# Patient Record
Sex: MALE | Race: OTHER | NOT HISPANIC OR LATINO | Employment: PART TIME | URBAN - METROPOLITAN AREA
[De-identification: names, ages, dates, MRNs, and addresses within clinical notes are randomized per-mention and may not be internally consistent; named-entity substitution may affect disease eponyms.]

---

## 2022-01-07 ENCOUNTER — TELEPHONE (OUTPATIENT)
Dept: TRANSPLANT | Facility: CLINIC | Age: 57
End: 2022-01-07
Payer: COMMERCIAL

## 2022-01-07 NOTE — TELEPHONE ENCOUNTER
----- Message from Awa Manzo sent at 1/7/2022 11:41 AM CST -----    Hepatology referral received and scanned into media; pt chart sent to referral nurse for medical review.     Pt recs are also in Care Everywhere.    Referring Provider: Irvin Sandhu MD  Phone: [phone] Fax: [phone]                  .

## 2022-01-14 ENCOUNTER — TELEPHONE (OUTPATIENT)
Dept: TRANSPLANT | Facility: CLINIC | Age: 57
End: 2022-01-14
Payer: COMMERCIAL

## 2022-01-14 NOTE — TELEPHONE ENCOUNTER
----- Message from Awa Manzo sent at 1/7/2022 11:41 AM CST -----    Hepatology referral received and scanned into media; pt chart sent to referral nurse for medical review.     Pt recs are also in Care Everywhere.    Referring Provider: Irvin Sandhu MD  Phone: [phone] Fax: [phone]                  .

## 2022-01-14 NOTE — TELEPHONE ENCOUNTER
Referral received from CLINTON CAMARGO     Patient with cirrhosis unknown dx . MELD 20  ICD-10:  k74.60  Referred for liver transplant for  EVALUATION.    Referral completed and forwarded to [facility].          Insurance:   PRIMARY: [facility] Indemnity  ID: [id number]  [facility] [phone]

## 2022-01-28 ENCOUNTER — TELEPHONE (OUTPATIENT)
Dept: TRANSPLANT | Facility: CLINIC | Age: 57
End: 2022-01-28
Payer: COMMERCIAL

## 2022-01-28 DIAGNOSIS — Z76.82 ORGAN TRANSPLANT CANDIDATE: ICD-10-CM

## 2022-01-28 DIAGNOSIS — K74.60 HEPATIC CIRRHOSIS, UNSPECIFIED HEPATIC CIRRHOSIS TYPE, UNSPECIFIED WHETHER ASCITES PRESENT: ICD-10-CM

## 2022-02-02 ENCOUNTER — OFFICE VISIT (OUTPATIENT)
Dept: TRANSPLANT | Facility: CLINIC | Age: 57
End: 2022-02-02
Payer: COMMERCIAL

## 2022-02-02 ENCOUNTER — LAB VISIT (OUTPATIENT)
Dept: LAB | Facility: HOSPITAL | Age: 57
End: 2022-02-02
Attending: INTERNAL MEDICINE
Payer: COMMERCIAL

## 2022-02-02 VITALS
BODY MASS INDEX: 40.17 KG/M2 | SYSTOLIC BLOOD PRESSURE: 151 MMHG | RESPIRATION RATE: 16 BRPM | TEMPERATURE: 98 F | DIASTOLIC BLOOD PRESSURE: 70 MMHG | HEIGHT: 67 IN | HEART RATE: 110 BPM | OXYGEN SATURATION: 98 % | WEIGHT: 255.94 LBS

## 2022-02-02 DIAGNOSIS — K74.60 HEPATIC CIRRHOSIS, UNSPECIFIED HEPATIC CIRRHOSIS TYPE, UNSPECIFIED WHETHER ASCITES PRESENT: ICD-10-CM

## 2022-02-02 DIAGNOSIS — K74.69 DECOMPENSATED LIVER DISEASE: ICD-10-CM

## 2022-02-02 DIAGNOSIS — Z76.82 ORGAN TRANSPLANT CANDIDATE: ICD-10-CM

## 2022-02-02 DIAGNOSIS — E11.21 CONTROLLED TYPE 2 DIABETES MELLITUS WITH DIABETIC NEPHROPATHY, WITHOUT LONG-TERM CURRENT USE OF INSULIN: ICD-10-CM

## 2022-02-02 LAB
ABO + RH BLD: NORMAL
AFP SERPL-MCNC: 11 NG/ML (ref 0–8.4)
ALBUMIN SERPL BCP-MCNC: 2.1 G/DL (ref 3.5–5.2)
ALP SERPL-CCNC: 101 U/L (ref 55–135)
ALT SERPL W/O P-5'-P-CCNC: 75 U/L (ref 10–44)
AMPHET+METHAMPHET UR QL: NEGATIVE
ANION GAP SERPL CALC-SCNC: 7 MMOL/L (ref 8–16)
AST SERPL-CCNC: 124 U/L (ref 10–40)
BARBITURATES UR QL SCN>200 NG/ML: NEGATIVE
BASOPHILS # BLD AUTO: 0.03 K/UL (ref 0–0.2)
BASOPHILS NFR BLD: 0.8 % (ref 0–1.9)
BENZODIAZ UR QL SCN>200 NG/ML: NEGATIVE
BILIRUB DIRECT SERPL-MCNC: 1.8 MG/DL (ref 0.1–0.3)
BILIRUB SERPL-MCNC: 3.2 MG/DL (ref 0.1–1)
BILIRUB UR QL STRIP: NEGATIVE
BLD GP AB SCN CELLS X3 SERPL QL: NORMAL
BUN SERPL-MCNC: 17 MG/DL (ref 6–20)
BZE UR QL SCN: NEGATIVE
CALCIUM SERPL-MCNC: 7.5 MG/DL (ref 8.7–10.5)
CANNABINOIDS UR QL SCN: NEGATIVE
CHLORIDE SERPL-SCNC: 98 MMOL/L (ref 95–110)
CLARITY UR REFRACT.AUTO: CLEAR
CO2 SERPL-SCNC: 21 MMOL/L (ref 23–29)
COLOR UR AUTO: YELLOW
CREAT SERPL-MCNC: 1.3 MG/DL (ref 0.5–1.4)
CREAT UR-MCNC: 104 MG/DL (ref 23–375)
DIFFERENTIAL METHOD: ABNORMAL
EOSINOPHIL # BLD AUTO: 0 K/UL (ref 0–0.5)
EOSINOPHIL NFR BLD: 0 % (ref 0–8)
ERYTHROCYTE [DISTWIDTH] IN BLOOD BY AUTOMATED COUNT: 16.1 % (ref 11.5–14.5)
EST. GFR  (AFRICAN AMERICAN): >60 ML/MIN/1.73 M^2
EST. GFR  (NON AFRICAN AMERICAN): >60 ML/MIN/1.73 M^2
ETHANOL UR-MCNC: <10 MG/DL
GGT SERPL-CCNC: 79 U/L (ref 8–55)
GLUCOSE SERPL-MCNC: 320 MG/DL (ref 70–110)
GLUCOSE UR QL STRIP: NEGATIVE
HCT VFR BLD AUTO: 24.6 % (ref 40–54)
HGB BLD-MCNC: 8.1 G/DL (ref 14–18)
HGB UR QL STRIP: NEGATIVE
IMM GRANULOCYTES # BLD AUTO: 0.01 K/UL (ref 0–0.04)
IMM GRANULOCYTES NFR BLD AUTO: 0.3 % (ref 0–0.5)
INR PPP: 1.4 (ref 0.8–1.2)
KETONES UR QL STRIP: NEGATIVE
LEUKOCYTE ESTERASE UR QL STRIP: NEGATIVE
LYMPHOCYTES # BLD AUTO: 0.7 K/UL (ref 1–4.8)
LYMPHOCYTES NFR BLD: 18.1 % (ref 18–48)
MCH RBC QN AUTO: 30.1 PG (ref 27–31)
MCHC RBC AUTO-ENTMCNC: 32.9 G/DL (ref 32–36)
MCV RBC AUTO: 91 FL (ref 82–98)
METHADONE UR QL SCN>300 NG/ML: NEGATIVE
MONOCYTES # BLD AUTO: 0.5 K/UL (ref 0.3–1)
MONOCYTES NFR BLD: 12.4 % (ref 4–15)
NEUTROPHILS # BLD AUTO: 2.5 K/UL (ref 1.8–7.7)
NEUTROPHILS NFR BLD: 68.4 % (ref 38–73)
NITRITE UR QL STRIP: NEGATIVE
NRBC BLD-RTO: 0 /100 WBC
OPIATES UR QL SCN: NEGATIVE
PCP UR QL SCN>25 NG/ML: NEGATIVE
PH UR STRIP: 5 [PH] (ref 5–8)
PLATELET # BLD AUTO: 67 K/UL (ref 150–450)
PMV BLD AUTO: 10.3 FL (ref 9.2–12.9)
POTASSIUM SERPL-SCNC: 4.3 MMOL/L (ref 3.5–5.1)
PROT SERPL-MCNC: 6.4 G/DL (ref 6–8.4)
PROT UR QL STRIP: NEGATIVE
PROTHROMBIN TIME: 15.5 SEC (ref 9–12.5)
RBC # BLD AUTO: 2.69 M/UL (ref 4.6–6.2)
SODIUM SERPL-SCNC: 126 MMOL/L (ref 136–145)
SP GR UR STRIP: 1.01 (ref 1–1.03)
TOXICOLOGY INFORMATION: NORMAL
URN SPEC COLLECT METH UR: NORMAL
WBC # BLD AUTO: 3.7 K/UL (ref 3.9–12.7)

## 2022-02-02 PROCEDURE — 99999 PR PBB SHADOW E&M-EST. PATIENT-LVL III: ICD-10-PCS | Mod: PBBFAC,TXP,, | Performed by: INTERNAL MEDICINE

## 2022-02-02 PROCEDURE — 85610 PROTHROMBIN TIME: CPT | Mod: TXP | Performed by: INTERNAL MEDICINE

## 2022-02-02 PROCEDURE — 82977 ASSAY OF GGT: CPT | Mod: TXP | Performed by: INTERNAL MEDICINE

## 2022-02-02 PROCEDURE — 86706 HEP B SURFACE ANTIBODY: CPT | Mod: TXP | Performed by: INTERNAL MEDICINE

## 2022-02-02 PROCEDURE — 82105 ALPHA-FETOPROTEIN SERUM: CPT | Mod: TXP | Performed by: INTERNAL MEDICINE

## 2022-02-02 PROCEDURE — 80321 ALCOHOLS BIOMARKERS 1OR 2: CPT | Mod: TXP | Performed by: INTERNAL MEDICINE

## 2022-02-02 PROCEDURE — 99205 OFFICE O/P NEW HI 60 MIN: CPT | Mod: S$GLB,TXP,, | Performed by: INTERNAL MEDICINE

## 2022-02-02 PROCEDURE — 82248 BILIRUBIN DIRECT: CPT | Mod: TXP | Performed by: INTERNAL MEDICINE

## 2022-02-02 PROCEDURE — 99999 PR PBB SHADOW E&M-EST. PATIENT-LVL III: CPT | Mod: PBBFAC,TXP,, | Performed by: INTERNAL MEDICINE

## 2022-02-02 PROCEDURE — 87340 HEPATITIS B SURFACE AG IA: CPT | Mod: TXP | Performed by: INTERNAL MEDICINE

## 2022-02-02 PROCEDURE — 99205 PR OFFICE/OUTPT VISIT, NEW, LEVL V, 60-74 MIN: ICD-10-PCS | Mod: S$GLB,TXP,, | Performed by: INTERNAL MEDICINE

## 2022-02-02 PROCEDURE — 86901 BLOOD TYPING SEROLOGIC RH(D): CPT | Mod: TXP | Performed by: INTERNAL MEDICINE

## 2022-02-02 PROCEDURE — 36415 COLL VENOUS BLD VENIPUNCTURE: CPT | Mod: TXP | Performed by: INTERNAL MEDICINE

## 2022-02-02 PROCEDURE — 86850 RBC ANTIBODY SCREEN: CPT | Mod: TXP | Performed by: INTERNAL MEDICINE

## 2022-02-02 PROCEDURE — 86790 VIRUS ANTIBODY NOS: CPT | Mod: TXP | Performed by: INTERNAL MEDICINE

## 2022-02-02 PROCEDURE — 86704 HEP B CORE ANTIBODY TOTAL: CPT | Mod: TXP | Performed by: INTERNAL MEDICINE

## 2022-02-02 PROCEDURE — 85025 COMPLETE CBC W/AUTO DIFF WBC: CPT | Mod: TXP | Performed by: INTERNAL MEDICINE

## 2022-02-02 PROCEDURE — 80053 COMPREHEN METABOLIC PANEL: CPT | Mod: TXP | Performed by: INTERNAL MEDICINE

## 2022-02-02 PROCEDURE — 81003 URINALYSIS AUTO W/O SCOPE: CPT | Mod: TXP,59 | Performed by: INTERNAL MEDICINE

## 2022-02-02 PROCEDURE — 86803 HEPATITIS C AB TEST: CPT | Mod: TXP | Performed by: INTERNAL MEDICINE

## 2022-02-02 PROCEDURE — 80307 DRUG TEST PRSMV CHEM ANLYZR: CPT | Mod: TXP | Performed by: INTERNAL MEDICINE

## 2022-02-02 RX ORDER — LISINOPRIL 10 MG/1
10 TABLET ORAL DAILY
COMMUNITY
Start: 2022-01-09 | End: 2022-03-17

## 2022-02-02 RX ORDER — PANTOPRAZOLE SODIUM 40 MG/1
40 TABLET, DELAYED RELEASE ORAL DAILY
COMMUNITY
Start: 2021-10-20 | End: 2022-04-28

## 2022-02-02 RX ORDER — POTASSIUM CHLORIDE 1500 MG/1
20 TABLET, EXTENDED RELEASE ORAL DAILY
COMMUNITY
Start: 2022-01-09 | End: 2022-03-17

## 2022-02-02 RX ORDER — ATORVASTATIN CALCIUM 20 MG/1
20 TABLET, FILM COATED ORAL DAILY
COMMUNITY
Start: 2022-01-14 | End: 2022-03-24

## 2022-02-02 RX ORDER — LACTULOSE 10 G/15ML
30 SOLUTION ORAL; RECTAL 3 TIMES DAILY
COMMUNITY
Start: 2021-11-21 | End: 2022-02-02

## 2022-02-02 RX ORDER — FUROSEMIDE 40 MG/1
40 TABLET ORAL 2 TIMES DAILY
Status: ON HOLD | COMMUNITY
Start: 2022-01-20 | End: 2022-04-17 | Stop reason: SDUPTHER

## 2022-02-02 RX ORDER — SPIRONOLACTONE 50 MG/1
50 TABLET, FILM COATED ORAL 2 TIMES DAILY
COMMUNITY
Start: 2022-01-20 | End: 2022-03-17

## 2022-02-02 RX ORDER — RIFAXIMIN 550 MG/1
550 TABLET ORAL 2 TIMES DAILY
Status: ON HOLD | COMMUNITY
Start: 2021-12-10 | End: 2022-04-25 | Stop reason: HOSPADM

## 2022-02-02 NOTE — PROGRESS NOTES
Subjective:       Patient ID: Pelon Vasquez is a 57 y.o. male.    Chief Complaint: No chief complaint on file.    HPI  I saw this 57 y.o. man who came to the liver transplant evaluation clinic with his fiance.    He was diagnosed as having cirrhosis in the summer of 2021 but decompensated with ascites/edema in Nov 2021. He was hospitalized at that time and had 2 large volume paracenteses since then.    It is thought that his liver disease is a combination of alcohol/HASH. He admits to drinking 2 large drinks every night for years but stopped drinking when told about his liver disease in the summer of 2021.    Never considered himself to be addicted to alcohol and has not needed to attend AA or rehab. Has never had alcohol-related work or legal problems    MELD has been around 20    Ascites + HE  Fatigue  Dizzy    - ignacio 100 mg  --furossemide 40 mg    Usual weight was 235 lb    MELD-Na score: 25 at 2/2/2022  2:02 PM  MELD score: 17 at 2/2/2022  2:02 PM  Calculated from:  Serum Creatinine: 1.3 mg/dL at 2/2/2022  2:02 PM  Serum Sodium: 126 mmol/L at 2/2/2022  2:02 PM  Total Bilirubin: 3.2 mg/dL at 2/2/2022  2:02 PM  INR(ratio): 1.4 at 2/2/2022  2:02 PM  Age: 57 years    Recent stress test negative- Nov 2021    PMH:  DM  Hypercholesterolemia  Left shoulder surgeries  Knee surgery- Left MCL + meniscus + Right MCL (footballing injuries)    No heart issues    SH:  Construction  Non- smoker    No kids    FH:  adopted      Review of Systems   Constitutional: Positive for activity change, appetite change and fatigue. Negative for chills, fever and unexpected weight change.   HENT: Negative for hearing loss.    Eyes: Negative for discharge and visual disturbance.   Respiratory: Negative for cough, chest tightness, shortness of breath and wheezing.    Cardiovascular: Positive for leg swelling. Negative for chest pain and palpitations.   Gastrointestinal: Positive for abdominal distention. Negative for abdominal pain,  constipation, diarrhea and nausea.   Genitourinary: Negative for dysuria and frequency.   Musculoskeletal: Negative for arthralgias and back pain.   Skin: Negative for pallor and rash.   Neurological: Negative for dizziness, tremors, speech difficulty and headaches.   Hematological: Negative for adenopathy.   Psychiatric/Behavioral: Negative for agitation and confusion.           Lab Results   Component Value Date    ALT 75 (H) 02/02/2022     (H) 02/02/2022    GGT 79 (H) 02/02/2022    ALKPHOS 101 02/02/2022    BILITOT 3.2 (H) 02/02/2022     Past Medical History:   Diagnosis Date    Arthritis     Cirrhosis of liver     HTN (hypertension)     BRAYDON (obstructive sleep apnea)     Type 2 diabetes mellitus      Past Surgical History:   Procedure Laterality Date    COLONOSCOPY      FINGER AMPUTATION      MEDIAL COLLATERAL LIGAMENT REPAIR, KNEE Bilateral     ROTATRO CUFF REPAIR      TONSILLECTOMY       Current Outpatient Medications   Medication Sig    atorvastatin (LIPITOR) 20 MG tablet Take 20 mg by mouth once daily.    furosemide (LASIX) 40 MG tablet TAKE 1 TABLET BY MOUTH IN THE MORNING AND 1 TABLET IN THE EVENING    lisinopriL 10 MG tablet Take 10 mg by mouth once daily.    pantoprazole (PROTONIX) 40 MG tablet Take 40 mg by mouth.    potassium chloride (K-TAB) 20 mEq Take 20 mEq by mouth once daily.    spironolactone (ALDACTONE) 50 MG tablet Take 50 mg by mouth 2 (two) times daily.    XIFAXAN 550 mg Tab Take 550 mg by mouth 2 (two) times daily.     No current facility-administered medications for this visit.       Objective:      Physical Exam  Constitutional:       Appearance: Normal appearance.   Cardiovascular:      Rate and Rhythm: Normal rate and regular rhythm.      Pulses: Normal pulses.   Pulmonary:      Effort: Pulmonary effort is normal.      Breath sounds: Normal breath sounds.   Abdominal:      General: There is distension.   Musculoskeletal:      Right lower leg: Edema present.       Left lower leg: Edema present.         Assessment:       1. Hepatic cirrhosis, unspecified hepatic cirrhosis type, unspecified whether ascites present    2. Organ transplant candidate    3. Decompensated liver disease    4. Controlled type 2 diabetes mellitus with diabetic nephropathy, without long-term current use of insulin        Plan:   He has decompensated alcohol cirrhosis with a recent MELD score of 20. MELD score from today's labs is awaited.    - he talks about confusion and encephalopathy but he has not had any asterixis, does not describe slurred speech or disorientation.    - proceed with LT evaluation  - discussed living donor option    Patient advised that it is recommended that all transplant candidates, and their close contacts and household members receive Covid vaccination.    UNOS Patient Status  Functional Status: 80% - Normal activity with effort: some symptoms of disease  Physical Capacity: No Limitations    Patient on life support: No  Diabetes: Type II  Any previous malignancy: No  Neoadjuvant Therapy: no  Has patient ever had a dx of HCC: no  Previous Abdominal Surgery: no  Spontaneous Bacterial Peritonitis: no  History of Portal Vein Thrombosis: no  Transjugular Intrahepatic Portosystemic Shunt: no    New diabetes onset between last follow-up to the current follow-up: No  Did patient have any acute rejection episodes during the follow-up period: No  Post transplant malignancy: No

## 2022-02-02 NOTE — LETTER
February 4, 2022        Irvin Sandhu  [address]  [address]  [facility] [place] [address]  Phone: [phone]  Fax: [phone]             [facility]  [address]  [facility] [address]  Phone: [phone]   Patient: Pelon Vasquez   MR Number: [id number]   YOB: 1965   Date of Visit: 2/2/2022       Dear Dr. Irvin Sandhu    Thank you for referring Pelon Vasquez to me for evaluation. Attached you will find relevant portions of my assessment and plan of care.    If you have questions, please do not hesitate to call me. I look forward to following Pelon Vasquez along with you.    Sincerely,    Tito Mcclure MD    Enclosure    If you would like to receive this communication electronically, please contact [email or web address] or [phone] to request [email or web address] Link access.    [email or web address] Link is a tool which provides read-only access to select patient information with whom you have a relationship. Its easy to use and provides real time access to review your patients record including encounter summaries, notes, results, and demographic information.    If you feel you have received this communication in error or would no longer like to receive these types of communications, please e-mail [email or web address]

## 2022-02-03 LAB
HAV IGG SER QL IA: NEGATIVE
HBV CORE AB SERPL QL IA: NEGATIVE
HBV SURFACE AB SER-ACNC: NEGATIVE M[IU]/ML
HBV SURFACE AG SERPL QL IA: NEGATIVE
HCV AB SERPL QL IA: NEGATIVE

## 2022-02-04 PROBLEM — K74.60 DECOMPENSATED LIVER DISEASE: Status: ACTIVE | Noted: 2022-02-04

## 2022-02-04 PROBLEM — K72.90 DECOMPENSATED LIVER DISEASE: Status: ACTIVE | Noted: 2022-02-04

## 2022-02-04 PROBLEM — E11.9 CONTROLLED TYPE 2 DIABETES MELLITUS, WITHOUT LONG-TERM CURRENT USE OF INSULIN: Status: ACTIVE | Noted: 2022-02-04

## 2022-02-04 PROBLEM — K74.69 DECOMPENSATED LIVER DISEASE: Status: ACTIVE | Noted: 2022-02-04

## 2022-02-07 ENCOUNTER — TELEPHONE (OUTPATIENT)
Dept: TRANSPLANT | Facility: CLINIC | Age: 57
End: 2022-02-07
Payer: COMMERCIAL

## 2022-02-07 NOTE — TELEPHONE ENCOUNTER
Called pt to discuss liver transplant evaluation.  Informed patient that evaluation will take approx 2 days to complete.  Informed patient that all testing will be done outpatient.  Patient voiced understanding of the need to have his caregiver present for the  and surgeon consult, as well as for the patient education.  All questions/ concerns regarding liver transplant evaluation answered/ addressed.    Orders for liver transplant evaluation entered and submitted to  for scheduling.  Will schedule appts 2/17 and 2/18.  Patient reports that he is currently in the ER at [facility] for fluid overload, but anticipates being discharged within a few days.    ==================================================    ----- Message from Tito Mcclure MD sent at 2/3/2022  9:42 AM CST -----  Please start liver tx evaluation  Results reviewed.

## 2022-02-08 DIAGNOSIS — I10 HYPERTENSION, UNSPECIFIED TYPE: ICD-10-CM

## 2022-02-08 DIAGNOSIS — K74.69 DECOMPENSATED LIVER DISEASE: Primary | ICD-10-CM

## 2022-02-08 DIAGNOSIS — E11.9 CONTROLLED TYPE 2 DIABETES MELLITUS WITHOUT COMPLICATION, WITHOUT LONG-TERM CURRENT USE OF INSULIN: ICD-10-CM

## 2022-02-08 DIAGNOSIS — R18.8 OTHER ASCITES: ICD-10-CM

## 2022-02-08 DIAGNOSIS — Z76.82 ORGAN TRANSPLANT CANDIDATE: ICD-10-CM

## 2022-02-09 DIAGNOSIS — E11.9 CONTROLLED TYPE 2 DIABETES MELLITUS WITHOUT COMPLICATION, WITHOUT LONG-TERM CURRENT USE OF INSULIN: ICD-10-CM

## 2022-02-09 DIAGNOSIS — Z76.82 ORGAN TRANSPLANT CANDIDATE: ICD-10-CM

## 2022-02-09 DIAGNOSIS — K74.69 DECOMPENSATED LIVER DISEASE: ICD-10-CM

## 2022-02-09 DIAGNOSIS — I10 HYPERTENSION, UNSPECIFIED TYPE: Primary | ICD-10-CM

## 2022-02-09 LAB
PETH 16:0/18.1 (POPETH): <10 NG/ML
PETH 16:0/18.2 (PLPETH): <10 NG/ML

## 2022-02-10 ENCOUNTER — TELEPHONE (OUTPATIENT)
Dept: TRANSPLANT | Facility: CLINIC | Age: 57
End: 2022-02-10
Payer: COMMERCIAL

## 2022-02-10 DIAGNOSIS — E11.9 CONTROLLED TYPE 2 DIABETES MELLITUS WITHOUT COMPLICATION, WITHOUT LONG-TERM CURRENT USE OF INSULIN: ICD-10-CM

## 2022-02-10 DIAGNOSIS — K74.69 DECOMPENSATED LIVER DISEASE: Primary | ICD-10-CM

## 2022-02-10 DIAGNOSIS — R18.8 OTHER ASCITES: ICD-10-CM

## 2022-02-10 DIAGNOSIS — Z76.82 ORGAN TRANSPLANT CANDIDATE: ICD-10-CM

## 2022-02-10 NOTE — TELEPHONE ENCOUNTER
Called [facility] in [place], [place] to request disc of MRI/ CT.  Faxed request to [phone].

## 2022-02-14 ENCOUNTER — HOSPITAL ENCOUNTER (OUTPATIENT)
Dept: RADIOLOGY | Facility: HOSPITAL | Age: 57
Discharge: HOME | End: 2022-02-14
Attending: INTERNAL MEDICINE
Payer: COMMERCIAL

## 2022-02-14 DIAGNOSIS — K74.69 DECOMPENSATED LIVER DISEASE: ICD-10-CM

## 2022-02-14 DIAGNOSIS — R18.8 OTHER ASCITES: ICD-10-CM

## 2022-02-14 DIAGNOSIS — E11.9 CONTROLLED TYPE 2 DIABETES MELLITUS WITHOUT COMPLICATION, WITHOUT LONG-TERM CURRENT USE OF INSULIN: ICD-10-CM

## 2022-02-14 DIAGNOSIS — Z76.82 ORGAN TRANSPLANT CANDIDATE: ICD-10-CM

## 2022-02-14 PROCEDURE — 77080 DEXA BONE DENSITY SPINE HIP: ICD-10-PCS | Mod: 26,TXP,, | Performed by: RADIOLOGY

## 2022-02-14 PROCEDURE — 77080 DXA BONE DENSITY AXIAL: CPT | Mod: TC,TXP

## 2022-02-14 PROCEDURE — 77080 DXA BONE DENSITY AXIAL: CPT | Mod: 26,TXP,, | Performed by: RADIOLOGY

## 2022-02-16 DIAGNOSIS — Z76.82 ORGAN TRANSPLANT CANDIDATE: Primary | ICD-10-CM

## 2022-02-17 ENCOUNTER — HOSPITAL ENCOUNTER (OUTPATIENT)
Dept: RADIOLOGY | Facility: HOSPITAL | Age: 57
Discharge: HOME | End: 2022-02-17
Attending: INTERNAL MEDICINE
Payer: COMMERCIAL

## 2022-02-17 ENCOUNTER — HOSPITAL ENCOUNTER (OUTPATIENT)
Dept: CARDIOLOGY | Facility: HOSPITAL | Age: 57
Discharge: HOME | End: 2022-02-17
Attending: INTERNAL MEDICINE
Payer: COMMERCIAL

## 2022-02-17 ENCOUNTER — CLINICAL SUPPORT (OUTPATIENT)
Dept: TRANSPLANT | Facility: CLINIC | Age: 57
End: 2022-02-17
Payer: COMMERCIAL

## 2022-02-17 ENCOUNTER — OFFICE VISIT (OUTPATIENT)
Dept: INFECTIOUS DISEASES | Facility: CLINIC | Age: 57
End: 2022-02-17
Payer: COMMERCIAL

## 2022-02-17 VITALS
OXYGEN SATURATION: 98 % | RESPIRATION RATE: 16 BRPM | SYSTOLIC BLOOD PRESSURE: 140 MMHG | HEART RATE: 108 BPM | BODY MASS INDEX: 40.81 KG/M2 | SYSTOLIC BLOOD PRESSURE: 115 MMHG | WEIGHT: 260 LBS | DIASTOLIC BLOOD PRESSURE: 61 MMHG | TEMPERATURE: 97 F | WEIGHT: 260.38 LBS | RESPIRATION RATE: 16 BRPM | HEIGHT: 67 IN | BODY MASS INDEX: 40.87 KG/M2 | HEIGHT: 67 IN | DIASTOLIC BLOOD PRESSURE: 62 MMHG | HEART RATE: 99 BPM

## 2022-02-17 VITALS
OXYGEN SATURATION: 98 % | DIASTOLIC BLOOD PRESSURE: 62 MMHG | WEIGHT: 260.38 LBS | TEMPERATURE: 97 F | HEART RATE: 108 BPM | RESPIRATION RATE: 16 BRPM | SYSTOLIC BLOOD PRESSURE: 140 MMHG | BODY MASS INDEX: 40.87 KG/M2 | HEIGHT: 67 IN

## 2022-02-17 VITALS
HEIGHT: 67 IN | DIASTOLIC BLOOD PRESSURE: 58 MMHG | HEART RATE: 97 BPM | TEMPERATURE: 98 F | BODY MASS INDEX: 41.18 KG/M2 | WEIGHT: 262.38 LBS | SYSTOLIC BLOOD PRESSURE: 115 MMHG

## 2022-02-17 DIAGNOSIS — Z76.82 ORGAN TRANSPLANT CANDIDATE: ICD-10-CM

## 2022-02-17 DIAGNOSIS — K74.69 DECOMPENSATED LIVER DISEASE: ICD-10-CM

## 2022-02-17 DIAGNOSIS — R18.8 OTHER ASCITES: ICD-10-CM

## 2022-02-17 DIAGNOSIS — E11.9 CONTROLLED TYPE 2 DIABETES MELLITUS WITHOUT COMPLICATION, WITHOUT LONG-TERM CURRENT USE OF INSULIN: ICD-10-CM

## 2022-02-17 DIAGNOSIS — I10 HYPERTENSION, UNSPECIFIED TYPE: ICD-10-CM

## 2022-02-17 LAB
ASCENDING AORTA: 2.81 CM
AV INDEX (PROSTH): 0.8
AV MEAN GRADIENT: 10 MMHG
AV PEAK GRADIENT: 18 MMHG
AV VALVE AREA: 3 CM2
AV VELOCITY RATIO: 0.75
BSA FOR ECHO PROCEDURE: 2.36 M2
CV ECHO LV RWT: 0.37 CM
CV STRESS BASE HR: 100 BPM
DIASTOLIC BLOOD PRESSURE: 61 MMHG
DOP CALC AO PEAK VEL: 2.13 M/S
DOP CALC AO VTI: 40.63 CM
DOP CALC LVOT AREA: 3.7 CM2
DOP CALC LVOT DIAMETER: 2.18 CM
DOP CALC LVOT PEAK VEL: 1.6 M/S
DOP CALC LVOT STROKE VOLUME: 121.88 CM3
DOP CALCLVOT PEAK VEL VTI: 32.67 CM
E WAVE DECELERATION TIME: 185 MSEC
E/A RATIO: 0.91
E/E' RATIO: 8.08 M/S
ECHO LV POSTERIOR WALL: 0.85 CM (ref 0.6–1.1)
EJECTION FRACTION: 65 %
FRACTIONAL SHORTENING: 35 % (ref 28–44)
INTERVENTRICULAR SEPTUM: 0.69 CM (ref 0.6–1.1)
IVRT: 57.09 MSEC
LA MAJOR: 6.13 CM
LA MINOR: 6.01 CM
LA WIDTH: 4.86 CM
LEFT ATRIUM SIZE: 3.71 CM
LEFT ATRIUM VOLUME INDEX MOD: 37.8 ML/M2
LEFT ATRIUM VOLUME INDEX: 41.2 ML/M2
LEFT ATRIUM VOLUME MOD: 85.32 CM3
LEFT ATRIUM VOLUME: 93.02 CM3
LEFT INTERNAL DIMENSION IN SYSTOLE: 2.96 CM (ref 2.1–4)
LEFT VENTRICLE DIASTOLIC VOLUME INDEX: 42.73 ML/M2
LEFT VENTRICLE DIASTOLIC VOLUME: 96.57 ML
LEFT VENTRICLE MASS INDEX: 49 G/M2
LEFT VENTRICLE SYSTOLIC VOLUME INDEX: 15 ML/M2
LEFT VENTRICLE SYSTOLIC VOLUME: 33.95 ML
LEFT VENTRICULAR INTERNAL DIMENSION IN DIASTOLE: 4.58 CM (ref 3.5–6)
LEFT VENTRICULAR MASS: 111.38 G
LV LATERAL E/E' RATIO: 7.77 M/S
LV SEPTAL E/E' RATIO: 8.42 M/S
MV A" WAVE DURATION": 9.99 MSEC
MV PEAK A VEL: 1.11 M/S
MV PEAK E VEL: 1.01 M/S
MV STENOSIS PRESSURE HALF TIME: 53.65 MS
MV VALVE AREA P 1/2 METHOD: 4.1 CM2
OHS CV CPX 1 MINUTE RECOVERY HEART RATE: 141 BPM
OHS CV CPX 85 PERCENT MAX PREDICTED HEART RATE MALE: 140
OHS CV CPX MAX PREDICTED HEART RATE: 165
OHS CV CPX PATIENT IS FEMALE: 0
OHS CV CPX PATIENT IS MALE: 1
OHS CV CPX PEAK DIASTOLIC BLOOD PRESSURE: 36 MMHG
OHS CV CPX PEAK HEAR RATE: 141 BPM
OHS CV CPX PEAK RATE PRESSURE PRODUCT: NORMAL
OHS CV CPX PEAK SYSTOLIC BLOOD PRESSURE: 92 MMHG
OHS CV CPX PERCENT MAX PREDICTED HEART RATE ACHIEVED: 85
OHS CV CPX RATE PRESSURE PRODUCT PRESENTING: NORMAL
PISA TR MAX VEL: 2.55 M/S
PULM VEIN S/D RATIO: 1.52
PV PEAK D VEL: 0.67 M/S
PV PEAK S VEL: 1.02 M/S
RA MAJOR: 5.46 CM
RA PRESSURE: 3 MMHG
RA WIDTH: 3.9 CM
RIGHT VENTRICULAR END-DIASTOLIC DIMENSION: 3.59 CM
RV TISSUE DOPPLER FREE WALL SYSTOLIC VELOCITY 1 (APICAL 4 CHAMBER VIEW): 22.24 CM/S
SINUS: 2.95 CM
STJ: 2.66 CM
SYSTOLIC BLOOD PRESSURE: 115 MMHG
TDI LATERAL: 0.13 M/S
TDI SEPTAL: 0.12 M/S
TDI: 0.13 M/S
TR MAX PG: 26 MMHG
TRICUSPID ANNULAR PLANE SYSTOLIC EXCURSION: 3.02 CM
TV REST PULMONARY ARTERY PRESSURE: 29 MMHG

## 2022-02-17 PROCEDURE — 93975 VASCULAR STUDY: CPT | Mod: 26,TXP,, | Performed by: RADIOLOGY

## 2022-02-17 PROCEDURE — 76700 US EXAM ABDOM COMPLETE: CPT | Mod: TC,TXP,59

## 2022-02-17 PROCEDURE — 99999 PR PBB SHADOW E&M-EST. PATIENT-LVL III: CPT | Mod: PBBFAC,TXP,,

## 2022-02-17 PROCEDURE — 93351 STRESS TTE COMPLETE: CPT | Mod: 26,TXP,, | Performed by: INTERNAL MEDICINE

## 2022-02-17 PROCEDURE — 71046 XR CHEST PA AND LATERAL: ICD-10-PCS | Mod: 26,TXP,, | Performed by: RADIOLOGY

## 2022-02-17 PROCEDURE — 99999 PR PBB SHADOW E&M-EST. PATIENT-LVL IV: CPT | Mod: PBBFAC,TXP,, | Performed by: PHYSICIAN ASSISTANT

## 2022-02-17 PROCEDURE — 93351 STRESS ECHO (CUPID ONLY): ICD-10-PCS | Mod: 26,TXP,, | Performed by: INTERNAL MEDICINE

## 2022-02-17 PROCEDURE — 93351 STRESS TTE COMPLETE: CPT | Mod: TXP

## 2022-02-17 PROCEDURE — 99204 PR OFFICE/OUTPT VISIT, NEW, LEVL IV, 45-59 MIN: ICD-10-PCS | Mod: S$GLB,TXP,, | Performed by: PHYSICIAN ASSISTANT

## 2022-02-17 PROCEDURE — 93320 STRESS ECHO (CUPID ONLY): ICD-10-PCS | Mod: 26,TXP,, | Performed by: INTERNAL MEDICINE

## 2022-02-17 PROCEDURE — 99999 PR PBB SHADOW E&M-EST. PATIENT-LVL IV: ICD-10-PCS | Mod: PBBFAC,TXP,, | Performed by: PHYSICIAN ASSISTANT

## 2022-02-17 PROCEDURE — 63600175 PHARM REV CODE 636 W HCPCS: Mod: TXP | Performed by: INTERNAL MEDICINE

## 2022-02-17 PROCEDURE — 99999 PR PBB SHADOW E&M-EST. PATIENT-LVL III: ICD-10-PCS | Mod: PBBFAC,TXP,,

## 2022-02-17 PROCEDURE — 93325 DOPPLER ECHO COLOR FLOW MAPG: CPT | Mod: 26,TXP,, | Performed by: INTERNAL MEDICINE

## 2022-02-17 PROCEDURE — 71046 X-RAY EXAM CHEST 2 VIEWS: CPT | Mod: TC,FY,TXP

## 2022-02-17 PROCEDURE — 93975 VASCULAR STUDY: CPT | Mod: TC,TXP

## 2022-02-17 PROCEDURE — 93325 STRESS ECHO (CUPID ONLY): ICD-10-PCS | Mod: 26,TXP,, | Performed by: INTERNAL MEDICINE

## 2022-02-17 PROCEDURE — A4216 STERILE WATER/SALINE, 10 ML: HCPCS | Mod: TXP | Performed by: INTERNAL MEDICINE

## 2022-02-17 PROCEDURE — 76700 US EXAM ABDOM COMPLETE: CPT | Mod: 26,XS,TXP, | Performed by: RADIOLOGY

## 2022-02-17 PROCEDURE — 25000003 PHARM REV CODE 250: Mod: TXP | Performed by: INTERNAL MEDICINE

## 2022-02-17 PROCEDURE — 71046 X-RAY EXAM CHEST 2 VIEWS: CPT | Mod: 26,TXP,, | Performed by: RADIOLOGY

## 2022-02-17 PROCEDURE — 93320 DOPPLER ECHO COMPLETE: CPT | Mod: 26,TXP,, | Performed by: INTERNAL MEDICINE

## 2022-02-17 PROCEDURE — 99204 OFFICE O/P NEW MOD 45 MIN: CPT | Mod: S$GLB,TXP,, | Performed by: PHYSICIAN ASSISTANT

## 2022-02-17 PROCEDURE — 76700 US ABDOMEN COMP WITH DOPPLER_PRE LIVER TRANSPLANT: ICD-10-PCS | Mod: 26,XS,TXP, | Performed by: RADIOLOGY

## 2022-02-17 PROCEDURE — 93975 US ABDOMEN COMP WITH DOPPLER_PRE LIVER TRANSPLANT: ICD-10-PCS | Mod: 26,TXP,, | Performed by: RADIOLOGY

## 2022-02-17 RX ORDER — ATROPINE SULFATE 0.1 MG/ML
0.25 INJECTION INTRAVENOUS
Status: COMPLETED | OUTPATIENT
Start: 2022-02-17 | End: 2022-02-17

## 2022-02-17 RX ORDER — DOBUTAMINE HYDROCHLORIDE 400 MG/100ML
40 INJECTION INTRAVENOUS
Status: COMPLETED | OUTPATIENT
Start: 2022-02-17 | End: 2022-02-17

## 2022-02-17 RX ORDER — SODIUM CHLORIDE 0.9 % (FLUSH) 0.9 %
100 SYRINGE (ML) INJECTION
Status: COMPLETED | OUTPATIENT
Start: 2022-02-17 | End: 2022-02-17

## 2022-02-17 RX ORDER — SODIUM CHLORIDE 0.9 % (FLUSH) 0.9 %
50 SYRINGE (ML) INJECTION
Status: COMPLETED | OUTPATIENT
Start: 2022-02-17 | End: 2022-02-17

## 2022-02-17 RX ADMIN — SODIUM CHLORIDE 50 ML: 9 INJECTION, SOLUTION INTRAMUSCULAR; INTRAVENOUS; SUBCUTANEOUS at 10:02

## 2022-02-17 RX ADMIN — SODIUM CHLORIDE 100 ML: 9 INJECTION, SOLUTION INTRAMUSCULAR; INTRAVENOUS; SUBCUTANEOUS at 10:02

## 2022-02-17 RX ADMIN — ATROPINE SULFATE 0.25 MG: 0.1 INJECTION PARENTERAL at 10:02

## 2022-02-17 RX ADMIN — DOBUTAMINE HYDROCHLORIDE 40 MCG/KG/MIN: 400 INJECTION INTRAVENOUS at 10:02

## 2022-02-17 NOTE — PROGRESS NOTES
PHARM.D. PRE-TRANSPLANT NOTE:    This patient's medication therapy was evaluated as part of his pre-transplant evaluation.      The following general pharmacologic concerns were noted: none    The following concerns for post-operative pain management were noted: none    The following pharmacologic concerns related to HCV therapy were noted: none      This patient's medication profile was reviewed for considerations for DAA Hepatitis C therapy:    [x]  No current inducers of CYP 3A4 or PGP  [x]  No amiodarone on this patient's EMR profile in the last 24 months  [x]  No past or current atrial fibrillation on this patient's EMR profile       Current Outpatient Medications   Medication Sig Dispense Refill    atorvastatin (LIPITOR) 20 MG tablet Take 20 mg by mouth once daily.      furosemide (LASIX) 40 MG tablet TAKE 1 TABLET BY MOUTH IN THE MORNING AND 1 TABLET IN THE EVENING      lisinopriL 10 MG tablet Take 10 mg by mouth once daily.      pantoprazole (PROTONIX) 40 MG tablet Take 40 mg by mouth once daily.      potassium chloride (K-TAB) 20 mEq Take 20 mEq by mouth once daily.      spironolactone (ALDACTONE) 50 MG tablet Take 50 mg by mouth 2 (two) times daily.      XIFAXAN 550 mg Tab Take 550 mg by mouth 2 (two) times daily.       No current facility-administered medications for this visit.           I am available for consultation and can be contacted, as needed by the other members of the Transplant team.

## 2022-02-17 NOTE — PROGRESS NOTES
Pre Transplant Infectious Diseases Consult  Liver Transplant Recipient Evaluation    Requesting Physician: Dr. Mcclure    Reason for Visit:  Pre Transplant Evaluation    Organ:  Liver    Etiology of Liver Disease:  Alcohol cirrhosis and LOZANO. +ascites +paracentesis. Denies SBP. Not on lactulose. Denies esophageal varices     History of Prior Transplant:  No    Currently taking immunosuppressants/steroids:  No    History of Splenectomy:  No    Infectious History:   Current/recent infections or currently taking antibiotics?  No. Took abx for sinus infection in the last year   History of recurrent infections (sinuses, throat, bladder/kidneys, intestines, skin, dental, lung, catheter (HD/PD) related, or peritonitis/SBP)?  No  Any major hospitalizations due to infection?  No  If diabetic, history of diabetic foot infection/osteomyelitis?  No  History of shingles?  No  History of STDs (syphilis, viral hepatitis, HIV)?  Yes - syphilis in the late 80s. Treated.   Exposure to TB or ever had a positive TB skin test?  No  History of residence in coccidioides endemic areas ([facility]?  Born in [place], [place] and [place]   Any foreign travel?  No  Any associated illness?  No    Social/Environmental:  Occupational:  Construction   Animal exposures (dogs, cats, farm animals, bird cages, fish tanks):  Yes - indoor and outdoor   Hobbies (gardening, hike, fish/hunting, etc): none  Consumption of raw/undercooked meat or seafood?  No  Any injectable or smoked recreational drug use?  No    Immunization History:  Childhood vaccines:  Yes  Last Flu shot: 2021  Tetanus/TDAP:[id number]  Hepatitis A:  Hepatitis B:  Prevnar-13:  Pneumovax-23:  Shingles (Zostavax/Shingrix):  Meningococcal:  Other:     Serologies:  CMV IgG Interpretation   Date Value Ref Range Status   02/14/2022 Reactive (A) Non-Reactive Final     Hepatitis A Antibody IgG   Date Value Ref Range Status   02/02/2022 Negative  Final     Comment:     IgG anti-HAV not  detected.     Hep B Core Total Ab   Date Value Ref Range Status   02/02/2022 Negative  Final     Hep B S Ab   Date Value Ref Range Status   02/02/2022 Negative  Final     Comment:     Individual is considered not immune to HBV infection.     Hepatitis B Surface Ag   Date Value Ref Range Status   02/02/2022 Negative Negative Final     HIV 1/2 Ag/Ab   Date Value Ref Range Status   02/14/2022 Negative Negative Final     TB Gold Plus   Date Value Ref Range Status   02/14/2022 Negative  Final     Comment:     The Nil tube value is used to determine if the patient has a  preexisting immune response which could cause a false-positive  reading on the test.  In order for a test to be valid, the   NIL tube must have a value of less than or equal to 8.0 IU/mL.    The mitogen control tube is used to assure the patient has a   healthy immune status and also serves as a control for correct  blood handling and incubation.  It is used to detect false-  negative readings.  The mitogen tube must have a gamma   interferon value of greater than or equal to 0.5 IU/mL higher  that the value of the Nil tube.    The TB antigen tubes are coated with the M. tuberculosis   specific antigens. For a test to be considered positive,the  TB antigen tube values minus the Nil tube value must be   greater than or equal to 0.35 IU/mL.    Diagnosing or excluding tuberculosis disease, and assessing  the probability of LTNI, requires a combination of   epidemiological, historical, medical, and diagnostic findings  that should be taken into account when interpreting   Quantiferon-TB Gold Plus results.       RPR   Date Value Ref Range Status   02/14/2022 Non-reactive Non-reactive Final     Varicella Interpretation   Date Value Ref Range Status   02/14/2022 Positive (A) Negative Final     Comment:     <or=0.8    Negative        No detectable IgG antibody to Varicella zoster  by the TUYET test. Such individuals are presumed to be   uninfected with Varicella  zoster and to be susceptible to   primary infection.    0.9-1.0    Equivocal    >or=1.1    Positive        Indicates presence of detectable IgG antibody to Varicella   zoster by the TUYET test. Indicative of previous or current   infection.          Review of Systems   Constitutional: Negative for chills, decreased appetite, fever, malaise/fatigue, night sweats, weight gain and weight loss.   HENT: Negative for congestion, ear pain, hearing loss, hoarse voice, sore throat and tinnitus.    Eyes: Negative for blurred vision, pain, vision loss in left eye, vision loss in right eye and visual disturbance.   Cardiovascular: Negative for chest pain, dyspnea on exertion, leg swelling and palpitations.   Respiratory: Negative for cough, shortness of breath, sputum production and wheezing.    Skin: Negative for dry skin, itching, rash and suspicious lesions.   Musculoskeletal: Negative for back pain, joint pain, myalgias and neck pain.   Gastrointestinal: Negative for abdominal pain, constipation, diarrhea, heartburn, nausea and vomiting.   Genitourinary: Negative for dysuria, flank pain, frequency, hematuria, hesitancy and urgency.   Neurological: Negative for dizziness, headaches, numbness, paresthesias and weakness.   Psychiatric/Behavioral: Negative for depression and memory loss. The patient does not have insomnia and is not nervous/anxious.      Physical Exam  Vitals reviewed.   Constitutional:       General: He is not in acute distress.     Appearance: He is well-developed and well-nourished. He is obese. He is not diaphoretic.   HENT:      Head: Normocephalic and atraumatic.      Mouth/Throat:      Mouth: Oropharynx is clear and moist.   Eyes:      Extraocular Movements: EOM normal.      Pupils: Pupils are equal, round, and reactive to light.   Cardiovascular:      Rate and Rhythm: Normal rate and regular rhythm.      Pulses: Intact distal pulses.      Heart sounds: Normal heart sounds. No murmur heard.  No friction  rub. No gallop.    Pulmonary:      Effort: Pulmonary effort is normal. No respiratory distress.      Breath sounds: Normal breath sounds. No wheezing or rales.   Chest:      Chest wall: No tenderness.   Abdominal:      General: Bowel sounds are normal. There is distension.      Palpations: There is no mass.      Tenderness: There is no abdominal tenderness.   Musculoskeletal:         General: No deformity. Normal range of motion.      Cervical back: Normal range of motion and neck supple.      Right lower leg: Edema present.      Left lower leg: Edema present.   Skin:     General: Skin is warm and dry.      Findings: No erythema or rash.   Neurological:      Mental Status: He is alert and oriented to person, place, and time.   Psychiatric:         Mood and Affect: Mood and affect and mood normal.         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  The patient has been counseled on the importance of vaccinations including but not limited to a yearly flu vaccine. Patient was also instructed to encourage that family/caretakers receive their flu vaccine yearly. The patient was encouraged to contact us about any problems that may develop after immunizations and possible side effects were reviewed.     Specific guidance has been provided to the patient regarding the patient's occupation, hobbies and activities to avoid future infectious complications. These include but are not limited to: avoiding raw/undercooked meats and seafood, avoiding unpasteurized milk/cheeses, proper (hand) hygiene, contact with animals and appropriate vaccination of animals, use of mosquito/tick precautions, avoiding walking barefoot, avoiding sick contacts, and seeking medical advice  prior to foreign travel (specifically developing countries).     Transplant Candidacy: Based on available information, there are no identified significant barriers to transplantation from an infectious disease standpoint pending acceptable serologies and subject to recommendations below.     Final determination of transplant candidacy will be made once evaluation is complete and reviewed by the Transplant Selection Committee.      ID recommendations:          HIV, RPR, HEP C, NEGATIVE. Strongyloides IgG pending. If positive will need treatment with ivermectin      Vaccines recommended (rx given)   1. heplisav B vaccine series due day 0, 1 month  2. prevnar vaccine  3. shingrix series due day 0, 2-6 months   4. Hepatitis A vaccine series due day 0, 6 months  5. covid vaccine due

## 2022-02-17 NOTE — NURSING NOTE
Patient verified by 2 identifiers and allergies reviewed.  22g IV placed to lt AC.  DSE explained to patient, consent obtained & testing completed.  Pt tolerated testing well except for C/O lightheadedness, dizziness & fatigue associated w/ hypotension during peak testing.  NS 150cc IVP given in divided doses w/ resolution of hypotension & symptoms.  IV removed post testing.  Post study discharge instructions reviewed with patient, patient verbalized understanding.  Patient discharged to home in stable condition.

## 2022-02-17 NOTE — PROGRESS NOTES
Pelon was  seen in clinic for Fast Pass evaluation.  Handbook on pre-liver transplant information (see outline below) was given to the patient.  Patient's wife, accompanied him to his scheduled appointments   Patient viewed pre-liver transplant education slides via desktop in transplant clinic.  Informed consent signed and written information given on selection criteria.    LIVER TRANSPLANT WORK-UP EDUCATION   I. UNDERSTANDING THE TRANSPLANT PROCESS     A. Transplant team      B. MELD score      C. Balancing urgency and outcome     D. Liver Transplant Options         1.  Donor         2. Living Donor--rationale, benefits     E. Transplant Work-up         1. Medical         2. Psychological and Social--lifetime commitment, life changes, personal plan/ goal         3. Financial--fundraising     F.  Completed work-up and Next Steps    G. Wait Time         1.  Can be listed at more than one center         2.  Can transfer wait time     H. The Call       I. Possible donor options         1. DCD         2. Hep B Core and Hep C Positive         3. Increased Risk     J.  Liver Transplant Surgery         1. Length         2. Transfusions, cell saver         3. Surgical risks         4. What to expect after sugery--Central lines, drains, Shirley catheter, incision, endotracheal              tube, NG tube, length of stay in ICU/ TSU  II.  HOW TO BEST CARE FOR YOURSELF (Take Five To Thrive)  III. UNDERSTANDING LIFE AFTER TRANSPLANT  A. Medicines after transplant      1. Immunosuppression--infection and rejection  B. Labs   IV. ADULT LIVER SURVIVAL RATES

## 2022-02-18 ENCOUNTER — LAB VISIT (OUTPATIENT)
Dept: LAB | Facility: HOSPITAL | Age: 57
End: 2022-02-18
Attending: INTERNAL MEDICINE
Payer: COMMERCIAL

## 2022-02-18 DIAGNOSIS — Z76.82 ORGAN TRANSPLANT CANDIDATE: ICD-10-CM

## 2022-02-18 DIAGNOSIS — E11.9 CONTROLLED TYPE 2 DIABETES MELLITUS WITHOUT COMPLICATION, WITHOUT LONG-TERM CURRENT USE OF INSULIN: ICD-10-CM

## 2022-02-18 DIAGNOSIS — K74.69 DECOMPENSATED LIVER DISEASE: ICD-10-CM

## 2022-02-18 DIAGNOSIS — R18.8 OTHER ASCITES: ICD-10-CM

## 2022-02-18 LAB
CREAT CL/1.73 SQ M 12H UR+SERPL-ARVRAT: 49 ML/MIN (ref 70–110)
CREAT CL/1.73 SQ M 12H UR+SERPL-ARVRAT: 49 ML/MIN (ref 70–110)
CREAT SERPL-MCNC: 1.4 MG/DL (ref 0.5–1.4)
CREAT SERPL-MCNC: 1.4 MG/DL (ref 0.5–1.4)
CREAT UR-MCNC: 157.9 MG/DL (ref 23–375)
CREAT UR-MCNC: 157.9 MG/DL (ref 23–375)
CREATININE, URINE (MG/SPEC): 986.9 MG/SPEC
CREATININE, URINE (MG/SPEC): 986.9 MG/SPEC
PROT 24H UR-MRATE: 75 MG/SPEC (ref 0–100)
PROT 24H UR-MRATE: 75 MG/SPEC (ref 0–100)
PROT UR-MCNC: 12 MG/DL (ref 0–15)
PROT UR-MCNC: 12 MG/DL (ref 0–15)
URINE COLLECTION DURATION: 24 HR
URINE VOLUME: 625 ML

## 2022-02-18 PROCEDURE — 84156 ASSAY OF PROTEIN URINE: CPT | Mod: TXP | Performed by: INTERNAL MEDICINE

## 2022-02-18 PROCEDURE — 82575 CREATININE CLEARANCE TEST: CPT | Mod: TXP | Performed by: INTERNAL MEDICINE

## 2022-02-18 NOTE — PROGRESS NOTES
Pre-liver transplant education provided via slide show in exam room of transplant clinic.  E-consents obtained by Lianne Steven LPN and can be found under the Media tab.  ALBAN and HIV consent submitted to ZION Snyder MA for scanning.

## 2022-02-21 ENCOUNTER — TELEPHONE (OUTPATIENT)
Dept: TRANSPLANT | Facility: CLINIC | Age: 57
End: 2022-02-21
Payer: COMMERCIAL

## 2022-02-21 ENCOUNTER — DOCUMENTATION ONLY (OUTPATIENT)
Dept: TRANSPLANT | Facility: CLINIC | Age: 57
End: 2022-02-21
Payer: COMMERCIAL

## 2022-02-21 DIAGNOSIS — E56.9 VITAMIN DEFICIENCY: Primary | ICD-10-CM

## 2022-02-21 RX ORDER — UREA 10 %
220 LOTION (ML) TOPICAL DAILY
Qty: 90 TABLET | Refills: 1 | Status: ON HOLD | COMMUNITY
Start: 2022-02-21 | End: 2022-04-25 | Stop reason: HOSPADM

## 2022-02-21 NOTE — TELEPHONE ENCOUNTER
Called patient to inform him that his case will be discussed this week to see rather he is a suitable transplant candidate, but covid vaccine and EGD pending.  Patient confirms that he has not had the vaccination, but reports that he will schedule J&J ASAP.  Patient made aware that even if approved for transplant, he will not be listed until FULLY vaccinated.  In regards to EGD, patient unaware if he has ever had this done.  Called pt's local GI MD, Dr. Sandhu.  Message left for nurse requesting report of EGD if done, and a callback if not done.  Phone and fax number provided. Awaiting call back.

## 2022-02-21 NOTE — TELEPHONE ENCOUNTER
Phone call returned.  No answer.  Message left on VM confirming that message received, and will follow-up later this week to get results and to confirm that covid vaccine administered.  Return phone call requested if any further assistance needed or for any questions/ concerns.  Contact info provided.    ==============================================    ----- Message from Adrián Pulliam sent at 2/21/2022  2:30 PM CST -----  Dr. Sandhu calling to state that Mr. Vasquez is scheduled for EGD this Wednesday and vaccines will be completed as well.    Call # [phone]

## 2022-02-21 NOTE — TELEPHONE ENCOUNTER
Received call back from Dr. Sandhu's office.  Patient has not had an EGD.  Informed nurse that EGD will likely need to be scheduled for patient.  Will call back later this week w/ further recommendations (after presented in liver selection committee meeting.

## 2022-02-23 ENCOUNTER — COMMITTEE REVIEW (OUTPATIENT)
Dept: TRANSPLANT | Facility: CLINIC | Age: 57
End: 2022-02-23
Payer: COMMERCIAL

## 2022-02-23 DIAGNOSIS — K74.60 LIVER CIRRHOSIS SECONDARY TO NASH: ICD-10-CM

## 2022-02-23 DIAGNOSIS — E11.9 CONTROLLED TYPE 2 DIABETES MELLITUS WITHOUT COMPLICATION, WITHOUT LONG-TERM CURRENT USE OF INSULIN: ICD-10-CM

## 2022-02-23 DIAGNOSIS — K74.69 DECOMPENSATED LIVER DISEASE: Primary | ICD-10-CM

## 2022-02-23 DIAGNOSIS — K75.81 LIVER CIRRHOSIS SECONDARY TO NASH: ICD-10-CM

## 2022-02-23 DIAGNOSIS — K76.9 LIVER LESION: ICD-10-CM

## 2022-02-23 NOTE — COMMITTEE REVIEW
Pelon Vasquez's case presented to selection committee.  Patient has been deferred for liver transplant pending [facility] (d/t diagnosis of LOZANO, elevated BMI, h/o of lipidemia, h/o DM).  Patient also needs updated imaging (CT or MRI) to follow up on liver lesion identified on recent u/s of abd.  SW will also need to follow-up with patient to confirm enrollment in IOP/ AA, as well as confirmation of back-up caregiver plan.  I was present at the committee meeting and attest to the decision of the committee.    Tito Mcclure  02/23/2022

## 2022-02-24 DIAGNOSIS — K74.69 DECOMPENSATED LIVER DISEASE: Primary | ICD-10-CM

## 2022-02-24 DIAGNOSIS — K76.9 LIVER LESION: ICD-10-CM

## 2022-02-24 DIAGNOSIS — Z76.82 ORGAN TRANSPLANT CANDIDATE: ICD-10-CM

## 2022-03-02 ENCOUNTER — TELEPHONE (OUTPATIENT)
Dept: TRANSPLANT | Facility: CLINIC | Age: 57
End: 2022-03-02
Payer: COMMERCIAL

## 2022-03-02 NOTE — TELEPHONE ENCOUNTER
Called pt wife back with Cardiology number so pt can call and rescheduled her husbands appointment    ----- Message from Cornelio Schwartz sent at 3/2/2022  8:02 AM CST -----  Regarding: call back  Pt's wife call to reschedule appt due to pt being in the hospital in ICU    CALL

## 2022-03-03 ENCOUNTER — TELEPHONE (OUTPATIENT)
Dept: TRANSPLANT | Facility: HOSPITAL | Age: 57
End: 2022-03-03
Payer: COMMERCIAL

## 2022-03-03 DIAGNOSIS — Z76.82 ORGAN TRANSPLANT CANDIDATE: ICD-10-CM

## 2022-03-03 DIAGNOSIS — K74.69 DECOMPENSATED LIVER DISEASE: Primary | ICD-10-CM

## 2022-03-03 NOTE — TELEPHONE ENCOUNTER
SW left message for pt's back-up caregiver, Romario, [phone], he shares his phone with a someone named Estrella. No answer, sw left voicemail. Getting in touch with back-up caregiver has been difficult.     PANCHO has sent message to pt's RN coordinator regarding pt's need for IOP or AA, as SW that did assessment did not feel pt did, and pt is diagnosed with LOZANO, and has had no alcohol since diagnosis.    SW waiting for return call from caregiver. SW remains available.    ----- Message from BLANCO Benedict sent at 3/3/2022  1:09 PM CST -----    ----- Message -----  From: Hallie Brown LCSW  Sent: 2/28/2022   2:22 PM CST  To: Ishmael Feng LMSW, BLANCO Benedict    I tried to call this secondary caregiver probably 10 times last week. He called back once but I missed it and couldn't reach him again. Can you all try to call and confirm. Per  T:    Pelon Vasquez's case presented to selection committee.  Patient has been deferred for liver transplant pending [facility] (d/t diagnosis of LOZANO, elevated BMI, h/o of lipidemia, h/o DM).  Patient also needs updated imaging (CT or MRI) to follow up on liver lesion identified on recent u/s of abd.  SW will also need to follow-up with patient to confirm enrollment in IOP/ AA, as well as confirmation of back-up caregiver plan.    I'm not sure if we said he needs IOP though but that's what they want us to follow up on.      Thanks!   ----- Message -----  From: Hallie Brown LCSW  Sent: 2/25/2022  11:39 AM CST  To: Hallie Brown LCSW    Pt's secondary care giver is calling to speak with office.       [phone]

## 2022-03-17 ENCOUNTER — OFFICE VISIT (OUTPATIENT)
Dept: CARDIOLOGY | Facility: CLINIC | Age: 57
DRG: 432 | End: 2022-03-17
Payer: COMMERCIAL

## 2022-03-17 ENCOUNTER — OFFICE VISIT (OUTPATIENT)
Dept: TRANSPLANT | Facility: CLINIC | Age: 57
DRG: 432 | End: 2022-03-17
Payer: COMMERCIAL

## 2022-03-17 ENCOUNTER — HOSPITAL ENCOUNTER (OUTPATIENT)
Dept: RADIOLOGY | Facility: HOSPITAL | Age: 57
Discharge: HOME | DRG: 432 | End: 2022-03-17
Attending: INTERNAL MEDICINE
Payer: COMMERCIAL

## 2022-03-17 ENCOUNTER — HOSPITAL ENCOUNTER (INPATIENT)
Facility: HOSPITAL | Age: 57
LOS: 7 days | Discharge: HOME | DRG: 432 | End: 2022-03-24
Attending: EMERGENCY MEDICINE | Admitting: STUDENT IN AN ORGANIZED HEALTH CARE EDUCATION/TRAINING PROGRAM
Payer: COMMERCIAL

## 2022-03-17 VITALS
OXYGEN SATURATION: 100 % | WEIGHT: 257.06 LBS | RESPIRATION RATE: 16 BRPM | HEIGHT: 67 IN | OXYGEN SATURATION: 98 % | SYSTOLIC BLOOD PRESSURE: 123 MMHG | WEIGHT: 256.38 LBS | HEART RATE: 72 BPM | HEART RATE: 75 BPM | HEIGHT: 66 IN | BODY MASS INDEX: 41.31 KG/M2 | TEMPERATURE: 97 F | SYSTOLIC BLOOD PRESSURE: 133 MMHG | DIASTOLIC BLOOD PRESSURE: 61 MMHG | BODY MASS INDEX: 40.24 KG/M2 | DIASTOLIC BLOOD PRESSURE: 63 MMHG

## 2022-03-17 DIAGNOSIS — E11.9 CONTROLLED TYPE 2 DIABETES MELLITUS WITHOUT COMPLICATION, WITHOUT LONG-TERM CURRENT USE OF INSULIN: ICD-10-CM

## 2022-03-17 DIAGNOSIS — K70.31 ASCITES DUE TO ALCOHOLIC CIRRHOSIS: Chronic | ICD-10-CM

## 2022-03-17 DIAGNOSIS — R07.9 CHEST PAIN: ICD-10-CM

## 2022-03-17 DIAGNOSIS — K74.69 DECOMPENSATED LIVER DISEASE: Primary | ICD-10-CM

## 2022-03-17 DIAGNOSIS — K75.81 LIVER CIRRHOSIS SECONDARY TO NASH: ICD-10-CM

## 2022-03-17 DIAGNOSIS — K74.60 HEPATIC CIRRHOSIS, UNSPECIFIED HEPATIC CIRRHOSIS TYPE, UNSPECIFIED WHETHER ASCITES PRESENT: Primary | ICD-10-CM

## 2022-03-17 DIAGNOSIS — K76.9 LIVER LESION: ICD-10-CM

## 2022-03-17 DIAGNOSIS — E87.5 HYPERKALEMIA: ICD-10-CM

## 2022-03-17 DIAGNOSIS — Z76.82 ORGAN TRANSPLANT CANDIDATE: ICD-10-CM

## 2022-03-17 DIAGNOSIS — K74.60 LIVER CIRRHOSIS SECONDARY TO NASH: ICD-10-CM

## 2022-03-17 DIAGNOSIS — D63.8 ANEMIA IN OTHER CHRONIC DISEASES CLASSIFIED ELSEWHERE: ICD-10-CM

## 2022-03-17 DIAGNOSIS — N17.9 AKI (ACUTE KIDNEY INJURY): ICD-10-CM

## 2022-03-17 DIAGNOSIS — K72.90 DECOMPENSATION OF CIRRHOSIS OF LIVER: Chronic | ICD-10-CM

## 2022-03-17 DIAGNOSIS — K74.69 DECOMPENSATED LIVER DISEASE: ICD-10-CM

## 2022-03-17 DIAGNOSIS — K74.60 DECOMPENSATION OF CIRRHOSIS OF LIVER: Chronic | ICD-10-CM

## 2022-03-17 DIAGNOSIS — Z01.818 PRE-TRANSPLANT EVALUATION FOR LIVER TRANSPLANT: ICD-10-CM

## 2022-03-17 LAB
ABO + RH BLD: NORMAL
AMPHET+METHAMPHET UR QL: NEGATIVE
BARBITURATES UR QL SCN>200 NG/ML: NEGATIVE
BENZODIAZ UR QL SCN>200 NG/ML: NEGATIVE
BLD GP AB SCN CELLS X3 SERPL QL: NORMAL
BZE UR QL SCN: NEGATIVE
CANNABINOIDS UR QL SCN: NEGATIVE
CREAT UR-MCNC: 89 MG/DL (ref 23–375)
CTP QC/QA: YES
ESTIMATED AVG GLUCOSE: 140 MG/DL (ref 68–131)
ETHANOL UR-MCNC: <10 MG/DL
HBA1C MFR BLD: 6.5 % (ref 4–5.6)
METHADONE UR QL SCN>300 NG/ML: NEGATIVE
OPIATES UR QL SCN: NEGATIVE
PCP UR QL SCN>25 NG/ML: NEGATIVE
POCT GLUCOSE: 261 MG/DL (ref 70–110)
SARS-COV-2 RDRP RESP QL NAA+PROBE: NEGATIVE
TOXICOLOGY INFORMATION: NORMAL

## 2022-03-17 PROCEDURE — 99223 PR INITIAL HOSPITAL CARE,LEVL III: ICD-10-PCS | Mod: NTX,,, | Performed by: STUDENT IN AN ORGANIZED HEALTH CARE EDUCATION/TRAINING PROGRAM

## 2022-03-17 PROCEDURE — 25000003 PHARM REV CODE 250: Mod: NTX | Performed by: STUDENT IN AN ORGANIZED HEALTH CARE EDUCATION/TRAINING PROGRAM

## 2022-03-17 PROCEDURE — 80307 DRUG TEST PRSMV CHEM ANLYZR: CPT | Mod: TXP | Performed by: INTERNAL MEDICINE

## 2022-03-17 PROCEDURE — 99999 PR PBB SHADOW E&M-EST. PATIENT-LVL III: CPT | Mod: PBBFAC,TXP,, | Performed by: INTERNAL MEDICINE

## 2022-03-17 PROCEDURE — A9585 GADOBUTROL INJECTION: HCPCS | Mod: TXP | Performed by: INTERNAL MEDICINE

## 2022-03-17 PROCEDURE — 63600175 PHARM REV CODE 636 W HCPCS: Mod: NTX | Performed by: STUDENT IN AN ORGANIZED HEALTH CARE EDUCATION/TRAINING PROGRAM

## 2022-03-17 PROCEDURE — 99215 OFFICE O/P EST HI 40 MIN: CPT | Mod: S$GLB,TXP,, | Performed by: INTERNAL MEDICINE

## 2022-03-17 PROCEDURE — 99999 PR PBB SHADOW E&M-EST. PATIENT-LVL IV: ICD-10-PCS | Mod: PBBFAC,TXP,, | Performed by: INTERNAL MEDICINE

## 2022-03-17 PROCEDURE — 99285 EMERGENCY DEPT VISIT HI MDM: CPT | Mod: 25,NTX

## 2022-03-17 PROCEDURE — 99999 PR PBB SHADOW E&M-EST. PATIENT-LVL III: ICD-10-PCS | Mod: PBBFAC,TXP,, | Performed by: INTERNAL MEDICINE

## 2022-03-17 PROCEDURE — 25500020 PHARM REV CODE 255: Mod: TXP | Performed by: INTERNAL MEDICINE

## 2022-03-17 PROCEDURE — 99285 EMERGENCY DEPT VISIT HI MDM: CPT | Mod: NTX,CS,, | Performed by: EMERGENCY MEDICINE

## 2022-03-17 PROCEDURE — 99285 PR EMERGENCY DEPT VISIT,LEVEL V: ICD-10-PCS | Mod: NTX,CS,, | Performed by: EMERGENCY MEDICINE

## 2022-03-17 PROCEDURE — 74183 MRI ABD W/O CNTR FLWD CNTR: CPT | Mod: 26,NTX,, | Performed by: STUDENT IN AN ORGANIZED HEALTH CARE EDUCATION/TRAINING PROGRAM

## 2022-03-17 PROCEDURE — 99215 PR OFFICE/OUTPT VISIT, EST, LEVL V, 40-54 MIN: ICD-10-PCS | Mod: S$GLB,TXP,, | Performed by: INTERNAL MEDICINE

## 2022-03-17 PROCEDURE — 63600175 PHARM REV CODE 636 W HCPCS: Mod: JG,NTX | Performed by: STUDENT IN AN ORGANIZED HEALTH CARE EDUCATION/TRAINING PROGRAM

## 2022-03-17 PROCEDURE — 99223 1ST HOSP IP/OBS HIGH 75: CPT | Mod: NTX,,, | Performed by: STUDENT IN AN ORGANIZED HEALTH CARE EDUCATION/TRAINING PROGRAM

## 2022-03-17 PROCEDURE — U0002 COVID-19 LAB TEST NON-CDC: HCPCS | Mod: NTX | Performed by: EMERGENCY MEDICINE

## 2022-03-17 PROCEDURE — 99204 OFFICE O/P NEW MOD 45 MIN: CPT | Mod: S$GLB,TXP,, | Performed by: INTERNAL MEDICINE

## 2022-03-17 PROCEDURE — 74183 MRI ABD W/O CNTR FLWD CNTR: CPT | Mod: TC,TXP

## 2022-03-17 PROCEDURE — 74183 MRI ABDOMEN W WO CONTRAST: ICD-10-PCS | Mod: 26,NTX,, | Performed by: STUDENT IN AN ORGANIZED HEALTH CARE EDUCATION/TRAINING PROGRAM

## 2022-03-17 PROCEDURE — 99999 PR PBB SHADOW E&M-EST. PATIENT-LVL IV: CPT | Mod: PBBFAC,TXP,, | Performed by: INTERNAL MEDICINE

## 2022-03-17 PROCEDURE — 86850 RBC ANTIBODY SCREEN: CPT | Mod: NTX | Performed by: EMERGENCY MEDICINE

## 2022-03-17 PROCEDURE — 86920 COMPATIBILITY TEST SPIN: CPT | Mod: NTX | Performed by: STUDENT IN AN ORGANIZED HEALTH CARE EDUCATION/TRAINING PROGRAM

## 2022-03-17 PROCEDURE — 20600001 HC STEP DOWN PRIVATE ROOM: Mod: NTX

## 2022-03-17 PROCEDURE — P9047 ALBUMIN (HUMAN), 25%, 50ML: HCPCS | Mod: JG,NTX | Performed by: STUDENT IN AN ORGANIZED HEALTH CARE EDUCATION/TRAINING PROGRAM

## 2022-03-17 PROCEDURE — 83036 HEMOGLOBIN GLYCOSYLATED A1C: CPT | Mod: NTX | Performed by: STUDENT IN AN ORGANIZED HEALTH CARE EDUCATION/TRAINING PROGRAM

## 2022-03-17 PROCEDURE — 99204 PR OFFICE/OUTPT VISIT, NEW, LEVL IV, 45-59 MIN: ICD-10-PCS | Mod: S$GLB,TXP,, | Performed by: INTERNAL MEDICINE

## 2022-03-17 RX ORDER — ALBUMIN HUMAN 250 G/1000ML
100 SOLUTION INTRAVENOUS EVERY 24 HOURS
Status: DISCONTINUED | OUTPATIENT
Start: 2022-03-17 | End: 2022-03-18

## 2022-03-17 RX ORDER — TRIAMCINOLONE ACETONIDE 1 MG/G
CREAM TOPICAL 2 TIMES DAILY PRN
COMMUNITY
Start: 2022-02-17 | End: 2022-03-24

## 2022-03-17 RX ORDER — AMOXICILLIN 250 MG
1 CAPSULE ORAL 2 TIMES DAILY
Status: DISCONTINUED | OUTPATIENT
Start: 2022-03-17 | End: 2022-03-24 | Stop reason: HOSPADM

## 2022-03-17 RX ORDER — ACETAMINOPHEN 325 MG/1
650 TABLET ORAL EVERY 8 HOURS PRN
Status: DISCONTINUED | OUTPATIENT
Start: 2022-03-17 | End: 2022-03-24 | Stop reason: HOSPADM

## 2022-03-17 RX ORDER — ONDANSETRON 4 MG/1
1 TABLET, ORALLY DISINTEGRATING ORAL EVERY 8 HOURS PRN
Status: ON HOLD | COMMUNITY
Start: 2022-03-02 | End: 2022-04-28 | Stop reason: HOSPADM

## 2022-03-17 RX ORDER — IBUPROFEN 200 MG
16 TABLET ORAL
Status: DISCONTINUED | OUTPATIENT
Start: 2022-03-17 | End: 2022-03-24 | Stop reason: HOSPADM

## 2022-03-17 RX ORDER — ZINC SULFATE 50(220)MG
220 CAPSULE ORAL DAILY
Refills: 1 | Status: DISCONTINUED | OUTPATIENT
Start: 2022-03-18 | End: 2022-03-24 | Stop reason: HOSPADM

## 2022-03-17 RX ORDER — IPRATROPIUM BROMIDE AND ALBUTEROL SULFATE 2.5; .5 MG/3ML; MG/3ML
3 SOLUTION RESPIRATORY (INHALATION) EVERY 6 HOURS PRN
Status: DISCONTINUED | OUTPATIENT
Start: 2022-03-17 | End: 2022-03-24 | Stop reason: HOSPADM

## 2022-03-17 RX ORDER — GADOBUTROL 604.72 MG/ML
10 INJECTION INTRAVENOUS
Status: COMPLETED | OUTPATIENT
Start: 2022-03-17 | End: 2022-03-17

## 2022-03-17 RX ORDER — PROCHLORPERAZINE EDISYLATE 5 MG/ML
5 INJECTION INTRAMUSCULAR; INTRAVENOUS EVERY 6 HOURS PRN
Status: DISCONTINUED | OUTPATIENT
Start: 2022-03-17 | End: 2022-03-24 | Stop reason: HOSPADM

## 2022-03-17 RX ORDER — MAG HYDROX/ALUMINUM HYD/SIMETH 200-200-20
30 SUSPENSION, ORAL (FINAL DOSE FORM) ORAL 4 TIMES DAILY PRN
Status: DISCONTINUED | OUTPATIENT
Start: 2022-03-17 | End: 2022-03-24 | Stop reason: HOSPADM

## 2022-03-17 RX ORDER — HEPARIN SODIUM 5000 [USP'U]/ML
5000 INJECTION, SOLUTION INTRAVENOUS; SUBCUTANEOUS EVERY 8 HOURS
Status: DISCONTINUED | OUTPATIENT
Start: 2022-03-17 | End: 2022-03-22

## 2022-03-17 RX ORDER — LACTULOSE 10 G/15ML
30 SOLUTION ORAL; RECTAL 3 TIMES DAILY
Status: ON HOLD | COMMUNITY
Start: 2022-03-02 | End: 2022-04-28 | Stop reason: HOSPADM

## 2022-03-17 RX ORDER — FUROSEMIDE 40 MG/1
40 TABLET ORAL 2 TIMES DAILY
Status: DISCONTINUED | OUTPATIENT
Start: 2022-03-17 | End: 2022-03-17

## 2022-03-17 RX ORDER — LACTULOSE 10 G/15ML
20 SOLUTION ORAL 3 TIMES DAILY
Status: DISCONTINUED | OUTPATIENT
Start: 2022-03-17 | End: 2022-03-24 | Stop reason: HOSPADM

## 2022-03-17 RX ORDER — GLUCAGON 1 MG
1 KIT INJECTION
Status: DISCONTINUED | OUTPATIENT
Start: 2022-03-17 | End: 2022-03-24 | Stop reason: HOSPADM

## 2022-03-17 RX ORDER — SODIUM CHLORIDE 0.9 % (FLUSH) 0.9 %
10 SYRINGE (ML) INJECTION EVERY 8 HOURS PRN
Status: DISCONTINUED | OUTPATIENT
Start: 2022-03-17 | End: 2022-03-24 | Stop reason: HOSPADM

## 2022-03-17 RX ORDER — IBUPROFEN 200 MG
24 TABLET ORAL
Status: DISCONTINUED | OUTPATIENT
Start: 2022-03-17 | End: 2022-03-24 | Stop reason: HOSPADM

## 2022-03-17 RX ORDER — TALC
6 POWDER (GRAM) TOPICAL NIGHTLY PRN
Status: DISCONTINUED | OUTPATIENT
Start: 2022-03-17 | End: 2022-03-24 | Stop reason: HOSPADM

## 2022-03-17 RX ORDER — NALOXONE HCL 0.4 MG/ML
0.02 VIAL (ML) INJECTION
Status: DISCONTINUED | OUTPATIENT
Start: 2022-03-17 | End: 2022-03-24 | Stop reason: HOSPADM

## 2022-03-17 RX ORDER — PANTOPRAZOLE SODIUM 40 MG/1
40 TABLET, DELAYED RELEASE ORAL DAILY
Status: DISCONTINUED | OUTPATIENT
Start: 2022-03-18 | End: 2022-03-24 | Stop reason: HOSPADM

## 2022-03-17 RX ORDER — ONDANSETRON 2 MG/ML
4 INJECTION INTRAMUSCULAR; INTRAVENOUS EVERY 8 HOURS PRN
Status: DISCONTINUED | OUTPATIENT
Start: 2022-03-17 | End: 2022-03-24 | Stop reason: HOSPADM

## 2022-03-17 RX ORDER — SIMETHICONE 80 MG
1 TABLET,CHEWABLE ORAL 4 TIMES DAILY PRN
Status: DISCONTINUED | OUTPATIENT
Start: 2022-03-17 | End: 2022-03-24 | Stop reason: HOSPADM

## 2022-03-17 RX ORDER — INSULIN ASPART 100 [IU]/ML
0-5 INJECTION, SOLUTION INTRAVENOUS; SUBCUTANEOUS
Status: DISCONTINUED | OUTPATIENT
Start: 2022-03-17 | End: 2022-03-24 | Stop reason: HOSPADM

## 2022-03-17 RX ADMIN — LACTULOSE 20 G: 20 SOLUTION ORAL at 09:03

## 2022-03-17 RX ADMIN — GADOBUTROL 10 ML: 604.72 INJECTION INTRAVENOUS at 01:03

## 2022-03-17 RX ADMIN — HEPARIN SODIUM 5000 UNITS: 5000 INJECTION INTRAVENOUS; SUBCUTANEOUS at 09:03

## 2022-03-17 RX ADMIN — SENNOSIDES AND DOCUSATE SODIUM 1 TABLET: 50; 8.6 TABLET ORAL at 09:03

## 2022-03-17 RX ADMIN — RIFAXIMIN 550 MG: 550 TABLET ORAL at 09:03

## 2022-03-17 RX ADMIN — SODIUM ZIRCONIUM CYCLOSILICATE 5 G: 5 POWDER, FOR SUSPENSION ORAL at 07:03

## 2022-03-17 RX ADMIN — ALBUMIN (HUMAN) 100 G: 12.5 SOLUTION INTRAVENOUS at 06:03

## 2022-03-17 NOTE — PROGRESS NOTES
Subjective:       Patient ID: Pelon Vasquez is a 57 y.o. male.    Chief Complaint: No chief complaint on file.    HPI  I saw this 57 y.o. man who came to the liver transplant evaluation clinic with his fiance for follow up.  He was evaluated for liver transplant but was deferred for 3 reasons:    1) Anesthesia recommended a Left heart cath for evaluation of his coronary arteries given his other risk factors for cardiac disease.  2) Needed further cross-sectional imaging- arranged for today at 12:15 pm  3) Needed to register for IOP    He is seeing cardiology today.    I understand that he has been in hospital x3 since I first met him in Feb 2022.  He has had encephalopathy and recurrent issues with hyperkalemia and deteriorating kidney function.    He was discharged from hospital last night and his last K was 6 with a creatinine of 2.23.    He was diagnosed as having cirrhosis in the summer of 2021 but decompensated with ascites/edema in Nov 2021. He was hospitalized at that time and had 2 large volume paracenteses since then.    It is thought that his liver disease is a combination of alcohol/HASH. He admits to drinking 2 large drinks every night for years but stopped drinking when told about his liver disease in the summer of 2021.    Never considered himself to be addicted to alcohol and has not needed to attend AA or rehab. Has never had alcohol-related work or legal problems    MELD has been around 20    Ascites + HE  Fatigue  Dizzy    Usual weight was 235 lb    MELD-Na score: 26 at 2/18/2022  1:04 PM  MELD score: 18 at 2/18/2022  1:04 PM  Calculated from:  Serum Creatinine: 1.4 mg/dL at 2/18/2022  1:04 PM  Serum Sodium: 126 mmol/L at 2/17/2022  8:40 AM  Total Bilirubin: 2.9 mg/dL at 2/17/2022  8:40 AM  INR(ratio): 1.5 at 2/17/2022  8:40 AM  Age: 57 years    Recent stress test negative- Nov 2021    PMH:  DM  Hypercholesterolemia  Left shoulder surgeries  Knee surgery- Left MCL + meniscus + Right MCL (footballing  injuries)    No heart issues    SH:  Construction  Non- smoker    No kids    FH:  adopted      Review of Systems   Constitutional: Positive for activity change, appetite change and fatigue. Negative for chills, fever and unexpected weight change.   HENT: Negative for hearing loss.    Eyes: Negative for discharge and visual disturbance.   Respiratory: Negative for cough, chest tightness, shortness of breath and wheezing.    Cardiovascular: Positive for leg swelling. Negative for chest pain and palpitations.   Gastrointestinal: Positive for abdominal distention. Negative for abdominal pain, constipation, diarrhea and nausea.   Genitourinary: Negative for dysuria and frequency.   Musculoskeletal: Negative for arthralgias and back pain.   Skin: Negative for pallor and rash.   Neurological: Negative for dizziness, tremors, speech difficulty and headaches.   Hematological: Negative for adenopathy.   Psychiatric/Behavioral: Negative for agitation and confusion.           Lab Results   Component Value Date    ALT 45 (H) 02/17/2022    AST 81 (H) 02/17/2022    GGT 66 (H) 02/14/2022    ALKPHOS 94 02/17/2022    BILITOT 2.9 (H) 02/17/2022     Past Medical History:   Diagnosis Date    Arthritis     Cirrhosis of liver     HTN (hypertension)     BRAYDON (obstructive sleep apnea)     Type 2 diabetes mellitus      Past Surgical History:   Procedure Laterality Date    COLONOSCOPY      FINGER AMPUTATION      MEDIAL COLLATERAL LIGAMENT REPAIR, KNEE Bilateral     ROTATRO CUFF REPAIR      TONSILLECTOMY       Current Outpatient Medications   Medication Sig    atorvastatin (LIPITOR) 20 MG tablet Take 20 mg by mouth once daily.    furosemide (LASIX) 40 MG tablet TAKE 1 TABLET BY MOUTH IN THE MORNING AND 1 TABLET IN THE EVENING    lactulose (CHRONULAC) 10 gram/15 mL solution Take 30 mLs by mouth 3 (three) times daily.    ondansetron (ZOFRAN-ODT) 4 MG TbDL Take 1 tablet by mouth 2 (two) times daily as needed.    pantoprazole  (PROTONIX) 40 MG tablet Take 40 mg by mouth once daily.    triamcinolone acetonide 0.1% (KENALOG) 0.1 % cream Apply topically 2 (two) times daily as needed.    vitamin A palmitate 7,500 mcg (25,000 unit) Cap Take 900 mcg by mouth once daily.    XIFAXAN 550 mg Tab Take 550 mg by mouth 2 (two) times daily.    zinc sulfate 50 mg zinc (220 mg) Tab tablet Take 1 tablet (220 mg total) by mouth once daily.     No current facility-administered medications for this visit.       Objective:      Physical Exam  Constitutional:       Appearance: Normal appearance.   Cardiovascular:      Rate and Rhythm: Normal rate and regular rhythm.      Pulses: Normal pulses.   Pulmonary:      Effort: Pulmonary effort is normal.      Breath sounds: Normal breath sounds.   Abdominal:      General: There is distension.   Musculoskeletal:      Right lower leg: Edema present.      Left lower leg: Edema present.         Assessment:       1. Decompensated liver disease    2. Organ transplant candidate        Plan:   He has decompensated alcohol cirrhosis with a recent MELD score of around 20. MELD score from today's labs is awaited.    He has worsening kidney function and a high K- awaiting urgent labs from today but if they show similar results, he will be admitted to our hospital to complete his evaluation and treat his electrolyte/kidney dysfunction.    - does not have HE today  - currently on furosemide 40 mg BID only- no ignacio  I will contact him and his wife once I get today's labs.      UNOS Patient Status  Functional Status: 80% - Normal activity with effort: some symptoms of disease  Physical Capacity: No Limitations    Patient on life support: No  Diabetes: Type II  Any previous malignancy: No  Neoadjuvant Therapy: no  Has patient ever had a dx of HCC: no  Previous Abdominal Surgery: no  Spontaneous Bacterial Peritonitis: no  History of Portal Vein Thrombosis: no  Transjugular Intrahepatic Portosystemic Shunt: no    New diabetes onset  between last follow-up to the current follow-up: No  Did patient have any acute rejection episodes during the follow-up period: No  Post transplant malignancy: No

## 2022-03-17 NOTE — LETTER
March 17, 2022        Irvin Sandhu  [address]  [address]  [facility] [place] [address]  Phone: [phone]  Fax: [phone]             [facility]  [address]  [facility] [address]  Phone: [phone]   Patient: Pelon Vasquez   MR Number: [id number]   YOB: 1965   Date of Visit: 3/17/2022       Dear Dr. Irvin Sandhu    Thank you for referring Pelon Vasquez to me for evaluation. Attached you will find relevant portions of my assessment and plan of care.    If you have questions, please do not hesitate to call me. I look forward to following Pelon Vasquez along with you.    Sincerely,    Tito Mcclure MD    Enclosure    If you would like to receive this communication electronically, please contact [email or web address] or [phone] to request [employer] access.    [employer] is a tool which provides read-only access to select patient information with whom you have a relationship. Its easy to use and provides real time access to review your patients record including encounter summaries, notes, results, and demographic information.    If you feel you have received this communication in error or would no longer like to receive these types of communications, please e-mail [email or web address]

## 2022-03-17 NOTE — ASSESSMENT & PLAN NOTE
Either from intravascular depletion vs HRS. Patient was taking lasix/aldactone prior to admission at OSH where he had FAMILIA + hyperK. Aldactone and Kcl supp were discontinued. Patient is voluem overloaded but renal function still not at baseline  - Trial albumin 100g x1 today, possible continuation of HRS treatment tomorrow  - [facility] for hyperkalemia  - Avoid nephrotoxic medications

## 2022-03-17 NOTE — H&P
[facility] - Emergency Dept  [facility]  History & Physical    Patient Name: Pelon Vasquez  MRN: [id number]  Patient Class: IP- Inpatient  Admission Date: 3/17/2022  Attending Physician: Jesica Lo MD  Primary Care Provider: Primary Doctor No         Patient information was obtained from patient, spouse/SO and ER records.     Subjective:     Principal Problem:Decompensated liver disease    Chief Complaint:   Chief Complaint   Patient presents with    Admission        HPI: 58 y/o M with a PMH of cirrhosis c/b ascites who presents for liver transplant evaluation and renal failure.    Patient was seen in hepatology clinic this morning where he mentioned to staff that he was recently hospitalized for ascites as well as acute kidney failure and hyperkalemia. He was discharged yesterday and made his way to his hepatology appointment. Patient reports that he had not been feeling all to well and that his ascites reaccumulated hence his presentation to OSH. There he had a potassium of 7 and Cr of 2.3~. It was believed to be potassium supplementation + aldactone in the setting of worsening renal function that caused hyperkalemia. Furthermore, he saw his cardiologist due to questions if he needed LHC prior to being listed for transplant or not. Per their discussion, they would like his renal function to return to his baseline before LHC    Patient being admitted for hyperkalemia/FAMILIA/transplant w/u.        Past Medical History:   Diagnosis Date    Arthritis     Cirrhosis of liver     HTN (hypertension)     BRAYDON (obstructive sleep apnea)     Type 2 diabetes mellitus        Past Surgical History:   Procedure Laterality Date    COLONOSCOPY      FINGER AMPUTATION      MEDIAL COLLATERAL LIGAMENT REPAIR, KNEE Bilateral     ROTATRO CUFF REPAIR      TONSILLECTOMY         Review of patient's allergies indicates:   Allergen Reactions    Strawberry Anaphylaxis and Rash    Metformin Diarrhea       Current  Facility-Administered Medications on File Prior to Encounter   Medication    [COMPLETED] gadobutroL (GADAVIST) injection 10 mL    [DISCONTINUED] dextrose 50 % in water (D50W) injection    [DISCONTINUED] enoxaparin injection    [DISCONTINUED] GENERIC EXTERNAL MEDICATION    [DISCONTINUED] hydrALAZINE injection    [DISCONTINUED] insulin aspart U-100 pen    [DISCONTINUED] lactulose 10 gram/15 mL solution    [DISCONTINUED] ondansetron injection    [DISCONTINUED] pantoprazole injection    [DISCONTINUED] propranoloL tablet    [DISCONTINUED] rifAXIMin tablet     Current Outpatient Medications on File Prior to Encounter   Medication Sig    atorvastatin (LIPITOR) 20 MG tablet Take 20 mg by mouth once daily.    furosemide (LASIX) 40 MG tablet TAKE 1 TABLET BY MOUTH IN THE MORNING AND 1 TABLET IN THE EVENING    lactulose (CHRONULAC) 10 gram/15 mL solution Take 30 mLs by mouth 3 (three) times daily.    ondansetron (ZOFRAN-ODT) 4 MG TbDL Take 1 tablet by mouth 2 (two) times daily as needed.    pantoprazole (PROTONIX) 40 MG tablet Take 40 mg by mouth once daily.    triamcinolone acetonide 0.1% (KENALOG) 0.1 % cream Apply topically 2 (two) times daily as needed.    vitamin A palmitate 7,500 mcg (25,000 unit) Cap Take 900 mcg by mouth once daily.    XIFAXAN 550 mg Tab Take 550 mg by mouth 2 (two) times daily.    zinc sulfate 50 mg zinc (220 mg) Tab tablet Take 1 tablet (220 mg total) by mouth once daily.    [DISCONTINUED] lisinopriL 10 MG tablet Take 10 mg by mouth once daily.    [DISCONTINUED] potassium chloride (K-TAB) 20 mEq Take 20 mEq by mouth once daily.    [DISCONTINUED] spironolactone (ALDACTONE) 50 MG tablet Take 50 mg by mouth 2 (two) times daily.     Family History    None       Tobacco Use    Smoking status: Never Smoker    Smokeless tobacco: Never Used   Substance and Sexual Activity    Alcohol use: Not Currently    Drug use: Not on file    Sexual activity: Not on file     Review of Systems    Constitutional:  Positive for activity change (decreased). Negative for appetite change, chills, diaphoresis, fatigue, fever and unexpected weight change.   HENT:  Negative for congestion, dental problem, ear pain, hearing loss, nosebleeds, rhinorrhea, sneezing, sore throat and trouble swallowing.    Eyes:  Negative for photophobia, pain, discharge and visual disturbance.   Respiratory:  Negative for cough, shortness of breath and wheezing.    Cardiovascular:  Positive for leg swelling. Negative for chest pain and palpitations.   Gastrointestinal:  Positive for abdominal distention and diarrhea (from lactulose). Negative for abdominal pain, blood in stool, constipation, nausea, rectal pain and vomiting.   Endocrine: Negative for cold intolerance, heat intolerance, polydipsia and polyuria.   Genitourinary:  Negative for decreased urine volume, dysuria, flank pain and hematuria.   Musculoskeletal:  Negative for arthralgias, gait problem, joint swelling and myalgias.   Skin:  Negative for rash and wound.   Allergic/Immunologic: Negative for environmental allergies, food allergies and immunocompromised state.   Neurological:  Negative for dizziness, tremors, seizures, syncope, speech difficulty, weakness, light-headedness, numbness and headaches.   Hematological:  Negative for adenopathy. Does not bruise/bleed easily.   Psychiatric/Behavioral:  Negative for agitation, confusion, hallucinations, sleep disturbance and suicidal ideas. The patient is not nervous/anxious.    Objective:     Vital Signs (Most Recent):  Temp: 98 °F (36.7 °C) (03/17/22 1537)  Pulse: 74 (03/17/22 1537)  Resp: 18 (03/17/22 1537)  BP: 135/61 (03/17/22 1537)  SpO2: 100 % (03/17/22 1537) Vital Signs (24h Range):  Temp:  [97.2 °F (36.2 °C)-98 °F (36.7 °C)] 98 °F (36.7 °C)  Pulse:  [72-75] 74  Resp:  [16-18] 18  SpO2:  [98 %-100 %] 100 %  BP: (109-135)/(55-63) 135/61     Weight: 117.9 kg (260 lb)  Body mass index is 40.72 kg/m².    Physical  Exam  Constitutional:       General: He is not in acute distress.     Appearance: He is well-developed. He is obese. He is ill-appearing. He is not diaphoretic.   HENT:      Head: Normocephalic and atraumatic.   Eyes:      General: Scleral icterus present.         Right eye: No discharge.         Left eye: No discharge.      Conjunctiva/sclera: Conjunctivae normal.      Pupils: Pupils are equal, round, and reactive to light.   Neck:      Thyroid: No thyromegaly.      Vascular: No JVD.      Trachea: No tracheal deviation.   Cardiovascular:      Rate and Rhythm: Normal rate and regular rhythm.      Heart sounds: Normal heart sounds. No murmur heard.    No friction rub. No gallop.   Pulmonary:      Effort: Pulmonary effort is normal. No respiratory distress.      Breath sounds: Normal breath sounds. No stridor. No wheezing or rales.   Abdominal:      General: Bowel sounds are normal. There is distension.      Palpations: Abdomen is soft. There is no mass.      Tenderness: There is no abdominal tenderness. There is no guarding or rebound.      Hernia: No hernia is present.   Musculoskeletal:         General: Swelling present. No deformity. Normal range of motion.      Cervical back: Normal range of motion and neck supple.      Right lower leg: Edema present.      Left lower leg: Edema present.   Lymphadenopathy:      Cervical: No cervical adenopathy.   Skin:     General: Skin is warm and dry.      Coloration: Skin is jaundiced.      Findings: No rash.   Neurological:      Mental Status: He is alert and oriented to person, place, and time.      Cranial Nerves: No cranial nerve deficit.      Motor: No abnormal muscle tone.      Coordination: Coordination normal.      Deep Tendon Reflexes: Reflexes normal.   Psychiatric:         Behavior: Behavior normal.         Thought Content: Thought content normal.         Judgment: Judgment normal.         CRANIAL NERVES     CN III, IV, VI   Pupils are equal, round, and reactive to  light.     Significant Labs: All pertinent labs within the past 24 hours have been reviewed.  CBC:   Recent Labs   Lab 03/17/22  0930   WBC 5.58   HGB 7.2*   HCT 21.2*   PLT 59*     CMP:   Recent Labs   Lab 03/17/22  0930   *   K 5.6*      CO2 18*   *   BUN 48*   CREATININE 2.1*   CALCIUM 8.4*   PROT 5.7*   ALBUMIN 2.5*   BILITOT 2.9*   ALKPHOS 79   AST 70*   ALT 44   ANIONGAP 3*   EGFRNONAA 34.4*       Significant Imaging: I have reviewed all pertinent imaging results/findings within the past 24 hours.    Assessment/Plan:     * Decompensated liver disease  MELD-Na score: 27 at 3/17/2022  9:30 AM  MELD score: 22 at 3/17/2022  9:30 AM  Calculated from:  Serum Creatinine: 2.1 mg/dL at 3/17/2022  9:30 AM  Serum Sodium: 128 mmol/L at 3/17/2022  9:30 AM  Total Bilirubin: 2.9 mg/dL at 3/17/2022  9:30 AM  INR(ratio): 1.5 at 3/17/2022  9:30 AM  Age: 57 years  Secondary to LOZANO vs alcohol. Patient presents with volume overload and FAMILIA despite taking diuretics as prescribed. Patient's abdomen remains soft but his legs are extremely edematous. Patient seen in hepatology clinic and referred to inpatient for transplant eval. Patient's wife reports that at OSH they had given him albumin due to his FAMILIA and his Cr downtreded from 2.4 to 2.1  - Placed consult for hepatology evaluation  - Treat for possible HRS with 100g of albumin today, if responds will continue HRS treatment tomorrow with midodrine and octreotide  - Continue lactulose and rifaximin for HE  - Received TTE today, cardiology requesting Cr be at baseline before C    FAMILIA (acute kidney injury)  Either from intravascular depletion vs HRS. Patient was taking lasix/aldactone prior to admission at OSH where he had FAMILIA + hyperK. Aldactone and Kcl supp were discontinued. Patient is voluem overloaded but renal function still not at baseline  - Trial albumin 100g x1 today, possible continuation of HRS treatment tomorrow  - Lokelma for hyperkalemia  - Avoid  nephrotoxic medications      Hyperkalemia  See FAMILIA      Controlled type 2 diabetes mellitus, without long-term current use of insulin  Not on any insulin currently.   - Will get A1c  - LDSSI + POC AC/HS        VTE Risk Mitigation (From admission, onward)         Ordered     heparin (porcine) injection 5,000 Units  Every 8 hours         03/17/22 1656     IP VTE HIGH RISK PATIENT  Once         03/17/22 1656     Place sequential compression device  Until discontinued         03/17/22 1656                   Jesica Lo MD  Department of Hospital Medicine   [facility] - Emergency Dept

## 2022-03-17 NOTE — ED PROVIDER NOTES
Encounter Date: 3/17/2022       History     Chief Complaint   Patient presents with    Admission     56 yo M with pmhx LOZANO/EtOH cirrhosis, HTN, DM, BRAYDON presents as referral for admission for liver transplant evaluation.  Patient was recently admitted to [facility] and discharged yesterday for admission here.  He saw the liver clinic today at which time labs were obtained and they referred him to the emergency department for admission for COVID test.  His wife notes that during the previous hospitalization, his ammonia was elevated at 118 and potassium was 7. He had a paracentesis yesterday at which time 3.3 L removed.  Patient endorses shortness of breath, weakness, lower extremity edema, left greater than the right, abdominal distension.  Some mild abdominal pain as well.  The symptoms are not more severe today than usual and have been present for weeks.    Labs obtained this morning revealed CBC with a hemoglobin of 7.2.  MCV was 92. White blood cell count was 5.58.  INR was 1.5, PTT 15.6.  CMP revealed hyponatremia of 128, hyperkalemia of 5.6.  Creatinine was 2.1 and BUN is 48. Hypocalcemia of 8.4.  Total bilirubin 2.9.  AST elevated at 70. Urine tox screen was clear. MRI revealed:  1. Examination limited by motion artifact and suboptimal arterial phase bolus timing.  2. No suspicious focal hepatic lesion detected.  3. Cirrhotic liver with sequela of portal hypertension including mild splenomegaly and moderate ascites.  4. Moderate right pleural effusion.  5. Cholelithiasis.          Review of patient's allergies indicates:   Allergen Reactions    Strawberry Anaphylaxis and Rash    Metformin Diarrhea     Past Medical History:   Diagnosis Date    Arthritis     Cirrhosis of liver     HTN (hypertension)     BRAYDON (obstructive sleep apnea)     Type 2 diabetes mellitus      Past Surgical History:   Procedure Laterality Date    COLONOSCOPY      FINGER AMPUTATION      MEDIAL COLLATERAL LIGAMENT REPAIR,  KNEE Bilateral     ROTATRO CUFF REPAIR      TONSILLECTOMY       History reviewed. No pertinent family history.  Social History     Tobacco Use    Smoking status: Never Smoker    Smokeless tobacco: Never Used   Substance Use Topics    Alcohol use: Not Currently     Review of Systems   Constitutional: Positive for fatigue. Negative for fever.   HENT: Negative for sore throat.    Respiratory: Positive for shortness of breath.    Cardiovascular: Negative for chest pain.   Gastrointestinal: Positive for abdominal distention, abdominal pain and nausea.   Genitourinary: Negative for dysuria.   Musculoskeletal: Negative for back pain.   Skin: Negative for rash.   Neurological: Negative for weakness.   Hematological: Does not bruise/bleed easily.   Psychiatric/Behavioral: Negative for confusion.       Physical Exam     Initial Vitals [03/17/22 1537]   BP Pulse Resp Temp SpO2   135/61 74 18 98 °F (36.7 °C) 100 %      MAP       --         Physical Exam    Nursing note and vitals reviewed.  Constitutional: He appears well-developed and well-nourished. He is not diaphoretic. No distress.   HENT:   Head: Normocephalic and atraumatic.   Eyes: Right eye exhibits no discharge. Left eye exhibits no discharge. Scleral icterus is present.   Neck: Neck supple.   Normal range of motion.  Cardiovascular: Normal rate, regular rhythm and normal heart sounds. Exam reveals no gallop and no friction rub.    No murmur heard.  Pulmonary/Chest: Breath sounds normal. No respiratory distress.   Abdominal: Abdomen is soft. He exhibits distension. He exhibits no mass. There is no abdominal tenderness. There is no rebound and no guarding.   Musculoskeletal:         General: Edema (bilateral, L > R, no cords) present. No tenderness. Normal range of motion.      Cervical back: Normal range of motion and neck supple.     Neurological: He is alert. He has normal strength. No sensory deficit.   No asterixis   Skin: Skin is warm and dry. Capillary  refill takes less than 2 seconds.   Psychiatric: Thought content normal.         ED Course   Procedures  Labs Reviewed   POCT GLUCOSE - Abnormal; Notable for the following components:       Result Value    POCT Glucose 261 (*)     All other components within normal limits   HEMOGLOBIN A1C   SARS-COV-2 RDRP GENE    Narrative:     This test utilizes isothermal nucleic acid amplification   technology to detect the SARS-CoV-2 RdRp nucleic acid segment.   The analytical sensitivity (limit of detection) is 125 genome   equivalents/mL.   A POSITIVE result implies infection with the SARS-CoV-2 virus;   the patient is presumed to be contagious.     A NEGATIVE result means that SARS-CoV-2 nucleic acids are not   present above the limit of detection. A NEGATIVE result should be   treated as presumptive. It does not rule out the possibility of   COVID-19 and should not be the sole basis for treatment decisions.   If COVID-19 is strongly suspected based on clinical and exposure   history, re-testing using an alternate molecular assay should be   considered.   This test is only for use under the Food and Drug   Administration s Emergency Use Authorization (EUA).   Commercial kits are provided by [employer].   Performance characteristics of the EUA have been independently   verified by [facility] Department of   Pathology and Laboratory Medicine.   _________________________________________________________________   The authorized Fact Sheet for Healthcare Providers and the authorized Fact   Sheet for Patients of the ID NOW COVID-19 are available on the FDA   website:     https://www.fda.gov/media/260735/download  https://www.fda.gov/media/674604/download          POCT GLUCOSE, HAND-HELD DEVICE   TYPE & SCREEN   POCT GLUCOSE MONITORING CONTINUOUS          Imaging Results    None          Medications   lactulose 20 gram/30 mL solution Soln 20 g (has no administration in time range)   pantoprazole EC tablet 40 mg  (has no administration in time range)   rifAXIMin tablet 550 mg (has no administration in time range)   zinc sulfate capsule 220 mg (has no administration in time range)   sodium chloride 0.9% flush 10 mL (has no administration in time range)   albuterol-ipratropium 2.5 mg-0.5 mg/3 mL nebulizer solution 3 mL (has no administration in time range)   melatonin tablet 6 mg (has no administration in time range)   ondansetron injection 4 mg (has no administration in time range)   prochlorperazine injection Soln 5 mg (has no administration in time range)   senna-docusate 8.6-50 mg per tablet 1 tablet (has no administration in time range)   acetaminophen tablet 650 mg (has no administration in time range)   simethicone chewable tablet 80 mg (has no administration in time range)   aluminum-magnesium hydroxide-simethicone 200-200-20 mg/5 mL suspension 30 mL (has no administration in time range)   naloxone 0.4 mg/mL injection 0.02 mg (has no administration in time range)   glucose chewable tablet 16 g (has no administration in time range)   glucose chewable tablet 24 g (has no administration in time range)   dextrose 10% bolus 125 mL (has no administration in time range)   dextrose 10% bolus 250 mL (has no administration in time range)   glucagon (human recombinant) injection 1 mg (has no administration in time range)   heparin (porcine) injection 5,000 Units (has no administration in time range)   insulin aspart U-100 pen 0-5 Units (has no administration in time range)     Medical Decision Making:   History:   Old Medical Records: I decided to obtain old medical records.  Initial Assessment:   56 yo M with pmhx LOZANO/EtOH cirrhosis, HTN, DM, BRAYDON presents as referral for admission for liver transplant evaluation.   Differential Diagnosis:   Liver transplant eval, anemia, hepatic encephalopathy, COVID  Clinical Tests:   Lab Tests: Ordered  Radiological Study: Reviewed  ED Management:  COVID neg. Obs per case management. Medicine  put in full admission orders.                      Clinical Impression:   Final diagnoses:  [K74.60] Hepatic cirrhosis, unspecified hepatic cirrhosis type, unspecified whether ascites present (Primary)          ED Disposition Condition    Admit               Car Marinelli MD  03/17/22 1720

## 2022-03-17 NOTE — PROGRESS NOTES
Referring physician:      A/P:  Cirrhosis, decompensated  - discussed admission. Will defer to transplant team  pending labwork for inpatient admission today    Coronary eval for liver transplant  - hold off coronary angiogram given cirrhosis and worsening renal function  - can reconsider if Cr recovers < 2 and plt/Hg stable (>50 K , Hg > 7)     HTN, well controlled  -continue lasix defer to liver team for management    HLD  -stop statin given LFTs    DM  -goal a1c < 7  Defer PCP    BRAYDON  - NIPPV       Subjective:     HPI:   Pelon Vasquez who presents as new patient for with following problem list:     HTN   DM   BRAYDON   Etoh/LOZANO cirrhosis with history of encephalopathy, thrombocytopenia and ascites requiring large volume paracentesis- last MELD-Na 26     He presents for evaluation of [facility] at request of anesthesia for pre op eval for liver transplant. He saw liver transplant today and is pending repeat labwork due to fluctuating renal function and multiple admits for encephalopathy and hyperkaelmia.   He was discharged from hospital last night and his last K was 6 with a creatinine of 2.23 and overall has been admitted 3X in past 1 mos. Today states he has 5 pillow orthopnea due to ascites and has been requiring paracentesis more frequently (now once a week, last yesterday, average 3-4 liters removed). ROS - melena or hemathochezia or angina. Today he feels more confused and even more weak with ongoing dyspnea.          HTN: Bp review shows normotensive to low reads, not requiring BP meds as liver disease has progressed.  DM: A1c 6.9   Dyslipidemia: no recent LDL however LFTs 100s.     Anginal symptoms:  Duration: none  Frequency: none  Ambulatory distance: unable to ambulate more than a couple of steps due to dyspnea  Bleeding diathesis or upcoming surgery: yes, ongoing issues with thromboctyopenia in 50s due to liver disease.  Claudication: no          Cardiac Studies:  DSE 2/22  · The stress echo portion of  this study is negative for myocardial ischemia.  · The ECG portion of this study is negative for myocardial ischemia.  · During stress, the following significant arrhythmias were observed: rare PVCs.  · The left ventricle is normal in size with normal systolic function. The estimated ejection fraction is 65%.  · Normal left ventricular diastolic function.  · Normal right ventricular size with normal right ventricular systolic function.  · Mild biatrial enlargement.  · Mild tricuspid regurgitation.  · The estimated PA systolic pressure is 29 mmHg.  · Normal central venous pressure (3 mmHg).          ROS:  Constitution: Negative for fever, chills, weight loss or gain.   HENT: Negative for sore throat, rhinorrhea, or headache.  Eyes: Negative for blurred or double vision.   Cardiovascular: - for Exertional chest pain  Pulmonary: - for Dyspnea on exertion  Gastrointestinal: Negative for abdominal pain, nausea, vomiting, or diarrhea. Negative for melena/hematochezia.  : Negative for dysuria.   Neurological: Negative for focal weakness or sensory changes.  Skin: No bleeding or bruising      Past Medical History:   Diagnosis Date    Arthritis     Cirrhosis of liver     HTN (hypertension)     BRAYDON (obstructive sleep apnea)     Type 2 diabetes mellitus        Past Surgical History:   Procedure Laterality Date    COLONOSCOPY      FINGER AMPUTATION      MEDIAL COLLATERAL LIGAMENT REPAIR, KNEE Bilateral     ROTATRO CUFF REPAIR      TONSILLECTOMY         Social History     Tobacco Use    Smoking status: Never Smoker    Smokeless tobacco: Never Used   Substance Use Topics    Alcohol use: Not Currently       History reviewed. No pertinent family history.    Current Outpatient Medications   Medication Sig    atorvastatin (LIPITOR) 20 MG tablet Take 20 mg by mouth once daily.    furosemide (LASIX) 40 MG tablet TAKE 1 TABLET BY MOUTH IN THE MORNING AND 1 TABLET IN THE EVENING    lactulose (CHRONULAC) 10 gram/15 mL  "solution Take 30 mLs by mouth 3 (three) times daily.    ondansetron (ZOFRAN-ODT) 4 MG TbDL Take 1 tablet by mouth 2 (two) times daily as needed.    pantoprazole (PROTONIX) 40 MG tablet Take 40 mg by mouth once daily.    vitamin A palmitate 7,500 mcg (25,000 unit) Cap Take 900 mcg by mouth once daily.    XIFAXAN 550 mg Tab Take 550 mg by mouth 2 (two) times daily.    zinc sulfate 50 mg zinc (220 mg) Tab tablet Take 1 tablet (220 mg total) by mouth once daily.    triamcinolone acetonide 0.1% (KENALOG) 0.1 % cream Apply topically 2 (two) times daily as needed.     No current facility-administered medications for this visit.       Review of patient's allergies indicates:   Allergen Reactions    Strawberry Anaphylaxis and Rash    Metformin Diarrhea       Objective:   Physical Exam  Vitals:    03/17/22 0943 03/17/22 0946   BP: (!) 109/55 123/63   Pulse: 73 72   SpO2: 100%    Weight: 116.3 kg (256 lb 6.3 oz)    Height: 5' 7" (1.702 m)    PainSc: 0-No pain        Gen: Lying in bed, no acute distress  HEENT: Supple, + JVD  Cvs: Regular rate and rhythm, no murmurs  Resp: Normal work of breathing, clear bilaterally  Abd:+ distended + hepatomegaly  Ext: Warm,+ extensive lower extremity edema.   Vascular:   LEFT RIGHT   RADIAL 2+ 2+   BRACHIAL 2+ 2+   FEMORAL 2+ 2+   DP 2+ 2+   TP 2+ 2+   Denny[facility] Test Normal Normal           Chemistry        Component Value Date/Time     (L) 02/17/2022 0840    K 4.5 02/17/2022 0840    CL 99 02/17/2022 0840    CO2 21 (L) 02/17/2022 0840    BUN 25 (H) 02/17/2022 0840    CREATININE 1.4 02/18/2022 1304    CREATININE 1.4 02/18/2022 1304     (H) 02/17/2022 0840        Component Value Date/Time    CALCIUM 7.9 (L) 02/17/2022 0840    ALKPHOS 94 02/17/2022 0840    AST 81 (H) 02/17/2022 0840    ALT 45 (H) 02/17/2022 0840    BILITOT 2.9 (H) 02/17/2022 0840    ESTGFRAFRICA >60.0 02/17/2022 0840    EGFRNONAA 56.2 (A) 02/17/2022 0840            No results found for: CHOL  No results found " for: HDL  No results found for: LDLCALC  No results found for: TRIG  No results found for: CHOLHDL      Lab Results   Component Value Date     (L) 02/17/2022    K 4.5 02/17/2022    CL 99 02/17/2022    CO2 21 (L) 02/17/2022    BUN 25 (H) 02/17/2022    CREATININE 1.4 02/18/2022    CREATININE 1.4 02/18/2022     (H) 02/17/2022    HGBA1C 6.9 (H) 02/17/2022    AST 81 (H) 02/17/2022    ALT 45 (H) 02/17/2022    ALBUMIN 2.1 (L) 02/17/2022    PROT 6.5 02/17/2022    BILITOT 2.9 (H) 02/17/2022    WBC 4.91 02/17/2022    HGB 7.8 (L) 02/17/2022    HCT 22.8 (L) 02/17/2022    MCV 92 02/17/2022    PLT 65 (L) 02/17/2022    INR 1.5 (H) 02/17/2022    PSA 0.02 02/14/2022   +    I have personally taken the history and examined this patient and agree with the resident's note as stated above.  Discussed coronary angiography as part of her pre liver transplant workup as requested by the liver transplant team with the patient.  If the patient is found to have high-grade coronary stenosis then will discuss coronary revascularization options with the patient and the hepatology team end-stage any indicated PCI.  The patient agreed to proceed.  However today he reports worsening lethargy and ascites.  Prior to this clinic visit he had a clinic visit with hepatology.  His hepatologist is recommending inpatient admission for acute medical management decompensated cirrhosis.  Follow up with the patient for coronary angiography when he is medically stable.  All the patient's and his wife's questions were answered.

## 2022-03-17 NOTE — ED NOTES
Pelon Vasquez, a 57 y.o. male presents to the ED w/ complaint of on liver transplant. Pt reports being sent for elevated ammonia and potassium. Pt aaox4. Abdominal distention noted., jaundice. Pt reports weakness, SOB     Triage note:  Chief Complaint   Patient presents with    Admission     Review of patient's allergies indicates:   Allergen Reactions    Strawberry Anaphylaxis and Rash    Metformin Diarrhea     Past Medical History:   Diagnosis Date    Arthritis     Cirrhosis of liver     HTN (hypertension)     BRAYDON (obstructive sleep apnea)     Type 2 diabetes mellitus      LOC: The patient is awake, alert and aware of environment with an appropriate affect, the patient is oriented x 3 and speaking appropriately.   APPEARANCE: Patient appears comfortable and in no acute distress, patient is clean and well groomed.  SKIN: The skin is warm and dry, jaundice , patient has normal skin turgor and moist mucus membranes, skin intact, no breakdown or bruising noted.   MUSCULOSKELETAL: Patient moving all extremities spontaneously, bilateral leg swelling noted.  RESPIRATORY: Airway is open and patent, respirations are spontaneous, patient has a normal effort and rate, no accessory muscle use noted, pt placed on continuous pulse ox with O2 sats noted at 97% on room air.  CARDIAC: Pt placed on cardiac monitor. Patient has a normal rate and regular rhythm, no edema noted, capillary refill < 3 seconds.   GASTRO: Soft and non tender to palpation,  distention noted, normoactive bowel sounds present in all four quadrants. Pt states bowel movements have been regular.  : Pt denies any pain or frequency with urination.  NEURO: Pt opens eyes spontaneously, behavior appropriate to situation, follows commands, facial expression symmetrical, bilateral hand grasp equal and even, purposeful motor response noted, normal sensation in all extremities when touched with a finger.

## 2022-03-17 NOTE — ASSESSMENT & PLAN NOTE
MELD-Na score: 27 at 3/17/2022  9:30 AM  MELD score: 22 at 3/17/2022  9:30 AM  Calculated from:  Serum Creatinine: 2.1 mg/dL at 3/17/2022  9:30 AM  Serum Sodium: 128 mmol/L at 3/17/2022  9:30 AM  Total Bilirubin: 2.9 mg/dL at 3/17/2022  9:30 AM  INR(ratio): 1.5 at 3/17/2022  9:30 AM  Age: 57 years  Secondary to LOZANO vs alcohol. Patient presents with volume overload and FAMILIA despite taking diuretics as prescribed. Patient's abdomen remains soft but his legs are extremely edematous. Patient seen in hepatology clinic and referred to inpatient for transplant eval. Patient's wife reports that at OSH they had given him albumin due to his FAMILIA and his Cr downtreded from 2.4 to 2.1  - Placed consult for hepatology evaluation  - Treat for possible HRS with 100g of albumin today, if responds will continue HRS treatment tomorrow with midodrine and octreotide  - Continue lactulose and rifaximin for HE  - Received TTE today, cardiology requesting Cr be at baseline before [facility]

## 2022-03-17 NOTE — SUBJECTIVE & OBJECTIVE
Past Medical History:   Diagnosis Date    Arthritis     Cirrhosis of liver     HTN (hypertension)     BRAYDON (obstructive sleep apnea)     Type 2 diabetes mellitus        Past Surgical History:   Procedure Laterality Date    COLONOSCOPY      FINGER AMPUTATION      MEDIAL COLLATERAL LIGAMENT REPAIR, KNEE Bilateral     ROTATRO CUFF REPAIR      TONSILLECTOMY         Review of patient's allergies indicates:   Allergen Reactions    Strawberry Anaphylaxis and Rash    Metformin Diarrhea       Current Facility-Administered Medications on File Prior to Encounter   Medication    [COMPLETED] gadobutroL (GADAVIST) injection 10 mL    [DISCONTINUED] dextrose 50 % in water (D50W) injection    [DISCONTINUED] enoxaparin injection    [DISCONTINUED] GENERIC EXTERNAL MEDICATION    [DISCONTINUED] hydrALAZINE injection    [DISCONTINUED] insulin aspart U-100 pen    [DISCONTINUED] lactulose 10 gram/15 mL solution    [DISCONTINUED] ondansetron injection    [DISCONTINUED] pantoprazole injection    [DISCONTINUED] propranoloL tablet    [DISCONTINUED] rifAXIMin tablet     Current Outpatient Medications on File Prior to Encounter   Medication Sig    atorvastatin (LIPITOR) 20 MG tablet Take 20 mg by mouth once daily.    furosemide (LASIX) 40 MG tablet TAKE 1 TABLET BY MOUTH IN THE MORNING AND 1 TABLET IN THE EVENING    lactulose (CHRONULAC) 10 gram/15 mL solution Take 30 mLs by mouth 3 (three) times daily.    ondansetron (ZOFRAN-ODT) 4 MG TbDL Take 1 tablet by mouth 2 (two) times daily as needed.    pantoprazole (PROTONIX) 40 MG tablet Take 40 mg by mouth once daily.    triamcinolone acetonide 0.1% (KENALOG) 0.1 % cream Apply topically 2 (two) times daily as needed.    vitamin A palmitate 7,500 mcg (25,000 unit) Cap Take 900 mcg by mouth once daily.    XIFAXAN 550 mg Tab Take 550 mg by mouth 2 (two) times daily.    zinc sulfate 50 mg zinc (220 mg) Tab tablet Take 1 tablet (220 mg total) by mouth once daily.    [DISCONTINUED] lisinopriL 10 MG tablet  Take 10 mg by mouth once daily.    [DISCONTINUED] potassium chloride (K-TAB) 20 mEq Take 20 mEq by mouth once daily.    [DISCONTINUED] spironolactone (ALDACTONE) 50 MG tablet Take 50 mg by mouth 2 (two) times daily.     Family History    None       Tobacco Use    Smoking status: Never Smoker    Smokeless tobacco: Never Used   Substance and Sexual Activity    Alcohol use: Not Currently    Drug use: Not on file    Sexual activity: Not on file     Review of Systems   Constitutional:  Positive for activity change (decreased). Negative for appetite change, chills, diaphoresis, fatigue, fever and unexpected weight change.   HENT:  Negative for congestion, dental problem, ear pain, hearing loss, nosebleeds, rhinorrhea, sneezing, sore throat and trouble swallowing.    Eyes:  Negative for photophobia, pain, discharge and visual disturbance.   Respiratory:  Negative for cough, shortness of breath and wheezing.    Cardiovascular:  Positive for leg swelling. Negative for chest pain and palpitations.   Gastrointestinal:  Positive for abdominal distention and diarrhea (from lactulose). Negative for abdominal pain, blood in stool, constipation, nausea, rectal pain and vomiting.   Endocrine: Negative for cold intolerance, heat intolerance, polydipsia and polyuria.   Genitourinary:  Negative for decreased urine volume, dysuria, flank pain and hematuria.   Musculoskeletal:  Negative for arthralgias, gait problem, joint swelling and myalgias.   Skin:  Negative for rash and wound.   Allergic/Immunologic: Negative for environmental allergies, food allergies and immunocompromised state.   Neurological:  Negative for dizziness, tremors, seizures, syncope, speech difficulty, weakness, light-headedness, numbness and headaches.   Hematological:  Negative for adenopathy. Does not bruise/bleed easily.   Psychiatric/Behavioral:  Negative for agitation, confusion, hallucinations, sleep disturbance and suicidal ideas. The patient is not  nervous/anxious.    Objective:     Vital Signs (Most Recent):  Temp: 98 °F (36.7 °C) (03/17/22 1537)  Pulse: 74 (03/17/22 1537)  Resp: 18 (03/17/22 1537)  BP: 135/61 (03/17/22 1537)  SpO2: 100 % (03/17/22 1537) Vital Signs (24h Range):  Temp:  [97.2 °F (36.2 °C)-98 °F (36.7 °C)] 98 °F (36.7 °C)  Pulse:  [72-75] 74  Resp:  [16-18] 18  SpO2:  [98 %-100 %] 100 %  BP: (109-135)/(55-63) 135/61     Weight: 117.9 kg (260 lb)  Body mass index is 40.72 kg/m².    Physical Exam  Constitutional:       General: He is not in acute distress.     Appearance: He is well-developed. He is obese. He is ill-appearing. He is not diaphoretic.   HENT:      Head: Normocephalic and atraumatic.   Eyes:      General: Scleral icterus present.         Right eye: No discharge.         Left eye: No discharge.      Conjunctiva/sclera: Conjunctivae normal.      Pupils: Pupils are equal, round, and reactive to light.   Neck:      Thyroid: No thyromegaly.      Vascular: No JVD.      Trachea: No tracheal deviation.   Cardiovascular:      Rate and Rhythm: Normal rate and regular rhythm.      Heart sounds: Normal heart sounds. No murmur heard.    No friction rub. No gallop.   Pulmonary:      Effort: Pulmonary effort is normal. No respiratory distress.      Breath sounds: Normal breath sounds. No stridor. No wheezing or rales.   Abdominal:      General: Bowel sounds are normal. There is distension.      Palpations: Abdomen is soft. There is no mass.      Tenderness: There is no abdominal tenderness. There is no guarding or rebound.      Hernia: No hernia is present.   Musculoskeletal:         General: Swelling present. No deformity. Normal range of motion.      Cervical back: Normal range of motion and neck supple.      Right lower leg: Edema present.      Left lower leg: Edema present.   Lymphadenopathy:      Cervical: No cervical adenopathy.   Skin:     General: Skin is warm and dry.      Coloration: Skin is jaundiced.      Findings: No rash.    Neurological:      Mental Status: He is alert and oriented to person, place, and time.      Cranial Nerves: No cranial nerve deficit.      Motor: No abnormal muscle tone.      Coordination: Coordination normal.      Deep Tendon Reflexes: Reflexes normal.   Psychiatric:         Behavior: Behavior normal.         Thought Content: Thought content normal.         Judgment: Judgment normal.         CRANIAL NERVES     CN III, IV, VI   Pupils are equal, round, and reactive to light.     Significant Labs: All pertinent labs within the past 24 hours have been reviewed.  CBC:   Recent Labs   Lab 03/17/22  0930   WBC 5.58   HGB 7.2*   HCT 21.2*   PLT 59*     CMP:   Recent Labs   Lab 03/17/22  0930   *   K 5.6*      CO2 18*   *   BUN 48*   CREATININE 2.1*   CALCIUM 8.4*   PROT 5.7*   ALBUMIN 2.5*   BILITOT 2.9*   ALKPHOS 79   AST 70*   ALT 44   ANIONGAP 3*   EGFRNONAA 34.4*       Significant Imaging: I have reviewed all pertinent imaging results/findings within the past 24 hours.

## 2022-03-17 NOTE — HPI
58 y/o M with a PMH of cirrhosis c/b ascites who presents for liver transplant evaluation and renal failure.    Patient was seen in hepatology clinic this morning where he mentioned to staff that he was recently hospitalized for ascites as well as acute kidney failure and hyperkalemia. He was discharged yesterday and made his way to his hepatology appointment. Patient reports that he had not been feeling all to well and that his ascites reaccumulated hence his presentation to OSH. There he had a potassium of 7 and Cr of 2.3~. It was believed to be potassium supplementation + aldactone in the setting of worsening renal function that caused hyperkalemia. Furthermore, he saw his cardiologist due to questions if he needed LHC prior to being listed for transplant or not. Per their discussion, they would like his renal function to return to his baseline before LHC    Patient being admitted for hyperkalemia/FAMILIA/transplant w/u.

## 2022-03-18 PROBLEM — K74.60 DECOMPENSATION OF CIRRHOSIS OF LIVER: Chronic | Status: ACTIVE | Noted: 2022-02-04

## 2022-03-18 PROBLEM — K70.31 ASCITES DUE TO ALCOHOLIC CIRRHOSIS: Chronic | Status: ACTIVE | Noted: 2022-03-18

## 2022-03-18 PROBLEM — K72.90 DECOMPENSATION OF CIRRHOSIS OF LIVER: Status: ACTIVE | Noted: 2022-02-04

## 2022-03-18 PROBLEM — K72.90 DECOMPENSATION OF CIRRHOSIS OF LIVER: Chronic | Status: ACTIVE | Noted: 2022-02-04

## 2022-03-18 PROBLEM — D64.89 OTHER SPECIFIED ANEMIAS: Status: ACTIVE | Noted: 2022-03-18

## 2022-03-18 PROBLEM — K74.60 DECOMPENSATION OF CIRRHOSIS OF LIVER: Status: ACTIVE | Noted: 2022-02-04

## 2022-03-18 PROBLEM — Z76.82 ORGAN TRANSPLANT CANDIDATE: Chronic | Status: ACTIVE | Noted: 2022-03-18

## 2022-03-18 PROBLEM — I85.10 SECONDARY ESOPHAGEAL VARICES WITHOUT BLEEDING: Chronic | Status: ACTIVE | Noted: 2022-03-18

## 2022-03-18 LAB
ALBUMIN SERPL BCP-MCNC: 3.1 G/DL (ref 3.5–5.2)
ALP SERPL-CCNC: 83 U/L (ref 55–135)
ALT SERPL W/O P-5'-P-CCNC: 37 U/L (ref 10–44)
ANION GAP SERPL CALC-SCNC: 6 MMOL/L (ref 8–16)
AST SERPL-CCNC: 55 U/L (ref 10–40)
BASOPHILS # BLD AUTO: 0.05 K/UL (ref 0–0.2)
BASOPHILS NFR BLD: 1.1 % (ref 0–1.9)
BILIRUB SERPL-MCNC: 2.6 MG/DL (ref 0.1–1)
BUN SERPL-MCNC: 45 MG/DL (ref 6–20)
CALCIUM SERPL-MCNC: 8.7 MG/DL (ref 8.7–10.5)
CHLORIDE SERPL-SCNC: 108 MMOL/L (ref 95–110)
CO2 SERPL-SCNC: 15 MMOL/L (ref 23–29)
CREAT SERPL-MCNC: 1.8 MG/DL (ref 0.5–1.4)
DIFFERENTIAL METHOD: ABNORMAL
EOSINOPHIL # BLD AUTO: 0.6 K/UL (ref 0–0.5)
EOSINOPHIL NFR BLD: 13.5 % (ref 0–8)
ERYTHROCYTE [DISTWIDTH] IN BLOOD BY AUTOMATED COUNT: 16.9 % (ref 11.5–14.5)
EST. GFR  (AFRICAN AMERICAN): 47.9 ML/MIN/1.73 M^2
EST. GFR  (NON AFRICAN AMERICAN): 41.4 ML/MIN/1.73 M^2
GLUCOSE SERPL-MCNC: 131 MG/DL (ref 70–110)
HCT VFR BLD AUTO: 21.7 % (ref 40–54)
HGB BLD-MCNC: 6.7 G/DL (ref 14–18)
IMM GRANULOCYTES # BLD AUTO: 0.01 K/UL (ref 0–0.04)
IMM GRANULOCYTES NFR BLD AUTO: 0.2 % (ref 0–0.5)
INR PPP: 1.7 (ref 0.8–1.2)
LYMPHOCYTES # BLD AUTO: 0.7 K/UL (ref 1–4.8)
LYMPHOCYTES NFR BLD: 14.6 % (ref 18–48)
MCH RBC QN AUTO: 30.9 PG (ref 27–31)
MCHC RBC AUTO-ENTMCNC: 30.9 G/DL (ref 32–36)
MCV RBC AUTO: 100 FL (ref 82–98)
MONOCYTES # BLD AUTO: 0.6 K/UL (ref 0.3–1)
MONOCYTES NFR BLD: 14.4 % (ref 4–15)
NEUTROPHILS # BLD AUTO: 2.5 K/UL (ref 1.8–7.7)
NEUTROPHILS NFR BLD: 56.2 % (ref 38–73)
NRBC BLD-RTO: 0 /100 WBC
PLATELET # BLD AUTO: 51 K/UL (ref 150–450)
PMV BLD AUTO: 10.3 FL (ref 9.2–12.9)
POCT GLUCOSE: 146 MG/DL (ref 70–110)
POCT GLUCOSE: 148 MG/DL (ref 70–110)
POCT GLUCOSE: 184 MG/DL (ref 70–110)
POCT GLUCOSE: 283 MG/DL (ref 70–110)
POCT GLUCOSE: <20 MG/DL (ref 70–110)
POTASSIUM SERPL-SCNC: 5.4 MMOL/L (ref 3.5–5.1)
PROT SERPL-MCNC: 5.6 G/DL (ref 6–8.4)
PROTHROMBIN TIME: 16.8 SEC (ref 9–12.5)
RBC # BLD AUTO: 2.17 M/UL (ref 4.6–6.2)
SODIUM SERPL-SCNC: 129 MMOL/L (ref 136–145)
WBC # BLD AUTO: 4.44 K/UL (ref 3.9–12.7)

## 2022-03-18 PROCEDURE — P9047 ALBUMIN (HUMAN), 25%, 50ML: HCPCS | Mod: JG,NTX | Performed by: STUDENT IN AN ORGANIZED HEALTH CARE EDUCATION/TRAINING PROGRAM

## 2022-03-18 PROCEDURE — 99233 SBSQ HOSP IP/OBS HIGH 50: CPT | Mod: NTX,,, | Performed by: STUDENT IN AN ORGANIZED HEALTH CARE EDUCATION/TRAINING PROGRAM

## 2022-03-18 PROCEDURE — 25000003 PHARM REV CODE 250: Mod: NTX | Performed by: STUDENT IN AN ORGANIZED HEALTH CARE EDUCATION/TRAINING PROGRAM

## 2022-03-18 PROCEDURE — 99223 PR INITIAL HOSPITAL CARE,LEVL III: ICD-10-PCS | Mod: NTX,,, | Performed by: INTERNAL MEDICINE

## 2022-03-18 PROCEDURE — 99233 PR SUBSEQUENT HOSPITAL CARE,LEVL III: ICD-10-PCS | Mod: NTX,,, | Performed by: STUDENT IN AN ORGANIZED HEALTH CARE EDUCATION/TRAINING PROGRAM

## 2022-03-18 PROCEDURE — 85025 COMPLETE CBC W/AUTO DIFF WBC: CPT | Mod: NTX | Performed by: STUDENT IN AN ORGANIZED HEALTH CARE EDUCATION/TRAINING PROGRAM

## 2022-03-18 PROCEDURE — 36415 COLL VENOUS BLD VENIPUNCTURE: CPT | Mod: NTX | Performed by: STUDENT IN AN ORGANIZED HEALTH CARE EDUCATION/TRAINING PROGRAM

## 2022-03-18 PROCEDURE — 85610 PROTHROMBIN TIME: CPT | Mod: NTX | Performed by: STUDENT IN AN ORGANIZED HEALTH CARE EDUCATION/TRAINING PROGRAM

## 2022-03-18 PROCEDURE — 80053 COMPREHEN METABOLIC PANEL: CPT | Mod: NTX | Performed by: STUDENT IN AN ORGANIZED HEALTH CARE EDUCATION/TRAINING PROGRAM

## 2022-03-18 PROCEDURE — 99223 1ST HOSP IP/OBS HIGH 75: CPT | Mod: NTX,,, | Performed by: INTERNAL MEDICINE

## 2022-03-18 PROCEDURE — 63600175 PHARM REV CODE 636 W HCPCS: Mod: NTX | Performed by: STUDENT IN AN ORGANIZED HEALTH CARE EDUCATION/TRAINING PROGRAM

## 2022-03-18 PROCEDURE — 20600001 HC STEP DOWN PRIVATE ROOM: Mod: NTX

## 2022-03-18 RX ORDER — MIDODRINE HYDROCHLORIDE 5 MG/1
10 TABLET ORAL EVERY 8 HOURS
Status: DISCONTINUED | OUTPATIENT
Start: 2022-03-18 | End: 2022-03-19

## 2022-03-18 RX ORDER — ALBUMIN HUMAN 250 G/1000ML
SOLUTION INTRAVENOUS
Status: DISPENSED
Start: 2022-03-18 | End: 2022-03-19

## 2022-03-18 RX ORDER — OCTREOTIDE ACETATE 100 UG/ML
100 INJECTION, SOLUTION INTRAVENOUS; SUBCUTANEOUS EVERY 8 HOURS
Status: DISCONTINUED | OUTPATIENT
Start: 2022-03-18 | End: 2022-03-18

## 2022-03-18 RX ORDER — ALBUMIN HUMAN 250 G/1000ML
25 SOLUTION INTRAVENOUS ONCE
Status: COMPLETED | OUTPATIENT
Start: 2022-03-18 | End: 2022-03-18

## 2022-03-18 RX ORDER — ALBUMIN HUMAN 250 G/1000ML
50 SOLUTION INTRAVENOUS DAILY
Status: DISCONTINUED | OUTPATIENT
Start: 2022-03-19 | End: 2022-03-24

## 2022-03-18 RX ORDER — OCTREOTIDE ACETATE 100 UG/ML
100 INJECTION, SOLUTION INTRAVENOUS; SUBCUTANEOUS EVERY 8 HOURS
Status: DISCONTINUED | OUTPATIENT
Start: 2022-03-18 | End: 2022-03-24

## 2022-03-18 RX ORDER — ACETAMINOPHEN 325 MG/1
325 TABLET ORAL EVERY 6 HOURS PRN
Status: ON HOLD | COMMUNITY
End: 2022-03-24 | Stop reason: HOSPADM

## 2022-03-18 RX ADMIN — OCTREOTIDE ACETATE 100 MCG: 100 INJECTION, SOLUTION INTRAVENOUS; SUBCUTANEOUS at 09:03

## 2022-03-18 RX ADMIN — SENNOSIDES AND DOCUSATE SODIUM 1 TABLET: 50; 8.6 TABLET ORAL at 09:03

## 2022-03-18 RX ADMIN — HEPARIN SODIUM 5000 UNITS: 5000 INJECTION INTRAVENOUS; SUBCUTANEOUS at 05:03

## 2022-03-18 RX ADMIN — SODIUM ZIRCONIUM CYCLOSILICATE 5 G: 5 POWDER, FOR SUSPENSION ORAL at 05:03

## 2022-03-18 RX ADMIN — HEPARIN SODIUM 5000 UNITS: 5000 INJECTION INTRAVENOUS; SUBCUTANEOUS at 09:03

## 2022-03-18 RX ADMIN — OCTREOTIDE ACETATE 100 MCG: 100 INJECTION, SOLUTION INTRAVENOUS; SUBCUTANEOUS at 05:03

## 2022-03-18 RX ADMIN — HEPARIN SODIUM 5000 UNITS: 5000 INJECTION INTRAVENOUS; SUBCUTANEOUS at 06:03

## 2022-03-18 RX ADMIN — INSULIN ASPART 3 UNITS: 100 INJECTION, SOLUTION INTRAVENOUS; SUBCUTANEOUS at 01:03

## 2022-03-18 RX ADMIN — LACTULOSE 20 G: 20 SOLUTION ORAL at 05:03

## 2022-03-18 RX ADMIN — RIFAXIMIN 550 MG: 550 TABLET ORAL at 09:03

## 2022-03-18 RX ADMIN — MIDODRINE HYDROCHLORIDE 10 MG: 5 TABLET ORAL at 05:03

## 2022-03-18 RX ADMIN — MIDODRINE HYDROCHLORIDE 10 MG: 5 TABLET ORAL at 09:03

## 2022-03-18 RX ADMIN — ZINC SULFATE 220 MG (50 MG) CAPSULE 220 MG: CAPSULE at 09:03

## 2022-03-18 RX ADMIN — PANTOPRAZOLE SODIUM 40 MG: 40 TABLET, DELAYED RELEASE ORAL at 09:03

## 2022-03-18 RX ADMIN — LACTULOSE 20 G: 20 SOLUTION ORAL at 09:03

## 2022-03-18 RX ADMIN — ALBUMIN (HUMAN) 25 G: 25 SOLUTION INTRAVENOUS at 04:03

## 2022-03-18 NOTE — SUBJECTIVE & OBJECTIVE
Review of Systems   Constitutional:  Negative for chills and fever.   HENT:  Negative for congestion, sore throat and trouble swallowing.    Eyes:  Negative for visual disturbance.   Respiratory:  Positive for cough and shortness of breath. Negative for wheezing.    Cardiovascular:  Positive for leg swelling. Negative for chest pain and palpitations.   Gastrointestinal:  Positive for abdominal distention. Negative for abdominal pain, blood in stool, constipation, diarrhea, nausea and vomiting.   Genitourinary:  Negative for dysuria and hematuria.   Musculoskeletal:  Negative for arthralgias and myalgias.   Skin:  Negative for rash.   Neurological:  Negative for dizziness, light-headedness, numbness and headaches.   Psychiatric/Behavioral:  Negative for agitation and confusion.      Past Medical History:   Diagnosis Date    Arthritis     Cirrhosis of liver     HTN (hypertension)     BRAYDON (obstructive sleep apnea)     Type 2 diabetes mellitus        Past Surgical History:   Procedure Laterality Date    COLONOSCOPY      FINGER AMPUTATION      MEDIAL COLLATERAL LIGAMENT REPAIR, KNEE Bilateral     ROTATRO CUFF REPAIR      TONSILLECTOMY         Family history of liver disease: No    Review of patient's allergies indicates:   Allergen Reactions    Strawberry Anaphylaxis and Rash    Metformin Diarrhea         Tobacco Use    Smoking status: Never Smoker    Smokeless tobacco: Never Used   Substance and Sexual Activity    Alcohol use: Not Currently    Drug use: Not on file    Sexual activity: Not on file       Medications Prior to Admission   Medication Sig Dispense Refill Last Dose    atorvastatin (LIPITOR) 20 MG tablet Take 20 mg by mouth once daily.       furosemide (LASIX) 40 MG tablet TAKE 1 TABLET BY MOUTH IN THE MORNING AND 1 TABLET IN THE EVENING       lactulose (CHRONULAC) 10 gram/15 mL solution Take 30 mLs by mouth 3 (three) times daily.       ondansetron (ZOFRAN-ODT) 4 MG TbDL Take 1 tablet by mouth 2 (two) times  daily as needed.       pantoprazole (PROTONIX) 40 MG tablet Take 40 mg by mouth once daily.       triamcinolone acetonide 0.1% (KENALOG) 0.1 % cream Apply topically 2 (two) times daily as needed.       vitamin A palmitate 7,500 mcg (25,000 unit) Cap Take 900 mcg by mouth once daily. 30 capsule 1     XIFAXAN 550 mg Tab Take 550 mg by mouth 2 (two) times daily.       zinc sulfate 50 mg zinc (220 mg) Tab tablet Take 1 tablet (220 mg total) by mouth once daily. 90 tablet 1        Objective:     Vital Signs (Most Recent):  Temp: 97.8 °F (36.6 °C) (03/18/22 0749)  Pulse: 83 (03/18/22 0749)  Resp: 18 (03/18/22 0749)  BP: (!) 107/55 (03/18/22 0749)  SpO2: 96 % (03/18/22 0749)   Vital Signs (24h Range):  Temp:  [96.9 °F (36.1 °C)-98.4 °F (36.9 °C)] 97.8 °F (36.6 °C)  Pulse:  [72-83] 83  Resp:  [16-18] 18  SpO2:  [96 %-100 %] 96 %  BP: ()/(52-67) 107/55     Weight: 117.9 kg (260 lb) (03/17/22 1537)  Body mass index is 40.72 kg/m².    Physical Exam  Constitutional:       General: He is not in acute distress.     Appearance: He is well-developed. He is obese.   HENT:      Head: Normocephalic and atraumatic.      Mouth/Throat:      Pharynx: No oropharyngeal exudate.   Eyes:      Conjunctiva/sclera: Conjunctivae normal.      Pupils: Pupils are equal, round, and reactive to light.   Cardiovascular:      Rate and Rhythm: Normal rate and regular rhythm.      Heart sounds: Normal heart sounds. No murmur heard.  Pulmonary:      Effort: Pulmonary effort is normal. No respiratory distress.      Breath sounds: No wheezing or rales.   Abdominal:      General: Bowel sounds are normal. There is distension.      Palpations: Abdomen is soft.      Tenderness: There is no abdominal tenderness. There is no guarding.   Musculoskeletal:         General: Swelling (2+ pitting edema in BLE) present. No tenderness.      Cervical back: Normal range of motion and neck supple.   Skin:     General: Skin is warm and dry.      Findings: No rash.    Neurological:      General: No focal deficit present.      Mental Status: He is alert and oriented to person, place, and time. Mental status is at baseline.      Cranial Nerves: No cranial nerve deficit.      Motor: No abnormal muscle tone.   Psychiatric:         Behavior: Behavior normal.       MELD-Na score: 27 at 3/18/2022  4:51 AM  MELD score: 22 at 3/18/2022  4:51 AM  Calculated from:  Serum Creatinine: 1.8 mg/dL at 3/18/2022  4:51 AM  Serum Sodium: 129 mmol/L at 3/18/2022  4:51 AM  Total Bilirubin: 2.6 mg/dL at 3/18/2022  4:51 AM  INR(ratio): 1.7 at 3/18/2022  4:51 AM  Age: 57 years    Significant Labs:  CBC:   Recent Labs   Lab 03/18/22  0451   WBC 4.44   RBC 2.17*   HGB 6.7*   HCT 21.7*   PLT 51*     CMP:   Recent Labs   Lab 03/18/22  0451   *   CALCIUM 8.7   ALBUMIN 3.1*   PROT 5.6*   *   K 5.4*   CO2 15*      BUN 45*   CREATININE 1.8*   ALKPHOS 83   ALT 37   AST 55*   BILITOT 2.6*       Significant Imaging:    MRI Abdomen (3/17/22):  FINDINGS:  Examination limited by motion artifact and suboptimal arterial phase bolus timing.     Liver: Cirrhotic morphology.  No focal arterial enhancing lesion detected.  No pseudocapsule or washout.  Portal vein is patent.     Biliary: Cholelithiasis.  No significant gallbladder wall thickening.  No intra or extrahepatic biliary duct dilatation.     Pancreas: Unremarkable.  No pancreatic ductal dilatation.     Spleen: Mildly enlarged measuring 12.8 cm in craniocaudal dimension.     Adrenal glands: Unremarkable.     Kidneys: No renal masses.  No hydronephrosis.     Miscellaneous: Moderate volume dependent right pleural fluid.  Moderate volume ascites.  Visualized bowel loops are unremarkable.  No evidence for lymphadenopathy.     Impression:    1. Examination limited by motion artifact and suboptimal arterial phase bolus timing.  2. No suspicious focal hepatic lesion detected.  3. Cirrhotic liver with sequela of portal hypertension including mild  splenomegaly and moderate ascites.  4. Moderate right pleural effusion.  5. Cholelithiasis.      US Retroperitoneal (3/17/22):  FINDINGS:  Right kidney: The right kidney measures 9.5 cm. No cortical thinning. No loss of corticomedullary distinction.  There is overall decreased perfusion to the right kidney.  Resistive index measures 0.80.  No mass. No renal stone. No hydronephrosis.     Left kidney: The left kidney measures 11.2 cm. No cortical thinning. No loss of corticomedullary distinction.  There is overall decreased perfusion to the left kidney.  Resistive index measures 0.82.  No mass. No renal stone. No hydronephrosis.     The bladder is partially distended at the time of scanning and has an unremarkable appearance.     Small volume ascites throughout the visualized abdomen.     Splenic RI measures 0.77.     Impression:  No sonographic evidence of obstructive uropathy.     Elevated renal arterial resistive indices, which can be seen in setting of medical renal disease.  Correlation is advised.     Small volume ascites throughout the visualized abdomen.

## 2022-03-18 NOTE — ASSESSMENT & PLAN NOTE
MELD-Na score: 27 at 3/18/2022  4:51 AM  MELD score: 22 at 3/18/2022  4:51 AM  Calculated from:  Serum Creatinine: 1.8 mg/dL at 3/18/2022  4:51 AM  Serum Sodium: 129 mmol/L at 3/18/2022  4:51 AM  Total Bilirubin: 2.6 mg/dL at 3/18/2022  4:51 AM  INR(ratio): 1.7 at 3/18/2022  4:51 AM  Age: 57 years  Secondary to LOZANO vs alcohol. Patient presents with volume overload and FAMILIA despite taking diuretics as prescribed. Patient's abdomen remains soft but his legs are extremely edematous. Patient seen in hepatology clinic and referred to inpatient for transplant eval. Patient's wife reports that at OSH they had given him albumin due to his FAMILIA and his Cr downtreded from 2.4 to 2.1  - Placed consult for hepatology evaluation  - Responded to albumin challenge. Continue HRS treatment with albumin, midodrine, and octreotide  - Continue lactulose and rifaximin for HE  - Interventional cardiology consulted for [facility], tentatively planned for Monday 3/20 assuming renal function continues to improve.

## 2022-03-18 NOTE — CONSULTS
Interventional Cardiologt Consult Note.      A/P:  Cirrhosis, decompensated  - s/p  paracentesis today   - MELD Na 26 in past     Coronary eval for liver transplant  - hold off coronary angiogram given cirrhosis and worsening renal function and anemia  - will continue to peripherally follow and reassess on Monday, please ensure plt/Hg stable (>50 K , Hg > 7)     HTN, well controlled  -continue lasix defer to liver team for management    HLD  -stop statin given LFTs    DM  -goal a1c < 7  Defer PCP    BRAYDON  - NIPPV       Subjective:     HPI:   Pelon Vasquez with following problem list:     HTN   DM   BRAYDON   Etoh/LOZANO cirrhosis with history of encephalopathy, thrombocytopenia and ascites requiring large volume paracentesis- last MELD-Na 26     He presents for evaluation of LHC at request of anesthesia for pre op eval for liver transplant. He saw liver transplant today and is pending repeat labwork due to fluctuating renal function and multiple admits for encephalopathy and hyperkaelmia.   He was discharged from hospital last night and his last K was 6 with a creatinine of 2.23 and overall has been admitted 3X in past 1 mos. Yesterday was seen in IC clinic where he stated he has 5 pillow orthopnea due to ascites and has been requiring paracentesis more frequently (now once a week, last yesterday, average 3-4 liters removed). ROS - melena or hemathochezia or angina. Due to worsening FAMILIA on labwork and hyperkalemia was admitted yesterday to hospital medicine. IC consulted for C for pre op eval of liver transplant.          Anginal symptoms:  Duration: none  Frequency: none  Ambulatory distance: unable to ambulate more than a couple of steps due to dyspnea  Bleeding diathesis or upcoming surgery: yes, ongoing issues with thromboctyopenia in 50s due to liver disease.  Claudication: no          Cardiac Studies:  DSE 2/22  · The stress echo portion of this study is negative for myocardial ischemia.  · The ECG portion of  this study is negative for myocardial ischemia.  · During stress, the following significant arrhythmias were observed: rare PVCs.  · The left ventricle is normal in size with normal systolic function. The estimated ejection fraction is 65%.  · Normal left ventricular diastolic function.  · Normal right ventricular size with normal right ventricular systolic function.  · Mild biatrial enlargement.  · Mild tricuspid regurgitation.  · The estimated PA systolic pressure is 29 mmHg.  · Normal central venous pressure (3 mmHg).          ROS:  Constitution: Negative for fever, chills, weight loss or gain.   HENT: Negative for sore throat, rhinorrhea, or headache.  Eyes: Negative for blurred or double vision.   Cardiovascular: - for Exertional chest pain  Pulmonary: - for Dyspnea on exertion  Gastrointestinal: Negative for abdominal pain, nausea, vomiting, or diarrhea. Negative for melena/hematochezia.  : Negative for dysuria.   Neurological: Negative for focal weakness or sensory changes.  Skin: No bleeding or bruising      Past Medical History:   Diagnosis Date    Arthritis     Cirrhosis of liver     HTN (hypertension)     BRAYDON (obstructive sleep apnea)     Secondary esophageal varices without bleeding 3/18/2022    EGD (2/25/22) showed moderate portal hypertensive gastropathy, small hiatal hernia, grade 1 esophageal varices    Type 2 diabetes mellitus        Past Surgical History:   Procedure Laterality Date    COLONOSCOPY      FINGER AMPUTATION      MEDIAL COLLATERAL LIGAMENT REPAIR, KNEE Bilateral     ROTATRO CUFF REPAIR      TONSILLECTOMY         Social History     Tobacco Use    Smoking status: Never Smoker    Smokeless tobacco: Never Used   Substance Use Topics    Alcohol use: Not Currently       History reviewed. No pertinent family history.    Current Facility-Administered Medications   Medication    acetaminophen tablet 650 mg    [START ON 3/19/2022] albumin human 25% bottle 50 g     albuterol-ipratropium 2.5 mg-0.5 mg/3 mL nebulizer solution 3 mL    aluminum-magnesium hydroxide-simethicone 200-200-20 mg/5 mL suspension 30 mL    dextrose 10% bolus 125 mL    dextrose 10% bolus 250 mL    glucagon (human recombinant) injection 1 mg    glucose chewable tablet 16 g    glucose chewable tablet 24 g    heparin (porcine) injection 5,000 Units    insulin aspart U-100 pen 0-5 Units    lactulose 20 gram/30 mL solution Soln 20 g    melatonin tablet 6 mg    midodrine tablet 10 mg    naloxone 0.4 mg/mL injection 0.02 mg    octreotide injection 100 mcg    ondansetron injection 4 mg    pantoprazole EC tablet 40 mg    prochlorperazine injection Soln 5 mg    rifAXIMin tablet 550 mg    senna-docusate 8.6-50 mg per tablet 1 tablet    simethicone chewable tablet 80 mg    sodium chloride 0.9% flush 10 mL    sodium zirconium cyclosilicate packet 5 g    zinc sulfate capsule 220 mg       Review of patient's allergies indicates:   Allergen Reactions    Strawberry Anaphylaxis and Rash    Metformin Diarrhea       Objective:   Physical Exam  Vitals:    03/17/22 2343 03/18/22 0431 03/18/22 0749 03/18/22 1211   BP: (!) 111/52 (!) 146/67 (!) 107/55 (!) 106/54   Pulse: 82 83 83 82   Resp:  18 18 18   Temp: 96.9 °F (36.1 °C) 98.4 °F (36.9 °C) 97.8 °F (36.6 °C) 97.8 °F (36.6 °C)   TempSrc:  Oral Oral Oral   SpO2: 97% 96% 96% 97%   Weight:       Height:           Gen: Lying in bed, no acute distress  HEENT: Supple, + JVD  Cvs: Regular rate and rhythm, no murmurs  Resp: Normal work of breathing, clear bilaterally  Abd:+ distended + hepatomegaly  Ext: Warm,+ extensive lower extremity edema.   Vascular:   LEFT RIGHT   RADIAL 2+ 2+   BRACHIAL 2+ 2+   FEMORAL 2+ 2+   DP 2+ 2+   TP 2+ 2+   Denny's Test Normal Normal           Chemistry        Component Value Date/Time     (L) 03/18/2022 0451    K 5.4 (H) 03/18/2022 0451     03/18/2022 0451    CO2 15 (L) 03/18/2022 0451    BUN 45 (H) 03/18/2022 045     CREATININE 1.8 (H) 03/18/2022 0451     (H) 03/18/2022 0451        Component Value Date/Time    CALCIUM 8.7 03/18/2022 0451    ALKPHOS 83 03/18/2022 0451    AST 55 (H) 03/18/2022 0451    ALT 37 03/18/2022 0451    BILITOT 2.6 (H) 03/18/2022 0451    ESTGFRAFRICA 47.9 (A) 03/18/2022 0451    EGFRNONAA 41.4 (A) 03/18/2022 0451            No results found for: CHOL  No results found for: HDL  No results found for: LDLCALC  No results found for: TRIG  No results found for: CHOLHDL      Lab Results   Component Value Date     (L) 03/18/2022    K 5.4 (H) 03/18/2022     03/18/2022    CO2 15 (L) 03/18/2022    BUN 45 (H) 03/18/2022    CREATININE 1.8 (H) 03/18/2022     (H) 03/18/2022    HGBA1C 6.5 (H) 03/17/2022    AST 55 (H) 03/18/2022    ALT 37 03/18/2022    ALBUMIN 3.1 (L) 03/18/2022    PROT 5.6 (L) 03/18/2022    BILITOT 2.6 (H) 03/18/2022    WBC 4.44 03/18/2022    HGB 6.7 (L) 03/18/2022    HCT 21.7 (L) 03/18/2022     (H) 03/18/2022    PLT 51 (L) 03/18/2022    INR 1.7 (H) 03/18/2022    PSA 0.02 02/14/2022

## 2022-03-18 NOTE — ASSESSMENT & PLAN NOTE
Either from intravascular depletion vs HRS. Patient was taking lasix/aldactone prior to admission at OSH where he had FAMILIA + hyperK. Aldactone and Kcl supp were discontinued. Patient is voluem overloaded but renal function still not at baseline  - Continue HRS treatment as above  - Lokelma for hyperkalemia  - Avoid nephrotoxic medications

## 2022-03-18 NOTE — ASSESSMENT & PLAN NOTE
58 yo M with decompensated EtOH cirrhosis (c/b HE, esophageal varices, ascites) admitted as direct admit from Hepatology clinic for FAMILIA, hyperK, and inpatient transplant evaluation. In regards to his cirrhosis, he was diagnosed in summer of 2021. He was told that it was due to EtOH + years of undiagnosed DM. He is a former drinker and drank approx 4-6 liquor drinks daily for 8 years. His last drink was approx 10 months ago since being diagnosed with cirrhosis. He denies any legal or work-related issues 2/2 EtOH use. He has never been to rehab. He has history of HE requiring ICU admission. He takes lactulose + rifaximin. He previously struggled with compliance with lactulose 2/2 nausea/vomiting + diarrhea but has since been compliant. He has refractory ascites and has multiple therapeutic paracenteses. He was prescribed spironolactone 150mg am + 100mg pm, lasix 40mg BID, and KCl 20 mEq prior to recent admission. EGD (2/25/22) showed moderate portal hypertensive gastropathy, small hiatal hernia, distal grade 1 esophageal varices (no sequela of recent bleeding). Denies h/o esophageal bleeding. He is currently being evaluated for LTx. He needs LHC prior to being listed. His MELD has been around 20 until recent admission.    MELD-Na score: 27 at 3/18/2022  4:51 AM  MELD score: 22 at 3/18/2022  4:51 AM  Calculated from:  Serum Creatinine: 1.8 mg/dL at 3/18/2022  4:51 AM  Serum Sodium: 129 mmol/L at 3/18/2022  4:51 AM  Total Bilirubin: 2.6 mg/dL at 3/18/2022  4:51 AM  INR(ratio): 1.7 at 3/18/2022  4:51 AM  Age: 57 years    Recommendations:  - LTx evaluation: iCARDS consultation for LHC   - FAMILIA: agree with albumin, Nephro consult for further recommendations to optimize for LHC  - Ascites: hold spironolactone + K supplementation in setting of hyperK, hold lasix given FAMILIA  - HE: stable, continue lactulose + rifaximin  - Varices: non-bleeding, continue to monitor  - Continue EtOH cessation  - Avoid hepatotoxic agents  -  Monitor mental status  - CMP, INR daily  - Na restriction < 2g

## 2022-03-18 NOTE — CONSULTS
[facility] - Telemetry Stepdown  Hepatology  Consult Note    Patient Name: Pelon Vasquez  MRN: [id number]  Admission Date: 3/17/2022  Hospital Length of Stay: 1 days  Attending Provider: Jesica Lo MD   Primary Care Physician: Primary Doctor No  Principal Problem:Decompensation of cirrhosis of liver    Inpatient consult to Hepatology  Consult performed by: Jay Gudino MD  Consult ordered by: Jesica Lo MD        Subjective:     Transplant status: Pre-transplant    HPI:  Mr. Vasquez is a 58 yo M with decompensated EtOH cirrhosis (c/b HE, esophageal varices, ascites) and HFpEF (EF 60-65%, grade I dd) that presents as direct admit from Hepatology clinic for abnormal labs + IP LTx evaluation.    Patient has been hospitalized 3 times thus far in 2022 due various issues related to his cirrhosis + renal function. Patient was recently admitted to OSH from 3/13-16 for AMS 2/2 suspected HE. He was treated with lactulose + rifaximin with rapid improvement of mental status. While inpatient, he received a therapeutic paracentesis with removal of 3L. Course complicated by FAMILIA with associated hyperK. His renal function was still poor and K 6 on day of discharge. Spironolactone was held upon discharge. He was seen by Dr. Eaton in Hepatology clinic on 3/17. Repeat labs showed persistent FAMILIA + hyperK with K 5.6. Decision was made to send patient as direct admit for FAMILIA, hyperK, and inpatient transplant evaluation.    In regards to his cirrhosis, he was diagnosed in summer of 2021. He was told that it was due to EtOH + years of undiagnosed DM. He is a former drinker and drank approx 4-6 liquor drinks daily for 8 years. His last drink was approx 10 months ago since being diagnosed with cirrhosis. He denies any legal or work-related issues 2/2 EtOH use. He has never been to rehab. He has history of HE requiring ICU admission. He takes lactulose + rifaximin. He previously struggled with compliance with lactulose 2/2  nausea/vomiting + diarrhea but has since been compliant. He has refractory ascites and has multiple therapeutic paracenteses. He was prescribed spironolactone 150mg am + 100mg pm, lasix 40mg BID, and KCl 20 mEq prior to recent admission. EGD (2/25/22) showed moderate portal hypertensive gastropathy, small hiatal hernia, distal grade 1 esophageal varices (no sequela of recent bleeding). Denies h/o esophageal bleeding. He is currently being evaluated for LTx. He needs LHC prior to being listed. His MELD has been around 20 until recent admission.      Review of Systems   Constitutional:  Negative for chills and fever.   HENT:  Negative for congestion, sore throat and trouble swallowing.    Eyes:  Negative for visual disturbance.   Respiratory:  Positive for cough and shortness of breath. Negative for wheezing.    Cardiovascular:  Positive for leg swelling. Negative for chest pain and palpitations.   Gastrointestinal:  Positive for abdominal distention. Negative for abdominal pain, blood in stool, constipation, diarrhea, nausea and vomiting.   Genitourinary:  Negative for dysuria and hematuria.   Musculoskeletal:  Negative for arthralgias and myalgias.   Skin:  Negative for rash.   Neurological:  Negative for dizziness, light-headedness, numbness and headaches.   Psychiatric/Behavioral:  Negative for agitation and confusion.      Past Medical History:   Diagnosis Date    Arthritis     Cirrhosis of liver     HTN (hypertension)     BRAYDON (obstructive sleep apnea)     Type 2 diabetes mellitus        Past Surgical History:   Procedure Laterality Date    COLONOSCOPY      FINGER AMPUTATION      MEDIAL COLLATERAL LIGAMENT REPAIR, KNEE Bilateral     ROTATRO CUFF REPAIR      TONSILLECTOMY         Family history of liver disease: No    Review of patient's allergies indicates:   Allergen Reactions    Strawberry Anaphylaxis and Rash    Metformin Diarrhea         Tobacco Use    Smoking status: Never Smoker    Smokeless  tobacco: Never Used   Substance and Sexual Activity    Alcohol use: Not Currently    Drug use: Not on file    Sexual activity: Not on file       Medications Prior to Admission   Medication Sig Dispense Refill Last Dose    atorvastatin (LIPITOR) 20 MG tablet Take 20 mg by mouth once daily.       furosemide (LASIX) 40 MG tablet TAKE 1 TABLET BY MOUTH IN THE MORNING AND 1 TABLET IN THE EVENING       lactulose (CHRONULAC) 10 gram/15 mL solution Take 30 mLs by mouth 3 (three) times daily.       ondansetron (ZOFRAN-ODT) 4 MG TbDL Take 1 tablet by mouth 2 (two) times daily as needed.       pantoprazole (PROTONIX) 40 MG tablet Take 40 mg by mouth once daily.       triamcinolone acetonide 0.1% (KENALOG) 0.1 % cream Apply topically 2 (two) times daily as needed.       vitamin A palmitate 7,500 mcg (25,000 unit) Cap Take 900 mcg by mouth once daily. 30 capsule 1     XIFAXAN 550 mg Tab Take 550 mg by mouth 2 (two) times daily.       zinc sulfate 50 mg zinc (220 mg) Tab tablet Take 1 tablet (220 mg total) by mouth once daily. 90 tablet 1        Objective:     Vital Signs (Most Recent):  Temp: 97.8 °F (36.6 °C) (03/18/22 0749)  Pulse: 83 (03/18/22 0749)  Resp: 18 (03/18/22 0749)  BP: (!) 107/55 (03/18/22 0749)  SpO2: 96 % (03/18/22 0749)   Vital Signs (24h Range):  Temp:  [96.9 °F (36.1 °C)-98.4 °F (36.9 °C)] 97.8 °F (36.6 °C)  Pulse:  [72-83] 83  Resp:  [16-18] 18  SpO2:  [96 %-100 %] 96 %  BP: ()/(52-67) 107/55     Weight: 117.9 kg (260 lb) (03/17/22 1537)  Body mass index is 40.72 kg/m².    Physical Exam  Constitutional:       General: He is not in acute distress.     Appearance: He is well-developed. He is obese.   HENT:      Head: Normocephalic and atraumatic.      Mouth/Throat:      Pharynx: No oropharyngeal exudate.   Eyes:      Conjunctiva/sclera: Conjunctivae normal.      Pupils: Pupils are equal, round, and reactive to light.   Cardiovascular:      Rate and Rhythm: Normal rate and regular rhythm.       Heart sounds: Normal heart sounds. No murmur heard.  Pulmonary:      Effort: Pulmonary effort is normal. No respiratory distress.      Breath sounds: No wheezing or rales.   Abdominal:      General: Bowel sounds are normal. There is distension.      Palpations: Abdomen is soft.      Tenderness: There is no abdominal tenderness. There is no guarding.   Musculoskeletal:         General: Swelling (2+ pitting edema in BLE) present. No tenderness.      Cervical back: Normal range of motion and neck supple.   Skin:     General: Skin is warm and dry.      Findings: No rash.   Neurological:      General: No focal deficit present.      Mental Status: He is alert and oriented to person, place, and time. Mental status is at baseline.      Cranial Nerves: No cranial nerve deficit.      Motor: No abnormal muscle tone.   Psychiatric:         Behavior: Behavior normal.       MELD-Na score: 27 at 3/18/2022  4:51 AM  MELD score: 22 at 3/18/2022  4:51 AM  Calculated from:  Serum Creatinine: 1.8 mg/dL at 3/18/2022  4:51 AM  Serum Sodium: 129 mmol/L at 3/18/2022  4:51 AM  Total Bilirubin: 2.6 mg/dL at 3/18/2022  4:51 AM  INR(ratio): 1.7 at 3/18/2022  4:51 AM  Age: 57 years    Significant Labs:  CBC:   Recent Labs   Lab 03/18/22  0451   WBC 4.44   RBC 2.17*   HGB 6.7*   HCT 21.7*   PLT 51*     CMP:   Recent Labs   Lab 03/18/22  0451   *   CALCIUM 8.7   ALBUMIN 3.1*   PROT 5.6*   *   K 5.4*   CO2 15*      BUN 45*   CREATININE 1.8*   ALKPHOS 83   ALT 37   AST 55*   BILITOT 2.6*       Significant Imaging:    MRI Abdomen (3/17/22):  FINDINGS:  Examination limited by motion artifact and suboptimal arterial phase bolus timing.     Liver: Cirrhotic morphology.  No focal arterial enhancing lesion detected.  No pseudocapsule or washout.  Portal vein is patent.     Biliary: Cholelithiasis.  No significant gallbladder wall thickening.  No intra or extrahepatic biliary duct dilatation.     Pancreas: Unremarkable.  No pancreatic  ductal dilatation.     Spleen: Mildly enlarged measuring 12.8 cm in craniocaudal dimension.     Adrenal glands: Unremarkable.     Kidneys: No renal masses.  No hydronephrosis.     Miscellaneous: Moderate volume dependent right pleural fluid.  Moderate volume ascites.  Visualized bowel loops are unremarkable.  No evidence for lymphadenopathy.     Impression:    1. Examination limited by motion artifact and suboptimal arterial phase bolus timing.  2. No suspicious focal hepatic lesion detected.  3. Cirrhotic liver with sequela of portal hypertension including mild splenomegaly and moderate ascites.  4. Moderate right pleural effusion.  5. Cholelithiasis.      US Retroperitoneal (3/17/22):  FINDINGS:  Right kidney: The right kidney measures 9.5 cm. No cortical thinning. No loss of corticomedullary distinction.  There is overall decreased perfusion to the right kidney.  Resistive index measures 0.80.  No mass. No renal stone. No hydronephrosis.     Left kidney: The left kidney measures 11.2 cm. No cortical thinning. No loss of corticomedullary distinction.  There is overall decreased perfusion to the left kidney.  Resistive index measures 0.82.  No mass. No renal stone. No hydronephrosis.     The bladder is partially distended at the time of scanning and has an unremarkable appearance.     Small volume ascites throughout the visualized abdomen.     Splenic RI measures 0.77.     Impression:  No sonographic evidence of obstructive uropathy.     Elevated renal arterial resistive indices, which can be seen in setting of medical renal disease.  Correlation is advised.     Small volume ascites throughout the visualized abdomen.    Assessment/Plan:     * Decompensation of cirrhosis of liver  Ascites due to alcoholic cirrhosis  Secondary esophageal varices without bleeding  H/o hepatic encephalopathy  Organ transplant candidate  FAMILIA (acute kidney injury)  Hyperkalemia  58 yo M with decompensated EtOH cirrhosis (c/b HE,  esophageal varices, ascites) admitted as direct admit from Hepatology clinic for FAMILIA, hyperK, and inpatient transplant evaluation. In regards to his cirrhosis, he was diagnosed in summer of 2021. He was told that it was due to EtOH + years of undiagnosed DM. He is a former drinker and drank approx 4-6 liquor drinks daily for 8 years. His last drink was approx 10 months ago since being diagnosed with cirrhosis. He denies any legal or work-related issues 2/2 EtOH use. He has never been to rehab. He has history of HE requiring ICU admission. He takes lactulose + rifaximin. He previously struggled with compliance with lactulose 2/2 nausea/vomiting + diarrhea but has since been compliant. He has refractory ascites and has multiple therapeutic paracenteses. He was prescribed spironolactone 150mg am + 100mg pm, lasix 40mg BID, and KCl 20 mEq prior to recent admission. EGD (2/25/22) showed moderate portal hypertensive gastropathy, small hiatal hernia, distal grade 1 esophageal varices (no sequela of recent bleeding). Denies h/o esophageal bleeding. He is currently being evaluated for LTx. He needs LHC prior to being listed. His MELD has been around 20 until recent admission.    MELD-Na score: 27 at 3/18/2022  4:51 AM  MELD score: 22 at 3/18/2022  4:51 AM  Calculated from:  Serum Creatinine: 1.8 mg/dL at 3/18/2022  4:51 AM  Serum Sodium: 129 mmol/L at 3/18/2022  4:51 AM  Total Bilirubin: 2.6 mg/dL at 3/18/2022  4:51 AM  INR(ratio): 1.7 at 3/18/2022  4:51 AM  Age: 57 years    Recommendations:  - LTx evaluation: iCARDS consultation for LHC   - FAMILIA: agree with albumin, Nephro consult for further recommendations to optimize for LHC  - Ascites: hold spironolactone + K supplementation in setting of hyperK, hold lasix given FAMIILA  - HE: stable, continue lactulose + rifaximin  - Varices: non-bleeding, continue to monitor  - Continue EtOH cessation  - Avoid hepatotoxic agents  - Monitor mental status  - CMP, INR daily  - Na restriction  < 2g      Thank you for your consult. I will follow-up with patient. Please contact us if you have any additional questions.    Jay Gudino MD  Hepatology  [facility] - Telemetry Stepdown

## 2022-03-18 NOTE — PROCEDURES
Radiology Post-Procedure Note    Pre Op Diagnosis: Ascites  Post Op Diagnosis: Same    Procedure: Ultrasound Guided Paracentesis    Procedure performed by: Lianne Headley PA-C    Written Informed Consent Obtained: Yes  Specimen Removed: YES clear, yellow  Estimated Blood Loss: Minimal    Findings:   Successful paracentesis.  LLQ. Albumin administered PRN per protocol.    Patient tolerated procedure well.    Lianne Headley PA-C  Interventional Radiology  Clinic [phone]

## 2022-03-18 NOTE — H&P
Inpatient Radiology Pre-procedure Note    History of Present Illness:  Pelon Vasquez is a 57 y.o. male who presents for ultrasound guided paracentesis.  Admission H&P reviewed.  Past Medical History:   Diagnosis Date    Arthritis     Cirrhosis of liver     HTN (hypertension)     BRAYDON (obstructive sleep apnea)     Secondary esophageal varices without bleeding 3/18/2022    EGD (2/25/22) showed moderate portal hypertensive gastropathy, small hiatal hernia, grade 1 esophageal varices    Type 2 diabetes mellitus      Past Surgical History:   Procedure Laterality Date    COLONOSCOPY      FINGER AMPUTATION      MEDIAL COLLATERAL LIGAMENT REPAIR, KNEE Bilateral     ROTATRO CUFF REPAIR      TONSILLECTOMY         Review of Systems:   As documented in primary team H&P    Home Meds:   Prior to Admission medications    Medication Sig Start Date End Date Taking? Authorizing Provider   acetaminophen (TYLENOL) 325 MG tablet Take 325 mg by mouth every 6 (six) hours as needed for Pain (or headache).   Yes Historical Provider   atorvastatin (LIPITOR) 20 MG tablet Take 20 mg by mouth once daily. 1/14/22  Yes Historical Provider   furosemide (LASIX) 40 MG tablet Take 40 mg by mouth 2 (two) times a day. 1/20/22  Yes Historical Provider   lactulose (CHRONULAC) 10 gram/15 mL solution Take 30 mLs by mouth 3 (three) times daily. 3/2/22  Yes Historical Provider   ondansetron (ZOFRAN-ODT) 4 MG TbDL Take 1 tablet by mouth every 8 (eight) hours as needed (NAUSEA). 3/2/22  Yes Historical Provider   pantoprazole (PROTONIX) 40 MG tablet Take 40 mg by mouth once daily. 10/20/21 4/18/22 Yes Historical Provider   triamcinolone acetonide 0.1% (KENALOG) 0.1 % cream Apply topically 2 (two) times daily as needed. 2/17/22  Yes Historical Provider   vitamin A palmitate 7,500 mcg (25,000 unit) Cap Take 900 mcg by mouth once daily. 2/21/22  Yes Tito Mcclure MD   XIFAXAN 550 mg Tab Take 550 mg by mouth 2 (two) times daily. 12/10/21  Yes  Historical Provider   zinc sulfate 50 mg zinc (220 mg) Tab tablet Take 1 tablet (220 mg total) by mouth once daily. 2/21/22  Yes Tito Mcclure MD     Scheduled Meds:    [START ON 3/19/2022] albumin human 25%  50 g Intravenous Daily    heparin (porcine)  5,000 Units Subcutaneous Q8H    lactulose  20 g Oral TID    midodrine  10 mg Oral Q8H    octreotide  100 mcg Subcutaneous Q8H    pantoprazole  40 mg Oral Daily    rifAXIMin  550 mg Oral BID    senna-docusate 8.6-50 mg  1 tablet Oral BID    sodium zirconium cyclosilicate  5 g Oral Once    zinc sulfate  220 mg Oral Daily     Continuous Infusions:   PRN Meds:acetaminophen, albuterol-ipratropium, aluminum-magnesium hydroxide-simethicone, dextrose 10%, dextrose 10%, glucagon (human recombinant), glucose, glucose, insulin aspart U-100, melatonin, naloxone, ondansetron, prochlorperazine, simethicone, sodium chloride 0.9%  Anticoagulants/Antiplatelets: no anticoagulation    Allergies:   Review of patient's allergies indicates:   Allergen Reactions    Strawberry Anaphylaxis and Rash    Metformin Diarrhea     Sedation Hx: have not been any systemic reactions    Vitals:  Temp: 97.8 °F (36.6 °C) (03/18/22 1211)  Pulse: 82 (03/18/22 1211)  Resp: 18 (03/18/22 1211)  BP: (!) 106/54 (03/18/22 1211)  SpO2: 97 % (03/18/22 1211)     Physical Exam:  ASA: 3  Mallampati: n/a    General: no acute distress  Mental Status: alert and oriented to person, place and time  HEENT: normocephalic, atraumatic  Chest: unlabored breathing  Heart: regular heart rate  Abdomen: distended  Extremity: moves all extremities    Plan: ultrasound guided paracentesis  Sedation Plan: local    Lianne Headley PA-C  Interventional Radiology  Clinic [phone]

## 2022-03-18 NOTE — CARE UPDATE
Did not see the pt today because he was moved for a procedure. Pt's VSS, Cr improved from 2.3 to 1.8. Currently on HRS treatment. Will evaluate the patient again.

## 2022-03-18 NOTE — ASSESSMENT & PLAN NOTE
Baseline hemoglobin around 7-8.  - Hgb this AM 6.7, likely in relation to receiving albumin yesterday. Will repeat CBC overnight and transfuse if <7.  - Already has T&S

## 2022-03-18 NOTE — SUBJECTIVE & OBJECTIVE
Interval History: No acute events overnight. Patient this AM states that he feels better. He denies any hypoglycemic symptoms. Reports his abdomen is distended but not painfully distended. Reports no change in his LE edema.    Review of Systems   Constitutional:  Positive for activity change. Negative for chills and fever.   HENT:  Negative for congestion and sore throat.    Eyes:  Negative for photophobia and visual disturbance.   Respiratory:  Negative for cough and shortness of breath.    Cardiovascular:  Positive for leg swelling. Negative for chest pain and palpitations.   Gastrointestinal:  Positive for abdominal distention. Negative for abdominal pain, blood in stool, constipation, diarrhea, nausea and vomiting.   Endocrine: Negative for cold intolerance and heat intolerance.   Genitourinary:  Negative for dysuria and hematuria.   Musculoskeletal:  Negative for arthralgias and myalgias.   Skin:  Negative for rash and wound.   Allergic/Immunologic: Negative for environmental allergies and food allergies.   Neurological:  Negative for seizures and numbness.   Hematological:  Negative for adenopathy. Does not bruise/bleed easily.   Psychiatric/Behavioral:  Negative for hallucinations and suicidal ideas.    Objective:     Vital Signs (Most Recent):  Temp: 97.8 °F (36.6 °C) (03/18/22 1211)  Pulse: 82 (03/18/22 1211)  Resp: 18 (03/18/22 1211)  BP: (!) 106/54 (03/18/22 1211)  SpO2: 97 % (03/18/22 1211)   Vital Signs (24h Range):  Temp:  [96.9 °F (36.1 °C)-98.4 °F (36.9 °C)] 97.8 °F (36.6 °C)  Pulse:  [74-83] 82  Resp:  [16-18] 18  SpO2:  [96 %-100 %] 97 %  BP: ()/(52-67) 106/54     Weight: 117.9 kg (260 lb)  Body mass index is 40.72 kg/m².    Intake/Output Summary (Last 24 hours) at 3/18/2022 1348  Last data filed at 3/18/2022 0900  Gross per 24 hour   Intake 240 ml   Output --   Net 240 ml      Physical Exam  Constitutional:       Appearance: He is well-developed. He is ill-appearing.   HENT:      Head:  Normocephalic and atraumatic.   Eyes:      General: Scleral icterus present.      Conjunctiva/sclera: Conjunctivae normal.      Pupils: Pupils are equal, round, and reactive to light.   Neck:      Thyroid: No thyromegaly.      Vascular: No JVD.   Cardiovascular:      Rate and Rhythm: Normal rate and regular rhythm.      Heart sounds: Normal heart sounds. No murmur heard.    No friction rub. No gallop.   Pulmonary:      Effort: Pulmonary effort is normal. No respiratory distress.      Breath sounds: Normal breath sounds. No wheezing or rales.   Abdominal:      General: Bowel sounds are normal. There is no distension.      Palpations: Abdomen is soft. There is no mass.      Tenderness: There is no abdominal tenderness. There is no guarding or rebound.      Hernia: No hernia is present.   Musculoskeletal:         General: Swelling present. No deformity. Normal range of motion.      Cervical back: Normal range of motion and neck supple.      Right lower leg: Edema present.      Left lower leg: Edema present.   Skin:     General: Skin is warm and dry.      Coloration: Skin is not jaundiced.   Neurological:      Mental Status: He is alert and oriented to person, place, and time.      Cranial Nerves: No cranial nerve deficit.   Psychiatric:         Behavior: Behavior normal.       Significant Labs: All pertinent labs within the past 24 hours have been reviewed.  CBC:   Recent Labs   Lab 03/17/22  0930 03/18/22  0451   WBC 5.58 4.44   HGB 7.2* 6.7*   HCT 21.2* 21.7*   PLT 59* 51*     CMP:   Recent Labs   Lab 03/17/22  0930 03/18/22  0451   * 129*   K 5.6* 5.4*    108   CO2 18* 15*   * 131*   BUN 48* 45*   CREATININE 2.1* 1.8*   CALCIUM 8.4* 8.7   PROT 5.7* 5.6*   ALBUMIN 2.5* 3.1*   BILITOT 2.9* 2.6*   ALKPHOS 79 83   AST 70* 55*   ALT 44 37   ANIONGAP 3* 6*   EGFRNONAA 34.4* 41.4*       Significant Imaging: I have reviewed all pertinent imaging results/findings within the past 24 hours.

## 2022-03-18 NOTE — PHARMACY MED REC
"Admission Medication History     The home medication history was taken by Kelsie Dill.    You may go to "Admission" then "Reconcile Home Medications" tabs to review and/or act upon these items.      The home medication list has been updated by the Pharmacy department.    Please read ALL comments highlighted in yellow.    Please address this information as you see fit.     Feel free to contact us if you have any questions or require assistance.          Medications listed below were obtained from: Patient/family  PTA Medications   Medication Sig    acetaminophen (TYLENOL) 325 MG tablet Take 325 mg by mouth every 6 (six) hours as needed for Pain (or headache).    atorvastatin (LIPITOR) 20 MG tablet Take 20 mg by mouth once daily.    furosemide (LASIX) 40 MG tablet Take 40 mg by mouth 2 (two) times a day.    lactulose (CHRONULAC) 10 gram/15 mL solution Take 30 mLs by mouth 3 (three) times daily.    ondansetron (ZOFRAN-ODT) 4 MG TbDL Take 1 tablet by mouth every 8 (eight) hours as needed (NAUSEA).    pantoprazole (PROTONIX) 40 MG tablet Take 40 mg by mouth once daily.    triamcinolone acetonide 0.1% (KENALOG) 0.1 % cream Apply topically 2 (two) times daily as needed.    vitamin A palmitate 7,500 mcg (25,000 unit) Cap Take 900 mcg by mouth once daily.    XIFAXAN 550 mg Tab Take 550 mg by mouth 2 (two) times daily.    zinc sulfate 50 mg zinc (220 mg) Tab tablet Take 1 tablet (220 mg total) by mouth once daily.           Kelsie Dill  EXT [id number]                  .        "

## 2022-03-18 NOTE — HOSPITAL COURSE
Renal function worsened post paracentesis despite albumin. Nephrology consulted recommending continuing HRS treatment. Urine was spun showed some WBCs indicating possible AIN. Cr down to 2.0. Cardiology holding off on LHC due to patients other cardiac workup not indicating need for LHC. Anesthesia requesting PET in lieu of LHC given issues with renal function. Patient to have it scheduled outpatient.

## 2022-03-18 NOTE — PROGRESS NOTES
[facility] - Telemetry [facility] Medicine  Progress Note    Patient Name: Pelon Vasquez  MRN: [id number]  Patient Class: IP- Inpatient   Admission Date: 3/17/2022  Length of Stay: 1 days  Attending Physician: Jesica Lo MD  Primary Care Provider: Primary Doctor No        Subjective:     Principal Problem:Decompensation of cirrhosis of liver        HPI:  58 y/o M with a PMH of cirrhosis c/b ascites who presents for liver transplant evaluation and renal failure.    Patient was seen in hepatology clinic this morning where he mentioned to staff that he was recently hospitalized for ascites as well as acute kidney failure and hyperkalemia. He was discharged yesterday and made his way to his hepatology appointment. Patient reports that he had not been feeling all to well and that his ascites reaccumulated hence his presentation to OSH. There he had a potassium of 7 and Cr of 2.3~. It was believed to be potassium supplementation + aldactone in the setting of worsening renal function that caused hyperkalemia. Furthermore, he saw his cardiologist due to questions if he needed LHC prior to being listed for transplant or not. Per their discussion, they would like his renal function to return to his baseline before [facility]    Patient being admitted for hyperkalemia/FAMILIA/transplant w/u.        Overview/Hospital Course:  Cr improving, tentative plan for LHC monday      Interval History: No acute events overnight. Patient this AM states that he feels better. He denies any hypoglycemic symptoms. Reports his abdomen is distended but not painfully distended. Reports no change in his LE edema.    Review of Systems   Constitutional:  Positive for activity change. Negative for chills and fever.   HENT:  Negative for congestion and sore throat.    Eyes:  Negative for photophobia and visual disturbance.   Respiratory:  Negative for cough and shortness of breath.    Cardiovascular:  Positive for leg swelling. Negative for chest pain  and palpitations.   Gastrointestinal:  Positive for abdominal distention. Negative for abdominal pain, blood in stool, constipation, diarrhea, nausea and vomiting.   Endocrine: Negative for cold intolerance and heat intolerance.   Genitourinary:  Negative for dysuria and hematuria.   Musculoskeletal:  Negative for arthralgias and myalgias.   Skin:  Negative for rash and wound.   Allergic/Immunologic: Negative for environmental allergies and food allergies.   Neurological:  Negative for seizures and numbness.   Hematological:  Negative for adenopathy. Does not bruise/bleed easily.   Psychiatric/Behavioral:  Negative for hallucinations and suicidal ideas.    Objective:     Vital Signs (Most Recent):  Temp: 97.8 °F (36.6 °C) (03/18/22 1211)  Pulse: 82 (03/18/22 1211)  Resp: 18 (03/18/22 1211)  BP: (!) 106/54 (03/18/22 1211)  SpO2: 97 % (03/18/22 1211)   Vital Signs (24h Range):  Temp:  [96.9 °F (36.1 °C)-98.4 °F (36.9 °C)] 97.8 °F (36.6 °C)  Pulse:  [74-83] 82  Resp:  [16-18] 18  SpO2:  [96 %-100 %] 97 %  BP: ()/(52-67) 106/54     Weight: 117.9 kg (260 lb)  Body mass index is 40.72 kg/m².    Intake/Output Summary (Last 24 hours) at 3/18/2022 1348  Last data filed at 3/18/2022 0900  Gross per 24 hour   Intake 240 ml   Output --   Net 240 ml      Physical Exam  Constitutional:       Appearance: He is well-developed. He is ill-appearing.   HENT:      Head: Normocephalic and atraumatic.   Eyes:      General: Scleral icterus present.      Conjunctiva/sclera: Conjunctivae normal.      Pupils: Pupils are equal, round, and reactive to light.   Neck:      Thyroid: No thyromegaly.      Vascular: No JVD.   Cardiovascular:      Rate and Rhythm: Normal rate and regular rhythm.      Heart sounds: Normal heart sounds. No murmur heard.    No friction rub. No gallop.   Pulmonary:      Effort: Pulmonary effort is normal. No respiratory distress.      Breath sounds: Normal breath sounds. No wheezing or rales.   Abdominal:       General: Bowel sounds are normal. There is no distension.      Palpations: Abdomen is soft. There is no mass.      Tenderness: There is no abdominal tenderness. There is no guarding or rebound.      Hernia: No hernia is present.   Musculoskeletal:         General: Swelling present. No deformity. Normal range of motion.      Cervical back: Normal range of motion and neck supple.      Right lower leg: Edema present.      Left lower leg: Edema present.   Skin:     General: Skin is warm and dry.      Coloration: Skin is not jaundiced.   Neurological:      Mental Status: He is alert and oriented to person, place, and time.      Cranial Nerves: No cranial nerve deficit.   Psychiatric:         Behavior: Behavior normal.       Significant Labs: All pertinent labs within the past 24 hours have been reviewed.  CBC:   Recent Labs   Lab 03/17/22 0930 03/18/22  0451   WBC 5.58 4.44   HGB 7.2* 6.7*   HCT 21.2* 21.7*   PLT 59* 51*     CMP:   Recent Labs   Lab 03/17/22 0930 03/18/22 0451   * 129*   K 5.6* 5.4*    108   CO2 18* 15*   * 131*   BUN 48* 45*   CREATININE 2.1* 1.8*   CALCIUM 8.4* 8.7   PROT 5.7* 5.6*   ALBUMIN 2.5* 3.1*   BILITOT 2.9* 2.6*   ALKPHOS 79 83   AST 70* 55*   ALT 44 37   ANIONGAP 3* 6*   EGFRNONAA 34.4* 41.4*       Significant Imaging: I have reviewed all pertinent imaging results/findings within the past 24 hours.      Assessment/Plan:      * Decompensation of cirrhosis of liver  MELD-Na score: 27 at 3/18/2022  4:51 AM  MELD score: 22 at 3/18/2022  4:51 AM  Calculated from:  Serum Creatinine: 1.8 mg/dL at 3/18/2022  4:51 AM  Serum Sodium: 129 mmol/L at 3/18/2022  4:51 AM  Total Bilirubin: 2.6 mg/dL at 3/18/2022  4:51 AM  INR(ratio): 1.7 at 3/18/2022  4:51 AM  Age: 57 years  Secondary to LOZANO vs alcohol. Patient presents with volume overload and FAMILIA despite taking diuretics as prescribed. Patient's abdomen remains soft but his legs are extremely edematous. Patient seen in hepatology  clinic and referred to inpatient for transplant eval. Patient's wife reports that at OSH they had given him albumin due to his FAMILIA and his Cr downtreded from 2.4 to 2.1  - Placed consult for hepatology evaluation  - Responded to albumin challenge. Continue HRS treatment with albumin, midodrine, and octreotide  - Continue lactulose and rifaximin for HE  - Interventional cardiology consulted for [facility], tentatively planned for Monday 3/20 assuming renal function continues to improve.    FAMILIA (acute kidney injury)  Either from intravascular depletion vs HRS. Patient was taking lasix/aldactone prior to admission at OSH where he had FAMILIA + hyperK. Aldactone and Kcl supp were discontinued. Patient is voluem overloaded but renal function still not at baseline  - Continue HRS treatment as above  - Lokelma for hyperkalemia  - Avoid nephrotoxic medications      Other specified anemias  Baseline hemoglobin around 7-8.  - Hgb this AM 6.7, likely in relation to receiving albumin yesterday. Will repeat CBC overnight and transfuse if <7.  - Already has T&S      Ascites due to alcoholic cirrhosis  Went for paracentesis with IR today. Studies pending      Hyperkalemia  See FAMILIA      Controlled type 2 diabetes mellitus, without long-term current use of insulin  Not on any insulin currently.   - A1c 6.5, down from 6.9  - LDSSI + POC AC/HS        VTE Risk Mitigation (From admission, onward)         Ordered     heparin (porcine) injection 5,000 Units  Every 8 hours         03/17/22 1656     IP VTE HIGH RISK PATIENT  Once         03/17/22 1656     Place sequential compression device  Until discontinued         03/17/22 1656                Discharge Planning   EDITH:      Code Status: Full Code   Is the patient medically ready for discharge?:     Reason for patient still in hospital (select all that apply): Patient trending condition                     Jesica Lo MD  Department of Hospital Medicine   [facility] - Telemetry Stepdown     regular

## 2022-03-18 NOTE — PLAN OF CARE
Paracentesis complete, 3600 MLs removed. 25g (100mL) Albumin administered per protocol. Dressing applied to LLQ puncture site, dressing clean dry and intact. Report called to floor nurse. Pt returned to floor via stretcher with transporter.

## 2022-03-18 NOTE — PLAN OF CARE
Problem: Adult Inpatient Plan of Care  Goal: Plan of Care Review  Outcome: Ongoing, Progressing  Goal: Patient-Specific Goal (Individualized)  Outcome: Ongoing, Progressing  Goal: Optimal Comfort and Wellbeing  Outcome: Ongoing, Progressing  Goal: Readiness for Transition of Care  Outcome: Ongoing, Progressing

## 2022-03-18 NOTE — PLAN OF CARE
[facility] - Telemetry Stepdown  Initial Discharge Assessment       Primary Care Provider: Primary Doctor No    Admission Diagnosis: Chest pain [R07.9]  Hepatic cirrhosis, unspecified hepatic cirrhosis type, unspecified whether ascites present [K74.60]    Admission Date: 3/17/2022  Expected Discharge Date: 3/23/2022    Discharge Barriers Identified: None    Payor: [facility] [employer] / Plan: [employer] (TRANSPLANT) / Product Type: Managed Care Transplant /     No emergency contact information on file.    Discharge Plan A: Home with family  Discharge Plan B: Home      [address], [place] - [address] [facility]  [address] [address] [place] [address]  Phone: [phone] Fax: [phone]      Initial Assessment (most recent)     Adult Discharge Assessment - 03/18/22 1541        Discharge Assessment    Assessment Type Discharge Planning Assessment     Confirmed/corrected address, phone number and insurance Yes     Confirmed Demographics Correct on Facesheet     Source of Information family   Leonarda Vicente, S/O, [phone] at bedside. Patient off the floor at time of initial assessment attempt. S/O provided and confirmed basic information below.    Communicated EDITH with patient/caregiver Date not available/Unable to determine     Reason For Admission Decompensation of cirrhosis of liver     Lives With significant other     Do you expect to return to your current living situation? Yes     Do you have help at home or someone to help you manage your care at home? Yes     Who are your caregiver(s) and their phone number(s)? Leonarda Vicente, S/O, [phone]     Current cognitive status: Unable to Assess     Walking or Climbing Stairs Difficulty none     Dressing/Bathing Difficulty none     Home Layout Able to live on 1st floor     Equipment Currently Used at Home glucometer;other (see comments)   Digital BP monitor    Patient currently being followed by outpatient case management? No      Do you currently have service(s) that help you manage your care at home? No     Do you have any problems affording any of your prescribed medications? No     Who is going to help you get home at discharge? Leonarda Vicente S/O, [phone]     How do you get to doctors appointments? car, drives self;family or friend will provide     Are you on dialysis? No     Do you take coumadin? No     Discharge Plan A Home with family     Discharge Plan B Home     DME Needed Upon Discharge  --   TBD    Discharge Plan discussed with: Spouse/sig other     Name(s) and Number(s) Leonarda Vicente S/O, [phone]     Discharge Barriers Identified None        Relationship/Environment    Name(s) of Who Lives With Patient Leonarda Vicente S/O, [phone]               Yudy Umaña LMSW  [facility]  Ext. [phone]

## 2022-03-18 NOTE — HPI
Mr. Vasquez is a 58 yo M with decompensated EtOH cirrhosis (c/b HE, esophageal varices, ascites) and HFpEF (EF 60-65%, grade I dd) that presents as direct admit from Hepatology clinic for abnormal labs + IP LTx evaluation.    Patient has been hospitalized 3 times thus far in 2022 due various issues related to his cirrhosis + renal function. Patient was recently admitted to OSH from 3/13-16 for AMS 2/2 suspected HE. He was treated with lactulose + rifaximin with rapid improvement of mental status. While inpatient, he received a therapeutic paracentesis with removal of 3L. Course complicated by FAMILIA with associated hyperK. His renal function was still poor and K 6 on day of discharge. Spironolactone was held upon discharge. He was seen by Dr. Eaton in Hepatology clinic on 3/17. Repeat labs showed persistent FAMILIA + hyperK with K 5.6. Decision was made to send patient as direct admit for FAMILIA, hyperK, and inpatient transplant evaluation.    In regards to his cirrhosis, he was diagnosed in summer of 2021. He was told that it was due to EtOH + years of undiagnosed DM. He is a former drinker and drank approx 4-6 liquor drinks daily for 8 years. His last drink was approx 10 months ago since being diagnosed with cirrhosis. He denies any legal or work-related issues 2/2 EtOH use. He has never been to rehab. He has history of HE requiring ICU admission. He takes lactulose + rifaximin. He previously struggled with compliance with lactulose 2/2 nausea/vomiting + diarrhea but has since been compliant. He has refractory ascites and has multiple therapeutic paracenteses. He was prescribed spironolactone 150mg am + 100mg pm, lasix 40mg BID, and KCl 20 mEq prior to recent admission. EGD (2/25/22) showed moderate portal hypertensive gastropathy, small hiatal hernia, distal grade 1 esophageal varices (no sequela of recent bleeding). Denies h/o esophageal bleeding. He is currently being evaluated for LTx. He needs [facility] prior to being  listed. His MELD has been around 20 until recent admission.

## 2022-03-19 LAB
ALBUMIN SERPL BCP-MCNC: 3 G/DL (ref 3.5–5.2)
ALP SERPL-CCNC: 53 U/L (ref 55–135)
ALT SERPL W/O P-5'-P-CCNC: 34 U/L (ref 10–44)
ANION GAP SERPL CALC-SCNC: 5 MMOL/L (ref 8–16)
AST SERPL-CCNC: 50 U/L (ref 10–40)
BASOPHILS # BLD AUTO: 0.06 K/UL (ref 0–0.2)
BASOPHILS NFR BLD: 1.1 % (ref 0–1.9)
BILIRUB SERPL-MCNC: 3.5 MG/DL (ref 0.1–1)
BLD PROD TYP BPU: NORMAL
BLOOD UNIT EXPIRATION DATE: NORMAL
BLOOD UNIT TYPE CODE: 9500
BLOOD UNIT TYPE: NORMAL
BUN SERPL-MCNC: 52 MG/DL (ref 6–20)
CALCIUM SERPL-MCNC: 8.2 MG/DL (ref 8.7–10.5)
CHLORIDE SERPL-SCNC: 108 MMOL/L (ref 95–110)
CO2 SERPL-SCNC: 16 MMOL/L (ref 23–29)
CODING SYSTEM: NORMAL
CREAT SERPL-MCNC: 2.2 MG/DL (ref 0.5–1.4)
DIFFERENTIAL METHOD: ABNORMAL
DISPENSE STATUS: NORMAL
EOSINOPHIL # BLD AUTO: 0.8 K/UL (ref 0–0.5)
EOSINOPHIL NFR BLD: 14 % (ref 0–8)
ERYTHROCYTE [DISTWIDTH] IN BLOOD BY AUTOMATED COUNT: 16.8 % (ref 11.5–14.5)
EST. GFR  (AFRICAN AMERICAN): 37.6 ML/MIN/1.73 M^2
EST. GFR  (NON AFRICAN AMERICAN): 32.5 ML/MIN/1.73 M^2
GLUCOSE SERPL-MCNC: 111 MG/DL (ref 70–110)
HCT VFR BLD AUTO: 19.6 % (ref 40–54)
HGB BLD-MCNC: 6.6 G/DL (ref 14–18)
IMM GRANULOCYTES # BLD AUTO: 0.02 K/UL (ref 0–0.04)
IMM GRANULOCYTES NFR BLD AUTO: 0.4 % (ref 0–0.5)
INR PPP: 1.7 (ref 0.8–1.2)
LYMPHOCYTES # BLD AUTO: 0.9 K/UL (ref 1–4.8)
LYMPHOCYTES NFR BLD: 17.6 % (ref 18–48)
MCH RBC QN AUTO: 31.7 PG (ref 27–31)
MCHC RBC AUTO-ENTMCNC: 33.7 G/DL (ref 32–36)
MCV RBC AUTO: 94 FL (ref 82–98)
MONOCYTES # BLD AUTO: 0.7 K/UL (ref 0.3–1)
MONOCYTES NFR BLD: 13.8 % (ref 4–15)
NEUTROPHILS # BLD AUTO: 2.8 K/UL (ref 1.8–7.7)
NEUTROPHILS NFR BLD: 53.1 % (ref 38–73)
NRBC BLD-RTO: 0 /100 WBC
NUM UNITS TRANS PACKED RBC: NORMAL
OSMOLALITY UR: 388 MOSM/KG (ref 50–1200)
PLATELET # BLD AUTO: 59 K/UL (ref 150–450)
PMV BLD AUTO: 10.2 FL (ref 9.2–12.9)
POCT GLUCOSE: 111 MG/DL (ref 70–110)
POCT GLUCOSE: 139 MG/DL (ref 70–110)
POCT GLUCOSE: 147 MG/DL (ref 70–110)
POCT GLUCOSE: 201 MG/DL (ref 70–110)
POTASSIUM SERPL-SCNC: 5.4 MMOL/L (ref 3.5–5.1)
POTASSIUM UR-SCNC: 26 MMOL/L (ref 15–95)
PROT SERPL-MCNC: 5.5 G/DL (ref 6–8.4)
PROTHROMBIN TIME: 17.2 SEC (ref 9–12.5)
RBC # BLD AUTO: 2.08 M/UL (ref 4.6–6.2)
SODIUM SERPL-SCNC: 129 MMOL/L (ref 136–145)
SODIUM UR-SCNC: 20 MMOL/L (ref 20–250)
WBC # BLD AUTO: 5.35 K/UL (ref 3.9–12.7)

## 2022-03-19 PROCEDURE — 25000003 PHARM REV CODE 250: Mod: NTX | Performed by: STUDENT IN AN ORGANIZED HEALTH CARE EDUCATION/TRAINING PROGRAM

## 2022-03-19 PROCEDURE — 99222 PR INITIAL HOSPITAL CARE,LEVL II: ICD-10-PCS | Mod: NTX,,, | Performed by: HOSPITALIST

## 2022-03-19 PROCEDURE — 63600175 PHARM REV CODE 636 W HCPCS: Mod: JB,NTX | Performed by: STUDENT IN AN ORGANIZED HEALTH CARE EDUCATION/TRAINING PROGRAM

## 2022-03-19 PROCEDURE — 99222 1ST HOSP IP/OBS MODERATE 55: CPT | Mod: NTX,,, | Performed by: HOSPITALIST

## 2022-03-19 PROCEDURE — 36415 COLL VENOUS BLD VENIPUNCTURE: CPT | Mod: NTX | Performed by: STUDENT IN AN ORGANIZED HEALTH CARE EDUCATION/TRAINING PROGRAM

## 2022-03-19 PROCEDURE — P9016 RBC LEUKOCYTES REDUCED: HCPCS | Mod: NTX | Performed by: STUDENT IN AN ORGANIZED HEALTH CARE EDUCATION/TRAINING PROGRAM

## 2022-03-19 PROCEDURE — 51798 US URINE CAPACITY MEASURE: CPT | Mod: NTX

## 2022-03-19 PROCEDURE — 20600001 HC STEP DOWN PRIVATE ROOM: Mod: NTX

## 2022-03-19 PROCEDURE — 80053 COMPREHEN METABOLIC PANEL: CPT | Mod: NTX | Performed by: STUDENT IN AN ORGANIZED HEALTH CARE EDUCATION/TRAINING PROGRAM

## 2022-03-19 PROCEDURE — 85025 COMPLETE CBC W/AUTO DIFF WBC: CPT | Mod: NTX | Performed by: STUDENT IN AN ORGANIZED HEALTH CARE EDUCATION/TRAINING PROGRAM

## 2022-03-19 PROCEDURE — 84133 ASSAY OF URINE POTASSIUM: CPT | Mod: NTX | Performed by: HOSPITALIST

## 2022-03-19 PROCEDURE — 99233 PR SUBSEQUENT HOSPITAL CARE,LEVL III: ICD-10-PCS | Mod: NTX,,, | Performed by: STUDENT IN AN ORGANIZED HEALTH CARE EDUCATION/TRAINING PROGRAM

## 2022-03-19 PROCEDURE — 84300 ASSAY OF URINE SODIUM: CPT | Mod: NTX | Performed by: HOSPITALIST

## 2022-03-19 PROCEDURE — 99233 SBSQ HOSP IP/OBS HIGH 50: CPT | Mod: NTX,,, | Performed by: STUDENT IN AN ORGANIZED HEALTH CARE EDUCATION/TRAINING PROGRAM

## 2022-03-19 PROCEDURE — 85610 PROTHROMBIN TIME: CPT | Mod: NTX | Performed by: STUDENT IN AN ORGANIZED HEALTH CARE EDUCATION/TRAINING PROGRAM

## 2022-03-19 PROCEDURE — P9047 ALBUMIN (HUMAN), 25%, 50ML: HCPCS | Mod: JG,NTX | Performed by: STUDENT IN AN ORGANIZED HEALTH CARE EDUCATION/TRAINING PROGRAM

## 2022-03-19 PROCEDURE — 83935 ASSAY OF URINE OSMOLALITY: CPT | Mod: NTX | Performed by: HOSPITALIST

## 2022-03-19 RX ORDER — HYDROCODONE BITARTRATE AND ACETAMINOPHEN 500; 5 MG/1; MG/1
TABLET ORAL
Status: DISCONTINUED | OUTPATIENT
Start: 2022-03-19 | End: 2022-03-21

## 2022-03-19 RX ORDER — MIDODRINE HYDROCHLORIDE 5 MG/1
15 TABLET ORAL EVERY 8 HOURS
Status: DISCONTINUED | OUTPATIENT
Start: 2022-03-19 | End: 2022-03-24 | Stop reason: HOSPADM

## 2022-03-19 RX ADMIN — SENNOSIDES AND DOCUSATE SODIUM 1 TABLET: 50; 8.6 TABLET ORAL at 09:03

## 2022-03-19 RX ADMIN — OCTREOTIDE ACETATE 100 MCG: 100 INJECTION, SOLUTION INTRAVENOUS; SUBCUTANEOUS at 09:03

## 2022-03-19 RX ADMIN — MIDODRINE HYDROCHLORIDE 15 MG: 5 TABLET ORAL at 09:03

## 2022-03-19 RX ADMIN — INSULIN ASPART 2 UNITS: 100 INJECTION, SOLUTION INTRAVENOUS; SUBCUTANEOUS at 01:03

## 2022-03-19 RX ADMIN — LACTULOSE 20 G: 20 SOLUTION ORAL at 09:03

## 2022-03-19 RX ADMIN — PANTOPRAZOLE SODIUM 40 MG: 40 TABLET, DELAYED RELEASE ORAL at 09:03

## 2022-03-19 RX ADMIN — OCTREOTIDE ACETATE 100 MCG: 100 INJECTION, SOLUTION INTRAVENOUS; SUBCUTANEOUS at 06:03

## 2022-03-19 RX ADMIN — HEPARIN SODIUM 5000 UNITS: 5000 INJECTION INTRAVENOUS; SUBCUTANEOUS at 01:03

## 2022-03-19 RX ADMIN — CEFTRIAXONE 1 G: 1 INJECTION, SOLUTION INTRAVENOUS at 11:03

## 2022-03-19 RX ADMIN — ONDANSETRON 4 MG: 2 INJECTION INTRAMUSCULAR; INTRAVENOUS at 09:03

## 2022-03-19 RX ADMIN — RIFAXIMIN 550 MG: 550 TABLET ORAL at 09:03

## 2022-03-19 RX ADMIN — Medication 6 MG: at 09:03

## 2022-03-19 RX ADMIN — HEPARIN SODIUM 5000 UNITS: 5000 INJECTION INTRAVENOUS; SUBCUTANEOUS at 09:03

## 2022-03-19 RX ADMIN — LACTULOSE 20 G: 20 SOLUTION ORAL at 04:03

## 2022-03-19 RX ADMIN — ALBUMIN (HUMAN) 50 G: 12.5 SOLUTION INTRAVENOUS at 06:03

## 2022-03-19 RX ADMIN — OCTREOTIDE ACETATE 100 MCG: 100 INJECTION, SOLUTION INTRAVENOUS; SUBCUTANEOUS at 01:03

## 2022-03-19 RX ADMIN — SODIUM ZIRCONIUM CYCLOSILICATE 10 G: 5 POWDER, FOR SUSPENSION ORAL at 11:03

## 2022-03-19 RX ADMIN — HEPARIN SODIUM 5000 UNITS: 5000 INJECTION INTRAVENOUS; SUBCUTANEOUS at 06:03

## 2022-03-19 RX ADMIN — MIDODRINE HYDROCHLORIDE 10 MG: 5 TABLET ORAL at 06:03

## 2022-03-19 RX ADMIN — ZINC SULFATE 220 MG (50 MG) CAPSULE 220 MG: CAPSULE at 09:03

## 2022-03-19 NOTE — SUBJECTIVE & OBJECTIVE
Interval History: No acute events overnight. Patient this AM states that he feels better during the mornings but gets tired by midnight.     Review of Systems   Constitutional:  Positive for activity change. Negative for chills and fever.   HENT:  Negative for congestion and sore throat.    Eyes:  Negative for photophobia and visual disturbance.   Respiratory:  Negative for cough and shortness of breath.    Cardiovascular:  Positive for leg swelling. Negative for chest pain and palpitations.   Gastrointestinal:  Positive for abdominal distention. Negative for abdominal pain, blood in stool, constipation, diarrhea, nausea and vomiting.   Endocrine: Negative for cold intolerance and heat intolerance.   Genitourinary:  Negative for dysuria and hematuria.   Musculoskeletal:  Negative for arthralgias and myalgias.   Skin:  Negative for rash and wound.   Allergic/Immunologic: Negative for environmental allergies and food allergies.   Neurological:  Negative for seizures and numbness.   Hematological:  Negative for adenopathy. Does not bruise/bleed easily.   Psychiatric/Behavioral:  Negative for hallucinations and suicidal ideas.    Objective:     Vital Signs (Most Recent):  Temp: 98.1 °F (36.7 °C) (03/19/22 0745)  Pulse: 75 (03/19/22 0745)  Resp: 17 (03/19/22 0745)  BP: (!) 114/54 (03/19/22 0745)  SpO2: 96 % (03/19/22 0745)   Vital Signs (24h Range):  Temp:  [97.8 °F (36.6 °C)-98.5 °F (36.9 °C)] 98.1 °F (36.7 °C)  Pulse:  [71-82] 75  Resp:  [16-20] 17  SpO2:  [96 %-99 %] 96 %  BP: ()/(50-55) 114/54     Weight: 117.9 kg (260 lb)  Body mass index is 40.72 kg/m².    Intake/Output Summary (Last 24 hours) at 3/19/2022 0957  Last data filed at 3/18/2022 1518  Gross per 24 hour   Intake --   Output 3600 ml   Net -3600 ml        Physical Exam  Constitutional:       Appearance: He is well-developed. He is ill-appearing.   HENT:      Head: Normocephalic and atraumatic.   Eyes:      General: Scleral icterus present.       Conjunctiva/sclera: Conjunctivae normal.      Pupils: Pupils are equal, round, and reactive to light.   Neck:      Thyroid: No thyromegaly.      Vascular: No JVD.   Cardiovascular:      Rate and Rhythm: Normal rate and regular rhythm.      Heart sounds: Normal heart sounds. No murmur heard.    No friction rub. No gallop.   Pulmonary:      Effort: Pulmonary effort is normal. No respiratory distress.      Breath sounds: Normal breath sounds. No wheezing or rales.   Abdominal:      General: Bowel sounds are normal. There is no distension.      Palpations: Abdomen is soft. There is no mass.      Tenderness: There is no abdominal tenderness. There is no guarding or rebound.      Hernia: No hernia is present.   Musculoskeletal:         General: Swelling present. No deformity. Normal range of motion.      Cervical back: Normal range of motion and neck supple.      Right lower leg: Edema present.      Left lower leg: Edema present.   Skin:     General: Skin is warm and dry.      Coloration: Skin is not jaundiced.   Neurological:      Mental Status: He is alert and oriented to person, place, and time.      Cranial Nerves: No cranial nerve deficit.   Psychiatric:         Behavior: Behavior normal.       Significant Labs: All pertinent labs within the past 24 hours have been reviewed.  CBC:   Recent Labs   Lab 03/18/22  0451 03/19/22  0609   WBC 4.44 5.35   HGB 6.7* 6.6*   HCT 21.7* 19.6*   PLT 51* 59*       CMP:   Recent Labs   Lab 03/18/22  0451 03/19/22  0609   * 129*   K 5.4* 5.4*    108   CO2 15* 16*   * 111*   BUN 45* 52*   CREATININE 1.8* 2.2*   CALCIUM 8.7 8.2*   PROT 5.6* 5.5*   ALBUMIN 3.1* 3.0*   BILITOT 2.6* 3.5*   ALKPHOS 83 53*   AST 55* 50*   ALT 37 34   ANIONGAP 6* 5*   EGFRNONAA 41.4* 32.5*         Significant Imaging: I have reviewed all pertinent imaging results/findings within the past 24 hours.

## 2022-03-19 NOTE — ASSESSMENT & PLAN NOTE
Baseline hemoglobin around 7-8.  - Hgb remains <7 (6.6), will give 1u PRBC today. Patient already consented. Patient denies any hematochezia or hematemesis  - Already has T&S

## 2022-03-19 NOTE — PROGRESS NOTES
[facility] - Telemetry [facility]  Progress Note    Patient Name: Pelon Vasquez  MRN: [id number]  Patient Class: IP- Inpatient   Admission Date: 3/17/2022  Length of Stay: 2 days  Attending Physician: Jesica Lo MD  Primary Care Provider: Primary Doctor No        Subjective:     Principal Problem:Decompensation of cirrhosis of liver        HPI:  58 y/o M with a PMH of cirrhosis c/b ascites who presents for liver transplant evaluation and renal failure.    Patient was seen in hepatology clinic this morning where he mentioned to staff that he was recently hospitalized for ascites as well as acute kidney failure and hyperkalemia. He was discharged yesterday and made his way to his hepatology appointment. Patient reports that he had not been feeling all to well and that his ascites reaccumulated hence his presentation to OSH. There he had a potassium of 7 and Cr of 2.3~. It was believed to be potassium supplementation + aldactone in the setting of worsening renal function that caused hyperkalemia. Furthermore, he saw his cardiologist due to questions if he needed LHC prior to being listed for transplant or not. Per their discussion, they would like his renal function to return to his baseline before LHC    Patient being admitted for hyperkalemia/FAMILIA/transplant w/u.        Overview/Hospital Course:  Tentative plan for LHC Monday. Renal function worsened post paracentesis despite albumin. Nephrology consult pending      Interval History: No acute events overnight. Patient this AM states that he feels better during the mornings but gets tired by midnight. He otherwise denies any issues and is willing to accept a blood transfusion.    Review of Systems   Constitutional:  Positive for activity change. Negative for chills and fever.   HENT:  Negative for congestion and sore throat.    Eyes:  Negative for photophobia and visual disturbance.   Respiratory:  Negative for cough and shortness of breath.     Cardiovascular:  Positive for leg swelling. Negative for chest pain and palpitations.   Gastrointestinal:  Positive for abdominal distention. Negative for abdominal pain, blood in stool, constipation, diarrhea, nausea and vomiting.   Endocrine: Negative for cold intolerance and heat intolerance.   Genitourinary:  Negative for dysuria and hematuria.   Musculoskeletal:  Negative for arthralgias and myalgias.   Skin:  Negative for rash and wound.   Allergic/Immunologic: Negative for environmental allergies and food allergies.   Neurological:  Negative for seizures and numbness.   Hematological:  Negative for adenopathy. Does not bruise/bleed easily.   Psychiatric/Behavioral:  Negative for hallucinations and suicidal ideas.    Objective:     Vital Signs (Most Recent):  Temp: 98.1 °F (36.7 °C) (03/19/22 0745)  Pulse: 75 (03/19/22 0745)  Resp: 17 (03/19/22 0745)  BP: (!) 114/54 (03/19/22 0745)  SpO2: 96 % (03/19/22 0745)   Vital Signs (24h Range):  Temp:  [97.8 °F (36.6 °C)-98.5 °F (36.9 °C)] 98.1 °F (36.7 °C)  Pulse:  [71-82] 75  Resp:  [16-20] 17  SpO2:  [96 %-99 %] 96 %  BP: ()/(50-55) 114/54     Weight: 117.9 kg (260 lb)  Body mass index is 40.72 kg/m².    Intake/Output Summary (Last 24 hours) at 3/19/2022 0957  Last data filed at 3/18/2022 1518  Gross per 24 hour   Intake --   Output 3600 ml   Net -3600 ml        Physical Exam  Constitutional:       Appearance: He is well-developed. He is ill-appearing.   HENT:      Head: Normocephalic and atraumatic.   Eyes:      General: Scleral icterus present.      Conjunctiva/sclera: Conjunctivae normal.      Pupils: Pupils are equal, round, and reactive to light.   Neck:      Thyroid: No thyromegaly.      Vascular: No JVD.   Cardiovascular:      Rate and Rhythm: Normal rate and regular rhythm.      Heart sounds: Normal heart sounds. No murmur heard.    No friction rub. No gallop.   Pulmonary:      Effort: Pulmonary effort is normal. No respiratory distress.      Breath  sounds: Normal breath sounds. No wheezing or rales.   Abdominal:      General: Bowel sounds are normal. There is no distension.      Palpations: Abdomen is soft. There is no mass.      Tenderness: There is no abdominal tenderness. There is no guarding or rebound.      Hernia: No hernia is present.   Musculoskeletal:         General: Swelling present. No deformity. Normal range of motion.      Cervical back: Normal range of motion and neck supple.      Right lower leg: Edema present.      Left lower leg: Edema present.   Skin:     General: Skin is warm and dry.      Coloration: Skin is not jaundiced.   Neurological:      Mental Status: He is alert and oriented to person, place, and time.      Cranial Nerves: No cranial nerve deficit.   Psychiatric:         Behavior: Behavior normal.       Significant Labs: All pertinent labs within the past 24 hours have been reviewed.  CBC:   Recent Labs   Lab 03/18/22 0451 03/19/22  0609   WBC 4.44 5.35   HGB 6.7* 6.6*   HCT 21.7* 19.6*   PLT 51* 59*       CMP:   Recent Labs   Lab 03/18/22 0451 03/19/22  0609   * 129*   K 5.4* 5.4*    108   CO2 15* 16*   * 111*   BUN 45* 52*   CREATININE 1.8* 2.2*   CALCIUM 8.7 8.2*   PROT 5.6* 5.5*   ALBUMIN 3.1* 3.0*   BILITOT 2.6* 3.5*   ALKPHOS 83 53*   AST 55* 50*   ALT 37 34   ANIONGAP 6* 5*   EGFRNONAA 41.4* 32.5*         Significant Imaging: I have reviewed all pertinent imaging results/findings within the past 24 hours.      Assessment/Plan:      * Decompensation of cirrhosis of liver  MELD-Na score: 29 at 3/19/2022  6:09 AM  MELD score: 25 at 3/19/2022  6:09 AM  Calculated from:  Serum Creatinine: 2.2 mg/dL at 3/19/2022  6:09 AM  Serum Sodium: 129 mmol/L at 3/19/2022  6:09 AM  Total Bilirubin: 3.5 mg/dL at 3/19/2022  6:09 AM  INR(ratio): 1.7 at 3/19/2022  6:09 AM  Age: 57 years  Secondary to LOZANO vs alcohol. Patient presents with volume overload and FAMILIA despite taking diuretics as prescribed. Patient's abdomen remains  soft but his legs are extremely edematous. Patient seen in hepatology clinic and referred to inpatient for transplant eval. Patient's wife reports that at OSH they had given him albumin due to his FAMILIA and his Cr downtreded from 2.4 to 2.1  - Hepatology following  - Responded to albumin challenge. Continue HRS treatment with albumin, midodrine, and octreotide  - Continue lactulose and rifaximin for HE  - Interventional cardiology consulted for [facility], tentatively planned for Monday 3/21 assuming renal function continues to improve.  - Received paracentesis on 3/18 but fluid was not sent for analysis. Will empirically treat for SBP    FAMILIA (acute kidney injury)  Either from intravascular depletion vs HRS. Patient was taking lasix/aldactone prior to admission at OSH where he had FAMILIA + hyperK. Aldactone and Kcl supp were discontinued. Patient is volume overloaded but renal function still not at baseline  - Continue HRS treatment as above  - Lokelma for hyperkalemia  - Avoid nephrotoxic medications      Other specified anemias  Baseline hemoglobin around 7-8.  - Hgb remains <7 (6.6), will give 1u PRBC today. Patient already consented. Patient denies any hematochezia or hematemesis  - Already has T&S      Ascites due to alcoholic cirrhosis  Went for paracentesis with IR 3/18 with 3.6L removed      Hyperkalemia  See FAMILIA      Controlled type 2 diabetes mellitus, without long-term current use of insulin  Not on any insulin currently.   - A1c 6.5, down from 6.9  - LDSSI + POC AC/HS        VTE Risk Mitigation (From admission, onward)         Ordered     heparin (porcine) injection 5,000 Units  Every 8 hours         03/17/22 1656     IP VTE HIGH RISK PATIENT  Once         03/17/22 1656     Place sequential compression device  Until discontinued         03/17/22 1656                Discharge Planning   EDITH: 3/23/2022     Code Status: Full Code   Is the patient medically ready for discharge?: No    Reason for patient still in  hospital (select all that apply): Patient trending condition  Discharge Plan A: Home with family                  Jesica Lo MD  Department of Hospital Medicine   [facility] - Telemetry Stepdown

## 2022-03-19 NOTE — PLAN OF CARE
Problem: Adult Inpatient Plan of Care  Goal: Plan of Care Review  Outcome: Ongoing, Progressing  Goal: Optimal Comfort and Wellbeing  Outcome: Ongoing, Progressing     Problem: Infection  Goal: Absence of Infection Signs and Symptoms  Outcome: Ongoing, Progressing     Problem: Diabetes Comorbidity  Goal: Blood Glucose Level Within Targeted Range  Outcome: Ongoing, Progressing     Problem: Fluid and Electrolyte Imbalance (Acute Kidney Injury/Impairment)  Goal: Fluid and Electrolyte Balance  Outcome: Ongoing, Progressing     Problem: Oral Intake Inadequate (Acute Kidney Injury/Impairment)  Goal: Optimal Nutrition Intake  Outcome: Ongoing, Progressing     Problem: Renal Function Impairment (Acute Kidney Injury/Impairment)  Goal: Effective Renal Function  Outcome: Ongoing, Not Progressing

## 2022-03-19 NOTE — CONSULTS
[facility] - Telemetry Stepdown  Nephrology  Consult Note    Patient Name: Pelon Vasquez  MRN: [id number]  Admission Date: 3/17/2022  Hospital Length of Stay: 2 days  Attending Provider: Jesica Lo MD   Primary Care Physician: Primary Doctor No  Principal Problem:Decompensation of cirrhosis of liver    Inpatient consult to Nephrology  Consult performed by: Yesenia Grayson MD  Consult ordered by: Jesica Lo MD        Subjective:     HPI: 56 yo M with PMHx of cirrhosis c/b ascites was admitted for liver transplant evaluation and renal failure. Pt was recently hospitalized at OSH for ascites as well as acute kidney failure and hyperkalemia, where he had a potassium of 7 and Cr of 2.3~. Also pt will need prerequisite C evaluation prior to being listed for transplant. Primary care team suspected the current FAMILIA was 2/2 HRS and started relevant treatment. His renal function was improved temporarily and worsened again after paracentesis. Nephrology was consulted for pre-LHC optimization of renal function. Pt complained about abdominal distension and severe lower extremity edema, denied SOB, chest pain, dysuria, decreased UOP or change in voiding frequency.           Past Medical History:   Diagnosis Date    Arthritis     Cirrhosis of liver     HTN (hypertension)     BRAYDON (obstructive sleep apnea)     Secondary esophageal varices without bleeding 3/18/2022    EGD (2/25/22) showed moderate portal hypertensive gastropathy, small hiatal hernia, grade 1 esophageal varices    Type 2 diabetes mellitus        Past Surgical History:   Procedure Laterality Date    COLONOSCOPY      FINGER AMPUTATION      MEDIAL COLLATERAL LIGAMENT REPAIR, KNEE Bilateral     ROTATRO CUFF REPAIR      TONSILLECTOMY         Review of patient's allergies indicates:   Allergen Reactions    Strawberry Anaphylaxis and Rash    Metformin Diarrhea     Current Facility-Administered Medications   Medication Frequency    0.9%  NaCl infusion (for  blood administration) Q24H PRN    acetaminophen tablet 650 mg Q8H PRN    albumin human 25% bottle 50 g Daily    albuterol-ipratropium 2.5 mg-0.5 mg/3 mL nebulizer solution 3 mL Q6H PRN    aluminum-magnesium hydroxide-simethicone 200-200-20 mg/5 mL suspension 30 mL QID PRN    cefTRIAXone (ROCEPHIN) 1 g/50 mL D5W IVPB Q24H    dextrose 10% bolus 125 mL PRN    dextrose 10% bolus 250 mL PRN    glucagon (human recombinant) injection 1 mg PRN    glucose chewable tablet 16 g PRN    glucose chewable tablet 24 g PRN    heparin (porcine) injection 5,000 Units Q8H    insulin aspart U-100 pen 0-5 Units QID (AC + HS) PRN    lactulose 20 gram/30 mL solution Soln 20 g TID    melatonin tablet 6 mg Nightly PRN    midodrine tablet 15 mg Q8H    naloxone 0.4 mg/mL injection 0.02 mg PRN    octreotide injection 100 mcg Q8H    ondansetron injection 4 mg Q8H PRN    pantoprazole EC tablet 40 mg Daily    prochlorperazine injection Soln 5 mg Q6H PRN    rifAXIMin tablet 550 mg BID    senna-docusate 8.6-50 mg per tablet 1 tablet BID    simethicone chewable tablet 80 mg QID PRN    sodium chloride 0.9% flush 10 mL Q8H PRN    zinc sulfate capsule 220 mg Daily     Family History    None       Tobacco Use    Smoking status: Never Smoker    Smokeless tobacco: Never Used   Substance and Sexual Activity    Alcohol use: Not Currently    Drug use: Not on file    Sexual activity: Not on file     Review of Systems   Constitutional:  Negative for chills and fever.   Respiratory:  Negative for cough and shortness of breath.    Cardiovascular:  Positive for leg swelling. Negative for chest pain.   Gastrointestinal:  Positive for abdominal distention.   Genitourinary:  Negative for decreased urine volume, difficulty urinating, dysuria and frequency.   Neurological:  Negative for light-headedness and headaches.   Objective:     Vital Signs (Most Recent):  Temp: 98.1 °F (36.7 °C) (03/19/22 1428)  Pulse: 81 (03/19/22 1428)  Resp: 18  (03/19/22 1428)  BP: (!) 118/54 (03/19/22 1428)  SpO2: 97 % (03/19/22 1428)  O2 Device (Oxygen Therapy): room air (03/17/22 1930)   Vital Signs (24h Range):  Temp:  [97.4 °F (36.3 °C)-98.5 °F (36.9 °C)] 98.1 °F (36.7 °C)  Pulse:  [71-81] 81  Resp:  [16-20] 18  SpO2:  [96 %-100 %] 97 %  BP: ()/(50-60) 118/54     Weight: 117.9 kg (260 lb) (03/17/22 1537)  Body mass index is 40.72 kg/m².  Body surface area is 2.36 meters squared.    I/O last 3 completed shifts:  In: 240 [P.O.:240]  Out: 3600 [Other:3600]    Physical Exam  Vitals and nursing note reviewed.   Constitutional:       General: He is not in acute distress.     Appearance: He is ill-appearing.   Eyes:      General: Scleral icterus present.   Cardiovascular:      Rate and Rhythm: Normal rate and regular rhythm.      Heart sounds: Normal heart sounds.   Pulmonary:      Effort: Pulmonary effort is normal.      Breath sounds: Normal breath sounds.   Abdominal:      General: There is distension.   Musculoskeletal:         General: Swelling present.      Right lower leg: Edema present.      Left lower leg: Edema present.   Skin:     Coloration: Skin is jaundiced and pale.   Neurological:      General: No focal deficit present.      Mental Status: He is alert and oriented to person, place, and time.       Significant Labs:  All labs within the past 24 hours have been reviewed.    Significant Imaging:  reivewed    Assessment/Plan:     FAMILIA (acute kidney injury)  Baseline 1.4, currently Cr 2.1 -> 1.8 -> 2.2, worsened after paracentesis, suspected volume depletion superimposed to HRS.     Plan:    - continue with HRS treatment with midodrine, octreotide, and albumin  - urine Na and osmolarity  - will perform urine microscopy  - f/u I/O  - daily CMP, Mg, Phos  - renally dosing medications  - avoid nephrotoxic medications, NSAID, ACEi/ARB, IV contrast    Hyperkalemia  Currently stabilized at 5.4, continue to monitor        Thank you for your consult. I will follow-up  with patient. Please contact us if you have any additional questions.    Yesenia Grayson MD PhD  Nephrology  [facility] - Telemetry Stepdown

## 2022-03-19 NOTE — ASSESSMENT & PLAN NOTE
MELD-Na score: 29 at 3/19/2022  6:09 AM  MELD score: 25 at 3/19/2022  6:09 AM  Calculated from:  Serum Creatinine: 2.2 mg/dL at 3/19/2022  6:09 AM  Serum Sodium: 129 mmol/L at 3/19/2022  6:09 AM  Total Bilirubin: 3.5 mg/dL at 3/19/2022  6:09 AM  INR(ratio): 1.7 at 3/19/2022  6:09 AM  Age: 57 years  Secondary to LOZANO vs alcohol. Patient presents with volume overload and FAMILIA despite taking diuretics as prescribed. Patient's abdomen remains soft but his legs are extremely edematous. Patient seen in hepatology clinic and referred to inpatient for transplant eval. Patient's wife reports that at OSH they had given him albumin due to his FAMILIA and his Cr downtreded from 2.4 to 2.1  - Hepatology following  - Responded to albumin challenge. Continue HRS treatment with albumin, midodrine, and octreotide  - Continue lactulose and rifaximin for HE  - Interventional cardiology consulted for [facility], tentatively planned for Monday 3/21 assuming renal function continues to improve.  - Received paracentesis on 3/18 but fluid was not sent for analysis. Will empirically treat for SBP

## 2022-03-19 NOTE — SUBJECTIVE & OBJECTIVE
Past Medical History:   Diagnosis Date    Arthritis     Cirrhosis of liver     HTN (hypertension)     BRAYDON (obstructive sleep apnea)     Secondary esophageal varices without bleeding 3/18/2022    EGD (2/25/22) showed moderate portal hypertensive gastropathy, small hiatal hernia, grade 1 esophageal varices    Type 2 diabetes mellitus        Past Surgical History:   Procedure Laterality Date    COLONOSCOPY      FINGER AMPUTATION      MEDIAL COLLATERAL LIGAMENT REPAIR, KNEE Bilateral     ROTATRO CUFF REPAIR      TONSILLECTOMY         Review of patient's allergies indicates:   Allergen Reactions    Strawberry Anaphylaxis and Rash    Metformin Diarrhea     Current Facility-Administered Medications   Medication Frequency    0.9%  NaCl infusion (for blood administration) Q24H PRN    acetaminophen tablet 650 mg Q8H PRN    albumin human 25% bottle 50 g Daily    albuterol-ipratropium 2.5 mg-0.5 mg/3 mL nebulizer solution 3 mL Q6H PRN    aluminum-magnesium hydroxide-simethicone 200-200-20 mg/5 mL suspension 30 mL QID PRN    cefTRIAXone (ROCEPHIN) 1 g/50 mL D5W IVPB Q24H    dextrose 10% bolus 125 mL PRN    dextrose 10% bolus 250 mL PRN    glucagon (human recombinant) injection 1 mg PRN    glucose chewable tablet 16 g PRN    glucose chewable tablet 24 g PRN    heparin (porcine) injection 5,000 Units Q8H    insulin aspart U-100 pen 0-5 Units QID (AC + HS) PRN    lactulose 20 gram/30 mL solution Soln 20 g TID    melatonin tablet 6 mg Nightly PRN    midodrine tablet 15 mg Q8H    naloxone 0.4 mg/mL injection 0.02 mg PRN    octreotide injection 100 mcg Q8H    ondansetron injection 4 mg Q8H PRN    pantoprazole EC tablet 40 mg Daily    prochlorperazine injection Soln 5 mg Q6H PRN    rifAXIMin tablet 550 mg BID    senna-docusate 8.6-50 mg per tablet 1 tablet BID    simethicone chewable tablet 80 mg QID PRN    sodium chloride 0.9% flush 10 mL Q8H PRN    zinc sulfate capsule 220 mg Daily     Family History    None       Tobacco Use     Smoking status: Never Smoker    Smokeless tobacco: Never Used   Substance and Sexual Activity    Alcohol use: Not Currently    Drug use: Not on file    Sexual activity: Not on file     Review of Systems   Constitutional:  Negative for chills and fever.   Respiratory:  Negative for cough and shortness of breath.    Cardiovascular:  Positive for leg swelling. Negative for chest pain.   Gastrointestinal:  Positive for abdominal distention.   Genitourinary:  Negative for decreased urine volume, difficulty urinating, dysuria and frequency.   Neurological:  Negative for light-headedness and headaches.   Objective:     Vital Signs (Most Recent):  Temp: 98.1 °F (36.7 °C) (03/19/22 1428)  Pulse: 81 (03/19/22 1428)  Resp: 18 (03/19/22 1428)  BP: (!) 118/54 (03/19/22 1428)  SpO2: 97 % (03/19/22 1428)  O2 Device (Oxygen Therapy): room air (03/17/22 1930)   Vital Signs (24h Range):  Temp:  [97.4 °F (36.3 °C)-98.5 °F (36.9 °C)] 98.1 °F (36.7 °C)  Pulse:  [71-81] 81  Resp:  [16-20] 18  SpO2:  [96 %-100 %] 97 %  BP: ()/(50-60) 118/54     Weight: 117.9 kg (260 lb) (03/17/22 1537)  Body mass index is 40.72 kg/m².  Body surface area is 2.36 meters squared.    I/O last 3 completed shifts:  In: 240 [P.O.:240]  Out: 3600 [Other:3600]    Physical Exam  Vitals and nursing note reviewed.   Constitutional:       General: He is not in acute distress.     Appearance: He is ill-appearing.   Eyes:      General: Scleral icterus present.   Cardiovascular:      Rate and Rhythm: Normal rate and regular rhythm.      Heart sounds: Normal heart sounds.   Pulmonary:      Effort: Pulmonary effort is normal.      Breath sounds: Normal breath sounds.   Abdominal:      General: There is distension.   Musculoskeletal:         General: Swelling present.      Right lower leg: Edema present.      Left lower leg: Edema present.   Skin:     Coloration: Skin is jaundiced and pale.   Neurological:      General: No focal deficit present.      Mental Status: He  is alert and oriented to person, place, and time.       Significant Labs:  All labs within the past 24 hours have been reviewed.    Significant Imaging:  reivewed

## 2022-03-19 NOTE — ASSESSMENT & PLAN NOTE
Baseline 1.4, currently Cr 2.1 -> 1.8 -> 2.2, worsened after paracentesis, suspected volume depletion superimposed to HRS.     Plan:    - continue with HRS treatment with midodrine, octreotide, and albumin  - urine Na and osmolarity  - will perform urine microscopy  - f/u I/O  - daily CMP, Mg, Phos  - renally dosing medications  - avoid nephrotoxic medications, NSAID, ACEi/ARB, IV contrast

## 2022-03-19 NOTE — ASSESSMENT & PLAN NOTE
Either from intravascular depletion vs HRS. Patient was taking lasix/aldactone prior to admission at OSH where he had FAMILIA + hyperK. Aldactone and Kcl supp were discontinued. Patient is volume overloaded but renal function still not at baseline  - Continue HRS treatment as above  - Lokelma for hyperkalemia  - Avoid nephrotoxic medications

## 2022-03-19 NOTE — PROGRESS NOTES
Hepatology Treatment Plan    Pelon Vasquez is a 57 y.o. male admitted to hospital 3/17/2022 (Hospital Day: 3) due to Decompensation of cirrhosis of liver.     Interval History  Creatinine up this morning.  S/p paracentesis with removal of 3.6 L, however fluid analysis has not been sent.  Seen by Cardiology, catheterization deferred until creatinine improves.    Objective  Temp:  [97.8 °F (36.6 °C)-98.5 °F (36.9 °C)] 98.1 °F (36.7 °C) (03/19 0745)  Pulse:  [71-82] 75 (03/19 0745)  BP: ()/(50-55) 114/54 (03/19 0745)  Resp:  [16-20] 17 (03/19 0745)  SpO2:  [96 %-99 %] 96 % (03/19 0745)      Laboratory    Lab Results   Component Value Date    WBC 5.35 03/19/2022    HGB 6.6 (L) 03/19/2022    HCT 19.6 (LL) 03/19/2022    MCV 94 03/19/2022    PLT 59 (L) 03/19/2022       Lab Results   Component Value Date     (L) 03/19/2022    K 5.4 (H) 03/19/2022     03/19/2022    CO2 16 (L) 03/19/2022    BUN 52 (H) 03/19/2022    CREATININE 2.2 (H) 03/19/2022    CALCIUM 8.2 (L) 03/19/2022       Lab Results   Component Value Date    ALBUMIN 3.0 (L) 03/19/2022    ALT 34 03/19/2022    AST 50 (H) 03/19/2022    GGT 66 (H) 02/14/2022    ALKPHOS 53 (L) 03/19/2022    BILITOT 3.5 (H) 03/19/2022       Lab Results   Component Value Date    INR 1.7 (H) 03/19/2022    INR 1.7 (H) 03/18/2022    INR 1.5 (H) 03/17/2022       MELD-Na score: 29 at 3/19/2022  6:09 AM  MELD score: 25 at 3/19/2022  6:09 AM  Calculated from:  Serum Creatinine: 2.2 mg/dL at 3/19/2022  6:09 AM  Serum Sodium: 129 mmol/L at 3/19/2022  6:09 AM  Total Bilirubin: 3.5 mg/dL at 3/19/2022  6:09 AM  INR(ratio): 1.7 at 3/19/2022  6:09 AM  Age: 57 years    Plan  - continue HRS protocol  - appreciate nephrology input  - hold diuretics  - send fluid analysis from paracentesis if able, if not, recommend empiric antibiotic treatment for SBP for 5 days  - [facility] timing per cardiology    Thank you for involving us in the care of Pelon Vasquez. Please call with any additional concerns  or questions.    Ivonne Montoya MD  Gastroenterology Fellow

## 2022-03-19 NOTE — HPI
56 yo M with PMHx of cirrhosis c/b ascites was admitted for liver transplant evaluation and renal failure. Pt was recently hospitalized at OSH for ascites as well as acute kidney failure and hyperkalemia, where he had a potassium of 7 and Cr of 2.3~. Also pt will need prerequisite C evaluation prior to being listed for transplant. Primary care team suspected the current FAMILIA was 2/2 HRS and started relevant treatment. His renal function was improved temporarily and worsened again after paracentesis. Nephrology was consulted for pre-LHC optimization of renal function. Pt complained about abdominal distension and severe lower extremity edema, denied SOB, chest pain, dysuria, decreased UOP or change in voiding frequency.

## 2022-03-19 NOTE — NURSING
Patient independent, denies discomfort. Minimal concentrated urine output. 1u PRBC and albumin given today. Fluid restriction adhered to. Vss, safety maintained.

## 2022-03-20 LAB
ALBUMIN SERPL BCP-MCNC: 3.2 G/DL (ref 3.5–5.2)
ALP SERPL-CCNC: 50 U/L (ref 55–135)
ALT SERPL W/O P-5'-P-CCNC: 31 U/L (ref 10–44)
ANION GAP SERPL CALC-SCNC: 8 MMOL/L (ref 8–16)
AST SERPL-CCNC: 42 U/L (ref 10–40)
BASOPHILS # BLD AUTO: 0.06 K/UL (ref 0–0.2)
BASOPHILS NFR BLD: 1.1 % (ref 0–1.9)
BILIRUB SERPL-MCNC: 5 MG/DL (ref 0.1–1)
BUN SERPL-MCNC: 58 MG/DL (ref 6–20)
CALCIUM SERPL-MCNC: 8.7 MG/DL (ref 8.7–10.5)
CHLORIDE SERPL-SCNC: 112 MMOL/L (ref 95–110)
CO2 SERPL-SCNC: 15 MMOL/L (ref 23–29)
CREAT SERPL-MCNC: 2.2 MG/DL (ref 0.5–1.4)
DIFFERENTIAL METHOD: ABNORMAL
EOSINOPHIL # BLD AUTO: 0.6 K/UL (ref 0–0.5)
EOSINOPHIL NFR BLD: 10.5 % (ref 0–8)
ERYTHROCYTE [DISTWIDTH] IN BLOOD BY AUTOMATED COUNT: 17.4 % (ref 11.5–14.5)
EST. GFR  (AFRICAN AMERICAN): 37.6 ML/MIN/1.73 M^2
EST. GFR  (NON AFRICAN AMERICAN): 32.5 ML/MIN/1.73 M^2
GLUCOSE SERPL-MCNC: 123 MG/DL (ref 70–110)
HCT VFR BLD AUTO: 20.4 % (ref 40–54)
HGB BLD-MCNC: 7.1 G/DL (ref 14–18)
IMM GRANULOCYTES # BLD AUTO: 0.01 K/UL (ref 0–0.04)
IMM GRANULOCYTES NFR BLD AUTO: 0.2 % (ref 0–0.5)
INR PPP: 1.8 (ref 0.8–1.2)
LYMPHOCYTES # BLD AUTO: 0.8 K/UL (ref 1–4.8)
LYMPHOCYTES NFR BLD: 15.1 % (ref 18–48)
MCH RBC QN AUTO: 32 PG (ref 27–31)
MCHC RBC AUTO-ENTMCNC: 34.8 G/DL (ref 32–36)
MCV RBC AUTO: 92 FL (ref 82–98)
MONOCYTES # BLD AUTO: 0.7 K/UL (ref 0.3–1)
MONOCYTES NFR BLD: 13.4 % (ref 4–15)
NEUTROPHILS # BLD AUTO: 3.3 K/UL (ref 1.8–7.7)
NEUTROPHILS NFR BLD: 59.7 % (ref 38–73)
NRBC BLD-RTO: 0 /100 WBC
PLATELET # BLD AUTO: 130 K/UL (ref 150–450)
PMV BLD AUTO: 12.7 FL (ref 9.2–12.9)
POCT GLUCOSE: 132 MG/DL (ref 70–110)
POCT GLUCOSE: 144 MG/DL (ref 70–110)
POCT GLUCOSE: 155 MG/DL (ref 70–110)
POCT GLUCOSE: 179 MG/DL (ref 70–110)
POCT GLUCOSE: 188 MG/DL (ref 70–110)
POCT GLUCOSE: 202 MG/DL (ref 70–110)
POTASSIUM SERPL-SCNC: 5.4 MMOL/L (ref 3.5–5.1)
PROT SERPL-MCNC: 5.6 G/DL (ref 6–8.4)
PROTHROMBIN TIME: 18 SEC (ref 9–12.5)
RBC # BLD AUTO: 2.22 M/UL (ref 4.6–6.2)
SODIUM SERPL-SCNC: 135 MMOL/L (ref 136–145)
WBC # BLD AUTO: 5.44 K/UL (ref 3.9–12.7)

## 2022-03-20 PROCEDURE — 63600175 PHARM REV CODE 636 W HCPCS: Mod: JG,NTX | Performed by: STUDENT IN AN ORGANIZED HEALTH CARE EDUCATION/TRAINING PROGRAM

## 2022-03-20 PROCEDURE — 80053 COMPREHEN METABOLIC PANEL: CPT | Mod: NTX | Performed by: STUDENT IN AN ORGANIZED HEALTH CARE EDUCATION/TRAINING PROGRAM

## 2022-03-20 PROCEDURE — 99232 SBSQ HOSP IP/OBS MODERATE 35: CPT | Mod: NTX,,, | Performed by: STUDENT IN AN ORGANIZED HEALTH CARE EDUCATION/TRAINING PROGRAM

## 2022-03-20 PROCEDURE — 25000003 PHARM REV CODE 250: Mod: NTX | Performed by: HOSPITALIST

## 2022-03-20 PROCEDURE — 94760 N-INVAS EAR/PLS OXIMETRY 1: CPT | Mod: NTX

## 2022-03-20 PROCEDURE — 99232 SBSQ HOSP IP/OBS MODERATE 35: CPT | Mod: NTX,,, | Performed by: HOSPITALIST

## 2022-03-20 PROCEDURE — 99232 PR SUBSEQUENT HOSPITAL CARE,LEVL II: ICD-10-PCS | Mod: NTX,,, | Performed by: STUDENT IN AN ORGANIZED HEALTH CARE EDUCATION/TRAINING PROGRAM

## 2022-03-20 PROCEDURE — 25000003 PHARM REV CODE 250: Mod: NTX | Performed by: STUDENT IN AN ORGANIZED HEALTH CARE EDUCATION/TRAINING PROGRAM

## 2022-03-20 PROCEDURE — 36415 COLL VENOUS BLD VENIPUNCTURE: CPT | Mod: NTX | Performed by: STUDENT IN AN ORGANIZED HEALTH CARE EDUCATION/TRAINING PROGRAM

## 2022-03-20 PROCEDURE — 85610 PROTHROMBIN TIME: CPT | Mod: NTX | Performed by: STUDENT IN AN ORGANIZED HEALTH CARE EDUCATION/TRAINING PROGRAM

## 2022-03-20 PROCEDURE — P9047 ALBUMIN (HUMAN), 25%, 50ML: HCPCS | Mod: JG,NTX | Performed by: STUDENT IN AN ORGANIZED HEALTH CARE EDUCATION/TRAINING PROGRAM

## 2022-03-20 PROCEDURE — 85025 COMPLETE CBC W/AUTO DIFF WBC: CPT | Mod: NTX | Performed by: STUDENT IN AN ORGANIZED HEALTH CARE EDUCATION/TRAINING PROGRAM

## 2022-03-20 PROCEDURE — 99232 PR SUBSEQUENT HOSPITAL CARE,LEVL II: ICD-10-PCS | Mod: NTX,,, | Performed by: HOSPITALIST

## 2022-03-20 PROCEDURE — 20600001 HC STEP DOWN PRIVATE ROOM: Mod: NTX

## 2022-03-20 RX ORDER — SODIUM BICARBONATE 650 MG/1
1300 TABLET ORAL 2 TIMES DAILY
Status: DISCONTINUED | OUTPATIENT
Start: 2022-03-20 | End: 2022-03-24 | Stop reason: HOSPADM

## 2022-03-20 RX ADMIN — MIDODRINE HYDROCHLORIDE 15 MG: 5 TABLET ORAL at 08:03

## 2022-03-20 RX ADMIN — RIFAXIMIN 550 MG: 550 TABLET ORAL at 09:03

## 2022-03-20 RX ADMIN — HEPARIN SODIUM 5000 UNITS: 5000 INJECTION INTRAVENOUS; SUBCUTANEOUS at 05:03

## 2022-03-20 RX ADMIN — RIFAXIMIN 550 MG: 550 TABLET ORAL at 08:03

## 2022-03-20 RX ADMIN — LACTULOSE 20 G: 20 SOLUTION ORAL at 09:03

## 2022-03-20 RX ADMIN — OCTREOTIDE ACETATE 100 MCG: 100 INJECTION, SOLUTION INTRAVENOUS; SUBCUTANEOUS at 08:03

## 2022-03-20 RX ADMIN — OCTREOTIDE ACETATE 100 MCG: 100 INJECTION, SOLUTION INTRAVENOUS; SUBCUTANEOUS at 01:03

## 2022-03-20 RX ADMIN — CEFTRIAXONE 1 G: 1 INJECTION, SOLUTION INTRAVENOUS at 11:03

## 2022-03-20 RX ADMIN — OCTREOTIDE ACETATE 100 MCG: 100 INJECTION, SOLUTION INTRAVENOUS; SUBCUTANEOUS at 05:03

## 2022-03-20 RX ADMIN — LACTULOSE 20 G: 20 SOLUTION ORAL at 02:03

## 2022-03-20 RX ADMIN — ZINC SULFATE 220 MG (50 MG) CAPSULE 220 MG: CAPSULE at 09:03

## 2022-03-20 RX ADMIN — MIDODRINE HYDROCHLORIDE 15 MG: 5 TABLET ORAL at 05:03

## 2022-03-20 RX ADMIN — HEPARIN SODIUM 5000 UNITS: 5000 INJECTION INTRAVENOUS; SUBCUTANEOUS at 01:03

## 2022-03-20 RX ADMIN — PANTOPRAZOLE SODIUM 40 MG: 40 TABLET, DELAYED RELEASE ORAL at 09:03

## 2022-03-20 RX ADMIN — MIDODRINE HYDROCHLORIDE 15 MG: 5 TABLET ORAL at 01:03

## 2022-03-20 RX ADMIN — ALBUMIN (HUMAN) 50 G: 12.5 SOLUTION INTRAVENOUS at 05:03

## 2022-03-20 RX ADMIN — INSULIN ASPART 2 UNITS: 100 INJECTION, SOLUTION INTRAVENOUS; SUBCUTANEOUS at 01:03

## 2022-03-20 RX ADMIN — HEPARIN SODIUM 5000 UNITS: 5000 INJECTION INTRAVENOUS; SUBCUTANEOUS at 08:03

## 2022-03-20 RX ADMIN — SODIUM ZIRCONIUM CYCLOSILICATE 10 G: 5 POWDER, FOR SUSPENSION ORAL at 11:03

## 2022-03-20 RX ADMIN — SODIUM BICARBONATE 650 MG TABLET 1300 MG: at 08:03

## 2022-03-20 NOTE — ASSESSMENT & PLAN NOTE
MELD-Na score: 28 at 3/20/2022  5:31 AM  MELD score: 27 at 3/20/2022  5:31 AM  Calculated from:  Serum Creatinine: 2.2 mg/dL at 3/20/2022  5:31 AM  Serum Sodium: 135 mmol/L at 3/20/2022  5:31 AM  Total Bilirubin: 5.0 mg/dL at 3/20/2022  5:31 AM  INR(ratio): 1.8 at 3/20/2022  3:18 AM  Age: 57 years  Secondary to LOZANO vs alcohol. Patient presents with volume overload and FAMILIA despite taking diuretics as prescribed. Patient's abdomen remains soft but his legs are extremely edematous. Patient seen in hepatology clinic and referred to inpatient for transplant eval. Patient's wife reports that at OSH they had given him albumin due to his FAMILIA and his Cr downtreded from 2.4 to 2.1  - Hepatology following  - Responded to albumin challenge. Continue HRS treatment with albumin, midodrine, and octreotide  - Continue lactulose and rifaximin for HE  - Interventional cardiology consulted for [facility], tentatively planned for Monday 3/21 assuming renal function continues to improve/remains stable.  - Received paracentesis on 3/18 but fluid was not sent for analysis. Will empirically treat for SBP

## 2022-03-20 NOTE — PLAN OF CARE
Problem: Adult Inpatient Plan of Care  Goal: Plan of Care Review  Outcome: Ongoing, Progressing  Flowsheets (Taken 3/20/2022 0603)  Plan of Care Reviewed With:   patient   spouse     Problem: Diabetes Comorbidity  Goal: Blood Glucose Level Within Targeted Range  Outcome: Ongoing, Progressing  Intervention: Monitor and Manage Glycemia  Flowsheets (Taken 3/20/2022 0603)  Glycemic Management: blood glucose monitored     Problem: Fall Injury Risk  Goal: Absence of Fall and Fall-Related Injury  Outcome: Ongoing, Progressing  Intervention: Identify and Manage Contributors  Flowsheets (Taken 3/20/2022 0603)  Self-Care Promotion:   independence encouraged   BADL personal routines maintained  Medication Review/Management: medications reviewed  Intervention: Promote Injury-Free Environment  Flowsheets (Taken 3/20/2022 0603)  Safety Promotion/Fall Prevention:   assistive device/personal item within reach   bed alarm set   side rails raised x 3   nonskid shoes/socks when out of bed   Pt AAO*4, calm, cooperative. No complains of discomfort. Intake and output per flowsheet. VSS, afebrile overnight. No acute changes overnight. WCTM.

## 2022-03-20 NOTE — PLAN OF CARE
Problem: Adult Inpatient Plan of Care  Goal: Plan of Care Review  Outcome: Ongoing, Progressing  Goal: Absence of Hospital-Acquired Illness or Injury  Outcome: Ongoing, Progressing  Goal: Optimal Comfort and Wellbeing  Outcome: Ongoing, Progressing     Problem: Bariatric Environmental Safety  Goal: Safety Maintained with Care  Outcome: Ongoing, Progressing     Problem: Infection  Goal: Absence of Infection Signs and Symptoms  Outcome: Ongoing, Progressing     Problem: Diabetes Comorbidity  Goal: Blood Glucose Level Within Targeted Range  Outcome: Ongoing, Progressing     Problem: Fluid and Electrolyte Imbalance (Acute Kidney Injury/Impairment)  Goal: Fluid and Electrolyte Balance  Outcome: Ongoing, Progressing     Problem: Oral Intake Inadequate (Acute Kidney Injury/Impairment)  Goal: Optimal Nutrition Intake  Outcome: Ongoing, Progressing     Problem: Fall Injury Risk  Goal: Absence of Fall and Fall-Related Injury  Outcome: Ongoing, Progressing     Problem: Adult Inpatient Plan of Care  Goal: Readiness for Transition of Care  Outcome: Ongoing, Not Progressing     Problem: Renal Function Impairment (Acute Kidney Injury/Impairment)  Goal: Effective Renal Function  Outcome: Ongoing, Not Progressing

## 2022-03-20 NOTE — PROGRESS NOTES
Pelon Vasquez   MR#:[id number]   RM:[id number] A  :1965  AGE:57 y.o.     Progress Note  Nephrology    Admit Date: 3/17/2022  Length of Stay: 3  Consult Requested By: Jesica Lo MD  Reason for Consult:  FAMILIA likely HRS     58 yo M with PMHx of cirrhosis c/b ascites was admitted for liver transplant evaluation and renal failure. Pt was recently hospitalized at OSH for ascites as well as acute kidney failure and hyperkalemia, where he had a potassium of 7 and Cr of 2.3~. Also pt will need prerequisite C evaluation prior to being listed for transplant. Primary care team suspected the current FAMILIA was 2/2 HRS and started relevant treatment. His renal function was improved temporarily and worsened again after paracentesis. Nephrology was consulted for pre-LHC optimization of renal function. Pt complained about abdominal distension and severe lower extremity edema, denied SOB, chest pain, dysuria, decreased UOP or change in voiding frequency    Interval History:   Pt more alert and active today. S/p 1 unit pRBC. Minimal UOP past 24 ho. Continues to have  edematous B/l lower extremities. S/p paracentesis with 3600 cc 3/18    Review of Systems:  Constitutional: denies fever/weakness  Respiratory: denies SOB, cough   Cardiovascular: denies chest pain/palpitations   Gastrointestinal: denies N/V, diarrhea/constipation   Psych: denies confusion, depression      Patient Active Problem List   Diagnosis    Decompensation of cirrhosis of liver    Controlled type 2 diabetes mellitus, without long-term current use of insulin    Hyperkalemia    FAMILIA (acute kidney injury)    Secondary esophageal varices without bleeding    Ascites due to alcoholic cirrhosis    Organ transplant candidate    Other specified anemias     Past Medical History:   Diagnosis Date    Arthritis     Cirrhosis of liver     HTN (hypertension)     BRAYDON (obstructive sleep apnea)     Secondary esophageal varices without bleeding 3/18/2022    EGD  (2/25/22) showed moderate portal hypertensive gastropathy, small hiatal hernia, grade 1 esophageal varices    Type 2 diabetes mellitus         OBJECTIVE:   Temp:  [97.5 °F (36.4 °C)-99.4 °F (37.4 °C)]   Pulse:  [73-83]   Resp:  [15-20]   BP: (104-119)/(50-57)   SpO2:  [95 %-100 %]       Intake/Output Summary (Last 24 hours) at 3/20/2022 1506  Last data filed at 3/20/2022 1444  Gross per 24 hour   Intake 1140 ml   Output 900 ml   Net 240 ml         Physical Exam:  General Appearance: well developed, well nourished   HEENT: normocephalic, atraumatic    Eyes: conjunctivae/corneas clear. PERRL.   Oropharynx: MMM  Pulm:  Effort: normal   Auscultation: CTA B, no crackles/wheezes  Card:   Palpation:   Auscultation: RRR, S1/S2 nml   Ext. Edema:  B/l lower extremity edema   GI: Tenderness/Masses:  Distended          Laboratory:  CBC with Diff:   Recent Labs   Lab 03/18/22  0451 03/19/22  0609 03/20/22  0318   WBC 4.44 5.35 5.44   HGB 6.7* 6.6* 7.1*   HCT 21.7* 19.6* 20.4*   PLT 51* 59* 130*   LYMPH 14.6*  0.7* 17.6*  0.9* 15.1*  0.8*   MONO 14.4  0.6 13.8  0.7 13.4  0.7   EOSINOPHIL 13.5* 14.0* 10.5*     COAG:  Recent Labs   Lab 03/18/22  0451 03/19/22  0609 03/20/22  0318   INR 1.7* 1.7* 1.8*       CMP:   Recent Labs   Lab 03/18/22  0451 03/19/22  0609 03/20/22  0531   * 111* 123*   CALCIUM 8.7 8.2* 8.7   ALBUMIN 3.1* 3.0* 3.2*   PROT 5.6* 5.5* 5.6*   * 129* 135*   K 5.4* 5.4* 5.4*   CO2 15* 16* 15*    108 112*   BUN 45* 52* 58*   CREATININE 1.8* 2.2* 2.2*   ALKPHOS 83 53* 50*   ALT 37 34 31   AST 55* 50* 42*   BILITOT 2.6* 3.5* 5.0*     Estimated Creatinine Clearance: 46.6 mL/min (A) (based on SCr of 2.2 mg/dL (H)).  Corrected Calcium:      Cardiac markers: No results for input(s): CKMB, TROPONINT, MYOGLOBIN in the last 168 hours.  PT, PTT, INR    ABGNo results for input(s): PH, PO2, PCO2, HCO3, BE in the last 168 hours.    Urinalysis:  No results for input(s): COLORU, CLARITYU, SPECGRAV, PHUR,  PROTEINUA, GLUCOSEU, BILIRUBINCON, BLOODU, WBCU, RBCU, BACTERIA, MUCUS, NITRITE, LEUKOCYTESUR, UROBILINOGEN, HYALINECASTS in the last 24 hours.  CMP:   Recent Labs   Lab 03/20/22  0531   *   CALCIUM 8.7   ALBUMIN 3.2*   PROT 5.6*   *   K 5.4*   CO2 15*   *   BUN 58*   CREATININE 2.2*   ALKPHOS 50*   ALT 31   AST 42*   BILITOT 5.0*     LFTs:   Recent Labs   Lab 03/20/22  0531   ALT 31   AST 42*   ALKPHOS 50*   BILITOT 5.0*   PROT 5.6*   ALBUMIN 3.2*     Coagulation:   Recent Labs   Lab 03/20/22  0318   INR 1.8*           Hemoglobin A1C   Date Value Ref Range Status   03/17/2022 6.5 (H) 4.0 - 5.6 % Final     Comment:     ADA Screening Guidelines:  5.7-6.4%  Consistent with prediabetes  >or=6.5%  Consistent with diabetes    High levels of fetal hemoglobin interfere with the HbA1C  assay. Heterozygous hemoglobin variants (HbS, HgC, etc)do  not significantly interfere with this assay.   However, presence of multiple variants may affect accuracy.     02/17/2022 6.9 (H) 4.0 - 5.6 % Final     Comment:     ADA Screening Guidelines:  5.7-6.4%  Consistent with prediabetes  >or=6.5%  Consistent with diabetes    High levels of fetal hemoglobin interfere with the HbA1C  assay. Heterozygous hemoglobin variants (HbS, HgC, etc)do  not significantly interfere with this assay.   However, presence of multiple variants may affect accuracy.             ACCESS    ASSESSMENT/PLAN:   Principle Problem:  Decompensation of cirrhosis of liver  Active Hospital Problems    Diagnosis  POA    *Decompensation of cirrhosis of liver [K72.90, K74.60]  Yes     Chronic    Secondary esophageal varices without bleeding [I85.10]  Yes     Chronic     EGD (2/25/22) showed moderate portal hypertensive gastropathy, small hiatal hernia, grade 1 esophageal varices      Ascites due to alcoholic cirrhosis [K70.31]  Yes     Chronic    Organ transplant candidate [Z76.82]  Not Applicable     Chronic    Other specified anemias [D64.89]  Yes     Hyperkalemia [E87.5]  Yes    FAMILIA (acute kidney injury) [N17.9]  Yes    Controlled type 2 diabetes mellitus, without long-term current use of insulin [E11.9]  Yes      Resolved Hospital Problems   No resolved problems to display.       A/P:    1. FAMILIA (acute kidney injury)    -Baseline 1.4, currently Cr 2.1 -> 1.8 -> 2.2, worsened after paracentesis    -MAP rangE from 75 to 90, CHAMP 15     Drop in H and h with 1 unit pRBC 3/19  - Urine spun with lots of WBC and hyaline cast  - HRS still under the differentials, however has multiple other insults as drop in H and H   -Pt was hospitalized at [facility] last wk  For acute encephalopathy with serum creatinine peak to 2.3 and has received antibiotics per wife for SBP .  - Given urine with WBC, would consider AIN in the differentials     Plan:     - continue with HRS treatment with midodrine, octreotide   -Suggest re initiating lasix 40 mg po BID      Hyperkalemia  Currently stabilized at 5.4, continue to monitor   S/p one dose Kayexalate      Hypervolemic Hyponatremia     Serum improved to 135 this AM     Non gap metabolic Acidosis    likely  Secondary to renal failure    Recommend adding sodium bicarbonate 1300 mG po Bid     Padmini Camacho MD  Nephrology  [facility].

## 2022-03-20 NOTE — PROGRESS NOTES
[facility] - Telemetry [facility] Medicine  Progress Note    Patient Name: Pelon Vasquez  MRN: [id number]  Patient Class: IP- Inpatient   Admission Date: 3/17/2022  Length of Stay: 3 days  Attending Physician: Jesica Lo MD  Primary Care Provider: Primary Doctor No        Subjective:     Principal Problem:Decompensation of cirrhosis of liver        HPI:  56 y/o M with a PMH of cirrhosis c/b ascites who presents for liver transplant evaluation and renal failure.    Patient was seen in hepatology clinic this morning where he mentioned to staff that he was recently hospitalized for ascites as well as acute kidney failure and hyperkalemia. He was discharged yesterday and made his way to his hepatology appointment. Patient reports that he had not been feeling all to well and that his ascites reaccumulated hence his presentation to OSH. There he had a potassium of 7 and Cr of 2.3~. It was believed to be potassium supplementation + aldactone in the setting of worsening renal function that caused hyperkalemia. Furthermore, he saw his cardiologist due to questions if he needed LHC prior to being listed for transplant or not. Per their discussion, they would like his renal function to return to his baseline before C    Patient being admitted for hyperkalemia/FAMILIA/transplant w/u.        Overview/Hospital Course:  Tentative plan for LHC Monday. Renal function worsened post paracentesis despite albumin. Nephrology consulted recommending continuing HRS treatment. Cr remains at 2.2      Interval History: No acute events overnight. Patient this AM states that he had a good nights rest. He denies any issues at this time. Reports stable appetite. Denies any abdominal pain or confusion.     Review of Systems   Constitutional:  Positive for activity change. Negative for chills and fever.   HENT:  Negative for congestion and sore throat.    Eyes:  Negative for photophobia and visual disturbance.   Respiratory:  Negative for  cough and shortness of breath.    Cardiovascular:  Positive for leg swelling. Negative for chest pain and palpitations.   Gastrointestinal:  Positive for abdominal distention. Negative for abdominal pain, blood in stool, constipation, diarrhea, nausea and vomiting.   Endocrine: Negative for cold intolerance and heat intolerance.   Genitourinary:  Negative for dysuria and hematuria.   Musculoskeletal:  Negative for arthralgias and myalgias.   Skin:  Negative for rash and wound.   Allergic/Immunologic: Negative for environmental allergies and food allergies.   Neurological:  Negative for seizures and numbness.   Hematological:  Negative for adenopathy. Does not bruise/bleed easily.   Psychiatric/Behavioral:  Negative for hallucinations and suicidal ideas.    Objective:     Vital Signs (Most Recent):  Temp: 99.4 °F (37.4 °C) (03/20/22 0800)  Pulse: 78 (03/20/22 0800)  Resp: 15 (03/20/22 0800)  BP: (!) 119/57 (03/20/22 0800)  SpO2: 97 % (03/20/22 0800)   Vital Signs (24h Range):  Temp:  [97.4 °F (36.3 °C)-99.4 °F (37.4 °C)] 99.4 °F (37.4 °C)  Pulse:  [73-83] 78  Resp:  [15-20] 15  SpO2:  [95 %-100 %] 97 %  BP: (104-124)/(50-60) 119/57     Weight: 117.9 kg (260 lb)  Body mass index is 40.72 kg/m².    Intake/Output Summary (Last 24 hours) at 3/20/2022 0936  Last data filed at 3/20/2022 0543  Gross per 24 hour   Intake 1632.42 ml   Output 600 ml   Net 1032.42 ml        Physical Exam  Constitutional:       Appearance: He is well-developed. He is ill-appearing.   HENT:      Head: Normocephalic and atraumatic.   Eyes:      General: Scleral icterus present.      Conjunctiva/sclera: Conjunctivae normal.      Pupils: Pupils are equal, round, and reactive to light.   Neck:      Thyroid: No thyromegaly.      Vascular: No JVD.   Cardiovascular:      Rate and Rhythm: Normal rate and regular rhythm.      Heart sounds: Normal heart sounds. No murmur heard.    No friction rub. No gallop.   Pulmonary:      Effort: Pulmonary effort is  normal. No respiratory distress.      Breath sounds: Normal breath sounds. No wheezing or rales.   Abdominal:      General: Bowel sounds are normal. There is no distension.      Palpations: Abdomen is soft. There is no mass.      Tenderness: There is no abdominal tenderness. There is no guarding or rebound.      Hernia: No hernia is present.   Musculoskeletal:         General: Swelling present. No deformity. Normal range of motion.      Cervical back: Normal range of motion and neck supple.      Right lower leg: Edema present.      Left lower leg: Edema present.   Skin:     General: Skin is warm and dry.      Coloration: Skin is not jaundiced.   Neurological:      Mental Status: He is alert and oriented to person, place, and time.      Cranial Nerves: No cranial nerve deficit.   Psychiatric:         Behavior: Behavior normal.       Significant Labs: All pertinent labs within the past 24 hours have been reviewed.  CBC:   Recent Labs   Lab 03/19/22  0609 03/20/22  0318   WBC 5.35 5.44   HGB 6.6* 7.1*   HCT 19.6* 20.4*   PLT 59* 130*       CMP:   Recent Labs   Lab 03/19/22  0609 03/20/22  0531   * 135*   K 5.4* 5.4*    112*   CO2 16* 15*   * 123*   BUN 52* 58*   CREATININE 2.2* 2.2*   CALCIUM 8.2* 8.7   PROT 5.5* 5.6*   ALBUMIN 3.0* 3.2*   BILITOT 3.5* 5.0*   ALKPHOS 53* 50*   AST 50* 42*   ALT 34 31   ANIONGAP 5* 8   EGFRNONAA 32.5* 32.5*         Significant Imaging: I have reviewed all pertinent imaging results/findings within the past 24 hours.      Assessment/Plan:      * Decompensation of cirrhosis of liver  MELD-Na score: 28 at 3/20/2022  5:31 AM  MELD score: 27 at 3/20/2022  5:31 AM  Calculated from:  Serum Creatinine: 2.2 mg/dL at 3/20/2022  5:31 AM  Serum Sodium: 135 mmol/L at 3/20/2022  5:31 AM  Total Bilirubin: 5.0 mg/dL at 3/20/2022  5:31 AM  INR(ratio): 1.8 at 3/20/2022  3:18 AM  Age: 57 years  Secondary to LOZANO vs alcohol. Patient presents with volume overload and FAMILIA despite taking  diuretics as prescribed. Patient's abdomen remains soft but his legs are extremely edematous. Patient seen in hepatology clinic and referred to inpatient for transplant eval. Patient's wife reports that at OSH they had given him albumin due to his FAMILIA and his Cr downtreded from 2.4 to 2.1  - Hepatology following  - Responded to albumin challenge. Continue HRS treatment with albumin, midodrine, and octreotide  - Continue lactulose and rifaximin for HE  - Interventional cardiology consulted for LHC, tentatively planned for Monday 3/21 assuming renal function continues to improve/remains stable.  - Received paracentesis on 3/18 but fluid was not sent for analysis. Will empirically treat for SBP    FAMILIA (acute kidney injury)  Either from intravascular depletion vs HRS. Patient was taking lasix/aldactone prior to admission at OSH where he had FAMILIA + hyperK. Aldactone and Kcl supp were discontinued. Patient is volume overloaded but renal function still not at baseline  - Continue HRS treatment as above  - Lokelma for hyperkalemia  - Avoid nephrotoxic medications      Other specified anemias  Baseline hemoglobin around 7-8. S/p 1u PRBC on 3/19. T&S ordered for tomorrow AM  - Hgb 7.1  - Transfuse for Hgb <7, platelets <50k in anticipation for C tomorrow      Ascites due to alcoholic cirrhosis  Went for paracentesis with IR 3/18 with 3.6L removed      Hyperkalemia  See FAMILIA      Controlled type 2 diabetes mellitus, without long-term current use of insulin  Not on any insulin currently.   - A1c 6.5, down from 6.9  - LDSSI + POC AC/HS        VTE Risk Mitigation (From admission, onward)         Ordered     heparin (porcine) injection 5,000 Units  Every 8 hours         03/17/22 1656     IP VTE HIGH RISK PATIENT  Once         03/17/22 1656     Place sequential compression device  Until discontinued         03/17/22 1656                Discharge Planning   EDITH: 3/23/2022     Code Status: Full Code   Is the patient medically ready  for discharge?: No    Reason for patient still in hospital (select all that apply): Patient trending condition  Discharge Plan A: Home with family                  Jesica Lo MD  Department of Hospital Medicine   [facility] - Telemetry Stepdown

## 2022-03-20 NOTE — SUBJECTIVE & OBJECTIVE
Interval History: No acute events overnight. Patient this AM states that he had a good nights rest. He denies any issues at this time. Reports stable appetite. Denies any abdominal pain or confusion.     Review of Systems   Constitutional:  Positive for activity change. Negative for chills and fever.   HENT:  Negative for congestion and sore throat.    Eyes:  Negative for photophobia and visual disturbance.   Respiratory:  Negative for cough and shortness of breath.    Cardiovascular:  Positive for leg swelling. Negative for chest pain and palpitations.   Gastrointestinal:  Positive for abdominal distention. Negative for abdominal pain, blood in stool, constipation, diarrhea, nausea and vomiting.   Endocrine: Negative for cold intolerance and heat intolerance.   Genitourinary:  Negative for dysuria and hematuria.   Musculoskeletal:  Negative for arthralgias and myalgias.   Skin:  Negative for rash and wound.   Allergic/Immunologic: Negative for environmental allergies and food allergies.   Neurological:  Negative for seizures and numbness.   Hematological:  Negative for adenopathy. Does not bruise/bleed easily.   Psychiatric/Behavioral:  Negative for hallucinations and suicidal ideas.    Objective:     Vital Signs (Most Recent):  Temp: 99.4 °F (37.4 °C) (03/20/22 0800)  Pulse: 78 (03/20/22 0800)  Resp: 15 (03/20/22 0800)  BP: (!) 119/57 (03/20/22 0800)  SpO2: 97 % (03/20/22 0800)   Vital Signs (24h Range):  Temp:  [97.4 °F (36.3 °C)-99.4 °F (37.4 °C)] 99.4 °F (37.4 °C)  Pulse:  [73-83] 78  Resp:  [15-20] 15  SpO2:  [95 %-100 %] 97 %  BP: (104-124)/(50-60) 119/57     Weight: 117.9 kg (260 lb)  Body mass index is 40.72 kg/m².    Intake/Output Summary (Last 24 hours) at 3/20/2022 0936  Last data filed at 3/20/2022 0543  Gross per 24 hour   Intake 1632.42 ml   Output 600 ml   Net 1032.42 ml        Physical Exam  Constitutional:       Appearance: He is well-developed. He is ill-appearing.   HENT:      Head: Normocephalic  and atraumatic.   Eyes:      General: Scleral icterus present.      Conjunctiva/sclera: Conjunctivae normal.      Pupils: Pupils are equal, round, and reactive to light.   Neck:      Thyroid: No thyromegaly.      Vascular: No JVD.   Cardiovascular:      Rate and Rhythm: Normal rate and regular rhythm.      Heart sounds: Normal heart sounds. No murmur heard.    No friction rub. No gallop.   Pulmonary:      Effort: Pulmonary effort is normal. No respiratory distress.      Breath sounds: Normal breath sounds. No wheezing or rales.   Abdominal:      General: Bowel sounds are normal. There is no distension.      Palpations: Abdomen is soft. There is no mass.      Tenderness: There is no abdominal tenderness. There is no guarding or rebound.      Hernia: No hernia is present.   Musculoskeletal:         General: Swelling present. No deformity. Normal range of motion.      Cervical back: Normal range of motion and neck supple.      Right lower leg: Edema present.      Left lower leg: Edema present.   Skin:     General: Skin is warm and dry.      Coloration: Skin is not jaundiced.   Neurological:      Mental Status: He is alert and oriented to person, place, and time.      Cranial Nerves: No cranial nerve deficit.   Psychiatric:         Behavior: Behavior normal.       Significant Labs: All pertinent labs within the past 24 hours have been reviewed.  CBC:   Recent Labs   Lab 03/19/22  0609 03/20/22  0318   WBC 5.35 5.44   HGB 6.6* 7.1*   HCT 19.6* 20.4*   PLT 59* 130*       CMP:   Recent Labs   Lab 03/19/22  0609 03/20/22  0531   * 135*   K 5.4* 5.4*    112*   CO2 16* 15*   * 123*   BUN 52* 58*   CREATININE 2.2* 2.2*   CALCIUM 8.2* 8.7   PROT 5.5* 5.6*   ALBUMIN 3.0* 3.2*   BILITOT 3.5* 5.0*   ALKPHOS 53* 50*   AST 50* 42*   ALT 34 31   ANIONGAP 5* 8   EGFRNONAA 32.5* 32.5*         Significant Imaging: I have reviewed all pertinent imaging results/findings within the past 24 hours.

## 2022-03-20 NOTE — ASSESSMENT & PLAN NOTE
Baseline hemoglobin around 7-8. S/p 1u PRBC on 3/19. T&S ordered for tomorrow AM  - Hgb 7.1  - Transfuse for Hgb <7, platelets <50k in anticipation for LHC tomorrow

## 2022-03-21 ENCOUNTER — SOCIAL WORK (OUTPATIENT)
Dept: TRANSPLANT | Facility: HOSPITAL | Age: 57
End: 2022-03-21
Payer: COMMERCIAL

## 2022-03-21 ENCOUNTER — TELEPHONE (OUTPATIENT)
Dept: TRANSPLANT | Facility: HOSPITAL | Age: 57
End: 2022-03-21
Payer: COMMERCIAL

## 2022-03-21 LAB
ABO + RH BLD: NORMAL
ALBUMIN SERPL BCP-MCNC: 3.9 G/DL (ref 3.5–5.2)
ALP SERPL-CCNC: 45 U/L (ref 55–135)
ALT SERPL W/O P-5'-P-CCNC: 26 U/L (ref 10–44)
ANION GAP SERPL CALC-SCNC: 10 MMOL/L (ref 8–16)
ANISOCYTOSIS BLD QL SMEAR: SLIGHT
AST SERPL-CCNC: 36 U/L (ref 10–40)
BACTERIA #/AREA URNS AUTO: ABNORMAL /HPF
BASOPHILS # BLD AUTO: 0.06 K/UL (ref 0–0.2)
BASOPHILS NFR BLD: 1 % (ref 0–1.9)
BILIRUB SERPL-MCNC: 4 MG/DL (ref 0.1–1)
BILIRUB UR QL STRIP: NEGATIVE
BLD GP AB SCN CELLS X3 SERPL QL: NORMAL
BUN SERPL-MCNC: 53 MG/DL (ref 6–20)
CALCIUM SERPL-MCNC: 8.7 MG/DL (ref 8.7–10.5)
CHLORIDE SERPL-SCNC: 111 MMOL/L (ref 95–110)
CLARITY UR REFRACT.AUTO: CLEAR
CO2 SERPL-SCNC: 15 MMOL/L (ref 23–29)
COLOR UR AUTO: YELLOW
CREAT SERPL-MCNC: 2.2 MG/DL (ref 0.5–1.4)
DIFFERENTIAL METHOD: ABNORMAL
EOSINOPHIL # BLD AUTO: 0.6 K/UL (ref 0–0.5)
EOSINOPHIL NFR BLD: 9.8 % (ref 0–8)
ERYTHROCYTE [DISTWIDTH] IN BLOOD BY AUTOMATED COUNT: 17.7 % (ref 11.5–14.5)
EST. GFR  (AFRICAN AMERICAN): 37.6 ML/MIN/1.73 M^2
EST. GFR  (NON AFRICAN AMERICAN): 32.5 ML/MIN/1.73 M^2
GLUCOSE SERPL-MCNC: 99 MG/DL (ref 70–110)
GLUCOSE UR QL STRIP: NEGATIVE
HCT VFR BLD AUTO: 21.5 % (ref 40–54)
HGB BLD-MCNC: 7.3 G/DL (ref 14–18)
HGB UR QL STRIP: ABNORMAL
HYALINE CASTS UR QL AUTO: 15 /LPF
IMM GRANULOCYTES # BLD AUTO: 0.02 K/UL (ref 0–0.04)
IMM GRANULOCYTES NFR BLD AUTO: 0.3 % (ref 0–0.5)
INR PPP: 1.9 (ref 0.8–1.2)
KETONES UR QL STRIP: NEGATIVE
LEUKOCYTE ESTERASE UR QL STRIP: ABNORMAL
LYMPHOCYTES # BLD AUTO: 0.8 K/UL (ref 1–4.8)
LYMPHOCYTES NFR BLD: 13.2 % (ref 18–48)
MCH RBC QN AUTO: 31.7 PG (ref 27–31)
MCHC RBC AUTO-ENTMCNC: 34 G/DL (ref 32–36)
MCV RBC AUTO: 94 FL (ref 82–98)
MICROSCOPIC COMMENT: ABNORMAL
MONOCYTES # BLD AUTO: 0.8 K/UL (ref 0.3–1)
MONOCYTES NFR BLD: 12.5 % (ref 4–15)
NEUTROPHILS # BLD AUTO: 3.9 K/UL (ref 1.8–7.7)
NEUTROPHILS NFR BLD: 63.2 % (ref 38–73)
NITRITE UR QL STRIP: NEGATIVE
NRBC BLD-RTO: 0 /100 WBC
PH UR STRIP: 5 [PH] (ref 5–8)
PLATELET # BLD AUTO: 55 K/UL (ref 150–450)
PLATELET BLD QL SMEAR: ABNORMAL
PMV BLD AUTO: 9.8 FL (ref 9.2–12.9)
POCT GLUCOSE: 151 MG/DL (ref 70–110)
POCT GLUCOSE: 242 MG/DL (ref 70–110)
POCT GLUCOSE: 92 MG/DL (ref 70–110)
POIKILOCYTOSIS BLD QL SMEAR: SLIGHT
POTASSIUM SERPL-SCNC: 4.8 MMOL/L (ref 3.5–5.1)
PROT SERPL-MCNC: 6.1 G/DL (ref 6–8.4)
PROT UR QL STRIP: NEGATIVE
PROTHROMBIN TIME: 18.8 SEC (ref 9–12.5)
RBC # BLD AUTO: 2.3 M/UL (ref 4.6–6.2)
RBC #/AREA URNS AUTO: 3 /HPF (ref 0–4)
SODIUM SERPL-SCNC: 136 MMOL/L (ref 136–145)
SP GR UR STRIP: 1.01 (ref 1–1.03)
SQUAMOUS #/AREA URNS AUTO: 1 /HPF
URN SPEC COLLECT METH UR: ABNORMAL
WBC # BLD AUTO: 6.22 K/UL (ref 3.9–12.7)
WBC #/AREA URNS AUTO: 12 /HPF (ref 0–5)

## 2022-03-21 PROCEDURE — 20600001 HC STEP DOWN PRIVATE ROOM: Mod: NTX

## 2022-03-21 PROCEDURE — 81001 URINALYSIS AUTO W/SCOPE: CPT | Mod: NTX | Performed by: STUDENT IN AN ORGANIZED HEALTH CARE EDUCATION/TRAINING PROGRAM

## 2022-03-21 PROCEDURE — 63600175 PHARM REV CODE 636 W HCPCS: Mod: JG,NTX | Performed by: STUDENT IN AN ORGANIZED HEALTH CARE EDUCATION/TRAINING PROGRAM

## 2022-03-21 PROCEDURE — 85610 PROTHROMBIN TIME: CPT | Mod: NTX | Performed by: STUDENT IN AN ORGANIZED HEALTH CARE EDUCATION/TRAINING PROGRAM

## 2022-03-21 PROCEDURE — 99232 SBSQ HOSP IP/OBS MODERATE 35: CPT | Mod: NTX,,, | Performed by: STUDENT IN AN ORGANIZED HEALTH CARE EDUCATION/TRAINING PROGRAM

## 2022-03-21 PROCEDURE — 86920 COMPATIBILITY TEST SPIN: CPT | Mod: NTX | Performed by: STUDENT IN AN ORGANIZED HEALTH CARE EDUCATION/TRAINING PROGRAM

## 2022-03-21 PROCEDURE — 87086 URINE CULTURE/COLONY COUNT: CPT | Mod: NTX | Performed by: STUDENT IN AN ORGANIZED HEALTH CARE EDUCATION/TRAINING PROGRAM

## 2022-03-21 PROCEDURE — 99232 PR SUBSEQUENT HOSPITAL CARE,LEVL II: ICD-10-PCS | Mod: NTX,,, | Performed by: STUDENT IN AN ORGANIZED HEALTH CARE EDUCATION/TRAINING PROGRAM

## 2022-03-21 PROCEDURE — 99233 PR SUBSEQUENT HOSPITAL CARE,LEVL III: ICD-10-PCS | Mod: NTX,,, | Performed by: INTERNAL MEDICINE

## 2022-03-21 PROCEDURE — 85025 COMPLETE CBC W/AUTO DIFF WBC: CPT | Mod: NTX | Performed by: STUDENT IN AN ORGANIZED HEALTH CARE EDUCATION/TRAINING PROGRAM

## 2022-03-21 PROCEDURE — 25000003 PHARM REV CODE 250: Mod: NTX | Performed by: HOSPITALIST

## 2022-03-21 PROCEDURE — 80053 COMPREHEN METABOLIC PANEL: CPT | Mod: NTX | Performed by: STUDENT IN AN ORGANIZED HEALTH CARE EDUCATION/TRAINING PROGRAM

## 2022-03-21 PROCEDURE — 25000003 PHARM REV CODE 250: Mod: NTX | Performed by: STUDENT IN AN ORGANIZED HEALTH CARE EDUCATION/TRAINING PROGRAM

## 2022-03-21 PROCEDURE — 99233 SBSQ HOSP IP/OBS HIGH 50: CPT | Mod: NTX,,, | Performed by: INTERNAL MEDICINE

## 2022-03-21 PROCEDURE — 36415 COLL VENOUS BLD VENIPUNCTURE: CPT | Mod: NTX | Performed by: STUDENT IN AN ORGANIZED HEALTH CARE EDUCATION/TRAINING PROGRAM

## 2022-03-21 PROCEDURE — 86901 BLOOD TYPING SEROLOGIC RH(D): CPT | Mod: NTX | Performed by: STUDENT IN AN ORGANIZED HEALTH CARE EDUCATION/TRAINING PROGRAM

## 2022-03-21 PROCEDURE — P9047 ALBUMIN (HUMAN), 25%, 50ML: HCPCS | Mod: JG,NTX | Performed by: STUDENT IN AN ORGANIZED HEALTH CARE EDUCATION/TRAINING PROGRAM

## 2022-03-21 RX ORDER — HYDROCODONE BITARTRATE AND ACETAMINOPHEN 500; 5 MG/1; MG/1
TABLET ORAL
Status: CANCELLED | OUTPATIENT
Start: 2022-03-21

## 2022-03-21 RX ORDER — FUROSEMIDE 40 MG/1
40 TABLET ORAL 2 TIMES DAILY
Status: DISCONTINUED | OUTPATIENT
Start: 2022-03-21 | End: 2022-03-24 | Stop reason: HOSPADM

## 2022-03-21 RX ADMIN — OCTREOTIDE ACETATE 100 MCG: 100 INJECTION, SOLUTION INTRAVENOUS; SUBCUTANEOUS at 05:03

## 2022-03-21 RX ADMIN — FUROSEMIDE 40 MG: 40 TABLET ORAL at 06:03

## 2022-03-21 RX ADMIN — SODIUM BICARBONATE 650 MG TABLET 1300 MG: at 09:03

## 2022-03-21 RX ADMIN — HEPARIN SODIUM 5000 UNITS: 5000 INJECTION INTRAVENOUS; SUBCUTANEOUS at 09:03

## 2022-03-21 RX ADMIN — SENNOSIDES AND DOCUSATE SODIUM 1 TABLET: 50; 8.6 TABLET ORAL at 09:03

## 2022-03-21 RX ADMIN — INSULIN ASPART 2 UNITS: 100 INJECTION, SOLUTION INTRAVENOUS; SUBCUTANEOUS at 02:03

## 2022-03-21 RX ADMIN — MIDODRINE HYDROCHLORIDE 15 MG: 5 TABLET ORAL at 03:03

## 2022-03-21 RX ADMIN — PANTOPRAZOLE SODIUM 40 MG: 40 TABLET, DELAYED RELEASE ORAL at 09:03

## 2022-03-21 RX ADMIN — HEPARIN SODIUM 5000 UNITS: 5000 INJECTION INTRAVENOUS; SUBCUTANEOUS at 03:03

## 2022-03-21 RX ADMIN — INSULIN ASPART 0 UNITS: 100 INJECTION, SOLUTION INTRAVENOUS; SUBCUTANEOUS at 09:03

## 2022-03-21 RX ADMIN — FUROSEMIDE 40 MG: 40 TABLET ORAL at 09:03

## 2022-03-21 RX ADMIN — MIDODRINE HYDROCHLORIDE 15 MG: 5 TABLET ORAL at 05:03

## 2022-03-21 RX ADMIN — RIFAXIMIN 550 MG: 550 TABLET ORAL at 09:03

## 2022-03-21 RX ADMIN — LACTULOSE 20 G: 20 SOLUTION ORAL at 04:03

## 2022-03-21 RX ADMIN — ALBUMIN (HUMAN) 50 G: 12.5 SOLUTION INTRAVENOUS at 05:03

## 2022-03-21 RX ADMIN — ZINC SULFATE 220 MG (50 MG) CAPSULE 220 MG: CAPSULE at 09:03

## 2022-03-21 RX ADMIN — OCTREOTIDE ACETATE 100 MCG: 100 INJECTION, SOLUTION INTRAVENOUS; SUBCUTANEOUS at 03:03

## 2022-03-21 RX ADMIN — OCTREOTIDE ACETATE 100 MCG: 100 INJECTION, SOLUTION INTRAVENOUS; SUBCUTANEOUS at 09:03

## 2022-03-21 RX ADMIN — LACTULOSE 20 G: 20 SOLUTION ORAL at 09:03

## 2022-03-21 RX ADMIN — MIDODRINE HYDROCHLORIDE 15 MG: 5 TABLET ORAL at 09:03

## 2022-03-21 RX ADMIN — CEFTRIAXONE 1 G: 1 INJECTION, SOLUTION INTRAVENOUS at 12:03

## 2022-03-21 NOTE — ASSESSMENT & PLAN NOTE
Baseline hemoglobin around 7-8. S/p 1u PRBC on 3/19. T&S ordered for tomorrow AM  - Hgb 7.3  - Transfuse for Hgb <7, platelets <50k in anticipation for LHC tomorrow

## 2022-03-21 NOTE — PROGRESS NOTES
Interventional Cardiology Progress Note    Pelon Vasquez with following problem list:     · HTN  · DM  · BRAYDON  · Etoh/LOZANO cirrhosis with history of encephalopathy, thrombocytopenia and ascites requiring large volume paracentesis- last MELD-Na 26      He presents for evaluation of LHC at request of anesthesia for pre op eval for liver transplant. He saw liver transplant today and is pending repeat labwork due to fluctuating renal function and multiple admits for encephalopathy and hyperkaelmia.   He was discharged from hospital last night and his last K was 6 with a creatinine of 2.23 and overall has been admitted 3X in past 1 mos. Yesterday was seen in IC clinic where he stated he has 5 pillow orthopnea due to ascites and has been requiring paracentesis more frequently (now once a week, last yesterday, average 3-4 liters removed). ROS - melena or hemathochezia or angina. Due to worsening FAMILIA on labwork and hyperkalemia was admitted to hospital medicine. IC consulted for [facility] for pre op eval of liver transplant. Hospital course notable for ongoing FAMILIA 2/2 HRS.  Subjective:   Overnight events: none, over the weekend got 1 u prbc, minimal UOP past 24 hours. Underwent paracententisis on 3/18 with 3600 mL removed.  Pending labwork this AM. Last Cr 2.2.      Medications:   Continuous Infusions:    Scheduled Meds:   albumin human 25%  50 g Intravenous Daily    cefTRIAXone (ROCEPHIN) IVPB  1 g Intravenous Q24H    heparin (porcine)  5,000 Units Subcutaneous Q8H    lactulose  20 g Oral TID    midodrine  15 mg Oral Q8H    octreotide  100 mcg Subcutaneous Q8H    pantoprazole  40 mg Oral Daily    rifAXIMin  550 mg Oral BID    senna-docusate 8.6-50 mg  1 tablet Oral BID    sodium bicarbonate  1,300 mg Oral BID    zinc sulfate  220 mg Oral Daily     PRN Meds:sodium chloride, acetaminophen, albuterol-ipratropium, aluminum-magnesium hydroxide-simethicone, dextrose 10%, dextrose 10%, glucagon (human recombinant),  glucose, glucose, insulin aspart U-100, melatonin, naloxone, ondansetron, prochlorperazine, simethicone, sodium chloride 0.9%  Objective:     Vitals:  Temp:  [97.6 °F (36.4 °C)-99.4 °F (37.4 °C)]   Pulse:  [78-89]   Resp:  [15-19]   BP: (110-130)/(56-65)   SpO2:  [95 %-100 %]  on RA I/O's:    Intake/Output Summary (Last 24 hours) at 3/21/2022 0652  Last data filed at 3/21/2022 0000  Gross per 24 hour   Intake --   Output 1000 ml   Net -1000 ml       Constitutional:       Appearance: He is well-developed. He is ill-appearing.   HENT:      Head: Normocephalic and atraumatic.   Eyes:      General: Scleral icterus present.      Conjunctiva/sclera: Conjunctivae normal.      Pupils: Pupils are equal, round, and reactive to light.   Neck:      Thyroid: No thyromegaly.      Vascular: No JVD.   Cardiovascular:      Rate and Rhythm: Normal rate and regular rhythm.      Heart sounds: Normal heart sounds. No murmur heard.    No friction rub. No gallop.   Pulmonary:      Effort: Pulmonary effort is normal. No respiratory distress.      Breath sounds: Normal breath sounds. No wheezing or rales.   Abdominal:      General: Bowel sounds are normal. There is no distension.      Palpations: Abdomen is soft. There is no mass.      Tenderness: There is no abdominal tenderness. There is no guarding or rebound.      Hernia: No hernia is present.   Musculoskeletal:         General: Swelling present. No deformity. Normal range of motion.      Cervical back: Normal range of motion and neck supple.      Right lower leg: Edema present.      Left lower leg: Edema present        Labs:     Lab Results   Component Value Date    HGBA1C 6.5 (H) 03/17/2022     No results found for: BNP, BNPTRIAGEBLO  No results found for: CHOL, HDL, LDLCALC, TRIG      Micro:   Blood Cultures  No results found for: LABBLOO  Urine Cultures  No results found for: LABURIN  Sputum Cultures    Imaging:     EF   Date Value Ref Range Status   02/17/2022 65 % Final       TTE:  {Echo:   EF   Date Value Ref Range Status   02/17/2022 65 % Final           Assessment/Plan   Coronary eval for liver transplant  - hold off coronary angiogram given cirrhosis and worsening renal function and anemia  - if renal function stabilizes ( Cr < 2) with out concern of bleeding, ongoing transfusion needs (hg > 7 Plt > 50K) or infection , please reconsult IC       Signed:  Jo Hardy M.D.  Cardiology Fellow  [facility]

## 2022-03-21 NOTE — SUBJECTIVE & OBJECTIVE
Interval History: No acute events overnight. Patient this AM disappointed at not getting the [facility] today, but he understands why it was delayed given his renal function. Patient mentioned that he would be okay with the risk of further worsening kidney disease from contrast if it arose because he wanted to complete his eval.     Review of Systems   Constitutional:  Positive for activity change. Negative for chills and fever.   HENT:  Negative for congestion and sore throat.    Eyes:  Negative for photophobia and visual disturbance.   Respiratory:  Negative for cough and shortness of breath.    Cardiovascular:  Positive for leg swelling. Negative for chest pain and palpitations.   Gastrointestinal:  Positive for abdominal distention. Negative for abdominal pain, blood in stool, constipation, diarrhea, nausea and vomiting.   Endocrine: Negative for cold intolerance and heat intolerance.   Genitourinary:  Negative for dysuria and hematuria.   Musculoskeletal:  Negative for arthralgias and myalgias.   Skin:  Negative for rash and wound.   Allergic/Immunologic: Negative for environmental allergies and food allergies.   Neurological:  Negative for seizures and numbness.   Hematological:  Negative for adenopathy. Does not bruise/bleed easily.   Psychiatric/Behavioral:  Negative for hallucinations and suicidal ideas.    Objective:     Vital Signs (Most Recent):  Temp: 96 °F (35.6 °C) (03/21/22 0746)  Pulse: 76 (03/21/22 0746)  Resp: 18 (03/21/22 0746)  BP: 126/60 (03/21/22 0746)  SpO2: 96 % (03/21/22 0746)   Vital Signs (24h Range):  Temp:  [96 °F (35.6 °C)-98.6 °F (37 °C)] 96 °F (35.6 °C)  Pulse:  [76-89] 76  Resp:  [15-19] 18  SpO2:  [95 %-100 %] 96 %  BP: (110-130)/(56-65) 126/60     Weight: 117.9 kg (260 lb)  Body mass index is 40.72 kg/m².    Intake/Output Summary (Last 24 hours) at 3/21/2022 1034  Last data filed at 3/21/2022 0000  Gross per 24 hour   Intake --   Output 1000 ml   Net -1000 ml        Physical  Exam  Constitutional:       Appearance: He is well-developed. He is ill-appearing.   HENT:      Head: Normocephalic and atraumatic.   Eyes:      General: Scleral icterus present.      Conjunctiva/sclera: Conjunctivae normal.      Pupils: Pupils are equal, round, and reactive to light.   Neck:      Thyroid: No thyromegaly.      Vascular: No JVD.   Cardiovascular:      Rate and Rhythm: Normal rate and regular rhythm.      Heart sounds: Normal heart sounds. No murmur heard.    No friction rub. No gallop.   Pulmonary:      Effort: Pulmonary effort is normal. No respiratory distress.      Breath sounds: Normal breath sounds. No wheezing or rales.   Abdominal:      General: Bowel sounds are normal. There is no distension.      Palpations: Abdomen is soft. There is no mass.      Tenderness: There is no abdominal tenderness. There is no guarding or rebound.      Hernia: No hernia is present.   Musculoskeletal:         General: Swelling present. No deformity. Normal range of motion.      Cervical back: Normal range of motion and neck supple.      Right lower leg: Edema present.      Left lower leg: Edema present.   Skin:     General: Skin is warm and dry.      Coloration: Skin is not jaundiced.   Neurological:      Mental Status: He is alert and oriented to person, place, and time.      Cranial Nerves: No cranial nerve deficit.   Psychiatric:         Behavior: Behavior normal.       Significant Labs: All pertinent labs within the past 24 hours have been reviewed.  CBC:   Recent Labs   Lab 03/20/22  0318 03/21/22  0535   WBC 5.44 6.22   HGB 7.1* 7.3*   HCT 20.4* 21.5*   *  --        CMP:   Recent Labs   Lab 03/20/22  0531 03/21/22  0757   * 136   K 5.4* 4.8   * 111*   CO2 15* 15*   * 99   BUN 58* 53*   CREATININE 2.2* 2.2*   CALCIUM 8.7 8.7   PROT 5.6* 6.1   ALBUMIN 3.2* 3.9   BILITOT 5.0* 4.0*   ALKPHOS 50* 45*   AST 42* 36   ALT 31 26   ANIONGAP 8 10   EGFRNONAA 32.5* 32.5*         Significant  Imaging: I have reviewed all pertinent imaging results/findings within the past 24 hours.

## 2022-03-21 NOTE — PROGRESS NOTES
PANCHO left another voicemail for pt's back-up caregiver, Romario, c: [phone], he shares this phone with Estrella. SWs have been unable to reach Romario after multiple attempts to confirm that he can be pt's back-up caregiver.    PANCHO then called pt, c: [phone], and notified him of the above. Pt stated he will look into someone else to be his back-up caregiver and call SW back. SW waiting to hear back from pt so that SW can contact back-up caregiver and confirm availability/willingness.    SW remains available.

## 2022-03-21 NOTE — ASSESSMENT & PLAN NOTE
Baseline 1.4, currently Cr 2.1 -> 1.8 -> 2.2, worsened after paracentesis, suspected volume depletion superimposed to HRS.     Plan:  - continue with HRS treatment for now with midodrine, octreotide, and albumin, although urine studies are not largely  suggestive of HRS etiology, accompanied with lack of response to current HRS treatment.  - Will obtain urine sample and perform urine microscopy to explore other etiologies. AIN remains a differential.  - continue oral lasix 40mg BID  - f/u I/O  - daily CMP, Mg, Phos  - renally dosing medications  - avoid nephrotoxic medications, NSAID, ACEi/ARB, IV contrast

## 2022-03-21 NOTE — TELEPHONE ENCOUNTER
CAREGIVER UPDATE FOR TRANSPLANT:    SW received return call from pt listing Farrah Lyons as his back-up, C: [phone], wk: [phone].     SW contacted Farrah and provided education on backup caregiver role. She stated she is able to commit to being pt's back-up caregiver. She works in  at a alf home and reports ability to take time off without concern. She stated she has known pt for over 30yrs and that they were together for 25yrs and remain good friends.    Pt has an adequate back-up caregiver plan for transplant and is suitable at this time.    SW remains available.

## 2022-03-21 NOTE — ASSESSMENT & PLAN NOTE
MELD-Na score: 26 at 3/21/2022  7:57 AM  MELD score: 26 at 3/21/2022  7:57 AM  Calculated from:  Serum Creatinine: 2.2 mg/dL at 3/21/2022  7:57 AM  Serum Sodium: 136 mmol/L at 3/21/2022  7:57 AM  Total Bilirubin: 4.0 mg/dL at 3/21/2022  7:57 AM  INR(ratio): 1.9 at 3/21/2022  5:35 AM  Age: 57 years  Secondary to LOZANO vs alcohol. Patient presents with volume overload and FAMILIA despite taking diuretics as prescribed. Patient's abdomen remains soft but his legs are extremely edematous. Patient seen in hepatology clinic and referred to inpatient for transplant eval. Patient's wife reports that at OSH they had given him albumin due to his FAMILIA and his Cr downtreded from 2.4 to 2.1  - Hepatology following  - Responded to albumin challenge initially; however with further HRS treatment, patients Cr has plateaued. Currently continuing HRS treatment with albumin, midodrine, and octreotide  - Continue lactulose and rifaximin for HE  - Interventional cardiology consulted for [facility], currently held off secondary to patients Cr being >2.  - Received paracentesis on 3/18 but fluid was not sent for analysis. Will empirically treat for SBP

## 2022-03-21 NOTE — NURSING
Morning bedside handoff report received.  Patient is awake and pleasant on rounds.  Wife is at the bedside.  No sign of distress noted at this time.

## 2022-03-21 NOTE — PROGRESS NOTES
[facility] - Telemetry [facility] Medicine  Progress Note    Patient Name: Pelon Vasquez  MRN: [id number]  Patient Class: IP- Inpatient   Admission Date: 3/17/2022  Length of Stay: 4 days  Attending Physician: Jesica Lo MD  Primary Care Provider: Primary Doctor No        Subjective:     Principal Problem:Decompensation of cirrhosis of liver        HPI:  56 y/o M with a PMH of cirrhosis c/b ascites who presents for liver transplant evaluation and renal failure.    Patient was seen in hepatology clinic this morning where he mentioned to staff that he was recently hospitalized for ascites as well as acute kidney failure and hyperkalemia. He was discharged yesterday and made his way to his hepatology appointment. Patient reports that he had not been feeling all to well and that his ascites reaccumulated hence his presentation to OSH. There he had a potassium of 7 and Cr of 2.3~. It was believed to be potassium supplementation + aldactone in the setting of worsening renal function that caused hyperkalemia. Furthermore, he saw his cardiologist due to questions if he needed LHC prior to being listed for transplant or not. Per their discussion, they would like his renal function to return to his baseline before [facility]    Patient being admitted for hyperkalemia/FAMILIA/transplant w/u.        Overview/Hospital Course:  Renal function worsened post paracentesis despite albumin. Nephrology consulted recommending continuing HRS treatment. Urine was spun showed some WBCs indicating possible AIN Cr remains at 2.2      Interval History: No acute events overnight. Patient this AM disappointed at not getting the LHC today, but he understands why it was delayed given his renal function. Patient mentioned that he would be okay with the risk of further worsening kidney disease from contrast if it arose because he wanted to complete his eval.     Review of Systems   Constitutional:  Positive for activity change. Negative for chills  and fever.   HENT:  Negative for congestion and sore throat.    Eyes:  Negative for photophobia and visual disturbance.   Respiratory:  Negative for cough and shortness of breath.    Cardiovascular:  Positive for leg swelling. Negative for chest pain and palpitations.   Gastrointestinal:  Positive for abdominal distention. Negative for abdominal pain, blood in stool, constipation, diarrhea, nausea and vomiting.   Endocrine: Negative for cold intolerance and heat intolerance.   Genitourinary:  Negative for dysuria and hematuria.   Musculoskeletal:  Negative for arthralgias and myalgias.   Skin:  Negative for rash and wound.   Allergic/Immunologic: Negative for environmental allergies and food allergies.   Neurological:  Negative for seizures and numbness.   Hematological:  Negative for adenopathy. Does not bruise/bleed easily.   Psychiatric/Behavioral:  Negative for hallucinations and suicidal ideas.    Objective:     Vital Signs (Most Recent):  Temp: 96 °F (35.6 °C) (03/21/22 0746)  Pulse: 76 (03/21/22 0746)  Resp: 18 (03/21/22 0746)  BP: 126/60 (03/21/22 0746)  SpO2: 96 % (03/21/22 0746)   Vital Signs (24h Range):  Temp:  [96 °F (35.6 °C)-98.6 °F (37 °C)] 96 °F (35.6 °C)  Pulse:  [76-89] 76  Resp:  [15-19] 18  SpO2:  [95 %-100 %] 96 %  BP: (110-130)/(56-65) 126/60     Weight: 117.9 kg (260 lb)  Body mass index is 40.72 kg/m².    Intake/Output Summary (Last 24 hours) at 3/21/2022 1034  Last data filed at 3/21/2022 0000  Gross per 24 hour   Intake --   Output 1000 ml   Net -1000 ml        Physical Exam  Constitutional:       Appearance: He is well-developed. He is ill-appearing.   HENT:      Head: Normocephalic and atraumatic.   Eyes:      General: Scleral icterus present.      Conjunctiva/sclera: Conjunctivae normal.      Pupils: Pupils are equal, round, and reactive to light.   Neck:      Thyroid: No thyromegaly.      Vascular: No JVD.   Cardiovascular:      Rate and Rhythm: Normal rate and regular rhythm.       Heart sounds: Normal heart sounds. No murmur heard.    No friction rub. No gallop.   Pulmonary:      Effort: Pulmonary effort is normal. No respiratory distress.      Breath sounds: Normal breath sounds. No wheezing or rales.   Abdominal:      General: Bowel sounds are normal. There is no distension.      Palpations: Abdomen is soft. There is no mass.      Tenderness: There is no abdominal tenderness. There is no guarding or rebound.      Hernia: No hernia is present.   Musculoskeletal:         General: Swelling present. No deformity. Normal range of motion.      Cervical back: Normal range of motion and neck supple.      Right lower leg: Edema present.      Left lower leg: Edema present.   Skin:     General: Skin is warm and dry.      Coloration: Skin is not jaundiced.   Neurological:      Mental Status: He is alert and oriented to person, place, and time.      Cranial Nerves: No cranial nerve deficit.   Psychiatric:         Behavior: Behavior normal.       Significant Labs: All pertinent labs within the past 24 hours have been reviewed.  CBC:   Recent Labs   Lab 03/20/22  0318 03/21/22  0535   WBC 5.44 6.22   HGB 7.1* 7.3*   HCT 20.4* 21.5*   *  --        CMP:   Recent Labs   Lab 03/20/22  0531 03/21/22  0757   * 136   K 5.4* 4.8   * 111*   CO2 15* 15*   * 99   BUN 58* 53*   CREATININE 2.2* 2.2*   CALCIUM 8.7 8.7   PROT 5.6* 6.1   ALBUMIN 3.2* 3.9   BILITOT 5.0* 4.0*   ALKPHOS 50* 45*   AST 42* 36   ALT 31 26   ANIONGAP 8 10   EGFRNONAA 32.5* 32.5*         Significant Imaging: I have reviewed all pertinent imaging results/findings within the past 24 hours.      Assessment/Plan:      * Decompensation of cirrhosis of liver  MELD-Na score: 26 at 3/21/2022  7:57 AM  MELD score: 26 at 3/21/2022  7:57 AM  Calculated from:  Serum Creatinine: 2.2 mg/dL at 3/21/2022  7:57 AM  Serum Sodium: 136 mmol/L at 3/21/2022  7:57 AM  Total Bilirubin: 4.0 mg/dL at 3/21/2022  7:57 AM  INR(ratio): 1.9 at  3/21/2022  5:35 AM  Age: 57 years  Secondary to LOZANO vs alcohol. Patient presents with volume overload and FAMILIA despite taking diuretics as prescribed. Patient's abdomen remains soft but his legs are extremely edematous. Patient seen in hepatology clinic and referred to inpatient for transplant eval. Patient's wife reports that at OSH they had given him albumin due to his FAMILIA and his Cr downtreded from 2.4 to 2.1  - Hepatology following  - Responded to albumin challenge initially; however with further HRS treatment, patients Cr has plateaued. Currently continuing HRS treatment with albumin, midodrine, and octreotide  - Continue lactulose and rifaximin for HE  - Interventional cardiology consulted for LHC, currently held off secondary to patients Cr being >2.  - Received paracentesis on 3/18 but fluid was not sent for analysis. Will empirically treat for SBP    FAMILIA (acute kidney injury)  Either from intravascular depletion vs HRS. Patient was taking lasix/aldactone prior to admission at OSH where he had FAMILIA + hyperK. Aldactone and Kcl supp were discontinued. Patient is volume overloaded but renal function still not at baseline. Urine microscopy showed WBCs possibly indicating AIN.  - Continue HRS treatment as above  - Lokelma for hyperkalemia  - Avoid nephrotoxic medications      Other specified anemias  Baseline hemoglobin around 7-8. S/p 1u PRBC on 3/19. T&S ordered for tomorrow AM  - Hgb 7.3  - Transfuse for Hgb <7, platelets <50k in anticipation for LHC tomorrow      Ascites due to alcoholic cirrhosis  Went for paracentesis with IR 3/18 with 3.6L removed      Hyperkalemia  See FAMILIA      Controlled type 2 diabetes mellitus, without long-term current use of insulin  Not on any insulin currently.   - A1c 6.5, down from 6.9  - LDSSI + POC AC/HS        VTE Risk Mitigation (From admission, onward)         Ordered     heparin (porcine) injection 5,000 Units  Every 8 hours         03/17/22 1656     IP VTE HIGH RISK PATIENT   Once         03/17/22 1656     Place sequential compression device  Until discontinued         03/17/22 1656                Discharge Planning   EDITH: 3/23/2022     Code Status: Full Code   Is the patient medically ready for discharge?: No    Reason for patient still in hospital (select all that apply): Patient trending condition  Discharge Plan A: Home with family                  Jesica Lo MD  Department of Hospital Medicine   [facility] - Telemetry Stepdown

## 2022-03-21 NOTE — PROGRESS NOTES
[facility] - Telemetry Stepdown  Nephrology  Progress Note    Patient Name: Pelon Vasquez  MRN: [id number]  Admission Date: 3/17/2022  Hospital Length of Stay: 4 days  Attending Provider: Jesica Lo MD   Primary Care Physician: Primary Doctor No  Principal Problem:Decompensation of cirrhosis of liver    Subjective:     HPI: 58 yo M with PMHx of cirrhosis c/b ascites was admitted for liver transplant evaluation and renal failure. Pt was recently hospitalized at OSH for ascites as well as acute kidney failure and hyperkalemia, where he had a potassium of 7 and Cr of 2.3~. Also pt will need prerequisite C evaluation prior to being listed for transplant. Primary care team suspected the current FAMILIA was 2/2 HRS and started relevant treatment. His renal function was improved temporarily and worsened again after paracentesis. Nephrology was consulted for pre-LHC optimization of renal function. Pt complained about abdominal distension and severe lower extremity edema, denied SOB, chest pain, dysuria, decreased UOP or change in voiding frequency.           Interval History: NAEON. Reports ongoing b/l LE edema, though improving. Denies abdominal pain or worsening distention since last para.     Review of patient's allergies indicates:   Allergen Reactions    Strawberry Anaphylaxis and Rash    Metformin Diarrhea     Current Facility-Administered Medications   Medication Frequency    acetaminophen tablet 650 mg Q8H PRN    albumin human 25% bottle 50 g Daily    albuterol-ipratropium 2.5 mg-0.5 mg/3 mL nebulizer solution 3 mL Q6H PRN    aluminum-magnesium hydroxide-simethicone 200-200-20 mg/5 mL suspension 30 mL QID PRN    cefTRIAXone (ROCEPHIN) 1 g/50 mL D5W IVPB Q24H    dextrose 10% bolus 125 mL PRN    dextrose 10% bolus 250 mL PRN    furosemide tablet 40 mg BID    glucagon (human recombinant) injection 1 mg PRN    glucose chewable tablet 16 g PRN    glucose chewable tablet 24 g PRN    heparin (porcine)  injection 5,000 Units Q8H    insulin aspart U-100 pen 0-5 Units QID (AC + HS) PRN    lactulose 20 gram/30 mL solution Soln 20 g TID    melatonin tablet 6 mg Nightly PRN    midodrine tablet 15 mg Q8H    naloxone 0.4 mg/mL injection 0.02 mg PRN    octreotide injection 100 mcg Q8H    ondansetron injection 4 mg Q8H PRN    pantoprazole EC tablet 40 mg Daily    prochlorperazine injection Soln 5 mg Q6H PRN    rifAXIMin tablet 550 mg BID    senna-docusate 8.6-50 mg per tablet 1 tablet BID    simethicone chewable tablet 80 mg QID PRN    sodium bicarbonate tablet 1,300 mg BID    sodium chloride 0.9% flush 10 mL Q8H PRN    zinc sulfate capsule 220 mg Daily       Objective:     Vital Signs (Most Recent):  Temp: 98.7 °F (37.1 °C) (03/21/22 1227)  Pulse: 92 (03/21/22 1227)  Resp: 18 (03/21/22 1227)  BP: 128/66 (03/21/22 1227)  SpO2: 96 % (03/21/22 1227)  O2 Device (Oxygen Therapy): room air (03/21/22 0508) Vital Signs (24h Range):  Temp:  [96 °F (35.6 °C)-98.7 °F (37.1 °C)] 98.7 °F (37.1 °C)  Pulse:  [76-92] 92  Resp:  [15-19] 18  SpO2:  [95 %-100 %] 96 %  BP: (110-130)/(58-66) 128/66     Weight: 117.9 kg (260 lb) (03/17/22 1537)  Body mass index is 40.72 kg/m².  Body surface area is 2.36 meters squared.    I/O last 3 completed shifts:  In: 240 [P.O.:240]  Out: 1450 [Urine:1450]    Physical Exam  Vitals and nursing note reviewed.   Constitutional:       General: He is not in acute distress.     Appearance: He is ill-appearing.   Eyes:      General: Scleral icterus present.   Cardiovascular:      Rate and Rhythm: Normal rate and regular rhythm.      Heart sounds: Normal heart sounds.   Pulmonary:      Effort: Pulmonary effort is normal. No respiratory distress.      Breath sounds: Normal breath sounds. No wheezing or rales.   Abdominal:      General: Abdomen is flat. There is no distension.      Palpations: Abdomen is soft.      Tenderness: There is no abdominal tenderness.   Musculoskeletal:         General:  Swelling present.      Right lower leg: Edema present.      Left lower leg: Edema present.   Skin:     Coloration: Skin is jaundiced and pale.   Neurological:      General: No focal deficit present.      Mental Status: He is alert and oriented to person, place, and time. Mental status is at baseline.   Psychiatric:         Mood and Affect: Mood normal.       Significant Labs:  BMP:   Recent Labs   Lab 03/21/22  0757   GLU 99      K 4.8   *   CO2 15*   BUN 53*   CREATININE 2.2*   CALCIUM 8.7     CBC:   Recent Labs   Lab 03/21/22  0535   WBC 6.22   RBC 2.30*   HGB 7.3*   HCT 21.5*   PLT 55*   MCV 94   MCH 31.7*   MCHC 34.0     CMP:   Recent Labs   Lab 03/21/22  0757   GLU 99   CALCIUM 8.7   ALBUMIN 3.9   PROT 6.1      K 4.8   CO2 15*   *   BUN 53*   CREATININE 2.2*   ALKPHOS 45*   ALT 26   AST 36   BILITOT 4.0*     All labs within the past 24 hours have been reviewed.     Significant Imaging:  Reviewed    Assessment/Plan:     * Decompensation of cirrhosis of liver  Management per hepatology and primary team    FAMILIA (acute kidney injury)  Baseline 1.4, currently Cr 2.1 -> 1.8 -> 2.2, worsened after paracentesis, suspected volume depletion superimposed to HRS.     Plan:  - continue with HRS treatment for now with midodrine, octreotide, and albumin, although urine studies are not largely  suggestive of HRS etiology, accompanied with lack of response to current HRS treatment.  - Will obtain urine sample and perform urine microscopy to explore other etiologies. AIN remains a differential.  - continue oral lasix 40mg BID  - f/u I/O  - daily CMP, Mg, Phos  - renally dosing medications  - avoid nephrotoxic medications, NSAID, ACEi/ARB, IV contrast    Hyperkalemia  Resolved      Thank you for your consult. I will follow-up with patient. Please contact us if you have any additional questions.    Wilman Colon MD  Nephrology  [facility] - Tele[facility]    ATTENDING PHYSICIAN ATTESTATION  I have  personally verified the history and examined the patient. I thoroughly reviewed the demographic, clinical, laboratorial and imaging information available in medical records. I agree with the assessment and recommendations provided by the subspecialty resident who was under my supervision.

## 2022-03-21 NOTE — ASSESSMENT & PLAN NOTE
Either from intravascular depletion vs HRS. Patient was taking lasix/aldactone prior to admission at OSH where he had FAMILIA + hyperK. Aldactone and Kcl supp were discontinued. Patient is volume overloaded but renal function still not at baseline. Urine microscopy showed WBCs possibly indicating AIN.  - Continue HRS treatment as above  - Lokelma for hyperkalemia  - Avoid nephrotoxic medications

## 2022-03-21 NOTE — PROGRESS NOTES
Hepatology Treatment Plan    Pelon Vasquez is a 57 y.o. male admitted to hospital 3/17/2022 (Hospital Day: 5) due to Decompensation of cirrhosis of liver.     Interval History  No creatinine improvement despite HRS protocol over the weekend.    Objective  Temp:  [96 °F (35.6 °C)-98.6 °F (37 °C)] 96 °F (35.6 °C) (03/21 0746)  Pulse:  [76-89] 76 (03/21 0746)  BP: (110-130)/(56-65) 126/60 (03/21 0746)  Resp:  [15-19] 18 (03/21 0746)  SpO2:  [95 %-100 %] 96 % (03/21 0746)       Constitutional:  not in acute distress and well developed  HENT: Head: Normal, normocephalic, atraumatic.  Eyes: conjunctiva clear and sclera nonicteric  Cardiovascular: regular rate and rhythm  Respiratory: normal chest expansion & respiratory effort   and no accessory muscle use  GI: soft, non-tender, without masses or organomegaly  Musculoskeletal: no muscular tenderness noted  Skin: normal color  Neurological: alert, oriented x3  Psychiatric: mood and affect are within normal limits, pt is a good historian; no memory problems were noted      Laboratory    Lab Results   Component Value Date    WBC 6.22 03/21/2022    HGB 7.3 (L) 03/21/2022    HCT 21.5 (L) 03/21/2022    MCV 94 03/21/2022     (L) 03/20/2022       Lab Results   Component Value Date     03/21/2022    K 4.8 03/21/2022     (H) 03/21/2022    CO2 15 (L) 03/21/2022    BUN 53 (H) 03/21/2022    CREATININE 2.2 (H) 03/21/2022    CALCIUM 8.7 03/21/2022       Lab Results   Component Value Date    ALBUMIN 3.9 03/21/2022    ALT 26 03/21/2022    AST 36 03/21/2022    GGT 66 (H) 02/14/2022    ALKPHOS 45 (L) 03/21/2022    BILITOT 4.0 (H) 03/21/2022       Lab Results   Component Value Date    INR 1.9 (H) 03/21/2022    INR 1.8 (H) 03/20/2022    INR 1.7 (H) 03/19/2022       MELD-Na score: 26 at 3/21/2022  7:57 AM  MELD score: 26 at 3/21/2022  7:57 AM  Calculated from:  Serum Creatinine: 2.2 mg/dL at 3/21/2022  7:57 AM  Serum Sodium: 136 mmol/L at 3/21/2022  7:57 AM  Total Bilirubin:  4.0 mg/dL at 3/21/2022  7:57 AM  INR(ratio): 1.9 at 3/21/2022  5:35 AM  Age: 57 years     Problem list  1. Decompensated EtOH cirrhosis  2. FAMILIA  3. Liver transplant evaluation    58 yo M with decompensated EtOH cirrhosis (c/b HE, esophageal varices, ascites) admitted as direct admit from Hepatology clinic for FAMILIA, hyperK, and inpatient transplant evaluation. In regards to his cirrhosis, he was diagnosed in summer of 2021. He was told that it was due to EtOH + years of undiagnosed DM. He is a former drinker and drank approx 4-6 liquor drinks daily for 8 years. His last drink was approx 10 months ago since being diagnosed with cirrhosis. He has history of HE requiring ICU admission. He takes lactulose + rifaximin. He previously struggled with compliance with lactulose 2/2 nausea/vomiting + diarrhea but has since been compliant. He has refractory ascites and has multiple therapeutic paracenteses. He was prescribed spironolactone 150mg am + 100mg pm, lasix 40mg BID, and KCl 20 mEq prior to recent admission. EGD (2/25/22) showed moderate portal hypertensive gastropathy, small hiatal hernia, distal grade 1 esophageal varices (no sequela of recent bleeding). Denies h/o esophageal bleeding. He is currently being evaluated for LTx. He needs LHC prior to being listed. His MELD has been around 20 until recent admission.       Plan  - continue HRS protocol  - appreciate nephrology input  - hold diuretics  - it is unlikely that his kidney function is going to improve significantly, or it might take a long period of time, therefore would recommend performing LHC despite creatinine since the patient is agreeable. Prophylactic dialysis might help prevent worsening FAMILIA, however will defer that to nephrology  - send fluid analysis from paracentesis if able, if not, recommend empiric antibiotic treatment for SBP for 5 days    Thank you for involving us in the care of Pelon Vasquez. Please call with any additional concerns or  questions.    Ivonne Montoya MD  Gastroenterology Fellow

## 2022-03-21 NOTE — SUBJECTIVE & OBJECTIVE
Interval History: NAEON. Reports ongoing b/l LE edema, though improving. Denies abdominal pain or worsening distention since last para.     Review of patient's allergies indicates:   Allergen Reactions    Strawberry Anaphylaxis and Rash    Metformin Diarrhea     Current Facility-Administered Medications   Medication Frequency    acetaminophen tablet 650 mg Q8H PRN    albumin human 25% bottle 50 g Daily    albuterol-ipratropium 2.5 mg-0.5 mg/3 mL nebulizer solution 3 mL Q6H PRN    aluminum-magnesium hydroxide-simethicone 200-200-20 mg/5 mL suspension 30 mL QID PRN    cefTRIAXone (ROCEPHIN) 1 g/50 mL D5W IVPB Q24H    dextrose 10% bolus 125 mL PRN    dextrose 10% bolus 250 mL PRN    furosemide tablet 40 mg BID    glucagon (human recombinant) injection 1 mg PRN    glucose chewable tablet 16 g PRN    glucose chewable tablet 24 g PRN    heparin (porcine) injection 5,000 Units Q8H    insulin aspart U-100 pen 0-5 Units QID (AC + HS) PRN    lactulose 20 gram/30 mL solution Soln 20 g TID    melatonin tablet 6 mg Nightly PRN    midodrine tablet 15 mg Q8H    naloxone 0.4 mg/mL injection 0.02 mg PRN    octreotide injection 100 mcg Q8H    ondansetron injection 4 mg Q8H PRN    pantoprazole EC tablet 40 mg Daily    prochlorperazine injection Soln 5 mg Q6H PRN    rifAXIMin tablet 550 mg BID    senna-docusate 8.6-50 mg per tablet 1 tablet BID    simethicone chewable tablet 80 mg QID PRN    sodium bicarbonate tablet 1,300 mg BID    sodium chloride 0.9% flush 10 mL Q8H PRN    zinc sulfate capsule 220 mg Daily       Objective:     Vital Signs (Most Recent):  Temp: 98.7 °F (37.1 °C) (03/21/22 1227)  Pulse: 92 (03/21/22 1227)  Resp: 18 (03/21/22 1227)  BP: 128/66 (03/21/22 1227)  SpO2: 96 % (03/21/22 1227)  O2 Device (Oxygen Therapy): room air (03/21/22 0508) Vital Signs (24h Range):  Temp:  [96 °F (35.6 °C)-98.7 °F (37.1 °C)] 98.7 °F (37.1 °C)  Pulse:  [76-92] 92  Resp:  [15-19] 18  SpO2:  [95 %-100 %] 96 %  BP: (110-130)/(58-66) 128/66      Weight: 117.9 kg (260 lb) (03/17/22 1537)  Body mass index is 40.72 kg/m².  Body surface area is 2.36 meters squared.    I/O last 3 completed shifts:  In: 240 [P.O.:240]  Out: 1450 [Urine:1450]    Physical Exam  Vitals and nursing note reviewed.   Constitutional:       General: He is not in acute distress.     Appearance: He is ill-appearing.   Eyes:      General: Scleral icterus present.   Cardiovascular:      Rate and Rhythm: Normal rate and regular rhythm.      Heart sounds: Normal heart sounds.   Pulmonary:      Effort: Pulmonary effort is normal. No respiratory distress.      Breath sounds: Normal breath sounds. No wheezing or rales.   Abdominal:      General: Abdomen is flat. There is no distension.      Palpations: Abdomen is soft.      Tenderness: There is no abdominal tenderness.   Musculoskeletal:         General: Swelling present.      Right lower leg: Edema present.      Left lower leg: Edema present.   Skin:     Coloration: Skin is jaundiced and pale.   Neurological:      General: No focal deficit present.      Mental Status: He is alert and oriented to person, place, and time. Mental status is at baseline.   Psychiatric:         Mood and Affect: Mood normal.       Significant Labs:  BMP:   Recent Labs   Lab 03/21/22  0757   GLU 99      K 4.8   *   CO2 15*   BUN 53*   CREATININE 2.2*   CALCIUM 8.7     CBC:   Recent Labs   Lab 03/21/22  0535   WBC 6.22   RBC 2.30*   HGB 7.3*   HCT 21.5*   PLT 55*   MCV 94   MCH 31.7*   MCHC 34.0     CMP:   Recent Labs   Lab 03/21/22  0757   GLU 99   CALCIUM 8.7   ALBUMIN 3.9   PROT 6.1      K 4.8   CO2 15*   *   BUN 53*   CREATININE 2.2*   ALKPHOS 45*   ALT 26   AST 36   BILITOT 4.0*     All labs within the past 24 hours have been reviewed.     Significant Imaging:  Reviewed

## 2022-03-22 PROBLEM — E87.5 HYPERKALEMIA: Status: RESOLVED | Noted: 2022-03-17 | Resolved: 2022-03-22

## 2022-03-22 LAB
ALBUMIN SERPL BCP-MCNC: 4.1 G/DL (ref 3.5–5.2)
ALP SERPL-CCNC: 44 U/L (ref 55–135)
ALT SERPL W/O P-5'-P-CCNC: 21 U/L (ref 10–44)
ANION GAP SERPL CALC-SCNC: 10 MMOL/L (ref 8–16)
AST SERPL-CCNC: 31 U/L (ref 10–40)
BACTERIA UR CULT: NORMAL
BASOPHILS # BLD AUTO: 0.04 K/UL (ref 0–0.2)
BASOPHILS NFR BLD: 0.7 % (ref 0–1.9)
BILIRUB SERPL-MCNC: 3.9 MG/DL (ref 0.1–1)
BLD PROD TYP BPU: NORMAL
BLOOD UNIT EXPIRATION DATE: NORMAL
BLOOD UNIT TYPE CODE: 9500
BLOOD UNIT TYPE: NORMAL
BUN SERPL-MCNC: 53 MG/DL (ref 6–20)
CALCIUM SERPL-MCNC: 8.7 MG/DL (ref 8.7–10.5)
CHLORIDE SERPL-SCNC: 110 MMOL/L (ref 95–110)
CO2 SERPL-SCNC: 15 MMOL/L (ref 23–29)
CODING SYSTEM: NORMAL
CREAT SERPL-MCNC: 2 MG/DL (ref 0.5–1.4)
DIFFERENTIAL METHOD: ABNORMAL
DISPENSE STATUS: NORMAL
EOSINOPHIL # BLD AUTO: 0.7 K/UL (ref 0–0.5)
EOSINOPHIL NFR BLD: 11.5 % (ref 0–8)
ERYTHROCYTE [DISTWIDTH] IN BLOOD BY AUTOMATED COUNT: 18.1 % (ref 11.5–14.5)
EST. GFR  (AFRICAN AMERICAN): 42.2 ML/MIN/1.73 M^2
EST. GFR  (NON AFRICAN AMERICAN): 36.5 ML/MIN/1.73 M^2
GLUCOSE SERPL-MCNC: 108 MG/DL (ref 70–110)
HCT VFR BLD AUTO: 20.6 % (ref 40–54)
HGB BLD-MCNC: 6.8 G/DL (ref 14–18)
IMM GRANULOCYTES # BLD AUTO: 0.01 K/UL (ref 0–0.04)
IMM GRANULOCYTES NFR BLD AUTO: 0.2 % (ref 0–0.5)
INR PPP: 1.8 (ref 0.8–1.2)
LYMPHOCYTES # BLD AUTO: 0.8 K/UL (ref 1–4.8)
LYMPHOCYTES NFR BLD: 14 % (ref 18–48)
MCH RBC QN AUTO: 31.9 PG (ref 27–31)
MCHC RBC AUTO-ENTMCNC: 33 G/DL (ref 32–36)
MCV RBC AUTO: 97 FL (ref 82–98)
MONOCYTES # BLD AUTO: 0.8 K/UL (ref 0.3–1)
MONOCYTES NFR BLD: 13.1 % (ref 4–15)
NEUTROPHILS # BLD AUTO: 3.7 K/UL (ref 1.8–7.7)
NEUTROPHILS NFR BLD: 60.5 % (ref 38–73)
NRBC BLD-RTO: 0 /100 WBC
NUM UNITS TRANS PACKED RBC: NORMAL
PLATELET # BLD AUTO: 46 K/UL (ref 150–450)
PMV BLD AUTO: 9.9 FL (ref 9.2–12.9)
POCT GLUCOSE: 127 MG/DL (ref 70–110)
POCT GLUCOSE: 147 MG/DL (ref 70–110)
POCT GLUCOSE: 178 MG/DL (ref 70–110)
POTASSIUM SERPL-SCNC: 4.9 MMOL/L (ref 3.5–5.1)
PROT SERPL-MCNC: 6.2 G/DL (ref 6–8.4)
PROTHROMBIN TIME: 18.7 SEC (ref 9–12.5)
RBC # BLD AUTO: 2.13 M/UL (ref 4.6–6.2)
SODIUM SERPL-SCNC: 135 MMOL/L (ref 136–145)
WBC # BLD AUTO: 6.02 K/UL (ref 3.9–12.7)

## 2022-03-22 PROCEDURE — P9016 RBC LEUKOCYTES REDUCED: HCPCS | Mod: NTX | Performed by: STUDENT IN AN ORGANIZED HEALTH CARE EDUCATION/TRAINING PROGRAM

## 2022-03-22 PROCEDURE — 99232 SBSQ HOSP IP/OBS MODERATE 35: CPT | Mod: NTX,,, | Performed by: INTERNAL MEDICINE

## 2022-03-22 PROCEDURE — 25000003 PHARM REV CODE 250: Mod: NTX | Performed by: STUDENT IN AN ORGANIZED HEALTH CARE EDUCATION/TRAINING PROGRAM

## 2022-03-22 PROCEDURE — 80053 COMPREHEN METABOLIC PANEL: CPT | Mod: NTX | Performed by: STUDENT IN AN ORGANIZED HEALTH CARE EDUCATION/TRAINING PROGRAM

## 2022-03-22 PROCEDURE — 99233 SBSQ HOSP IP/OBS HIGH 50: CPT | Mod: NTX,,, | Performed by: STUDENT IN AN ORGANIZED HEALTH CARE EDUCATION/TRAINING PROGRAM

## 2022-03-22 PROCEDURE — 20600001 HC STEP DOWN PRIVATE ROOM: Mod: NTX

## 2022-03-22 PROCEDURE — 94640 AIRWAY INHALATION TREATMENT: CPT | Mod: NTX

## 2022-03-22 PROCEDURE — P9047 ALBUMIN (HUMAN), 25%, 50ML: HCPCS | Mod: JG,NTX | Performed by: STUDENT IN AN ORGANIZED HEALTH CARE EDUCATION/TRAINING PROGRAM

## 2022-03-22 PROCEDURE — 85025 COMPLETE CBC W/AUTO DIFF WBC: CPT | Mod: NTX | Performed by: STUDENT IN AN ORGANIZED HEALTH CARE EDUCATION/TRAINING PROGRAM

## 2022-03-22 PROCEDURE — 25000242 PHARM REV CODE 250 ALT 637 W/ HCPCS: Mod: NTX | Performed by: STUDENT IN AN ORGANIZED HEALTH CARE EDUCATION/TRAINING PROGRAM

## 2022-03-22 PROCEDURE — 99232 PR SUBSEQUENT HOSPITAL CARE,LEVL II: ICD-10-PCS | Mod: NTX,,, | Performed by: INTERNAL MEDICINE

## 2022-03-22 PROCEDURE — 94761 N-INVAS EAR/PLS OXIMETRY MLT: CPT | Mod: NTX

## 2022-03-22 PROCEDURE — 85610 PROTHROMBIN TIME: CPT | Mod: NTX | Performed by: STUDENT IN AN ORGANIZED HEALTH CARE EDUCATION/TRAINING PROGRAM

## 2022-03-22 PROCEDURE — 25000003 PHARM REV CODE 250: Mod: NTX | Performed by: HOSPITALIST

## 2022-03-22 PROCEDURE — 36415 COLL VENOUS BLD VENIPUNCTURE: CPT | Mod: NTX | Performed by: STUDENT IN AN ORGANIZED HEALTH CARE EDUCATION/TRAINING PROGRAM

## 2022-03-22 PROCEDURE — 63600175 PHARM REV CODE 636 W HCPCS: Mod: JG,NTX | Performed by: STUDENT IN AN ORGANIZED HEALTH CARE EDUCATION/TRAINING PROGRAM

## 2022-03-22 PROCEDURE — 99233 PR SUBSEQUENT HOSPITAL CARE,LEVL III: ICD-10-PCS | Mod: NTX,,, | Performed by: STUDENT IN AN ORGANIZED HEALTH CARE EDUCATION/TRAINING PROGRAM

## 2022-03-22 RX ORDER — HYDROCODONE BITARTRATE AND ACETAMINOPHEN 500; 5 MG/1; MG/1
TABLET ORAL
Status: DISCONTINUED | OUTPATIENT
Start: 2022-03-22 | End: 2022-03-24 | Stop reason: HOSPADM

## 2022-03-22 RX ADMIN — MIDODRINE HYDROCHLORIDE 15 MG: 5 TABLET ORAL at 02:03

## 2022-03-22 RX ADMIN — CEFTRIAXONE 1 G: 1 INJECTION, SOLUTION INTRAVENOUS at 02:03

## 2022-03-22 RX ADMIN — OCTREOTIDE ACETATE 100 MCG: 100 INJECTION, SOLUTION INTRAVENOUS; SUBCUTANEOUS at 06:03

## 2022-03-22 RX ADMIN — RIFAXIMIN 550 MG: 550 TABLET ORAL at 09:03

## 2022-03-22 RX ADMIN — OCTREOTIDE ACETATE 100 MCG: 100 INJECTION, SOLUTION INTRAVENOUS; SUBCUTANEOUS at 09:03

## 2022-03-22 RX ADMIN — IPRATROPIUM BROMIDE AND ALBUTEROL SULFATE 3 ML: 2.5; .5 SOLUTION RESPIRATORY (INHALATION) at 10:03

## 2022-03-22 RX ADMIN — FUROSEMIDE 40 MG: 40 TABLET ORAL at 08:03

## 2022-03-22 RX ADMIN — MIDODRINE HYDROCHLORIDE 15 MG: 5 TABLET ORAL at 09:03

## 2022-03-22 RX ADMIN — OCTREOTIDE ACETATE 100 MCG: 100 INJECTION, SOLUTION INTRAVENOUS; SUBCUTANEOUS at 02:03

## 2022-03-22 RX ADMIN — ALBUMIN (HUMAN) 50 G: 12.5 SOLUTION INTRAVENOUS at 05:03

## 2022-03-22 RX ADMIN — Medication 6 MG: at 09:03

## 2022-03-22 RX ADMIN — LACTULOSE 20 G: 20 SOLUTION ORAL at 09:03

## 2022-03-22 RX ADMIN — RIFAXIMIN 550 MG: 550 TABLET ORAL at 08:03

## 2022-03-22 RX ADMIN — FUROSEMIDE 40 MG: 40 TABLET ORAL at 05:03

## 2022-03-22 RX ADMIN — LACTULOSE 20 G: 20 SOLUTION ORAL at 02:03

## 2022-03-22 RX ADMIN — SENNOSIDES AND DOCUSATE SODIUM 1 TABLET: 50; 8.6 TABLET ORAL at 09:03

## 2022-03-22 RX ADMIN — HEPARIN SODIUM 5000 UNITS: 5000 INJECTION INTRAVENOUS; SUBCUTANEOUS at 05:03

## 2022-03-22 RX ADMIN — SENNOSIDES AND DOCUSATE SODIUM 1 TABLET: 50; 8.6 TABLET ORAL at 08:03

## 2022-03-22 RX ADMIN — SODIUM BICARBONATE 650 MG TABLET 1300 MG: at 08:03

## 2022-03-22 RX ADMIN — SODIUM BICARBONATE 650 MG TABLET 1300 MG: at 09:03

## 2022-03-22 RX ADMIN — ZINC SULFATE 220 MG (50 MG) CAPSULE 220 MG: CAPSULE at 08:03

## 2022-03-22 RX ADMIN — PANTOPRAZOLE SODIUM 40 MG: 40 TABLET, DELAYED RELEASE ORAL at 08:03

## 2022-03-22 RX ADMIN — MIDODRINE HYDROCHLORIDE 15 MG: 5 TABLET ORAL at 06:03

## 2022-03-22 RX ADMIN — LACTULOSE 20 G: 20 SOLUTION ORAL at 08:03

## 2022-03-22 NOTE — NURSING
Morning bedside handoff report complete.  He is awake and pleasant on rounds.  He reports his abdomen is becoming slightly tight as it feels when in the past he has needed paracentesis.  I confirmed that he did mention this to the md this am as well.

## 2022-03-22 NOTE — ASSESSMENT & PLAN NOTE
MELD-Na score: 26 at 3/22/2022  7:49 AM  MELD score: 25 at 3/22/2022  7:49 AM  Calculated from:  Serum Creatinine: 2.0 mg/dL at 3/22/2022  7:49 AM  Serum Sodium: 135 mmol/L at 3/22/2022  7:49 AM  Total Bilirubin: 3.9 mg/dL at 3/22/2022  7:49 AM  INR(ratio): 1.8 at 3/22/2022  5:12 AM  Age: 57 years  Secondary to LOZANO vs alcohol. Patient presents with volume overload and FAMILIA despite taking diuretics as prescribed. Patient's abdomen remains soft but his legs are extremely edematous. Patient seen in hepatology clinic and referred to inpatient for transplant eval. Patient's wife reports that at OSH they had given him albumin due to his FAMILIA and his Cr downtreded from 2.4 to 2.1  - Hepatology following  - Responded to albumin challenge initially; however with further HRS treatment, patients Cr has plateaued. Currently continuing HRS treatment with albumin, midodrine, and octreotide  - Continue lactulose and rifaximin for HE  - Interventional cardiology consulted for [facility], currently held off secondary to patients Cr being >2.  - Received paracentesis on 3/18 but fluid was not sent for analysis. Will empirically treat for SBP  - Volume - Patient with less LE edema but more abdominal distension from ascites since starting diuretics. May order paracentesis for tomorrow.

## 2022-03-22 NOTE — ASSESSMENT & PLAN NOTE
Baseline hemoglobin around 7-8. S/p 1u PRBC on 3/19.   - Hgb 6.8 today, will give 1u PRBC  - Transfuse for Hgb <7, platelets <50k in anticipation for LHC tomorrow

## 2022-03-22 NOTE — PROGRESS NOTES
[facility] Tele[facility] Stepdown  Nephrology  Progress Note    Patient Name: Pelon Vasquez  MRN: [id number]  Admission Date: 3/17/2022  Hospital Length of Stay: 5 days  Attending Provider: Jesica Lo MD   Primary Care Physician: Primary Doctor No  Principal Problem:Decompensation of cirrhosis of liver    Subjective:     HPI: 56 yo M with PMHx of cirrhosis c/b ascites was admitted for liver transplant evaluation and renal failure. Pt was recently hospitalized at OSH for ascites as well as acute kidney failure and hyperkalemia, where he had a potassium of 7 and Cr of 2.3~. Also pt will need prerequisite C evaluation prior to being listed for transplant. Primary care team suspected the current FAMILIA was 2/2 HRS and started relevant treatment. His renal function was improved temporarily and worsened again after paracentesis. Nephrology was consulted for pre-C optimization of renal function. Pt complained about abdominal distension and severe lower extremity edema, denied SOB, chest pain, dysuria, decreased UOP or change in voiding frequency.           Interval History: NAEON. Patient reports worsening abdominal distention and intermittent dyspnea. Remains on room air. UOP 1.5L over the past 24 hours. sCr slightly improving.     Review of patient's allergies indicates:   Allergen Reactions    Strawberry Anaphylaxis and Rash    Metformin Diarrhea     Current Facility-Administered Medications   Medication Frequency    0.9%  NaCl infusion (for blood administration) Q24H PRN    acetaminophen tablet 650 mg Q8H PRN    albumin human 25% bottle 50 g Daily    albuterol-ipratropium 2.5 mg-0.5 mg/3 mL nebulizer solution 3 mL Q6H PRN    aluminum-magnesium hydroxide-simethicone 200-200-20 mg/5 mL suspension 30 mL QID PRN    cefTRIAXone (ROCEPHIN) 1 g/50 mL D5W IVPB Q24H    dextrose 10% bolus 125 mL PRN    dextrose 10% bolus 250 mL PRN    furosemide tablet 40 mg BID    glucagon (human recombinant) injection 1 mg PRN     glucose chewable tablet 16 g PRN    glucose chewable tablet 24 g PRN    insulin aspart U-100 pen 0-5 Units QID (AC + HS) PRN    lactulose 20 gram/30 mL solution Soln 20 g TID    melatonin tablet 6 mg Nightly PRN    midodrine tablet 15 mg Q8H    naloxone 0.4 mg/mL injection 0.02 mg PRN    octreotide injection 100 mcg Q8H    ondansetron injection 4 mg Q8H PRN    pantoprazole EC tablet 40 mg Daily    prochlorperazine injection Soln 5 mg Q6H PRN    rifAXIMin tablet 550 mg BID    senna-docusate 8.6-50 mg per tablet 1 tablet BID    simethicone chewable tablet 80 mg QID PRN    sodium bicarbonate tablet 1,300 mg BID    sodium chloride 0.9% flush 10 mL Q8H PRN    zinc sulfate capsule 220 mg Daily       Objective:     Vital Signs (Most Recent):  Temp: 97.9 °F (36.6 °C) (03/22/22 1135)  Pulse: 82 (03/22/22 1135)  Resp: 18 (03/22/22 1135)  BP: 127/61 (03/22/22 1135)  SpO2: 95 % (03/22/22 1135)  O2 Device (Oxygen Therapy): room air (03/22/22 1135) Vital Signs (24h Range):  Temp:  [97.5 °F (36.4 °C)-99 °F (37.2 °C)] 97.9 °F (36.6 °C)  Pulse:  [71-93] 82  Resp:  [17-18] 18  SpO2:  [94 %-98 %] 95 %  BP: (113-128)/(56-86) 127/61     Weight: 117.9 kg (260 lb) (03/17/22 1537)  Body mass index is 40.72 kg/m².  Body surface area is 2.36 meters squared.    I/O last 3 completed shifts:  In: 480 [P.O.:480]  Out: 2200 [Urine:2200]    Physical Exam  Vitals and nursing note reviewed.   Constitutional:       General: He is not in acute distress.     Appearance: He is ill-appearing.   Eyes:      General: Scleral icterus present.   Cardiovascular:      Rate and Rhythm: Normal rate and regular rhythm.      Heart sounds: Normal heart sounds.   Pulmonary:      Effort: Pulmonary effort is normal. No respiratory distress.      Breath sounds: Normal breath sounds. No wheezing or rales.   Abdominal:      General: Abdomen is flat. There is distension.      Palpations: Abdomen is soft.      Tenderness: There is no abdominal tenderness.    Musculoskeletal:         General: Swelling present.      Right lower leg: Edema present.      Left lower leg: Edema present.   Skin:     Coloration: Skin is jaundiced and pale.   Neurological:      General: No focal deficit present.      Mental Status: He is alert and oriented to person, place, and time. Mental status is at baseline.   Psychiatric:         Mood and Affect: Mood normal.       Significant Labs:  BMP:   Recent Labs   Lab 03/22/22  0749      *   K 4.9      CO2 15*   BUN 53*   CREATININE 2.0*   CALCIUM 8.7     CBC:   Recent Labs   Lab 03/22/22  0512   WBC 6.02   RBC 2.13*   HGB 6.8*   HCT 20.6*   PLT 46*   MCV 97   MCH 31.9*   MCHC 33.0     CMP:   Recent Labs   Lab 03/22/22  0749      CALCIUM 8.7   ALBUMIN 4.1   PROT 6.2   *   K 4.9   CO2 15*      BUN 53*   CREATININE 2.0*   ALKPHOS 44*   ALT 21   AST 31   BILITOT 3.9*     All labs within the past 24 hours have been reviewed.     Significant Imaging:  No new images to review.    Assessment/Plan:     * Decompensation of cirrhosis of liver  Management per hepatology and primary team    FAMILIA (acute kidney injury)  Baseline 1.4, currently Cr 2.1 -> 1.8 -> 2.2, worsened after paracentesis, suspected volume depletion superimposed to HRS.     Plan:  - continue with HRS treatment for now with midodrine, octreotide, and albumin, although urine studies are not largely  suggestive of HRS etiology, accompanied with lack of response to current HRS treatment.  - sCr improved to 2.0. Okay to proceed with [facility] for liver transplant preop eval from nephrology standpoint. Discussed benefits and risks with patient.   - continue oral lasix 40mg BID  - f/u I/O  - daily CMP, Mg, Phos  - renally dosing medications  - avoid nephrotoxic medications, NSAID, ACEi/ARB, IV contrast    Hyperkalemia  Resolved        Thank you for your consult. I will follow-up with patient. Please contact us if you have any additional questions.    Wilman Colon  MD  Nephrology  [facility] - Tele[facility]    ATTENDING PHYSICIAN ATTESTATION  I have personally verified the history and examined the patient. I thoroughly reviewed the demographic, clinical, laboratorial and imaging information available in medical records. I agree with the assessment and recommendations provided by the subspecialty resident who was under my supervision.

## 2022-03-22 NOTE — ASSESSMENT & PLAN NOTE
Either from intravascular depletion vs HRS. Patient was taking lasix/aldactone prior to admission at OSH where he had FAMILIA + hyperK. Aldactone and Kcl supp were discontinued. Patient is volume overloaded but renal function still not at baseline. Urine microscopy showed WBCs possibly indicating AIN. Hyperkalemia resolved  - Continue HRS treatment as above  - Avoid nephrotoxic medications

## 2022-03-22 NOTE — NURSING
After 15 min of blood transfusing no sign of transfusion reaction noted.  I did explain to the patient and his wife possible signs of transfusion reactions and they vu

## 2022-03-22 NOTE — PROGRESS NOTES
[facility] - Telemetry [facility] Medicine  Progress Note    Patient Name: Pelon Vasquez  MRN: [id number]  Patient Class: IP- Inpatient   Admission Date: 3/17/2022  Length of Stay: 5 days  Attending Physician: Jesica Lo MD  Primary Care Provider: Primary Doctor No        Subjective:     Principal Problem:Decompensation of cirrhosis of liver        HPI:  58 y/o M with a PMH of cirrhosis c/b ascites who presents for liver transplant evaluation and renal failure.    Patient was seen in hepatology clinic this morning where he mentioned to staff that he was recently hospitalized for ascites as well as acute kidney failure and hyperkalemia. He was discharged yesterday and made his way to his hepatology appointment. Patient reports that he had not been feeling all to well and that his ascites reaccumulated hence his presentation to OSH. There he had a potassium of 7 and Cr of 2.3~. It was believed to be potassium supplementation + aldactone in the setting of worsening renal function that caused hyperkalemia. Furthermore, he saw his cardiologist due to questions if he needed LHC prior to being listed for transplant or not. Per their discussion, they would like his renal function to return to his baseline before LHC    Patient being admitted for hyperkalemia/FAMILIA/transplant w/u.        Overview/Hospital Course:  Renal function worsened post paracentesis despite albumin. Nephrology consulted recommending continuing HRS treatment. Urine was spun showed some WBCs indicating possible AIN. Cr down to 2.0. Cardiology holding off on LHC due to patients other cardiac workup not indicating need for LHC. Awaiting Anesthesia input as they were key in suggesting LHC given patients other comorbidities (DM, obesity)      Interval History: No acute events overnight. Discussed cardiology, anesthesia, hepatology, and nephrology perspectives with patient and wife. Patient otherwise reported a rougher night with issues sleeping. He  has more abdominal distension but its not causing extreme discomfort. He reports less LE edema. He is desiring a solution to hopefully getting himself listed for transplant. Patients hgb <7 but Cr improved to 2.0    Review of Systems   Constitutional:  Positive for activity change. Negative for chills and fever.   HENT:  Negative for congestion and sore throat.    Eyes:  Negative for photophobia and visual disturbance.   Respiratory:  Negative for cough and shortness of breath.    Cardiovascular:  Positive for leg swelling. Negative for chest pain and palpitations.   Gastrointestinal:  Positive for abdominal distention. Negative for abdominal pain, blood in stool, constipation, diarrhea, nausea and vomiting.   Endocrine: Negative for cold intolerance and heat intolerance.   Genitourinary:  Negative for dysuria and hematuria.   Musculoskeletal:  Negative for arthralgias and myalgias.   Skin:  Negative for rash and wound.   Allergic/Immunologic: Negative for environmental allergies and food allergies.   Neurological:  Negative for seizures and numbness.   Hematological:  Negative for adenopathy. Does not bruise/bleed easily.   Psychiatric/Behavioral:  Negative for hallucinations and suicidal ideas.    Objective:     Vital Signs (Most Recent):  Temp: 97.9 °F (36.6 °C) (03/22/22 1135)  Pulse: 82 (03/22/22 1135)  Resp: 18 (03/22/22 1135)  BP: 127/61 (03/22/22 1135)  SpO2: 95 % (03/22/22 1135)   Vital Signs (24h Range):  Temp:  [97.5 °F (36.4 °C)-99 °F (37.2 °C)] 97.9 °F (36.6 °C)  Pulse:  [71-93] 82  Resp:  [17-18] 18  SpO2:  [94 %-98 %] 95 %  BP: (113-127)/(56-86) 127/61     Weight: 117.9 kg (260 lb)  Body mass index is 40.72 kg/m².    Intake/Output Summary (Last 24 hours) at 3/22/2022 1232  Last data filed at 3/22/2022 0600  Gross per 24 hour   Intake 480 ml   Output 950 ml   Net -470 ml        Physical Exam  Constitutional:       Appearance: He is well-developed. He is ill-appearing.   HENT:      Head: Normocephalic  and atraumatic.   Eyes:      General: Scleral icterus present.      Conjunctiva/sclera: Conjunctivae normal.      Pupils: Pupils are equal, round, and reactive to light.   Neck:      Thyroid: No thyromegaly.      Vascular: No JVD.   Cardiovascular:      Rate and Rhythm: Normal rate and regular rhythm.      Heart sounds: Normal heart sounds. No murmur heard.    No friction rub. No gallop.   Pulmonary:      Effort: Pulmonary effort is normal. No respiratory distress.      Breath sounds: Normal breath sounds. No wheezing or rales.   Abdominal:      General: Bowel sounds are normal. There is no distension.      Palpations: Abdomen is soft. There is no mass.      Tenderness: There is no abdominal tenderness. There is no guarding or rebound.      Hernia: No hernia is present.   Musculoskeletal:         General: Swelling present. No deformity. Normal range of motion.      Cervical back: Normal range of motion and neck supple.      Right lower leg: Edema present.      Left lower leg: Edema present.   Skin:     General: Skin is warm and dry.      Coloration: Skin is not jaundiced.   Neurological:      Mental Status: He is alert and oriented to person, place, and time.      Cranial Nerves: No cranial nerve deficit.   Psychiatric:         Behavior: Behavior normal.       Significant Labs: All pertinent labs within the past 24 hours have been reviewed.  CBC:   Recent Labs   Lab 03/21/22  0535 03/22/22  0512   WBC 6.22 6.02   HGB 7.3* 6.8*   HCT 21.5* 20.6*   PLT 55* 46*       CMP:   Recent Labs   Lab 03/21/22  0757 03/22/22  0749    135*   K 4.8 4.9   * 110   CO2 15* 15*   GLU 99 108   BUN 53* 53*   CREATININE 2.2* 2.0*   CALCIUM 8.7 8.7   PROT 6.1 6.2   ALBUMIN 3.9 4.1   BILITOT 4.0* 3.9*   ALKPHOS 45* 44*   AST 36 31   ALT 26 21   ANIONGAP 10 10   EGFRNONAA 32.5* 36.5*         Significant Imaging: I have reviewed all pertinent imaging results/findings within the past 24 hours.      Assessment/Plan:      *  Decompensation of cirrhosis of liver  MELD-Na score: 26 at 3/22/2022  7:49 AM  MELD score: 25 at 3/22/2022  7:49 AM  Calculated from:  Serum Creatinine: 2.0 mg/dL at 3/22/2022  7:49 AM  Serum Sodium: 135 mmol/L at 3/22/2022  7:49 AM  Total Bilirubin: 3.9 mg/dL at 3/22/2022  7:49 AM  INR(ratio): 1.8 at 3/22/2022  5:12 AM  Age: 57 years  Secondary to LOZANO vs alcohol. Patient presents with volume overload and FAMILIA despite taking diuretics as prescribed. Patient's abdomen remains soft but his legs are extremely edematous. Patient seen in hepatology clinic and referred to inpatient for transplant eval. Patient's wife reports that at OSH they had given him albumin due to his FAMILIA and his Cr downtreded from 2.4 to 2.1  - Hepatology following  - Responded to albumin challenge initially; however with further HRS treatment, patients Cr has plateaued. Currently continuing HRS treatment with albumin, midodrine, and octreotide  - Continue lactulose and rifaximin for HE  - Interventional cardiology consulted for LHC, currently held off secondary to patients Cr being >2.  - Received paracentesis on 3/18 but fluid was not sent for analysis. Will empirically treat for SBP  - Volume - Patient with less LE edema but more abdominal distension from ascites since starting diuretics. May order paracentesis for tomorrow.     FAMILIA (acute kidney injury)  Either from intravascular depletion vs HRS. Patient was taking lasix/aldactone prior to admission at OSH where he had FAMILIA + hyperK. Aldactone and Kcl supp were discontinued. Patient is volume overloaded but renal function still not at baseline. Urine microscopy showed WBCs possibly indicating AIN. Hyperkalemia resolved  - Continue HRS treatment as above  - Avoid nephrotoxic medications      Other specified anemias  Baseline hemoglobin around 7-8. S/p 1u PRBC on 3/19.   - Hgb 6.8 today, will give 1u PRBC  - Transfuse for Hgb <7, platelets <50k in anticipation for LHC tomorrow      Ascites due to  alcoholic cirrhosis  Went for paracentesis with IR 3/18 with 3.6L removed  - May scheduled repeat para tomorrow.       Controlled type 2 diabetes mellitus, without long-term current use of insulin  Not on any insulin currently.   - A1c 6.5, down from 6.9  - LDSSI + POC AC/HS        VTE Risk Mitigation (From admission, onward)         Ordered     IP VTE HIGH RISK PATIENT  Once         03/17/22 1656     Place sequential compression device  Until discontinued         03/17/22 1656                Discharge Planning   EDITH: 3/24/2022     Code Status: Full Code   Is the patient medically ready for discharge?: No    Reason for patient still in hospital (select all that apply): Patient trending condition  Discharge Plan A: Home with family                  Jesica Lo MD  Department of Hospital Medicine   [facility] - Telemetry Stepdown

## 2022-03-22 NOTE — ASSESSMENT & PLAN NOTE
Baseline 1.4, currently Cr 2.1 -> 1.8 -> 2.2, worsened after paracentesis, suspected volume depletion superimposed to HRS.     Plan:  - continue with HRS treatment for now with midodrine, octreotide, and albumin, although urine studies are not largely  suggestive of HRS etiology, accompanied with lack of response to current HRS treatment.  - sCr improved to 2.0. Okay to proceed with [facility] for liver transplant preop eval from nephrology standpoint. Discussed benefits and risks with patient.   - continue oral lasix 40mg BID  - f/u I/O  - daily CMP, Mg, Phos  - renally dosing medications  - avoid nephrotoxic medications, NSAID, ACEi/ARB, IV contrast

## 2022-03-22 NOTE — SUBJECTIVE & OBJECTIVE
Interval History: No acute events overnight. Discussed cardiology, anesthesia, hepatology, and nephrology perspectives with patient and wife. Patient otherwise reported a rougher night with issues sleeping. He has more abdominal distension but its not causing extreme discomfort. He reports less LE edema. He is desiring a solution to hopefully getting himself listed for transplant. Patients hgb <7 but Cr improved to 2.0    Review of Systems   Constitutional:  Positive for activity change. Negative for chills and fever.   HENT:  Negative for congestion and sore throat.    Eyes:  Negative for photophobia and visual disturbance.   Respiratory:  Negative for cough and shortness of breath.    Cardiovascular:  Positive for leg swelling. Negative for chest pain and palpitations.   Gastrointestinal:  Positive for abdominal distention. Negative for abdominal pain, blood in stool, constipation, diarrhea, nausea and vomiting.   Endocrine: Negative for cold intolerance and heat intolerance.   Genitourinary:  Negative for dysuria and hematuria.   Musculoskeletal:  Negative for arthralgias and myalgias.   Skin:  Negative for rash and wound.   Allergic/Immunologic: Negative for environmental allergies and food allergies.   Neurological:  Negative for seizures and numbness.   Hematological:  Negative for adenopathy. Does not bruise/bleed easily.   Psychiatric/Behavioral:  Negative for hallucinations and suicidal ideas.    Objective:     Vital Signs (Most Recent):  Temp: 97.9 °F (36.6 °C) (03/22/22 1135)  Pulse: 82 (03/22/22 1135)  Resp: 18 (03/22/22 1135)  BP: 127/61 (03/22/22 1135)  SpO2: 95 % (03/22/22 1135)   Vital Signs (24h Range):  Temp:  [97.5 °F (36.4 °C)-99 °F (37.2 °C)] 97.9 °F (36.6 °C)  Pulse:  [71-93] 82  Resp:  [17-18] 18  SpO2:  [94 %-98 %] 95 %  BP: (113-127)/(56-86) 127/61     Weight: 117.9 kg (260 lb)  Body mass index is 40.72 kg/m².    Intake/Output Summary (Last 24 hours) at 3/22/2022 1232  Last data filed at  3/22/2022 0600  Gross per 24 hour   Intake 480 ml   Output 950 ml   Net -470 ml        Physical Exam  Constitutional:       Appearance: He is well-developed. He is ill-appearing.   HENT:      Head: Normocephalic and atraumatic.   Eyes:      General: Scleral icterus present.      Conjunctiva/sclera: Conjunctivae normal.      Pupils: Pupils are equal, round, and reactive to light.   Neck:      Thyroid: No thyromegaly.      Vascular: No JVD.   Cardiovascular:      Rate and Rhythm: Normal rate and regular rhythm.      Heart sounds: Normal heart sounds. No murmur heard.    No friction rub. No gallop.   Pulmonary:      Effort: Pulmonary effort is normal. No respiratory distress.      Breath sounds: Normal breath sounds. No wheezing or rales.   Abdominal:      General: Bowel sounds are normal. There is no distension.      Palpations: Abdomen is soft. There is no mass.      Tenderness: There is no abdominal tenderness. There is no guarding or rebound.      Hernia: No hernia is present.   Musculoskeletal:         General: Swelling present. No deformity. Normal range of motion.      Cervical back: Normal range of motion and neck supple.      Right lower leg: Edema present.      Left lower leg: Edema present.   Skin:     General: Skin is warm and dry.      Coloration: Skin is not jaundiced.   Neurological:      Mental Status: He is alert and oriented to person, place, and time.      Cranial Nerves: No cranial nerve deficit.   Psychiatric:         Behavior: Behavior normal.       Significant Labs: All pertinent labs within the past 24 hours have been reviewed.  CBC:   Recent Labs   Lab 03/21/22  0535 03/22/22  0512   WBC 6.22 6.02   HGB 7.3* 6.8*   HCT 21.5* 20.6*   PLT 55* 46*       CMP:   Recent Labs   Lab 03/21/22  0757 03/22/22  0749    135*   K 4.8 4.9   * 110   CO2 15* 15*   GLU 99 108   BUN 53* 53*   CREATININE 2.2* 2.0*   CALCIUM 8.7 8.7   PROT 6.1 6.2   ALBUMIN 3.9 4.1   BILITOT 4.0* 3.9*   ALKPHOS 45* 44*    AST 36 31   ALT 26 21   ANIONGAP 10 10   EGFRNONAA 32.5* 36.5*         Significant Imaging: I have reviewed all pertinent imaging results/findings within the past 24 hours.

## 2022-03-22 NOTE — PLAN OF CARE
Problem: Adult Inpatient Plan of Care  Goal: Plan of Care Review  Outcome: Ongoing, Progressing  Goal: Patient-Specific Goal (Individualized)  Outcome: Ongoing, Progressing  Goal: Absence of Hospital-Acquired Illness or Injury  Outcome: Ongoing, Progressing  Goal: Optimal Comfort and Wellbeing  Outcome: Ongoing, Progressing  Goal: Readiness for Transition of Care  Outcome: Ongoing, Progressing     Problem: Bariatric Environmental Safety  Goal: Safety Maintained with Care  Outcome: Ongoing, Progressing     Problem: Infection  Goal: Absence of Infection Signs and Symptoms  Outcome: Ongoing, Progressing     Problem: Diabetes Comorbidity  Goal: Blood Glucose Level Within Targeted Range  Outcome: Ongoing, Progressing     Problem: Fluid and Electrolyte Imbalance (Acute Kidney Injury/Impairment)  Goal: Fluid and Electrolyte Balance  Outcome: Ongoing, Progressing     Problem: Oral Intake Inadequate (Acute Kidney Injury/Impairment)  Goal: Optimal Nutrition Intake  Outcome: Ongoing, Progressing     Problem: Renal Function Impairment (Acute Kidney Injury/Impairment)  Goal: Effective Renal Function  Outcome: Ongoing, Progressing     Problem: Fall Injury Risk  Goal: Absence of Fall and Fall-Related Injury  Outcome: Ongoing, Progressing  He seems to be improved from yesterday.  No new complaints.  He did received 1 unit of PRBC's and tolerated this well.

## 2022-03-22 NOTE — PROGRESS NOTES
Hepatology Treatment Plan    Pelon Vasquez is a 57 y.o. male admitted to hospital 3/17/2022 (Hospital Day: 6) due to Decompensation of cirrhosis of liver.     Interval History  Cr down slightly to 2 today. The patient reports improvement in his LE swelling, but experienced shortness of breath when lays down flat.   Discussed with cardiology, his stress test showed no abnormalities and with his current Cr, risk of FAMILIA is high, therefore will re-discuss with anesthesia the need for LHC prior to transplant.    Objective  Temp:  [97.5 °F (36.4 °C)-99 °F (37.2 °C)] 98.2 °F (36.8 °C) (03/22 0758)  Pulse:  [71-93] 71 (03/22 0758)  BP: (113-128)/(56-66) 124/58 (03/22 0758)  Resp:  [17-18] 17 (03/22 0758)  SpO2:  [94 %-98 %] 96 % (03/22 0758)       Constitutional:  not in acute distress and well developed  HENT: Head: Normal, normocephalic, atraumatic.  Eyes: conjunctiva clear and sclera nonicteric  Cardiovascular: regular rate and rhythm  Respiratory: normal chest expansion & respiratory effort   and no accessory muscle use  GI: soft, non-tender, without masses or organomegaly  Musculoskeletal: no muscular tenderness noted  Skin: normal color  Neurological: alert, oriented x3  Psychiatric: mood and affect are within normal limits, pt is a good historian; no memory problems were noted      Laboratory    Lab Results   Component Value Date    WBC 6.02 03/22/2022    HGB 6.8 (L) 03/22/2022    HCT 20.6 (L) 03/22/2022    MCV 97 03/22/2022    PLT 46 (L) 03/22/2022       Lab Results   Component Value Date     (L) 03/22/2022    K 4.9 03/22/2022     03/22/2022    CO2 15 (L) 03/22/2022    BUN 53 (H) 03/22/2022    CREATININE 2.0 (H) 03/22/2022    CALCIUM 8.7 03/22/2022       Lab Results   Component Value Date    ALBUMIN 4.1 03/22/2022    ALT 21 03/22/2022    AST 31 03/22/2022    GGT 66 (H) 02/14/2022    ALKPHOS 44 (L) 03/22/2022    BILITOT 3.9 (H) 03/22/2022       Lab Results   Component Value Date    INR 1.8 (H) 03/22/2022     INR 1.9 (H) 03/21/2022    INR 1.8 (H) 03/20/2022       MELD-Na score: 26 at 3/22/2022  7:49 AM  MELD score: 25 at 3/22/2022  7:49 AM  Calculated from:  Serum Creatinine: 2.0 mg/dL at 3/22/2022  7:49 AM  Serum Sodium: 135 mmol/L at 3/22/2022  7:49 AM  Total Bilirubin: 3.9 mg/dL at 3/22/2022  7:49 AM  INR(ratio): 1.8 at 3/22/2022  5:12 AM  Age: 57 years     Problem list  1. Decompensated EtOH cirrhosis  2. FAMILIA  3. Liver transplant evaluation    56 yo M with decompensated EtOH cirrhosis (c/b HE, esophageal varices, ascites) admitted as direct admit from Hepatology clinic for FAMILIA, hyperK, and inpatient transplant evaluation. In regards to his cirrhosis, he was diagnosed in summer of 2021. He was told that it was due to EtOH + years of undiagnosed DM. He is a former drinker and drank approx 4-6 liquor drinks daily for 8 years. His last drink was approx 10 months ago since being diagnosed with cirrhosis. He has history of HE requiring ICU admission. He takes lactulose + rifaximin. He previously struggled with compliance with lactulose 2/2 nausea/vomiting + diarrhea but has since been compliant. He has refractory ascites and has multiple therapeutic paracenteses. He was prescribed spironolactone 150mg am + 100mg pm, lasix 40mg BID, and KCl 20 mEq prior to recent admission. EGD (2/25/22) showed moderate portal hypertensive gastropathy, small hiatal hernia, distal grade 1 esophageal varices (no sequela of recent bleeding). Denies h/o esophageal bleeding. He is currently being evaluated for LTx. Due to FMAILIA, will re discuss with anesthesia the need for LHC.     Plan  - continue HRS protocol  - appreciate nephrology input  - will re-present to selection committee tomorrow in regards to the need for LHC    Thank you for involving us in the care of Pelon Vasquez. Please call with any additional concerns or questions.    Ivonne Montoya MD  Gastroenterology Fellow

## 2022-03-22 NOTE — SUBJECTIVE & OBJECTIVE
Interval History: NAEON. Patient reports worsening abdominal distention and intermittent dyspnea. Remains on room air. UOP 1.5L over the past 24 hours. sCr slightly improving.     Review of patient's allergies indicates:   Allergen Reactions    Strawberry Anaphylaxis and Rash    Metformin Diarrhea     Current Facility-Administered Medications   Medication Frequency    0.9%  NaCl infusion (for blood administration) Q24H PRN    acetaminophen tablet 650 mg Q8H PRN    albumin human 25% bottle 50 g Daily    albuterol-ipratropium 2.5 mg-0.5 mg/3 mL nebulizer solution 3 mL Q6H PRN    aluminum-magnesium hydroxide-simethicone 200-200-20 mg/5 mL suspension 30 mL QID PRN    cefTRIAXone (ROCEPHIN) 1 g/50 mL D5W IVPB Q24H    dextrose 10% bolus 125 mL PRN    dextrose 10% bolus 250 mL PRN    furosemide tablet 40 mg BID    glucagon (human recombinant) injection 1 mg PRN    glucose chewable tablet 16 g PRN    glucose chewable tablet 24 g PRN    insulin aspart U-100 pen 0-5 Units QID (AC + HS) PRN    lactulose 20 gram/30 mL solution Soln 20 g TID    melatonin tablet 6 mg Nightly PRN    midodrine tablet 15 mg Q8H    naloxone 0.4 mg/mL injection 0.02 mg PRN    octreotide injection 100 mcg Q8H    ondansetron injection 4 mg Q8H PRN    pantoprazole EC tablet 40 mg Daily    prochlorperazine injection Soln 5 mg Q6H PRN    rifAXIMin tablet 550 mg BID    senna-docusate 8.6-50 mg per tablet 1 tablet BID    simethicone chewable tablet 80 mg QID PRN    sodium bicarbonate tablet 1,300 mg BID    sodium chloride 0.9% flush 10 mL Q8H PRN    zinc sulfate capsule 220 mg Daily       Objective:     Vital Signs (Most Recent):  Temp: 97.9 °F (36.6 °C) (03/22/22 1135)  Pulse: 82 (03/22/22 1135)  Resp: 18 (03/22/22 1135)  BP: 127/61 (03/22/22 1135)  SpO2: 95 % (03/22/22 1135)  O2 Device (Oxygen Therapy): room air (03/22/22 1135) Vital Signs (24h Range):  Temp:  [97.5 °F (36.4 °C)-99 °F (37.2 °C)] 97.9 °F (36.6 °C)  Pulse:  [71-93] 82  Resp:  [17-18]  18  SpO2:  [94 %-98 %] 95 %  BP: (113-128)/(56-86) 127/61     Weight: 117.9 kg (260 lb) (03/17/22 1537)  Body mass index is 40.72 kg/m².  Body surface area is 2.36 meters squared.    I/O last 3 completed shifts:  In: 480 [P.O.:480]  Out: 2200 [Urine:2200]    Physical Exam  Vitals and nursing note reviewed.   Constitutional:       General: He is not in acute distress.     Appearance: He is ill-appearing.   Eyes:      General: Scleral icterus present.   Cardiovascular:      Rate and Rhythm: Normal rate and regular rhythm.      Heart sounds: Normal heart sounds.   Pulmonary:      Effort: Pulmonary effort is normal. No respiratory distress.      Breath sounds: Normal breath sounds. No wheezing or rales.   Abdominal:      General: Abdomen is flat. There is distension.      Palpations: Abdomen is soft.      Tenderness: There is no abdominal tenderness.   Musculoskeletal:         General: Swelling present.      Right lower leg: Edema present.      Left lower leg: Edema present.   Skin:     Coloration: Skin is jaundiced and pale.   Neurological:      General: No focal deficit present.      Mental Status: He is alert and oriented to person, place, and time. Mental status is at baseline.   Psychiatric:         Mood and Affect: Mood normal.       Significant Labs:  BMP:   Recent Labs   Lab 03/22/22  0749      *   K 4.9      CO2 15*   BUN 53*   CREATININE 2.0*   CALCIUM 8.7     CBC:   Recent Labs   Lab 03/22/22  0512   WBC 6.02   RBC 2.13*   HGB 6.8*   HCT 20.6*   PLT 46*   MCV 97   MCH 31.9*   MCHC 33.0     CMP:   Recent Labs   Lab 03/22/22  0749      CALCIUM 8.7   ALBUMIN 4.1   PROT 6.2   *   K 4.9   CO2 15*      BUN 53*   CREATININE 2.0*   ALKPHOS 44*   ALT 21   AST 31   BILITOT 3.9*     All labs within the past 24 hours have been reviewed.     Significant Imaging:  No new images to review.

## 2022-03-23 ENCOUNTER — COMMITTEE REVIEW (OUTPATIENT)
Dept: TRANSPLANT | Facility: CLINIC | Age: 57
End: 2022-03-23
Payer: COMMERCIAL

## 2022-03-23 LAB
ALBUMIN SERPL BCP-MCNC: 3.6 G/DL (ref 3.5–5.2)
ALP SERPL-CCNC: 44 U/L (ref 55–135)
ALT SERPL W/O P-5'-P-CCNC: 21 U/L (ref 10–44)
ANION GAP SERPL CALC-SCNC: 8 MMOL/L (ref 8–16)
AST SERPL-CCNC: 30 U/L (ref 10–40)
BASOPHILS # BLD AUTO: 0.05 K/UL (ref 0–0.2)
BASOPHILS NFR BLD: 0.9 % (ref 0–1.9)
BILIRUB SERPL-MCNC: 4.2 MG/DL (ref 0.1–1)
BUN SERPL-MCNC: 48 MG/DL (ref 6–20)
CALCIUM SERPL-MCNC: 8.6 MG/DL (ref 8.7–10.5)
CHLORIDE SERPL-SCNC: 110 MMOL/L (ref 95–110)
CO2 SERPL-SCNC: 18 MMOL/L (ref 23–29)
CREAT SERPL-MCNC: 1.8 MG/DL (ref 0.5–1.4)
DIFFERENTIAL METHOD: ABNORMAL
EOSINOPHIL # BLD AUTO: 0.7 K/UL (ref 0–0.5)
EOSINOPHIL NFR BLD: 12.6 % (ref 0–8)
ERYTHROCYTE [DISTWIDTH] IN BLOOD BY AUTOMATED COUNT: 18 % (ref 11.5–14.5)
EST. GFR  (AFRICAN AMERICAN): 47.9 ML/MIN/1.73 M^2
EST. GFR  (NON AFRICAN AMERICAN): 41.4 ML/MIN/1.73 M^2
GLUCOSE SERPL-MCNC: 144 MG/DL (ref 70–110)
HCT VFR BLD AUTO: 23.8 % (ref 40–54)
HGB BLD-MCNC: 7.9 G/DL (ref 14–18)
IMM GRANULOCYTES # BLD AUTO: 0.01 K/UL (ref 0–0.04)
IMM GRANULOCYTES NFR BLD AUTO: 0.2 % (ref 0–0.5)
INR PPP: 1.8 (ref 0.8–1.2)
LYMPHOCYTES # BLD AUTO: 0.6 K/UL (ref 1–4.8)
LYMPHOCYTES NFR BLD: 11 % (ref 18–48)
MCH RBC QN AUTO: 31.3 PG (ref 27–31)
MCHC RBC AUTO-ENTMCNC: 33.2 G/DL (ref 32–36)
MCV RBC AUTO: 94 FL (ref 82–98)
MONOCYTES # BLD AUTO: 0.7 K/UL (ref 0.3–1)
MONOCYTES NFR BLD: 12.1 % (ref 4–15)
NEUTROPHILS # BLD AUTO: 3.6 K/UL (ref 1.8–7.7)
NEUTROPHILS NFR BLD: 63.2 % (ref 38–73)
NRBC BLD-RTO: 0 /100 WBC
PLATELET # BLD AUTO: 49 K/UL (ref 150–450)
PMV BLD AUTO: 10.6 FL (ref 9.2–12.9)
POCT GLUCOSE: 135 MG/DL (ref 70–110)
POCT GLUCOSE: 160 MG/DL (ref 70–110)
POCT GLUCOSE: 195 MG/DL (ref 70–110)
POCT GLUCOSE: 198 MG/DL (ref 70–110)
POCT GLUCOSE: 208 MG/DL (ref 70–110)
POTASSIUM SERPL-SCNC: 4.6 MMOL/L (ref 3.5–5.1)
PROT SERPL-MCNC: 5.9 G/DL (ref 6–8.4)
PROTHROMBIN TIME: 18.3 SEC (ref 9–12.5)
RBC # BLD AUTO: 2.52 M/UL (ref 4.6–6.2)
SODIUM SERPL-SCNC: 136 MMOL/L (ref 136–145)
WBC # BLD AUTO: 5.64 K/UL (ref 3.9–12.7)

## 2022-03-23 PROCEDURE — 25000242 PHARM REV CODE 250 ALT 637 W/ HCPCS: Mod: NTX | Performed by: STUDENT IN AN ORGANIZED HEALTH CARE EDUCATION/TRAINING PROGRAM

## 2022-03-23 PROCEDURE — 25000003 PHARM REV CODE 250: Mod: NTX | Performed by: STUDENT IN AN ORGANIZED HEALTH CARE EDUCATION/TRAINING PROGRAM

## 2022-03-23 PROCEDURE — P9047 ALBUMIN (HUMAN), 25%, 50ML: HCPCS | Mod: JG,NTX | Performed by: STUDENT IN AN ORGANIZED HEALTH CARE EDUCATION/TRAINING PROGRAM

## 2022-03-23 PROCEDURE — 99232 SBSQ HOSP IP/OBS MODERATE 35: CPT | Mod: NTX,,, | Performed by: STUDENT IN AN ORGANIZED HEALTH CARE EDUCATION/TRAINING PROGRAM

## 2022-03-23 PROCEDURE — 63600175 PHARM REV CODE 636 W HCPCS: Mod: JG,NTX | Performed by: FAMILY MEDICINE

## 2022-03-23 PROCEDURE — 99232 PR SUBSEQUENT HOSPITAL CARE,LEVL II: ICD-10-PCS | Mod: NTX,,, | Performed by: STUDENT IN AN ORGANIZED HEALTH CARE EDUCATION/TRAINING PROGRAM

## 2022-03-23 PROCEDURE — 85610 PROTHROMBIN TIME: CPT | Mod: NTX | Performed by: STUDENT IN AN ORGANIZED HEALTH CARE EDUCATION/TRAINING PROGRAM

## 2022-03-23 PROCEDURE — 99900035 HC TECH TIME PER 15 MIN (STAT): Mod: NTX

## 2022-03-23 PROCEDURE — 99232 SBSQ HOSP IP/OBS MODERATE 35: CPT | Mod: NTX,,, | Performed by: INTERNAL MEDICINE

## 2022-03-23 PROCEDURE — 99233 SBSQ HOSP IP/OBS HIGH 50: CPT | Mod: NTX,,, | Performed by: INTERNAL MEDICINE

## 2022-03-23 PROCEDURE — 63600175 PHARM REV CODE 636 W HCPCS: Mod: NTX | Performed by: STUDENT IN AN ORGANIZED HEALTH CARE EDUCATION/TRAINING PROGRAM

## 2022-03-23 PROCEDURE — 99233 PR SUBSEQUENT HOSPITAL CARE,LEVL III: ICD-10-PCS | Mod: NTX,,, | Performed by: INTERNAL MEDICINE

## 2022-03-23 PROCEDURE — 36415 COLL VENOUS BLD VENIPUNCTURE: CPT | Mod: NTX | Performed by: STUDENT IN AN ORGANIZED HEALTH CARE EDUCATION/TRAINING PROGRAM

## 2022-03-23 PROCEDURE — P9047 ALBUMIN (HUMAN), 25%, 50ML: HCPCS | Mod: JG,NTX | Performed by: FAMILY MEDICINE

## 2022-03-23 PROCEDURE — 94640 AIRWAY INHALATION TREATMENT: CPT | Mod: NTX

## 2022-03-23 PROCEDURE — 20600001 HC STEP DOWN PRIVATE ROOM: Mod: NTX

## 2022-03-23 PROCEDURE — 85025 COMPLETE CBC W/AUTO DIFF WBC: CPT | Mod: NTX | Performed by: STUDENT IN AN ORGANIZED HEALTH CARE EDUCATION/TRAINING PROGRAM

## 2022-03-23 PROCEDURE — 80053 COMPREHEN METABOLIC PANEL: CPT | Mod: NTX | Performed by: STUDENT IN AN ORGANIZED HEALTH CARE EDUCATION/TRAINING PROGRAM

## 2022-03-23 PROCEDURE — 25000003 PHARM REV CODE 250: Mod: NTX | Performed by: HOSPITALIST

## 2022-03-23 PROCEDURE — 99232 PR SUBSEQUENT HOSPITAL CARE,LEVL II: ICD-10-PCS | Mod: NTX,,, | Performed by: INTERNAL MEDICINE

## 2022-03-23 RX ORDER — ALBUMIN HUMAN 250 G/1000ML
SOLUTION INTRAVENOUS
Status: COMPLETED | OUTPATIENT
Start: 2022-03-23 | End: 2022-03-23

## 2022-03-23 RX ORDER — ALBUMIN HUMAN 250 G/1000ML
SOLUTION INTRAVENOUS
Status: DISPENSED
Start: 2022-03-23 | End: 2022-03-24

## 2022-03-23 RX ADMIN — LACTULOSE 20 G: 20 SOLUTION ORAL at 05:03

## 2022-03-23 RX ADMIN — OCTREOTIDE ACETATE 100 MCG: 100 INJECTION, SOLUTION INTRAVENOUS; SUBCUTANEOUS at 09:03

## 2022-03-23 RX ADMIN — Medication 6 MG: at 09:03

## 2022-03-23 RX ADMIN — PANTOPRAZOLE SODIUM 40 MG: 40 TABLET, DELAYED RELEASE ORAL at 08:03

## 2022-03-23 RX ADMIN — RIFAXIMIN 550 MG: 550 TABLET ORAL at 08:03

## 2022-03-23 RX ADMIN — LACTULOSE 20 G: 20 SOLUTION ORAL at 09:03

## 2022-03-23 RX ADMIN — IPRATROPIUM BROMIDE AND ALBUTEROL SULFATE 3 ML: 2.5; .5 SOLUTION RESPIRATORY (INHALATION) at 10:03

## 2022-03-23 RX ADMIN — MIDODRINE HYDROCHLORIDE 15 MG: 5 TABLET ORAL at 06:03

## 2022-03-23 RX ADMIN — SENNOSIDES AND DOCUSATE SODIUM 1 TABLET: 50; 8.6 TABLET ORAL at 09:03

## 2022-03-23 RX ADMIN — MIDODRINE HYDROCHLORIDE 15 MG: 5 TABLET ORAL at 05:03

## 2022-03-23 RX ADMIN — SODIUM BICARBONATE 650 MG TABLET 1300 MG: at 09:03

## 2022-03-23 RX ADMIN — LACTULOSE 20 G: 20 SOLUTION ORAL at 08:03

## 2022-03-23 RX ADMIN — OCTREOTIDE ACETATE 100 MCG: 100 INJECTION, SOLUTION INTRAVENOUS; SUBCUTANEOUS at 06:03

## 2022-03-23 RX ADMIN — ALBUMIN (HUMAN) 50 G: 12.5 SOLUTION INTRAVENOUS at 06:03

## 2022-03-23 RX ADMIN — FUROSEMIDE 40 MG: 40 TABLET ORAL at 08:03

## 2022-03-23 RX ADMIN — CEFTRIAXONE 1 G: 1 INJECTION, SOLUTION INTRAVENOUS at 12:03

## 2022-03-23 RX ADMIN — SENNOSIDES AND DOCUSATE SODIUM 1 TABLET: 50; 8.6 TABLET ORAL at 08:03

## 2022-03-23 RX ADMIN — MIDODRINE HYDROCHLORIDE 15 MG: 5 TABLET ORAL at 09:03

## 2022-03-23 RX ADMIN — SODIUM BICARBONATE 650 MG TABLET 1300 MG: at 08:03

## 2022-03-23 RX ADMIN — INSULIN ASPART 2 UNITS: 100 INJECTION, SOLUTION INTRAVENOUS; SUBCUTANEOUS at 05:03

## 2022-03-23 RX ADMIN — RIFAXIMIN 550 MG: 550 TABLET ORAL at 09:03

## 2022-03-23 RX ADMIN — FUROSEMIDE 40 MG: 40 TABLET ORAL at 05:03

## 2022-03-23 RX ADMIN — ZINC SULFATE 220 MG (50 MG) CAPSULE 220 MG: CAPSULE at 08:03

## 2022-03-23 RX ADMIN — ALBUMIN (HUMAN) 25 G: 25 SOLUTION INTRAVENOUS at 03:03

## 2022-03-23 RX ADMIN — OCTREOTIDE ACETATE 100 MCG: 100 INJECTION, SOLUTION INTRAVENOUS; SUBCUTANEOUS at 05:03

## 2022-03-23 NOTE — ASSESSMENT & PLAN NOTE
Baseline hemoglobin around 7-8. S/p 2u PRBC (3/19 and 3/23).   - Hgb 7.9 today  - Transfuse for Hgb <7, platelets <50k if signs of bleeding

## 2022-03-23 NOTE — COMMITTEE REVIEW
Pelon Vasquez's case presented to selection committee.  Patient has been accepted for liver transplant due to LOZANO / ZAHEER and complications of end stage liver disease including Hyperbilirubinemia, Hypoalbuminemia, Severe malnutrition, Coagulopathy, Hepatic Encephalopathy, Thrombocytopenia, Ascites, , Portal Hypertension, Splenomegaly, Jaundice, Hyponatremia, h/o Pleural effusion, Edema, Fatigue, Anemia, Muscle wasting, Anasarca and esophageal varices with a MELD score of 25.  Patient has no absolute contraindications for liver transplant.  Patient will be listed pending financial approval and normal Cardiac PET Stress test.     Patient will accept HBcAb positive livers.  Patient will accept HCVAB positive livers.  Patient will accept DCD livers.  Patient will accept HCV MATHEUS positive livers  Patient will accept HBV MATHEUS positive livers  I was present at the committee meeting and attest to the decision of the committee.    Tito Mcclure  03/23/2022

## 2022-03-23 NOTE — SUBJECTIVE & OBJECTIVE
Interval History: NAEON. sCr improving to 1.8. UOP 2L over the past 24 hours. Pending repeat paracentesis today.      Review of patient's allergies indicates:   Allergen Reactions    Strawberry Anaphylaxis and Rash    Metformin Diarrhea     Current Facility-Administered Medications   Medication Frequency    0.9%  NaCl infusion (for blood administration) Q24H PRN    acetaminophen tablet 650 mg Q8H PRN    albumin human 25% bottle 50 g Daily    albuterol-ipratropium 2.5 mg-0.5 mg/3 mL nebulizer solution 3 mL Q6H PRN    aluminum-magnesium hydroxide-simethicone 200-200-20 mg/5 mL suspension 30 mL QID PRN    dextrose 10% bolus 125 mL PRN    dextrose 10% bolus 250 mL PRN    furosemide tablet 40 mg BID    glucagon (human recombinant) injection 1 mg PRN    glucose chewable tablet 16 g PRN    glucose chewable tablet 24 g PRN    insulin aspart U-100 pen 0-5 Units QID (AC + HS) PRN    lactulose 20 gram/30 mL solution Soln 20 g TID    melatonin tablet 6 mg Nightly PRN    midodrine tablet 15 mg Q8H    naloxone 0.4 mg/mL injection 0.02 mg PRN    octreotide injection 100 mcg Q8H    ondansetron injection 4 mg Q8H PRN    pantoprazole EC tablet 40 mg Daily    prochlorperazine injection Soln 5 mg Q6H PRN    rifAXIMin tablet 550 mg BID    senna-docusate 8.6-50 mg per tablet 1 tablet BID    simethicone chewable tablet 80 mg QID PRN    sodium bicarbonate tablet 1,300 mg BID    sodium chloride 0.9% flush 10 mL Q8H PRN    zinc sulfate capsule 220 mg Daily       Objective:     Vital Signs (Most Recent):  Temp: 98.7 °F (37.1 °C) (03/23/22 0752)  Pulse: 87 (03/23/22 0752)  Resp: 18 (03/23/22 0752)  BP: (!) 120/59 (03/23/22 0752)  SpO2: 97 % (03/23/22 0752)  O2 Device (Oxygen Therapy): room air (03/23/22 0752)   Vital Signs (24h Range):  Temp:  [97.7 °F (36.5 °C)-98.7 °F (37.1 °C)] 98.7 °F (37.1 °C)  Pulse:  [84-87] 87  Resp:  [15-20] 18  SpO2:  [95 %-100 %] 97 %  BP: (114-130)/(55-66) 120/59     Weight: 117.9 kg (260 lb) (03/17/22 1537)  Body  mass index is 40.72 kg/m².  Body surface area is 2.36 meters squared.    I/O last 3 completed shifts:  In: 480 [P.O.:480]  Out: 2600 [Urine:2600]    Physical Exam  Vitals and nursing note reviewed.   Constitutional:       General: He is not in acute distress.     Appearance: He is ill-appearing.   Eyes:      General: Scleral icterus present.   Cardiovascular:      Rate and Rhythm: Normal rate and regular rhythm.      Heart sounds: Normal heart sounds.   Pulmonary:      Effort: Pulmonary effort is normal. No respiratory distress.      Breath sounds: Normal breath sounds. No wheezing or rales.   Abdominal:      General: Abdomen is flat. Bowel sounds are normal. There is distension.      Palpations: Abdomen is soft.      Tenderness: There is no abdominal tenderness.   Musculoskeletal:         General: Swelling present. No tenderness.      Right lower leg: Edema present.      Left lower leg: Edema present.   Skin:     Coloration: Skin is jaundiced and pale.   Neurological:      General: No focal deficit present.      Mental Status: He is alert and oriented to person, place, and time. Mental status is at baseline.   Psychiatric:         Mood and Affect: Mood normal.       Significant Labs:  BMP:   Recent Labs   Lab 03/23/22 0430   *      K 4.6      CO2 18*   BUN 48*   CREATININE 1.8*   CALCIUM 8.6*     CBC:   Recent Labs   Lab 03/23/22 0430   WBC 5.64   RBC 2.52*   HGB 7.9*   HCT 23.8*   PLT 49*   MCV 94   MCH 31.3*   MCHC 33.2     CMP:   Recent Labs   Lab 03/23/22 0430   *   CALCIUM 8.6*   ALBUMIN 3.6   PROT 5.9*      K 4.6   CO2 18*      BUN 48*   CREATININE 1.8*   ALKPHOS 44*   ALT 21   AST 30   BILITOT 4.2*     All labs within the past 24 hours have been reviewed.     Significant Imaging:  No new images to review.

## 2022-03-23 NOTE — H&P
Inpatient Radiology Pre-procedure Note    History of Present Illness:  Pelon Vasquez is a 57 y.o. male who presents for ultrasound guided paracentesis.  Admission H&P reviewed.  Past Medical History:   Diagnosis Date    Arthritis     Cirrhosis of liver     HTN (hypertension)     BRAYDON (obstructive sleep apnea)     Secondary esophageal varices without bleeding 3/18/2022    EGD (2/25/22) showed moderate portal hypertensive gastropathy, small hiatal hernia, grade 1 esophageal varices    Type 2 diabetes mellitus      Past Surgical History:   Procedure Laterality Date    COLONOSCOPY      FINGER AMPUTATION      MEDIAL COLLATERAL LIGAMENT REPAIR, KNEE Bilateral     ROTATRO CUFF REPAIR      TONSILLECTOMY         Review of Systems:   As documented in primary team H&P    Home Meds:   Prior to Admission medications    Medication Sig Start Date End Date Taking? Authorizing Provider   acetaminophen (TYLENOL) 325 MG tablet Take 325 mg by mouth every 6 (six) hours as needed for Pain (or headache).   Yes Historical Provider   atorvastatin (LIPITOR) 20 MG tablet Take 20 mg by mouth once daily. 1/14/22  Yes Historical Provider   furosemide (LASIX) 40 MG tablet Take 40 mg by mouth 2 (two) times a day. 1/20/22  Yes Historical Provider   lactulose (CHRONULAC) 10 gram/15 mL solution Take 30 mLs by mouth 3 (three) times daily. 3/2/22  Yes Historical Provider   ondansetron (ZOFRAN-ODT) 4 MG TbDL Take 1 tablet by mouth every 8 (eight) hours as needed (NAUSEA). 3/2/22  Yes Historical Provider   pantoprazole (PROTONIX) 40 MG tablet Take 40 mg by mouth once daily. 10/20/21 4/18/22 Yes Historical Provider   triamcinolone acetonide 0.1% (KENALOG) 0.1 % cream Apply topically 2 (two) times daily as needed. 2/17/22  Yes Historical Provider   vitamin A palmitate 7,500 mcg (25,000 unit) Cap Take 900 mcg by mouth once daily. 2/21/22  Yes Tito Mcclure MD   XIFAXAN 550 mg Tab Take 550 mg by mouth 2 (two) times daily. 12/10/21  Yes  Historical Provider   zinc sulfate 50 mg zinc (220 mg) Tab tablet Take 1 tablet (220 mg total) by mouth once daily. 2/21/22  Yes Tito Mcclure MD     Scheduled Meds:    albumin human 25%  50 g Intravenous Daily    furosemide  40 mg Oral BID    lactulose  20 g Oral TID    midodrine  15 mg Oral Q8H    octreotide  100 mcg Subcutaneous Q8H    pantoprazole  40 mg Oral Daily    rifAXIMin  550 mg Oral BID    senna-docusate 8.6-50 mg  1 tablet Oral BID    sodium bicarbonate  1,300 mg Oral BID    zinc sulfate  220 mg Oral Daily     Continuous Infusions:   PRN Meds:sodium chloride, acetaminophen, albuterol-ipratropium, aluminum-magnesium hydroxide-simethicone, dextrose 10%, dextrose 10%, glucagon (human recombinant), glucose, glucose, insulin aspart U-100, melatonin, naloxone, ondansetron, prochlorperazine, simethicone, sodium chloride 0.9%  Anticoagulants/Antiplatelets: Heparin    Allergies:   Review of patient's allergies indicates:   Allergen Reactions    Strawberry Anaphylaxis and Rash    Metformin Diarrhea     Sedation Hx: have not been any systemic reactions    Vitals:  Temp: 98.7 °F (37.1 °C) (03/23/22 0752)  Pulse: 80 (03/23/22 1423)  Resp: 18 (03/23/22 1423)  BP: 127/72 (03/23/22 1423)  SpO2: 97 % (03/23/22 1423)     Physical Exam:  ASA: 3  Mallampati: n/a    General: no acute distress  Mental Status: alert and oriented to person, place and time  HEENT: normocephalic, atraumatic  Chest: unlabored breathing  Heart: regular heart rate  Abdomen: distended  Extremity: moves all extremities    Plan: ultrasound guided paracentesis  Sedation Plan: local    VIN Brito, FNP  Interventional Radiology  [phone] clinic

## 2022-03-23 NOTE — ASSESSMENT & PLAN NOTE
Baseline 1.4, currently Cr 2.1 -> 1.8 -> 2.2, worsened after paracentesis, suspected volume depletion superimposed to HRS.     Plan:  - continue with HRS treatment for now with midodrine, octreotide, and albumin, although urine studies are not largely  suggestive of HRS etiology, accompanied with lack of response to current HRS treatment.  - sCr improved to 1.8. Okay to proceed with [facility] for liver transplant preop eval from nephrology standpoint. Discussed benefits and risks with patient.   - continue oral lasix 40mg BID  - f/u I/O  - daily CMP, Mg, Phos  - renally dosing medications  - avoid nephrotoxic medications, NSAID, ACEi/ARB, IV contrast

## 2022-03-23 NOTE — PROGRESS NOTES
[facility] - Telemetry Stepdown  Nephrology  Progress Note    Patient Name: Pelon Vasquez  MRN: [id number]  Admission Date: 3/17/2022  Hospital Length of Stay: 6 days  Attending Provider: Jesica Lo MD   Primary Care Physician: Primary Doctor No  Principal Problem:Decompensation of cirrhosis of liver    Subjective:     HPI: 56 yo M with PMHx of cirrhosis c/b ascites was admitted for liver transplant evaluation and renal failure. Pt was recently hospitalized at OSH for ascites as well as acute kidney failure and hyperkalemia, where he had a potassium of 7 and Cr of 2.3~. Also pt will need prerequisite [facility] evaluation prior to being listed for transplant. Primary care team suspected the current FAMILIA was 2/2 HRS and started relevant treatment. His renal function was improved temporarily and worsened again after paracentesis. Nephrology was consulted for pre-C optimization of renal function. Pt complained about abdominal distension and severe lower extremity edema, denied SOB, chest pain, dysuria, decreased UOP or change in voiding frequency.           Interval History: NAEON. sCr improving to 1.8. UOP 2L over the past 24 hours. Pending repeat paracentesis today.      Review of patient's allergies indicates:   Allergen Reactions    Strawberry Anaphylaxis and Rash    Metformin Diarrhea     Current Facility-Administered Medications   Medication Frequency    0.9%  NaCl infusion (for blood administration) Q24H PRN    acetaminophen tablet 650 mg Q8H PRN    albumin human 25% bottle 50 g Daily    albuterol-ipratropium 2.5 mg-0.5 mg/3 mL nebulizer solution 3 mL Q6H PRN    aluminum-magnesium hydroxide-simethicone 200-200-20 mg/5 mL suspension 30 mL QID PRN    dextrose 10% bolus 125 mL PRN    dextrose 10% bolus 250 mL PRN    furosemide tablet 40 mg BID    glucagon (human recombinant) injection 1 mg PRN    glucose chewable tablet 16 g PRN    glucose chewable tablet 24 g PRN    insulin aspart U-100 pen 0-5 Units QID  (AC + HS) PRN    lactulose 20 gram/30 mL solution Soln 20 g TID    melatonin tablet 6 mg Nightly PRN    midodrine tablet 15 mg Q8H    naloxone 0.4 mg/mL injection 0.02 mg PRN    octreotide injection 100 mcg Q8H    ondansetron injection 4 mg Q8H PRN    pantoprazole EC tablet 40 mg Daily    prochlorperazine injection Soln 5 mg Q6H PRN    rifAXIMin tablet 550 mg BID    senna-docusate 8.6-50 mg per tablet 1 tablet BID    simethicone chewable tablet 80 mg QID PRN    sodium bicarbonate tablet 1,300 mg BID    sodium chloride 0.9% flush 10 mL Q8H PRN    zinc sulfate capsule 220 mg Daily       Objective:     Vital Signs (Most Recent):  Temp: 98.7 °F (37.1 °C) (03/23/22 0752)  Pulse: 87 (03/23/22 0752)  Resp: 18 (03/23/22 0752)  BP: (!) 120/59 (03/23/22 0752)  SpO2: 97 % (03/23/22 0752)  O2 Device (Oxygen Therapy): room air (03/23/22 0752)   Vital Signs (24h Range):  Temp:  [97.7 °F (36.5 °C)-98.7 °F (37.1 °C)] 98.7 °F (37.1 °C)  Pulse:  [84-87] 87  Resp:  [15-20] 18  SpO2:  [95 %-100 %] 97 %  BP: (114-130)/(55-66) 120/59     Weight: 117.9 kg (260 lb) (03/17/22 1537)  Body mass index is 40.72 kg/m².  Body surface area is 2.36 meters squared.    I/O last 3 completed shifts:  In: 480 [P.O.:480]  Out: 2600 [Urine:2600]    Physical Exam  Vitals and nursing note reviewed.   Constitutional:       General: He is not in acute distress.     Appearance: He is ill-appearing.   Eyes:      General: Scleral icterus present.   Cardiovascular:      Rate and Rhythm: Normal rate and regular rhythm.      Heart sounds: Normal heart sounds.   Pulmonary:      Effort: Pulmonary effort is normal. No respiratory distress.      Breath sounds: Normal breath sounds. No wheezing or rales.   Abdominal:      General: Abdomen is flat. Bowel sounds are normal. There is distension.      Palpations: Abdomen is soft.      Tenderness: There is no abdominal tenderness.   Musculoskeletal:         General: Swelling present. No tenderness.      Right  lower leg: Edema present.      Left lower leg: Edema present.   Skin:     Coloration: Skin is jaundiced and pale.   Neurological:      General: No focal deficit present.      Mental Status: He is alert and oriented to person, place, and time. Mental status is at baseline.   Psychiatric:         Mood and Affect: Mood normal.       Significant Labs:  BMP:   Recent Labs   Lab 03/23/22 0430   *      K 4.6      CO2 18*   BUN 48*   CREATININE 1.8*   CALCIUM 8.6*     CBC:   Recent Labs   Lab 03/23/22 0430   WBC 5.64   RBC 2.52*   HGB 7.9*   HCT 23.8*   PLT 49*   MCV 94   MCH 31.3*   MCHC 33.2     CMP:   Recent Labs   Lab 03/23/22 0430   *   CALCIUM 8.6*   ALBUMIN 3.6   PROT 5.9*      K 4.6   CO2 18*      BUN 48*   CREATININE 1.8*   ALKPHOS 44*   ALT 21   AST 30   BILITOT 4.2*     All labs within the past 24 hours have been reviewed.     Significant Imaging:  No new images to review.    Assessment/Plan:     * Decompensation of cirrhosis of liver  Management per hepatology and primary team    FAMILIA (acute kidney injury)  Baseline 1.4, currently Cr 2.1 -> 1.8 -> 2.2, worsened after paracentesis, suspected volume depletion superimposed to HRS.     Plan:  - continue with HRS treatment for now with midodrine, octreotide, and albumin, although urine studies are not largely  suggestive of HRS etiology, accompanied with lack of response to current HRS treatment.  - sCr improved to 1.8. Okay to proceed with [facility] for liver transplant preop eval from nephrology standpoint. Discussed benefits and risks with patient.   - continue oral lasix 40mg BID  - f/u I/O  - daily CMP, Mg, Phos  - renally dosing medications  - avoid nephrotoxic medications, NSAID, ACEi/ARB, IV contrast        Thank you for your consult. I will follow-up with patient. Please contact us if you have any additional questions.    Wilman Colon MD  Nephrology  [facility] - Telemetry Stepdown    ATTENDING PHYSICIAN ATTESTATION  I have  personally verified the history and examined the patient. I thoroughly reviewed the demographic, clinical, laboratorial and imaging information available in medical records. I agree with the assessment and recommendations provided by the subspecialty resident who was under my supervision.

## 2022-03-23 NOTE — PROCEDURES
Radiology Post-Procedure Note    Pre Op Diagnosis: Ascites  Post Op Diagnosis: Same    Procedure: Ultrasound Guided Paracentesis    Procedure performed by: Chong RODGERS, Katia     Written Informed Consent Obtained: Yes  Specimen Removed: YES clear yellow  Estimated Blood Loss: Minimal    Findings:   Successful paracentesis.  Albumin administered PRN per protocol.    Patient tolerated procedure well.    Katia Schwarz, APRN, FNP  Interventional Radiology  [phone] clinic

## 2022-03-23 NOTE — PLAN OF CARE
Paracentesis done. Removed 4700 ml. Albumin 25g administered. Patient awake and alert, no distress noted, respirations even and unlabored.   Vitals:    03/23/22 1530   BP: 118/60   Pulse: 85   Resp: 18   Temp:

## 2022-03-23 NOTE — PLAN OF CARE
Patient awake and alert, no distress noted, respirations even and unlabored. Allergies reviewed. Waiting for eval and consent.  Vitals:    03/23/22 1423   BP: 127/72   Pulse: 80   Resp: 18   Temp:

## 2022-03-23 NOTE — PLAN OF CARE
[facility] - Telemetry Stepdown  Discharge Reassessment    Primary Care Provider: Primary Doctor No    Expected Discharge Date: 3/24/2022    Reassessment (most recent)     Discharge Reassessment - 03/23/22 0916        Discharge Reassessment    Assessment Type Discharge Planning Reassessment     Did the patient's condition or plan change since previous assessment? No     Discharge Plan discussed with: Spouse/sig other;Patient     Discharge Plan A Home with family     Discharge Plan B Home Health     DME Needed Upon Discharge  --   TBD    Discharge Barriers Identified None     Why the patient remains in the hospital Requires continued medical care        Post-Acute Status    Hospital Resources/Appts/Education Provided --   Patient's PCP is Maryan Garcia DO at [facility] in [place]    Discharge Delays None known at this time               Yudy Umaña LMSW  [facility]  Ext. [phone]

## 2022-03-23 NOTE — ASSESSMENT & PLAN NOTE
MELD-Na score: 25 at 3/23/2022  4:30 AM  MELD score: 24 at 3/23/2022  4:30 AM  Calculated from:  Serum Creatinine: 1.8 mg/dL at 3/23/2022  4:30 AM  Serum Sodium: 136 mmol/L at 3/23/2022  4:30 AM  Total Bilirubin: 4.2 mg/dL at 3/23/2022  4:30 AM  INR(ratio): 1.8 at 3/23/2022  4:30 AM  Age: 57 years  Secondary to LOZANO vs alcohol. Patient presents with volume overload and FAMILIA despite taking diuretics as prescribed. Patient's abdomen remains soft but his legs are extremely edematous. Patient seen in hepatology clinic and referred to inpatient for transplant eval. Patient's wife reports that at OSH they had given him albumin due to his FAMILIA and his Cr downtreded from 2.4 to 2.1  - Hepatology following  - Responded to albumin challenge initially; however with further HRS treatment, patients Cr has plateaued. Currently continuing HRS treatment with albumin, midodrine, and octreotide  - Continue lactulose and rifaximin for HE  - Interventional cardiology consulted for [facility], currently held off secondary to patients Cr. Anesthesia requesting possible PET stress instead.  - Received paracentesis on 3/18 but fluid was not sent for analysis. Will empirically treat for SBP  - Volume - Patient with less LE edema but more abdominal distension from ascites since starting diuretics. Patient to go for paracentesis today. Continue lasix 40mg BID

## 2022-03-23 NOTE — PROGRESS NOTES
[facility] - Telemetry [facility] Medicine  Progress Note    Patient Name: Pelon Vasquez  MRN: [id number]  Patient Class: IP- Inpatient   Admission Date: 3/17/2022  Length of Stay: 6 days  Attending Physician: Jesica Lo MD  Primary Care Provider: Primary Doctor No        Subjective:     Principal Problem:Decompensation of cirrhosis of liver        HPI:  58 y/o M with a PMH of cirrhosis c/b ascites who presents for liver transplant evaluation and renal failure.    Patient was seen in hepatology clinic this morning where he mentioned to staff that he was recently hospitalized for ascites as well as acute kidney failure and hyperkalemia. He was discharged yesterday and made his way to his hepatology appointment. Patient reports that he had not been feeling all to well and that his ascites reaccumulated hence his presentation to OSH. There he had a potassium of 7 and Cr of 2.3~. It was believed to be potassium supplementation + aldactone in the setting of worsening renal function that caused hyperkalemia. Furthermore, he saw his cardiologist due to questions if he needed LHC prior to being listed for transplant or not. Per their discussion, they would like his renal function to return to his baseline before LHC    Patient being admitted for hyperkalemia/FAMILIA/transplant w/u.        Overview/Hospital Course:  Renal function worsened post paracentesis despite albumin. Nephrology consulted recommending continuing HRS treatment. Urine was spun showed some WBCs indicating possible AIN. Cr down to 2.0. Cardiology holding off on LHC due to patients other cardiac workup not indicating need for LHC. Anesthesia requesting PET in lieu of LHC given issues with renal function. Patient to have it scheduled outpatient.      No new subjective & objective note has been filed under this hospital service since the last note was generated.      Assessment/Plan:      * Decompensation of cirrhosis of liver  MELD-Na score: 25 at  3/23/2022  4:30 AM  MELD score: 24 at 3/23/2022  4:30 AM  Calculated from:  Serum Creatinine: 1.8 mg/dL at 3/23/2022  4:30 AM  Serum Sodium: 136 mmol/L at 3/23/2022  4:30 AM  Total Bilirubin: 4.2 mg/dL at 3/23/2022  4:30 AM  INR(ratio): 1.8 at 3/23/2022  4:30 AM  Age: 57 years  Secondary to LOZANO vs alcohol. Patient presents with volume overload and FAMILIA despite taking diuretics as prescribed. Patient's abdomen remains soft but his legs are extremely edematous. Patient seen in hepatology clinic and referred to inpatient for transplant eval. Patient's wife reports that at OSH they had given him albumin due to his FAMILIA and his Cr downtreded from 2.4 to 2.1  - Hepatology following  - Responded to albumin challenge initially; however with further HRS treatment, patients Cr has plateaued. Currently continuing HRS treatment with albumin, midodrine, and octreotide  - Continue lactulose and rifaximin for HE  - Interventional cardiology consulted for LHC, currently held off secondary to patients Cr. Anesthesia requesting possible PET stress instead.  - Received paracentesis on 3/18 but fluid was not sent for analysis. Will empirically treat for SBP  - Volume - Patient with less LE edema but more abdominal distension from ascites since starting diuretics. Patient to go for paracentesis today. Continue lasix 40mg BID    FAMILIA (acute kidney injury)  Either from intravascular depletion vs HRS. Patient was taking lasix/aldactone prior to admission at OSH where he had FAMILIA + hyperK. Aldactone and Kcl supp were discontinued. Patient is volume overloaded but renal function still not at baseline. Urine microscopy showed WBCs possibly indicating AIN. Hyperkalemia resolved  - Continue HRS treatment as above  - Avoid nephrotoxic medications      Other specified anemias  Baseline hemoglobin around 7-8. S/p 2u PRBC (3/19 and 3/23).   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      Controlled type 2 diabetes mellitus, without long-term current use of insulin  Not on any insulin currently.   - A1c 6.5, down from 6.9  - LDSSI + POC AC/HS        VTE Risk Mitigation (From admission, onward)         Ordered     IP VTE HIGH RISK PATIENT  Once         03/17/22 1656     Place sequential compression device  Until discontinued         03/17/22 1656                Discharge Planning   EDITH: 3/24/2022     Code Status: Full Code   Is the patient medically ready for discharge?: No    Reason for patient still in hospital (select all that apply): Patient trending condition  Discharge Plan A: Home with family   Discharge Delays: None known at this time              Jesica Lo MD  Department of Hospital Medicine   [facility] - Telemetry Stepdown

## 2022-03-23 NOTE — PROGRESS NOTES
Hepatology Treatment Plan    Pelon Vasquez is a 57 y.o. male admitted to hospital 3/17/2022 (Hospital Day: 7) due to Decompensation of cirrhosis of liver.     Interval History  Creatinine continues to improve. The patient reports improvement in his LE swelling, worsening abdominal distention associated with shortness of breath, plan for paracentesis today  Discussed with cardiology, his stress test showed no abnormalities and with his current Cr, risk of FAMILIA is high, therefore will re-discuss with anesthesia the need for LHC prior to transplant.    Objective  Temp:  [97.7 °F (36.5 °C)-98.7 °F (37.1 °C)] 98.7 °F (37.1 °C) (03/23 0752)  Pulse:  [82-87] 87 (03/23 0752)  BP: (114-130)/(55-66) 120/59 (03/23 0752)  Resp:  [15-20] 18 (03/23 0752)  SpO2:  [95 %-100 %] 97 % (03/23 0752)       Constitutional:  not in acute distress and well developed  HENT: Head: Normal, normocephalic, atraumatic.  Eyes: conjunctiva clear and sclera nonicteric  Cardiovascular: regular rate and rhythm  Respiratory: normal chest expansion & respiratory effort   and no accessory muscle use  GI: soft, non-tender, without masses or organomegaly  Musculoskeletal: no muscular tenderness noted  Skin: normal color, +1 peripheral edema  Neurological: alert, oriented x3  Psychiatric: mood and affect are within normal limits, pt is a good historian; no memory problems were noted      Laboratory    Lab Results   Component Value Date    WBC 5.64 03/23/2022    HGB 7.9 (L) 03/23/2022    HCT 23.8 (L) 03/23/2022    MCV 94 03/23/2022    PLT 49 (L) 03/23/2022       Lab Results   Component Value Date     03/23/2022    K 4.6 03/23/2022     03/23/2022    CO2 18 (L) 03/23/2022    BUN 48 (H) 03/23/2022    CREATININE 1.8 (H) 03/23/2022    CALCIUM 8.6 (L) 03/23/2022       Lab Results   Component Value Date    ALBUMIN 3.6 03/23/2022    ALT 21 03/23/2022    AST 30 03/23/2022    GGT 66 (H) 02/14/2022    ALKPHOS 44 (L) 03/23/2022    BILITOT 4.2 (H) 03/23/2022        Lab Results   Component Value Date    INR 1.8 (H) 03/23/2022    INR 1.8 (H) 03/22/2022    INR 1.9 (H) 03/21/2022       MELD-Na score: 25 at 3/23/2022  4:30 AM  MELD score: 24 at 3/23/2022  4:30 AM  Calculated from:  Serum Creatinine: 1.8 mg/dL at 3/23/2022  4:30 AM  Serum Sodium: 136 mmol/L at 3/23/2022  4:30 AM  Total Bilirubin: 4.2 mg/dL at 3/23/2022  4:30 AM  INR(ratio): 1.8 at 3/23/2022  4:30 AM  Age: 57 years     Problem list  1. Decompensated EtOH cirrhosis  2. FAMILIA  3. Liver transplant evaluation    58 yo M with decompensated EtOH cirrhosis (c/b HE, esophageal varices, ascites) admitted as direct admit from Hepatology clinic for FAMILIA, hyperK, and inpatient transplant evaluation. In regards to his cirrhosis, he was diagnosed in summer of 2021. He was told that it was due to EtOH + years of undiagnosed DM. He is a former drinker and drank approx 4-6 liquor drinks daily for 8 years. His last drink was approx 10 months ago since being diagnosed with cirrhosis. He has history of HE requiring ICU admission. He takes lactulose + rifaximin. He previously struggled with compliance with lactulose 2/2 nausea/vomiting + diarrhea but has since been compliant. He has refractory ascites and has multiple therapeutic paracenteses. He was prescribed spironolactone 150mg am + 100mg pm, lasix 40mg BID, and KCl 20 mEq prior to recent admission. EGD (2/25/22) showed moderate portal hypertensive gastropathy, small hiatal hernia, distal grade 1 esophageal varices (no sequela of recent bleeding). Denies h/o esophageal bleeding. He is currently being evaluated for LTx. Due to FAMILIA, will re discuss with anesthesia the need for LHC.     Plan  - continue HRS protocol  - appreciate nephrology input  - will re-present to selection committee in regards to the need for LHC and his transplant candidacy  - agree with periodic paracentesis for ascites    Thank you for involving us in the care of Pelon Vasquez. Please call with any additional  concerns or questions.    Ivonne Montoya MD  Gastroenterology Fellow

## 2022-03-24 VITALS
TEMPERATURE: 97 F | HEART RATE: 91 BPM | HEIGHT: 67 IN | DIASTOLIC BLOOD PRESSURE: 61 MMHG | RESPIRATION RATE: 14 BRPM | WEIGHT: 260 LBS | BODY MASS INDEX: 40.81 KG/M2 | OXYGEN SATURATION: 96 % | SYSTOLIC BLOOD PRESSURE: 132 MMHG

## 2022-03-24 LAB
ALBUMIN SERPL BCP-MCNC: 3.9 G/DL (ref 3.5–5.2)
ALP SERPL-CCNC: 45 U/L (ref 55–135)
ALT SERPL W/O P-5'-P-CCNC: 18 U/L (ref 10–44)
ANION GAP SERPL CALC-SCNC: 10 MMOL/L (ref 8–16)
AST SERPL-CCNC: 29 U/L (ref 10–40)
BASOPHILS # BLD AUTO: 0.05 K/UL (ref 0–0.2)
BASOPHILS NFR BLD: 1 % (ref 0–1.9)
BILIRUB SERPL-MCNC: 4.3 MG/DL (ref 0.1–1)
BUN SERPL-MCNC: 40 MG/DL (ref 6–20)
CALCIUM SERPL-MCNC: 8.9 MG/DL (ref 8.7–10.5)
CFR FLOW - ANTERIOR: 1.38
CFR FLOW - INFERIOR: 1.19
CFR FLOW - LATERAL: 1.3
CFR FLOW - MAX: 1.61
CFR FLOW - MIN: 1.05
CFR FLOW - SEPTAL: 1.26
CFR FLOW - WHOLE HEART: 1.28
CHLORIDE SERPL-SCNC: 110 MMOL/L (ref 95–110)
CO2 SERPL-SCNC: 18 MMOL/L (ref 23–29)
CREAT SERPL-MCNC: 1.6 MG/DL (ref 0.5–1.4)
CV PHARM DOSE: 0.4 MG
CV STRESS BASE HR: 88 BPM
DIASTOLIC BLOOD PRESSURE: 68 MMHG
DIFFERENTIAL METHOD: ABNORMAL
EJECTION FRACTION- HIGH: 65 %
END DIASTOLIC INDEX-HIGH: 153 ML/M2
END DIASTOLIC INDEX-LOW: 93 ML/M2
END SYSTOLIC INDEX-HIGH: 71 ML/M2
END SYSTOLIC INDEX-LOW: 31 ML/M2
EOSINOPHIL # BLD AUTO: 0.7 K/UL (ref 0–0.5)
EOSINOPHIL NFR BLD: 13 % (ref 0–8)
ERYTHROCYTE [DISTWIDTH] IN BLOOD BY AUTOMATED COUNT: 18.6 % (ref 11.5–14.5)
EST. GFR  (AFRICAN AMERICAN): 55.2 ML/MIN/1.73 M^2
EST. GFR  (NON AFRICAN AMERICAN): 47.8 ML/MIN/1.73 M^2
GLUCOSE SERPL-MCNC: 160 MG/DL (ref 70–110)
HCT VFR BLD AUTO: 24.3 % (ref 40–54)
HGB BLD-MCNC: 8 G/DL (ref 14–18)
IMM GRANULOCYTES # BLD AUTO: 0.01 K/UL (ref 0–0.04)
IMM GRANULOCYTES NFR BLD AUTO: 0.2 % (ref 0–0.5)
INR PPP: 1.9 (ref 0.8–1.2)
LYMPHOCYTES # BLD AUTO: 0.6 K/UL (ref 1–4.8)
LYMPHOCYTES NFR BLD: 11.6 % (ref 18–48)
MCH RBC QN AUTO: 30.9 PG (ref 27–31)
MCHC RBC AUTO-ENTMCNC: 32.9 G/DL (ref 32–36)
MCV RBC AUTO: 94 FL (ref 82–98)
MONOCYTES # BLD AUTO: 0.7 K/UL (ref 0.3–1)
MONOCYTES NFR BLD: 13 % (ref 4–15)
NEUTROPHILS # BLD AUTO: 3.2 K/UL (ref 1.8–7.7)
NEUTROPHILS NFR BLD: 61.2 % (ref 38–73)
NRBC BLD-RTO: 0 /100 WBC
NUC REST DIASTOLIC VOLUME INDEX: 128
NUC REST EJECTION FRACTION: 72
NUC REST SYSTOLIC VOLUME INDEX: 36
NUC STRESS DIASTOLIC VOLUME INDEX: 131
NUC STRESS EJECTION FRACTION: 76 %
NUC STRESS SYSTOLIC VOLUME INDEX: 31
OHS CV CPX 85 PERCENT MAX PREDICTED HEART RATE MALE: 140
OHS CV CPX MAX PREDICTED HEART RATE: 165
OHS CV CPX PATIENT IS FEMALE: 0
OHS CV CPX PATIENT IS MALE: 1
OHS CV CPX PEAK DIASTOLIC BLOOD PRESSURE: 52 MMHG
OHS CV CPX PEAK HEAR RATE: 89 BPM
OHS CV CPX PEAK RATE PRESSURE PRODUCT: NORMAL
OHS CV CPX PEAK SYSTOLIC BLOOD PRESSURE: 131 MMHG
OHS CV CPX PERCENT MAX PREDICTED HEART RATE ACHIEVED: 54
OHS CV CPX RATE PRESSURE PRODUCT PRESENTING: NORMAL
PLATELET # BLD AUTO: 42 K/UL (ref 150–450)
PMV BLD AUTO: 10.3 FL (ref 9.2–12.9)
POCT GLUCOSE: 172 MG/DL (ref 70–110)
POTASSIUM SERPL-SCNC: 4.1 MMOL/L (ref 3.5–5.1)
PROT SERPL-MCNC: 6 G/DL (ref 6–8.4)
PROTHROMBIN TIME: 19.6 SEC (ref 9–12.5)
RBC # BLD AUTO: 2.59 M/UL (ref 4.6–6.2)
REST FLOW - ANTERIOR: 1.13 CC/MIN/G
REST FLOW - INFERIOR: 1.1 CC/MIN/G
REST FLOW - LATERAL: 1.12 CC/MIN/G
REST FLOW - MAX: 1.55 CC/MIN/G
REST FLOW - MIN: 0.68 CC/MIN/G
REST FLOW - SEPTAL: 0.81 CC/MIN/G
REST FLOW - WHOLE HEART: 1.04 CC/MIN/G
RETIRED EF AND QEF - SEE NOTES: 53 %
SODIUM SERPL-SCNC: 138 MMOL/L (ref 136–145)
STRESS FLOW - ANTERIOR: 1.56 CC/MIN/G
STRESS FLOW - INFERIOR: 1.3 CC/MIN/G
STRESS FLOW - LATERAL: 1.46 CC/MIN/G
STRESS FLOW - MAX: 2.12 CC/MIN/G
STRESS FLOW - MIN: 0.85 CC/MIN/G
STRESS FLOW - SEPTAL: 1.01 CC/MIN/G
STRESS FLOW - WHOLE HEART: 1.33 CC/MIN/G
SYSTOLIC BLOOD PRESSURE: 149 MMHG
WBC # BLD AUTO: 5.24 K/UL (ref 3.9–12.7)

## 2022-03-24 PROCEDURE — 99900035 HC TECH TIME PER 15 MIN (STAT): Mod: NTX

## 2022-03-24 PROCEDURE — 63600175 PHARM REV CODE 636 W HCPCS: Mod: NTX | Performed by: HOSPITALIST

## 2022-03-24 PROCEDURE — P9047 ALBUMIN (HUMAN), 25%, 50ML: HCPCS | Mod: JG,NTX | Performed by: STUDENT IN AN ORGANIZED HEALTH CARE EDUCATION/TRAINING PROGRAM

## 2022-03-24 PROCEDURE — 85610 PROTHROMBIN TIME: CPT | Mod: NTX | Performed by: STUDENT IN AN ORGANIZED HEALTH CARE EDUCATION/TRAINING PROGRAM

## 2022-03-24 PROCEDURE — 99231 SBSQ HOSP IP/OBS SF/LOW 25: CPT | Mod: NTX,,, | Performed by: INTERNAL MEDICINE

## 2022-03-24 PROCEDURE — 63600175 PHARM REV CODE 636 W HCPCS: Mod: JB,NTX | Performed by: STUDENT IN AN ORGANIZED HEALTH CARE EDUCATION/TRAINING PROGRAM

## 2022-03-24 PROCEDURE — 85025 COMPLETE CBC W/AUTO DIFF WBC: CPT | Mod: NTX | Performed by: STUDENT IN AN ORGANIZED HEALTH CARE EDUCATION/TRAINING PROGRAM

## 2022-03-24 PROCEDURE — 94761 N-INVAS EAR/PLS OXIMETRY MLT: CPT | Mod: NTX

## 2022-03-24 PROCEDURE — 80053 COMPREHEN METABOLIC PANEL: CPT | Mod: NTX | Performed by: STUDENT IN AN ORGANIZED HEALTH CARE EDUCATION/TRAINING PROGRAM

## 2022-03-24 PROCEDURE — 25000003 PHARM REV CODE 250: Mod: NTX | Performed by: HOSPITALIST

## 2022-03-24 PROCEDURE — 25000003 PHARM REV CODE 250: Mod: NTX | Performed by: STUDENT IN AN ORGANIZED HEALTH CARE EDUCATION/TRAINING PROGRAM

## 2022-03-24 PROCEDURE — 36415 COLL VENOUS BLD VENIPUNCTURE: CPT | Mod: NTX | Performed by: STUDENT IN AN ORGANIZED HEALTH CARE EDUCATION/TRAINING PROGRAM

## 2022-03-24 PROCEDURE — 99231 PR SUBSEQUENT HOSPITAL CARE,LEVL I: ICD-10-PCS | Mod: NTX,,, | Performed by: INTERNAL MEDICINE

## 2022-03-24 RX ORDER — SODIUM BICARBONATE 650 MG/1
1300 TABLET ORAL 2 TIMES DAILY
Qty: 120 TABLET | Refills: 11 | Status: SHIPPED | OUTPATIENT
Start: 2022-03-24 | End: 2022-04-28

## 2022-03-24 RX ORDER — REGADENOSON 0.08 MG/ML
0.4 INJECTION, SOLUTION INTRAVENOUS ONCE
Status: COMPLETED | OUTPATIENT
Start: 2022-03-24 | End: 2022-03-24

## 2022-03-24 RX ORDER — MIDODRINE HYDROCHLORIDE 5 MG/1
15 TABLET ORAL EVERY 8 HOURS
Qty: 270 TABLET | Refills: 11 | Status: SHIPPED | OUTPATIENT
Start: 2022-03-24 | End: 2022-04-28

## 2022-03-24 RX ADMIN — OCTREOTIDE ACETATE 100 MCG: 100 INJECTION, SOLUTION INTRAVENOUS; SUBCUTANEOUS at 05:03

## 2022-03-24 RX ADMIN — ZINC SULFATE 220 MG (50 MG) CAPSULE 220 MG: CAPSULE at 09:03

## 2022-03-24 RX ADMIN — RIFAXIMIN 550 MG: 550 TABLET ORAL at 09:03

## 2022-03-24 RX ADMIN — REGADENOSON 0.4 MG: 0.08 INJECTION, SOLUTION INTRAVENOUS at 11:03

## 2022-03-24 RX ADMIN — ALBUMIN (HUMAN) 50 G: 12.5 SOLUTION INTRAVENOUS at 05:03

## 2022-03-24 RX ADMIN — SODIUM BICARBONATE 650 MG TABLET 1300 MG: at 09:03

## 2022-03-24 RX ADMIN — MIDODRINE HYDROCHLORIDE 15 MG: 5 TABLET ORAL at 02:03

## 2022-03-24 RX ADMIN — LACTULOSE 20 G: 20 SOLUTION ORAL at 09:03

## 2022-03-24 RX ADMIN — MIDODRINE HYDROCHLORIDE 15 MG: 5 TABLET ORAL at 05:03

## 2022-03-24 RX ADMIN — FUROSEMIDE 40 MG: 40 TABLET ORAL at 09:03

## 2022-03-24 RX ADMIN — PANTOPRAZOLE SODIUM 40 MG: 40 TABLET, DELAYED RELEASE ORAL at 09:03

## 2022-03-24 RX ADMIN — SENNOSIDES AND DOCUSATE SODIUM 1 TABLET: 50; 8.6 TABLET ORAL at 09:03

## 2022-03-24 NOTE — PLAN OF CARE
[facility] - Telemetry Stepdown  Discharge Final Note    Primary Care Provider: Primary Doctor No    Expected Discharge Date: 3/24/2022    Final Discharge Note (most recent)     Final Note - 03/24/22 1442        Final Note    Assessment Type Final Discharge Note     Anticipated Discharge Disposition Home or Self Care     Hospital Resources/Appts/Education Provided Appointments scheduled and added to AVS        Post-Acute Status    Post-Acute Authorization Other     Other Status No Post-Acute Service Needs     Discharge Delays None known at this time                 Contact Info     No, Primary Doctor   Relationship: PCP - General        Next Steps: Follow up    Instructions: Established pt's hospital f/u visit on 3/30/22 @ 9:00am. Please bring discharge summary, ID, insurance card, and medication list.   [address], [place], [place]  [phone]    Hepatology Clinic - [facility]   Specialty: Hepatology    [address]  [facility] [address]   Phone: [phone]       Next Steps: Schedule an appointment as soon as possible for a visit        Yudy Umaña LMSW  [facility]  Ext. [phone]

## 2022-03-24 NOTE — PROGRESS NOTES
IV sites have been dc'd per protocol with both tips intact. Both patient and spouse have verbalized understanding to discharge instructions. Patient escorted off unit in stable condition via WC per patient transporter.

## 2022-03-24 NOTE — SUBJECTIVE & OBJECTIVE
Interval History: NAEON. Patient with good UOP. sCr improved to 1.6. Pending cardiac PET stress today per anesthesia request. Patient tolerated 4.7L ascitic fluid removal yesterday.     Review of patient's allergies indicates:   Allergen Reactions    Strawberry Anaphylaxis and Rash    Metformin Diarrhea     Current Facility-Administered Medications   Medication Frequency    0.9%  NaCl infusion (for blood administration) Q24H PRN    acetaminophen tablet 650 mg Q8H PRN    albuterol-ipratropium 2.5 mg-0.5 mg/3 mL nebulizer solution 3 mL Q6H PRN    aluminum-magnesium hydroxide-simethicone 200-200-20 mg/5 mL suspension 30 mL QID PRN    dextrose 10% bolus 125 mL PRN    dextrose 10% bolus 250 mL PRN    furosemide tablet 40 mg BID    glucagon (human recombinant) injection 1 mg PRN    glucose chewable tablet 16 g PRN    glucose chewable tablet 24 g PRN    insulin aspart U-100 pen 0-5 Units QID (AC + HS) PRN    lactulose 20 gram/30 mL solution Soln 20 g TID    melatonin tablet 6 mg Nightly PRN    midodrine tablet 15 mg Q8H    naloxone 0.4 mg/mL injection 0.02 mg PRN    ondansetron injection 4 mg Q8H PRN    pantoprazole EC tablet 40 mg Daily    prochlorperazine injection Soln 5 mg Q6H PRN    rifAXIMin tablet 550 mg BID    senna-docusate 8.6-50 mg per tablet 1 tablet BID    simethicone chewable tablet 80 mg QID PRN    sodium bicarbonate tablet 1,300 mg BID    sodium chloride 0.9% flush 10 mL Q8H PRN    zinc sulfate capsule 220 mg Daily       Objective:     Vital Signs (Most Recent):  Temp: 97 °F (36.1 °C) (03/24/22 1143)  Pulse: 91 (03/24/22 1143)  Resp: 14 (03/24/22 1143)  BP: 132/61 (03/24/22 1143)  SpO2: 96 % (03/24/22 1143)  O2 Device (Oxygen Therapy): room air (03/23/22 2223) Vital Signs (24h Range):  Temp:  [97 °F (36.1 °C)-98.2 °F (36.8 °C)] 97 °F (36.1 °C)  Pulse:  [80-95] 91  Resp:  [14-18] 14  SpO2:  [95 %-100 %] 96 %  BP: (118-149)/(60-72) 132/61     Weight: 117.9 kg (260 lb) (03/24/22 1105)  Body mass index is 40.72  kg/m².  Body surface area is 2.36 meters squared.    I/O last 3 completed shifts:  In: -   Out: 7150 [Urine:2450; Other:4700]    Physical Exam  Vitals and nursing note reviewed.   Constitutional:       General: He is not in acute distress.     Appearance: He is ill-appearing.   Eyes:      General: Scleral icterus present.   Cardiovascular:      Rate and Rhythm: Normal rate and regular rhythm.      Heart sounds: Normal heart sounds.   Pulmonary:      Effort: Pulmonary effort is normal. No respiratory distress.      Breath sounds: Normal breath sounds. No wheezing or rales.   Abdominal:      General: Abdomen is flat. Bowel sounds are normal. There is distension.      Palpations: Abdomen is soft.      Tenderness: There is no abdominal tenderness.   Musculoskeletal:         General: Swelling present. No tenderness.      Right lower leg: Edema present.      Left lower leg: Edema present.   Skin:     General: Skin is warm.      Coloration: Skin is jaundiced. Skin is not pale.   Neurological:      General: No focal deficit present.      Mental Status: He is alert and oriented to person, place, and time. Mental status is at baseline.   Psychiatric:         Mood and Affect: Mood normal.       Significant Labs:  BMP:   Recent Labs   Lab 03/24/22 0409   *      K 4.1      CO2 18*   BUN 40*   CREATININE 1.6*   CALCIUM 8.9     CBC:   Recent Labs   Lab 03/24/22 0409   WBC 5.24   RBC 2.59*   HGB 8.0*   HCT 24.3*   PLT 42*   MCV 94   MCH 30.9   MCHC 32.9     CMP:   Recent Labs   Lab 03/24/22 0409   *   CALCIUM 8.9   ALBUMIN 3.9   PROT 6.0      K 4.1   CO2 18*      BUN 40*   CREATININE 1.6*   ALKPHOS 45*   ALT 18   AST 29   BILITOT 4.3*     All labs within the past 24 hours have been reviewed.     Significant Imaging:  No new images to review

## 2022-03-24 NOTE — PLAN OF CARE
Problem: Adult Inpatient Plan of Care  Goal: Plan of Care Review  Outcome: Ongoing, Progressing  Goal: Patient-Specific Goal (Individualized)  Outcome: Ongoing, Progressing  Goal: Absence of Hospital-Acquired Illness or Injury  Outcome: Ongoing, Progressing  Goal: Optimal Comfort and Wellbeing  Outcome: Ongoing, Progressing  Goal: Readiness for Transition of Care  Outcome: Ongoing, Progressing     Problem: Bariatric Environmental Safety  Goal: Safety Maintained with Care  Outcome: Ongoing, Progressing     Problem: Infection  Goal: Absence of Infection Signs and Symptoms  Outcome: Ongoing, Progressing     Problem: Diabetes Comorbidity  Goal: Blood Glucose Level Within Targeted Range  Outcome: Ongoing, Progressing     Problem: Fluid and Electrolyte Imbalance (Acute Kidney Injury/Impairment)  Goal: Fluid and Electrolyte Balance  Outcome: Ongoing, Progressing     Problem: Oral Intake Inadequate (Acute Kidney Injury/Impairment)  Goal: Optimal Nutrition Intake  Outcome: Ongoing, Progressing     Problem: Renal Function Impairment (Acute Kidney Injury/Impairment)  Goal: Effective Renal Function  Outcome: Ongoing, Progressing     Problem: Fall Injury Risk  Goal: Absence of Fall and Fall-Related Injury  Outcome: Ongoing, Progressing

## 2022-03-24 NOTE — PROGRESS NOTES
[facility] - Telemetry Stepdown  Nephrology  Progress Note    Patient Name: Pelon Vasquez  MRN: [id number]  Admission Date: 3/17/2022  Hospital Length of Stay: 7 days  Attending Provider: Indiana Dutton MD   Primary Care Physician: Primary Doctor No  Principal Problem:Decompensation of cirrhosis of liver    Subjective:     HPI: 58 yo M with PMHx of cirrhosis c/b ascites was admitted for liver transplant evaluation and renal failure. Pt was recently hospitalized at OSH for ascites as well as acute kidney failure and hyperkalemia, where he had a potassium of 7 and Cr of 2.3~. Also pt will need prerequisite C evaluation prior to being listed for transplant. Primary care team suspected the current FMAILIA was 2/2 HRS and started relevant treatment. His renal function was improved temporarily and worsened again after paracentesis. Nephrology was consulted for pre-C optimization of renal function. Pt complained about abdominal distension and severe lower extremity edema, denied SOB, chest pain, dysuria, decreased UOP or change in voiding frequency.           Interval History: NAEON. Patient with good UOP. sCr improved to 1.6. Pending cardiac PET stress today per anesthesia request. Patient tolerated 4.7L ascitic fluid removal yesterday.     Review of patient's allergies indicates:   Allergen Reactions    Strawberry Anaphylaxis and Rash    Metformin Diarrhea     Current Facility-Administered Medications   Medication Frequency    0.9%  NaCl infusion (for blood administration) Q24H PRN    acetaminophen tablet 650 mg Q8H PRN    albuterol-ipratropium 2.5 mg-0.5 mg/3 mL nebulizer solution 3 mL Q6H PRN    aluminum-magnesium hydroxide-simethicone 200-200-20 mg/5 mL suspension 30 mL QID PRN    dextrose 10% bolus 125 mL PRN    dextrose 10% bolus 250 mL PRN    furosemide tablet 40 mg BID    glucagon (human recombinant) injection 1 mg PRN    glucose chewable tablet 16 g PRN    glucose chewable tablet 24 g PRN    insulin  aspart U-100 pen 0-5 Units QID (AC + HS) PRN    lactulose 20 gram/30 mL solution Soln 20 g TID    melatonin tablet 6 mg Nightly PRN    midodrine tablet 15 mg Q8H    naloxone 0.4 mg/mL injection 0.02 mg PRN    ondansetron injection 4 mg Q8H PRN    pantoprazole EC tablet 40 mg Daily    prochlorperazine injection Soln 5 mg Q6H PRN    rifAXIMin tablet 550 mg BID    senna-docusate 8.6-50 mg per tablet 1 tablet BID    simethicone chewable tablet 80 mg QID PRN    sodium bicarbonate tablet 1,300 mg BID    sodium chloride 0.9% flush 10 mL Q8H PRN    zinc sulfate capsule 220 mg Daily       Objective:     Vital Signs (Most Recent):  Temp: 97 °F (36.1 °C) (03/24/22 1143)  Pulse: 91 (03/24/22 1143)  Resp: 14 (03/24/22 1143)  BP: 132/61 (03/24/22 1143)  SpO2: 96 % (03/24/22 1143)  O2 Device (Oxygen Therapy): room air (03/23/22 2223) Vital Signs (24h Range):  Temp:  [97 °F (36.1 °C)-98.2 °F (36.8 °C)] 97 °F (36.1 °C)  Pulse:  [80-95] 91  Resp:  [14-18] 14  SpO2:  [95 %-100 %] 96 %  BP: (118-149)/(60-72) 132/61     Weight: 117.9 kg (260 lb) (03/24/22 1105)  Body mass index is 40.72 kg/m².  Body surface area is 2.36 meters squared.    I/O last 3 completed shifts:  In: -   Out: 7150 [Urine:2450; Other:4700]    Physical Exam  Vitals and nursing note reviewed.   Constitutional:       General: He is not in acute distress.     Appearance: He is ill-appearing.   Eyes:      General: Scleral icterus present.   Cardiovascular:      Rate and Rhythm: Normal rate and regular rhythm.      Heart sounds: Normal heart sounds.   Pulmonary:      Effort: Pulmonary effort is normal. No respiratory distress.      Breath sounds: Normal breath sounds. No wheezing or rales.   Abdominal:      General: Abdomen is flat. Bowel sounds are normal. There is distension.      Palpations: Abdomen is soft.      Tenderness: There is no abdominal tenderness.   Musculoskeletal:         General: Swelling present. No tenderness.      Right lower leg: Edema  present.      Left lower leg: Edema present.   Skin:     General: Skin is warm.      Coloration: Skin is jaundiced. Skin is not pale.   Neurological:      General: No focal deficit present.      Mental Status: He is alert and oriented to person, place, and time. Mental status is at baseline.   Psychiatric:         Mood and Affect: Mood normal.       Significant Labs:  BMP:   Recent Labs   Lab 03/24/22  0409   *      K 4.1      CO2 18*   BUN 40*   CREATININE 1.6*   CALCIUM 8.9     CBC:   Recent Labs   Lab 03/24/22  0409   WBC 5.24   RBC 2.59*   HGB 8.0*   HCT 24.3*   PLT 42*   MCV 94   MCH 30.9   MCHC 32.9     CMP:   Recent Labs   Lab 03/24/22  0409   *   CALCIUM 8.9   ALBUMIN 3.9   PROT 6.0      K 4.1   CO2 18*      BUN 40*   CREATININE 1.6*   ALKPHOS 45*   ALT 18   AST 29   BILITOT 4.3*     All labs within the past 24 hours have been reviewed.     Significant Imaging:  No new images to review    Assessment/Plan:     * Decompensation of cirrhosis of liver  Management per hepatology and primary team    FAMILIA (acute kidney injury)  Baseline 1.4, currently Cr 2.1 -> 1.8 -> 2.2, worsened after paracentesis, suspected volume depletion vs HRS.    Plan:  - Discontinue albumin. Can continue midodrine, octreotide for now  -  Renal function continues to improve (sCr improved to 1.6)  - continue oral lasix 40mg BID  - f/u I/O  - daily CMP, Mg, Phos  - renally dosing medications  - avoid nephrotoxic medications, NSAID, ACEi/ARB, IV contrast      Thank you for your consult. I will follow-up with patient. Please contact us if you have any additional questions.    Wilman Colon MD  Nephrology  [facility] - Telemetry Stepdown    ATTENDING PHYSICIAN ATTESTATION  I have personally verified the history and examined the patient. I thoroughly reviewed the demographic, clinical, laboratorial and imaging information available in medical records. I agree with the assessment and recommendations provided by  the subspecialty resident who was under my supervision.

## 2022-03-24 NOTE — ASSESSMENT & PLAN NOTE
Baseline 1.4, currently Cr 2.1 -> 1.8 -> 2.2, worsened after paracentesis, suspected volume depletion vs HRS.    Plan:  - Discontinue albumin. Can continue midodrine, octreotide for now  -  Renal function continues to improve (sCr improved to 1.6)  - continue oral lasix 40mg BID  - f/u I/O  - daily CMP, Mg, Phos  - renally dosing medications  - avoid nephrotoxic medications, NSAID, ACEi/ARB, IV contrast

## 2022-03-25 ENCOUNTER — PATIENT OUTREACH (OUTPATIENT)
Dept: ADMINISTRATIVE | Facility: CLINIC | Age: 57
End: 2022-03-25
Payer: COMMERCIAL

## 2022-03-25 ENCOUNTER — PATIENT MESSAGE (OUTPATIENT)
Dept: ADMINISTRATIVE | Facility: CLINIC | Age: 57
End: 2022-03-25
Payer: COMMERCIAL

## 2022-03-25 LAB — POCT GLUCOSE: 210 MG/DL (ref 70–110)

## 2022-03-25 NOTE — PROGRESS NOTES
C3 nurse attempted to contact Pelon Vasquez for a TCC post hospital discharge follow up call. No answer. Left voicemail with callback information. Message sent via [facility] portal for Post Discharge Attempt. The patient has a scheduled HOSFU appointment with [facility] on 3/30/2022 @ 0900 per AVS note. PCP not on file.

## 2022-03-28 ENCOUNTER — TELEPHONE (OUTPATIENT)
Dept: TRANSPLANT | Facility: CLINIC | Age: 57
End: 2022-03-28
Payer: COMMERCIAL

## 2022-03-28 DIAGNOSIS — K74.60 LIVER CIRRHOSIS SECONDARY TO NASH: ICD-10-CM

## 2022-03-28 DIAGNOSIS — Z76.82 ORGAN TRANSPLANT CANDIDATE: Primary | ICD-10-CM

## 2022-03-28 DIAGNOSIS — K75.81 LIVER CIRRHOSIS SECONDARY TO NASH: ICD-10-CM

## 2022-03-28 NOTE — TELEPHONE ENCOUNTER
----- Message from Awa Manzo sent at 3/28/2022  8:46 AM CDT -----  Regarding: FW: Appt request/Referral check  Contact: Jules daughter    Returned call to pt and told him we are waiting on finical clr for liver transplant. Johanna'ed pt post hosp d/c'ed appts for 3/31.  .  ----- Message -----  From: Akash Feng  Sent: 3/28/2022   8:16 AM CDT  To: [facility] Pre-Liver Transplant Non-Clinical  Subject: Appt request/Referral check                      Pt's daughter is calling to check the status of her father's referral and proceed with scheduling.      [phone] pt #  [phone] daughter Jules

## 2022-03-28 NOTE — PROGRESS NOTES
C3 nurse attempted to contact Pelon Vasquez for a TCC post hospital discharge follow up call. No answer. Left voicemail with callback information. Message sent via [facility] portal for Post Discharge Attempt. The patient has a scheduled HOSFU appointment with [facility] on 3/30/2022 @ 0900 per AVS note. PCP not on file.

## 2022-03-28 NOTE — PROGRESS NOTES
C3 nurse spoke with Pelon Vasquez's SO, Leonarda Vicente, for a TCC post hospital discharge follow up call. The patient has a scheduled HOSFU appointment with [facility] on 3/30/2022 @ 0900.

## 2022-03-30 ENCOUNTER — TELEPHONE (OUTPATIENT)
Dept: TRANSPLANT | Facility: CLINIC | Age: 57
End: 2022-03-30
Payer: COMMERCIAL

## 2022-03-30 NOTE — TELEPHONE ENCOUNTER
Called pt to inform him that he has been added to the liver transplant waiting list w/ MELD score 24.  Patient voiced understanding that he is now actively listed for transplant.  Discussed the need to remain available at all times by phone for offers.  Reminded him that the call will not always come from an [facility] number, especially if after hours.  Instructed him of the need to call transplant for any acute issues, including local hospital admissions, travel plans, as well as any changes in insurance coverage or caregiver plan. Discussed requirements for waitlist maintenance.  All questions/ concerns regarding listing status answered and addressed.  Understanding verbalized.  Patient agrees to bring a copy of his covid card on tomorrow to f/u visit.

## 2022-03-30 NOTE — TELEPHONE ENCOUNTER
"  LIVER WAIT LISTING NOTE    **NOTE:   IF ANY EXTERNAL LABS ARE USED FOR LISTING THE VALUES AND DATES MUST BE ENTERED IN EPIC TO GENERATE THIS NOTE**    Date of Financial clearance to list: 2022    [facility]:        Organ: Liver    Last Name: Christina  First Name: Pelon     : 1965      Gender:     male         MRN#: [id number]                                   State of Permanent Residence:  [address] [place] [id number]    Ethnicity: Not  or /a   Race:      White    CLINICAL INFORMATION       ABO  ABO Blood Group:   O NEG     ABO Confirmation: (THESE DATES MUST BE PRIOR TO THE LIST DATE AND SUPPORTED BY SEPARATE LAB REPORTS)    Internal Results    Lab Results   Component Value Date    GROUPTRH O NEG 2022    GROUPTRH O NEG 2022     No results found for: ABO    External Results    ABO Date 1:  ABO Result 1:  ABO Date 2:  ABO Result 2:     Are either of these ABO results based on External Labs? no  (If Yes, STOP and go to source document in Media Tab for verification).    VITALS  Height:    Ht Readings from Last 1 Encounters:   22 5' 7" (1.702 m)     Weight:    Wt Readings from Last 1 Encounters:   22 117.9 kg (260 lb)       LIVER ORGAN INFORMATION  Candidate Medical Urgency Status: MELD/PELD    Number of Previous Transplants: 0    MELD/PELD Data Collection:  Had dialysis twice, or 24 hours of CVVHD, within 1 week prior to the serum creatinine test: No  Encephalopathy: 1 - 2 Date: 3/24/2022  Ascites: moderate Date: 3/24/2022          MELD Score:  MELD-Na score: 24 at 3/24/2022  4:09 AM  MELD score: 24 at 3/24/2022  4:09 AM  Calculated from:  Serum Creatinine: 1.6 mg/dL at 3/24/2022  4:09 AM  Serum Sodium: 138 mmol/L (Using max of 137 mmol/L) at 3/24/2022  4:09 AM  Total Bilirubin: 4.3 mg/dL at 3/24/2022  4:09 AM  INR(ratio): 1.9 at 3/24/2022  4:09 AM  Age: 57 years  Lab Results   Component Value Date    ALBUMIN 3.9 2022     No results found for: " "EXTINR, EXTCREATININ, EXTSODIUM, EXTBILITOTAL, EXTALBUMIN    Additional Organs: none  Kidney: No    If Kidney is "Yes" above, check Diagnosis and enter the medical eligibility below for a simultaneous liver/kidney:    Diagnosis: Chronic kidney disease (CKD) with measured or calculated GFR less than or equal to 60 ml/min for greater than 90 consecutive days. At least one of the following must qualify for CKD:  Date Begun Dialysis CrCl (ml/min)  Must be < or = 30 eGFR (ml/min) Must be < or = 30    Yes/no:                    Diagnosis: Sustained acute kidney injury (must be confirmed at least once every 7 days). Please select at least one of the following criteria:  Date of test or treatment Dialysis received CrCl (ml/min) Must be < or = 30 eGFR (ml/min) Must be < or = 25 Number of days since previous test or treatment (must be less than or equal to 7 days)    Yes/No:                  Diagnosis: Metabolic disease, Check all diagnosis that apply:     Hyperoxaluria    Atypical hemolytic uremic syndrome (HUS) from mutations in factor H or factor I    Familial non-neuropathic systemic amyloidosis    Methylmalonic aciduria     Transplant nephrologist confirming candidate's most recent diagnosis for SLK: N/A               ## Please submit a separate Kidney Listing note for combined Liver/Kidney patients. ##    Will Recipient Accept?   Accept HBcAB Positive Organ:  yes  Accept HBV MATHEUS Positive Organ:  yes  Accept HCV Antibody Positive Organ: yes   Accept HCV MATHEUS Positive Organ:  yes                        Local: No                           Import: No  Accept DCD Organ:    yes  Minimum acceptable donor age:  5 years  Maximum acceptable donor age:  99 years  Minimum acceptable donor weight:  40 lbs    Maximum acceptable donor weight:  440 lb  Maximum miles the organ or  Recovery team will travel:   5000 miles    TCR Information    Citizenship: [place] Citizen   Country of permanent residence:   Year of entry to the [place]:   Highest " education level: High School (9-12) or GED    Patient on Life Support: No  Functional Status: 70% - cares for self: unable to carry on normal activity or active work  Working for income: yes  If yes, working activity level: Working Part Time Due to Demands of Treatment  Previous Pancreas Islet Infusion - No  Source of payment: Private Insurance  Diabetes: Type II  Any previous malignancy: No  Neoadjuvant Therapy: No  Has candidate ever had a diagnosis of HCC: No    Liver Medical Factors  Previous abdominal surgery: No  Spontaneous Bacterial Peritonitis: No  History of Portal Vein Thrombosis: No  Transjugular Intrahepatic Portosystemic Shunt: No    Blood type verified by Estrella Rojas RN

## 2022-03-31 ENCOUNTER — LAB VISIT (OUTPATIENT)
Dept: LAB | Facility: HOSPITAL | Age: 57
End: 2022-03-31
Payer: COMMERCIAL

## 2022-03-31 ENCOUNTER — OFFICE VISIT (OUTPATIENT)
Dept: TRANSPLANT | Facility: CLINIC | Age: 57
End: 2022-03-31
Payer: COMMERCIAL

## 2022-03-31 VITALS
BODY MASS INDEX: 42.14 KG/M2 | WEIGHT: 268.5 LBS | HEART RATE: 97 BPM | DIASTOLIC BLOOD PRESSURE: 71 MMHG | HEIGHT: 67 IN | TEMPERATURE: 97 F | OXYGEN SATURATION: 98 % | SYSTOLIC BLOOD PRESSURE: 146 MMHG | RESPIRATION RATE: 16 BRPM

## 2022-03-31 DIAGNOSIS — K74.60 LIVER CIRRHOSIS SECONDARY TO NASH: Primary | ICD-10-CM

## 2022-03-31 DIAGNOSIS — I85.10 SECONDARY ESOPHAGEAL VARICES WITHOUT BLEEDING: ICD-10-CM

## 2022-03-31 DIAGNOSIS — K75.81 LIVER CIRRHOSIS SECONDARY TO NASH: ICD-10-CM

## 2022-03-31 DIAGNOSIS — Z76.82 LIVER TRANSPLANT CANDIDATE: ICD-10-CM

## 2022-03-31 DIAGNOSIS — K74.60 LIVER CIRRHOSIS SECONDARY TO NASH: ICD-10-CM

## 2022-03-31 DIAGNOSIS — Z76.82 ORGAN TRANSPLANT CANDIDATE: ICD-10-CM

## 2022-03-31 DIAGNOSIS — K70.31 ALCOHOLIC CIRRHOSIS OF LIVER WITH ASCITES: ICD-10-CM

## 2022-03-31 DIAGNOSIS — R79.89 ELEVATED LFTS: ICD-10-CM

## 2022-03-31 DIAGNOSIS — K76.82 HEPATIC ENCEPHALOPATHY: ICD-10-CM

## 2022-03-31 DIAGNOSIS — K75.81 LIVER CIRRHOSIS SECONDARY TO NASH: Primary | ICD-10-CM

## 2022-03-31 LAB
ALBUMIN SERPL BCP-MCNC: 3.6 G/DL (ref 3.5–5.2)
ALP SERPL-CCNC: 88 U/L (ref 55–135)
ALT SERPL W/O P-5'-P-CCNC: 26 U/L (ref 10–44)
ANION GAP SERPL CALC-SCNC: 7 MMOL/L (ref 8–16)
AST SERPL-CCNC: 44 U/L (ref 10–40)
BASOPHILS # BLD AUTO: 0.04 K/UL (ref 0–0.2)
BASOPHILS NFR BLD: 1 % (ref 0–1.9)
BILIRUB SERPL-MCNC: 4.6 MG/DL (ref 0.1–1)
BUN SERPL-MCNC: 29 MG/DL (ref 6–20)
CALCIUM SERPL-MCNC: 8.4 MG/DL (ref 8.7–10.5)
CHLORIDE SERPL-SCNC: 104 MMOL/L (ref 95–110)
CO2 SERPL-SCNC: 24 MMOL/L (ref 23–29)
CREAT SERPL-MCNC: 1.5 MG/DL (ref 0.5–1.4)
DIFFERENTIAL METHOD: ABNORMAL
EOSINOPHIL # BLD AUTO: 0.7 K/UL (ref 0–0.5)
EOSINOPHIL NFR BLD: 18 % (ref 0–8)
ERYTHROCYTE [DISTWIDTH] IN BLOOD BY AUTOMATED COUNT: 20 % (ref 11.5–14.5)
EST. GFR  (AFRICAN AMERICAN): 59.7 ML/MIN/1.73 M^2
EST. GFR  (NON AFRICAN AMERICAN): 51.7 ML/MIN/1.73 M^2
GLUCOSE SERPL-MCNC: 267 MG/DL (ref 70–110)
HCT VFR BLD AUTO: 22.8 % (ref 40–54)
HGB BLD-MCNC: 7.7 G/DL (ref 14–18)
IMM GRANULOCYTES # BLD AUTO: 0.01 K/UL (ref 0–0.04)
IMM GRANULOCYTES NFR BLD AUTO: 0.3 % (ref 0–0.5)
INR PPP: 1.8 (ref 0.8–1.2)
LYMPHOCYTES # BLD AUTO: 0.6 K/UL (ref 1–4.8)
LYMPHOCYTES NFR BLD: 15 % (ref 18–48)
MCH RBC QN AUTO: 30.6 PG (ref 27–31)
MCHC RBC AUTO-ENTMCNC: 33.8 G/DL (ref 32–36)
MCV RBC AUTO: 91 FL (ref 82–98)
MONOCYTES # BLD AUTO: 0.5 K/UL (ref 0.3–1)
MONOCYTES NFR BLD: 13.3 % (ref 4–15)
NEUTROPHILS # BLD AUTO: 2.1 K/UL (ref 1.8–7.7)
NEUTROPHILS NFR BLD: 52.4 % (ref 38–73)
NRBC BLD-RTO: 0 /100 WBC
PLATELET # BLD AUTO: 40 K/UL (ref 150–450)
PMV BLD AUTO: 10.1 FL (ref 9.2–12.9)
POTASSIUM SERPL-SCNC: 3.3 MMOL/L (ref 3.5–5.1)
PROT SERPL-MCNC: 6 G/DL (ref 6–8.4)
PROTHROMBIN TIME: 18.3 SEC (ref 9–12.5)
RBC # BLD AUTO: 2.52 M/UL (ref 4.6–6.2)
SODIUM SERPL-SCNC: 135 MMOL/L (ref 136–145)
WBC # BLD AUTO: 4 K/UL (ref 3.9–12.7)

## 2022-03-31 PROCEDURE — 85610 PROTHROMBIN TIME: CPT | Mod: TXP | Performed by: STUDENT IN AN ORGANIZED HEALTH CARE EDUCATION/TRAINING PROGRAM

## 2022-03-31 PROCEDURE — 99999 PR PBB SHADOW E&M-EST. PATIENT-LVL IV: ICD-10-PCS | Mod: PBBFAC,TXP,, | Performed by: STUDENT IN AN ORGANIZED HEALTH CARE EDUCATION/TRAINING PROGRAM

## 2022-03-31 PROCEDURE — 99215 PR OFFICE/OUTPT VISIT, EST, LEVL V, 40-54 MIN: ICD-10-PCS | Mod: S$GLB,TXP,, | Performed by: STUDENT IN AN ORGANIZED HEALTH CARE EDUCATION/TRAINING PROGRAM

## 2022-03-31 PROCEDURE — 99215 OFFICE O/P EST HI 40 MIN: CPT | Mod: S$GLB,TXP,, | Performed by: STUDENT IN AN ORGANIZED HEALTH CARE EDUCATION/TRAINING PROGRAM

## 2022-03-31 PROCEDURE — 80053 COMPREHEN METABOLIC PANEL: CPT | Mod: TXP | Performed by: STUDENT IN AN ORGANIZED HEALTH CARE EDUCATION/TRAINING PROGRAM

## 2022-03-31 PROCEDURE — 99999 PR PBB SHADOW E&M-EST. PATIENT-LVL IV: CPT | Mod: PBBFAC,TXP,, | Performed by: STUDENT IN AN ORGANIZED HEALTH CARE EDUCATION/TRAINING PROGRAM

## 2022-03-31 PROCEDURE — 85025 COMPLETE CBC W/AUTO DIFF WBC: CPT | Mod: TXP | Performed by: STUDENT IN AN ORGANIZED HEALTH CARE EDUCATION/TRAINING PROGRAM

## 2022-03-31 PROCEDURE — 36415 COLL VENOUS BLD VENIPUNCTURE: CPT | Mod: TXP | Performed by: STUDENT IN AN ORGANIZED HEALTH CARE EDUCATION/TRAINING PROGRAM

## 2022-03-31 NOTE — PROGRESS NOTES
Transplant Hepatology   Transplant Evaluation Follow Up Note    Referring provider: No ref. provider found  PCP: Primary Doctor No    Chief complaint: follow up of alcohol and LOZANO related cirrhosis    HPI: Pelon Vasquez is a 57 y.o. male who presents to Transplant Hepatology Clinic for follow up of alcohol and LOZANO related cirrhosis. He is accompanied by his wife.    He was hospitalized earlier this month with FAMILIA and hyperkalemia.  He was started on HR S therapy with improvement in his renal function and normalization of his potassium.  He reports doing ok since discharge. Still has abdominal distension and lower extremity edema despite compliance with diuretics.  He takes lactulose and rifaximin, and despite this still has intermittent encephalopathy.  He has had persistent jaundice.  No recent GI bleeding.      Past Medical History:   Diagnosis Date    Arthritis     Cirrhosis of liver     HTN (hypertension)     BRAYDON (obstructive sleep apnea)     Secondary esophageal varices without bleeding 3/18/2022    EGD (2/25/22) showed moderate portal hypertensive gastropathy, small hiatal hernia, grade 1 esophageal varices    Type 2 diabetes mellitus        Past Surgical History:   Procedure Laterality Date    COLONOSCOPY      FINGER AMPUTATION      MEDIAL COLLATERAL LIGAMENT REPAIR, KNEE Bilateral     ROTATRO CUFF REPAIR      TONSILLECTOMY         No family history on file.    Social History     Tobacco Use    Smoking status: Never Smoker    Smokeless tobacco: Never Used   Substance Use Topics    Alcohol use: Not Currently       Current Outpatient Medications   Medication Sig Dispense Refill    furosemide (LASIX) 40 MG tablet Take 40 mg by mouth 2 (two) times a day.      lactulose (CHRONULAC) 10 gram/15 mL solution Take 30 mLs by mouth 3 (three) times daily.      midodrine (PROAMATINE) 5 MG Tab Take 3 tablets (15 mg total) by mouth every 8 (eight) hours. 270 tablet 11    ondansetron (ZOFRAN-ODT) 4 MG TbDL  "Take 1 tablet by mouth every 8 (eight) hours as needed (NAUSEA).      pantoprazole (PROTONIX) 40 MG tablet Take 40 mg by mouth once daily.      sodium bicarbonate 650 MG tablet Take 2 tablets (1,300 mg total) by mouth 2 (two) times daily. 120 tablet 11    XIFAXAN 550 mg Tab Take 550 mg by mouth 2 (two) times daily.      zinc sulfate 50 mg zinc (220 mg) Tab tablet Take 1 tablet (220 mg total) by mouth once daily. 90 tablet 1     No current facility-administered medications for this visit.       Review of patient's allergies indicates:   Allergen Reactions    Strawberry Anaphylaxis and Rash    Metformin Diarrhea       Review of Systems   Constitutional: Negative for fever and weight loss.   Cardiovascular: Positive for leg swelling.   Gastrointestinal: Negative for abdominal pain, blood in stool, constipation, diarrhea, heartburn, melena, nausea and vomiting.       Vitals:    03/31/22 0937   BP: (!) 146/71   Pulse: 97   Resp: 16   Temp: 97.3 °F (36.3 °C)   TempSrc: Temporal   SpO2: 98%   Weight: 121.8 kg (268 lb 8.3 oz)   Height: 5' 7" (1.702 m)       Physical Exam  Vitals reviewed.   Constitutional:       General: He is not in acute distress.  Eyes:      General: Scleral icterus present.   Cardiovascular:      Rate and Rhythm: Normal rate and regular rhythm.   Pulmonary:      Effort: Pulmonary effort is normal. No respiratory distress.   Abdominal:      General: Bowel sounds are normal. There is distension.      Palpations: Abdomen is soft.      Tenderness: There is no abdominal tenderness. There is no guarding or rebound.   Musculoskeletal:      Right lower leg: Edema present.      Left lower leg: Edema present.   Skin:     Coloration: Skin is jaundiced.         LABS: I personally reviewed pertinent laboratory findings.    Lab Results   Component Value Date    ALT 26 03/31/2022    AST 44 (H) 03/31/2022    GGT 66 (H) 02/14/2022    ALKPHOS 88 03/31/2022    BILITOT 4.6 (H) 03/31/2022       Lab Results   Component " Value Date    WBC 4.00 03/31/2022    HGB 7.7 (L) 03/31/2022    HCT 22.8 (L) 03/31/2022    MCV 91 03/31/2022    PLT 40 (L) 03/31/2022       Lab Results   Component Value Date     (L) 03/31/2022    K 3.3 (L) 03/31/2022     03/31/2022    CO2 24 03/31/2022    BUN 29 (H) 03/31/2022    CREATININE 1.5 (H) 03/31/2022    CALCIUM 8.4 (L) 03/31/2022    ANIONGAP 7 (L) 03/31/2022    ESTGFRAFRICA 59.7 (A) 03/31/2022    EGFRNONAA 51.7 (A) 03/31/2022       Lab Results   Component Value Date    INR 1.8 (H) 03/31/2022    INR 1.9 (H) 03/24/2022    INR 1.8 (H) 03/23/2022       No results found for: SMOOTHMUSCAB, MITOAB    Lab Results   Component Value Date    IRON 162 (H) 02/14/2022    TIBC 249 (L) 02/14/2022    FERRITIN 335 (H) 02/14/2022       Lab Results   Component Value Date    HEPBCAB Negative 02/02/2022    HEPCAB Negative 02/02/2022       Imaging:   I personally reviewed recent imaging studies available on the chart and from outside medical records.      Assessment:  57 y.o. male presenting with alcohol and LOZANO related cirrhosis. He is decompensated with ascites, HE, jaundice.    1. Liver cirrhosis secondary to LOZANO    2. Alcoholic cirrhosis of liver with ascites    3. Hepatic encephalopathy    4. Secondary esophageal varices without bleeding    5. Elevated LFTs    6. Liver transplant candidate        MELD-Na score: 24 at 3/31/2022  9:15 AM  MELD score: 23 at 3/31/2022  9:15 AM  Calculated from:  Serum Creatinine: 1.5 mg/dL at 3/31/2022  9:15 AM  Serum Sodium: 135 mmol/L at 3/31/2022  9:15 AM  Total Bilirubin: 4.6 mg/dL at 3/31/2022  9:15 AM  INR(ratio): 1.8 at 3/31/2022  9:15 AM  Age: 57 years    UNOS Patient Status  Functional Status: 50% - Requires considerable assistance and frequent medical care  Physical Capacity: No Limitations    Transplant Candidacy: He is listed for liver transplantation.    Recommendations:  1. Ascites/Edema: 2 gm Na diet.  Continue Lasix 40 mg twice daily.  Unable to tolerate  spironolactone due to recurrent hyperkalemia.    2. Encephalopathy: Continue lactulose. Titrate to maintain 3-4 bowel movements per day. Continue rifaximin 550mg BID    3. Variceal screening: EGD in 02/2022 showed grade I varices. Repeat EGD in 02/2023.    4. HCC screening: MRI in 03/2022 without HCC. AFP 8.8. Repeat abdominal US and AFP every 6 months.    5. Immunizations: Recommend HAV and HBV vaccinations if not immune    Weekly MELD labs. Return to clinic in 4-6 weeks.    I spent a total of 40 minutes on the day of the visit. This includes face to face time and non-face to face time preparing to see the patient (eg, review of tests), obtaining and/or reviewing separately obtained history, documenting clinical information in the electronic or other health record, independently interpreting results, and communicating results to the patient/family/caregiver, or care coordination.    Jimmie Leon MD  Staff Physician  Hepatology and Liver Transplant  [facility]  [facility] [place]

## 2022-03-31 NOTE — LETTER
April 1, 2022        Irvin Sandhu  [address]  [address]  [place] [address]  Phone: [phone]  Fax: [phone]             [facility]  [address]  [facility] [address]  Phone: [phone]   Patient: Pelon Vasquez   MR Number: [id number]   YOB: 1965   Date of Visit: 3/31/2022       Dear Dr. Irvin Sandhu    Thank you for referring Pelon Vasquez to me for evaluation. Attached you will find relevant portions of my assessment and plan of care.    If you have questions, please do not hesitate to call me. I look forward to following Pelon Vasquez along with you.    Sincerely,    Jimmie Leon MD    Enclosure    If you would like to receive this communication electronically, please contact [email or web address] or [phone] to request [employer] Link access.    [employer]pt[employer] Link is a tool which provides read-only access to select patient information with whom you have a relationship. Its easy to use and provides real time access to review your patients record including encounter summaries, notes, results, and demographic information.    If you feel you have received this communication in error or would no longer like to receive these types of communications, please e-mail [email or web address]

## 2022-03-31 NOTE — Clinical Note
Weekly MELD labs. Return in 4-6 weeks with labs. He has been seeing Dr. AHMADI, but I'm happy to see him if needed.

## 2022-04-01 ENCOUNTER — TELEPHONE (OUTPATIENT)
Dept: TRANSPLANT | Facility: CLINIC | Age: 57
End: 2022-04-01
Payer: MEDICAID

## 2022-04-04 DIAGNOSIS — N17.9 AKI (ACUTE KIDNEY INJURY): ICD-10-CM

## 2022-04-04 DIAGNOSIS — K72.90 DECOMPENSATION OF CIRRHOSIS OF LIVER: Primary | ICD-10-CM

## 2022-04-04 DIAGNOSIS — K74.60 DECOMPENSATION OF CIRRHOSIS OF LIVER: Primary | ICD-10-CM

## 2022-04-04 DIAGNOSIS — K70.31 ASCITES DUE TO ALCOHOLIC CIRRHOSIS: ICD-10-CM

## 2022-04-04 NOTE — TELEPHONE ENCOUNTER
4/1/2022  Patient Notification of Waitlist Status Change    Status has been changed from active to inactive on 4/1/2022 due to patient pending clearance from insurance company for transplant listing. This status change has been discussed with Pelon Vasquez by phone.    ====================================  4/4/2022  Patient Notification of Waitlist Status Change    Status has been changed from inactive to active on 4/4/2022 due to financial clearance obtained from insurance company for liver transplant listing. This status change has been discussed with Pelon Vasquez by phone.                                       Lab Results   Component Value Date    CREATININE 1.5 (H) 03/31/2022     (L) 03/31/2022    BILITOT 4.6 (H) 03/31/2022    ALBUMIN 3.6 03/31/2022    INR 1.8 (H) 03/31/2022       Encephalopathy: 1 - 2  Ascites: moderate  Dialysis: no     MELD 24     Recertification requestor: Mariza Gongora

## 2022-04-05 ENCOUNTER — TELEPHONE (OUTPATIENT)
Dept: TRANSPLANT | Facility: CLINIC | Age: 57
End: 2022-04-05
Payer: MEDICAID

## 2022-04-05 ENCOUNTER — DOCUMENTATION ONLY (OUTPATIENT)
Dept: TRANSPLANT | Facility: CLINIC | Age: 57
End: 2022-04-05
Payer: MEDICAID

## 2022-04-05 LAB
EXT ALBUMIN: 2.9
EXT BILIRUBIN TOTAL: 5.2
EXT CREATININE: 1.38 MG/DL
EXT INR: 2
EXT SODIUM: 136 MMOL/L

## 2022-04-05 NOTE — TELEPHONE ENCOUNTER
Spoke w/ patient/ pt's SO.  Patient admitted for HE, SOB and increased fluid retention.  Paracentesis done.  Ammonia level improved.  Dc'd on yesterday.    Informed them of recommendation to repeat labs weekly and to RTC in 4 weeks.  Lab appts scheduled every Thursday at 2 pm in [place] and f/u appt scheduled at the end of April.  Pt and SO made aware of appt dates and times, and confirm that they will review appts via [facility] portal.  No questions noted.    =====================================    ----- Message from Angelo Shanks sent at 4/5/2022  3:34 PM CDT -----  Regarding: speak to nurse  Contact: Pelonkwaku Fierro's SO is calling to speak with nurse.    Contact: [phone]

## 2022-04-05 NOTE — TELEPHONE ENCOUNTER
PATIENT NAME: Pelon Vasquez  [facility] #: [id number]    Lab Results   Component Value Date    EXTINR 2.0 04/04/2022    EXTCREATININ 1.38 04/04/2022    EXTSODIUM 136 04/04/2022    EXTBILITOTAL 5.2 04/04/2022    EXTALBUMIN 2.9 04/04/2022       Encephalopathy: 1 - 2  Ascites: moderate  Dialysis: no     MELD 25     Recertification requestor: Mariza Gongora

## 2022-04-06 ENCOUNTER — TELEPHONE (OUTPATIENT)
Dept: TRANSPLANT | Facility: CLINIC | Age: 57
End: 2022-04-06
Payer: MEDICAID

## 2022-04-06 NOTE — TELEPHONE ENCOUNTER
Called patient to inform him that lab appts will need to be rescheduled to earlier in the week, since MELD re-certification due 4/12.  Patient voiced understanding.  Weekly lab appts rescheduled to Mondays @ 2pm.  Updated appt reminder mailed.

## 2022-04-09 ENCOUNTER — ANESTHESIA (OUTPATIENT)
Dept: SURGERY | Facility: HOSPITAL | Age: 57
DRG: 442 | End: 2022-04-09
Payer: COMMERCIAL

## 2022-04-09 ENCOUNTER — HOSPITAL ENCOUNTER (INPATIENT)
Facility: HOSPITAL | Age: 57
LOS: 1 days | Discharge: HOME | DRG: 442 | End: 2022-04-10
Attending: TRANSPLANT SURGERY | Admitting: TRANSPLANT SURGERY
Payer: COMMERCIAL

## 2022-04-09 ENCOUNTER — TELEPHONE (OUTPATIENT)
Dept: TRANSPLANT | Facility: CLINIC | Age: 57
End: 2022-04-09

## 2022-04-09 ENCOUNTER — ANESTHESIA EVENT (OUTPATIENT)
Dept: SURGERY | Facility: HOSPITAL | Age: 57
DRG: 442 | End: 2022-04-09
Payer: COMMERCIAL

## 2022-04-09 DIAGNOSIS — K72.10 END STAGE LIVER DISEASE: Primary | ICD-10-CM

## 2022-04-09 DIAGNOSIS — K70.31 ASCITES DUE TO ALCOHOLIC CIRRHOSIS: Chronic | ICD-10-CM

## 2022-04-09 DIAGNOSIS — D68.318: ICD-10-CM

## 2022-04-09 DIAGNOSIS — N17.9 AKI (ACUTE KIDNEY INJURY): ICD-10-CM

## 2022-04-09 DIAGNOSIS — D69.6 THROMBOCYTOPENIA, UNSPECIFIED: ICD-10-CM

## 2022-04-09 DIAGNOSIS — K76.82 HEPATIC ENCEPHALOPATHY: ICD-10-CM

## 2022-04-09 DIAGNOSIS — Z01.818 PRE-OP EVALUATION: ICD-10-CM

## 2022-04-09 DIAGNOSIS — K74.69 DECOMPENSATED LIVER DISEASE: ICD-10-CM

## 2022-04-09 DIAGNOSIS — Z76.82 ORGAN TRANSPLANT CANDIDATE: Chronic | ICD-10-CM

## 2022-04-09 PROBLEM — K74.60 DECOMPENSATED LIVER DISEASE: Status: ACTIVE | Noted: 2022-04-09

## 2022-04-09 PROBLEM — K72.90 DECOMPENSATED LIVER DISEASE: Status: ACTIVE | Noted: 2022-04-09

## 2022-04-09 PROBLEM — D64.9 ANEMIA: Status: ACTIVE | Noted: 2022-03-18

## 2022-04-09 LAB
ABO + RH BLD: NORMAL
ALBUMIN SERPL BCP-MCNC: 3.2 G/DL (ref 3.5–5.2)
ALP SERPL-CCNC: 108 U/L (ref 55–135)
ALT SERPL W/O P-5'-P-CCNC: 29 U/L (ref 10–44)
ANION GAP SERPL CALC-SCNC: 9 MMOL/L (ref 8–16)
APTT BLDCRRT: 37.9 SEC (ref 21–32)
AST SERPL-CCNC: 54 U/L (ref 10–40)
BACTERIA #/AREA URNS AUTO: ABNORMAL /HPF
BASOPHILS # BLD AUTO: 0.05 K/UL (ref 0–0.2)
BASOPHILS NFR BLD: 1.1 % (ref 0–1.9)
BILIRUB DIRECT SERPL-MCNC: 1.7 MG/DL (ref 0.1–0.3)
BILIRUB SERPL-MCNC: 4.8 MG/DL (ref 0.1–1)
BILIRUB UR QL STRIP: NEGATIVE
BLD GP AB SCN CELLS X3 SERPL QL: NORMAL
BLOOD BANK HEPATITIS FREEZE AND HOLD: NORMAL
BUN SERPL-MCNC: 19 MG/DL (ref 6–20)
CALCIUM SERPL-MCNC: 8.6 MG/DL (ref 8.7–10.5)
CHLORIDE SERPL-SCNC: 98 MMOL/L (ref 95–110)
CLARITY UR REFRACT.AUTO: ABNORMAL
CO2 SERPL-SCNC: 27 MMOL/L (ref 23–29)
COLOR UR AUTO: YELLOW
CREAT SERPL-MCNC: 1.5 MG/DL (ref 0.5–1.4)
DIFFERENTIAL METHOD: ABNORMAL
EOSINOPHIL # BLD AUTO: 0.5 K/UL (ref 0–0.5)
EOSINOPHIL NFR BLD: 11.7 % (ref 0–8)
ERYTHROCYTE [DISTWIDTH] IN BLOOD BY AUTOMATED COUNT: 19.8 % (ref 11.5–14.5)
EST. GFR  (AFRICAN AMERICAN): 59.7 ML/MIN/1.73 M^2
EST. GFR  (NON AFRICAN AMERICAN): 51.7 ML/MIN/1.73 M^2
ESTIMATED AVG GLUCOSE: 143 MG/DL (ref 68–131)
FIBRINOGEN PPP-MCNC: 108 MG/DL (ref 182–400)
GLUCOSE SERPL-MCNC: 294 MG/DL (ref 70–110)
GLUCOSE UR QL STRIP: ABNORMAL
HBA1C MFR BLD: 6.6 % (ref 4–5.6)
HCT VFR BLD AUTO: 24.4 % (ref 40–54)
HGB BLD-MCNC: 8.1 G/DL (ref 14–18)
HGB UR QL STRIP: ABNORMAL
HYALINE CASTS UR QL AUTO: 9 /LPF
IMM GRANULOCYTES # BLD AUTO: 0.01 K/UL (ref 0–0.04)
IMM GRANULOCYTES NFR BLD AUTO: 0.2 % (ref 0–0.5)
INR PPP: 1.8 (ref 0.8–1.2)
KETONES UR QL STRIP: NEGATIVE
LEUKOCYTE ESTERASE UR QL STRIP: ABNORMAL
LYMPHOCYTES # BLD AUTO: 0.6 K/UL (ref 1–4.8)
LYMPHOCYTES NFR BLD: 13.2 % (ref 18–48)
MAGNESIUM SERPL-MCNC: 1.6 MG/DL (ref 1.6–2.6)
MCH RBC QN AUTO: 30.6 PG (ref 27–31)
MCHC RBC AUTO-ENTMCNC: 33.2 G/DL (ref 32–36)
MCV RBC AUTO: 92 FL (ref 82–98)
MICROSCOPIC COMMENT: ABNORMAL
MONOCYTES # BLD AUTO: 0.9 K/UL (ref 0.3–1)
MONOCYTES NFR BLD: 19 % (ref 4–15)
NEUTROPHILS # BLD AUTO: 2.5 K/UL (ref 1.8–7.7)
NEUTROPHILS NFR BLD: 54.8 % (ref 38–73)
NITRITE UR QL STRIP: NEGATIVE
NRBC BLD-RTO: 0 /100 WBC
PH UR STRIP: 5 [PH] (ref 5–8)
PLATELET # BLD AUTO: 59 K/UL (ref 150–450)
PMV BLD AUTO: 10.7 FL (ref 9.2–12.9)
POTASSIUM SERPL-SCNC: 3.6 MMOL/L (ref 3.5–5.1)
PROT SERPL-MCNC: 6 G/DL (ref 6–8.4)
PROT UR QL STRIP: NEGATIVE
PROTHROMBIN TIME: 18.5 SEC (ref 9–12.5)
RBC # BLD AUTO: 2.65 M/UL (ref 4.6–6.2)
RBC #/AREA URNS AUTO: 3 /HPF (ref 0–4)
SARS-COV-2 RDRP RESP QL NAA+PROBE: NEGATIVE
SODIUM SERPL-SCNC: 134 MMOL/L (ref 136–145)
SP GR UR STRIP: 1.01 (ref 1–1.03)
SQUAMOUS #/AREA URNS AUTO: 2 /HPF
URN SPEC COLLECT METH UR: ABNORMAL
WBC # BLD AUTO: 4.62 K/UL (ref 3.9–12.7)
WBC #/AREA URNS AUTO: 9 /HPF (ref 0–5)

## 2022-04-09 PROCEDURE — 86901 BLOOD TYPING SEROLOGIC RH(D): CPT | Mod: NTX | Performed by: NURSE PRACTITIONER

## 2022-04-09 PROCEDURE — 82248 BILIRUBIN DIRECT: CPT | Mod: NTX | Performed by: NURSE PRACTITIONER

## 2022-04-09 PROCEDURE — 86900 BLOOD TYPING SEROLOGIC ABO: CPT | Mod: NTX | Performed by: NURSE PRACTITIONER

## 2022-04-09 PROCEDURE — 87389 HIV-1 AG W/HIV-1&-2 AB AG IA: CPT | Mod: NTX | Performed by: NURSE PRACTITIONER

## 2022-04-09 PROCEDURE — U0002 COVID-19 LAB TEST NON-CDC: HCPCS | Mod: NTX | Performed by: NURSE PRACTITIONER

## 2022-04-09 PROCEDURE — 20600001 HC STEP DOWN PRIVATE ROOM: Mod: NTX

## 2022-04-09 PROCEDURE — 86706 HEP B SURFACE ANTIBODY: CPT | Mod: NTX | Performed by: NURSE PRACTITIONER

## 2022-04-09 PROCEDURE — 85384 FIBRINOGEN ACTIVITY: CPT | Mod: NTX | Performed by: NURSE PRACTITIONER

## 2022-04-09 PROCEDURE — 87086 URINE CULTURE/COLONY COUNT: CPT | Mod: NTX | Performed by: NURSE PRACTITIONER

## 2022-04-09 PROCEDURE — 83036 HEMOGLOBIN GLYCOSYLATED A1C: CPT | Mod: NTX | Performed by: NURSE PRACTITIONER

## 2022-04-09 PROCEDURE — 83735 ASSAY OF MAGNESIUM: CPT | Mod: NTX | Performed by: NURSE PRACTITIONER

## 2022-04-09 PROCEDURE — 87340 HEPATITIS B SURFACE AG IA: CPT | Mod: NTX | Performed by: NURSE PRACTITIONER

## 2022-04-09 PROCEDURE — 81001 URINALYSIS AUTO W/SCOPE: CPT | Mod: NTX | Performed by: NURSE PRACTITIONER

## 2022-04-09 PROCEDURE — 99223 1ST HOSP IP/OBS HIGH 75: CPT | Mod: 57,NTX,, | Performed by: NURSE PRACTITIONER

## 2022-04-09 PROCEDURE — 85610 PROTHROMBIN TIME: CPT | Mod: NTX | Performed by: NURSE PRACTITIONER

## 2022-04-09 PROCEDURE — 36415 COLL VENOUS BLD VENIPUNCTURE: CPT | Mod: NTX | Performed by: NURSE PRACTITIONER

## 2022-04-09 PROCEDURE — 86704 HEP B CORE ANTIBODY TOTAL: CPT | Mod: NTX | Performed by: NURSE PRACTITIONER

## 2022-04-09 PROCEDURE — 99223 PR INITIAL HOSPITAL CARE,LEVL III: ICD-10-PCS | Mod: 57,NTX,, | Performed by: NURSE PRACTITIONER

## 2022-04-09 PROCEDURE — 86803 HEPATITIS C AB TEST: CPT | Mod: NTX | Performed by: NURSE PRACTITIONER

## 2022-04-09 PROCEDURE — 93010 ELECTROCARDIOGRAM REPORT: CPT | Mod: NTX,,, | Performed by: INTERNAL MEDICINE

## 2022-04-09 PROCEDURE — 25000003 PHARM REV CODE 250: Mod: NTX | Performed by: NURSE PRACTITIONER

## 2022-04-09 PROCEDURE — 93010 EKG 12-LEAD: ICD-10-PCS | Mod: NTX,,, | Performed by: INTERNAL MEDICINE

## 2022-04-09 PROCEDURE — 93005 ELECTROCARDIOGRAM TRACING: CPT | Mod: NTX

## 2022-04-09 PROCEDURE — 85730 THROMBOPLASTIN TIME PARTIAL: CPT | Mod: NTX | Performed by: NURSE PRACTITIONER

## 2022-04-09 PROCEDURE — 87522 HEPATITIS C REVRS TRNSCRPJ: CPT | Mod: NTX | Performed by: NURSE PRACTITIONER

## 2022-04-09 PROCEDURE — 80053 COMPREHEN METABOLIC PANEL: CPT | Mod: NTX | Performed by: NURSE PRACTITIONER

## 2022-04-09 PROCEDURE — 99000 SPECIMEN HANDLING OFFICE-LAB: CPT | Mod: NTX | Performed by: NURSE PRACTITIONER

## 2022-04-09 PROCEDURE — 85025 COMPLETE CBC W/AUTO DIFF WBC: CPT | Mod: NTX | Performed by: NURSE PRACTITIONER

## 2022-04-09 RX ORDER — METHYLPREDNISOLONE SODIUM SUCCINATE 500 MG/8ML
500 INJECTION INTRAMUSCULAR; INTRAVENOUS
Status: DISCONTINUED | OUTPATIENT
Start: 2022-04-09 | End: 2022-04-10 | Stop reason: HOSPADM

## 2022-04-09 RX ORDER — MIDODRINE HYDROCHLORIDE 5 MG/1
15 TABLET ORAL EVERY 8 HOURS
Status: DISCONTINUED | OUTPATIENT
Start: 2022-04-09 | End: 2022-04-10 | Stop reason: HOSPADM

## 2022-04-09 RX ORDER — LACTULOSE 10 G/15ML
20 SOLUTION ORAL 3 TIMES DAILY
Status: COMPLETED | OUTPATIENT
Start: 2022-04-09 | End: 2022-04-09

## 2022-04-09 RX ADMIN — LACTULOSE 20 G: 20 SOLUTION ORAL at 08:04

## 2022-04-09 NOTE — ASSESSMENT & PLAN NOTE
- has been getting 5L paracentesis twice w week, no reported SBP  - abdomen large  - 2 gm Na diet  - was taking Lasix 40 mg twice daily- held on admit  - unable to tolerate spironolactone due to recurrent hyperkalemia. Most recent hospitalization pt had hypokalemia

## 2022-04-09 NOTE — TELEPHONE ENCOUNTER
ORGAN OFFER NOTE    Notified by Jessee Gamino, , of liver offer with donor information.  Donor and recipient information read back and verified.  Spoke with Pelon Benitez identified no acute medical issues during telephone assessment.  Patient instructed to leave within 30 minutes of this phone call and report to the TSU on the 11th floor.  Patient verbalized understanding and willingness to come in for transplant.  ETA: 4:45pm.    Patient was asked if they have had a positive COVID-19 test or if they have any signs or symptoms. Informed patient that they will be tested for COVID-19 upon arrival to the hospital, unless have a previous positive result. If tested and result is positive, the transplant will not be able to occur, they will be inactivated on the wait list for 28 days per protocol and required to quarantine.

## 2022-04-09 NOTE — ANESTHESIA PREPROCEDURE EVALUATION
THIS CASE WAS CANCELLED AT ABOUT 10:55AM  4/10/2022                [facility]  Anesthesia Pre-Operative Evaluation         Patient Name: Pelon Vasquez  YOB: 1965  MRN: [id number]    SUBJECTIVE:     Pre-operative evaluation for Procedure(s) (LRB):  TRANSPLANT, LIVER (N/A)     04/09/2022    Pelon Vasquez is a 57 y.o. male w/ a significant PMHx of EtOH/LOZANO cirrhosis, esophageal varices, obesity (BMI 42), CKD, HTN, DM2, and BRAYDON. He presented for liver transplant likely going on 4/9/2022 PM.     Notably, he has been NPO since 4/9/2022 at 2:00PM when he ate tuna with crackers.    MELD-Na score: 25 at 4/1/2022 11:19 PM  MELD score: 24 at 4/1/2022 11:19 PM  Calculated from:  Serum Creatinine: 1.5 mg/dL at 3/31/2022  9:15 AM  Serum Sodium: 135 mmol/L at 3/31/2022  9:15 AM  Total Bilirubin: 4.6 mg/dL at 3/31/2022  9:15 AM  INR(ratio): 2.10 INR at 4/1/2022 11:19 PM  Age: 57 years    Patient now presents for the above procedure(s).      LDA:        Peripheral IV - Single Lumen 03/17/22 1647 18 G Right Antecubital (Active)   Site Assessment Clean;Dry;Intact 03/24/22 0400   Extremity Assessment Distal to IV No abnormal discoloration;No redness;No swelling;No warmth 03/20/22 0800   Line Status Flushed;Saline locked 03/20/22 0800   Dressing Status Clean;Dry;Intact 03/20/22 0800   Dressing Intervention Integrity maintained 03/20/22 0800   Dressing Change Due 03/22/22 03/19/22 2200   Site Change Due 03/22/22 03/19/22 2200   Reason Not Rotated Not due 03/19/22 2200   Number of days: 23            Peripheral IV - Single Lumen 03/19/22 1200 20 G Left Hand (Active)   Site Assessment Dry;Intact;Clean 03/24/22 0800   Extremity Assessment Distal to IV No swelling;No warmth 03/24/22 0800   Line Status Flushed 03/24/22 0800   Dressing Status Clean;Dry;Intact 03/24/22 0800   Dressing Intervention Integrity maintained 03/24/22 0800   Dressing Change Due 03/24/22 03/19/22 2200   Site Change Due 03/24/22  03/19/22 2200   Reason Not Rotated Not due 03/19/22 2200   Number of days: 21     Prev airway: None documented.    Drips: None documented.      Patient Active Problem List   Diagnosis    Decompensation of cirrhosis of liver    Controlled type 2 diabetes mellitus, without long-term current use of insulin    FAMILIA (acute kidney injury)    Secondary esophageal varices without bleeding    Ascites due to alcoholic cirrhosis    Organ transplant candidate    Other specified anemias       Review of patient's allergies indicates:   Allergen Reactions    Strawberry Anaphylaxis and Rash    Metformin Diarrhea       Current Inpatient Medications:      No current facility-administered medications on file prior to encounter.     Current Outpatient Medications on File Prior to Encounter   Medication Sig Dispense Refill    furosemide (LASIX) 40 MG tablet Take 40 mg by mouth 2 (two) times a day.      lactulose (CHRONULAC) 10 gram/15 mL solution Take 30 mLs by mouth 3 (three) times daily.      midodrine (PROAMATINE) 5 MG Tab Take 3 tablets (15 mg total) by mouth every 8 (eight) hours. 270 tablet 11    ondansetron (ZOFRAN-ODT) 4 MG TbDL Take 1 tablet by mouth every 8 (eight) hours as needed (NAUSEA).      pantoprazole (PROTONIX) 40 MG tablet Take 40 mg by mouth once daily.      sodium bicarbonate 650 MG tablet Take 2 tablets (1,300 mg total) by mouth 2 (two) times daily. 120 tablet 11    XIFAXAN 550 mg Tab Take 550 mg by mouth 2 (two) times daily.      zinc sulfate 50 mg zinc (220 mg) Tab tablet Take 1 tablet (220 mg total) by mouth once daily. 90 tablet 1    [DISCONTINUED] atorvastatin (LIPITOR) 20 MG tablet Take 20 mg by mouth once daily.      [DISCONTINUED] triamcinolone acetonide 0.1% (KENALOG) 0.1 % cream Apply topically 2 (two) times daily as needed.         Past Surgical History:   Procedure Laterality Date    COLONOSCOPY      FINGER AMPUTATION      MEDIAL COLLATERAL LIGAMENT REPAIR, KNEE Bilateral      ROTATRO CUFF REPAIR      TONSILLECTOMY         Social History:  Tobacco Use: Low Risk     Smoking Tobacco Use: Never Smoker    Smokeless Tobacco Use: Never Used      Alcohol Use: Not on file        OBJECTIVE:     Vital Signs Range (Last 24H):  Temp:  [37.2 °C (98.9 °F)]   Pulse:  [113]   BP: (142)/(68)   SpO2:  [97 %]       Significant Labs:  Lab Results   Component Value Date    WBC 5.1 04/03/2022    HGB 7.2 (L) 04/03/2022    HCT 22.0 (L) 04/03/2022    PLT 44 (LL) 04/03/2022    ALT 26 03/31/2022    AST 44 (H) 03/31/2022     (L) 03/31/2022    K 3.3 (L) 03/31/2022     03/31/2022    CREATININE 1.5 (H) 03/31/2022    BUN 29 (H) 03/31/2022    CO2 24 03/31/2022    PSA 0.02 02/14/2022    INR 2.00 04/04/2022    HGBA1C 6.5 (H) 03/17/2022       Diagnostic Studies: No relevant studies.    EKG:   Results for orders placed or performed in visit on 03/17/22   EKG 12-lead    Collection Time: 03/17/22  4:50 PM    Narrative    Test Reason :     Vent. Rate : 073 BPM     Atrial Rate : 073 BPM     P-R Int : 180 ms          QRS Dur : 062 ms      QT Int : 338 ms       P-R-T Axes : 075 -11 038 degrees     QTc Int : 372 ms    Normal sinus rhythm  Low voltage QRS  Abnormal ECG  When compared with ECG of 17-FEB-2022 09:16,  Questionable change in QRS duration    Confirmed by Corby HERRERA MD ([id number]) on 3/18/2022 9:56:39 AM    Referred By: [email or web address]   SELF           Confirmed By:Corby HERRERA MD       2D ECHO:  TTE (2/17/2022):  · The stress echo portion of this study is negative for myocardial ischemia.  · The ECG portion of this study is negative for myocardial ischemia.  · During stress, the following significant arrhythmias were observed: rare PVCs.  · The left ventricle is normal in size with normal systolic function. The estimated ejection fraction is 65%.  · Normal left ventricular diastolic function.  · Normal right ventricular size with normal right ventricular systolic function.  · Mild biatrial enlargement.  · Mild  tricuspid regurgitation.  · The estimated PA systolic pressure is 29 mmHg.  · Normal central venous pressure (3 mmHg).    SERG:  No results found for this or any previous visit.     PET Stress Test (3/24/2022):    The myocardial perfusion images are normal without evidence of ischemia or scar.    The whole heart absolute myocardial perfusion values averaged 1.04 cc/min/g at rest, which is elevated; 1.33 cc/min/g at stress, which is mildly reduced; and CFR is 1.28 , which is moderately reduced in part due to elevated resting flow.    CT attenuation images demonstrate moderate diffuse coronary calcifications in the LAD and LCX territory and no aortic calcifications.    The gated perfusion images showed an ejection fraction of 72% at rest and 76% during stress. A normal ejection fraction is greater than 53%.    The wall motion is normal at rest and during stress.    The LV cavity size is normal at rest and stress.    The EKG portion of this study is negative for ischemia.    There were no arrhythmias during stress.    The patient reported no chest pain during the stress test.      ASSESSMENT/PLAN:           Pre-op Assessment    I have reviewed the Patient Summary Reports.     I have reviewed the Nursing Notes. I have reviewed the NPO Status.   I have reviewed the Medications.     Review of Systems  Anesthesia Hx:  Denies Hx of Anesthetic complications  History of prior surgery of interest to airway management or planning: Denies Family Hx of Anesthesia complications.   Denies Personal Hx of Anesthesia complications.   Social:  Non-Smoker    Cardiovascular:   Hypertension    Pulmonary:   Sleep Apnea    Renal/:   Chronic Renal Disease, CRI    Hepatic/GI:   Liver Disease,    Neurological:  Neurology Normal    Endocrine:   Diabetes, type 2        Physical Exam  General: Well nourished, Alert and Oriented    Airway:  Mallampati: III / II  Mouth Opening: Normal  TM Distance: Normal  Tongue: Normal  Neck ROM: Normal  ROM    Dental:  Edentulous    Chest/Lungs:  Clear to auscultation, Normal Respiratory Rate    Heart:  Rate: Tachycardia  Rhythm: Regular Rhythm  Sounds: Normal        Anesthesia Plan  Type of Anesthesia, risks & benefits discussed:    Anesthesia Type: Gen ETT  Intra-op Monitoring Plan: Standard ASA Monitors, Art Line, Central Line and PA  Post Op Pain Control Plan: multimodal analgesia and IV/PO Opioids PRN  Induction:  IV  Airway Plan: Direct, Post-Induction  Informed Consent: Informed consent signed with the Patient and all parties understand the risks and agree with anesthesia plan.  All questions answered.   ASA Score: 3  Day of Surgery Review of History & Physical: H&P Update referred to the surgeon/provider.    Ready For Surgery From Anesthesia Perspective.     .

## 2022-04-09 NOTE — HPI
Mr.Blake Vasquez is a 57 y.o. 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 He is followed by Dr Leon in Hepatology clinic.  His liver disease has been complicated by jaundice, FAMILIA w hyperkalemia, abscites (requiring 5L para twice a week- no known hx of SBP) and lower extremity edema and HE.  Abdomen is slightly red, but not watm to touch.  He has a tape burn that was blistered but has since popped to middle/top of belly.  He has old paracentesis site to RLQ that is oozing small amt of serous fluid.  BLE are edematous and reddened but w no active signs of cellulitis. FAMILIA and Hyperkalemia improved w diuretics.  Cr on admit is 1.5. Edema has per pt improved but still persists despite diuretics.  Intermittent HE on Lactulose and Rifaximin.  He was hospitalized April 1-4 at OSH for HE.  He is listed w MELD 25.  He is a direct admit for DCD liver txp, donor is NOT high risk.  Donor is CMV+, EBV IgG and syphilis +.  Pt is currently not NPO as there are no times for OR.  He denies SOB, CP, recent change in health or sick contacts.  He denies signs of bleeding. Dr Scott will be primary surgeon.  Labs and imaging ordered.

## 2022-04-09 NOTE — ASSESSMENT & PLAN NOTE
- listed for liver transplant 3/30/2022  - direct admit 4/9 for DCD Liver transplant    MELD-Na score: 25 at 4/1/2022 11:19 PM  MELD score: 24 at 4/1/2022 11:19 PM  Calculated from:  Serum Creatinine: 1.5 mg/dL at 3/31/2022  9:15 AM  Serum Sodium: 135 mmol/L at 3/31/2022  9:15 AM  Total Bilirubin: 4.6 mg/dL at 3/31/2022  9:15 AM  INR(ratio): 2.10 INR at 4/1/2022 11:19 PM  Age: 57 years

## 2022-04-10 VITALS
RESPIRATION RATE: 17 BRPM | WEIGHT: 266.75 LBS | TEMPERATURE: 98 F | BODY MASS INDEX: 41.87 KG/M2 | HEART RATE: 102 BPM | OXYGEN SATURATION: 99 % | HEIGHT: 67 IN | DIASTOLIC BLOOD PRESSURE: 78 MMHG | SYSTOLIC BLOOD PRESSURE: 128 MMHG

## 2022-04-10 LAB
ALBUMIN SERPL BCP-MCNC: 3 G/DL (ref 3.5–5.2)
ALP SERPL-CCNC: 80 U/L (ref 55–135)
ALT SERPL W/O P-5'-P-CCNC: 27 U/L (ref 10–44)
ANION GAP SERPL CALC-SCNC: 8 MMOL/L (ref 8–16)
AST SERPL-CCNC: 53 U/L (ref 10–40)
BACTERIA UR CULT: NO GROWTH
BASOPHILS # BLD AUTO: 0.06 K/UL (ref 0–0.2)
BASOPHILS NFR BLD: 1.5 % (ref 0–1.9)
BILIRUB SERPL-MCNC: 4.8 MG/DL (ref 0.1–1)
BUN SERPL-MCNC: 18 MG/DL (ref 6–20)
CALCIUM SERPL-MCNC: 8.4 MG/DL (ref 8.7–10.5)
CHLORIDE SERPL-SCNC: 99 MMOL/L (ref 95–110)
CO2 SERPL-SCNC: 26 MMOL/L (ref 23–29)
CREAT SERPL-MCNC: 1.2 MG/DL (ref 0.5–1.4)
DIFFERENTIAL METHOD: ABNORMAL
EOSINOPHIL # BLD AUTO: 0.5 K/UL (ref 0–0.5)
EOSINOPHIL NFR BLD: 12.5 % (ref 0–8)
ERYTHROCYTE [DISTWIDTH] IN BLOOD BY AUTOMATED COUNT: 19.8 % (ref 11.5–14.5)
EST. GFR  (AFRICAN AMERICAN): >60 ML/MIN/1.73 M^2
EST. GFR  (NON AFRICAN AMERICAN): >60 ML/MIN/1.73 M^2
GLUCOSE SERPL-MCNC: 210 MG/DL (ref 70–110)
HCT VFR BLD AUTO: 23.2 % (ref 40–54)
HGB BLD-MCNC: 7.9 G/DL (ref 14–18)
IMM GRANULOCYTES # BLD AUTO: 0.01 K/UL (ref 0–0.04)
IMM GRANULOCYTES NFR BLD AUTO: 0.3 % (ref 0–0.5)
LYMPHOCYTES # BLD AUTO: 0.6 K/UL (ref 1–4.8)
LYMPHOCYTES NFR BLD: 15 % (ref 18–48)
MAGNESIUM SERPL-MCNC: 1.7 MG/DL (ref 1.6–2.6)
MCH RBC QN AUTO: 31 PG (ref 27–31)
MCHC RBC AUTO-ENTMCNC: 34.1 G/DL (ref 32–36)
MCV RBC AUTO: 91 FL (ref 82–98)
MONOCYTES # BLD AUTO: 0.8 K/UL (ref 0.3–1)
MONOCYTES NFR BLD: 19.8 % (ref 4–15)
NEUTROPHILS # BLD AUTO: 2 K/UL (ref 1.8–7.7)
NEUTROPHILS NFR BLD: 50.9 % (ref 38–73)
NRBC BLD-RTO: 0 /100 WBC
PHOSPHATE SERPL-MCNC: 2.9 MG/DL (ref 2.7–4.5)
PLATELET # BLD AUTO: 52 K/UL (ref 150–450)
PMV BLD AUTO: 10.8 FL (ref 9.2–12.9)
POTASSIUM SERPL-SCNC: 3.6 MMOL/L (ref 3.5–5.1)
PROT SERPL-MCNC: 5.6 G/DL (ref 6–8.4)
RBC # BLD AUTO: 2.55 M/UL (ref 4.6–6.2)
SODIUM SERPL-SCNC: 133 MMOL/L (ref 136–145)
WBC # BLD AUTO: 4 K/UL (ref 3.9–12.7)

## 2022-04-10 PROCEDURE — 36415 COLL VENOUS BLD VENIPUNCTURE: CPT | Mod: NTX | Performed by: NURSE PRACTITIONER

## 2022-04-10 PROCEDURE — 99239 PR HOSPITAL DISCHARGE DAY,>30 MIN: ICD-10-PCS | Mod: NTX,,, | Performed by: NURSE PRACTITIONER

## 2022-04-10 PROCEDURE — 80053 COMPREHEN METABOLIC PANEL: CPT | Mod: NTX | Performed by: NURSE PRACTITIONER

## 2022-04-10 PROCEDURE — 99499 UNLISTED E&M SERVICE: CPT | Mod: ,,, | Performed by: SURGERY

## 2022-04-10 PROCEDURE — 83735 ASSAY OF MAGNESIUM: CPT | Mod: NTX | Performed by: NURSE PRACTITIONER

## 2022-04-10 PROCEDURE — 99499 NO LOS: ICD-10-PCS | Mod: ,,, | Performed by: SURGERY

## 2022-04-10 PROCEDURE — 85025 COMPLETE CBC W/AUTO DIFF WBC: CPT | Mod: NTX | Performed by: NURSE PRACTITIONER

## 2022-04-10 PROCEDURE — 84100 ASSAY OF PHOSPHORUS: CPT | Mod: NTX | Performed by: NURSE PRACTITIONER

## 2022-04-10 PROCEDURE — 99239 HOSP IP/OBS DSCHRG MGMT >30: CPT | Mod: NTX,,, | Performed by: NURSE PRACTITIONER

## 2022-04-10 RX ORDER — LACTULOSE 10 G/15ML
20 SOLUTION ORAL ONCE
Status: DISCONTINUED | OUTPATIENT
Start: 2022-04-10 | End: 2022-04-10 | Stop reason: HOSPADM

## 2022-04-10 NOTE — TELEPHONE ENCOUNTER
PATIENT NAME: Pelon Vasquez  [facility] #: [id number]    Lab Results   Component Value Date    CREATININE 1.5 (H) 04/09/2022     (L) 04/09/2022    BILITOT 4.8 (H) 04/09/2022    ALBUMIN 3.2 (L) 04/09/2022    INR 1.8 (H) 04/09/2022       Encephalopathy: 1 - 2  Ascites: moderate  Dialysis: no     MELD 25     Recertification requestor: Mariza Gongora

## 2022-04-10 NOTE — TELEPHONE ENCOUNTER
ON CALL NOTE    Times for liver transplant surgery received.  Appropriate staff, as well as patient notified and updated.

## 2022-04-10 NOTE — PROGRESS NOTES
Date Consent signed: 04/10/2022      Sponsor: [employer]    Study Title/IRB Number: An Observational Trial to Assess the Performance of the TEG 6s Diagnostic System with the Citrated K, KH, RTH, FFH Cartridge/IRB # 2021.159    Principle Investigator: Zia Teixeira MD    Present for Discussion: Lluvia GATES, Pelon Vasquez, Spouse    Is LAR Consenting for Subject: No    Prior to the Informed Consent (IC) being signed, or any study protocol required data collection, testing, procedure, or intervention being performed, the following was done and/or discussed:     Subject was given a copy of the IC for review    Purpose of the study and qualifications to participate    Study design, Follow up schedule, and tests or procedures done at each visit   Confidentiality and HIPAA Authorization for Release of Medical Records for the research trial/ subject's rights/research related injury   Risk, Benefits, Alternative Treatments, Compensation and Costs   Participation in the research trial is voluntary and subject may withdraw at anytime   Contact information for study related questions    Subject verbalizes understanding of the above: Yes  Contact information for CRC and PI given to subject: Yes  Subject able to adequately summarize: the purpose of the study, the risks associated with the study, and all procedures, testing, and follow-ups associated with the study: Yes    Pelon Vasquez signed the informed consent form for the [employer] Research Study with an IRB approval date of 30/JUL/2021.  Each page of the consent form was reviewed with Pelon Vasquez and spouse and all questions were answered satisfactorily. Pelon Vasquez signed the consent form and received a copy of same. The original consent was scanned into the subject's electronic medical record (EPIC) and filed into their research study binder.

## 2022-04-10 NOTE — H&P
[facility] - Transplant Step[facility]  Liver Transplant  History & Physical    Patient Name: Pelon Vasquez  MRN: [id number]  Admission Date: 4/9/2022  Code Status: Full Code  Primary Care Provider: Primary Doctor No    Subjective:     History of Present Illness:  Mr.Blake Vasquez is a 57 y.o. 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 He is followed by Dr Leon in Hepatology clinic.  His liver disease has been complicated by jaundice, FAMILIA w hyperkalemia, abscites (requiring 5L para twice a week- no known hx of SBP) and lower extremity edema and HE.  Abdomen is slightly red, but not watm to touch.  He has a tape burn that was blistered but has since popped to middle/top of belly.  He has old paracentesis site to RLQ that is oozing small amt of serous fluid.  BLE are edematous and reddened but w no active signs of cellulitis. FAMILIA and Hyperkalemia improved w diuretics.  Cr on admit is 1.5. Edema has per pt improved but still persists despite diuretics.  Intermittent HE on Lactulose and Rifaximin.  He was hospitalized April 1-4 at OSH for HE.  He is listed w MELD 25.  He is a direct admit for DCD liver txp, donor is NOT high risk.  Donor is CMV+, EBV IgG and syphilis +.  Pt is currently not NPO as there are no times for OR.  He denies SOB, CP, recent change in health or sick contacts.  He denies signs of bleeding. Dr Scott will be primary surgeon.  Labs and imaging ordered.      Past Medical History:   Diagnosis Date    Arthritis     Cirrhosis of liver     HTN (hypertension)     BRYADON (obstructive sleep apnea)     Secondary esophageal varices without bleeding 3/18/2022    EGD (2/25/22) showed moderate portal hypertensive gastropathy, small hiatal hernia, grade 1 esophageal varices    Type 2 diabetes mellitus        Past Surgical History:   Procedure Laterality Date    COLONOSCOPY      FINGER AMPUTATION      MEDIAL COLLATERAL LIGAMENT REPAIR, KNEE Bilateral     ROTATRO CUFF REPAIR      TONSILLECTOMY         Review  of patient's allergies indicates:   Allergen Reactions    Strawberry Anaphylaxis and Rash    Metformin Diarrhea       Family History    None       Tobacco Use    Smoking status: Never Smoker    Smokeless tobacco: Never Used   Substance and Sexual Activity    Alcohol use: Not Currently    Drug use: Not on file    Sexual activity: Not on file       PTA Medications   Medication Sig    furosemide (LASIX) 40 MG tablet Take 40 mg by mouth 2 (two) times a day.    lactulose (CHRONULAC) 10 gram/15 mL solution Take 30 mLs by mouth 3 (three) times daily.    midodrine (PROAMATINE) 5 MG Tab Take 3 tablets (15 mg total) by mouth every 8 (eight) hours.    ondansetron (ZOFRAN-ODT) 4 MG TbDL Take 1 tablet by mouth every 8 (eight) hours as needed (NAUSEA).    pantoprazole (PROTONIX) 40 MG tablet Take 40 mg by mouth once daily.    sodium bicarbonate 650 MG tablet Take 2 tablets (1,300 mg total) by mouth 2 (two) times daily.    XIFAXAN 550 mg Tab Take 550 mg by mouth 2 (two) times daily.    zinc sulfate 50 mg zinc (220 mg) Tab tablet Take 1 tablet (220 mg total) by mouth once daily.       Review of Systems   Constitutional:  Positive for activity change (due to edema/ascites) and unexpected weight change (weight is up 30 lbs per pt over 3 months). Negative for appetite change.   HENT:  Negative for trouble swallowing.    Eyes:  Negative for visual disturbance.   Respiratory:  Negative for cough and shortness of breath.    Cardiovascular:  Positive for leg swelling. Negative for chest pain and palpitations.   Gastrointestinal:  Positive for abdominal distention. Negative for abdominal pain.   Genitourinary:  Negative for decreased urine volume, difficulty urinating and urgency.   Musculoskeletal:  Positive for arthralgias.   Skin:  Positive for color change (to abdomen and BLE) and wound.   Allergic/Immunologic: Negative for immunocompromised state.   Neurological:  Positive for weakness (minimal).   Hematological:   Bruises/bleeds easily.   Psychiatric/Behavioral:  Negative for confusion, decreased concentration and dysphoric mood.    Objective:     Vital Signs (Most Recent):  Temp: 98.9 °F (37.2 °C) (04/09/22 1630)  Pulse: (!) 113 (04/09/22 1630)  BP: (!) 142/68 (04/09/22 1630)  SpO2: 97 % (04/09/22 1630)   Vital Signs (24h Range):  Temp:  [98.9 °F (37.2 °C)] 98.9 °F (37.2 °C)  Pulse:  [113] 113  SpO2:  [97 %] 97 %  BP: (142)/(68) 142/68     Weight: 121 kg (266 lb 12.1 oz)  Body mass index is 41.78 kg/m².    No intake or output data in the 24 hours ending 04/09/22 1938    Physical Exam  Vitals and nursing note reviewed.   Constitutional:       Appearance: Normal appearance.      Comments: obese   HENT:      Head: Normocephalic.      Nose: Nose normal.   Eyes:      General: Scleral icterus present.   Cardiovascular:      Rate and Rhythm: Normal rate and regular rhythm.      Heart sounds: Normal heart sounds.      Comments: Pedal pulses trace due to edema  Pulmonary:      Effort: Pulmonary effort is normal. No respiratory distress.      Breath sounds: Normal breath sounds. No wheezing.   Abdominal:      General: There is distension.      Tenderness: There is no abdominal tenderness.          Comments: Obese abdomen   Musculoskeletal:         General: Normal range of motion.      Cervical back: Neck supple.      Right lower leg: Edema (2+, reddened, no erythema or drainage) present.      Left lower leg: Edema (2+, reddended w no erythema or drainage) present.   Skin:     General: Skin is warm and dry.      Capillary Refill: Capillary refill takes 2 to 3 seconds.      Coloration: Skin is jaundiced.   Neurological:      General: No focal deficit present.      Mental Status: He is alert and oriented to person, place, and time.   Psychiatric:         Mood and Affect: Mood normal.         Behavior: Behavior normal.      Comments: Responses appropriate w slight delay       Laboratory:  CBC:   Recent Labs   Lab 04/09/22  1722   WBC 4.62    RBC 2.65*   HGB 8.1*   HCT 24.4*   PLT 59*   MCV 92   MCH 30.6   MCHC 33.2     CMP:   Recent Labs   Lab 04/09/22  1722   *   CALCIUM 8.6*   ALBUMIN 3.2*   PROT 6.0   *   K 3.6   CO2 27   CL 98   BUN 19   CREATININE 1.5*   ALKPHOS 108   ALT 29   AST 54*   BILITOT 4.8*     Coagulation:   Recent Labs   Lab 04/09/22  1722   INR 1.8*   APTT 37.9*     Labs within the past 24 hours have been reviewed.    Diagnostic Results:    I have personally reviewed all pertinent imaging studies.    Assessment/Plan:     * Decompensated liver disease  - listed for liver transplant 3/30/2022  - direct admit 4/9 for DCD Liver transplant    MELD-Na score: 25 at 4/1/2022 11:19 PM  MELD score: 24 at 4/1/2022 11:19 PM  Calculated from:  Serum Creatinine: 1.5 mg/dL at 3/31/2022  9:15 AM  Serum Sodium: 135 mmol/L at 3/31/2022  9:15 AM  Total Bilirubin: 4.6 mg/dL at 3/31/2022  9:15 AM  INR(ratio): 2.10 INR at 4/1/2022 11:19 PM  Age: 57 years      Thrombocytopenia, unspecified  - likely to improve post transplant      Acquired circulating anticoagulants  - to improve post transplant      Anemia  - last EGD in 02/2022 showed grade I varices  - monitor w daily CBC       Ascites due to alcoholic cirrhosis  - has been getting 5L paracentesis twice w week, no reported SBP  - abdomen large  - 2 gm Na diet  - was taking Lasix 40 mg twice daily- held on admit  - unable to tolerate spironolactone due to recurrent hyperkalemia. Most recent hospitalization pt had hypokalemia    FAMILIA (acute kidney injury)  - cont to monitor strict I/o, weight daily      Hepatic encephalopathy  - having intermittent HE while taking Lactulose and Rifaximin        The patient presents for liver transplant.  There are no apparent contraindications to proceeding with the planned transplant.  The patient understands that the transplant could potentially be cancelled pending detailed assessment of the donor organ.    MELD-Na score: 25 at 4/9/2022  5:22 PM  MELD score:  23 at 4/9/2022  5:22 PM  Calculated from:  Serum Creatinine: 1.5 mg/dL at 4/9/2022  5:22 PM  Serum Sodium: 134 mmol/L at 4/9/2022  5:22 PM  Total Bilirubin: 4.8 mg/dL at 4/9/2022  5:22 PM  INR(ratio): 1.8 at 4/9/2022  5:22 PM  Age: 57 years    He will receive IV steroids pulse induction.    Patient was SARS-CoV-2 /COVID-19 tested with negative results.     A complete discussion of the transplant procedure, including risks, complications, and alternatives, as well as any donor-specific risk factors requiring specific disclosure, will be carried out by the responsible staff surgeon prior to the procedure.         Hien Murray NP  Liver Transplant  [facility] - Transplant Stepdown

## 2022-04-10 NOTE — NURSING
Removed peripheral iv. Catherine escorted pt off of floor, both denied escort. Pt is disharged with avs in hand

## 2022-04-10 NOTE — SUBJECTIVE & OBJECTIVE
Past Medical History:   Diagnosis Date    Arthritis     Cirrhosis of liver     HTN (hypertension)     BRAYDON (obstructive sleep apnea)     Secondary esophageal varices without bleeding 3/18/2022    EGD (2/25/22) showed moderate portal hypertensive gastropathy, small hiatal hernia, grade 1 esophageal varices    Type 2 diabetes mellitus        Past Surgical History:   Procedure Laterality Date    COLONOSCOPY      FINGER AMPUTATION      MEDIAL COLLATERAL LIGAMENT REPAIR, KNEE Bilateral     ROTATRO CUFF REPAIR      TONSILLECTOMY         Review of patient's allergies indicates:   Allergen Reactions    Strawberry Anaphylaxis and Rash    Metformin Diarrhea       Family History    None       Tobacco Use    Smoking status: Never Smoker    Smokeless tobacco: Never Used   Substance and Sexual Activity    Alcohol use: Not Currently    Drug use: Not on file    Sexual activity: Not on file       PTA Medications   Medication Sig    furosemide (LASIX) 40 MG tablet Take 40 mg by mouth 2 (two) times a day.    lactulose (CHRONULAC) 10 gram/15 mL solution Take 30 mLs by mouth 3 (three) times daily.    midodrine (PROAMATINE) 5 MG Tab Take 3 tablets (15 mg total) by mouth every 8 (eight) hours.    ondansetron (ZOFRAN-ODT) 4 MG TbDL Take 1 tablet by mouth every 8 (eight) hours as needed (NAUSEA).    pantoprazole (PROTONIX) 40 MG tablet Take 40 mg by mouth once daily.    sodium bicarbonate 650 MG tablet Take 2 tablets (1,300 mg total) by mouth 2 (two) times daily.    XIFAXAN 550 mg Tab Take 550 mg by mouth 2 (two) times daily.    zinc sulfate 50 mg zinc (220 mg) Tab tablet Take 1 tablet (220 mg total) by mouth once daily.       Review of Systems   Constitutional:  Positive for activity change (due to edema/ascites) and unexpected weight change (weight is up 30 lbs per pt over 3 months). Negative for appetite change.   HENT:  Negative for trouble swallowing.    Eyes:  Negative for visual disturbance.   Respiratory:  Negative for cough and  shortness of breath.    Cardiovascular:  Positive for leg swelling. Negative for chest pain and palpitations.   Gastrointestinal:  Positive for abdominal distention. Negative for abdominal pain.   Genitourinary:  Negative for decreased urine volume, difficulty urinating and urgency.   Musculoskeletal:  Positive for arthralgias.   Skin:  Positive for color change (to abdomen and BLE) and wound.   Allergic/Immunologic: Negative for immunocompromised state.   Neurological:  Positive for weakness (minimal).   Hematological:  Bruises/bleeds easily.   Psychiatric/Behavioral:  Negative for confusion, decreased concentration and dysphoric mood.    Objective:     Vital Signs (Most Recent):  Temp: 98.9 °F (37.2 °C) (04/09/22 1630)  Pulse: (!) 113 (04/09/22 1630)  BP: (!) 142/68 (04/09/22 1630)  SpO2: 97 % (04/09/22 1630)   Vital Signs (24h Range):  Temp:  [98.9 °F (37.2 °C)] 98.9 °F (37.2 °C)  Pulse:  [113] 113  SpO2:  [97 %] 97 %  BP: (142)/(68) 142/68     Weight: 121 kg (266 lb 12.1 oz)  Body mass index is 41.78 kg/m².    No intake or output data in the 24 hours ending 04/09/22 1938    Physical Exam  Vitals and nursing note reviewed.   Constitutional:       Appearance: Normal appearance.      Comments: obese   HENT:      Head: Normocephalic.      Nose: Nose normal.   Eyes:      General: Scleral icterus present.   Cardiovascular:      Rate and Rhythm: Normal rate and regular rhythm.      Heart sounds: Normal heart sounds.      Comments: Pedal pulses trace due to edema  Pulmonary:      Effort: Pulmonary effort is normal. No respiratory distress.      Breath sounds: Normal breath sounds. No wheezing.   Abdominal:      General: There is distension.      Tenderness: There is no abdominal tenderness.          Comments: Obese abdomen   Musculoskeletal:         General: Normal range of motion.      Cervical back: Neck supple.      Right lower leg: Edema (2+, reddened, no erythema or drainage) present.      Left lower leg: Edema  (2+, reddended w no erythema or drainage) present.   Skin:     General: Skin is warm and dry.      Capillary Refill: Capillary refill takes 2 to 3 seconds.      Coloration: Skin is jaundiced.   Neurological:      General: No focal deficit present.      Mental Status: He is alert and oriented to person, place, and time.   Psychiatric:         Mood and Affect: Mood normal.         Behavior: Behavior normal.      Comments: Responses appropriate w slight delay       Laboratory:  CBC:   Recent Labs   Lab 04/09/22  1722   WBC 4.62   RBC 2.65*   HGB 8.1*   HCT 24.4*   PLT 59*   MCV 92   MCH 30.6   MCHC 33.2     CMP:   Recent Labs   Lab 04/09/22  1722   *   CALCIUM 8.6*   ALBUMIN 3.2*   PROT 6.0   *   K 3.6   CO2 27   CL 98   BUN 19   CREATININE 1.5*   ALKPHOS 108   ALT 29   AST 54*   BILITOT 4.8*     Coagulation:   Recent Labs   Lab 04/09/22  1722   INR 1.8*   APTT 37.9*     Labs within the past 24 hours have been reviewed.    Diagnostic Results:    I have personally reviewed all pertinent imaging studies.

## 2022-04-10 NOTE — TELEPHONE ENCOUNTER
ON CALL NOTE    Received phone call from ZION Arshad, , that the case for liver transplant surgery has been cancelled.  All necessary staff as well as patient notified.  Informed patient that staff is aware of above and that he will likely be dc'd from hospital.  Patient verbalized understanding.

## 2022-04-10 NOTE — NURSING
- Patient admitted for liver transplant surgery  - Per team ok to eat, time of transplant TBA  - Spouse at bedside, keep bed low & locked, call light in reach, nonslip footwear in use  - EKG, x-ray, anesthesia consent, labs complete

## 2022-04-10 NOTE — DISCHARGE SUMMARY
[facility] - Transplant Stepdown  Liver Transplant  Discharge Summary      Patient Name: Pelon Vasquez  MRN: [id number]  Admission Date: 2022  Hospital Length of Stay: 1 days  Discharge Date and Time:  04/10/2022 11:20 AM  Attending Physician: Gunnar Kay MD   Discharging Provider: Hien Murray NP  Primary Care Provider: Primary Doctor No  HPI:   Mr.Blake Vasquez is a 57 y.o. 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 He is followed by Dr Leon in Hepatology clinic.  His liver disease has been complicated by jaundice, FAMILIA w hyperkalemia, abscites (requiring 5L para twice a week- no known hx of SBP) and lower extremity edema and HE.  Abdomen is slightly red, but not watm to touch.  He has a tape burn that was blistered but has since popped to middle/top of belly.  He has old paracentesis site to RLQ that is oozing small amt of serous fluid.  BLE are edematous and reddened but w no active signs of cellulitis. FAMILIA and Hyperkalemia improved w diuretics.  Cr on admit is 1.5. Edema has per pt improved but still persists despite diuretics.  Intermittent HE on Lactulose and Rifaximin.  He was hospitalized - at OSH for HE.  He is listed w MELD 25.  He is a direct admit for DCD liver txp, donor is NOT high risk.  Donor is CMV+, EBV IgG and syphilis +.  Pt is currently not NPO as there are no times for OR.  He denies SOB, CP, recent change in health or sick contacts.  He denies signs of bleeding. Dr Scott will be primary surgeon.  Labs and imaging ordered.      Procedure(s) (LRB):  TRANSPLANT, LIVER (N/A)     Hospital Course:    Transplant canceled as donor did not  in time.  Patient and wife verbalize understanding.  Dose of lactulose given.  Pt to resume home meds.  He has labs scheduled in am.  He is listed w MELD 25.  Na MELD day of discharge 24.      Discharge instructions reviewed Nursing and VEDA.  Patient and CG verbalize understanding and are in agreement with discharge from hospital today.   Patient is medically stable for discharge.         Goals of Care Treatment Preferences:  Code Status: Full Code    Living Will: Yes              Final Active Diagnoses:    Diagnosis Date Noted POA    PRINCIPAL PROBLEM:  End stage liver disease [K72.10] 04/09/2022 Unknown    Acquired circulating anticoagulants [D68.318] 04/09/2022 Yes    Thrombocytopenia, unspecified [D69.6] 04/09/2022 Yes    Ascites due to alcoholic cirrhosis [K70.31] 03/18/2022 Yes     Chronic    Anemia [D64.9] 03/18/2022 Yes    FAMILIA (acute kidney injury) [N17.9] 03/17/2022 Yes    Hepatic encephalopathy [K72.90] 02/04/2022 Yes      Problems Resolved During this Admission:           Pending Diagnostic Studies:     Procedure Component Value Units Date/Time    HIV 1/2 Ag/Ab (4th Gen) [[id number]] Collected: 04/09/22 1721    Order Status: Sent Lab Status: In process Updated: 04/09/22 1729    Specimen: Blood     Hepatitis B Core Antibody, Total [[id number]] Collected: 04/09/22 1721    Order Status: Sent Lab Status: In process Updated: 04/09/22 1729    Specimen: Blood     Hepatitis B Surface Ab, Qualitative [[id number]] Collected: 04/09/22 1721    Order Status: Sent Lab Status: In process Updated: 04/09/22 1729    Specimen: Blood     Hepatitis B surface antigen [[id number]] Collected: 04/09/22 1721    Order Status: Sent Lab Status: In process Updated: 04/09/22 1729    Specimen: Blood     Hepatitis C Antibody [[id number]] Collected: 04/09/22 1721    Order Status: Sent Lab Status: In process Updated: 04/09/22 1729    Specimen: Blood     Hepatitis C RNA, quantitative, PCR [[id number]] Collected: 04/09/22 1721    Order Status: Sent Lab Status: In process Updated: 04/09/22 1729    Specimen: Blood         Significant Diagnostic Studies: Labs:   CMP   Recent Labs   Lab 04/09/22  1722 04/10/22  0819   * 133*   K 3.6 3.6   CL 98 99   CO2 27 26   * 210*   BUN 19 18   CREATININE 1.5* 1.2   CALCIUM 8.6* 8.4*   PROT 6.0 5.6*   ALBUMIN 3.2* 3.0*    BILITOT 4.8* 4.8*   ALKPHOS 108 80   AST 54* 53*   ALT 29 27   ANIONGAP 9 8   ESTGFRAFRICA 59.7* >60.0   EGFRNONAA 51.7* >60.0     CBC   Recent Labs   Lab 04/09/22  1722 04/10/22  0819   WBC 4.62 4.00   HGB 8.1* 7.9*   HCT 24.4* 23.2*   PLT 59* 52*     INR   Lab Results   Component Value Date    INR 1.8 (H) 04/09/2022    INR 2.00 04/04/2022    INR 2.10 04/01/2022      All labs within the past 24 hours have been reviewed    Microbiology:   Blood Culture No results found for: LABBLOO and Urine Culture    Lab Results   Component Value Date    LABURIN No significant growth 03/21/2022     Radiology: X-Ray: CXR: X-Ray Chest 1 View (CXR): No results found for this visit on 04/09/22.    The patients clinical status was discussed at multidisplinary rounds, involving transplant surgery, transplant medicine, pharmacy, nursing, nutrition, and social work    Discharged Condition: fair    Disposition: to home accompanied by wife    Follow Up: Labs on Monday 4/12/22    Patient Instructions:   No discharge procedures on file.    Medications:  Reconciled Home Medications:      Medication List      ASK your doctor about these medications    atorvastatin 20 MG tablet  Commonly known as: LIPITOR  Take 20 mg by mouth once daily.     furosemide 40 MG tablet  Commonly known as: LASIX  Take 40 mg by mouth 2 (two) times a day.     lactulose 10 gram/15 mL solution  Commonly known as: CHRONULAC  Take 30 mLs by mouth 3 (three) times daily.     midodrine 5 MG Tab  Commonly known as: PROAMATINE  Take 3 tablets (15 mg total) by mouth every 8 (eight) hours.     ondansetron 4 MG Tbdl  Commonly known as: ZOFRAN-ODT  Take 1 tablet by mouth every 8 (eight) hours as needed (NAUSEA).     pantoprazole 40 MG tablet  Commonly known as: PROTONIX  Take 40 mg by mouth once daily.     sodium bicarbonate 650 MG tablet  Take 2 tablets (1,300 mg total) by mouth 2 (two) times daily.     triamcinolone acetonide 0.1% 0.1 % cream  Commonly known as: KENALOG  Apply  topically 2 (two) times daily as needed.     XIFAXAN 550 mg Tab  Generic drug: rifAXIMin  Take 550 mg by mouth 2 (two) times daily.     zinc sulfate 50 mg zinc (220 mg) Tab tablet  Take 1 tablet (220 mg total) by mouth once daily.          Time spent caring for patient (Greater than 1/2 spent in direct face-to-face contact): > 30 minutes    Hien Murray NP  Liver Transplant  [facility] - Transplant Stepdown

## 2022-04-11 ENCOUNTER — LAB VISIT (OUTPATIENT)
Dept: LAB | Facility: CLINIC | Age: 57
End: 2022-04-11
Payer: COMMERCIAL

## 2022-04-11 DIAGNOSIS — K74.60 DECOMPENSATION OF CIRRHOSIS OF LIVER: ICD-10-CM

## 2022-04-11 DIAGNOSIS — N17.9 AKI (ACUTE KIDNEY INJURY): ICD-10-CM

## 2022-04-11 DIAGNOSIS — K72.90 DECOMPENSATION OF CIRRHOSIS OF LIVER: ICD-10-CM

## 2022-04-11 DIAGNOSIS — K70.31 ASCITES DUE TO ALCOHOLIC CIRRHOSIS: ICD-10-CM

## 2022-04-11 LAB
HBV CORE AB SERPL QL IA: NEGATIVE
HBV SURFACE AB SER-ACNC: NEGATIVE M[IU]/ML
HBV SURFACE AG SERPL QL IA: NEGATIVE
HCV AB SERPL QL IA: NEGATIVE
HIV 1+2 AB+HIV1 P24 AG SERPL QL IA: NEGATIVE

## 2022-04-11 PROCEDURE — 85610 PROTHROMBIN TIME: CPT | Mod: TXP | Performed by: INTERNAL MEDICINE

## 2022-04-11 PROCEDURE — 36415 COLL VENOUS BLD VENIPUNCTURE: CPT | Mod: TXP,,, | Performed by: INTERNAL MEDICINE

## 2022-04-11 PROCEDURE — 36415 PR COLLECTION VENOUS BLOOD,VENIPUNCTURE: ICD-10-PCS | Mod: TXP,,, | Performed by: INTERNAL MEDICINE

## 2022-04-11 PROCEDURE — 85025 COMPLETE CBC W/AUTO DIFF WBC: CPT | Mod: TXP | Performed by: INTERNAL MEDICINE

## 2022-04-11 PROCEDURE — 80053 COMPREHEN METABOLIC PANEL: CPT | Mod: NTX | Performed by: INTERNAL MEDICINE

## 2022-04-12 ENCOUNTER — TELEPHONE (OUTPATIENT)
Dept: TRANSPLANT | Facility: CLINIC | Age: 57
End: 2022-04-12

## 2022-04-12 ENCOUNTER — HOSPITAL ENCOUNTER (INPATIENT)
Facility: HOSPITAL | Age: 57
LOS: 4 days | Discharge: HOME | DRG: 433 | End: 2022-04-17
Attending: TRANSPLANT SURGERY | Admitting: TRANSPLANT SURGERY
Payer: COMMERCIAL

## 2022-04-12 ENCOUNTER — ANESTHESIA EVENT (OUTPATIENT)
Dept: SURGERY | Facility: HOSPITAL | Age: 57
DRG: 433 | End: 2022-04-12
Payer: COMMERCIAL

## 2022-04-12 ENCOUNTER — ANESTHESIA (OUTPATIENT)
Dept: SURGERY | Facility: HOSPITAL | Age: 57
DRG: 433 | End: 2022-04-12
Payer: COMMERCIAL

## 2022-04-12 DIAGNOSIS — Z76.82 ORGAN TRANSPLANT CANDIDATE: Chronic | ICD-10-CM

## 2022-04-12 DIAGNOSIS — K72.10 END STAGE LIVER DISEASE: Primary | ICD-10-CM

## 2022-04-12 DIAGNOSIS — K70.31 ALCOHOLIC CIRRHOSIS OF LIVER WITH ASCITES: ICD-10-CM

## 2022-04-12 DIAGNOSIS — K76.82 HEPATIC ENCEPHALOPATHY: ICD-10-CM

## 2022-04-12 DIAGNOSIS — K70.31 ASCITES DUE TO ALCOHOLIC CIRRHOSIS: Chronic | ICD-10-CM

## 2022-04-12 DIAGNOSIS — Z01.818 PRE-OP EXAMINATION: ICD-10-CM

## 2022-04-12 DIAGNOSIS — R50.9 FEVER, UNSPECIFIED FEVER CAUSE: ICD-10-CM

## 2022-04-12 DIAGNOSIS — D69.6 THROMBOCYTOPENIA, UNSPECIFIED: ICD-10-CM

## 2022-04-12 DIAGNOSIS — D68.4 ACQUIRED COAGULATION FACTOR DEFICIENCY: ICD-10-CM

## 2022-04-12 DIAGNOSIS — Z76.82 LIVER TRANSPLANT CANDIDATE: ICD-10-CM

## 2022-04-12 DIAGNOSIS — D63.8 ANEMIA OF CHRONIC DISEASE: ICD-10-CM

## 2022-04-12 LAB
ABO + RH BLD: NORMAL
ALBUMIN SERPL BCP-MCNC: 3.1 G/DL (ref 3.5–5.2)
ALBUMIN SERPL BCP-MCNC: 3.3 G/DL (ref 3.5–5.2)
ALP SERPL-CCNC: 87 U/L (ref 55–135)
ALP SERPL-CCNC: 87 U/L (ref 55–135)
ALT SERPL W/O P-5'-P-CCNC: 29 U/L (ref 10–44)
ALT SERPL W/O P-5'-P-CCNC: 30 U/L (ref 10–44)
ANION GAP SERPL CALC-SCNC: 9 MMOL/L (ref 8–16)
ANION GAP SERPL CALC-SCNC: 9 MMOL/L (ref 8–16)
APTT BLDCRRT: 38.2 SEC (ref 21–32)
AST SERPL-CCNC: 55 U/L (ref 10–40)
AST SERPL-CCNC: 56 U/L (ref 10–40)
BACTERIA #/AREA URNS AUTO: ABNORMAL /HPF
BASOPHILS # BLD AUTO: 0.06 K/UL (ref 0–0.2)
BASOPHILS # BLD AUTO: 0.08 K/UL (ref 0–0.2)
BASOPHILS NFR BLD: 1 % (ref 0–1.9)
BASOPHILS NFR BLD: 1.3 % (ref 0–1.9)
BILIRUB DIRECT SERPL-MCNC: 2.1 MG/DL (ref 0.1–0.3)
BILIRUB SERPL-MCNC: 5.4 MG/DL (ref 0.1–1)
BILIRUB SERPL-MCNC: 6.2 MG/DL (ref 0.1–1)
BILIRUB UR QL STRIP: NEGATIVE
BLD GP AB SCN CELLS X3 SERPL QL: NORMAL
BLOOD BANK HEPATITIS FREEZE AND HOLD: NORMAL
BUN SERPL-MCNC: 16 MG/DL (ref 6–20)
BUN SERPL-MCNC: 17 MG/DL (ref 6–20)
CALCIUM SERPL-MCNC: 8.6 MG/DL (ref 8.7–10.5)
CALCIUM SERPL-MCNC: 8.8 MG/DL (ref 8.7–10.5)
CHLORIDE SERPL-SCNC: 97 MMOL/L (ref 95–110)
CHLORIDE SERPL-SCNC: 98 MMOL/L (ref 95–110)
CLARITY UR REFRACT.AUTO: CLEAR
CO2 SERPL-SCNC: 25 MMOL/L (ref 23–29)
CO2 SERPL-SCNC: 26 MMOL/L (ref 23–29)
COLOR UR AUTO: ABNORMAL
CREAT SERPL-MCNC: 1.5 MG/DL (ref 0.5–1.4)
CREAT SERPL-MCNC: 1.6 MG/DL (ref 0.5–1.4)
DIFFERENTIAL METHOD: ABNORMAL
DIFFERENTIAL METHOD: ABNORMAL
EOSINOPHIL # BLD AUTO: 0.5 K/UL (ref 0–0.5)
EOSINOPHIL # BLD AUTO: 0.6 K/UL (ref 0–0.5)
EOSINOPHIL NFR BLD: 7.8 % (ref 0–8)
EOSINOPHIL NFR BLD: 9.1 % (ref 0–8)
ERYTHROCYTE [DISTWIDTH] IN BLOOD BY AUTOMATED COUNT: 19.6 % (ref 11.5–14.5)
ERYTHROCYTE [DISTWIDTH] IN BLOOD BY AUTOMATED COUNT: 20 % (ref 11.5–14.5)
EST. GFR  (AFRICAN AMERICAN): 55.2 ML/MIN/1.73 M^2
EST. GFR  (AFRICAN AMERICAN): 59.7 ML/MIN/1.73 M^2
EST. GFR  (NON AFRICAN AMERICAN): 47.8 ML/MIN/1.73 M^2
EST. GFR  (NON AFRICAN AMERICAN): 51.7 ML/MIN/1.73 M^2
ESTIMATED AVG GLUCOSE: 137 MG/DL (ref 68–131)
FIBRINOGEN PPP-MCNC: 126 MG/DL (ref 182–400)
GLUCOSE SERPL-MCNC: 170 MG/DL (ref 70–110)
GLUCOSE SERPL-MCNC: 278 MG/DL (ref 70–110)
GLUCOSE UR QL STRIP: NEGATIVE
HBA1C MFR BLD: 6.4 % (ref 4–5.6)
HCT VFR BLD AUTO: 24.2 % (ref 40–54)
HCT VFR BLD AUTO: 24.8 % (ref 40–54)
HGB BLD-MCNC: 8.2 G/DL (ref 14–18)
HGB BLD-MCNC: 8.3 G/DL (ref 14–18)
HGB UR QL STRIP: ABNORMAL
HYALINE CASTS UR QL AUTO: 3 /LPF
IMM GRANULOCYTES # BLD AUTO: 0.02 K/UL (ref 0–0.04)
IMM GRANULOCYTES # BLD AUTO: 0.03 K/UL (ref 0–0.04)
IMM GRANULOCYTES NFR BLD AUTO: 0.3 % (ref 0–0.5)
IMM GRANULOCYTES NFR BLD AUTO: 0.5 % (ref 0–0.5)
INR PPP: 1.7 (ref 0.8–1.2)
INR PPP: 1.8 (ref 0.8–1.2)
KETONES UR QL STRIP: NEGATIVE
LEUKOCYTE ESTERASE UR QL STRIP: NEGATIVE
LYMPHOCYTES # BLD AUTO: 0.8 K/UL (ref 1–4.8)
LYMPHOCYTES # BLD AUTO: 0.9 K/UL (ref 1–4.8)
LYMPHOCYTES NFR BLD: 11.7 % (ref 18–48)
LYMPHOCYTES NFR BLD: 14.7 % (ref 18–48)
MAGNESIUM SERPL-MCNC: 1.7 MG/DL (ref 1.6–2.6)
MCH RBC QN AUTO: 30.9 PG (ref 27–31)
MCH RBC QN AUTO: 31.2 PG (ref 27–31)
MCHC RBC AUTO-ENTMCNC: 33.1 G/DL (ref 32–36)
MCHC RBC AUTO-ENTMCNC: 34.3 G/DL (ref 32–36)
MCV RBC AUTO: 91 FL (ref 82–98)
MCV RBC AUTO: 94 FL (ref 82–98)
MICROSCOPIC COMMENT: ABNORMAL
MONOCYTES # BLD AUTO: 1.1 K/UL (ref 0.3–1)
MONOCYTES # BLD AUTO: 1.2 K/UL (ref 0.3–1)
MONOCYTES NFR BLD: 17.3 % (ref 4–15)
MONOCYTES NFR BLD: 20.4 % (ref 4–15)
NEUTROPHILS # BLD AUTO: 3.2 K/UL (ref 1.8–7.7)
NEUTROPHILS # BLD AUTO: 3.9 K/UL (ref 1.8–7.7)
NEUTROPHILS NFR BLD: 55.6 % (ref 38–73)
NEUTROPHILS NFR BLD: 60.3 % (ref 38–73)
NITRITE UR QL STRIP: NEGATIVE
NRBC BLD-RTO: 0 /100 WBC
NRBC BLD-RTO: 0 /100 WBC
PH UR STRIP: 5 [PH] (ref 5–8)
PLATELET # BLD AUTO: 53 K/UL (ref 150–450)
PLATELET # BLD AUTO: 57 K/UL (ref 150–450)
PMV BLD AUTO: 10.6 FL (ref 9.2–12.9)
PMV BLD AUTO: 11.5 FL (ref 9.2–12.9)
POTASSIUM SERPL-SCNC: 3.9 MMOL/L (ref 3.5–5.1)
POTASSIUM SERPL-SCNC: 4 MMOL/L (ref 3.5–5.1)
PROT SERPL-MCNC: 6 G/DL (ref 6–8.4)
PROT SERPL-MCNC: 6.1 G/DL (ref 6–8.4)
PROT UR QL STRIP: NEGATIVE
PROTHROMBIN TIME: 17.5 SEC (ref 9–12.5)
PROTHROMBIN TIME: 18 SEC (ref 9–12.5)
RBC # BLD AUTO: 2.65 M/UL (ref 4.6–6.2)
RBC # BLD AUTO: 2.66 M/UL (ref 4.6–6.2)
RBC #/AREA URNS AUTO: 5 /HPF (ref 0–4)
SARS-COV-2 RDRP RESP QL NAA+PROBE: NEGATIVE
SODIUM SERPL-SCNC: 132 MMOL/L (ref 136–145)
SODIUM SERPL-SCNC: 132 MMOL/L (ref 136–145)
SP GR UR STRIP: 1.01 (ref 1–1.03)
SQUAMOUS #/AREA URNS AUTO: 1 /HPF
URN SPEC COLLECT METH UR: ABNORMAL
WBC # BLD AUTO: 5.79 K/UL (ref 3.9–12.7)
WBC # BLD AUTO: 6.4 K/UL (ref 3.9–12.7)
WBC #/AREA URNS AUTO: 8 /HPF (ref 0–5)

## 2022-04-12 PROCEDURE — 85384 FIBRINOGEN ACTIVITY: CPT | Mod: NTX

## 2022-04-12 PROCEDURE — 86920 COMPATIBILITY TEST SPIN: CPT | Mod: NTX

## 2022-04-12 PROCEDURE — 86704 HEP B CORE ANTIBODY TOTAL: CPT | Mod: NTX

## 2022-04-12 PROCEDURE — 85610 PROTHROMBIN TIME: CPT | Mod: NTX

## 2022-04-12 PROCEDURE — 86850 RBC ANTIBODY SCREEN: CPT | Mod: NTX

## 2022-04-12 PROCEDURE — 86706 HEP B SURFACE ANTIBODY: CPT | Mod: NTX

## 2022-04-12 PROCEDURE — 93010 EKG 12-LEAD: ICD-10-PCS | Mod: NTX,,, | Performed by: INTERNAL MEDICINE

## 2022-04-12 PROCEDURE — 81001 URINALYSIS AUTO W/SCOPE: CPT | Mod: NTX

## 2022-04-12 PROCEDURE — 86803 HEPATITIS C AB TEST: CPT | Mod: NTX

## 2022-04-12 PROCEDURE — 85025 COMPLETE CBC W/AUTO DIFF WBC: CPT | Mod: NTX

## 2022-04-12 PROCEDURE — 93005 ELECTROCARDIOGRAM TRACING: CPT | Mod: NTX

## 2022-04-12 PROCEDURE — 83735 ASSAY OF MAGNESIUM: CPT | Mod: NTX

## 2022-04-12 PROCEDURE — 82248 BILIRUBIN DIRECT: CPT | Mod: NTX

## 2022-04-12 PROCEDURE — 80053 COMPREHEN METABOLIC PANEL: CPT | Mod: NTX

## 2022-04-12 PROCEDURE — 83036 HEMOGLOBIN GLYCOSYLATED A1C: CPT | Mod: NTX

## 2022-04-12 PROCEDURE — 87389 HIV-1 AG W/HIV-1&-2 AB AG IA: CPT | Mod: NTX

## 2022-04-12 PROCEDURE — 99222 1ST HOSP IP/OBS MODERATE 55: CPT | Mod: 57,NTX,, | Performed by: NURSE PRACTITIONER

## 2022-04-12 PROCEDURE — 20600001 HC STEP DOWN PRIVATE ROOM: Mod: NTX

## 2022-04-12 PROCEDURE — 87340 HEPATITIS B SURFACE AG IA: CPT | Mod: NTX

## 2022-04-12 PROCEDURE — 99000 SPECIMEN HANDLING OFFICE-LAB: CPT | Mod: NTX

## 2022-04-12 PROCEDURE — 87522 HEPATITIS C REVRS TRNSCRPJ: CPT | Mod: NTX

## 2022-04-12 PROCEDURE — 99222 PR INITIAL HOSPITAL CARE,LEVL II: ICD-10-PCS | Mod: 57,NTX,, | Performed by: NURSE PRACTITIONER

## 2022-04-12 PROCEDURE — 85730 THROMBOPLASTIN TIME PARTIAL: CPT | Mod: NTX

## 2022-04-12 PROCEDURE — U0002 COVID-19 LAB TEST NON-CDC: HCPCS | Mod: NTX

## 2022-04-12 PROCEDURE — 93010 ELECTROCARDIOGRAM REPORT: CPT | Mod: NTX,,, | Performed by: INTERNAL MEDICINE

## 2022-04-12 PROCEDURE — 36415 COLL VENOUS BLD VENIPUNCTURE: CPT | Mod: NTX

## 2022-04-12 RX ORDER — MUPIROCIN 20 MG/G
OINTMENT TOPICAL
Status: DISCONTINUED | OUTPATIENT
Start: 2022-04-12 | End: 2022-04-17 | Stop reason: HOSPADM

## 2022-04-12 RX ORDER — METHYLPREDNISOLONE SODIUM SUCCINATE 500 MG/8ML
500 INJECTION INTRAMUSCULAR; INTRAVENOUS
Status: DISCONTINUED | OUTPATIENT
Start: 2022-04-12 | End: 2022-04-13

## 2022-04-12 NOTE — H&P
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 ESLD complicated by jaundice, FAMILIA, ascites (para 2x/week, no h/o SBP), HE, and hypervolemia. He is listed for a liver transplant with MELD 25. Patient admitted for liver transplant surgery. He denies any recent fevers or hospitalizations unknown to transplant team. He reports feel well in his usual state of health.       Past Medical History:   Diagnosis Date    Arthritis     Cirrhosis of liver     HTN (hypertension)     BRAYDON (obstructive sleep apnea)     Secondary esophageal varices without bleeding 3/18/2022    EGD (2/25/22) showed moderate portal hypertensive gastropathy, small hiatal hernia, grade 1 esophageal varices    Type 2 diabetes mellitus        Past Surgical History:   Procedure Laterality Date    COLONOSCOPY      FINGER AMPUTATION      LIVER TRANSPLANT N/A 4/10/2022    Procedure: TRANSPLANT, LIVER;  Surgeon: Félix Scott MD;  Location: [facility] OR [address] [place] [address];  Service: Transplant;  Laterality: N/A;    MEDIAL COLLATERAL LIGAMENT REPAIR, KNEE Bilateral     ROTATRO CUFF REPAIR      TONSILLECTOMY         Review of patient's allergies indicates:   Allergen Reactions    Strawberry Anaphylaxis and Rash    Metformin Diarrhea       Family History    None       Tobacco Use    Smoking status: Never Smoker    Smokeless tobacco: Never Used   Substance and Sexual Activity    Alcohol use: Not Currently    Drug use: Not on file    Sexual activity: Not on file       PTA Medications   Medication Sig    furosemide (LASIX) 40 MG tablet Take 40 mg by mouth 2 (two) times a day.    lactulose (CHRONULAC) 10 gram/15 mL solution Take 30 mLs by mouth 3 (three) times daily.     midodrine (PROAMATINE) 5 MG Tab Take 3 tablets (15 mg total) by mouth every 8 (eight) hours.    ondansetron (ZOFRAN-ODT) 4 MG TbDL Take 1 tablet by mouth every 8 (eight) hours as needed (NAUSEA).    pantoprazole (PROTONIX) 40 MG tablet Take 40 mg by mouth once daily.    sodium bicarbonate 650 MG tablet Take 2 tablets (1,300 mg total) by mouth 2 (two) times daily.    XIFAXAN 550 mg Tab Take 550 mg by mouth 2 (two) times daily.    zinc sulfate 50 mg zinc (220 mg) Tab tablet Take 1 tablet (220 mg total) by mouth once daily.       Review of Systems   Constitutional:  Positive for activity change (due to edema/ascites) and unexpected weight change (weight is up 30 lbs per pt over 3 months). Negative for appetite change.   HENT:  Negative for trouble swallowing.    Eyes:  Negative for visual disturbance.   Respiratory:  Negative for cough and shortness of breath.    Cardiovascular:  Positive for leg swelling. Negative for chest pain and palpitations.   Gastrointestinal:  Positive for abdominal distention. Negative for abdominal pain.   Genitourinary:  Negative for decreased urine volume, difficulty urinating and urgency.   Musculoskeletal:  Positive for arthralgias.   Skin:  Positive for color change (to abdomen and BLE) and wound.   Allergic/Immunologic: Negative for immunocompromised state.   Neurological:  Positive for weakness (minimal).   Hematological:  Bruises/bleeds easily.   Psychiatric/Behavioral:  Negative for confusion, decreased concentration and dysphoric mood.    Objective:     Vital Signs (Most Recent):  Temp: 98.4 °F (36.9 °C) (04/12/22 1645)  Pulse: 107 (04/12/22 1645)  Resp: 16 (04/12/22 1645)  BP: (!) 151/72 (04/12/22 1645)  SpO2: 98 % (04/12/22 1645)   Vital Signs (24h Range):  Temp:  [98.4 °F (36.9 °C)] 98.4 °F (36.9 °C)  Pulse:  [107] 107  Resp:  [16] 16  SpO2:  [98 %] 98 %  BP: (151)/(72) 151/72        There is no height or weight on file to calculate BMI.    No intake or output data in the 24  hours ending 04/12/22 1649    Physical Exam  Vitals and nursing note reviewed.   Constitutional:       Appearance: Normal appearance.      Comments: obese   HENT:      Head: Normocephalic.      Nose: Nose normal.   Eyes:      General: Scleral icterus present.   Cardiovascular:      Rate and Rhythm: Normal rate and regular rhythm.      Heart sounds: Normal heart sounds.      Comments: Pedal pulses trace due to edema  Pulmonary:      Effort: Pulmonary effort is normal. No respiratory distress.      Breath sounds: Normal breath sounds. No wheezing.   Abdominal:      General: There is distension (ascites).      Tenderness: There is no abdominal tenderness.   Musculoskeletal:         General: Normal range of motion.      Cervical back: Neck supple.      Right lower leg: Edema (3+, reddened, no erythema or drainage) present.      Left lower leg: Edema (3+, reddended w no erythema or drainage) present.   Skin:     General: Skin is warm and dry.      Capillary Refill: Capillary refill takes 2 to 3 seconds.      Coloration: Skin is jaundiced.   Neurological:      General: No focal deficit present.      Mental Status: He is alert and oriented to person, place, and time.   Psychiatric:         Mood and Affect: Mood normal.         Behavior: Behavior normal.      Comments: Responses appropriate w slight delay       Laboratory:  CBC:   Recent Labs   Lab 04/11/22  1347   WBC 6.40   RBC 2.66*   HGB 8.3*   HCT 24.2*   PLT 57*   MCV 91   MCH 31.2*   MCHC 34.3     CMP:   Recent Labs   Lab 04/11/22  1347   *   CALCIUM 8.6*   ALBUMIN 3.3*   PROT 6.1   *   K 3.9   CO2 25   CL 98   BUN 16   CREATININE 1.6*   ALKPHOS 87   ALT 30   AST 56*   BILITOT 5.4*     Coagulation:   Recent Labs   Lab 04/09/22  1722 04/11/22  1347   INR 1.8* 1.7*   APTT 37.9*  --      Labs within the past 24 hours have been reviewed.    Diagnostic Results:  I have personally reviewed all pertinent imaging studies.    Assessment/Plan:     * End stage  liver disease  - ESLD 2/2 ETOH/LOZANO  - Admit for liver transplant surgery  - Dr. Castillo primary surgeon. OR time 7:30pm, Cut time 9 pm  - Pre op labs and diagnostic studies pending, to be reviewed before surgery        The patient presents for liver transplant.  There are no apparent contraindications to proceeding with the planned transplant.  The patient understands that the transplant could potentially be cancelled pending detailed assessment of the donor organ.    MELD-Na score: 26 at 4/11/2022  1:47 PM  MELD score: 23 at 4/11/2022  1:47 PM  Calculated from:  Serum Creatinine: 1.6 mg/dL at 4/11/2022  1:47 PM  Serum Sodium: 132 mmol/L at 4/11/2022  1:47 PM  Total Bilirubin: 5.4 mg/dL at 4/11/2022  1:47 PM  INR(ratio): 1.7 at 4/11/2022  1:47 PM  Age: 57 years    He will receive IV steroids pulse induction.    Covid test pending. If Covid test positive then: Transplant will be cancelled, and instructed patient to go home to quarantine. Discussed the patient would be changed to inactive on wait list for 28 days and will be assessed at that time to be placed back on the waitlist. If discharged, instructed to call transplant program if signs and symptoms should appear    A complete discussion of the transplant procedure, including risks, complications, and alternatives, as well as any donor-specific risk factors requiring specific disclosure, will be carried out by the responsible staff surgeon prior to the procedure.       Amanda Reilly, DNP  Liver Transplant  [facility] - Transplant Stepdown

## 2022-04-12 NOTE — HPI
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 ESLD complicated by jaundice, FAMILIA, ascites (para 2x/week, no h/o SBP), HE, and hypervolemia. He is listed for a liver transplant with MELD 25. Patient admitted for liver transplant surgery. He denies any recent fevers or hospitalizations unknown to transplant team. He reports feel well in his usual state of health.

## 2022-04-12 NOTE — ASSESSMENT & PLAN NOTE
- ESLD 2/2 ETOH/LOZANO  - Admit for liver transplant surgery  - Dr. Castillo primary surgeon. OR time 7:30pm, Cut time 9 pm  - Pre op labs and diagnostic studies pending, to be reviewed before surgery

## 2022-04-12 NOTE — TELEPHONE ENCOUNTER
ORGAN OFFER NOTE    Notified by Jessee Blancas, , of liver offer with donor information.  Donor and recipient information read back and verified.  Spoke with Pelon Vasquez and identified no acute medical issues during telephone assessment.  Patient instructed to report to the T[facility] on 11th floor for admission.  Patient verbalized understanding and willingness to come in for transplant.  ETA: 3:15p.    Patient was asked if they have had a positive COVID-19 test or if they have any signs or symptoms. Informed patient that they will be tested for COVID-19 upon arrival to the hospital, unless have a previous positive result. If tested and result is positive, the transplant will not be able to occur, they will be inactivated on the wait list for 28 days per protocol and required to quarantine.     Instructed patient to bring a copy of his covid vaccination card with him, in order to update his medical records.  He agrees to do so.    =====================================    @230p  Patient on campus, awaiting bed availability.  Informed him that room is being cleaned and should be ready soon ([id number]).  Patient is also aware of the need to be NPO since we now have times for the surgery. Patient last ate at 12 noon.    Appropriate staff informed of above and times for transplant surgery.

## 2022-04-12 NOTE — ANESTHESIA PREPROCEDURE EVALUATION
[facility]  Anesthesia Pre-Operative Evaluation         Patient Name: Pelon Vasquez  YOB: 1965  MRN: [id number]    SUBJECTIVE:     Pre-operative Evaluation for Procedure(s) (LRB):  TRANSPLANT, LIVER (N/A)     04/12/2022    Pelon Vasquez is a 57 y.o. male with a PMHx significant for EtOH/LOZANO cirrhosis, esophageal varices, obesity (BMI 42), CKD, HTN, DM2, and BRAYDON presenting for liver transplant. Transplant was initially scheduled for 4/9 but cancelled due to donor not expiring in time.    NPO since noon.    The patient now presents for the above procedure(s).    MELD-Na score: 26 at 4/11/2022  1:47 PM  MELD score: 23 at 4/11/2022  1:47 PM  Calculated from:  Serum Creatinine: 1.6 mg/dL at 4/11/2022  1:47 PM  Serum Sodium: 132 mmol/L at 4/11/2022  1:47 PM  Total Bilirubin: 5.4 mg/dL at 4/11/2022  1:47 PM  INR(ratio): 1.7 at 4/11/2022  1:47 PM  Age: 57 years    Previous Airway: None documented.    LDA:        Peripheral IV - Single Lumen 04/12/22 1700 20 G Right (Active)   Number of days: 0       Drips: None documented.      Patient Active Problem List   Diagnosis    Hepatic encephalopathy    Controlled type 2 diabetes mellitus, without long-term current use of insulin    FAMILIA (acute kidney injury)    Secondary esophageal varices without bleeding    Ascites due to alcoholic cirrhosis    Organ transplant candidate    Anemia    End stage liver disease    Acquired circulating anticoagulants    Thrombocytopenia, unspecified    Pre-op examination       Review of patient's allergies indicates:   Allergen Reactions    Strawberry Anaphylaxis and Rash    Metformin Diarrhea       Current Inpatient Medications:      Current Outpatient Medications   Medication Instructions    furosemide (LASIX) 40 mg, Oral, 2 times daily    lactulose (CHRONULAC) 10 gram/15 mL solution 30 mLs, Oral, 3 times daily    midodrine (PROAMATINE) 15 mg, Oral, Every 8 hours    ondansetron (ZOFRAN-ODT) 4 MG TbDL  1 tablet, Oral, Every 8 hours PRN    pantoprazole (PROTONIX) 40 mg, Oral, Daily    sodium bicarbonate 1,300 mg, Oral, 2 times daily    XIFAXAN 550 mg, Oral, 2 times daily    zinc sulfate 220 mg, Oral, Daily       Past Surgical History:   Procedure Laterality Date    COLONOSCOPY      FINGER AMPUTATION      LIVER TRANSPLANT N/A 4/10/2022    Procedure: TRANSPLANT, LIVER;  Surgeon: Félix Scott MD;  Location: [facility] OR [address] [place] [address];  Service: Transplant;  Laterality: N/A;    MEDIAL COLLATERAL LIGAMENT REPAIR, KNEE Bilateral     ROTATRO CUFF REPAIR      TONSILLECTOMY         Social History     Substance and Sexual Activity   Drug Use Not on file     Tobacco Use: Low Risk     Smoking Tobacco Use: Never Smoker    Smokeless Tobacco Use: Never Used     Alcohol Use: Not on file         OBJECTIVE:     Vital Signs Range (Last 24H):  Temp:  [36.9 °C (98.4 °F)]   Pulse:  [107]   Resp:  [16]   BP: (151)/(72)   SpO2:  [98 %]       Significant Labs    Heme Profile  Lab Results   Component Value Date    WBC 6.40 04/11/2022    HGB 8.3 (L) 04/11/2022    HCT 24.2 (L) 04/11/2022    PLT 57 (L) 04/11/2022       Coagulation Studies  Lab Results   Component Value Date    LABPROT 17.5 (H) 04/11/2022    INR 1.7 (H) 04/11/2022    APTT 37.9 (H) 04/09/2022       BMP  Lab Results   Component Value Date     (L) 04/11/2022    K 3.9 04/11/2022    CL 98 04/11/2022    CO2 25 04/11/2022    BUN 16 04/11/2022    CREATININE 1.6 (H) 04/11/2022    MG 1.7 04/10/2022    PHOS 2.9 04/10/2022       Liver Function Tests  Lab Results   Component Value Date    AST 56 (H) 04/11/2022    ALT 30 04/11/2022    ALKPHOS 87 04/11/2022    BILITOT 5.4 (H) 04/11/2022    PROT 6.1 04/11/2022    ALBUMIN 3.3 (L) 04/11/2022       Lipid Profile  No results found for: CHOL, HDL, LDLDIRECT, TRIG    Endocrine Profile  Lab Results   Component Value Date    HGBA1C 6.6 (H) 04/09/2022         Cardiac Studies    EKG:   Results for orders placed or performed during the hospital  encounter of 04/09/22   EKG 12-lead    Collection Time: 04/09/22  5:23 PM    Narrative    Test Reason : Z01.818,    Vent. Rate : 108 BPM     Atrial Rate : 108 BPM     P-R Int : 122 ms          QRS Dur : 094 ms      QT Int : 374 ms       P-R-T Axes : 058 -29 054 degrees     QTc Int : 501 ms    Sinus tachycardia  Otherwise normal ECG  When compared with ECG of 24-MAR-2022 11:02,  QT has lengthened  Confirmed by Corby HERRERA MD ([id number]) on 4/10/2022 10:12:01 AM    Referred By: JOHANN KRUGER           Confirmed By:Corby HERRERA MD       SERG  No results found for this or any previous visit.      TTE  No results found for this or any previous visit.      Stress Echo (2/17/22):  Results for orders placed during the hospital encounter of 02/17/22    Stress Echo Which stress agent will be used? Pharmacological; Color Flow Doppler? Yes    Interpretation Summary  · The stress echo portion of this study is negative for myocardial ischemia.  · The ECG portion of this study is negative for myocardial ischemia.  · During stress, the following significant arrhythmias were observed: rare PVCs.  · The left ventricle is normal in size with normal systolic function. The estimated ejection fraction is 65%.  · Normal left ventricular diastolic function.  · Normal right ventricular size with normal right ventricular systolic function.  · Mild biatrial enlargement.  · Mild tricuspid regurgitation.  · The estimated PA systolic pressure is 29 mmHg.  · Normal central venous pressure (3 mmHg).    Cardiac PET 3/24/22:  Conclusion     The myocardial perfusion images are normal without evidence of ischemia or scar.    The whole heart absolute myocardial perfusion values averaged 1.04 cc/min/g at rest, which is elevated; 1.33 cc/min/g at stress, which is mildly reduced; and CFR is 1.28 , which is moderately reduced in part due to elevated resting flow.    CT attenuation images demonstrate moderate diffuse coronary calcifications in the LAD and  LCX territory and no aortic calcifications.    The gated perfusion images showed an ejection fraction of 72% at rest and 76% during stress. A normal ejection fraction is greater than 53%.    The wall motion is normal at rest and during stress.    The LV cavity size is normal at rest and stress.    The EKG portion of this study is negative for ischemia.    There were no arrhythmias during stress.    The patient reported no chest pain during the stress test.        ASSESSMENT/PLAN:         Pre-op Assessment    I have reviewed the Patient Summary Reports.     I have reviewed the Nursing Notes. I have reviewed the NPO Status.   I have reviewed the Medications.     Review of Systems  Anesthesia Hx:  No problems with previous Anesthesia Denies Hx of Anesthetic complications  History of prior surgery of interest to airway management or planning: Denies Family Hx of Anesthesia complications.   Denies Personal Hx of Anesthesia complications.   Social:  Non-Smoker    Hematology/Oncology:     Oncology Normal   Hematology Comments: Hepatic coagulopathy Oncology Comments: Minor skin cured with excision     Cardiovascular:   Denies Hypertension. (low bp on midodrine)  Denies MI.    Denies Angina.    Pulmonary:   Denies COPD.  Denies Asthma. Shortness of breath (yes with fluid buildup) Sleep Apnea    Renal/:   Chronic Renal Disease, CRI    Hepatic/GI:   Liver Disease,    Neurological:  Neurology Normal Denies TIA.  Denies CVA. Denies Seizures.    Endocrine:   Diabetes, type 2        Physical Exam  General: Well nourished, Alert and Oriented    Airway:  Mallampati: III / II  Mouth Opening: Normal  TM Distance: Normal  Tongue: Normal  Neck ROM: Normal ROM    Dental:  Edentulous        Anesthesia Plan  Type of Anesthesia, risks & benefits discussed:    Anesthesia Type: Gen ETT  Intra-op Monitoring Plan: Standard ASA Monitors, Art Line, Central Line and PA  Post Op Pain Control Plan: multimodal analgesia and IV/PO Opioids  PRN  Induction:  IV  Airway Plan: Direct, Post-Induction  Informed Consent: Informed consent signed with the Patient and all parties understand the risks and agree with anesthesia plan.  All questions answered.   ASA Score: 4  Day of Surgery Review of History & Physical: H&P Update referred to the surgeon/provider.    Ready For Surgery From Anesthesia Perspective.     .

## 2022-04-12 NOTE — SUBJECTIVE & OBJECTIVE
Past Medical History:   Diagnosis Date    Arthritis     Cirrhosis of liver     HTN (hypertension)     BRAYDON (obstructive sleep apnea)     Secondary esophageal varices without bleeding 3/18/2022    EGD (2/25/22) showed moderate portal hypertensive gastropathy, small hiatal hernia, grade 1 esophageal varices    Type 2 diabetes mellitus        Past Surgical History:   Procedure Laterality Date    COLONOSCOPY      FINGER AMPUTATION      LIVER TRANSPLANT N/A 4/10/2022    Procedure: TRANSPLANT, LIVER;  Surgeon: Félix Scott MD;  Location: [facility] OR [address] [place] [address];  Service: Transplant;  Laterality: N/A;    MEDIAL COLLATERAL LIGAMENT REPAIR, KNEE Bilateral     ROTATRO CUFF REPAIR      TONSILLECTOMY         Review of patient's allergies indicates:   Allergen Reactions    Strawberry Anaphylaxis and Rash    Metformin Diarrhea       Family History    None       Tobacco Use    Smoking status: Never Smoker    Smokeless tobacco: Never Used   Substance and Sexual Activity    Alcohol use: Not Currently    Drug use: Not on file    Sexual activity: Not on file       PTA Medications   Medication Sig    furosemide (LASIX) 40 MG tablet Take 40 mg by mouth 2 (two) times a day.    lactulose (CHRONULAC) 10 gram/15 mL solution Take 30 mLs by mouth 3 (three) times daily.    midodrine (PROAMATINE) 5 MG Tab Take 3 tablets (15 mg total) by mouth every 8 (eight) hours.    ondansetron (ZOFRAN-ODT) 4 MG TbDL Take 1 tablet by mouth every 8 (eight) hours as needed (NAUSEA).    pantoprazole (PROTONIX) 40 MG tablet Take 40 mg by mouth once daily.    sodium bicarbonate 650 MG tablet Take 2 tablets (1,300 mg total) by mouth 2 (two) times daily.    XIFAXAN 550 mg Tab Take 550 mg by mouth 2 (two) times daily.    zinc sulfate 50 mg zinc (220 mg) Tab tablet Take 1 tablet (220 mg total) by mouth once daily.       Review of Systems   Constitutional:  Positive for activity change (due to edema/ascites) and unexpected weight change (weight is up 30 lbs per pt over 3  months). Negative for appetite change.   HENT:  Negative for trouble swallowing.    Eyes:  Negative for visual disturbance.   Respiratory:  Negative for cough and shortness of breath.    Cardiovascular:  Positive for leg swelling. Negative for chest pain and palpitations.   Gastrointestinal:  Positive for abdominal distention. Negative for abdominal pain.   Genitourinary:  Negative for decreased urine volume, difficulty urinating and urgency.   Musculoskeletal:  Positive for arthralgias.   Skin:  Positive for color change (to abdomen and BLE) and wound.   Allergic/Immunologic: Negative for immunocompromised state.   Neurological:  Positive for weakness (minimal).   Hematological:  Bruises/bleeds easily.   Psychiatric/Behavioral:  Negative for confusion, decreased concentration and dysphoric mood.    Objective:     Vital Signs (Most Recent):  Temp: 98.4 °F (36.9 °C) (04/12/22 1645)  Pulse: 107 (04/12/22 1645)  Resp: 16 (04/12/22 1645)  BP: (!) 151/72 (04/12/22 1645)  SpO2: 98 % (04/12/22 1645)   Vital Signs (24h Range):  Temp:  [98.4 °F (36.9 °C)] 98.4 °F (36.9 °C)  Pulse:  [107] 107  Resp:  [16] 16  SpO2:  [98 %] 98 %  BP: (151)/(72) 151/72        There is no height or weight on file to calculate BMI.    No intake or output data in the 24 hours ending 04/12/22 1649    Physical Exam  Vitals and nursing note reviewed.   Constitutional:       Appearance: Normal appearance.      Comments: obese   HENT:      Head: Normocephalic.      Nose: Nose normal.   Eyes:      General: Scleral icterus present.   Cardiovascular:      Rate and Rhythm: Normal rate and regular rhythm.      Heart sounds: Normal heart sounds.      Comments: Pedal pulses trace due to edema  Pulmonary:      Effort: Pulmonary effort is normal. No respiratory distress.      Breath sounds: Normal breath sounds. No wheezing.   Abdominal:      General: There is distension (ascites).      Tenderness: There is no abdominal tenderness.   Musculoskeletal:          General: Normal range of motion.      Cervical back: Neck supple.      Right lower leg: Edema (3+, reddened, no erythema or drainage) present.      Left lower leg: Edema (3+, reddended w no erythema or drainage) present.   Skin:     General: Skin is warm and dry.      Capillary Refill: Capillary refill takes 2 to 3 seconds.      Coloration: Skin is jaundiced.   Neurological:      General: No focal deficit present.      Mental Status: He is alert and oriented to person, place, and time.   Psychiatric:         Mood and Affect: Mood normal.         Behavior: Behavior normal.      Comments: Responses appropriate w slight delay       Laboratory:  CBC:   Recent Labs   Lab 04/11/22  1347   WBC 6.40   RBC 2.66*   HGB 8.3*   HCT 24.2*   PLT 57*   MCV 91   MCH 31.2*   MCHC 34.3     CMP:   Recent Labs   Lab 04/11/22  1347   *   CALCIUM 8.6*   ALBUMIN 3.3*   PROT 6.1   *   K 3.9   CO2 25   CL 98   BUN 16   CREATININE 1.6*   ALKPHOS 87   ALT 30   AST 56*   BILITOT 5.4*     Coagulation:   Recent Labs   Lab 04/09/22  1722 04/11/22  1347   INR 1.8* 1.7*   APTT 37.9*  --      Labs within the past 24 hours have been reviewed.    Diagnostic Results:  I have personally reviewed all pertinent imaging studies.

## 2022-04-13 PROBLEM — D63.8 ANEMIA OF CHRONIC DISEASE: Status: ACTIVE | Noted: 2022-03-18

## 2022-04-13 PROBLEM — N17.9 AKI (ACUTE KIDNEY INJURY): Status: RESOLVED | Noted: 2022-03-17 | Resolved: 2022-04-13

## 2022-04-13 PROBLEM — D68.4 ACQUIRED COAGULATION FACTOR DEFICIENCY: Status: ACTIVE | Noted: 2022-04-13

## 2022-04-13 LAB
ALBUMIN SERPL BCP-MCNC: 3 G/DL (ref 3.5–5.2)
ALP SERPL-CCNC: 85 U/L (ref 55–135)
ALT SERPL W/O P-5'-P-CCNC: 27 U/L (ref 10–44)
ANION GAP SERPL CALC-SCNC: 8 MMOL/L (ref 8–16)
AST SERPL-CCNC: 59 U/L (ref 10–40)
BASOPHILS # BLD AUTO: 0.04 K/UL (ref 0–0.2)
BASOPHILS NFR BLD: 0.7 % (ref 0–1.9)
BILIRUB SERPL-MCNC: 6.2 MG/DL (ref 0.1–1)
BLD PROD TYP BPU: NORMAL
BLOOD UNIT EXPIRATION DATE: NORMAL
BLOOD UNIT TYPE CODE: 5100
BLOOD UNIT TYPE CODE: 9500
BLOOD UNIT TYPE: NORMAL
BUN SERPL-MCNC: 17 MG/DL (ref 6–20)
CALCIUM SERPL-MCNC: 8.7 MG/DL (ref 8.7–10.5)
CHLORIDE SERPL-SCNC: 98 MMOL/L (ref 95–110)
CO2 SERPL-SCNC: 27 MMOL/L (ref 23–29)
CODING SYSTEM: NORMAL
CREAT SERPL-MCNC: 1.4 MG/DL (ref 0.5–1.4)
DIFFERENTIAL METHOD: ABNORMAL
DISPENSE STATUS: NORMAL
EOSINOPHIL # BLD AUTO: 0.4 K/UL (ref 0–0.5)
EOSINOPHIL NFR BLD: 6.4 % (ref 0–8)
ERYTHROCYTE [DISTWIDTH] IN BLOOD BY AUTOMATED COUNT: 19.4 % (ref 11.5–14.5)
EST. GFR  (AFRICAN AMERICAN): >60 ML/MIN/1.73 M^2
EST. GFR  (NON AFRICAN AMERICAN): 56.2 ML/MIN/1.73 M^2
GLUCOSE SERPL-MCNC: 173 MG/DL (ref 70–110)
HCT VFR BLD AUTO: 24.4 % (ref 40–54)
HCV RNA SERPL NAA+PROBE-ACNC: NORMAL IU/ML
HGB BLD-MCNC: 8.1 G/DL (ref 14–18)
IMM GRANULOCYTES # BLD AUTO: 0.03 K/UL (ref 0–0.04)
IMM GRANULOCYTES NFR BLD AUTO: 0.5 % (ref 0–0.5)
INR PPP: 1.7 (ref 0.8–1.2)
LACTATE SERPL-SCNC: 2.2 MMOL/L (ref 0.5–2.2)
LYMPHOCYTES # BLD AUTO: 0.6 K/UL (ref 1–4.8)
LYMPHOCYTES NFR BLD: 10.3 % (ref 18–48)
MAGNESIUM SERPL-MCNC: 1.6 MG/DL (ref 1.6–2.6)
MCH RBC QN AUTO: 29.9 PG (ref 27–31)
MCHC RBC AUTO-ENTMCNC: 33.2 G/DL (ref 32–36)
MCV RBC AUTO: 90 FL (ref 82–98)
MONOCYTES # BLD AUTO: 1.2 K/UL (ref 0.3–1)
MONOCYTES NFR BLD: 20 % (ref 4–15)
NEUTROPHILS # BLD AUTO: 3.7 K/UL (ref 1.8–7.7)
NEUTROPHILS NFR BLD: 62.1 % (ref 38–73)
NRBC BLD-RTO: 0 /100 WBC
NUM UNITS TRANS FFP: NORMAL
PHOSPHATE SERPL-MCNC: 2.6 MG/DL (ref 2.7–4.5)
PLATELET # BLD AUTO: 56 K/UL (ref 150–450)
PMV BLD AUTO: 11.1 FL (ref 9.2–12.9)
POTASSIUM SERPL-SCNC: 3.9 MMOL/L (ref 3.5–5.1)
PROT SERPL-MCNC: 5.8 G/DL (ref 6–8.4)
PROTHROMBIN TIME: 17.1 SEC (ref 9–12.5)
RBC # BLD AUTO: 2.71 M/UL (ref 4.6–6.2)
SODIUM SERPL-SCNC: 133 MMOL/L (ref 136–145)
WBC # BLD AUTO: 5.9 K/UL (ref 3.9–12.7)

## 2022-04-13 PROCEDURE — 99499 NO LOS: ICD-10-PCS | Mod: ,,, | Performed by: TRANSPLANT SURGERY

## 2022-04-13 PROCEDURE — 63600175 PHARM REV CODE 636 W HCPCS: Mod: NTX | Performed by: TRANSPLANT SURGERY

## 2022-04-13 PROCEDURE — 36415 COLL VENOUS BLD VENIPUNCTURE: CPT | Mod: NTX | Performed by: PHYSICIAN ASSISTANT

## 2022-04-13 PROCEDURE — 27201247 HC HEMODIALYSIS, SET-UP & CANCEL: Mod: NTX

## 2022-04-13 PROCEDURE — 96367 TX/PROPH/DG ADDL SEQ IV INF: CPT

## 2022-04-13 PROCEDURE — G0378 HOSPITAL OBSERVATION PER HR: HCPCS | Mod: NTX

## 2022-04-13 PROCEDURE — 63600175 PHARM REV CODE 636 W HCPCS: Mod: NTX | Performed by: PHYSICIAN ASSISTANT

## 2022-04-13 PROCEDURE — 25000003 PHARM REV CODE 250: Mod: NTX | Performed by: PHYSICIAN ASSISTANT

## 2022-04-13 PROCEDURE — 96365 THER/PROPH/DIAG IV INF INIT: CPT

## 2022-04-13 PROCEDURE — 99499 UNLISTED E&M SERVICE: CPT | Mod: ,,, | Performed by: TRANSPLANT SURGERY

## 2022-04-13 PROCEDURE — 83605 ASSAY OF LACTIC ACID: CPT | Mod: NTX | Performed by: PHYSICIAN ASSISTANT

## 2022-04-13 PROCEDURE — 84100 ASSAY OF PHOSPHORUS: CPT | Mod: NTX

## 2022-04-13 PROCEDURE — 96366 THER/PROPH/DIAG IV INF ADDON: CPT

## 2022-04-13 PROCEDURE — 87040 BLOOD CULTURE FOR BACTERIA: CPT | Mod: NTX | Performed by: PHYSICIAN ASSISTANT

## 2022-04-13 PROCEDURE — 80053 COMPREHEN METABOLIC PANEL: CPT | Mod: NTX

## 2022-04-13 PROCEDURE — 27201236 HC CRRT SET UP & CANCELED: Mod: NTX

## 2022-04-13 PROCEDURE — 85025 COMPLETE CBC W/AUTO DIFF WBC: CPT | Mod: NTX

## 2022-04-13 PROCEDURE — 99225 PR SUBSEQUENT OBSERVATION CARE,LEVEL II: ICD-10-PCS | Mod: NTX,,, | Performed by: PHYSICIAN ASSISTANT

## 2022-04-13 PROCEDURE — 85610 PROTHROMBIN TIME: CPT | Mod: NTX

## 2022-04-13 PROCEDURE — 83735 ASSAY OF MAGNESIUM: CPT | Mod: NTX

## 2022-04-13 PROCEDURE — 25000003 PHARM REV CODE 250: Mod: NTX | Performed by: TRANSPLANT SURGERY

## 2022-04-13 PROCEDURE — 99225 PR SUBSEQUENT OBSERVATION CARE,LEVEL II: CPT | Mod: NTX,,, | Performed by: PHYSICIAN ASSISTANT

## 2022-04-13 PROCEDURE — 36415 COLL VENOUS BLD VENIPUNCTURE: CPT | Mod: NTX

## 2022-04-13 RX ORDER — LACTULOSE 10 G/15ML
30 SOLUTION ORAL 3 TIMES DAILY
Status: DISCONTINUED | OUTPATIENT
Start: 2022-04-13 | End: 2022-04-17 | Stop reason: HOSPADM

## 2022-04-13 RX ORDER — ZINC SULFATE 50(220)MG
220 CAPSULE ORAL DAILY
Refills: 1 | Status: DISCONTINUED | OUTPATIENT
Start: 2022-04-13 | End: 2022-04-17 | Stop reason: HOSPADM

## 2022-04-13 RX ORDER — SODIUM BICARBONATE 650 MG/1
1300 TABLET ORAL 2 TIMES DAILY
Status: DISCONTINUED | OUTPATIENT
Start: 2022-04-13 | End: 2022-04-17 | Stop reason: HOSPADM

## 2022-04-13 RX ORDER — PANTOPRAZOLE SODIUM 40 MG/1
40 TABLET, DELAYED RELEASE ORAL DAILY
Status: DISCONTINUED | OUTPATIENT
Start: 2022-04-13 | End: 2022-04-17 | Stop reason: HOSPADM

## 2022-04-13 RX ORDER — CEFEPIME HYDROCHLORIDE 1 G/50ML
1 INJECTION, SOLUTION INTRAVENOUS
Status: DISCONTINUED | OUTPATIENT
Start: 2022-04-13 | End: 2022-04-16

## 2022-04-13 RX ORDER — MIDODRINE HYDROCHLORIDE 5 MG/1
15 TABLET ORAL EVERY 8 HOURS
Status: DISCONTINUED | OUTPATIENT
Start: 2022-04-13 | End: 2022-04-17 | Stop reason: HOSPADM

## 2022-04-13 RX ADMIN — CEFEPIME HYDROCHLORIDE 1 G: 1 INJECTION, SOLUTION INTRAVENOUS at 12:04

## 2022-04-13 RX ADMIN — ZINC SULFATE 220 MG (50 MG) CAPSULE 220 MG: CAPSULE at 12:04

## 2022-04-13 RX ADMIN — RIFAXIMIN 550 MG: 550 TABLET ORAL at 12:04

## 2022-04-13 RX ADMIN — SODIUM BICARBONATE 650 MG TABLET 1300 MG: at 08:04

## 2022-04-13 RX ADMIN — PANTOPRAZOLE SODIUM 40 MG: 40 TABLET, DELAYED RELEASE ORAL at 12:04

## 2022-04-13 RX ADMIN — SODIUM BICARBONATE 650 MG TABLET 1300 MG: at 12:04

## 2022-04-13 RX ADMIN — LACTULOSE 30 G: 20 SOLUTION ORAL at 08:04

## 2022-04-13 RX ADMIN — LACTULOSE 30 G: 20 SOLUTION ORAL at 02:04

## 2022-04-13 RX ADMIN — CEFEPIME HYDROCHLORIDE 1 G: 1 INJECTION, SOLUTION INTRAVENOUS at 08:04

## 2022-04-13 RX ADMIN — VANCOMYCIN HYDROCHLORIDE 1750 MG: 500 INJECTION, POWDER, LYOPHILIZED, FOR SOLUTION INTRAVENOUS at 01:04

## 2022-04-13 RX ADMIN — RIFAXIMIN 550 MG: 550 TABLET ORAL at 08:04

## 2022-04-13 RX ADMIN — MIDODRINE HYDROCHLORIDE 15 MG: 5 TABLET ORAL at 01:04

## 2022-04-13 NOTE — PLAN OF CARE
Plan of care reviewed with the patient at the beginning of the shift. Pt admitted for liver transplant. Last night, transplant was cancelled and pt rescheduled as primary for a liver transplant today. To OR at 1300. He has been NPO since midnight. Fall precautions maintained. Pt remained free from falls and injury this shift. Bed locked in lowest position, side rails up x2, call light within reach. Instructed pt to call for assistance as needed. Pt verbalized understanding. Vitals stable. Pt afebrile overnight. No acute issues overnight. Will continue to monitor. Fiance at the bedside.

## 2022-04-13 NOTE — HOSPITAL COURSE
Mr. 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 Listed with MELD 26. Admitted as primary candidate for liver transplant. However, prior to transplant, patient spiked temp of 101.1 with associated tachycardia. Unfortunately, surgery canceled due to fever.  Cefepime/vanc initiated on 4/13. Infectious work-up unremarkable to date. Para done 4/14 with 9 L off. Cell counts not suspicious for infection, cx in process, with NGTD. Discontinued vanc and cefepime.  Patient has remained afebrile.   OF NOTE: Patients  Sodium is 128 at discharge will obtain blood work on 4/18, holding diuretics for now and patient placed on fluid restriction.   Discharge instructions reviewed by pharmacist, nursing and transplant coordinator.  Patient and CG verbalized understanding and are in agreement with discharge from hospital today.  Patient is medically stable for discharge. Patient will f/u with labs per pre-transplant coordinator.

## 2022-04-13 NOTE — PROGRESS NOTES
Pharmacokinetic Initial Assessment: IV Vancomycin    Assessment/Plan:  -patient admitted for liver transplant, incidentally found to have Tmax 101 prior to going to OR  -starting vanc 15 mg/kg q12h  -check trough @ 0030 on 4/15 prior to 4th dose    Please contact pharmacy at extension [phone] with any questions regarding this assessment.     Thank you for the consult,   Estela Joel       Patient brief summary:  Pelon Vasquez is a 57 y.o. male initiated on antimicrobial therapy with IV Vancomycin for treatment of suspected empiric    Drug Allergies:   Review of patient's allergies indicates:   Allergen Reactions    Strawberry Anaphylaxis and Rash    Metformin Diarrhea       Actual Body Weight:   122 kg    Renal Function:   Estimated Creatinine Clearance: 74.5 mL/min (based on SCr of 1.4 mg/dL).,     Dialysis Method (if applicable):  N/A    CBC (last 72 hours):  Recent Labs   Lab Result Units 04/11/22  1347 04/12/22  1723 04/13/22  0657   WBC K/uL 6.40 5.79 5.90   Hemoglobin g/dL 8.3* 8.2* 8.1*   Hemoglobin A1C %  --  6.4*  --    Hematocrit % 24.2* 24.8* 24.4*   Platelets K/uL 57* 53* 56*   Gran % % 60.3 55.6 62.1   Lymph % % 11.7* 14.7* 10.3*   Mono % % 17.3* 20.4* 20.0*   Eosinophil % % 9.1* 7.8 6.4   Basophil % % 1.3 1.0 0.7   Differential Method  Automated Automated Automated       Metabolic Panel (last 72 hours):  Recent Labs   Lab Result Units 04/11/22  1347 04/12/22  1654 04/12/22  1723 04/13/22  0657   Sodium mmol/L 132*  --  132* 133*   Potassium mmol/L 3.9  --  4.0 3.9   Chloride mmol/L 98  --  97 98   CO2 mmol/L 25  --  26 27   Glucose mg/dL 278*  --  170* 173*   Glucose, UA   --  Negative  --   --    BUN mg/dL 16  --  17 17   Creatinine mg/dL 1.6*  --  1.5* 1.4   Albumin g/dL 3.3*  --  3.1* 3.0*   Total Bilirubin mg/dL 5.4*  --  6.2* 6.2*   Alkaline Phosphatase U/L 87  --  87 85   AST U/L 56*  --  55* 59*   ALT U/L 30  --  29 27   Magnesium mg/dL  --   --  1.7 1.6   Phosphorus mg/dL  --   --   --   2.6*       Drug levels (last 3 results):  No results for input(s): VANCOMYCINRA, VANCOMYCINPE, VANCOMYCINTR in the last 72 hours.    Microbiologic Results:  Microbiology Results (last 7 days)     Procedure Component Value Units Date/Time    Fungus culture [[id number]]     Order Status: No result Specimen: Body Fluid from Peritoneal Fluid     Culture, Body Fluid (Aerobic) w/ GS [[id number]]     Order Status: No result Specimen: Body Fluid from Peritoneal Fluid     Culture, Anaerobic [[id number]]     Order Status: No result Specimen: Body Fluid from Peritoneal Fluid     Blood culture [[id number]]     Order Status: Sent Specimen: Blood     Blood culture [[id number]]     Order Status: Sent Specimen: Blood

## 2022-04-13 NOTE — PROGRESS NOTES
AAOx4. Patient scheduled for LT this afternoon - surgery canceled due to patient spiking temp of 101.1. . Infectious workup started. Blood cultures collected and sent 4/13 - in process. Cefepime given IVPB. Vancomycin given IVPB. Patient to get para done tomorrow 4/14. Low Na diet started. Patient up ad anuj - ambulates in room and bathroom independently w/ cane. No complaints from patient throughout shift. Bed in low position. Non-skid socks on. Call light within reach. Fiance at bedside.

## 2022-04-13 NOTE — TELEPHONE ENCOUNTER
Case [id number]  Received call from Jessee Gamino, this case is being cancelled.    Patient and significant other contacted with this information. Answered questions and informed to resume eating, drinking and medications.  Charge Nurse Irvin made aware as well as VEDA Lorrie Craig

## 2022-04-13 NOTE — ASSESSMENT & PLAN NOTE
- ESLD 2/2 ETOH/LOZANO  - Admit for liver transplant surgery  - Dr. Castillo primary surgeon. OR time 1300 4/13  - Pre op labs and diagnostic studies reviewed

## 2022-04-13 NOTE — SUBJECTIVE & OBJECTIVE
Scheduled Meds:  Continuous Infusions:  PRN Meds:ampicillin-sulbactim (UNASYN) IVPB, methylPREDNISolone sodium succinate, mupirocin    Review of Systems   Constitutional:  Positive for activity change (due to edema/ascites) and unexpected weight change (weight is up 30 lbs per pt over 3 months). Negative for appetite change.   HENT:  Negative for trouble swallowing.    Eyes:  Negative for visual disturbance.   Respiratory:  Negative for cough and shortness of breath.    Cardiovascular:  Positive for leg swelling. Negative for chest pain and palpitations.   Gastrointestinal:  Positive for abdominal distention. Negative for abdominal pain.   Genitourinary:  Negative for decreased urine volume, difficulty urinating and urgency.   Musculoskeletal:  Positive for arthralgias.   Skin:  Positive for color change (to abdomen and BLE) and wound.   Allergic/Immunologic: Negative for immunocompromised state.   Neurological:  Positive for weakness (minimal).   Hematological:  Bruises/bleeds easily.   Psychiatric/Behavioral:  Negative for confusion, decreased concentration and dysphoric mood.    Objective:     Vital Signs (Most Recent):  Temp: 98.5 °F (36.9 °C) (04/13/22 0730)  Pulse: (!) 117 (04/13/22 0730)  Resp: 16 (04/13/22 0730)  BP: (!) 163/74 (04/13/22 0730)  SpO2: 96 % (04/13/22 0730)   Vital Signs (24h Range):  Temp:  [98.4 °F (36.9 °C)-99.7 °F (37.6 °C)] 98.5 °F (36.9 °C)  Pulse:  [104-117] 117  Resp:  [16-18] 16  SpO2:  [94 %-98 %] 96 %  BP: (121-163)/(59-79) 163/74     Weight: 121.9 kg (268 lb 10.1 oz)  Body mass index is 42.07 kg/m².    Intake/Output - Last 3 Shifts         04/11 0700 04/12 0659 04/12 0700 04/13 0659 04/13 0700 04/14 0659    P.O.  0 0    Total Intake(mL/kg)  0 (0) 0 (0)    Urine (mL/kg/hr)  200     Total Output  200     Net  -200 0                   Physical Exam  Vitals and nursing note reviewed.   Constitutional:       General: He is not in acute distress.     Appearance: Normal appearance.       Comments: obese   HENT:      Head: Normocephalic.      Nose: Nose normal.   Eyes:      General: Scleral icterus present.         Right eye: No discharge.         Left eye: No discharge.   Cardiovascular:      Rate and Rhythm: Normal rate and regular rhythm.      Heart sounds: No murmur heard.     Comments: Pedal pulses trace due to edema  Pulmonary:      Effort: Pulmonary effort is normal. No respiratory distress.      Breath sounds: Normal breath sounds. No wheezing or rales.   Abdominal:      General: There is distension (ascites).      Tenderness: There is no abdominal tenderness. There is no guarding.   Musculoskeletal:         General: Normal range of motion.      Cervical back: Neck supple.      Right lower leg: Edema (3+, reddened, no erythema or drainage) present.      Left lower leg: Edema (3+, reddended w no erythema or drainage) present.   Skin:     General: Skin is warm and dry.      Capillary Refill: Capillary refill takes 2 to 3 seconds.      Coloration: Skin is jaundiced.   Neurological:      General: No focal deficit present.      Mental Status: He is alert and oriented to person, place, and time.   Psychiatric:         Mood and Affect: Mood normal.         Behavior: Behavior normal.         Thought Content: Thought content normal.      Comments: Responses appropriate w slight delay       Laboratory:  Immunosuppressants       None          CBC:   Recent Labs   Lab 04/13/22  0657   WBC 5.90   RBC 2.71*   HGB 8.1*   HCT 24.4*   PLT 56*   MCV 90   MCH 29.9   MCHC 33.2     CMP:   Recent Labs   Lab 04/13/22  0657   *   CALCIUM 8.7   ALBUMIN 3.0*   PROT 5.8*   *   K 3.9   CO2 27   CL 98   BUN 17   CREATININE 1.4   ALKPHOS 85   ALT 27   AST 59*   BILITOT 6.2*     Coagulation:   Recent Labs   Lab 04/12/22  1723 04/13/22  0657   INR 1.8* 1.7*   APTT 38.2*  --      Labs within the past 24 hours have been reviewed.    Diagnostic Results:  I have personally reviewed all pertinent imaging  studies.    Debility/Functional status: Patient debilitated by evidence of Weakness, Other malaise, and Limitation of activities due to disability. Physical and occupational therapy ordered daily to evaluate and treat. Debility was: present on admission.

## 2022-04-13 NOTE — ASSESSMENT & PLAN NOTE
MELD-Na score: 24 at 4/13/2022  6:57 AM  MELD score: 22 at 4/13/2022  6:57 AM  Calculated from:  Serum Creatinine: 1.4 mg/dL at 4/13/2022  6:57 AM  Serum Sodium: 133 mmol/L at 4/13/2022  6:57 AM  Total Bilirubin: 6.2 mg/dL at 4/13/2022  6:57 AM  INR(ratio): 1.7 at 4/13/2022  6:57 AM  Age: 57 years

## 2022-04-13 NOTE — PROGRESS NOTES
Admit Note     Met with patient and significant other to assess needs. Patient is a 57 y.o. single male, admitted for for liver transplant. Pt presented with fever prior to transplant, so pt unable to be transplanted at this time. Pt will now be monitored for infection, etc. SW will continue to follow patient for discharge needs.    Patient admitted on 4/12/2022 .  At this time, patient presents as alert and oriented x 4, pleasant, good eye contact, well groomed, recall good, concentration/judgement good, average intelligence, calm, communicative, cooperative and asking and answering questions appropriately.  At this time, patients caregiver presents as alert and oriented x 4, pleasant, good eye contact, well groomed, recall good, concentration/judgement good, average intelligence, calm, communicative, cooperative and asking and answering questions appropriately.    Household/Family Systems     Patient resides with patient's significant other, at [id number] [place] [address] [place] [id number].  Support system includes his significant other, s/o's family, and friends.  Patient does not have dependents that are need of being cared for.     Patients primary caregiver is Leonarda Vicente, patients significant other, phone number [phone].  Confirmed patients contact information is [phone] (home);   Telephone Information:   Mobile [phone]   .    During admission, patient's caregiver plans to stay in patient's room.  Confirmed patient and patients caregivers do have access to reliable transportation.    Cognitive Status/Learning     Patient reports reading ability as college and states patient does not have difficulty with N/A.  Patient reports patient learns best by reading and hands on.   Needed: No.   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 Pt states he plans on applying for disability soon as he will not be able to work any longer.    Adherence     Patient reports a high level of adherence to patients health care regimen.  Adherence counseling and education provided. Patient verbalizes understanding.    Substance Use    Patient reports the following substance usage.    Tobacco: none, patient denies any use.  Alcohol: none, patient denies any use. Pt states he stopped drinking over 8 months ago. Pt states he stopped drinking upon learning of his liver disease. Pt states he would drink a couple mixed drinks per day. Pt states he drink at that capacity for 7-8 years. Pt states he has not had any cravings or urges to drink since. Pt states that he did not have any issue with discontinuing his use and pt states that he "does not have an addictive personality" and does not have any desire to drink again. Pt states he has engaged in AA.  Illicit Drugs/Non-prescribed Medications: none, patient denies any use.  Patient states clear understanding of the potential impact of substance use.  Substance abstinence/cessation counseling, education and resources provided and reviewed.     Services Utilizing/ADLS    Infusion Service: Prior to admission, patient utilizing? no  Home Health: Prior to admission, patient utilizing? no  DME: Prior to admission, no  Pulmonary/Cardiac Rehab: Prior to admission, no  Dialysis:  Prior to admission, no  Transplant Specialty Pharmacy:  Prior to admission, no; patient provides permission to release necessary information to specialty pharmacy for medications post-transplant. Resources provided and patient is choosing [facility] pharmacy at this time for transplant medications.    Prior to admission, patient reports patient was independent with ADLS and was not driving.  Patient reports patient is not able to care for self at this time due to compromised medical " "condition (as documented in medical record) and physical weakness..  Patient indicates a willingness to care for self once medically cleared to do so.    Insurance/Medications    Insured by   Payer/Plan Subscr  Sex Relation Sub. Ins. ID Effective Group Num   1. [employer]* BRANT GRANADO 1965 Male Self [id number]                                    [address] [address]   2. [employer]* BRANT GRANADO 1965 Male Self [id number]                                   [address] [address]      Primary Insurance (for UNOS reporting): Private Insurance  Secondary Insurance (for UNOS reporting): Private Insurance    Patient reports patient is able to obtain and afford medications at this time and at time of discharge.    Living Will/Healthcare Power of     Patient states patient has a LW and/or HCPA.   provided education regarding LW and HCPA and the completion of forms.    Coping/Mental Health    Patient is coping adequately with the aid of  family members and friends. Pt denies any issues with anxiety or depression at this time. Pt states his transplant wait list journey has been a "roller coaster" but he remains optimistic for the future.  Patient denies mental health difficulties.     Discharge Planning    At time of discharge, [facility] under the care of his significant other Leonarda. If pt is not transplanted, he will return home under the care of his significant other. Patients significant other will transport patient.  Per rounds today, expected discharge date has not been medically determined at this time. Patient and patients caregiver  verbalize understanding and are involved in treatment planning and discharge process.    Additional Concerns    Patient's caretaker denies additional needs and/or concerns at this time. Patient is being followed for needs, education, resources, information, emotional " support, supportive counseling, and for supportive and skilled discharge plan of care.  providing ongoing psychosocial support, education, resources and d/c planning as needed.  SW remains available.  provided resource list, patient choice, psychosocial and supportive counseling, resources, education, assistance and discharge planning with patient and caregiver involvement, ongoing SW availability and services as appropriate. Patient's caregiver verbalizes understanding and agreement with information reviewed,  availability and how to access available resources as needed. Patient denies additional needs and/or concerns at this time. Patient verbalizes understanding and agreement with information reviewed, social work availability, and how to access available resources as needed.

## 2022-04-13 NOTE — PROGRESS NOTES
[facility]  Liver Transplant  Progress Note    Patient Name: Pelon Vasquez  MRN: [id number]  Admission Date: 4/12/2022  Hospital Length of Stay: 1 days  Code Status: Full Code  Primary Care Provider: Primary Doctor No  Post-Operative Day:     ORGAN:   LIVER  Disease Etiology: Cirrhosis: Other, Specify  Donor Type:   Donation after Brain Death  CDC High Risk:   Yes  Donor CMV Status:   Donor CMV Status: Positive  Donor HBcAB:   Negative  Donor HCV Status:   Negative  Donor HBV MATHEUS: Negative  Donor HCV MATHEUS: Negative  Whole or Partial:   Biliary Anastomosis:   Arterial Anatomy:   Subjective:     History of Present Illness:  Pelon Vasquez is a 57yr olf male with 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 ESLD complicated by jaundice, FAMILIA, ascites (para 2x/week, no h/o SBP), HE, and hypervolemia. He is listed for a liver transplant with MELD 25. Patient admitted for liver transplant surgery. He denies any recent fevers or hospitalizations unknown to transplant team. He reports feel well in his usual state of health.       Interval History: No acute events overnight. Primary for liver transplant with tentative OR time 1300 4/13. Pre-op workup reviewed. [facility].    UPDATE: Vitals taken as transport arrived to take patient to [facility]. Temp 101.1F . Temp retaken after blankets removed a few min later, 99.7. Notified Dr. Castillo. Due to temps and tachycardia, surgery cancelled. Will instead obtain infectious workup including para and start BS abx. Patient and caregiver notified and expressed frustration, but understanding.      Scheduled Meds:  Continuous Infusions:  PRN Meds:ampicillin-sulbactim (UNASYN) IVPB, methylPREDNISolone sodium succinate, mupirocin    Review of Systems   Constitutional:  Positive for activity change (due to edema/ascites) and unexpected weight change (weight is up 30 lbs per pt over 3 months). Negative for appetite change.   HENT:  Negative for trouble swallowing.    Eyes:   Negative for visual disturbance.   Respiratory:  Negative for cough and shortness of breath.    Cardiovascular:  Positive for leg swelling. Negative for chest pain and palpitations.   Gastrointestinal:  Positive for abdominal distention. Negative for abdominal pain.   Genitourinary:  Negative for decreased urine volume, difficulty urinating and urgency.   Musculoskeletal:  Positive for arthralgias.   Skin:  Positive for color change (to abdomen and BLE) and wound.   Allergic/Immunologic: Negative for immunocompromised state.   Neurological:  Positive for weakness (minimal).   Hematological:  Bruises/bleeds easily.   Psychiatric/Behavioral:  Negative for confusion, decreased concentration and dysphoric mood.    Objective:     Vital Signs (Most Recent):  Temp: 98.5 °F (36.9 °C) (04/13/22 0730)  Pulse: (!) 117 (04/13/22 0730)  Resp: 16 (04/13/22 0730)  BP: (!) 163/74 (04/13/22 0730)  SpO2: 96 % (04/13/22 0730)   Vital Signs (24h Range):  Temp:  [98.4 °F (36.9 °C)-99.7 °F (37.6 °C)] 98.5 °F (36.9 °C)  Pulse:  [104-117] 117  Resp:  [16-18] 16  SpO2:  [94 %-98 %] 96 %  BP: (121-163)/(59-79) 163/74     Weight: 121.9 kg (268 lb 10.1 oz)  Body mass index is 42.07 kg/m².    Intake/Output - Last 3 Shifts         04/11 0700 04/12 0659 04/12 0700 04/13 0659 04/13 0700 04/14 0659    P.O.  0 0    Total Intake(mL/kg)  0 (0) 0 (0)    Urine (mL/kg/hr)  200     Total Output  200     Net  -200 0                   Physical Exam  Vitals and nursing note reviewed.   Constitutional:       General: He is not in acute distress.     Appearance: Normal appearance.      Comments: obese   HENT:      Head: Normocephalic.      Nose: Nose normal.   Eyes:      General: Scleral icterus present.         Right eye: No discharge.         Left eye: No discharge.   Cardiovascular:      Rate and Rhythm: Normal rate and regular rhythm.      Heart sounds: No murmur heard.     Comments: Pedal pulses trace due to edema  Pulmonary:      Effort: Pulmonary  effort is normal. No respiratory distress.      Breath sounds: Normal breath sounds. No wheezing or rales.   Abdominal:      General: There is distension (ascites).      Tenderness: There is no abdominal tenderness. There is no guarding.   Musculoskeletal:         General: Normal range of motion.      Cervical back: Neck supple.      Right lower leg: Edema (3+, reddened, no erythema or drainage) present.      Left lower leg: Edema (3+, reddended w no erythema or drainage) present.   Skin:     General: Skin is warm and dry.      Capillary Refill: Capillary refill takes 2 to 3 seconds.      Coloration: Skin is jaundiced.   Neurological:      General: No focal deficit present.      Mental Status: He is alert and oriented to person, place, and time.   Psychiatric:         Mood and Affect: Mood normal.         Behavior: Behavior normal.         Thought Content: Thought content normal.      Comments: Responses appropriate w slight delay       Laboratory:  Immunosuppressants       None          CBC:   Recent Labs   Lab 04/13/22  0657   WBC 5.90   RBC 2.71*   HGB 8.1*   HCT 24.4*   PLT 56*   MCV 90   MCH 29.9   MCHC 33.2     CMP:   Recent Labs   Lab 04/13/22  0657   *   CALCIUM 8.7   ALBUMIN 3.0*   PROT 5.8*   *   K 3.9   CO2 27   CL 98   BUN 17   CREATININE 1.4   ALKPHOS 85   ALT 27   AST 59*   BILITOT 6.2*     Coagulation:   Recent Labs   Lab 04/12/22  1723 04/13/22  0657   INR 1.8* 1.7*   APTT 38.2*  --      Labs within the past 24 hours have been reviewed.    Diagnostic Results:  I have personally reviewed all pertinent imaging studies.    Debility/Functional status: Patient debilitated by evidence of Weakness, Other malaise, and Limitation of activities due to disability. Physical and occupational therapy ordered daily to evaluate and treat. Debility was: present on admission.    Assessment/Plan:     * End stage liver disease  - ESLD 2/2 ETOH/LOZANO  - Admit for liver transplant surgery  - Dr. Castillo  primary surgeon. OR time 1300 4/13  - Pre op labs and diagnostic studies reviewed    Acquired coagulation factor deficiency  - Expect to improve post transplant      Pre-op examination        Thrombocytopenia, unspecified  - Due to ESLD. Stable. Expect to improve post transplant.      Anemia of chronic disease  - H/H stable. Monitor.      Organ transplant candidate  MELD-Na score: 24 at 4/13/2022  6:57 AM  MELD score: 22 at 4/13/2022  6:57 AM  Calculated from:  Serum Creatinine: 1.4 mg/dL at 4/13/2022  6:57 AM  Serum Sodium: 133 mmol/L at 4/13/2022  6:57 AM  Total Bilirubin: 6.2 mg/dL at 4/13/2022  6:57 AM  INR(ratio): 1.7 at 4/13/2022  6:57 AM  Age: 57 years      Hepatic encephalopathy  - AAO x 4  - Lactulose/Rifaximin held pre op  - Monitor          VTE Risk Mitigation (From admission, onward)         Ordered     IP VTE HIGH RISK PATIENT  Once         04/12/22 1649     Send SCD stockings and BULMARO hose with patient to OR  Continuous         04/12/22 1649                The patients clinical status was discussed at multidisplinary rounds, involving transplant surgery, transplant medicine, pharmacy, nursing, nutrition, and social work    Discharge Planning:  Not a candidate for dc at this time.    Azalea Malin PA-C  Liver Transplant  [facility] - Transplant Stepdown

## 2022-04-13 NOTE — TELEPHONE ENCOUNTER
"[facility] RISK CRITERIA BEHAVIOR DONOR ORGAN OFFER NOTE    Notified by Jessee Blancas, , of liver offer with donor information.  Donor and recipient information read back and verified.  Spoke with Pelon Vasquez and identified no acute medical issues during telephone assessment.  Patient instructed NPO.    Patient verbalized understanding that he/she has been called as primary recipient.    The donor's reported social history includes PHS risk criteria: Donor had sex with people for money or drugs    The risk of getting HIV or other hepatitis viruses from this donor is very small compared to the risk of dying while waiting for another liver. The risk of clinical illness from any transmitted infection is also small due to the availability of highly effective oral drugs for all three of these viruses. While the patient has the right to decline any organ for any reason, available scientific data do not support turning down an organ based on Hepatitis C or behavioral risk factors as the risks associated with waiting longer for the transplant are many times greater. Informed patient that Dr. Castillo thinks that this is a good liver for the patient. Mr. Vasquez informed that all ID serologies resulting are negative including MATHEUS testing.    Patient was asked if they have had a positive COVID-19 test or if they have any signs or symptoms. Informed patient that they will be tested for COVID-19 upon arrival to the hospital, unless have a previous positive result. If tested and result is positive, the transplant will not be able to occur, they will be inactivated on the wait list for 28 days per protocol and required to quarantine.     Patient was also informed that if he turned down the offer, "A transplant doctor will call you after we hang up". Patient was also informed that if he turned down this donor, he would not be punished or removed from the transplant list, that he would remain on the list at his " current status/MELD score. However, by waiting on the list longer rather than receiving this transplant, he will face a greater risk of death than any risk from the slight possibility of transmitted infection.    Patient was also informed that he would have the opportunity to see and talk to the surgeon when he got to the hospital and before he went down to the operating room.    Patient understands risk and agrees to proceed with transplant.

## 2022-04-13 NOTE — TELEPHONE ENCOUNTER
Case [id number] notified by ELIEZER Gamino that times for donor OR pushed back.    Notified patient's caregiver, Leonarda of new times 2100/2230.    Also called OR and [facility].

## 2022-04-13 NOTE — TELEPHONE ENCOUNTER
Received a call from Jessee Padron, Procurement that patient is running a fever now and is not transplantable at this time.

## 2022-04-14 ENCOUNTER — TELEPHONE (OUTPATIENT)
Dept: TRANSPLANT | Facility: CLINIC | Age: 57
End: 2022-04-14
Payer: MEDICAID

## 2022-04-14 PROBLEM — Z76.82 ORGAN TRANSPLANT CANDIDATE: Chronic | Status: RESOLVED | Noted: 2022-03-18 | Resolved: 2022-04-14

## 2022-04-14 PROBLEM — Z76.82 LIVER TRANSPLANT CANDIDATE: Status: ACTIVE | Noted: 2022-03-18

## 2022-04-14 PROBLEM — R50.9 FEVER: Status: ACTIVE | Noted: 2022-04-14

## 2022-04-14 LAB
ADENOVIRUS: NOT DETECTED
ALBUMIN SERPL BCP-MCNC: 2.8 G/DL (ref 3.5–5.2)
ALP SERPL-CCNC: 77 U/L (ref 55–135)
ALT SERPL W/O P-5'-P-CCNC: 27 U/L (ref 10–44)
ANION GAP SERPL CALC-SCNC: 8 MMOL/L (ref 8–16)
APPEARANCE FLD: CLEAR
AST SERPL-CCNC: 59 U/L (ref 10–40)
BASOPHILS # BLD AUTO: 0.04 K/UL (ref 0–0.2)
BASOPHILS NFR BLD: 0.8 % (ref 0–1.9)
BILIRUB SERPL-MCNC: 6 MG/DL (ref 0.1–1)
BODY FLD TYPE: NORMAL
BORDETELLA PARAPERTUSSIS (IS1001): NOT DETECTED
BORDETELLA PERTUSSIS (PTXP): NOT DETECTED
BUN SERPL-MCNC: 17 MG/DL (ref 6–20)
CALCIUM SERPL-MCNC: 8.4 MG/DL (ref 8.7–10.5)
CHLAMYDIA PNEUMONIAE: NOT DETECTED
CHLORIDE SERPL-SCNC: 95 MMOL/L (ref 95–110)
CO2 SERPL-SCNC: 27 MMOL/L (ref 23–29)
COLOR FLD: YELLOW
CORONAVIRUS 229E, COMMON COLD VIRUS: NOT DETECTED
CORONAVIRUS HKU1, COMMON COLD VIRUS: NOT DETECTED
CORONAVIRUS NL63, COMMON COLD VIRUS: NOT DETECTED
CORONAVIRUS OC43, COMMON COLD VIRUS: NOT DETECTED
CREAT SERPL-MCNC: 1.3 MG/DL (ref 0.5–1.4)
DIFFERENTIAL METHOD: ABNORMAL
EOSINOPHIL # BLD AUTO: 0.3 K/UL (ref 0–0.5)
EOSINOPHIL NFR BLD: 6.7 % (ref 0–8)
ERYTHROCYTE [DISTWIDTH] IN BLOOD BY AUTOMATED COUNT: 19.5 % (ref 11.5–14.5)
EST. GFR  (AFRICAN AMERICAN): >60 ML/MIN/1.73 M^2
EST. GFR  (NON AFRICAN AMERICAN): >60 ML/MIN/1.73 M^2
FLUBV RNA NPH QL NAA+NON-PROBE: NOT DETECTED
GLUCOSE SERPL-MCNC: 153 MG/DL (ref 70–110)
HBV CORE AB SERPL QL IA: NEGATIVE
HBV SURFACE AB SER-ACNC: NEGATIVE M[IU]/ML
HBV SURFACE AG SERPL QL IA: NEGATIVE
HCT VFR BLD AUTO: 22.7 % (ref 40–54)
HCV AB SERPL QL IA: NEGATIVE
HCV RNA SERPL NAA+PROBE-ACNC: NORMAL IU/ML
HGB BLD-MCNC: 7.5 G/DL (ref 14–18)
HIV 1+2 AB+HIV1 P24 AG SERPL QL IA: NEGATIVE
HPIV1 RNA NPH QL NAA+NON-PROBE: NOT DETECTED
HPIV2 RNA NPH QL NAA+NON-PROBE: NOT DETECTED
HPIV3 RNA NPH QL NAA+NON-PROBE: NOT DETECTED
HPIV4 RNA NPH QL NAA+NON-PROBE: NOT DETECTED
HUMAN METAPNEUMOVIRUS: NOT DETECTED
IMM GRANULOCYTES # BLD AUTO: 0.02 K/UL (ref 0–0.04)
IMM GRANULOCYTES NFR BLD AUTO: 0.4 % (ref 0–0.5)
INFLUENZA A (SUBTYPES H1,H1-2009,H3): NOT DETECTED
INR PPP: 1.8 (ref 0.8–1.2)
LYMPHOCYTES # BLD AUTO: 0.7 K/UL (ref 1–4.8)
LYMPHOCYTES NFR BLD: 14.1 % (ref 18–48)
LYMPHOCYTES NFR FLD MANUAL: 69 %
MAGNESIUM SERPL-MCNC: 1.6 MG/DL (ref 1.6–2.6)
MCH RBC QN AUTO: 29.8 PG (ref 27–31)
MCHC RBC AUTO-ENTMCNC: 33 G/DL (ref 32–36)
MCV RBC AUTO: 90 FL (ref 82–98)
MESOTHL CELL NFR FLD MANUAL: 7 %
MONOCYTES # BLD AUTO: 1.2 K/UL (ref 0.3–1)
MONOCYTES NFR BLD: 24.5 % (ref 4–15)
MONOS+MACROS NFR FLD MANUAL: 23 %
MYCOPLASMA PNEUMONIAE: NOT DETECTED
NEUTROPHILS # BLD AUTO: 2.6 K/UL (ref 1.8–7.7)
NEUTROPHILS NFR BLD: 53.5 % (ref 38–73)
NEUTROPHILS NFR FLD MANUAL: 1 %
NRBC BLD-RTO: 0 /100 WBC
PHOSPHATE SERPL-MCNC: 2.8 MG/DL (ref 2.7–4.5)
PLATELET # BLD AUTO: 46 K/UL (ref 150–450)
PMV BLD AUTO: 10.8 FL (ref 9.2–12.9)
POTASSIUM SERPL-SCNC: 3.6 MMOL/L (ref 3.5–5.1)
PROT SERPL-MCNC: 5.7 G/DL (ref 6–8.4)
PROTHROMBIN TIME: 17.8 SEC (ref 9–12.5)
RBC # BLD AUTO: 2.52 M/UL (ref 4.6–6.2)
RESPIRATORY INFECTION PANEL SOURCE: NORMAL
RSV RNA NPH QL NAA+NON-PROBE: NOT DETECTED
RV+EV RNA NPH QL NAA+NON-PROBE: NOT DETECTED
SARS-COV-2 RNA RESP QL NAA+PROBE: NOT DETECTED
SODIUM SERPL-SCNC: 130 MMOL/L (ref 136–145)
WBC # BLD AUTO: 4.89 K/UL (ref 3.9–12.7)
WBC # FLD: 44 /CU MM

## 2022-04-14 PROCEDURE — 99215 PR OFFICE/OUTPT VISIT, EST, LEVL V, 40-54 MIN: ICD-10-PCS | Mod: NTX,,, | Performed by: CLINICAL NURSE SPECIALIST

## 2022-04-14 PROCEDURE — 99233 SBSQ HOSP IP/OBS HIGH 50: CPT | Mod: ,,, | Performed by: CLINICAL NURSE SPECIALIST

## 2022-04-14 PROCEDURE — 87075 CULTR BACTERIA EXCEPT BLOOD: CPT | Mod: NTX | Performed by: PHYSICIAN ASSISTANT

## 2022-04-14 PROCEDURE — P9047 ALBUMIN (HUMAN), 25%, 50ML: HCPCS | Mod: JG,NTX | Performed by: TRANSPLANT SURGERY

## 2022-04-14 PROCEDURE — 87070 CULTURE OTHR SPECIMN AEROBIC: CPT | Mod: NTX | Performed by: PHYSICIAN ASSISTANT

## 2022-04-14 PROCEDURE — 63600175 PHARM REV CODE 636 W HCPCS: Mod: JG,NTX

## 2022-04-14 PROCEDURE — 84100 ASSAY OF PHOSPHORUS: CPT | Mod: NTX

## 2022-04-14 PROCEDURE — 87205 SMEAR GRAM STAIN: CPT | Mod: NTX | Performed by: PHYSICIAN ASSISTANT

## 2022-04-14 PROCEDURE — 96367 TX/PROPH/DG ADDL SEQ IV INF: CPT

## 2022-04-14 PROCEDURE — 99215 OFFICE O/P EST HI 40 MIN: CPT | Mod: NTX,,, | Performed by: CLINICAL NURSE SPECIALIST

## 2022-04-14 PROCEDURE — 85025 COMPLETE CBC W/AUTO DIFF WBC: CPT | Mod: NTX

## 2022-04-14 PROCEDURE — 63600175 PHARM REV CODE 636 W HCPCS: Mod: NTX | Performed by: TRANSPLANT SURGERY

## 2022-04-14 PROCEDURE — 87798 DETECT AGENT NOS DNA AMP: CPT | Mod: NTX | Performed by: CLINICAL NURSE SPECIALIST

## 2022-04-14 PROCEDURE — 20600001 HC STEP DOWN PRIVATE ROOM: Mod: NTX

## 2022-04-14 PROCEDURE — P9047 ALBUMIN (HUMAN), 25%, 50ML: HCPCS | Mod: JG,NTX

## 2022-04-14 PROCEDURE — 99900035 HC TECH TIME PER 15 MIN (STAT): Mod: NTX

## 2022-04-14 PROCEDURE — 63600175 PHARM REV CODE 636 W HCPCS: Mod: NTX | Performed by: PHYSICIAN ASSISTANT

## 2022-04-14 PROCEDURE — 94761 N-INVAS EAR/PLS OXIMETRY MLT: CPT | Mod: NTX

## 2022-04-14 PROCEDURE — 96366 THER/PROPH/DIAG IV INF ADDON: CPT

## 2022-04-14 PROCEDURE — 87102 FUNGUS ISOLATION CULTURE: CPT | Mod: NTX | Performed by: PHYSICIAN ASSISTANT

## 2022-04-14 PROCEDURE — 80053 COMPREHEN METABOLIC PANEL: CPT | Mod: NTX

## 2022-04-14 PROCEDURE — 85610 PROTHROMBIN TIME: CPT | Mod: NTX

## 2022-04-14 PROCEDURE — 25000003 PHARM REV CODE 250: Mod: NTX | Performed by: PHYSICIAN ASSISTANT

## 2022-04-14 PROCEDURE — 36415 COLL VENOUS BLD VENIPUNCTURE: CPT | Mod: NTX

## 2022-04-14 PROCEDURE — 25000003 PHARM REV CODE 250: Mod: NTX | Performed by: TRANSPLANT SURGERY

## 2022-04-14 PROCEDURE — 83735 ASSAY OF MAGNESIUM: CPT | Mod: NTX

## 2022-04-14 PROCEDURE — 99233 PR SUBSEQUENT HOSPITAL CARE,LEVL III: ICD-10-PCS | Mod: ,,, | Performed by: CLINICAL NURSE SPECIALIST

## 2022-04-14 PROCEDURE — 89051 BODY FLUID CELL COUNT: CPT | Mod: NTX | Performed by: PHYSICIAN ASSISTANT

## 2022-04-14 RX ORDER — ALBUMIN HUMAN 250 G/1000ML
50 SOLUTION INTRAVENOUS ONCE
Status: DISCONTINUED | OUTPATIENT
Start: 2022-04-14 | End: 2022-04-14

## 2022-04-14 RX ORDER — ALBUMIN HUMAN 250 G/1000ML
SOLUTION INTRAVENOUS
Status: COMPLETED
Start: 2022-04-14 | End: 2022-04-14

## 2022-04-14 RX ORDER — ALBUMIN HUMAN 250 G/1000ML
12.5 SOLUTION INTRAVENOUS ONCE
Status: COMPLETED | OUTPATIENT
Start: 2022-04-14 | End: 2022-04-14

## 2022-04-14 RX ADMIN — CEFEPIME HYDROCHLORIDE 1 G: 1 INJECTION, SOLUTION INTRAVENOUS at 12:04

## 2022-04-14 RX ADMIN — PANTOPRAZOLE SODIUM 40 MG: 40 TABLET, DELAYED RELEASE ORAL at 10:04

## 2022-04-14 RX ADMIN — ALBUMIN (HUMAN) 37.5 G: 12.5 SOLUTION INTRAVENOUS at 09:04

## 2022-04-14 RX ADMIN — SODIUM BICARBONATE 650 MG TABLET 1300 MG: at 10:04

## 2022-04-14 RX ADMIN — RIFAXIMIN 550 MG: 550 TABLET ORAL at 08:04

## 2022-04-14 RX ADMIN — CEFEPIME HYDROCHLORIDE 1 G: 1 INJECTION, SOLUTION INTRAVENOUS at 05:04

## 2022-04-14 RX ADMIN — ALBUMIN (HUMAN) 12.5 G: 12.5 SOLUTION INTRAVENOUS at 09:04

## 2022-04-14 RX ADMIN — CEFEPIME HYDROCHLORIDE 1 G: 1 INJECTION, SOLUTION INTRAVENOUS at 08:04

## 2022-04-14 RX ADMIN — LACTULOSE 30 G: 20 SOLUTION ORAL at 08:04

## 2022-04-14 RX ADMIN — MIDODRINE HYDROCHLORIDE 15 MG: 5 TABLET ORAL at 01:04

## 2022-04-14 RX ADMIN — ALBUMIN (HUMAN) 12.5 G: 12.5 SOLUTION INTRAVENOUS at 10:04

## 2022-04-14 RX ADMIN — LACTULOSE 30 G: 20 SOLUTION ORAL at 10:04

## 2022-04-14 RX ADMIN — VANCOMYCIN HYDROCHLORIDE 1750 MG: 500 INJECTION, POWDER, LYOPHILIZED, FOR SOLUTION INTRAVENOUS at 12:04

## 2022-04-14 RX ADMIN — LACTULOSE 30 G: 20 SOLUTION ORAL at 02:04

## 2022-04-14 RX ADMIN — RIFAXIMIN 550 MG: 550 TABLET ORAL at 10:04

## 2022-04-14 RX ADMIN — ZINC SULFATE 220 MG (50 MG) CAPSULE 220 MG: CAPSULE at 10:04

## 2022-04-14 RX ADMIN — SODIUM BICARBONATE 650 MG TABLET 1300 MG: at 08:04

## 2022-04-14 RX ADMIN — ALBUMIN (HUMAN): 12.5 SOLUTION INTRAVENOUS at 10:04

## 2022-04-14 NOTE — PROGRESS NOTES
AAOx4. VSS. Afebrile since start of shift - Tmax 99.0. Patient had 2 BMs today. Cefepime and vancomycin given IVPB. Para done today 4/14 - 9 L removed - cultures collected - albumin given. Respiratory infection panel (PCR) done 4/14 - not detected. Plan to consider switching to PO antibiotics pending results from cultures collected during para. Blood cultures from 4/13 show NGTD. Patient up ad anuj - ambulates in room and bathroom independently w/ cane. No complaints from patient throughout shift. Bed in low position. Non-skid socks on. Call light within reach.

## 2022-04-14 NOTE — PROGRESS NOTES
[facility] - Transplant Stepdown  Liver Transplant  Progress Note    Patient Name: Pelon Vasquez  MRN: [id number]  Admission Date: 4/12/2022  Hospital Length of Stay: 1 days  Code Status: Full Code  Primary Care Provider: Primary Doctor No  Post-Operative Day:     ORGAN:   LIVER  Disease Etiology: Cirrhosis: Other, Specify  Donor Type:   Donation after Brain Death  CDC High Risk:   Yes  Donor CMV Status:   Donor CMV Status: Positive  Donor HBcAB:   Negative  Donor HCV Status:   Negative  Donor HBV MATHEUS: Negative  Donor HCV MATHEUS: Negative  Whole or Partial:   Biliary Anastomosis:   Arterial Anatomy:   Subjective:     History of Present Illness:  Pelon Vasquez is a 57yr olf male with 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 ESLD complicated by jaundice, FAMILIA, ascites (para 2x/week, no h/o SBP), HE, and hypervolemia. He is listed for a liver transplant with MELD 25. Patient admitted for liver transplant surgery. He denies any recent fevers or hospitalizations unknown to transplant team. He reports feel well in his usual state of health.       Hospital Course:  Patient admitted as primary candidate for liver transplant scheduled for 4/13. However, prior to transplant, patient spiked temp of 101.1 with associated tachycardia. Decision made to cancel transplant and infectious work-up ordered.     Interval Note: NAEON. Patient Tmax overnight 100.1. Infectious work-up unremarkable to date. UA clean, blood cx NGTD. Paracentesis done this AM, 9 L removed per patient. Awaiting cell counts from para. If cell counts unremarkable, will consider transitioning from IV to PO anbx. MELD 26 today, currently listed with 25. Will have coordinator recapture higher MELD. VSS. Continue to monitor.        Scheduled Meds:   albumin human 25%  12.5 g Intravenous Once    ceFEPime (MAXIPIME) IVPB  1 g Intravenous Q8H    lactulose  30 g Oral TID    midodrine  15 mg Oral Q8H    pantoprazole  40 mg Oral Daily    rifAXIMin  550 mg Oral BID     sodium bicarbonate  1,300 mg Oral BID    vancomycin (VANCOCIN) IVPB  15 mg/kg Intravenous Q12H    zinc sulfate  220 mg Oral Daily     Continuous Infusions:  PRN Meds:mupirocin, Pharmacy to dose Vancomycin consult **AND** vancomycin - pharmacy to dose    Review of Systems   Constitutional:  Positive for unexpected weight change (weight is up 30 lbs per pt over 3 months). Negative for appetite change.   HENT:  Negative for trouble swallowing.    Eyes:  Negative for visual disturbance.   Respiratory:  Negative for cough and shortness of breath.    Cardiovascular:  Positive for leg swelling. Negative for chest pain and palpitations.   Gastrointestinal:  Positive for abdominal distention. Negative for abdominal pain.   Genitourinary:  Negative for decreased urine volume, difficulty urinating and urgency.   Musculoskeletal:  Positive for arthralgias.   Skin:  Positive for color change (to abdomen and BLE) and wound.   Allergic/Immunologic: Negative for immunocompromised state.   Neurological:  Positive for weakness (minimal).   Hematological:  Bruises/bleeds easily.   Psychiatric/Behavioral:  Negative for confusion, decreased concentration and dysphoric mood.    Objective:     Vital Signs (Most Recent):  Temp: 98.3 °F (36.8 °C) (04/14/22 0712)  Pulse: 100 (04/14/22 0712)  Resp: 16 (04/14/22 0712)  BP: 130/71 (04/14/22 0712)  SpO2: 96 % (04/14/22 0712) Vital Signs (24h Range):  Temp:  [97.9 °F (36.6 °C)-100.3 °F (37.9 °C)] 98.3 °F (36.8 °C)  Pulse:  [] 100  Resp:  [16-18] 16  SpO2:  [96 %] 96 %  BP: (130-153)/(68-74) 130/71     Weight: 121.9 kg (268 lb 10.1 oz)  Body mass index is 42.07 kg/m².    Intake/Output - Last 3 Shifts         04/12 0700  04/13 0659 04/13 0700  04/14 0659 04/14 0700  04/15 0659    P.O. 0 480 120    I.V. (mL/kg)  0 (0)     Other  0     Total Intake(mL/kg) 0 (0) 480 (3.9) 120 (1)    Urine (mL/kg/hr) 200 220 (0.1)     Emesis/NG output  0     Other  0 8100    Stool  0     Blood  0     Total  Output     Net -200 +260 -7980           Urine Occurrence  2 x     Stool Occurrence  3 x     Emesis Occurrence  0 x             Physical Exam  Vitals and nursing note reviewed.   Constitutional:       General: He is not in acute distress.     Appearance: Normal appearance.      Comments: obese   HENT:      Head: Normocephalic.      Nose: Nose normal.   Eyes:      General: Scleral icterus present.         Right eye: No discharge.         Left eye: No discharge.   Cardiovascular:      Rate and Rhythm: Normal rate and regular rhythm.      Heart sounds: No murmur heard.  Pulmonary:      Effort: Pulmonary effort is normal. No respiratory distress.      Breath sounds: Normal breath sounds. No wheezing or rales.   Abdominal:      General: There is distension.      Tenderness: There is no abdominal tenderness. There is no guarding.   Musculoskeletal:         General: Normal range of motion.      Cervical back: Neck supple.      Right lower leg: Edema present.      Left lower leg: Edema present.   Skin:     General: Skin is warm and dry.      Capillary Refill: Capillary refill takes 2 to 3 seconds.      Coloration: Skin is jaundiced.   Neurological:      General: No focal deficit present.      Mental Status: He is alert and oriented to person, place, and time.   Psychiatric:         Mood and Affect: Mood normal.         Behavior: Behavior normal.         Thought Content: Thought content normal.       Laboratory:  Immunosuppressants       None          CBC:   Recent Labs   Lab 04/14/22 0627   WBC 4.89   RBC 2.52*   HGB 7.5*   HCT 22.7*   PLT 46*   MCV 90   MCH 29.8   MCHC 33.0     BMP:   Recent Labs   Lab 04/14/22 0627   *   *   K 3.6   CL 95   CO2 27   BUN 17   CREATININE 1.3   CALCIUM 8.4*     CMP:   Recent Labs   Lab 04/14/22 0627   *   CALCIUM 8.4*   ALBUMIN 2.8*   PROT 5.7*   *   K 3.6   CO2 27   CL 95   BUN 17   CREATININE 1.3   ALKPHOS 77   ALT 27   AST 59*   BILITOT 6.0*      Labs within the past 24 hours have been reviewed.    Diagnostic Results:  I have personally reviewed all pertinent imaging studies.    Debility/Functional status: No debility noted.    Assessment/Plan:     * End stage liver disease  - ESLD 2/2 ETOH/LOZANO  - Admit for liver transplant surgery  - Surgery cancelled 4/13 due to new onset fever, Tmax 101.1    Fever  - patient spiked 101.1 fever with tachycardia 4/13 prior to transplant  - surgery cancelled and infectious work-up initiated  - UA clean, blood cx NGTD, respiratory infection panel in process  - Cefepime/vanc ordered 4/13  - Para done 4/14, 9 L removed. Cell counts pending  - Consider transitioning to oral anbx if Para fluid negative for infection and pt remains afebrile      Acquired coagulation factor deficiency  - due to ESLD, Expect to improve post transplant      Thrombocytopenia, unspecified  - Due to ESLD. Stable. Expect to improve post transplant.      Anemia of chronic disease  - H/H low but stable. Monitor.      Ascites due to alcoholic cirrhosis  - Requires frequent outpt paracentesis  - Para done 4/14 with 9 L off, cell counts pending      Hepatic encephalopathy  - AAO x 4  - Lactulose/Rifaximin held pre op  - Monitor          VTE Risk Mitigation (From admission, onward)         Ordered     IP VTE HIGH RISK PATIENT  Once         04/12/22 2027                The patients clinical status was discussed at multidisplinary rounds, involving transplant surgery, transplant medicine, pharmacy, nursing, nutrition, and social work    Discharge Planning:  Not suitable for discharge at this time    Saad Melo, ANA MARIA  Liver Transplant  [facility] - Transplant Stepdown

## 2022-04-14 NOTE — PLAN OF CARE
Pt aaox4. VSS on RA. Tmax overnight 100.7. IV abx administered per order. Pt to get paracentesis today. Blood cx pending. Pt independent and up to bathroom. Nonskid socks in use when OOB. VS and assessments in flowsheets.

## 2022-04-14 NOTE — SUBJECTIVE & OBJECTIVE
Scheduled Meds:   albumin human 25%  12.5 g Intravenous Once    ceFEPime (MAXIPIME) IVPB  1 g Intravenous Q8H    lactulose  30 g Oral TID    midodrine  15 mg Oral Q8H    pantoprazole  40 mg Oral Daily    rifAXIMin  550 mg Oral BID    sodium bicarbonate  1,300 mg Oral BID    vancomycin (VANCOCIN) IVPB  15 mg/kg Intravenous Q12H    zinc sulfate  220 mg Oral Daily     Continuous Infusions:  PRN Meds:mupirocin, Pharmacy to dose Vancomycin consult **AND** vancomycin - pharmacy to dose    Review of Systems   Constitutional:  Positive for unexpected weight change (weight is up 30 lbs per pt over 3 months). Negative for appetite change.   HENT:  Negative for trouble swallowing.    Eyes:  Negative for visual disturbance.   Respiratory:  Negative for cough and shortness of breath.    Cardiovascular:  Positive for leg swelling. Negative for chest pain and palpitations.   Gastrointestinal:  Positive for abdominal distention. Negative for abdominal pain.   Genitourinary:  Negative for decreased urine volume, difficulty urinating and urgency.   Musculoskeletal:  Positive for arthralgias.   Skin:  Positive for color change (to abdomen and BLE) and wound.   Allergic/Immunologic: Negative for immunocompromised state.   Neurological:  Positive for weakness (minimal).   Hematological:  Bruises/bleeds easily.   Psychiatric/Behavioral:  Negative for confusion, decreased concentration and dysphoric mood.    Objective:     Vital Signs (Most Recent):  Temp: 98.3 °F (36.8 °C) (04/14/22 0712)  Pulse: 100 (04/14/22 0712)  Resp: 16 (04/14/22 0712)  BP: 130/71 (04/14/22 0712)  SpO2: 96 % (04/14/22 0712) Vital Signs (24h Range):  Temp:  [97.9 °F (36.6 °C)-100.3 °F (37.9 °C)] 98.3 °F (36.8 °C)  Pulse:  [] 100  Resp:  [16-18] 16  SpO2:  [96 %] 96 %  BP: (130-153)/(68-74) 130/71     Weight: 121.9 kg (268 lb 10.1 oz)  Body mass index is 42.07 kg/m².    Intake/Output - Last 3 Shifts         04/12 0700 04/13 0659 04/13 0700 04/14 0659 04/14  0700  04/15 0659    P.O. 0 480 120    I.V. (mL/kg)  0 (0)     Other  0     Total Intake(mL/kg) 0 (0) 480 (3.9) 120 (1)    Urine (mL/kg/hr) 200 220 (0.1)     Emesis/NG output  0     Other  0 8100    Stool  0     Blood  0     Total Output     Net -200 +260 -7980           Urine Occurrence  2 x     Stool Occurrence  3 x     Emesis Occurrence  0 x             Physical Exam  Vitals and nursing note reviewed.   Constitutional:       General: He is not in acute distress.     Appearance: Normal appearance.      Comments: obese   HENT:      Head: Normocephalic.      Nose: Nose normal.   Eyes:      General: Scleral icterus present.         Right eye: No discharge.         Left eye: No discharge.   Cardiovascular:      Rate and Rhythm: Normal rate and regular rhythm.      Heart sounds: No murmur heard.  Pulmonary:      Effort: Pulmonary effort is normal. No respiratory distress.      Breath sounds: Normal breath sounds. No wheezing or rales.   Abdominal:      General: There is distension.      Tenderness: There is no abdominal tenderness. There is no guarding.   Musculoskeletal:         General: Normal range of motion.      Cervical back: Neck supple.      Right lower leg: Edema present.      Left lower leg: Edema present.   Skin:     General: Skin is warm and dry.      Capillary Refill: Capillary refill takes 2 to 3 seconds.      Coloration: Skin is jaundiced.   Neurological:      General: No focal deficit present.      Mental Status: He is alert and oriented to person, place, and time.   Psychiatric:         Mood and Affect: Mood normal.         Behavior: Behavior normal.         Thought Content: Thought content normal.       Laboratory:  Immunosuppressants       None          CBC:   Recent Labs   Lab 04/14/22  0627   WBC 4.89   RBC 2.52*   HGB 7.5*   HCT 22.7*   PLT 46*   MCV 90   MCH 29.8   MCHC 33.0     BMP:   Recent Labs   Lab 04/14/22 0627   *   *   K 3.6   CL 95   CO2 27   BUN 17   CREATININE  1.3   CALCIUM 8.4*     CMP:   Recent Labs   Lab 04/14/22  0627   *   CALCIUM 8.4*   ALBUMIN 2.8*   PROT 5.7*   *   K 3.6   CO2 27   CL 95   BUN 17   CREATININE 1.3   ALKPHOS 77   ALT 27   AST 59*   BILITOT 6.0*     Labs within the past 24 hours have been reviewed.    Diagnostic Results:  I have personally reviewed all pertinent imaging studies.    Debility/Functional status: No debility noted.

## 2022-04-14 NOTE — PROGRESS NOTES
Potential Weekend Discharge:     SW met with pt at bedside. Pt presents aaox4, engaged, asking, and answering questions appropriately. Pt's fiance' present at bedside. Pt reported accepting of potential weekend discharge. Pt discharging with no SW needs. Pt verbalized understanding. Pt's caregiver transporting home. SW providing ongoing psychosocial and counseling support, education, resources, assistance and discharge planning as indicated. Following and available.

## 2022-04-14 NOTE — H&P
Inpatient Radiology Pre-procedure Note    History of Present Illness:  Pelon Vasquez is a 57 y.o. male who presents for US-guided paracentesis.    Admission H&P reviewed.    Past Medical History:   Diagnosis Date    Arthritis     Cirrhosis of liver     HTN (hypertension)     BRAYDON (obstructive sleep apnea)     Secondary esophageal varices without bleeding 3/18/2022    EGD (2/25/22) showed moderate portal hypertensive gastropathy, small hiatal hernia, grade 1 esophageal varices    Type 2 diabetes mellitus      Past Surgical History:   Procedure Laterality Date    COLONOSCOPY      FINGER AMPUTATION      LIVER TRANSPLANT N/A 4/10/2022    Procedure: TRANSPLANT, LIVER;  Surgeon: Félix Scott MD;  Location: [facility] OR [address] [place] [address];  Service: Transplant;  Laterality: N/A;    LIVER TRANSPLANT N/A 4/13/2022    Procedure: TRANSPLANT, LIVER;  Surgeon: Keyon Castillo MD;  Location: [facility] OR [address] [place] [address];  Service: Transplant;  Laterality: N/A;    MEDIAL COLLATERAL LIGAMENT REPAIR, KNEE Bilateral     ROTATRO CUFF REPAIR      TONSILLECTOMY         Review of Systems:   As documented in primary team H&P    Home Meds:   Prior to Admission medications    Medication Sig Start Date End Date Taking? Authorizing Provider   furosemide (LASIX) 40 MG tablet Take 40 mg by mouth 2 (two) times a day. 1/20/22   Historical Provider   lactulose (CHRONULAC) 10 gram/15 mL solution Take 30 mLs by mouth 3 (three) times daily. 3/2/22   Historical Provider   midodrine (PROAMATINE) 5 MG Tab Take 3 tablets (15 mg total) by mouth every 8 (eight) hours. 3/24/22 3/24/23  Indiana Dutton MD   ondansetron (ZOFRAN-ODT) 4 MG TbDL Take 1 tablet by mouth every 8 (eight) hours as needed (NAUSEA). 3/2/22   Historical Provider   pantoprazole (PROTONIX) 40 MG tablet Take 40 mg by mouth once daily. 10/20/21 4/18/22  Historical Provider   sodium bicarbonate 650 MG tablet Take 2 tablets (1,300 mg total) by mouth 2 (two) times daily. 3/24/22 3/24/23  Indiana VERAS  MD Marija   XIFAXAN 550 mg Tab Take 550 mg by mouth 2 (two) times daily. 12/10/21   Historical Provider   zinc sulfate 50 mg zinc (220 mg) Tab tablet Take 1 tablet (220 mg total) by mouth once daily. 2/21/22   Tito Mcclure MD   atorvastatin (LIPITOR) 20 MG tablet Take 20 mg by mouth once daily. 1/14/22 3/24/22  Historical Provider   triamcinolone acetonide 0.1% (KENALOG) 0.1 % cream Apply topically 2 (two) times daily as needed. 2/17/22 3/24/22  Historical Provider     Scheduled Meds:    ceFEPime (MAXIPIME) IVPB  1 g Intravenous Q8H    lactulose  30 g Oral TID    midodrine  15 mg Oral Q8H    pantoprazole  40 mg Oral Daily    rifAXIMin  550 mg Oral BID    sodium bicarbonate  1,300 mg Oral BID    vancomycin (VANCOCIN) IVPB  15 mg/kg Intravenous Q12H    zinc sulfate  220 mg Oral Daily     Continuous Infusions:   PRN Meds:mupirocin, Pharmacy to dose Vancomycin consult **AND** vancomycin - pharmacy to dose  Anticoagulants/Antiplatelets: no anticoagulation    Allergies:   Review of patient's allergies indicates:   Allergen Reactions    Strawberry Anaphylaxis and Rash    Metformin Diarrhea     Sedation Hx: have not been any systemic reactions    Labs:  Recent Labs   Lab 04/14/22 0627   INR 1.8*       Recent Labs   Lab 04/14/22 0627   WBC 4.89   HGB 7.5*   HCT 22.7*   MCV 90   PLT 46*      Recent Labs   Lab 04/12/22  1723 04/13/22  0657 04/14/22  0627   *   < > 153*   *   < > 130*   K 4.0   < > 3.6   CL 97   < > 95   CO2 26   < > 27   BUN 17   < > 17   CREATININE 1.5*   < > 1.3   CALCIUM 8.8   < > 8.4*   MG 1.7   < > 1.6   ALT 29   < > 27   AST 55*   < > 59*   ALBUMIN 3.1*   < > 2.8*   BILITOT 6.2*   < > 6.0*   BILIDIR 2.1*  --   --     < > = values in this interval not displayed.         Vitals:  Temp: 98.3 °F (36.8 °C) (04/14/22 0712)  Pulse: 100 (04/14/22 0712)  Resp: 16 (04/14/22 0712)  BP: 130/71 (04/14/22 0712)  SpO2: 96 % (04/14/22 0712)     Physical Exam:  ASA: 3  Mallampati:  3    General: no acute distress  Mental Status: alert and oriented to person, place and time  HEENT: normocephalic, atraumatic  Chest: unlabored breathing  Heart: regular heart rate  Abdomen: distended  Extremity: moves all extremities    Plan: US-guided paracentesis  Sedation Plan: paracentesis    Lj Ellis MD PGY2  Department of Radiology  [facility]

## 2022-04-14 NOTE — ASSESSMENT & PLAN NOTE
- patient spiked 101.1 fever with tachycardia 4/13 prior to transplant  - surgery cancelled and infectious work-up initiated  - UA clean, blood cx NGTD, respiratory infection panel in process  - Cefepime/vanc ordered 4/13  - Para done 4/14, 9 L removed. Cell counts pending  - Consider transitioning to oral anbx if Para fluid negative for infection and pt remains afebrile

## 2022-04-14 NOTE — TELEPHONE ENCOUNTER
PATIENT NAME: Pelon Vasquez  [facility] #: [id number]     Meld [address]    Lab Results   Component Value Date    CREATININE 1.3 04/14/2022     (L) 04/14/2022    BILITOT 6.0 (H) 04/14/2022    ALBUMIN 2.8 (L) 04/14/2022    INR 1.8 (H) 04/14/2022       Encephalopathy: 1 - 2  Ascites: moderate  Dialysis: no     Recertification requestor: Phoenix Choi

## 2022-04-14 NOTE — ASSESSMENT & PLAN NOTE
- ESLD 2/2 ETOH/LOZANO  - Admit for liver transplant surgery  - Surgery cancelled 4/13 due to new onset fever, Tmax 101.1

## 2022-04-14 NOTE — PROCEDURES
Radiology Post-Procedure Note    Pre Op Diagnosis: Ascites  Post Op Diagnosis: Same    Procedure: Ultrasound Guided Paracentesis    Procedure performed by: Lj Ellis MD    Written Informed Consent Obtained: Yes  Specimen Removed: YES clear yellow  Estimated Blood Loss: Minimal    Findings:   Successful RLQ US-guided paracentesis.  Albumin administered PRN per protocol.    Patient tolerated procedure well.    Lj Ellis MD PGY2  Department of Radiology  [facility]

## 2022-04-15 LAB
ABO + RH BLD: NORMAL
ALBUMIN SERPL BCP-MCNC: 2.9 G/DL (ref 3.5–5.2)
ALP SERPL-CCNC: 79 U/L (ref 55–135)
ALT SERPL W/O P-5'-P-CCNC: 23 U/L (ref 10–44)
ANION GAP SERPL CALC-SCNC: 8 MMOL/L (ref 8–16)
AST SERPL-CCNC: 52 U/L (ref 10–40)
BASOPHILS # BLD AUTO: 0.02 K/UL (ref 0–0.2)
BASOPHILS NFR BLD: 0.4 % (ref 0–1.9)
BILIRUB SERPL-MCNC: 4.6 MG/DL (ref 0.1–1)
BLD GP AB SCN CELLS X3 SERPL QL: NORMAL
BUN SERPL-MCNC: 15 MG/DL (ref 6–20)
CALCIUM SERPL-MCNC: 8.2 MG/DL (ref 8.7–10.5)
CHLORIDE SERPL-SCNC: 96 MMOL/L (ref 95–110)
CO2 SERPL-SCNC: 26 MMOL/L (ref 23–29)
CREAT SERPL-MCNC: 1.1 MG/DL (ref 0.5–1.4)
DIFFERENTIAL METHOD: ABNORMAL
EOSINOPHIL # BLD AUTO: 0.5 K/UL (ref 0–0.5)
EOSINOPHIL NFR BLD: 10 % (ref 0–8)
ERYTHROCYTE [DISTWIDTH] IN BLOOD BY AUTOMATED COUNT: 18.7 % (ref 11.5–14.5)
EST. GFR  (AFRICAN AMERICAN): >60 ML/MIN/1.73 M^2
EST. GFR  (NON AFRICAN AMERICAN): >60 ML/MIN/1.73 M^2
GLUCOSE SERPL-MCNC: 175 MG/DL (ref 70–110)
HCT VFR BLD AUTO: 21.7 % (ref 40–54)
HGB BLD-MCNC: 7.4 G/DL (ref 14–18)
IMM GRANULOCYTES # BLD AUTO: 0.02 K/UL (ref 0–0.04)
IMM GRANULOCYTES NFR BLD AUTO: 0.4 % (ref 0–0.5)
INR PPP: 1.8 (ref 0.8–1.2)
LYMPHOCYTES # BLD AUTO: 0.7 K/UL (ref 1–4.8)
LYMPHOCYTES NFR BLD: 15.5 % (ref 18–48)
MAGNESIUM SERPL-MCNC: 1.7 MG/DL (ref 1.6–2.6)
MCH RBC QN AUTO: 30 PG (ref 27–31)
MCHC RBC AUTO-ENTMCNC: 34.1 G/DL (ref 32–36)
MCV RBC AUTO: 88 FL (ref 82–98)
MONOCYTES # BLD AUTO: 1.1 K/UL (ref 0.3–1)
MONOCYTES NFR BLD: 24.8 % (ref 4–15)
NEUTROPHILS # BLD AUTO: 2.2 K/UL (ref 1.8–7.7)
NEUTROPHILS NFR BLD: 48.9 % (ref 38–73)
NRBC BLD-RTO: 0 /100 WBC
PHOSPHATE SERPL-MCNC: 2.5 MG/DL (ref 2.7–4.5)
PLATELET # BLD AUTO: 43 K/UL (ref 150–450)
PMV BLD AUTO: 10.1 FL (ref 9.2–12.9)
POTASSIUM SERPL-SCNC: 3.7 MMOL/L (ref 3.5–5.1)
PROT SERPL-MCNC: 5.3 G/DL (ref 6–8.4)
PROTHROMBIN TIME: 18.2 SEC (ref 9–12.5)
RBC # BLD AUTO: 2.47 M/UL (ref 4.6–6.2)
SODIUM SERPL-SCNC: 130 MMOL/L (ref 136–145)
VANCOMYCIN TROUGH SERPL-MCNC: 23.8 UG/ML (ref 10–22)
WBC # BLD AUTO: 4.51 K/UL (ref 3.9–12.7)

## 2022-04-15 PROCEDURE — 85610 PROTHROMBIN TIME: CPT | Mod: NTX

## 2022-04-15 PROCEDURE — 63600175 PHARM REV CODE 636 W HCPCS: Mod: NTX | Performed by: TRANSPLANT SURGERY

## 2022-04-15 PROCEDURE — 86901 BLOOD TYPING SEROLOGIC RH(D): CPT | Mod: NTX | Performed by: CLINICAL NURSE SPECIALIST

## 2022-04-15 PROCEDURE — 80053 COMPREHEN METABOLIC PANEL: CPT | Mod: NTX

## 2022-04-15 PROCEDURE — 63600175 PHARM REV CODE 636 W HCPCS: Mod: NTX | Performed by: PHYSICIAN ASSISTANT

## 2022-04-15 PROCEDURE — 80202 ASSAY OF VANCOMYCIN: CPT | Mod: NTX | Performed by: TRANSPLANT SURGERY

## 2022-04-15 PROCEDURE — 84100 ASSAY OF PHOSPHORUS: CPT | Mod: NTX

## 2022-04-15 PROCEDURE — 99233 SBSQ HOSP IP/OBS HIGH 50: CPT | Mod: NTX,,, | Performed by: STUDENT IN AN ORGANIZED HEALTH CARE EDUCATION/TRAINING PROGRAM

## 2022-04-15 PROCEDURE — 36415 COLL VENOUS BLD VENIPUNCTURE: CPT | Mod: NTX | Performed by: CLINICAL NURSE SPECIALIST

## 2022-04-15 PROCEDURE — 20600001 HC STEP DOWN PRIVATE ROOM: Mod: NTX

## 2022-04-15 PROCEDURE — 83735 ASSAY OF MAGNESIUM: CPT | Mod: NTX

## 2022-04-15 PROCEDURE — 25000003 PHARM REV CODE 250: Mod: NTX | Performed by: PHYSICIAN ASSISTANT

## 2022-04-15 PROCEDURE — 99233 PR SUBSEQUENT HOSPITAL CARE,LEVL III: ICD-10-PCS | Mod: NTX,,, | Performed by: STUDENT IN AN ORGANIZED HEALTH CARE EDUCATION/TRAINING PROGRAM

## 2022-04-15 PROCEDURE — 36415 COLL VENOUS BLD VENIPUNCTURE: CPT | Mod: NTX | Performed by: TRANSPLANT SURGERY

## 2022-04-15 PROCEDURE — 25000003 PHARM REV CODE 250: Mod: NTX | Performed by: TRANSPLANT SURGERY

## 2022-04-15 PROCEDURE — 85025 COMPLETE CBC W/AUTO DIFF WBC: CPT | Mod: NTX

## 2022-04-15 RX ADMIN — RIFAXIMIN 550 MG: 550 TABLET ORAL at 10:04

## 2022-04-15 RX ADMIN — MIDODRINE HYDROCHLORIDE 15 MG: 5 TABLET ORAL at 05:04

## 2022-04-15 RX ADMIN — CEFEPIME HYDROCHLORIDE 1 G: 1 INJECTION, SOLUTION INTRAVENOUS at 05:04

## 2022-04-15 RX ADMIN — CEFEPIME HYDROCHLORIDE 1 G: 1 INJECTION, SOLUTION INTRAVENOUS at 08:04

## 2022-04-15 RX ADMIN — LACTULOSE 30 G: 20 SOLUTION ORAL at 10:04

## 2022-04-15 RX ADMIN — LACTULOSE 30 G: 20 SOLUTION ORAL at 02:04

## 2022-04-15 RX ADMIN — CEFEPIME HYDROCHLORIDE 1 G: 1 INJECTION, SOLUTION INTRAVENOUS at 12:04

## 2022-04-15 RX ADMIN — LACTULOSE 30 G: 20 SOLUTION ORAL at 08:04

## 2022-04-15 RX ADMIN — VANCOMYCIN HYDROCHLORIDE 1750 MG: 500 INJECTION, POWDER, LYOPHILIZED, FOR SOLUTION INTRAVENOUS at 12:04

## 2022-04-15 RX ADMIN — SODIUM BICARBONATE 650 MG TABLET 1300 MG: at 08:04

## 2022-04-15 RX ADMIN — RIFAXIMIN 550 MG: 550 TABLET ORAL at 08:04

## 2022-04-15 RX ADMIN — ZINC SULFATE 220 MG (50 MG) CAPSULE 220 MG: CAPSULE at 10:04

## 2022-04-15 RX ADMIN — SODIUM BICARBONATE 650 MG TABLET 1300 MG: at 10:04

## 2022-04-15 RX ADMIN — PANTOPRAZOLE SODIUM 40 MG: 40 TABLET, DELAYED RELEASE ORAL at 10:04

## 2022-04-15 NOTE — PROGRESS NOTES
Liver Transplant Service  Hepatology Progress Note      HPI: Mr. Vasquez is a 57 y.o. 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       Subjective: No acute events overnight. Patient is without complaints this morning.      Objective:   Vitals:    04/15/22 0435 04/15/22 0515 04/15/22 0530 04/15/22 0837   BP: 130/67   136/65   BP Location: Right arm   Left arm   Patient Position: Lying   Sitting   Pulse: 109   101   Resp: 18   20   Temp: 96.7 °F (35.9 °C)  98.5 °F (36.9 °C) 98.4 °F (36.9 °C)   TempSrc: Oral  Oral Oral   SpO2: 96%   97%   Weight:  109.7 kg (241 lb 13.5 oz)     Height:            GEN:  Alert and oriented, no acute distress  RESP:  No respiratory distress, CTAB.  CV:  Tachycardic, regular rhythm  ABD:  Bowel sounds normal, soft, nontender, nondistended  SKIN: Warm, dry  PSYCH: Cooperate, appropriate mood and affect    Labs:  Labs within the past 24 hours have been reviewed.    Recent Labs   Lab 04/15/22  0740   WBC 4.51   RBC 2.47*   HGB 7.4*   HCT 21.7*   PLT 43*   MCV 88   MCH 30.0   MCHC 34.1       Recent Labs   Lab 04/15/22  0740   CALCIUM 8.2*   PROT 5.3*   *   K 3.7   CO2 26   CL 96   BUN 15   CREATININE 1.1   ALKPHOS 79   ALT 23   AST 52*   BILITOT 4.6*       Recent Labs   Lab 04/15/22  0740   INR 1.8*       MELD-Na score: 25 at 4/15/2022  7:40 AM  MELD score: 20 at 4/15/2022  7:40 AM  Calculated from:  Serum Creatinine: 1.1 mg/dL at 4/15/2022  7:40 AM  Serum Sodium: 130 mmol/L at 4/15/2022  7:40 AM  Total Bilirubin: 4.6 mg/dL at 4/15/2022  7:40 AM  INR(ratio): 1.8 at 4/15/2022  7:40 AM  Age: 57 years      Assessment/Plan:  1. ESLD: Currently listed for liver. Stable for transplant at this time.     2. Fever: Infectious work up negative to date (UA, blood cultures, CXR, ascitic fluid studies, respiratory panel). Given cultures without MRSA, will discontinue vancomycin. Continue cefepime.     3. Ascites: 2 gm Na diet. Holding  diuretics given concern for infection.    4. Hepatic encephalopathy: Continue lactulose. Titrate to maintain 3-4 bowel movements per day. Continue rifaximin 550mg BID. Mentation is good.    5. Anemia of chronic disease: H/H stable. Monitor.

## 2022-04-15 NOTE — PLAN OF CARE
* AAO x 4   * Abdomen ***   * *** diet patient tolerating well. See chart for % eaten.    * Generalized edema noted.   * Bed at lowest position and locked, call light within reach, non-slip socks on, ***, and side rails up x 2.  Encouraged patient to call for assistance when needed ***.   * *** PIV *** G (***) patent, dressing clean dry and intact no signs or symptoms of infection noted.   * Jaundice noted the ***.   * Oxygen saturation *** % on room air.  Respirations even and unlabored no signs or symptoms of distress noted.   * Patient has no complaints of pain at this time.   * Puncture site to the *** side ***.  Dressing clean dry and intact no signs or symptoms of infection noted.   * Skin assessment preformed no breakdown noted.   * Standard precautions maintained.   * Patient able to turn self no assistance needed.   * Patient voiding per urinal *** urine noted see flow sheet for intake and output totals.   * No Visi monitoring system in use. See flow sheet for vital sign charting.

## 2022-04-15 NOTE — PLAN OF CARE
Patient remaining free from injury and ambulating to and from the restroom.  Patient afebrile this shift and continues with Cefepine.  Patient cultures display NGTD.  Patient with 2 BMs.  Patient denies pain at this time.  Patient and wife anticipating dc Sunday as long as patient remains afebrile and BC negative.

## 2022-04-16 LAB
ALBUMIN SERPL BCP-MCNC: 2.9 G/DL (ref 3.5–5.2)
ALP SERPL-CCNC: 83 U/L (ref 55–135)
ALT SERPL W/O P-5'-P-CCNC: 24 U/L (ref 10–44)
ANION GAP SERPL CALC-SCNC: 8 MMOL/L (ref 8–16)
AST SERPL-CCNC: 52 U/L (ref 10–40)
BASOPHILS # BLD AUTO: 0.03 K/UL (ref 0–0.2)
BASOPHILS NFR BLD: 0.7 % (ref 0–1.9)
BILIRUB SERPL-MCNC: 4.4 MG/DL (ref 0.1–1)
BLD PROD TYP BPU: NORMAL
BLOOD UNIT EXPIRATION DATE: NORMAL
BLOOD UNIT TYPE CODE: 5100
BLOOD UNIT TYPE: NORMAL
BUN SERPL-MCNC: 15 MG/DL (ref 6–20)
CALCIUM SERPL-MCNC: 8 MG/DL (ref 8.7–10.5)
CHLORIDE SERPL-SCNC: 97 MMOL/L (ref 95–110)
CO2 SERPL-SCNC: 26 MMOL/L (ref 23–29)
CODING SYSTEM: NORMAL
CREAT SERPL-MCNC: 1.2 MG/DL (ref 0.5–1.4)
DIFFERENTIAL METHOD: ABNORMAL
DISPENSE STATUS: NORMAL
EOSINOPHIL # BLD AUTO: 0.6 K/UL (ref 0–0.5)
EOSINOPHIL NFR BLD: 13.4 % (ref 0–8)
ERYTHROCYTE [DISTWIDTH] IN BLOOD BY AUTOMATED COUNT: 18.9 % (ref 11.5–14.5)
EST. GFR  (AFRICAN AMERICAN): >60 ML/MIN/1.73 M^2
EST. GFR  (NON AFRICAN AMERICAN): >60 ML/MIN/1.73 M^2
GLUCOSE SERPL-MCNC: 164 MG/DL (ref 70–110)
HCT VFR BLD AUTO: 22.4 % (ref 40–54)
HGB BLD-MCNC: 7.4 G/DL (ref 14–18)
IMM GRANULOCYTES # BLD AUTO: 0.02 K/UL (ref 0–0.04)
IMM GRANULOCYTES NFR BLD AUTO: 0.4 % (ref 0–0.5)
INR PPP: 1.8 (ref 0.8–1.2)
LYMPHOCYTES # BLD AUTO: 0.8 K/UL (ref 1–4.8)
LYMPHOCYTES NFR BLD: 16.5 % (ref 18–48)
MAGNESIUM SERPL-MCNC: 1.9 MG/DL (ref 1.6–2.6)
MCH RBC QN AUTO: 29.5 PG (ref 27–31)
MCHC RBC AUTO-ENTMCNC: 33 G/DL (ref 32–36)
MCV RBC AUTO: 89 FL (ref 82–98)
MONOCYTES # BLD AUTO: 0.8 K/UL (ref 0.3–1)
MONOCYTES NFR BLD: 18 % (ref 4–15)
NEUTROPHILS # BLD AUTO: 2.3 K/UL (ref 1.8–7.7)
NEUTROPHILS NFR BLD: 51 % (ref 38–73)
NRBC BLD-RTO: 0 /100 WBC
PHOSPHATE SERPL-MCNC: 2.3 MG/DL (ref 2.7–4.5)
PLATELET # BLD AUTO: 47 K/UL (ref 150–450)
PMV BLD AUTO: 10.6 FL (ref 9.2–12.9)
POTASSIUM SERPL-SCNC: 3.5 MMOL/L (ref 3.5–5.1)
PROT SERPL-MCNC: 5.2 G/DL (ref 6–8.4)
PROTHROMBIN TIME: 17.7 SEC (ref 9–12.5)
RBC # BLD AUTO: 2.51 M/UL (ref 4.6–6.2)
SODIUM SERPL-SCNC: 131 MMOL/L (ref 136–145)
TRANS ERYTHROCYTES VOL PATIENT: NORMAL ML
WBC # BLD AUTO: 4.55 K/UL (ref 3.9–12.7)

## 2022-04-16 PROCEDURE — 25000003 PHARM REV CODE 250: Mod: NTX | Performed by: STUDENT IN AN ORGANIZED HEALTH CARE EDUCATION/TRAINING PROGRAM

## 2022-04-16 PROCEDURE — 84100 ASSAY OF PHOSPHORUS: CPT | Mod: NTX

## 2022-04-16 PROCEDURE — 36415 COLL VENOUS BLD VENIPUNCTURE: CPT | Mod: NTX

## 2022-04-16 PROCEDURE — 83735 ASSAY OF MAGNESIUM: CPT | Mod: NTX

## 2022-04-16 PROCEDURE — 20600001 HC STEP DOWN PRIVATE ROOM: Mod: NTX

## 2022-04-16 PROCEDURE — 99233 SBSQ HOSP IP/OBS HIGH 50: CPT | Mod: NTX,,, | Performed by: STUDENT IN AN ORGANIZED HEALTH CARE EDUCATION/TRAINING PROGRAM

## 2022-04-16 PROCEDURE — 85025 COMPLETE CBC W/AUTO DIFF WBC: CPT | Mod: NTX

## 2022-04-16 PROCEDURE — 25000003 PHARM REV CODE 250: Mod: NTX | Performed by: PHYSICIAN ASSISTANT

## 2022-04-16 PROCEDURE — 63600175 PHARM REV CODE 636 W HCPCS: Mod: NTX | Performed by: PHYSICIAN ASSISTANT

## 2022-04-16 PROCEDURE — 80053 COMPREHEN METABOLIC PANEL: CPT | Mod: NTX

## 2022-04-16 PROCEDURE — 99233 PR SUBSEQUENT HOSPITAL CARE,LEVL III: ICD-10-PCS | Mod: NTX,,, | Performed by: STUDENT IN AN ORGANIZED HEALTH CARE EDUCATION/TRAINING PROGRAM

## 2022-04-16 PROCEDURE — 85610 PROTHROMBIN TIME: CPT | Mod: NTX

## 2022-04-16 RX ORDER — SPIRONOLACTONE 50 MG/1
100 TABLET, FILM COATED ORAL DAILY
Status: DISCONTINUED | OUTPATIENT
Start: 2022-04-16 | End: 2022-04-17 | Stop reason: HOSPADM

## 2022-04-16 RX ORDER — FUROSEMIDE 40 MG/1
40 TABLET ORAL DAILY
Status: DISCONTINUED | OUTPATIENT
Start: 2022-04-16 | End: 2022-04-17 | Stop reason: HOSPADM

## 2022-04-16 RX ADMIN — LACTULOSE 30 G: 20 SOLUTION ORAL at 08:04

## 2022-04-16 RX ADMIN — RIFAXIMIN 550 MG: 550 TABLET ORAL at 08:04

## 2022-04-16 RX ADMIN — SODIUM BICARBONATE 650 MG TABLET 1300 MG: at 08:04

## 2022-04-16 RX ADMIN — CEFEPIME HYDROCHLORIDE 1 G: 1 INJECTION, SOLUTION INTRAVENOUS at 05:04

## 2022-04-16 RX ADMIN — SPIRONOLACTONE 100 MG: 50 TABLET ORAL at 12:04

## 2022-04-16 RX ADMIN — PANTOPRAZOLE SODIUM 40 MG: 40 TABLET, DELAYED RELEASE ORAL at 09:04

## 2022-04-16 RX ADMIN — SODIUM BICARBONATE 650 MG TABLET 1300 MG: at 09:04

## 2022-04-16 RX ADMIN — RIFAXIMIN 550 MG: 550 TABLET ORAL at 09:04

## 2022-04-16 RX ADMIN — FUROSEMIDE 40 MG: 40 TABLET ORAL at 12:04

## 2022-04-16 RX ADMIN — LACTULOSE 30 G: 20 SOLUTION ORAL at 02:04

## 2022-04-16 RX ADMIN — ZINC SULFATE 220 MG (50 MG) CAPSULE 220 MG: CAPSULE at 09:04

## 2022-04-16 RX ADMIN — LACTULOSE 30 G: 20 SOLUTION ORAL at 09:04

## 2022-04-16 NOTE — PLAN OF CARE
Pt remains AAO x 4 with VSS, afebrile, sats upper 90s on RA throughout shift  Pt denies any pain, SOB, or N/V this shift  Pt on cefepime q8hrs - if remains afebrile and cultures negative will switch to PO abx   R FA 20g - CDI, saline locked  Midodrine held for SBP > 130  Wife remains at bedside and attentive to patient needs  Pt up independent and ambulatory, Immanuel hahn on - showered this shift, voids in toilet, LBM 4/15  Low Na diet   Pt remains free from falls and injuries, call light in reach, bed in lowest position, nonskid socks on when OOB, side rails up x2  Will continue to monitor

## 2022-04-16 NOTE — PLAN OF CARE
Patient remains afebrile this shift.  Patient denies pain.  Patient given Lasix and Spironolactone and unable to measure urine secondary to BMs.  Patient SBP> 130 and midodrine held.  Patient anticipating dc tomorrow as long as cultures negative.

## 2022-04-16 NOTE — PROGRESS NOTES
Liver Transplant Service  Hepatology Progress Note      HPI: Mr. Vasquez is a 57 y.o. 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       Subjective: No acute events overnight. Patient is without complaints this morning.      Objective:   Vitals:    04/15/22 1712 04/15/22 1952 04/15/22 2348 04/16/22 0427   BP: 136/72 138/70 132/70 133/71   BP Location:  Left arm Left arm Left arm   Patient Position: Lying Sitting Lying Lying   Pulse: 106 107 104 104   Resp: 18 18 18 18   Temp: 98.6 °F (37 °C) 98.7 °F (37.1 °C) 98.6 °F (37 °C) 98.2 °F (36.8 °C)   TempSrc: Oral Oral Oral Oral   SpO2: 98% 96% 96% 97%   Weight:    112.7 kg (248 lb 7.3 oz)   Height:            GEN:  Alert and oriented, no acute distress  RESP:  No respiratory distress, CTAB.  CV:  Tachycardic, regular rhythm  ABD:  Bowel sounds normal, soft, nontender, nondistended  SKIN: Warm, dry, +LE edema  PSYCH: Cooperate, appropriate mood and affect    Labs:  Labs within the past 24 hours have been reviewed.    Recent Labs   Lab 04/16/22  0634   WBC 4.55   RBC 2.51*   HGB 7.4*   HCT 22.4*   PLT 47*   MCV 89   MCH 29.5   MCHC 33.0       Recent Labs   Lab 04/16/22  0634   CALCIUM 8.0*   PROT 5.2*   *   K 3.5   CO2 26   CL 97   BUN 15   CREATININE 1.2   ALKPHOS 83   ALT 24   AST 52*   BILITOT 4.4*       No results for input(s): PT, INR, APTT in the last 24 hours.    MELD-Na score: 24 at 4/16/2022  6:34 AM  MELD score: 20 at 4/16/2022  6:34 AM  Calculated from:  Serum Creatinine: 1.2 mg/dL at 4/16/2022  6:34 AM  Serum Sodium: 131 mmol/L at 4/16/2022  6:34 AM  Total Bilirubin: 4.4 mg/dL at 4/16/2022  6:34 AM  INR(ratio): 1.8 at 4/15/2022  7:40 AM  Age: 57 years      Assessment/Plan:  1. ESLD: Currently listed for liver. Stable for transplant at this time.     2. Fever: Infectious work up negative to date (UA, blood cultures, CXR, ascitic fluid studies, respiratory panel). Given this, will discontinue  cefepime.     3. Ascites: 2 gm Na diet. Restart Lasix 40mg daily and spironolactone 100mg daily.    4. Hepatic encephalopathy: Continue lactulose. Titrate to maintain 3-4 bowel movements per day. Continue rifaximin 550mg BID. Mentation is good.    5. Anemia of chronic disease: H/H stable. Monitor.     Dispo: If remains afebrile, will plan for discharge tomorrow.

## 2022-04-17 ENCOUNTER — PATIENT MESSAGE (OUTPATIENT)
Dept: TRANSPLANT | Facility: CLINIC | Age: 57
End: 2022-04-17
Payer: MEDICAID

## 2022-04-17 ENCOUNTER — TELEPHONE (OUTPATIENT)
Dept: TRANSPLANT | Facility: CLINIC | Age: 57
End: 2022-04-17
Payer: MEDICAID

## 2022-04-17 VITALS
OXYGEN SATURATION: 100 % | BODY MASS INDEX: 40.21 KG/M2 | HEART RATE: 100 BPM | SYSTOLIC BLOOD PRESSURE: 136 MMHG | DIASTOLIC BLOOD PRESSURE: 68 MMHG | RESPIRATION RATE: 18 BRPM | WEIGHT: 256.19 LBS | TEMPERATURE: 98 F | HEIGHT: 67 IN

## 2022-04-17 LAB
ALBUMIN SERPL BCP-MCNC: 2.8 G/DL (ref 3.5–5.2)
ALP SERPL-CCNC: 103 U/L (ref 55–135)
ALT SERPL W/O P-5'-P-CCNC: 25 U/L (ref 10–44)
ANION GAP SERPL CALC-SCNC: 8 MMOL/L (ref 8–16)
AST SERPL-CCNC: 57 U/L (ref 10–40)
BASOPHILS # BLD AUTO: 0.04 K/UL (ref 0–0.2)
BASOPHILS NFR BLD: 0.7 % (ref 0–1.9)
BILIRUB SERPL-MCNC: 4.1 MG/DL (ref 0.1–1)
BUN SERPL-MCNC: 18 MG/DL (ref 6–20)
CALCIUM SERPL-MCNC: 8.1 MG/DL (ref 8.7–10.5)
CHLORIDE SERPL-SCNC: 94 MMOL/L (ref 95–110)
CO2 SERPL-SCNC: 26 MMOL/L (ref 23–29)
CREAT SERPL-MCNC: 1.2 MG/DL (ref 0.5–1.4)
DIFFERENTIAL METHOD: ABNORMAL
EOSINOPHIL # BLD AUTO: 0.7 K/UL (ref 0–0.5)
EOSINOPHIL NFR BLD: 11.6 % (ref 0–8)
ERYTHROCYTE [DISTWIDTH] IN BLOOD BY AUTOMATED COUNT: 18.9 % (ref 11.5–14.5)
EST. GFR  (AFRICAN AMERICAN): >60 ML/MIN/1.73 M^2
EST. GFR  (NON AFRICAN AMERICAN): >60 ML/MIN/1.73 M^2
GLUCOSE SERPL-MCNC: 186 MG/DL (ref 70–110)
HCT VFR BLD AUTO: 22.6 % (ref 40–54)
HGB BLD-MCNC: 7.4 G/DL (ref 14–18)
IMM GRANULOCYTES # BLD AUTO: 0.06 K/UL (ref 0–0.04)
IMM GRANULOCYTES NFR BLD AUTO: 1 % (ref 0–0.5)
INR PPP: 1.7 (ref 0.8–1.2)
LYMPHOCYTES # BLD AUTO: 0.9 K/UL (ref 1–4.8)
LYMPHOCYTES NFR BLD: 14.9 % (ref 18–48)
MAGNESIUM SERPL-MCNC: 1.8 MG/DL (ref 1.6–2.6)
MCH RBC QN AUTO: 29.5 PG (ref 27–31)
MCHC RBC AUTO-ENTMCNC: 32.7 G/DL (ref 32–36)
MCV RBC AUTO: 90 FL (ref 82–98)
MONOCYTES # BLD AUTO: 0.9 K/UL (ref 0.3–1)
MONOCYTES NFR BLD: 15 % (ref 4–15)
NEUTROPHILS # BLD AUTO: 3.4 K/UL (ref 1.8–7.7)
NEUTROPHILS NFR BLD: 56.8 % (ref 38–73)
NRBC BLD-RTO: 0 /100 WBC
PHOSPHATE SERPL-MCNC: 2.4 MG/DL (ref 2.7–4.5)
PLATELET # BLD AUTO: 51 K/UL (ref 150–450)
PMV BLD AUTO: 10.9 FL (ref 9.2–12.9)
POTASSIUM SERPL-SCNC: 4.4 MMOL/L (ref 3.5–5.1)
PROT SERPL-MCNC: 5.4 G/DL (ref 6–8.4)
PROTHROMBIN TIME: 17.3 SEC (ref 9–12.5)
RBC # BLD AUTO: 2.51 M/UL (ref 4.6–6.2)
SODIUM SERPL-SCNC: 128 MMOL/L (ref 136–145)
WBC # BLD AUTO: 6.05 K/UL (ref 3.9–12.7)

## 2022-04-17 PROCEDURE — 85610 PROTHROMBIN TIME: CPT | Mod: NTX

## 2022-04-17 PROCEDURE — 85025 COMPLETE CBC W/AUTO DIFF WBC: CPT | Mod: NTX

## 2022-04-17 PROCEDURE — 36415 COLL VENOUS BLD VENIPUNCTURE: CPT | Mod: NTX

## 2022-04-17 PROCEDURE — 25000003 PHARM REV CODE 250: Mod: NTX | Performed by: PHYSICIAN ASSISTANT

## 2022-04-17 PROCEDURE — 99238 HOSP IP/OBS DSCHRG MGMT 30/<: CPT | Mod: NTX,,, | Performed by: NURSE PRACTITIONER

## 2022-04-17 PROCEDURE — 99238 PR HOSPITAL DISCHARGE DAY,<30 MIN: ICD-10-PCS | Mod: NTX,,, | Performed by: NURSE PRACTITIONER

## 2022-04-17 PROCEDURE — 80053 COMPREHEN METABOLIC PANEL: CPT | Mod: NTX

## 2022-04-17 PROCEDURE — 25000003 PHARM REV CODE 250: Mod: NTX | Performed by: STUDENT IN AN ORGANIZED HEALTH CARE EDUCATION/TRAINING PROGRAM

## 2022-04-17 PROCEDURE — 83735 ASSAY OF MAGNESIUM: CPT | Mod: NTX

## 2022-04-17 PROCEDURE — 84100 ASSAY OF PHOSPHORUS: CPT | Mod: NTX

## 2022-04-17 RX ORDER — SPIRONOLACTONE 100 MG/1
100 TABLET, FILM COATED ORAL DAILY
Qty: 30 TABLET | Refills: 5 | Status: SHIPPED | OUTPATIENT
Start: 2022-04-17 | End: 2022-04-28

## 2022-04-17 RX ORDER — FUROSEMIDE 40 MG/1
40 TABLET ORAL DAILY
Qty: 30 TABLET | Refills: 5 | Status: ON HOLD | OUTPATIENT
Start: 2022-04-17 | End: 2022-05-01 | Stop reason: HOSPADM

## 2022-04-17 RX ADMIN — LACTULOSE 30 G: 20 SOLUTION ORAL at 08:04

## 2022-04-17 RX ADMIN — ZINC SULFATE 220 MG (50 MG) CAPSULE 220 MG: CAPSULE at 08:04

## 2022-04-17 RX ADMIN — PANTOPRAZOLE SODIUM 40 MG: 40 TABLET, DELAYED RELEASE ORAL at 08:04

## 2022-04-17 RX ADMIN — SODIUM BICARBONATE 650 MG TABLET 1300 MG: at 08:04

## 2022-04-17 RX ADMIN — SPIRONOLACTONE 100 MG: 50 TABLET ORAL at 08:04

## 2022-04-17 RX ADMIN — RIFAXIMIN 550 MG: 550 TABLET ORAL at 08:04

## 2022-04-17 RX ADMIN — MIDODRINE HYDROCHLORIDE 15 MG: 5 TABLET ORAL at 04:04

## 2022-04-17 RX ADMIN — FUROSEMIDE 40 MG: 40 TABLET ORAL at 08:04

## 2022-04-17 NOTE — DISCHARGE SUMMARY
[facility]  Liver Transplant  Discharge Summary      Patient Name: Pelon Vasquez  MRN: [id number]  Admission Date: 4/12/2022  Hospital Length of Stay: 4 days  Discharge Date and Time:  04/17/2022 9:13 AM  Attending Physician: Jimmie Leon MD   Discharging Provider: JOSLYN Grider  Primary Care Provider: Primary Doctor No  HPI:   Pelon Vasquez is a 57yr olf male with 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 ESLD complicated by jaundice, FAMILIA, ascites (para 2x/week, no h/o SBP), HE, and hypervolemia. He is listed for a liver transplant with MELD 25. Patient admitted for liver transplant surgery. He denies any recent fevers or hospitalizations unknown to transplant team. He reports feel well in his usual state of health.       Procedure(s) (LRB):  TRANSPLANT, LIVER (N/A)     Hospital Course:    Mr. Vasquez 58 y/o male ESLD 2/2 LOZANO and/or ETOH cirrhosis. Listed with MELD 26. Admitted as primary candidate for liver transplant. However, prior to transplant, patient spiked temp of 101.1 with associated tachycardia. Unfortunately, surgery canceled due to fever.  Cefepime/vanc initiated on 4/13. Infectious work-up unremarkable to date. Para done 4/14 with 9 L off. Cell counts not suspicious for infection, cx in process, with NGTD. Discontinued vanc and cefepime.  Patient has remained afebrile.   OF NOTE: Patients  Sodium is 128 at discharge will obtain blood work on 4/18, holding diuretics for now and patient placed on fluid restriction.   Discharge instructions reviewed by pharmacist, nursing and transplant coordinator.  Patient and CG verbalized understanding and are in agreement with discharge from hospital today.  Patient is medically stable for discharge. Patient will f/u with labs per pre-transplant coordinator.       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] 04/13/2022 Yes    Thrombocytopenia, unspecified [D69.6] 04/09/2022 Yes    Liver transplant candidate [Z76.82] 03/18/2022 Not Applicable    Anemia of chronic disease [D63.8] 03/18/2022 Yes    Ascites due to alcoholic cirrhosis [K70.31] 03/18/2022 Yes     Chronic    Hepatic encephalopathy [K72.90] 02/04/2022 Yes      Problems Resolved During this Admission:    Diagnosis Date Noted Date Resolved POA    Pre-op examination [Z01.818]  04/14/2022 Not Applicable           Pending Diagnostic Studies:     None          Discharged Condition: stable    Disposition: Home or Self Care    Follow Up:    Patient Instructions:      Diet Adult Regular     Notify your health care provider if you experience any of the following:     Notify your health care provider if you experience any of the following:  increased confusion or weakness     Notify your health care provider if you experience any of the following:  persistent dizziness, light-headedness, or visual disturbances     Notify your health care provider if you experience any of the following:  worsening rash     Notify your health care provider if you experience any of the following:  severe persistent headache     Notify your health care provider if you experience any of the following:  difficulty breathing or increased cough     Notify your health care provider if you experience any of the following:  redness, tenderness, or signs of infection (pain, swelling, redness, odor or green/yellow discharge around incision site)     Notify your health care provider if you experience any of the following:  severe uncontrolled pain     Notify your health care provider if you experience any of the following:  persistent nausea and vomiting or diarrhea     Notify your health care provider if you experience any of the following:  temperature >100.4      Activity as tolerated     Medications:  Reconciled Home Medications:      Medication List      START taking these medications    spironolactone 100 MG tablet  Commonly known as: ALDACTONE  Take 1 tablet (100 mg total) by mouth once daily.        CHANGE how you take these medications    furosemide 40 MG tablet  Commonly known as: LASIX  Take 1 tablet (40 mg total) by mouth once daily.  What changed: when to take this        CONTINUE taking these medications    lactulose 10 gram/15 mL solution  Commonly known as: CHRONULAC  Take 30 mLs by mouth 3 (three) times daily.     midodrine 5 MG Tab  Commonly known as: PROAMATINE  Take 3 tablets (15 mg total) by mouth every 8 (eight) hours.     ondansetron 4 MG Tbdl  Commonly known as: ZOFRAN-ODT  Take 1 tablet by mouth every 8 (eight) hours as needed (NAUSEA).     pantoprazole 40 MG tablet  Commonly known as: PROTONIX  Take 40 mg by mouth once daily.     sodium bicarbonate 650 MG tablet  Take 2 tablets (1,300 mg total) by mouth 2 (two) times daily.     XIFAXAN 550 mg Tab  Generic drug: rifAXIMin  Take 550 mg by mouth 2 (two) times daily.     zinc sulfate 50 mg zinc (220 mg) Tab tablet  Take 1 tablet (220 mg total) by mouth once daily.        STOP taking these medications    atorvastatin 20 MG tablet  Commonly known as: LIPITOR     triamcinolone acetonide 0.1% 0.1 % cream  Commonly known as: KENALOG          Time spent caring for patient (Greater than 1/2 spent in direct face-to-face contact): < 30 minutes    JOSLYN Grider  Liver Transplant  [facility] - Transplant Stepdown

## 2022-04-17 NOTE — PROGRESS NOTES
AVS printout provided to pt and reviewed at bedside with pt and his significant other. Questions encouraged and answered accordingly. Both gave verbal understanding of AVS. PIV removed. VSS on bedside monitor. Pt ambulated off unit with his belongings, no apparent signs of distress noted.

## 2022-04-17 NOTE — PLAN OF CARE
Pt remains AAO x 4 with VSS, afebrile, sats upper 90s on RA throughout shift  Pt denies any pain, SOB, or N/V this shift  Infectious workup and cultures remain unremarkable and NGTD - Abx discontinued; Poss d/c tomorrow if remains afebrile  R FA 20g - CDI, saline locked  Midodrine held for SBP > 130  Wife remains at bedside and attentive to patient needs  Pt up independent and ambulatory, Immanuel hose on, voids in toilet - unmeasured with BM, LBM 4/16  Low Na diet   Pt remains free from falls and injuries, call light in reach, bed in lowest position, nonskid socks on when OOB, side rails up x2  Will continue to monitor

## 2022-04-18 ENCOUNTER — LAB VISIT (OUTPATIENT)
Dept: LAB | Facility: CLINIC | Age: 57
End: 2022-04-18
Payer: COMMERCIAL

## 2022-04-18 DIAGNOSIS — K72.90 DECOMPENSATION OF CIRRHOSIS OF LIVER: ICD-10-CM

## 2022-04-18 DIAGNOSIS — N17.9 AKI (ACUTE KIDNEY INJURY): ICD-10-CM

## 2022-04-18 DIAGNOSIS — K74.60 DECOMPENSATION OF CIRRHOSIS OF LIVER: ICD-10-CM

## 2022-04-18 DIAGNOSIS — K70.31 ASCITES DUE TO ALCOHOLIC CIRRHOSIS: ICD-10-CM

## 2022-04-18 LAB
ALBUMIN SERPL BCP-MCNC: 3.1 G/DL (ref 3.5–5.2)
ALP SERPL-CCNC: 98 U/L (ref 55–135)
ALT SERPL W/O P-5'-P-CCNC: 29 U/L (ref 10–44)
ANION GAP SERPL CALC-SCNC: 8 MMOL/L (ref 8–16)
AST SERPL-CCNC: 59 U/L (ref 10–40)
BACTERIA BLD CULT: NORMAL
BACTERIA BLD CULT: NORMAL
BACTERIA FLD AEROBE CULT: NO GROWTH
BASOPHILS # BLD AUTO: 0.05 K/UL (ref 0–0.2)
BASOPHILS NFR BLD: 0.6 % (ref 0–1.9)
BILIRUB SERPL-MCNC: 4.5 MG/DL (ref 0.1–1)
BUN SERPL-MCNC: 25 MG/DL (ref 6–20)
CALCIUM SERPL-MCNC: 7.9 MG/DL (ref 8.7–10.5)
CHLORIDE SERPL-SCNC: 92 MMOL/L (ref 95–110)
CO2 SERPL-SCNC: 26 MMOL/L (ref 23–29)
CREAT SERPL-MCNC: 1.8 MG/DL (ref 0.5–1.4)
DIFFERENTIAL METHOD: ABNORMAL
EOSINOPHIL # BLD AUTO: 0.7 K/UL (ref 0–0.5)
EOSINOPHIL NFR BLD: 8.9 % (ref 0–8)
ERYTHROCYTE [DISTWIDTH] IN BLOOD BY AUTOMATED COUNT: 18.8 % (ref 11.5–14.5)
EST. GFR  (AFRICAN AMERICAN): 47.9 ML/MIN/1.73 M^2
EST. GFR  (NON AFRICAN AMERICAN): 41.4 ML/MIN/1.73 M^2
GLUCOSE SERPL-MCNC: 270 MG/DL (ref 70–110)
GRAM STN SPEC: NORMAL
GRAM STN SPEC: NORMAL
HCT VFR BLD AUTO: 23 % (ref 40–54)
HGB BLD-MCNC: 7.9 G/DL (ref 14–18)
IMM GRANULOCYTES # BLD AUTO: 0.16 K/UL (ref 0–0.04)
IMM GRANULOCYTES NFR BLD AUTO: 2 % (ref 0–0.5)
INR PPP: 1.7 (ref 0.8–1.2)
LYMPHOCYTES # BLD AUTO: 0.9 K/UL (ref 1–4.8)
LYMPHOCYTES NFR BLD: 11.3 % (ref 18–48)
MCH RBC QN AUTO: 30.6 PG (ref 27–31)
MCHC RBC AUTO-ENTMCNC: 34.3 G/DL (ref 32–36)
MCV RBC AUTO: 89 FL (ref 82–98)
MONOCYTES # BLD AUTO: 1.4 K/UL (ref 0.3–1)
MONOCYTES NFR BLD: 17.2 % (ref 4–15)
NEUTROPHILS # BLD AUTO: 4.8 K/UL (ref 1.8–7.7)
NEUTROPHILS NFR BLD: 60 % (ref 38–73)
NRBC BLD-RTO: 0 /100 WBC
PLATELET # BLD AUTO: 59 K/UL (ref 150–450)
PMV BLD AUTO: 11 FL (ref 9.2–12.9)
POTASSIUM SERPL-SCNC: 4.2 MMOL/L (ref 3.5–5.1)
PROT SERPL-MCNC: 5.9 G/DL (ref 6–8.4)
PROTHROMBIN TIME: 17.3 SEC (ref 9–12.5)
RBC # BLD AUTO: 2.58 M/UL (ref 4.6–6.2)
SODIUM SERPL-SCNC: 126 MMOL/L (ref 136–145)
WBC # BLD AUTO: 7.97 K/UL (ref 3.9–12.7)

## 2022-04-18 PROCEDURE — 80053 COMPREHEN METABOLIC PANEL: CPT | Mod: TXP | Performed by: INTERNAL MEDICINE

## 2022-04-18 PROCEDURE — 36415 PR COLLECTION VENOUS BLOOD,VENIPUNCTURE: ICD-10-PCS | Mod: TXP,,, | Performed by: INTERNAL MEDICINE

## 2022-04-18 PROCEDURE — 85610 PROTHROMBIN TIME: CPT | Mod: NTX | Performed by: INTERNAL MEDICINE

## 2022-04-18 PROCEDURE — 36415 COLL VENOUS BLD VENIPUNCTURE: CPT | Mod: TXP,,, | Performed by: INTERNAL MEDICINE

## 2022-04-18 PROCEDURE — 85025 COMPLETE CBC W/AUTO DIFF WBC: CPT | Mod: NTX | Performed by: INTERNAL MEDICINE

## 2022-04-18 NOTE — TELEPHONE ENCOUNTER
REceived message from Hien that pt has been discharged and needs labs tomorrow locally for low Na+. Scheduled labs at [facility] at 1400 on 4/18/22. Coordinator CCed on note.

## 2022-04-19 ENCOUNTER — TELEPHONE (OUTPATIENT)
Dept: TRANSPLANT | Facility: CLINIC | Age: 57
End: 2022-04-19
Payer: MEDICAID

## 2022-04-19 ENCOUNTER — HOSPITAL ENCOUNTER (INPATIENT)
Facility: HOSPITAL | Age: 57
LOS: 12 days | Discharge: HOME HEALTH | DRG: 005 | End: 2022-05-01
Attending: SURGERY | Admitting: SURGERY
Payer: COMMERCIAL

## 2022-04-19 DIAGNOSIS — Z29.89 PROPHYLACTIC IMMUNOTHERAPY: ICD-10-CM

## 2022-04-19 DIAGNOSIS — Z99.11 ENCOUNTER FOR WEANING FROM VENTILATOR: ICD-10-CM

## 2022-04-19 DIAGNOSIS — D69.6 THROMBOCYTOPENIA, UNSPECIFIED: ICD-10-CM

## 2022-04-19 DIAGNOSIS — D68.4 ACQUIRED COAGULATION FACTOR DEFICIENCY: ICD-10-CM

## 2022-04-19 DIAGNOSIS — T38.0X5A ADVERSE EFFECT OF CORTICOSTEROIDS, INITIAL ENCOUNTER: ICD-10-CM

## 2022-04-19 DIAGNOSIS — R60.1 GENERALIZED EDEMA: ICD-10-CM

## 2022-04-19 DIAGNOSIS — N18.31 STAGE 3A CHRONIC KIDNEY DISEASE: ICD-10-CM

## 2022-04-19 DIAGNOSIS — Z76.82 LIVER TRANSPLANT CANDIDATE: ICD-10-CM

## 2022-04-19 DIAGNOSIS — I10 ESSENTIAL HYPERTENSION: ICD-10-CM

## 2022-04-19 DIAGNOSIS — Z91.89 AT RISK FOR OPPORTUNISTIC INFECTIONS: ICD-10-CM

## 2022-04-19 DIAGNOSIS — K72.10 END STAGE LIVER DISEASE: ICD-10-CM

## 2022-04-19 DIAGNOSIS — K76.82 HEPATIC ENCEPHALOPATHY: ICD-10-CM

## 2022-04-19 DIAGNOSIS — E66.01 CLASS 2 SEVERE OBESITY DUE TO EXCESS CALORIES WITH SERIOUS COMORBIDITY IN ADULT, UNSPECIFIED BMI: ICD-10-CM

## 2022-04-19 DIAGNOSIS — Z94.4 S/P LIVER TRANSPLANT: Primary | ICD-10-CM

## 2022-04-19 DIAGNOSIS — Z79.60 LONG-TERM USE OF IMMUNOSUPPRESSANT MEDICATION: ICD-10-CM

## 2022-04-19 DIAGNOSIS — K70.31 ALCOHOLIC CIRRHOSIS OF LIVER WITH ASCITES: ICD-10-CM

## 2022-04-19 DIAGNOSIS — Z01.818 PRE-OP EVALUATION: ICD-10-CM

## 2022-04-19 DIAGNOSIS — D63.8 ANEMIA OF CHRONIC DISEASE: ICD-10-CM

## 2022-04-19 DIAGNOSIS — N17.9 AKI (ACUTE KIDNEY INJURY): ICD-10-CM

## 2022-04-19 DIAGNOSIS — E11.9 TYPE 2 DIABETES MELLITUS WITHOUT COMPLICATION, WITHOUT LONG-TERM CURRENT USE OF INSULIN: ICD-10-CM

## 2022-04-19 DIAGNOSIS — E66.01 CLASS 2 SEVERE OBESITY DUE TO EXCESS CALORIES WITH SERIOUS COMORBIDITY AND BODY MASS INDEX (BMI) OF 38.0 TO 38.9 IN ADULT: ICD-10-CM

## 2022-04-19 PROBLEM — R50.9 FEVER: Status: RESOLVED | Noted: 2022-04-14 | Resolved: 2022-04-19

## 2022-04-19 LAB
ABO + RH BLD: NORMAL
ALBUMIN SERPL BCP-MCNC: 3.1 G/DL (ref 3.5–5.2)
ALP SERPL-CCNC: 104 U/L (ref 55–135)
ALT SERPL W/O P-5'-P-CCNC: 26 U/L (ref 10–44)
ANION GAP SERPL CALC-SCNC: 10 MMOL/L (ref 8–16)
APTT BLDCRRT: 40.2 SEC (ref 21–32)
AST SERPL-CCNC: 54 U/L (ref 10–40)
BASOPHILS # BLD AUTO: 0.03 K/UL (ref 0–0.2)
BASOPHILS NFR BLD: 0.5 % (ref 0–1.9)
BILIRUB DIRECT SERPL-MCNC: 1.7 MG/DL (ref 0.1–0.3)
BILIRUB SERPL-MCNC: 4.4 MG/DL (ref 0.1–1)
BLD GP AB SCN CELLS X3 SERPL QL: NORMAL
BLOOD BANK HEPATITIS FREEZE AND HOLD: NORMAL
BUN SERPL-MCNC: 29 MG/DL (ref 6–20)
CALCIUM SERPL-MCNC: 7.9 MG/DL (ref 8.7–10.5)
CHLORIDE SERPL-SCNC: 91 MMOL/L (ref 95–110)
CO2 SERPL-SCNC: 26 MMOL/L (ref 23–29)
CREAT SERPL-MCNC: 2.2 MG/DL (ref 0.5–1.4)
DIFFERENTIAL METHOD: ABNORMAL
EOSINOPHIL # BLD AUTO: 0.6 K/UL (ref 0–0.5)
EOSINOPHIL NFR BLD: 9.2 % (ref 0–8)
ERYTHROCYTE [DISTWIDTH] IN BLOOD BY AUTOMATED COUNT: 19 % (ref 11.5–14.5)
EST. GFR  (AFRICAN AMERICAN): 37.6 ML/MIN/1.73 M^2
EST. GFR  (NON AFRICAN AMERICAN): 32.5 ML/MIN/1.73 M^2
FIBRINOGEN PPP-MCNC: 95 MG/DL (ref 182–400)
GLUCOSE SERPL-MCNC: 314 MG/DL (ref 70–110)
HCT VFR BLD AUTO: 22.2 % (ref 40–54)
HGB BLD-MCNC: 7.3 G/DL (ref 14–18)
IMM GRANULOCYTES # BLD AUTO: 0.1 K/UL (ref 0–0.04)
IMM GRANULOCYTES NFR BLD AUTO: 1.7 % (ref 0–0.5)
INR PPP: 1.8 (ref 0.8–1.2)
LYMPHOCYTES # BLD AUTO: 0.8 K/UL (ref 1–4.8)
LYMPHOCYTES NFR BLD: 12.9 % (ref 18–48)
MAGNESIUM SERPL-MCNC: 2.2 MG/DL (ref 1.6–2.6)
MCH RBC QN AUTO: 30.5 PG (ref 27–31)
MCHC RBC AUTO-ENTMCNC: 32.9 G/DL (ref 32–36)
MCV RBC AUTO: 93 FL (ref 82–98)
MONOCYTES # BLD AUTO: 0.8 K/UL (ref 0.3–1)
MONOCYTES NFR BLD: 13.6 % (ref 4–15)
NEUTROPHILS # BLD AUTO: 3.7 K/UL (ref 1.8–7.7)
NEUTROPHILS NFR BLD: 62.1 % (ref 38–73)
NRBC BLD-RTO: 0 /100 WBC
PLATELET # BLD AUTO: 50 K/UL (ref 150–450)
PMV BLD AUTO: 10.3 FL (ref 9.2–12.9)
POTASSIUM SERPL-SCNC: 4.4 MMOL/L (ref 3.5–5.1)
PROT SERPL-MCNC: 5.6 G/DL (ref 6–8.4)
PROTHROMBIN TIME: 18 SEC (ref 9–12.5)
RBC # BLD AUTO: 2.39 M/UL (ref 4.6–6.2)
SARS-COV-2 RDRP RESP QL NAA+PROBE: NEGATIVE
SODIUM SERPL-SCNC: 127 MMOL/L (ref 136–145)
WBC # BLD AUTO: 5.97 K/UL (ref 3.9–12.7)

## 2022-04-19 PROCEDURE — 87389 HIV-1 AG W/HIV-1&-2 AB AG IA: CPT | Mod: NTX | Performed by: PHYSICIAN ASSISTANT

## 2022-04-19 PROCEDURE — 87340 HEPATITIS B SURFACE AG IA: CPT | Mod: NTX | Performed by: PHYSICIAN ASSISTANT

## 2022-04-19 PROCEDURE — 36415 COLL VENOUS BLD VENIPUNCTURE: CPT | Mod: NTX | Performed by: PHYSICIAN ASSISTANT

## 2022-04-19 PROCEDURE — 85730 THROMBOPLASTIN TIME PARTIAL: CPT | Mod: NTX | Performed by: PHYSICIAN ASSISTANT

## 2022-04-19 PROCEDURE — 86706 HEP B SURFACE ANTIBODY: CPT | Mod: NTX | Performed by: PHYSICIAN ASSISTANT

## 2022-04-19 PROCEDURE — 85384 FIBRINOGEN ACTIVITY: CPT | Mod: NTX | Performed by: PHYSICIAN ASSISTANT

## 2022-04-19 PROCEDURE — 85610 PROTHROMBIN TIME: CPT | Mod: NTX | Performed by: PHYSICIAN ASSISTANT

## 2022-04-19 PROCEDURE — 85025 COMPLETE CBC W/AUTO DIFF WBC: CPT | Mod: NTX | Performed by: PHYSICIAN ASSISTANT

## 2022-04-19 PROCEDURE — 81300005 HC LIVER TRANSPORT, GROUND 4-5 HOURS

## 2022-04-19 PROCEDURE — 87086 URINE CULTURE/COLONY COUNT: CPT | Mod: NTX | Performed by: PHYSICIAN ASSISTANT

## 2022-04-19 PROCEDURE — U0002 COVID-19 LAB TEST NON-CDC: HCPCS | Mod: NTX | Performed by: PHYSICIAN ASSISTANT

## 2022-04-19 PROCEDURE — 86704 HEP B CORE ANTIBODY TOTAL: CPT | Mod: NTX | Performed by: PHYSICIAN ASSISTANT

## 2022-04-19 PROCEDURE — 82248 BILIRUBIN DIRECT: CPT | Mod: NTX | Performed by: PHYSICIAN ASSISTANT

## 2022-04-19 PROCEDURE — 87522 HEPATITIS C REVRS TRNSCRPJ: CPT | Mod: NTX | Performed by: PHYSICIAN ASSISTANT

## 2022-04-19 PROCEDURE — 80053 COMPREHEN METABOLIC PANEL: CPT | Mod: NTX | Performed by: PHYSICIAN ASSISTANT

## 2022-04-19 PROCEDURE — 83735 ASSAY OF MAGNESIUM: CPT | Mod: NTX | Performed by: PHYSICIAN ASSISTANT

## 2022-04-19 PROCEDURE — 99000 SPECIMEN HANDLING OFFICE-LAB: CPT | Mod: NTX | Performed by: PHYSICIAN ASSISTANT

## 2022-04-19 PROCEDURE — 93005 ELECTROCARDIOGRAM TRACING: CPT | Mod: NTX

## 2022-04-19 PROCEDURE — 83036 HEMOGLOBIN GLYCOSYLATED A1C: CPT | Mod: NTX | Performed by: PHYSICIAN ASSISTANT

## 2022-04-19 PROCEDURE — 20600001 HC STEP DOWN PRIVATE ROOM: Mod: NTX

## 2022-04-19 PROCEDURE — 86803 HEPATITIS C AB TEST: CPT | Mod: NTX | Performed by: PHYSICIAN ASSISTANT

## 2022-04-19 PROCEDURE — 93010 EKG 12-LEAD: ICD-10-PCS | Mod: NTX,,, | Performed by: INTERNAL MEDICINE

## 2022-04-19 PROCEDURE — 81300000 HC LIVER ACQUISITION CHARGE

## 2022-04-19 PROCEDURE — 86901 BLOOD TYPING SEROLOGIC RH(D): CPT | Mod: NTX | Performed by: PHYSICIAN ASSISTANT

## 2022-04-19 PROCEDURE — 93010 ELECTROCARDIOGRAM REPORT: CPT | Mod: NTX,,, | Performed by: INTERNAL MEDICINE

## 2022-04-19 PROCEDURE — 81001 URINALYSIS AUTO W/SCOPE: CPT | Mod: NTX | Performed by: PHYSICIAN ASSISTANT

## 2022-04-19 RX ORDER — MUPIROCIN 20 MG/G
OINTMENT TOPICAL
Status: DISCONTINUED | OUTPATIENT
Start: 2022-04-19 | End: 2022-04-23

## 2022-04-19 RX ORDER — METHYLPREDNISOLONE SODIUM SUCCINATE 500 MG/8ML
500 INJECTION INTRAMUSCULAR; INTRAVENOUS
Status: DISCONTINUED | OUTPATIENT
Start: 2022-04-19 | End: 2022-04-22

## 2022-04-19 RX ORDER — HYDROCODONE BITARTRATE AND ACETAMINOPHEN 500; 5 MG/1; MG/1
TABLET ORAL
Status: DISCONTINUED | OUTPATIENT
Start: 2022-04-19 | End: 2022-04-22

## 2022-04-19 NOTE — TELEPHONE ENCOUNTER
PATIENT NAME: Pelon Vasquez  [facility] #: [id number]    Lab Results   Component Value Date    CREATININE 1.8 (H) 04/18/2022     (L) 04/18/2022    BILITOT 4.5 (H) 04/18/2022    ALBUMIN 3.1 (L) 04/18/2022    INR 1.7 (H) 04/18/2022       Encephalopathy: 1 - 2  Ascites: moderate  Dialysis: no     MELD 30     Recertification requestor: Mariza Gongora

## 2022-04-20 ENCOUNTER — TELEPHONE (OUTPATIENT)
Dept: TRANSPLANT | Facility: CLINIC | Age: 57
End: 2022-04-20
Payer: MEDICAID

## 2022-04-20 ENCOUNTER — TELEPHONE (OUTPATIENT)
Dept: TRANSPLANT | Facility: HOSPITAL | Age: 57
End: 2022-04-20
Payer: MEDICAID

## 2022-04-20 LAB
BACTERIA #/AREA URNS AUTO: ABNORMAL /HPF
BILIRUB UR QL STRIP: NEGATIVE
CLARITY UR REFRACT.AUTO: ABNORMAL
COLOR UR AUTO: ABNORMAL
ESTIMATED AVG GLUCOSE: 143 MG/DL (ref 68–131)
GLUCOSE UR QL STRIP: ABNORMAL
HBA1C MFR BLD: 6.6 % (ref 4–5.6)
HBV CORE AB SERPL QL IA: NEGATIVE
HBV SURFACE AB SER-ACNC: NEGATIVE M[IU]/ML
HBV SURFACE AG SERPL QL IA: NEGATIVE
HCV AB SERPL QL IA: NEGATIVE
HGB UR QL STRIP: NEGATIVE
HYALINE CASTS UR QL AUTO: 45 /LPF
KETONES UR QL STRIP: ABNORMAL
LEUKOCYTE ESTERASE UR QL STRIP: ABNORMAL
MICROSCOPIC COMMENT: ABNORMAL
NITRITE UR QL STRIP: NEGATIVE
PH UR STRIP: 5 [PH] (ref 5–8)
PROT UR QL STRIP: NEGATIVE
RBC #/AREA URNS AUTO: 5 /HPF (ref 0–4)
SP GR UR STRIP: 1.01 (ref 1–1.03)
SQUAMOUS #/AREA URNS AUTO: 4 /HPF
URN SPEC COLLECT METH UR: ABNORMAL
WBC #/AREA URNS AUTO: 21 /HPF (ref 0–5)

## 2022-04-20 PROCEDURE — P9047 ALBUMIN (HUMAN), 25%, 50ML: HCPCS | Mod: JG | Performed by: PHYSICIAN ASSISTANT

## 2022-04-20 PROCEDURE — G0378 HOSPITAL OBSERVATION PER HR: HCPCS | Mod: NTX

## 2022-04-20 PROCEDURE — 99499 NO LOS: ICD-10-PCS | Mod: ,,, | Performed by: TRANSPLANT SURGERY

## 2022-04-20 PROCEDURE — 99223 PR INITIAL HOSPITAL CARE,LEVL III: ICD-10-PCS | Mod: NTX,,, | Performed by: PHYSICIAN ASSISTANT

## 2022-04-20 PROCEDURE — 63600175 PHARM REV CODE 636 W HCPCS: Mod: JG | Performed by: PHYSICIAN ASSISTANT

## 2022-04-20 PROCEDURE — 25000003 PHARM REV CODE 250: Performed by: PHYSICIAN ASSISTANT

## 2022-04-20 PROCEDURE — 99223 1ST HOSP IP/OBS HIGH 75: CPT | Mod: NTX,,, | Performed by: PHYSICIAN ASSISTANT

## 2022-04-20 PROCEDURE — 99499 UNLISTED E&M SERVICE: CPT | Mod: ,,, | Performed by: TRANSPLANT SURGERY

## 2022-04-20 PROCEDURE — 20600001 HC STEP DOWN PRIVATE ROOM

## 2022-04-20 RX ORDER — MIDODRINE HYDROCHLORIDE 5 MG/1
15 TABLET ORAL EVERY 8 HOURS
Status: DISCONTINUED | OUTPATIENT
Start: 2022-04-20 | End: 2022-04-23

## 2022-04-20 RX ORDER — ALBUMIN HUMAN 250 G/1000ML
25 SOLUTION INTRAVENOUS EVERY 8 HOURS
Status: COMPLETED | OUTPATIENT
Start: 2022-04-20 | End: 2022-04-20

## 2022-04-20 RX ORDER — ONDANSETRON 4 MG/1
4 TABLET, ORALLY DISINTEGRATING ORAL EVERY 8 HOURS PRN
Status: DISCONTINUED | OUTPATIENT
Start: 2022-04-20 | End: 2022-05-01 | Stop reason: HOSPADM

## 2022-04-20 RX ORDER — SODIUM BICARBONATE 650 MG/1
1300 TABLET ORAL 2 TIMES DAILY
Status: DISCONTINUED | OUTPATIENT
Start: 2022-04-20 | End: 2022-04-23

## 2022-04-20 RX ORDER — LACTULOSE 10 G/15ML
20 SOLUTION ORAL 3 TIMES DAILY
Status: DISCONTINUED | OUTPATIENT
Start: 2022-04-20 | End: 2022-04-23

## 2022-04-20 RX ADMIN — MIDODRINE HYDROCHLORIDE 15 MG: 5 TABLET ORAL at 08:04

## 2022-04-20 RX ADMIN — RIFAXIMIN 550 MG: 550 TABLET ORAL at 08:04

## 2022-04-20 RX ADMIN — SODIUM BICARBONATE 650 MG TABLET 1300 MG: at 08:04

## 2022-04-20 RX ADMIN — ALBUMIN (HUMAN) 25 G: 12.5 SOLUTION INTRAVENOUS at 04:04

## 2022-04-20 RX ADMIN — LACTULOSE 20 G: 20 SOLUTION ORAL at 08:04

## 2022-04-20 RX ADMIN — ALBUMIN (HUMAN) 25 G: 12.5 SOLUTION INTRAVENOUS at 09:04

## 2022-04-20 NOTE — TELEPHONE ENCOUNTER
ON CALL NOTE    @0842:  Call received from Jessee Gamino, , with surgery times. Awaiting report from liver transplant coordinator. Will follow-up accordingly.     @0943:  Hand-off report received from Ilda De Guzman RN for ongoing organ offer. Patient admitted but has not been instructed on NPO because surgery times had not been determined. Advised I received surgery times and will follow-up accordingly.     @1025:  ZARINA Herman notified of surgery times and advised patient should be NPO as of now. Understanding expressed.     @1239:  Appropriate in-patient and out-patient team members updated on status of organ offer/surgery times.     @1502:  Notified by Guillermo Arshad, , case is being shut down due to poor donor organ quality. Patient and appropriate inpatient/outpatient team members notified.

## 2022-04-20 NOTE — PROGRESS NOTES
[facility] Transplant [facility]  Liver Transplant  Progress Note    Patient Name: Pelon Vasquez  MRN: [id number]  Admission Date: 4/19/2022  Hospital Length of Stay: 1 days  Code Status: Full Code  Primary Care Provider: Primary Doctor No    Subjective:     History of Present Illness:  Pelon Vasquez is a 57yr olf male with 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 ESLD complicated by jaundice, FAMILIA, ascites (para 2x/week, no h/o SBP), HE, and hypervolemia. He is listed for a liver transplant with MELD 30. Patient admitted for liver transplant surgery. He denies any recent fevers or hospitalizations unknown to transplant team. He reports feel well in his usual state of health. Pre op labs and imaging pending      Hospital Course:  Patient was admitted for liver transplant but case cancelled due to donor organ quality. Pt noted to have FAMILIA on admit. Will hydrate with albumin and reassess in the morning with labs. Will continue to monitor.       Scheduled Meds:   albumin human 25%  25 g Intravenous Q8H     Continuous Infusions:  PRN Meds:sodium chloride, sodium chloride, ampicillin-sulbactim (UNASYN) IVPB, methylPREDNISolone sodium succinate, mupirocin    Review of Systems   Constitutional:  Negative for activity change and appetite change.   Respiratory:  Negative for shortness of breath.    Gastrointestinal:  Negative for abdominal pain, constipation, diarrhea, nausea and vomiting.   Genitourinary:  Negative for difficulty urinating and hematuria.   Neurological:  Negative for dizziness and light-headedness.   Psychiatric/Behavioral:  Negative for agitation and decreased concentration.    Objective:     Vital Signs (Most Recent):  Temp: 97.8 °F (36.6 °C) (04/20/22 1135)  Pulse: 98 (04/20/22 1135)  Resp: 18 (04/20/22 1135)  BP: (!) 112/55 (04/20/22 1135)  SpO2: 98 % (04/20/22 1135)   Vital Signs (24h Range):  Temp:  [97.6 °F (36.4 °C)-98.5 °F (36.9 °C)] 97.8 °F (36.6 °C)  Pulse:  [] 98  Resp:  [16-18] 18  SpO2:   [97 %-100 %] 98 %  BP: (101-130)/(49-60) 112/55     Weight: 118.2 kg (260 lb 7.6 oz)  Body mass index is 40.8 kg/m².    Intake/Output - Last 3 Shifts       None            Physical Exam  Vitals and nursing note reviewed.   Eyes:      Pupils: Pupils are equal, round, and reactive to light.   Cardiovascular:      Rate and Rhythm: Normal rate and regular rhythm.      Heart sounds: No murmur heard.    No gallop.   Pulmonary:      Effort: Pulmonary effort is normal.      Breath sounds: No wheezing or rales.   Abdominal:      General: There is no distension.      Tenderness: There is no abdominal tenderness. There is no guarding or rebound.   Musculoskeletal:         General: No swelling. Normal range of motion.      Right lower leg: No edema.      Left lower leg: No edema.   Skin:     General: Skin is warm.   Neurological:      Mental Status: He is alert and oriented to person, place, and time.   Psychiatric:         Mood and Affect: Mood normal.         Behavior: Behavior normal.         Thought Content: Thought content normal.         Judgment: Judgment normal.       Laboratory:  Immunosuppressants       None          CBC:   Recent Labs   Lab 04/19/22 2209   WBC 5.97   RBC 2.39*   HGB 7.3*   HCT 22.2*   PLT 50*   MCV 93   MCH 30.5   MCHC 32.9     CMP:   Recent Labs   Lab 04/19/22 2209   *   CALCIUM 7.9*   ALBUMIN 3.1*   PROT 5.6*   *   K 4.4   CO2 26   CL 91*   BUN 29*   CREATININE 2.2*   ALKPHOS 104   ALT 26   AST 54*   BILITOT 4.4*     Coagulation:   Recent Labs   Lab 04/19/22 2209   INR 1.8*   APTT 40.2*     Labs within the past 24 hours have been reviewed.    Diagnostic Results:  I have personally reviewed all pertinent imaging studies.    Debility/Functional status: No debility noted.    Assessment/Plan:     * End stage liver disease  - admitted for liver transplant but case cancelled due to donor quality  - MELD 31      Acquired coagulation factor deficiency  - Due to ESLD  - Likely to improve  post transplant      Thrombocytopenia, unspecified  - Due to ESLD  - Likely to improve post transplant      Anemia of chronic disease  - anemia of chronic disease      FAMILIA (acute kidney injury)  - Cr 2.2 from baseline ~1  - Hydrate with albumin  - Hold diuretics       Hepatic encephalopathy  - Chronic  - Cont home lactulose      VTE Risk Mitigation (From admission, onward)         Ordered     IP VTE HIGH RISK PATIENT  Once         04/19/22 2139     Send SCD stockings and BULMARO hose with patient to OR  Continuous         04/19/22 2139                The patients clinical status was discussed at multidisplinary rounds, involving transplant surgery, transplant medicine, pharmacy, nursing, nutrition, and social work    Discharge Planning: Not a candidate for d/c at this time.     No Patient Care Coordination Note on file.      Sandrine Kay PA-C  Liver Transplant  [facility] - Transplant Step[facility]

## 2022-04-20 NOTE — TELEPHONE ENCOUNTER
On Call :     Chart Review Note:    On Call Transplant SW reviewed patients chart for psychosocial barriers or concerns as patient may have an organ offer. No concerns/needs identified or indicated at this time. Transplant SW remains available.    If patient is transplanted, transplant social work to follow. Transplant social work team remains available.

## 2022-04-20 NOTE — SUBJECTIVE & OBJECTIVE
Scheduled Meds:   albumin human 25%  25 g Intravenous Q8H     Continuous Infusions:  PRN Meds:sodium chloride, sodium chloride, ampicillin-sulbactim (UNASYN) IVPB, methylPREDNISolone sodium succinate, mupirocin    Review of Systems   Constitutional:  Negative for activity change and appetite change.   Respiratory:  Negative for shortness of breath.    Gastrointestinal:  Negative for abdominal pain, constipation, diarrhea, nausea and vomiting.   Genitourinary:  Negative for difficulty urinating and hematuria.   Neurological:  Negative for dizziness and light-headedness.   Psychiatric/Behavioral:  Negative for agitation and decreased concentration.    Objective:     Vital Signs (Most Recent):  Temp: 97.8 °F (36.6 °C) (04/20/22 1135)  Pulse: 98 (04/20/22 1135)  Resp: 18 (04/20/22 1135)  BP: (!) 112/55 (04/20/22 1135)  SpO2: 98 % (04/20/22 1135)   Vital Signs (24h Range):  Temp:  [97.6 °F (36.4 °C)-98.5 °F (36.9 °C)] 97.8 °F (36.6 °C)  Pulse:  [] 98  Resp:  [16-18] 18  SpO2:  [97 %-100 %] 98 %  BP: (101-130)/(49-60) 112/55     Weight: 118.2 kg (260 lb 7.6 oz)  Body mass index is 40.8 kg/m².    Intake/Output - Last 3 Shifts       None            Physical Exam  Vitals and nursing note reviewed.   Eyes:      Pupils: Pupils are equal, round, and reactive to light.   Cardiovascular:      Rate and Rhythm: Normal rate and regular rhythm.      Heart sounds: No murmur heard.    No gallop.   Pulmonary:      Effort: Pulmonary effort is normal.      Breath sounds: No wheezing or rales.   Abdominal:      General: There is no distension.      Tenderness: There is no abdominal tenderness. There is no guarding or rebound.   Musculoskeletal:         General: No swelling. Normal range of motion.      Right lower leg: No edema.      Left lower leg: No edema.   Skin:     General: Skin is warm.   Neurological:      Mental Status: He is alert and oriented to person, place, and time.   Psychiatric:         Mood and Affect: Mood normal.          Behavior: Behavior normal.         Thought Content: Thought content normal.         Judgment: Judgment normal.       Laboratory:  Immunosuppressants       None          CBC:   Recent Labs   Lab 04/19/22 2209   WBC 5.97   RBC 2.39*   HGB 7.3*   HCT 22.2*   PLT 50*   MCV 93   MCH 30.5   MCHC 32.9     CMP:   Recent Labs   Lab 04/19/22 2209   *   CALCIUM 7.9*   ALBUMIN 3.1*   PROT 5.6*   *   K 4.4   CO2 26   CL 91*   BUN 29*   CREATININE 2.2*   ALKPHOS 104   ALT 26   AST 54*   BILITOT 4.4*     Coagulation:   Recent Labs   Lab 04/19/22 2209   INR 1.8*   APTT 40.2*     Labs within the past 24 hours have been reviewed.    Diagnostic Results:  I have personally reviewed all pertinent imaging studies.    Debility/Functional status: No debility noted.

## 2022-04-20 NOTE — H&P
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 ESLD complicated by jaundice, FAMILIA, ascites (para 2x/week, no h/o SBP), HE, and hypervolemia. He is listed for a liver transplant with MELD 30. Patient admitted for liver transplant surgery. He denies any recent fevers or hospitalizations unknown to transplant team. He reports feel well in his usual state of health. Pre op labs and imaging pending      Past Medical History:   Diagnosis Date    Arthritis     Cirrhosis of liver     HTN (hypertension)     BRAYDON (obstructive sleep apnea)     Secondary esophageal varices without bleeding 3/18/2022    EGD (2/25/22) showed moderate portal hypertensive gastropathy, small hiatal hernia, grade 1 esophageal varices    Type 2 diabetes mellitus        Past Surgical History:   Procedure Laterality Date    COLONOSCOPY      FINGER AMPUTATION      LIVER TRANSPLANT N/A 4/10/2022    Procedure: TRANSPLANT, LIVER;  Surgeon: Félix Scott MD;  Location: [facility] OR [address] [place] [address];  Service: Transplant;  Laterality: N/A;    LIVER TRANSPLANT N/A 4/13/2022    Procedure: TRANSPLANT, LIVER;  Surgeon: Keyon Castillo MD;  Location: [facility] OR [address] [place] [address];  Service: Transplant;  Laterality: N/A;    MEDIAL COLLATERAL LIGAMENT REPAIR, KNEE Bilateral     ROTATRO CUFF REPAIR      TONSILLECTOMY         Review of patient's allergies indicates:   Allergen Reactions    Strawberry Anaphylaxis and Rash    Metformin Diarrhea       Family History    None       Tobacco Use    Smoking status: Never Smoker    Smokeless tobacco: Never Used   Substance and Sexual Activity    Alcohol use: Not Currently    Drug use: Not on file    Sexual activity: Not on file       No  medications prior to admission.       Review of Systems   Constitutional:  Negative for activity change and appetite change.   Respiratory:  Negative for shortness of breath.    Gastrointestinal:  Negative for abdominal pain.   Psychiatric/Behavioral:  Negative for agitation and decreased concentration.    Objective:     Vital Signs (Most Recent):    Vital Signs (24h Range):           There is no height or weight on file to calculate BMI.    No intake or output data in the 24 hours ending 04/19/22 1959    Physical Exam  Vitals and nursing note reviewed.   Cardiovascular:      Rate and Rhythm: Normal rate and regular rhythm.      Heart sounds: No murmur heard.    No gallop.   Pulmonary:      Effort: Pulmonary effort is normal.      Breath sounds: No wheezing or rales.   Abdominal:      General: There is no distension.      Tenderness: There is no abdominal tenderness. There is no guarding or rebound.   Neurological:      Mental Status: He is alert and oriented to person, place, and time.   Psychiatric:         Mood and Affect: Mood normal.         Behavior: Behavior normal.         Thought Content: Thought content normal.         Judgment: Judgment normal.     Laboratory:  CBC:   Recent Labs   Lab 04/19/22 2209   WBC 5.97   RBC 2.39*   HGB 7.3*   HCT 22.2*   PLT 50*   MCV 93   MCH 30.5   MCHC 32.9     CMP:   Recent Labs   Lab 04/19/22 2209   *   CALCIUM 7.9*   ALBUMIN 3.1*   PROT 5.6*   *   K 4.4   CO2 26   CL 91*   BUN 29*   CREATININE 2.2*   ALKPHOS 104   ALT 26   AST 54*   BILITOT 4.4*     Labs within the past 24 hours have been reviewed.    Diagnostic Results:  XR Chest: No results found for this or any previous visit.    Assessment/Plan:     * End stage liver disease  - Pre op labs and imaging pending  - Steroid induction      Acquired coagulation factor deficiency  - Due to ESLD  - Likely to improve post transplant      Thrombocytopenia, unspecified  - Due to ESLD  - Likely to improve post  transplant          The patient presents for liver transplant.  There are no apparent contraindications to proceeding with the planned transplant.  The patient understands that the transplant could potentially be cancelled pending detailed assessment of the donor organ.    MELD-Na score: 31 at 4/19/2022 10:09 PM  MELD score: 26 at 4/19/2022 10:09 PM  Calculated from:  Serum Creatinine: 2.2 mg/dL at 4/19/2022 10:09 PM  Serum Sodium: 127 mmol/L at 4/19/2022 10:09 PM  Total Bilirubin: 4.4 mg/dL at 4/19/2022 10:09 PM  INR(ratio): 1.8 at 4/19/2022 10:09 PM  Age: 57 years    He will receive IV steroids pulse induction.    Patient was SARS-CoV-2 /COVID-19 tested with negative results.     A complete discussion of the transplant procedure, including risks, complications, and alternatives, as well as any donor-specific risk factors requiring specific disclosure, will be carried out by the responsible staff surgeon prior to the procedure.     Discharge Planning:  No Patient Care Coordination Note on file.      Sabrina Lepe PA-C  Liver Transplant  [facility] - Transplant [facility]

## 2022-04-20 NOTE — TELEPHONE ENCOUNTER
PATIENT NAME: Pelon Vasquez  [facility] #: [id number]    Lab Results   Component Value Date    CREATININE 2.2 (H) 04/19/2022     (L) 04/19/2022    BILITOT 4.4 (H) 04/19/2022    ALBUMIN 3.1 (L) 04/19/2022    INR 1.8 (H) 04/19/2022       Encephalopathy: 1 - 2  Ascites: moderate  Dialysis: no     MELD 31     Recertification requestor: Mariza Gongora

## 2022-04-20 NOTE — PLAN OF CARE
Pt. Arrived to TSU rm [id number] independently for liver workup. MORENITA SRINIVASAN (LTS) notified of pt arrival.  VSS, spo2 >94% on room air.   Pre-transplant workup complete. Consent still needed. No times.  Pt NPO for surgery, wife at bedside--attentive to pt.  Bed in low locked position, call light within reach, pt instructed to call for assistance as needed, WCTM.

## 2022-04-20 NOTE — HPI
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 ESLD complicated by jaundice, FAMILIA, ascites (para 2x/week, no h/o SBP), HE, and hypervolemia. He is listed for a liver transplant with MELD 30. Patient admitted for liver transplant surgery. He denies any recent fevers or hospitalizations unknown to transplant team. He reports feel well in his usual state of health. Pre op labs and imaging pending

## 2022-04-20 NOTE — PROGRESS NOTES
Admit/Transplant Note    Met with patient and significant other Leonarda to assess needs. Patient is a 57 y.o. single male, admitted for for liver transplant.     Patient admitted on 4/19/2022 . At this time, patient presents as alert and oriented x 4, pleasant, good eye contact, well groomed, recall good, concentration/judgement good, average intelligence, calm, communicative, cooperative and asking and answering questions appropriately. At this time, patients caregiver presents as alert and oriented x 4, pleasant, good eye contact, well groomed, recall good, concentration/judgement good, average intelligence, calm, communicative, cooperative and asking and answering questions appropriately.    Household/Family Systems     Patient resides with patient's significant other, at [address] [facility] [place] [address]. Support system includes his significant other, s/o's family, and friends.   Patient does not have dependents that are need of being cared for.     Patients primary caregiver is Leonarda Vicente, patients significant otherphone number [phone]. Confirmed patients contact information is [phone] (home);   Telephone Information:   Mobile [phone]     During admission, patient's caregiver plans to stay in patient's room.  Confirmed patient and patients caregivers do have access to reliable transportation.    Cognitive Status/Learning     Patient reports reading ability as college and states patient does not have difficulty with N/A.  Patient reports patient learns best by reading and hands on.    Needed: No.   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 Pt states he plans on applying for disability soon as he will not be able to work any longer.    Patient reported applying for disability through work  "in January 2022 and applying for social security disability in March 2022 and has not heard back from either on eligibility.     Adherence     Patient reports a high level of adherence to patients health care regimen. Adherence counseling and education provided. Patient verbalizes understanding.    Substance Use    Patient reports the following substance usage.    Tobacco: none, patient denies any use.  Alcohol: none, patient denies any use. Pt states he stopped drinking over 8 months ago. Pt states he stopped drinking upon learning of his liver disease. Pt states he would drink a couple mixed drinks per day. Pt states he drink at that capacity for 7-8 years. Pt states he has not had any cravings or urges to drink since. Pt states that he did not have any issue with discontinuing his use and pt states that he "does not have an addictive personality" and does not have any desire to drink again. Pt states he has engaged in AA.  Illicit Drugs/Non-prescribed Medications: none, patient denies any use.    Patient states clear understanding of the potential impact of substance use. Substance abstinence/cessation counseling, education and resources provided and reviewed.     Services Utilizing/ADLS    Infusion Service: Prior to admission, patient utilizing? no  Home Health: Prior to admission, patient utilizing? no  DME: Prior to admission, no  Pulmonary/Cardiac Rehab: Prior to admission, no  Dialysis:  Prior to admission, no  Transplant Specialty Pharmacy: Prior to admission, no; patient provides permission to release necessary information to specialty pharmacy for medications post-transplant. Resources provided and patient is choosing [facility] pharmacy at this time for transplant medications.    Prior to admission, patient reports patient was independent with ADLS and was not driving. Patient reports patient is not able to care for self at this time due to compromised medical condition (as documented in medical record) " "and physical weakness. Patient indicates a willingness to care for self once medically cleared to do so.    Insurance/Medications    Insured by   Payer/Plan Subscr  Sex Relation Sub. Ins. ID Effective Group Num   1. [employer]* BRANT GRANADO 1965 Male Self [id number]                                    [address] [address]   2. [employer]* BRANT GRANADO 1965 Male Self [id number]                                   [address] [address]      Primary Insurance (for UNOS reporting): Private Insurance  Secondary Insurance (for UNOS reporting): Private Insurance    Patient reports patient is able to obtain and afford medications at this time and at time of discharge.    Living Will/Healthcare Power of     Patient states patient has a LW and/or HCPA.  provided education regarding LW and HCPA and the completion of forms.    Coping/Mental Health    Patient is coping adequately with the aid of  family members and friends. Pt reports feeling a little down that he has  Pt denies any issues with anxiety or depression at this time. Pt states his transplant wait list journey has been a "roller coaster" but he remains optimistic for the future. Patient denies mental health difficulties.     Discharge Planning    At time of discharge, patient plans to return to [facility] apartments (if transplanted on this admission) under the care of his significant other Leonarda. If pt is not transplanted, he will return home under the care of his significant other. Patients significant other will transport patient. Per rounds today, expected discharge date has not been medically determined at this time. Patient and patients caregiver  verbalize understanding and are involved in treatment planning and discharge process.    Additional Concerns    Patient's caretaker denies additional needs and/or concerns at this time. Patient is being followed for needs, education, resources, information, emotional " support, supportive counseling, and for supportive and skilled discharge plan of care.  providing ongoing psychosocial support, education, resources and d/c planning as needed.  SW remains available.  provided resource list, patient choice, psychosocial and supportive counseling, resources, education, assistance and discharge planning with patient and caregiver involvement, ongoing SW availability and services as appropriate. Patient's caregiver verbalizes understanding and agreement with information reviewed,  availability and how to access available resources as needed. Patient denies additional needs and/or concerns at this time. Patient verbalizes understanding and agreement with information reviewed, social work availability, and how to access available resources as needed.

## 2022-04-20 NOTE — TELEPHONE ENCOUNTER
DONOR WITH PHS RISK CRITERIA AND HEPATITIS C POSITIVE     Notified by Guillermo Arshad, , of liver offer with donor information.  Donor and recipient information read back and verified.  Spoke with Pelon Vasquez and identified no acute medical issues during telephone assessment.  Patient instructed NPO.    Patient verbalized understanding that he/she has been called as primary recipient.    The donor's reported social history includes PHS risk criteria.  ELY DETERMINED TO BE hepatitis c antibody positive / MATHEUS negative or   IF DONOR IS DEFINITELY DETERMINED TO BE HEPATITIS C ANTIBODY POSITIVE / MATHEUS NEGATIVE OR POSITIVE IN ADDITION TO THE PHS RISK CRITERIA:    This donor is hepatitis c antibody positive and MATHEUS positive.          The risk of getting HIV or other hepatitis viruses from this donor is very small compared to the risk of dying while waiting for another liver. The risk of clinical illness from any transmitted infection is also small due to the availability of highly effective oral drugs for all three of these viruses. The risk of clinical illness from any transmitted infection is much less than this due to the availability of highly effective oral drugs for all three of these viruses. While the patient has the right to decline any organ for any reason, available scientific data do not support turning down an organ based on Hepatitis C or behavioral risk factors as the risks associated with waiting longer for the transplant are many times greater. Informed patient that Dr. Gonzalez thinks that this is a good liver for the patient.    Patient was asked if they have had a positive COVID-19 test or if they have any signs or symptoms. Informed patient that they will be tested for COVID-19 upon arrival to the hospital, unless they have a previous positive result. If tested and result is positive, the transplant will not be able to occur, they will be inactivated on the wait list for 28 days per  "protocol and required to quarantine.     Patient was informed that if he turned down the offer A transplant doctor will call you after we hang up".  Patient was also informed that if he turned down this donor, he would not be punished or removed from the transplant list, that he would remain on the list at his current status/MELD score. However, by waiting on the list longer rather than receiving this transplant, he will face a greater risk of death than any risk from the slight possibility of transmitted infection.    Patient was also informed that he would have the opportunity to see and talk to the surgeon when he got to the hospital and before he went down to the operating room.    Patient understands risk and agrees to proceed with transplant.  "

## 2022-04-20 NOTE — SUBJECTIVE & OBJECTIVE
Past Medical History:   Diagnosis Date    Arthritis     Cirrhosis of liver     HTN (hypertension)     BRAYDON (obstructive sleep apnea)     Secondary esophageal varices without bleeding 3/18/2022    EGD (2/25/22) showed moderate portal hypertensive gastropathy, small hiatal hernia, grade 1 esophageal varices    Type 2 diabetes mellitus        Past Surgical History:   Procedure Laterality Date    COLONOSCOPY      FINGER AMPUTATION      LIVER TRANSPLANT N/A 4/10/2022    Procedure: TRANSPLANT, LIVER;  Surgeon: Félix Scott MD;  Location: [facility] OR [address] [place] [address];  Service: Transplant;  Laterality: N/A;    LIVER TRANSPLANT N/A 4/13/2022    Procedure: TRANSPLANT, LIVER;  Surgeon: Keyon Castillo MD;  [facility];  Service: Transplant;  Laterality: N/A;    MEDIAL COLLATERAL LIGAMENT REPAIR, KNEE Bilateral     ROTATRO CUFF REPAIR      TONSILLECTOMY         Review of patient's allergies indicates:   Allergen Reactions    Strawberry Anaphylaxis and Rash    Metformin Diarrhea       Family History    None       Tobacco Use    Smoking status: Never Smoker    Smokeless tobacco: Never Used   Substance and Sexual Activity    Alcohol use: Not Currently    Drug use: Not on file    Sexual activity: Not on file       No medications prior to admission.       Review of Systems   Constitutional:  Negative for activity change and appetite change.   Respiratory:  Negative for shortness of breath.    Gastrointestinal:  Negative for abdominal pain.   Psychiatric/Behavioral:  Negative for agitation and decreased concentration.    Objective:     Vital Signs (Most Recent):    Vital Signs (24h Range):           There is no height or weight on file to calculate BMI.    No intake or output data in the 24 hours ending 04/19/22 1959    Physical Exam  Vitals and nursing note reviewed.   Cardiovascular:      Rate and Rhythm: Normal rate and regular rhythm.      Heart sounds: No murmur heard.    No gallop.   Pulmonary:      Effort: Pulmonary  effort is normal.      Breath sounds: No wheezing or rales.   Abdominal:      General: There is no distension.      Tenderness: There is no abdominal tenderness. There is no guarding or rebound.   Neurological:      Mental Status: He is alert and oriented to person, place, and time.   Psychiatric:         Mood and Affect: Mood normal.         Behavior: Behavior normal.         Thought Content: Thought content normal.         Judgment: Judgment normal.     Laboratory:  CBC:   Recent Labs   Lab 04/19/22 2209   WBC 5.97   RBC 2.39*   HGB 7.3*   HCT 22.2*   PLT 50*   MCV 93   MCH 30.5   MCHC 32.9     CMP:   Recent Labs   Lab 04/19/22 2209   *   CALCIUM 7.9*   ALBUMIN 3.1*   PROT 5.6*   *   K 4.4   CO2 26   CL 91*   BUN 29*   CREATININE 2.2*   ALKPHOS 104   ALT 26   AST 54*   BILITOT 4.4*     Labs within the past 24 hours have been reviewed.    Diagnostic Results:  XR Chest: No results found for this or any previous visit.

## 2022-04-20 NOTE — HOSPITAL COURSE
Patient was admitted for liver transplant 4/19 but case cancelled due to donor organ quality. He was kept inpatient due to FAMILIA noted on admit. Urine sodium < 10. Cr continued to trend up despite holding diuretics and hydrating with albumin. Nephrology consulted. Paracentesis ordered to rule out SBP. 5.5L removed, no SBP. Patient ultimately became primary for another liver transplant. He is now s/p OLTx 4/24/22 2/2 LOZANO & ETOH (steroid induction, CMV +/+). Operation straight forward per op note. POD#1 US satisfactory. Post op SICU course notable for SVT requiring adenosine for conversation back to NSR. Shirley removed in SICU. Stepped down to TSU on 4/26. CVC removed 4/27 as renal function improving. TB trending down, but AST/ALT slightly increased 4/27. Increased Prograf and obtained liver US. Liver US 4/27 satisfactory doppler with 3 small peritransplant fluid collections. Continued to push Prograf and monitored.    Interval History: No acute events overnight. POD# 6 s/p OLTx. Continues to tolerate PO intake without nausea. LFT's stable. Plan to push Prograf and obtain US in AM. R GO drains remain with ss output, 490 cc's past 24h. Renal function now wnl with good UOP. Continue diuresis for generalized edema - switched to oral lasix 80 mg PO BID.  PT/OT following. Recommending HH @ discharge (orders placed). Tentative discharge Sunday if LFTs improving. Monitor.

## 2022-04-21 LAB
ALBUMIN SERPL BCP-MCNC: 3.4 G/DL (ref 3.5–5.2)
ALP SERPL-CCNC: 95 U/L (ref 55–135)
ALT SERPL W/O P-5'-P-CCNC: 24 U/L (ref 10–44)
ANION GAP SERPL CALC-SCNC: 10 MMOL/L (ref 8–16)
ANION GAP SERPL CALC-SCNC: 5 MMOL/L (ref 8–16)
AST SERPL-CCNC: 46 U/L (ref 10–40)
BACTERIA SPEC ANAEROBE CULT: NORMAL
BACTERIA UR CULT: NO GROWTH
BASOPHILS # BLD AUTO: 0.02 K/UL (ref 0–0.2)
BASOPHILS NFR BLD: 0.3 % (ref 0–1.9)
BILIRUB SERPL-MCNC: 4.9 MG/DL (ref 0.1–1)
BUN SERPL-MCNC: 37 MG/DL (ref 6–20)
BUN SERPL-MCNC: 37 MG/DL (ref 6–20)
CALCIUM SERPL-MCNC: 8.2 MG/DL (ref 8.7–10.5)
CALCIUM SERPL-MCNC: 8.2 MG/DL (ref 8.7–10.5)
CHLORIDE SERPL-SCNC: 96 MMOL/L (ref 95–110)
CHLORIDE SERPL-SCNC: 97 MMOL/L (ref 95–110)
CO2 SERPL-SCNC: 26 MMOL/L (ref 23–29)
CO2 SERPL-SCNC: 28 MMOL/L (ref 23–29)
CREAT SERPL-MCNC: 2.3 MG/DL (ref 0.5–1.4)
CREAT SERPL-MCNC: 2.5 MG/DL (ref 0.5–1.4)
CREAT UR-MCNC: 190 MG/DL (ref 23–375)
CREAT UR-MCNC: 190 MG/DL (ref 23–375)
DIFFERENTIAL METHOD: ABNORMAL
EOSINOPHIL # BLD AUTO: 0.5 K/UL (ref 0–0.5)
EOSINOPHIL NFR BLD: 8.5 % (ref 0–8)
ERYTHROCYTE [DISTWIDTH] IN BLOOD BY AUTOMATED COUNT: 19.5 % (ref 11.5–14.5)
EST. GFR  (AFRICAN AMERICAN): 32.2 ML/MIN/1.73 M^2
EST. GFR  (AFRICAN AMERICAN): 35.6 ML/MIN/1.73 M^2
EST. GFR  (NON AFRICAN AMERICAN): 27.9 ML/MIN/1.73 M^2
EST. GFR  (NON AFRICAN AMERICAN): 30.8 ML/MIN/1.73 M^2
GLUCOSE SERPL-MCNC: 182 MG/DL (ref 70–110)
GLUCOSE SERPL-MCNC: 340 MG/DL (ref 70–110)
HCT VFR BLD AUTO: 21.7 % (ref 40–54)
HCV RNA SERPL NAA+PROBE-ACNC: NORMAL IU/ML
HGB BLD-MCNC: 7.5 G/DL (ref 14–18)
HIV 1+2 AB+HIV1 P24 AG SERPL QL IA: NEGATIVE
IMM GRANULOCYTES # BLD AUTO: 0.08 K/UL (ref 0–0.04)
IMM GRANULOCYTES NFR BLD AUTO: 1.4 % (ref 0–0.5)
INR PPP: 1.8 (ref 0.8–1.2)
LYMPHOCYTES # BLD AUTO: 0.8 K/UL (ref 1–4.8)
LYMPHOCYTES NFR BLD: 13.2 % (ref 18–48)
MAGNESIUM SERPL-MCNC: 2.4 MG/DL (ref 1.6–2.6)
MCH RBC QN AUTO: 30.7 PG (ref 27–31)
MCHC RBC AUTO-ENTMCNC: 34.6 G/DL (ref 32–36)
MCV RBC AUTO: 89 FL (ref 82–98)
MONOCYTES # BLD AUTO: 0.9 K/UL (ref 0.3–1)
MONOCYTES NFR BLD: 15.3 % (ref 4–15)
NEUTROPHILS # BLD AUTO: 3.5 K/UL (ref 1.8–7.7)
NEUTROPHILS NFR BLD: 61.3 % (ref 38–73)
NRBC BLD-RTO: 0 /100 WBC
OSMOLALITY UR: 332 MOSM/KG (ref 50–1200)
PHOSPHATE SERPL-MCNC: 2.9 MG/DL (ref 2.7–4.5)
PLATELET # BLD AUTO: 51 K/UL (ref 150–450)
PMV BLD AUTO: 10.5 FL (ref 9.2–12.9)
POTASSIUM SERPL-SCNC: 4.7 MMOL/L (ref 3.5–5.1)
POTASSIUM SERPL-SCNC: 4.7 MMOL/L (ref 3.5–5.1)
POTASSIUM UR-SCNC: 41 MMOL/L (ref 15–95)
PROT SERPL-MCNC: 5.7 G/DL (ref 6–8.4)
PROT UR-MCNC: 40 MG/DL (ref 0–15)
PROT/CREAT UR: 0.21 MG/G{CREAT} (ref 0–0.2)
PROTHROMBIN TIME: 17.9 SEC (ref 9–12.5)
RBC # BLD AUTO: 2.44 M/UL (ref 4.6–6.2)
SODIUM SERPL-SCNC: 129 MMOL/L (ref 136–145)
SODIUM SERPL-SCNC: 133 MMOL/L (ref 136–145)
SODIUM UR-SCNC: <10 MMOL/L (ref 20–250)
WBC # BLD AUTO: 5.76 K/UL (ref 3.9–12.7)

## 2022-04-21 PROCEDURE — 85025 COMPLETE CBC W/AUTO DIFF WBC: CPT | Mod: NTX | Performed by: PHYSICIAN ASSISTANT

## 2022-04-21 PROCEDURE — 84156 ASSAY OF PROTEIN URINE: CPT | Mod: NTX | Performed by: PHYSICIAN ASSISTANT

## 2022-04-21 PROCEDURE — 63600175 PHARM REV CODE 636 W HCPCS: Mod: JG,NTX | Performed by: PHYSICIAN ASSISTANT

## 2022-04-21 PROCEDURE — 99233 PR SUBSEQUENT HOSPITAL CARE,LEVL III: ICD-10-PCS | Mod: NTX,,, | Performed by: PHYSICIAN ASSISTANT

## 2022-04-21 PROCEDURE — 80053 COMPREHEN METABOLIC PANEL: CPT | Mod: NTX | Performed by: PHYSICIAN ASSISTANT

## 2022-04-21 PROCEDURE — 25000003 PHARM REV CODE 250: Mod: NTX | Performed by: PHYSICIAN ASSISTANT

## 2022-04-21 PROCEDURE — 36415 COLL VENOUS BLD VENIPUNCTURE: CPT | Mod: NTX | Performed by: PHYSICIAN ASSISTANT

## 2022-04-21 PROCEDURE — 83935 ASSAY OF URINE OSMOLALITY: CPT | Mod: NTX | Performed by: PHYSICIAN ASSISTANT

## 2022-04-21 PROCEDURE — 80048 BASIC METABOLIC PNL TOTAL CA: CPT | Mod: NTX | Performed by: PHYSICIAN ASSISTANT

## 2022-04-21 PROCEDURE — 85610 PROTHROMBIN TIME: CPT | Mod: NTX | Performed by: PHYSICIAN ASSISTANT

## 2022-04-21 PROCEDURE — 84300 ASSAY OF URINE SODIUM: CPT | Mod: NTX | Performed by: PHYSICIAN ASSISTANT

## 2022-04-21 PROCEDURE — 84133 ASSAY OF URINE POTASSIUM: CPT | Mod: NTX | Performed by: PHYSICIAN ASSISTANT

## 2022-04-21 PROCEDURE — 83735 ASSAY OF MAGNESIUM: CPT | Mod: NTX | Performed by: PHYSICIAN ASSISTANT

## 2022-04-21 PROCEDURE — P9047 ALBUMIN (HUMAN), 25%, 50ML: HCPCS | Mod: JG | Performed by: PHYSICIAN ASSISTANT

## 2022-04-21 PROCEDURE — 20600001 HC STEP DOWN PRIVATE ROOM: Mod: NTX

## 2022-04-21 PROCEDURE — 99233 SBSQ HOSP IP/OBS HIGH 50: CPT | Mod: NTX,,, | Performed by: PHYSICIAN ASSISTANT

## 2022-04-21 PROCEDURE — 84100 ASSAY OF PHOSPHORUS: CPT | Mod: NTX | Performed by: PHYSICIAN ASSISTANT

## 2022-04-21 RX ORDER — ALBUMIN HUMAN 250 G/1000ML
25 SOLUTION INTRAVENOUS ONCE
Status: COMPLETED | OUTPATIENT
Start: 2022-04-21 | End: 2022-04-21

## 2022-04-21 RX ADMIN — ALBUMIN (HUMAN) 25 G: 5 SOLUTION INTRAVENOUS at 09:04

## 2022-04-21 RX ADMIN — RIFAXIMIN 550 MG: 550 TABLET ORAL at 08:04

## 2022-04-21 RX ADMIN — LACTULOSE 20 G: 20 SOLUTION ORAL at 08:04

## 2022-04-21 RX ADMIN — LACTULOSE 20 G: 20 SOLUTION ORAL at 04:04

## 2022-04-21 RX ADMIN — ALBUMIN (HUMAN) 25 G: 5 SOLUTION INTRAVENOUS at 04:04

## 2022-04-21 RX ADMIN — SODIUM BICARBONATE 650 MG TABLET 1300 MG: at 08:04

## 2022-04-21 RX ADMIN — MIDODRINE HYDROCHLORIDE 15 MG: 5 TABLET ORAL at 05:04

## 2022-04-21 NOTE — PLAN OF CARE
Pt. AAOx4, VSS, spo2> 94% on room air.    Pt. Ambulating independently in room with cane  No complaints of pain/N/V this shift.   Albumin administered this shift per orders  Wife at bedside--attentive to pt.  Bed in low locked position, call light within reach, pt instructed to call for assistance needed, pt verbalized understanding, remains free from falls this shift. WCTM.

## 2022-04-21 NOTE — ASSESSMENT & PLAN NOTE
- Cr 2.5 from baseline ~1  - Cont hydrating with albumin  - Urine lytes ordered   - Hold diuretics

## 2022-04-21 NOTE — SUBJECTIVE & OBJECTIVE
Scheduled Meds:   albumin human 25%  25 g Intravenous Once    lactulose  20 g Oral TID    midodrine  15 mg Oral Q8H    rifAXIMin  550 mg Oral BID    sodium bicarbonate  1,300 mg Oral BID     Continuous Infusions:  PRN Meds:sodium chloride, sodium chloride, ampicillin-sulbactim (UNASYN) IVPB, sars-cov-2 (covid-19), methylPREDNISolone sodium succinate, mupirocin, ondansetron    Review of Systems   Constitutional:  Negative for activity change and appetite change.   Respiratory:  Negative for shortness of breath.    Cardiovascular:  Negative for chest pain.   Gastrointestinal:  Positive for abdominal distention. Negative for abdominal pain, constipation, diarrhea, nausea and vomiting.   Genitourinary:  Negative for decreased urine volume, difficulty urinating and hematuria.   Neurological:  Negative for dizziness, weakness and light-headedness.   Psychiatric/Behavioral:  Negative for agitation and decreased concentration.    Objective:     Vital Signs (Most Recent):  Temp: 98.4 °F (36.9 °C) (04/21/22 1207)  Pulse: 100 (04/21/22 1406)  Resp: 13 (04/21/22 1406)  BP: 133/84 (04/21/22 1406)  SpO2: 98 % (04/21/22 1406) Vital Signs (24h Range):  Temp:  [97.5 °F (36.4 °C)-98.4 °F (36.9 °C)] 98.4 °F (36.9 °C)  Pulse:  [] 100  Resp:  [13-22] 13  SpO2:  [97 %-100 %] 98 %  BP: ()/(47-87) 133/84     Weight: 116.5 kg (256 lb 13.4 oz)  Body mass index is 40.23 kg/m².    Intake/Output - Last 3 Shifts         04/19 0700 04/20 0659 04/20 0700 04/21 0659 04/21 0700 04/22 0659           Urine Occurrence  3 x 1 x    Stool Occurrence  4 x     Emesis Occurrence  0 x             Physical Exam  Vitals and nursing note reviewed.   Eyes:      Pupils: Pupils are equal, round, and reactive to light.   Cardiovascular:      Rate and Rhythm: Normal rate and regular rhythm.      Heart sounds: No murmur heard.    No gallop.   Pulmonary:      Effort: Pulmonary effort is normal.      Breath sounds: No wheezing or rales.   Abdominal:       General: Bowel sounds are normal. There is distension.      Tenderness: There is no abdominal tenderness. There is no guarding or rebound.   Musculoskeletal:         General: No swelling. Normal range of motion.      Right lower leg: No edema.      Left lower leg: No edema.   Skin:     General: Skin is warm.   Neurological:      Mental Status: He is alert and oriented to person, place, and time.   Psychiatric:         Mood and Affect: Mood normal.         Behavior: Behavior normal.         Thought Content: Thought content normal.         Judgment: Judgment normal.       Laboratory:  Immunosuppressants       None          CBC:   Recent Labs   Lab 04/21/22  0653   WBC 5.76   RBC 2.44*   HGB 7.5*   HCT 21.7*   PLT 51*   MCV 89   MCH 30.7   MCHC 34.6     CMP:   Recent Labs   Lab 04/21/22  0653 04/21/22  1322   * 340*   CALCIUM 8.2* 8.2*   ALBUMIN 3.4*  --    PROT 5.7*  --    * 133*   K 4.7 4.7   CO2 28 26   CL 96 97   BUN 37* 37*   CREATININE 2.3* 2.5*   ALKPHOS 95  --    ALT 24  --    AST 46*  --    BILITOT 4.9*  --      Coagulation:   Recent Labs   Lab 04/19/22  2209 04/21/22  1007   INR 1.8* 1.8*   APTT 40.2*  --      Labs within the past 24 hours have been reviewed.    Diagnostic Results:  I have personally reviewed all pertinent imaging studies.    Debility/Functional status: No debility noted.

## 2022-04-21 NOTE — PROGRESS NOTES
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 ESLD complicated by jaundice, FAMILIA, ascites (para 2x/week, no h/o SBP), HE, and hypervolemia. He is listed for a liver transplant with MELD 30. Patient admitted for liver transplant surgery. He denies any recent fevers or hospitalizations unknown to transplant team. He reports feel well in his usual state of health. Pre op labs and imaging pending      Hospital Course:  Patient was admitted for liver transplant but case cancelled due to donor organ quality. Pt noted to have FAMILIA on admit.    Interval history: No acute events overnight. Cr continues to trend up. Urine lytes ordered. Cont to hold diuretics. Cont to hydrate with albumin. Will continue to monitor.       Scheduled Meds:   albumin human 25%  25 g Intravenous Once    lactulose  20 g Oral TID    midodrine  15 mg Oral Q8H    rifAXIMin  550 mg Oral BID    sodium bicarbonate  1,300 mg Oral BID     Continuous Infusions:  PRN Meds:sodium chloride, sodium chloride, ampicillin-sulbactim (UNASYN) IVPB, sars-cov-2 (covid-19), methylPREDNISolone sodium succinate, mupirocin, ondansetron    Review of Systems   Constitutional:  Negative for activity change and appetite change.   Respiratory:  Negative for shortness of breath.    Cardiovascular:  Negative for chest pain.   Gastrointestinal:  Positive for abdominal distention. Negative for abdominal pain, constipation, diarrhea, nausea and vomiting.   Genitourinary:  Negative for decreased urine volume, difficulty urinating and hematuria.   Neurological:  Negative for dizziness, weakness and light-headedness.   Psychiatric/Behavioral:  Negative for  agitation and decreased concentration.    Objective:     Vital Signs (Most Recent):  Temp: 98.4 °F (36.9 °C) (04/21/22 1207)  Pulse: 100 (04/21/22 1406)  Resp: 13 (04/21/22 1406)  BP: 133/84 (04/21/22 1406)  SpO2: 98 % (04/21/22 1406) Vital Signs (24h Range):  Temp:  [97.5 °F (36.4 °C)-98.4 °F (36.9 °C)] 98.4 °F (36.9 °C)  Pulse:  [] 100  Resp:  [13-22] 13  SpO2:  [97 %-100 %] 98 %  BP: ()/(47-87) 133/84     Weight: 116.5 kg (256 lb 13.4 oz)  Body mass index is 40.23 kg/m².    Intake/Output - Last 3 Shifts         04/19 0700  04/20 0659 04/20 0700  04/21 0659 04/21 0700 04/22 0659           Urine Occurrence  3 x 1 x    Stool Occurrence  4 x     Emesis Occurrence  0 x             Physical Exam  Vitals and nursing note reviewed.   Eyes:      Pupils: Pupils are equal, round, and reactive to light.   Cardiovascular:      Rate and Rhythm: Normal rate and regular rhythm.      Heart sounds: No murmur heard.    No gallop.   Pulmonary:      Effort: Pulmonary effort is normal.      Breath sounds: No wheezing or rales.   Abdominal:      General: Bowel sounds are normal. There is distension.      Tenderness: There is no abdominal tenderness. There is no guarding or rebound.   Musculoskeletal:         General: No swelling. Normal range of motion.      Right lower leg: No edema.      Left lower leg: No edema.   Skin:     General: Skin is warm.   Neurological:      Mental Status: He is alert and oriented to person, place, and time.   Psychiatric:         Mood and Affect: Mood normal.         Behavior: Behavior normal.         Thought Content: Thought content normal.         Judgment: Judgment normal.       Laboratory:  Immunosuppressants       None          CBC:   Recent Labs   Lab 04/21/22  0653   WBC 5.76   RBC 2.44*   HGB 7.5*   HCT 21.7*   PLT 51*   MCV 89   MCH 30.7   MCHC 34.6     CMP:   Recent Labs   Lab 04/21/22  0653 04/21/22  1322   * 340*   CALCIUM 8.2* 8.2*   ALBUMIN 3.4*  --    PROT 5.7*  --    NA  129* 133*   K 4.7 4.7   CO2 28 26   CL 96 97   BUN 37* 37*   CREATININE 2.3* 2.5*   ALKPHOS 95  --    ALT 24  --    AST 46*  --    BILITOT 4.9*  --      Coagulation:   Recent Labs   Lab 04/19/22 2209 04/21/22  1007   INR 1.8* 1.8*   APTT 40.2*  --      Labs within the past 24 hours have been reviewed.    Diagnostic Results:  I have personally reviewed all pertinent imaging studies.    Debility/Functional status: No debility noted.    Assessment/Plan:     * End stage liver disease  - Admitted for liver transplant but case cancelled due to donor quality  - MELD 31      Acquired coagulation factor deficiency  - Due to ESLD  - Likely to improve post transplant      Thrombocytopenia, unspecified  - Due to ESLD  - Likely to improve post transplant      Anemia of chronic disease  - H/H stable. Will continue to monitor with daily cbc.       FAMILIA (acute kidney injury)  - Cr 2.5 from baseline ~1  - Cont hydrating with albumin  - Urine lytes ordered   - Hold diuretics       Hepatic encephalopathy  - Chronic  - Cont home lactulose      VTE Risk Mitigation (From admission, onward)         Ordered     IP VTE HIGH RISK PATIENT  Once         04/19/22 2139     Send SCD stockings and BULMARO hose with patient to OR  Continuous         04/19/22 2139                The patients clinical status was discussed at multidisplinary rounds, involving transplant surgery, transplant medicine, pharmacy, nursing, nutrition, and social work    Discharge Planning: Not a candidate for d/c at this time.     No Patient Care Coordination Note on file.      Sandrine Kay PA-C  Liver Transplant  [facility]

## 2022-04-21 NOTE — DISCHARGE SUMMARY
[facility]  Liver Transplant  Discharge Summary      Patient Name: Pelon Vasquez  MRN: [id number]  Admission Date: 4/19/2022  Hospital Length of Stay: 11 days  Discharge Date and Time:  05/01/2022 7:02 AM  Attending Physician: Keyon Castillo MD  Discharging Provider: Azalea Malin PA-C  Primary Care Provider: Primary Doctor No    HPI:   Pelon Vasquez is a 57yr olf male with 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 ESLD complicated by jaundice, FAMILIA, ascites (para 2x/week, no h/o SBP), HE, and hypervolemia. He is listed for a liver transplant with MELD 30. Patient admitted for liver transplant surgery. He denies any recent fevers or hospitalizations unknown to transplant team. He reports feel well in his usual state of health. Pre op labs and imaging pending      Procedure(s) (LRB):  TRANSPLANT, LIVER (N/A)     Hospital Course:    Patient was admitted for liver transplant 4/19 but case cancelled due to donor organ quality. He was kept inpatient due to FAMILIA noted on admit. Urine sodium < 10. Cr continued to trend up despite holding diuretics and hydrating with albumin. Nephrology consulted. Paracentesis ordered to rule out SBP. 5.5L removed, no SBP. Patient ultimately became primary for another liver transplant. He is now s/p OLTx 4/24/22 2/2 LOZANO & ETOH (steroid induction, CMV +/+). Operation straight forward per op note. POD#1 US satisfactory. Post op SICU course notable for SVT requiring adenosine for conversation back to NSR. Shirley removed in SICU. Stepped down to TSU on 4/26. CVC removed 4/27 as renal function improving. TB trending down, but AST/ALT slightly increased 4/27. Increased Prograf and obtained liver US. Liver US 4/27 satisfactory doppler with 3 small peritransplant fluid collections. Continued to push Prograf and monitored. AST/ALT slightly improved 4/29, but TB slightly increased. Possibly due to low Prograf level.  Lateral GO drain removed 4/29. Medial drain removed 5/1.   Patient is now stable and ready for discharge. LFTs are trending down and renal function is stable. He will be discharged on lasix.  He is passing flatus, has had BMs, tolerating a diet, and pain is appropriately controlled. Follow up to include labs and transplant clinic. Patient is in agreement with discharge from the hospital today and follow up plan.    Goals of Care Treatment Preferences:  Code Status: Full Code    Living Will: Yes    Drain removed:  Site cleaned with alcohol, lidocaine 1% used to numb area to place prolene 3.0 suture to site.  Drain intact at time of removal.  Patient tolerated procedure well.  Will continue to monitor for any complications.    Final Active Diagnoses:    Diagnosis Date Noted POA    PRINCIPAL PROBLEM:  S/P liver transplant [Z94.4] 04/24/2022 Not Applicable    Long-term use of immunosuppressant medication [Z79.899] 04/27/2022 Not Applicable    Essential hypertension [I10] 04/27/2022 Yes    Generalized edema [R60.1] 04/27/2022 Yes    At risk for opportunistic infections [Z91.89] 04/24/2022 No    Prophylactic immunotherapy [Z29.8] 04/24/2022 Not Applicable    Adverse effect of corticosteroids [T38.0X5A] 04/24/2022 No    Class 2 severe obesity due to excess calories with serious comorbidity in adult [E66.01] 04/24/2022 Yes    Thrombocytopenia, unspecified [D69.6] 04/09/2022 Yes    Anemia of chronic disease [D63.8] 03/18/2022 Yes    Type 2 diabetes mellitus without complication, without long-term current use of insulin [E11.9] 02/04/2022 Yes      Problems Resolved During this Admission:    Diagnosis Date Noted Date Resolved POA    Stage 3a chronic kidney disease [N18.31]  04/29/2022 Yes    Encounter for weaning from ventilator [Z99.11] 04/24/2022 04/27/2022 Not Applicable    Acquired coagulation factor deficiency [D68.4] 04/13/2022 04/27/2022 Yes    End stage liver disease [K72.10] 04/09/2022 04/27/2022 Yes    Alcoholic cirrhosis of liver with ascites [K70.31]  03/18/2022 04/27/2022 Yes    FAMILIA (acute kidney injury) [N17.9] 03/17/2022 04/29/2022 Yes    Hepatic encephalopathy [K72.90] 02/04/2022 04/27/2022 Yes     Consults (From admission, onward)        Status Ordering Provider     Inpatient consult to PICC team (NIAS)  Once        Provider:  (Not yet assigned)    Completed CARLITOS CEBALLOS     Inpatient consult to Nephrology  Once        Provider:  (Not yet assigned)    Completed LYN SILVESTRE     Inpatient consult to Endocrinology  Once        Provider:  (Not yet assigned)    Completed MANISHA MADISON     Inpatient consult to Registered Dietitian/Nutritionist  Once        Provider:  (Not yet assigned)    Completed MANISHA MADISON     Inpatient consult to Nephrology  Once        Provider:  (Not yet assigned)    Completed FLORENTIN ECHEVARRIA     Inpatient consult to Nephrology  Once        Provider:  (Not yet assigned)    Completed KELLEN CHAND          Pending Diagnostic Studies:     Procedure Component Value Units Date/Time    Freeze and Hold -BB HEP [[id number]] Collected: 04/24/22 0838    Order Status: Sent Lab Status: In process Updated: 04/24/22 0840    Specimen: Blood     Urinalysis [[id number]] Collected: 04/23/22 2331    Order Status: Sent Lab Status: In process Updated: 04/23/22 2331    Specimen: Urine         Significant Diagnostic Studies: Labs:   CMP   Recent Labs   Lab 04/30/22  0755 05/01/22  0829    138   K 4.0 3.4*    98   CO2 30* 29   * 138*   BUN 43* 39*   CREATININE 1.1 1.1   CALCIUM 7.9* 8.0*   PROT 4.5* 4.8*   ALBUMIN 2.8* 2.9*   BILITOT 2.2* 2.0*   ALKPHOS 103 96   AST 39 25   ALT 97* 75*   ANIONGAP 8 11   ESTGFRAFRICA >60.0 >60.0   EGFRNONAA >60.0 >60.0   , CBC   Recent Labs   Lab 04/30/22  0755 05/01/22  0829   WBC 8.04 10.64   HGB 8.6* 8.9*   HCT 25.5* 26.5*   PLT 59* 106*    and INR   Lab Results   Component Value Date    INR 1.1 04/26/2022    INR 1.2 04/25/2022    INR 1.3 (H) 04/25/2022       The patients clinical status  "was discussed at multidisplinary rounds, involving transplant surgery, transplant medicine, pharmacy, nursing, nutrition, and social work    Discharged Condition: stable    Patient Instructions:      WALKER FOR HOME USE     Order Specific Question Answer Comments   Type of Walker: Adult (5'4"-6'6")    With wheels? Yes    Height: 5' 7" (1.702 m)    Weight: 108.8 kg (239 lb 13.8 oz)    Length of need (1-99 months): 99    Does patient have medical equipment at home? cane, straight    Please check all that apply: Patient's condition impairs ambulation.    Please check all that apply: Walker will be used for gait training.    Please check all that apply: Patient needs help to get in and out of chair.      3 IN 1 COMMODE FOR HOME USE     Order Specific Question Answer Comments   Type: Standard    Height: 5' 7" (1.702 m)    Weight: 108.8 kg (239 lb 13.8 oz)    Does patient have medical equipment at home? cane, straight    Length of need (1-99 months): 99      Ambulatory referral/consult to Nephrology   Standing Status: Future   Referral Priority: Routine Referral Type: Consultation   Referral Reason: Specialty Services Required   Requested Specialty: Nephrology     Ambulatory referral/consult to Home Health   Standing Status: Future   Referral Priority: Routine Referral Type: Home Health Care   Referral Reason: Specialty Services Required   Requested Specialty: Home Health Services   Number of Visits Requested: 1     Diet Adult Regular     Pelvic Rest     No driving until:     Lifting restrictions     Notify your health care provider if you experience any of the following:     Notify your health care provider if you experience any of the following:  increased confusion or weakness     Notify your health care provider if you experience any of the following:  persistent dizziness, light-headedness, or visual disturbances     Notify your health care provider if you experience any of the following:  worsening rash     Notify " your health care provider if you experience any of the following:  severe persistent headache     Notify your health care provider if you experience any of the following:  difficulty breathing or increased cough     Notify your health care provider if you experience any of the following:  redness, tenderness, or signs of infection (pain, swelling, redness, odor or green/yellow discharge around incision site)     Notify your health care provider if you experience any of the following:  severe uncontrolled pain     Notify your health care provider if you experience any of the following:  persistent nausea and vomiting or diarrhea     Notify your health care provider if you experience any of the following:  temperature >100.4     No dressing needed     Weight bearing restrictions (specify):     Shower on day dressing removed (No bath)     Medications:  Reconciled Home Medications:      Medication List      START taking these medications    aspirin 81 MG EC tablet  Commonly known as: ECOTRIN  Take 1 tablet (81 mg total) by mouth once daily.     * blood sugar diagnostic Strp  1 strip by Misc.(Non-Drug; Combo Route) route 4 (four) times daily before meals and nightly.     * blood sugar diagnostic Strp  1 strip by Misc.(Non-Drug; Combo Route) route 4 (four) times daily before meals and nightly.     * blood-glucose meter Misc  use as instructed     * blood-glucose meter kit  Commonly known as: FREESTYLE SYSTEM KIT  Use as instructed     calcium carbonate-vitamin D3 600 mg-20 mcg (800 unit) Tab  Take 1 tablet by mouth once daily.     docusate sodium 100 MG capsule  Commonly known as: COLACE  Take 1 capsule (100 mg total) by mouth 3 (three) times daily as needed for Constipation.     famotidine 20 MG tablet  Commonly known as: PEPCID  Take 1 tablet (20 mg total) by mouth nightly.     insulin lispro 100 unit/mL pen  Commonly known as: HumaLOG KwikPen Insulin  Inject 7 Units into the skin 3 (three) times daily with meals. Plus  "sliding scale insulin (MDD 36 units daily)     ketoconazole 200 mg Tab  Commonly known as: NIZORAL  Take 0.5 tablets (100 mg total) by mouth once daily.     * lancets Misc  1 each by Misc.(Non-Drug; Combo Route) route 4 (four) times daily before meals and nightly.     * lancets Misc  1 each by Misc.(Non-Drug; Combo Route) route 4 (four) times daily before meals and nightly.     magnesium oxide 400 mg (241.3 mg magnesium) tablet  Commonly known as: MAG-OX  Take 2 tablets (800 mg total) by mouth 2 (two) times daily.     methocarbamoL 500 MG Tab  Commonly known as: ROBAXIN  Take 1 tablet (500 mg total) by mouth 3 (three) times daily as needed (muscle spasm/back pain).     mycophenolate 250 mg Cap  Commonly known as: CELLCEPT  Take 4 capsules (1,000 mg total) by mouth 2 (two) times daily.     NIFEdipine 30 MG (OSM) 24 hr tablet  Commonly known as: PROCARDIA-XL  Take 1 tablet (30 mg total) by mouth once daily.     nystatin 100,000 unit/mL suspension  Commonly known as: MYCOSTATIN  Take 5 mLs (500,000 Units total) by mouth 3 (three) times daily after meals. STOP 5/14/22     oxyCODONE 5 MG immediate release tablet  Commonly known as: ROXICODONE  Take 1-2 tablets (5-10 mg total) by mouth every 6 (six) hours as needed for Pain.     pen needle, diabetic 32 gauge x 5/32" Ndle  1 each by Misc.(Non-Drug; Combo Route) route 3 (three) times daily with meals.     predniSONE 5 MG tablet  Commonly known as: DELTASONE  Take by mouth daily:  20mg 4/30-5/6, 15mg 5/7-5/13, 10mg 5/14-5/20, 5mg 5/21-5/27. STOP 5/28/22     sulfamethoxazole-trimethoprim 400-80mg 400-80 mg per tablet  Commonly known as: BACTRIM,SEPTRA  Take 1 tablet by mouth every morning. STOP 10/24/22     tacrolimus 1 MG Cap  Commonly known as: PROGRAF  Take 8 capsules (8 mg total) by mouth every 12 (twelve) hours.     valGANciclovir 450 mg Tab  Commonly known as: VALCYTE  Take 1 tablet (450 mg total) by mouth once daily. STOP 7/24/22  Start taking on: May 4, 2022         * " This list has 6 medication(s) that are the same as other medications prescribed for you. Read the directions carefully, and ask your doctor or other care provider to review them with you.            CHANGE how you take these medications    furosemide 80 MG tablet  Commonly known as: LASIX  Take 1 tablet (80 mg total) by mouth 2 (two) times daily.  What changed:   · medication strength  · how much to take  · when to take this        STOP taking these medications    atorvastatin 20 MG tablet  Commonly known as: LIPITOR     lactulose 10 gram/15 mL solution  Commonly known as: CHRONULAC     midodrine 5 MG Tab  Commonly known as: PROAMATINE     ondansetron 4 MG Tbdl  Commonly known as: ZOFRAN-ODT     pantoprazole 40 MG tablet  Commonly known as: PROTONIX     sodium bicarbonate 650 MG tablet     spironolactone 100 MG tablet  Commonly known as: ALDACTONE     triamcinolone acetonide 0.1% 0.1 % cream  Commonly known as: KENALOG     XIFAXAN 550 mg Tab  Generic drug: rifAXIMin     zinc sulfate 50 mg zinc (220 mg) Tab tablet          Time spent caring for patient (Greater than 1/2 spent in direct face-to-face contact): > 30 minutes     Liver Transplant  [facility] [place]

## 2022-04-22 ENCOUNTER — TELEPHONE (OUTPATIENT)
Dept: TRANSPLANT | Facility: CLINIC | Age: 57
End: 2022-04-22
Payer: MEDICAID

## 2022-04-22 LAB
ALBUMIN SERPL BCP-MCNC: 3.7 G/DL (ref 3.5–5.2)
ALP SERPL-CCNC: 105 U/L (ref 55–135)
ALT SERPL W/O P-5'-P-CCNC: 24 U/L (ref 10–44)
ANION GAP SERPL CALC-SCNC: 8 MMOL/L (ref 8–16)
APPEARANCE FLD: NORMAL
AST SERPL-CCNC: 43 U/L (ref 10–40)
BASOPHILS # BLD AUTO: 0.04 K/UL (ref 0–0.2)
BASOPHILS NFR BLD: 0.7 % (ref 0–1.9)
BILIRUB SERPL-MCNC: 4.7 MG/DL (ref 0.1–1)
BODY FLD TYPE: NORMAL
BODY FLUID COMMENTS: NORMAL
BUN SERPL-MCNC: 41 MG/DL (ref 6–20)
CALCIUM SERPL-MCNC: 8.9 MG/DL (ref 8.7–10.5)
CHLORIDE SERPL-SCNC: 99 MMOL/L (ref 95–110)
CO2 SERPL-SCNC: 28 MMOL/L (ref 23–29)
COLOR FLD: YELLOW
CREAT SERPL-MCNC: 2.6 MG/DL (ref 0.5–1.4)
DIFFERENTIAL METHOD: ABNORMAL
EOSINOPHIL # BLD AUTO: 0.4 K/UL (ref 0–0.5)
EOSINOPHIL NFR BLD: 7.4 % (ref 0–8)
ERYTHROCYTE [DISTWIDTH] IN BLOOD BY AUTOMATED COUNT: 19.9 % (ref 11.5–14.5)
EST. GFR  (AFRICAN AMERICAN): 30.7 ML/MIN/1.73 M^2
EST. GFR  (NON AFRICAN AMERICAN): 26.6 ML/MIN/1.73 M^2
GLUCOSE SERPL-MCNC: 256 MG/DL (ref 70–110)
HCT VFR BLD AUTO: 21.7 % (ref 40–54)
HGB BLD-MCNC: 7.3 G/DL (ref 14–18)
IMM GRANULOCYTES # BLD AUTO: 0.03 K/UL (ref 0–0.04)
IMM GRANULOCYTES NFR BLD AUTO: 0.6 % (ref 0–0.5)
INR PPP: 1.7 (ref 0.8–1.2)
LYMPHOCYTES # BLD AUTO: 0.7 K/UL (ref 1–4.8)
LYMPHOCYTES NFR BLD: 12.2 % (ref 18–48)
LYMPHOCYTES NFR FLD MANUAL: 76 %
MAGNESIUM SERPL-MCNC: 2.4 MG/DL (ref 1.6–2.6)
MCH RBC QN AUTO: 30.5 PG (ref 27–31)
MCHC RBC AUTO-ENTMCNC: 33.6 G/DL (ref 32–36)
MCV RBC AUTO: 91 FL (ref 82–98)
MESOTHL CELL NFR FLD MANUAL: 3 %
MONOCYTES # BLD AUTO: 0.9 K/UL (ref 0.3–1)
MONOCYTES NFR BLD: 16.2 % (ref 4–15)
MONOS+MACROS NFR FLD MANUAL: 10 %
NEUTROPHILS # BLD AUTO: 3.4 K/UL (ref 1.8–7.7)
NEUTROPHILS NFR BLD: 62.9 % (ref 38–73)
NEUTROPHILS NFR FLD MANUAL: 11 %
NRBC BLD-RTO: 0 /100 WBC
PHOSPHATE SERPL-MCNC: 3.2 MG/DL (ref 2.7–4.5)
PLATELET # BLD AUTO: 47 K/UL (ref 150–450)
PMV BLD AUTO: 11.1 FL (ref 9.2–12.9)
POTASSIUM SERPL-SCNC: 4.8 MMOL/L (ref 3.5–5.1)
PROT SERPL-MCNC: 6.1 G/DL (ref 6–8.4)
PROTHROMBIN TIME: 17.4 SEC (ref 9–12.5)
RBC # BLD AUTO: 2.39 M/UL (ref 4.6–6.2)
SODIUM SERPL-SCNC: 135 MMOL/L (ref 136–145)
WBC # BLD AUTO: 5.43 K/UL (ref 3.9–12.7)
WBC # FLD: 78 /CU MM

## 2022-04-22 PROCEDURE — 20600001 HC STEP DOWN PRIVATE ROOM: Mod: NTX

## 2022-04-22 PROCEDURE — 25000003 PHARM REV CODE 250: Mod: NTX | Performed by: PHYSICIAN ASSISTANT

## 2022-04-22 PROCEDURE — 36415 COLL VENOUS BLD VENIPUNCTURE: CPT | Mod: NTX | Performed by: PHYSICIAN ASSISTANT

## 2022-04-22 PROCEDURE — 85025 COMPLETE CBC W/AUTO DIFF WBC: CPT | Mod: NTX | Performed by: PHYSICIAN ASSISTANT

## 2022-04-22 PROCEDURE — 99233 PR SUBSEQUENT HOSPITAL CARE,LEVL III: ICD-10-PCS | Mod: NTX,,, | Performed by: PHYSICIAN ASSISTANT

## 2022-04-22 PROCEDURE — 83735 ASSAY OF MAGNESIUM: CPT | Mod: NTX | Performed by: PHYSICIAN ASSISTANT

## 2022-04-22 PROCEDURE — 99223 PR INITIAL HOSPITAL CARE,LEVL III: ICD-10-PCS | Mod: NTX,,, | Performed by: INTERNAL MEDICINE

## 2022-04-22 PROCEDURE — 80053 COMPREHEN METABOLIC PANEL: CPT | Mod: NTX | Performed by: PHYSICIAN ASSISTANT

## 2022-04-22 PROCEDURE — 87075 CULTR BACTERIA EXCEPT BLOOD: CPT | Mod: NTX | Performed by: PHYSICIAN ASSISTANT

## 2022-04-22 PROCEDURE — 87102 FUNGUS ISOLATION CULTURE: CPT | Mod: NTX | Performed by: PHYSICIAN ASSISTANT

## 2022-04-22 PROCEDURE — 89051 BODY FLUID CELL COUNT: CPT | Mod: NTX | Performed by: PHYSICIAN ASSISTANT

## 2022-04-22 PROCEDURE — 99233 SBSQ HOSP IP/OBS HIGH 50: CPT | Mod: NTX,,, | Performed by: PHYSICIAN ASSISTANT

## 2022-04-22 PROCEDURE — 63600175 PHARM REV CODE 636 W HCPCS: Mod: JG,NTX | Performed by: PHYSICIAN ASSISTANT

## 2022-04-22 PROCEDURE — 85610 PROTHROMBIN TIME: CPT | Mod: NTX | Performed by: PHYSICIAN ASSISTANT

## 2022-04-22 PROCEDURE — P9047 ALBUMIN (HUMAN), 25%, 50ML: HCPCS | Mod: JG,NTX | Performed by: PHYSICIAN ASSISTANT

## 2022-04-22 PROCEDURE — 99223 1ST HOSP IP/OBS HIGH 75: CPT | Mod: NTX,,, | Performed by: INTERNAL MEDICINE

## 2022-04-22 PROCEDURE — 87070 CULTURE OTHR SPECIMN AEROBIC: CPT | Mod: NTX | Performed by: PHYSICIAN ASSISTANT

## 2022-04-22 PROCEDURE — 87205 SMEAR GRAM STAIN: CPT | Mod: NTX | Performed by: PHYSICIAN ASSISTANT

## 2022-04-22 PROCEDURE — 84100 ASSAY OF PHOSPHORUS: CPT | Mod: NTX | Performed by: PHYSICIAN ASSISTANT

## 2022-04-22 RX ORDER — METHYLPREDNISOLONE SODIUM SUCCINATE 500 MG/8ML
500 INJECTION INTRAMUSCULAR; INTRAVENOUS
Status: CANCELLED | OUTPATIENT
Start: 2022-04-22

## 2022-04-22 RX ORDER — MUPIROCIN 20 MG/G
OINTMENT TOPICAL
Status: CANCELLED | OUTPATIENT
Start: 2022-04-22

## 2022-04-22 RX ORDER — LIDOCAINE HYDROCHLORIDE 10 MG/ML
INJECTION INFILTRATION; PERINEURAL
Status: DISPENSED
Start: 2022-04-22 | End: 2022-04-23

## 2022-04-22 RX ORDER — ALBUMIN HUMAN 250 G/1000ML
50 SOLUTION INTRAVENOUS EVERY 8 HOURS
Status: COMPLETED | OUTPATIENT
Start: 2022-04-22 | End: 2022-04-22

## 2022-04-22 RX ADMIN — LACTULOSE 20 G: 20 SOLUTION ORAL at 08:04

## 2022-04-22 RX ADMIN — ALBUMIN (HUMAN) 50 G: 12.5 SOLUTION INTRAVENOUS at 06:04

## 2022-04-22 RX ADMIN — SODIUM BICARBONATE 650 MG TABLET 1300 MG: at 09:04

## 2022-04-22 RX ADMIN — RIFAXIMIN 550 MG: 550 TABLET ORAL at 09:04

## 2022-04-22 RX ADMIN — ALBUMIN (HUMAN) 50 G: 12.5 SOLUTION INTRAVENOUS at 11:04

## 2022-04-22 RX ADMIN — MIDODRINE HYDROCHLORIDE 15 MG: 5 TABLET ORAL at 02:04

## 2022-04-22 RX ADMIN — MIDODRINE HYDROCHLORIDE 15 MG: 5 TABLET ORAL at 09:04

## 2022-04-22 RX ADMIN — RIFAXIMIN 550 MG: 550 TABLET ORAL at 08:04

## 2022-04-22 RX ADMIN — SODIUM BICARBONATE 650 MG TABLET 1300 MG: at 08:04

## 2022-04-22 NOTE — ASSESSMENT & PLAN NOTE
Admitted for liver transplant, cancelled due to organ quality.  MELD-Na score: 28 at 4/22/2022  6:51 AM  MELD score: 27 at 4/22/2022  6:51 AM  Calculated from:  Serum Creatinine: 2.6 mg/dL at 4/22/2022  6:51 AM  Serum Sodium: 135 mmol/L at 4/22/2022  6:51 AM  Total Bilirubin: 4.7 mg/dL at 4/22/2022  6:51 AM  INR(ratio): 1.7 at 4/22/2022  6:51 AM  Age: 57 years

## 2022-04-22 NOTE — ASSESSMENT & PLAN NOTE
- Cr 2.6 from baseline ~1  - Cont hydrating with albumin  - Urine sodium < 10  - Hold diuretics   - Neph consulted   - Diagnostic para ordered to r/o SBP

## 2022-04-22 NOTE — H&P
Inpatient Radiology Pre-procedure Note    History of Present Illness:  Pelon Vasquez is a 57 y.o. male w PMH LOZANO/EtOH cirrhosis, admitted for liver transplant. Pt presents for US-guided paracentesis.    Admission H&P reviewed.  Past Medical History:   Diagnosis Date    Arthritis     Cirrhosis of liver     HTN (hypertension)     BRAYDON (obstructive sleep apnea)     Secondary esophageal varices without bleeding 3/18/2022    EGD (2/25/22) showed moderate portal hypertensive gastropathy, small hiatal hernia, grade 1 esophageal varices    Type 2 diabetes mellitus      Past Surgical History:   Procedure Laterality Date    COLONOSCOPY      FINGER AMPUTATION      LIVER TRANSPLANT N/A 4/10/2022    Procedure: TRANSPLANT, LIVER;  Surgeon: Félix Scott MD;  Location: [facility] OR [facility] FLR;  Service: Transplant;  Laterality: N/A;    LIVER TRANSPLANT N/A 4/13/2022    Procedure: TRANSPLANT, LIVER;  Surgeon: Keyon Castillo MD;  Location: [facility] OR [facility] FLR;  Service: Transplant;  Laterality: N/A;    LIVER TRANSPLANT N/A 4/20/2022    Procedure: TRANSPLANT, LIVER;  Surgeon: Brant Gonzalez Jr., MD;  [facility];  Service: Transplant;  Laterality: N/A;    MEDIAL COLLATERAL LIGAMENT REPAIR, KNEE Bilateral     ROTATRO CUFF REPAIR      TONSILLECTOMY         Review of Systems:   As documented in primary team H&P    Home Meds:   Prior to Admission medications    Medication Sig Start Date End Date Taking? Authorizing Provider   furosemide (LASIX) 40 MG tablet Take 1 tablet (40 mg total) by mouth once daily. 4/17/22   Jimmie Leon MD   lactulose (CHRONULAC) 10 gram/15 mL solution Take 30 mLs by mouth 3 (three) times daily. 3/2/22   Historical Provider   midodrine (PROAMATINE) 5 MG Tab Take 3 tablets (15 mg total) by mouth every 8 (eight) hours. 3/24/22 3/24/23  Indiana Dutton MD   ondansetron (ZOFRAN-ODT) 4 MG TbDL Take 1 tablet by mouth every 8 (eight) hours as needed (NAUSEA). 3/2/22   Historical  Provider   pantoprazole (PROTONIX) 40 MG tablet Take 40 mg by mouth once daily. 10/20/21 4/18/22  Historical Provider   sodium bicarbonate 650 MG tablet Take 2 tablets (1,300 mg total) by mouth 2 (two) times daily. 3/24/22 3/24/23  Indiana Dutton MD   spironolactone (ALDACTONE) 100 MG tablet Take 1 tablet (100 mg total) by mouth once daily. 4/17/22   Jimmie Leon MD   XIFAXAN 550 mg Tab Take 550 mg by mouth 2 (two) times daily. 12/10/21   Historical Provider   zinc sulfate 50 mg zinc (220 mg) Tab tablet Take 1 tablet (220 mg total) by mouth once daily. 2/21/22   Tito Mcclure MD   atorvastatin (LIPITOR) 20 MG tablet Take 20 mg by mouth once daily. 1/14/22 3/24/22  Historical Provider   triamcinolone acetonide 0.1% (KENALOG) 0.1 % cream Apply topically 2 (two) times daily as needed. 2/17/22 3/24/22  Historical Provider     Scheduled Meds:    LIDOcaine HCL 10 mg/ml (1%)        albumin human 25%  50 g Intravenous Q8H    lactulose  20 g Oral TID    midodrine  15 mg Oral Q8H    rifAXIMin  550 mg Oral BID    sodium bicarbonate  1,300 mg Oral BID     Continuous Infusions:   PRN Meds:sars-cov-2 (covid-19), mupirocin, ondansetron  Anticoagulants/Antiplatelets: no anticoagulation    Allergies:   Review of patient's allergies indicates:   Allergen Reactions    Strawberry Anaphylaxis and Rash    Metformin Diarrhea    Pork/porcine containing products Diarrhea and Nausea And Vomiting     Sedation Hx: have not been any systemic reactions    Labs:  Recent Labs   Lab 04/22/22  0651   INR 1.7*       Recent Labs   Lab 04/22/22  0651   WBC 5.43   HGB 7.3*   HCT 21.7*   MCV 91   PLT 47*      Recent Labs   Lab 04/19/22 2209 04/21/22  0653 04/22/22  0651   *   < > 256*   *   < > 135*   K 4.4   < > 4.8   CL 91*   < > 99   CO2 26   < > 28   BUN 29*   < > 41*   CREATININE 2.2*   < > 2.6*   CALCIUM 7.9*   < > 8.9   MG 2.2   < > 2.4   ALT 26   < > 24   AST 54*   < > 43*   ALBUMIN 3.1*   < > 3.7   BILITOT  4.4*   < > 4.7*   BILIDIR 1.7*  --   --     < > = values in this interval not displayed.         Vitals:  Temp: 97.9 °F (36.6 °C) (04/22/22 1151)  Pulse: 109 (04/22/22 1400)  Resp: 15 (04/22/22 1400)  BP: 118/71 (04/22/22 1400)  SpO2: 97 % (04/22/22 1400)     Physical Exam:  ASA: 3  Mallampati: 3    General: no acute distress  Mental Status: alert and oriented to person, place and time  HEENT: normocephalic, atraumatic  Chest: unlabored breathing  Heart: regular heart rate  Abdomen: distended  Extremity: moves all extremities    Plan: US-guided paracentesis  Sedation Plan: local    Lj Ellis MD PGY2  Department of Radiology  [facility]

## 2022-04-22 NOTE — PLAN OF CARE
TB & AST elevated. Cr =2.6 inc from 2.5 yesterday. Nephrology consulted. Albumin ordered for today. Afebrile. Pain minimal to distended abd. Tolerating po well. Having frequent BM's after Lactulose this am. Lactulose afternoon dose held per pt request. Pt ambulating to BR and in room wearing non-skid socks and using cane. Sat on sofa or in chair all day today. Parameter orders added to Midodrine. Afternoon dose Midodrine given. H&H=7.3 & 26.7. Paracentesis completed this afternoon. 4800ml removed. Tolerated well. Significant other remains at BS.

## 2022-04-22 NOTE — PROGRESS NOTES
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 ESLD complicated by jaundice, FAMILIA, ascites (para 2x/week, no h/o SBP), HE, and hypervolemia. He is listed for a liver transplant with MELD 30. Patient admitted for liver transplant surgery. He denies any recent fevers or hospitalizations unknown to transplant team. He reports feel well in his usual state of health. Pre op labs and imaging pending      Hospital Course:  Patient was admitted for liver transplant but case cancelled due to donor organ quality. Pt noted to have FAMILIA on admit.    Interval history: No acute events overnight. Cr continues to trend up.  cc past 24 hrs. Urine sodium < 10. Cont to hold diuretics. Cont to hydrate with albumin. Nephrology consulted. Diagnostic para ordered to rule out SBP. Will continue to monitor.       Scheduled Meds:   albumin human 25%  50 g Intravenous Q8H    lactulose  20 g Oral TID    midodrine  15 mg Oral Q8H    rifAXIMin  550 mg Oral BID    sodium bicarbonate  1,300 mg Oral BID     Continuous Infusions:  PRN Meds:sodium chloride, sodium chloride, ampicillin-sulbactim (UNASYN) IVPB, sars-cov-2 (covid-19), methylPREDNISolone sodium succinate, mupirocin, ondansetron    Review of Systems   Constitutional:  Negative for activity change and appetite change.   Respiratory:  Negative for shortness of breath.    Cardiovascular:  Negative for chest pain.   Gastrointestinal:  Positive for abdominal distention. Negative for abdominal pain, constipation, diarrhea, nausea and vomiting.   Genitourinary:  Negative for decreased urine volume, difficulty urinating and hematuria.   Neurological:  Negative for  dizziness, weakness and light-headedness.   Psychiatric/Behavioral:  Negative for agitation and decreased concentration.    Objective:     Vital Signs (Most Recent):  Temp: 98.2 °F (36.8 °C) (04/22/22 0725)  Pulse: 102 (04/22/22 0725)  Resp: 20 (04/22/22 0725)  BP: 121/73 (04/22/22 0725)  SpO2: 97 % (04/22/22 0725) Vital Signs (24h Range):  Temp:  [98.2 °F (36.8 °C)-98.5 °F (36.9 °C)] 98.2 °F (36.8 °C)  Pulse:  [] 102  Resp:  [12-20] 20  SpO2:  [96 %-99 %] 97 %  BP: (121-143)/(73-90) 121/73     Weight: 116.3 kg (256 lb 4.8 oz)  Body mass index is 40.14 kg/m².    Intake/Output - Last 3 Shifts         04/20 0700  04/21 0659 04/21 0700 04/22 0659 04/22 0700  04/23 0659    P.O.  1044     I.V. (mL/kg)  100 (0.9)     Total Intake(mL/kg)  1144 (9.8)     Urine (mL/kg/hr)  900 (0.3)     Stool  0     Total Output  900     Net  +244            Urine Occurrence 3 x 4 x     Stool Occurrence 4 x 7 x     Emesis Occurrence 0 x              Physical Exam  Vitals and nursing note reviewed.   Eyes:      Pupils: Pupils are equal, round, and reactive to light.   Cardiovascular:      Rate and Rhythm: Normal rate and regular rhythm.      Heart sounds: No murmur heard.    No gallop.   Pulmonary:      Effort: Pulmonary effort is normal.      Breath sounds: No wheezing or rales.   Abdominal:      General: Bowel sounds are normal. There is distension.      Tenderness: There is no abdominal tenderness. There is no guarding or rebound.   Musculoskeletal:         General: No swelling. Normal range of motion.      Right lower leg: No edema.      Left lower leg: No edema.   Skin:     General: Skin is warm.   Neurological:      Mental Status: He is alert and oriented to person, place, and time.   Psychiatric:         Mood and Affect: Mood normal.         Behavior: Behavior normal.         Thought Content: Thought content normal.         Judgment: Judgment normal.       Laboratory:  Immunosuppressants       None          CBC:   Recent Labs    Lab 04/22/22  0651   WBC 5.43   RBC 2.39*   HGB 7.3*   HCT 21.7*   PLT 47*   MCV 91   MCH 30.5   MCHC 33.6     CMP:   Recent Labs   Lab 04/22/22  0651   *   CALCIUM 8.9   ALBUMIN 3.7   PROT 6.1   *   K 4.8   CO2 28   CL 99   BUN 41*   CREATININE 2.6*   ALKPHOS 105   ALT 24   AST 43*   BILITOT 4.7*     Coagulation:   Recent Labs   Lab 04/19/22  2209 04/21/22  1007 04/22/22  0651   INR 1.8*   < > 1.7*   APTT 40.2*  --   --     < > = values in this interval not displayed.     Labs within the past 24 hours have been reviewed.    Diagnostic Results:  I have personally reviewed all pertinent imaging studies.    Debility/Functional status: No debility noted.    Assessment/Plan:     * FAMILIA (acute kidney injury)  - Cr 2.6 from baseline ~1  - Cont hydrating with albumin  - Urine sodium < 10  - Hold diuretics   - Neph consulted   - Diagnostic para ordered to r/o SBP      End stage liver disease  - Admitted for liver transplant but case cancelled due to donor quality  - MELD 31      Acquired coagulation factor deficiency  - Due to ESLD  - Likely to improve post transplant      Thrombocytopenia, unspecified  - Due to ESLD  - Likely to improve post transplant      Anemia of chronic disease  - H/H stable. Will continue to monitor with daily cbc.       Hepatic encephalopathy  - Chronic  - Cont home lactulose      VTE Risk Mitigation (From admission, onward)         Ordered     IP VTE HIGH RISK PATIENT  Once         04/19/22 2139     Send SCD stockings and BULMARO hose with patient to OR  Continuous         04/19/22 2139                The patients clinical status was discussed at multidisplinary rounds, involving transplant surgery, transplant medicine, pharmacy, nursing, nutrition, and social work    Discharge Planning: Not a candidate for d/c at this time.     No Patient Care Coordination Note on file.      Sandrine Kay PA-C  Liver Transplant  [facility] - Transplant Step[facility]

## 2022-04-22 NOTE — SUBJECTIVE & OBJECTIVE
Past Medical History:   Diagnosis Date    Arthritis     Cirrhosis of liver     HTN (hypertension)     BRAYDON (obstructive sleep apnea)     Secondary esophageal varices without bleeding 3/18/2022    EGD (2/25/22) showed moderate portal hypertensive gastropathy, small hiatal hernia, grade 1 esophageal varices    Type 2 diabetes mellitus        Past Surgical History:   Procedure Laterality Date    COLONOSCOPY      FINGER AMPUTATION      LIVER TRANSPLANT N/A 4/10/2022    Procedure: TRANSPLANT, LIVER;  Surgeon: Félix Scott MD;  Location: [facility] OR 2ND FLR;  Service: Transplant;  Laterality: N/A;    LIVER TRANSPLANT N/A 4/13/2022    Procedure: TRANSPLANT, LIVER;  Surgeon: Keyon Castillo MD;  Location: [facility] OR [facility] FLR;  Service: Transplant;  Laterality: N/A;    LIVER TRANSPLANT N/A 4/20/2022    Procedure: TRANSPLANT, LIVER;  Surgeon: Brant Gonzalez Jr., MD;  Location: [facility] OR [facility] FLR;  Service: Transplant;  Laterality: N/A;    MEDIAL COLLATERAL LIGAMENT REPAIR, KNEE Bilateral     ROTATRO CUFF REPAIR      TONSILLECTOMY         Review of patient's allergies indicates:   Allergen Reactions    Strawberry Anaphylaxis and Rash    Metformin Diarrhea    Pork/porcine containing products Diarrhea and Nausea And Vomiting     Current Facility-Administered Medications   Medication Frequency    albumin human 25% bottle 50 g Q8H    COVID-19 vac, karen(Pfizer)(PF) (Pfizer COVID-19) 30 mcg/0.3 mL injection 0.3 mL vaccine x 1 dose    lactulose 20 gram/30 mL solution Soln 20 g TID    LIDOcaine HCL 10 mg/ml (1%) 10 mg/mL (1 %) injection     midodrine tablet 15 mg Q8H    mupirocin 2 % ointment On Call Procedure    ondansetron disintegrating tablet 4 mg Q8H PRN    rifAXIMin tablet 550 mg BID    sodium bicarbonate tablet 1,300 mg BID     Family History    None       Tobacco Use    Smoking status: Never Smoker    Smokeless tobacco: Never Used   Substance and Sexual Activity    Alcohol use: Not Currently    Drug use: Not on file    Sexual  activity: Not on file     Review of Systems   Constitutional:  Negative for activity change, appetite change, fatigue and fever.   HENT:  Negative for congestion and facial swelling.    Respiratory:  Negative for cough, shortness of breath, wheezing and stridor.    Cardiovascular:  Negative for chest pain, palpitations and leg swelling.   Gastrointestinal:  Negative for abdominal pain, diarrhea, nausea and vomiting.   Endocrine: Negative.    Genitourinary:  Negative for dysuria and hematuria.   Musculoskeletal: Negative.    Skin: Negative.    Neurological: Negative.    Objective:     Vital Signs (Most Recent):  Temp: 97.9 °F (36.6 °C) (04/22/22 1151)  Pulse: 109 (04/22/22 1400)  Resp: 15 (04/22/22 1400)  BP: 118/71 (04/22/22 1400)  SpO2: 97 % (04/22/22 1400)  O2 Device (Oxygen Therapy): room air (04/22/22 1400) Vital Signs (24h Range):  Temp:  [97.9 °F (36.6 °C)-98.5 °F (36.9 °C)] 97.9 °F (36.6 °C)  Pulse:  [102-114] 109  Resp:  [12-20] 15  SpO2:  [96 %-99 %] 97 %  BP: (106-136)/(64-85) 118/71     Weight: 116.3 kg (256 lb 4.8 oz) (04/22/22 0500)  Body mass index is 40.14 kg/m².  Body surface area is 2.34 meters squared.    I/O last 3 completed shifts:  In: 1144 [P.O.:1044; I.V.:100]  Out: 900 [Urine:900]    Physical Exam  Vitals reviewed.   Constitutional:       General: He is not in acute distress.     Appearance: He is well-developed.   HENT:      Head: Normocephalic and atraumatic.   Eyes:      Pupils: Pupils are equal, round, and reactive to light.   Neck:      Vascular: No JVD.   Cardiovascular:      Rate and Rhythm: Normal rate and regular rhythm.      Heart sounds: Normal heart sounds. No murmur heard.  Pulmonary:      Effort: Pulmonary effort is normal. No respiratory distress.      Breath sounds: Normal breath sounds. No wheezing or rales.   Abdominal:      General: Bowel sounds are normal. There is distension (ascites).      Palpations: Abdomen is soft.      Tenderness: There is no abdominal tenderness.    Musculoskeletal:         General: No deformity. Normal range of motion.      Cervical back: Normal range of motion and neck supple.   Skin:     General: Skin is warm.   Neurological:      Mental Status: He is alert and oriented to person, place, and time.       Significant Labs:  BMP:   Recent Labs   Lab 04/22/22  0651   *   *   K 4.8   CL 99   CO2 28   BUN 41*   CREATININE 2.6*   CALCIUM 8.9   MG 2.4     CBC:   Recent Labs   Lab 04/22/22  0651   WBC 5.43   RBC 2.39*   HGB 7.3*   HCT 21.7*   PLT 47*   MCV 91   MCH 30.5   MCHC 33.6     CMP:   Recent Labs   Lab 04/22/22  0651   *   CALCIUM 8.9   ALBUMIN 3.7   PROT 6.1   *   K 4.8   CO2 28   CL 99   BUN 41*   CREATININE 2.6*   ALKPHOS 105   ALT 24   AST 43*   BILITOT 4.7*     All labs within the past 24 hours have been reviewed.    Significant Imaging:

## 2022-04-22 NOTE — ASSESSMENT & PLAN NOTE
Baseline sCr of  1.3-1.5, admission Cr at 2.3  > currently Cr at 2.6      - UA shows protein 21 WBC 5 RBC and hyaline casts  - UPCR  0.2 g/g  - Urine spun today: shows some muddy brown casts consistent with ATN    Ddx include : HRS, ATN secondary to hypovolemia from recent paracentesis but also patient received IV Vancomycin (total of 7 gr in 2 days, 4/13 and 4/14 on recent admission)  Etiology most likely due to vancomycin toxicity.    - Volume status: appears hypervolemic but stable on RA  - Electrolytes:  K   4.8  - Acid/Base:Co2 at 28    Plan:  · No need for RRT at this time.  · Ok to continue albumin, midodrine and octreotide for now  · Tomorrow  would discontinue triple therapy as this is probably ATN 2/2 vancomycin.  · Daily BMP  · Monitor electrolytes closely.  · Avoid nephrotoxic medications, NSAIDs, IV contrast, etc.  · Medication doses adjusted to GFR  · Maintain MAP > 65  ;

## 2022-04-22 NOTE — CONSULTS
[facility] - Transplant Stepdown  Nephrology  Consult Note    Patient Name: Pelon Vasquez  MRN: [id number]  Admission Date: 4/19/2022  Hospital Length of Stay: 2 days  Attending Provider: Jimmie Leon MD   Primary Care Physician: Primary Doctor No  Principal Problem:FAMILIA (acute kidney injury)    Inpatient consult to Nephrology  Consult performed by: Mauricio Upton MD  Consult ordered by: Sandrine Kay PA-C        Subjective:     HPI: 56 YO male with ESLD 2/2 LOZANO and ETOH abuse related cirrhosis, ascites requiring paracentesis every 2 weeks (last one was last Tuesday), CKD 3 (baseline 1.3-1.5), recent admission rom 4/13 to 4/17 for transplant but cancelled due to fever of unknown etiology that has now resolved, re- admitted for liver transplant surgery. Surgery was cancelled due to organ quality, but noted to have an elevated creatinine. Nephrology consulted for FAMILIA on CKD.    Patient currently denies any symptoms. During his recent admission he received IV Vancomycin/Cefepime for sepsis. Denies any NSAID use, vomiting, dehydration. He does have frequent bms 2/2 lactulose but this has not increased. Denies recent GIB. Last Paracentesis was last Tuesday per patient with 9L of fluid removal.       Past Medical History:   Diagnosis Date    Arthritis     Cirrhosis of liver     HTN (hypertension)     BRAYDON (obstructive sleep apnea)     Secondary esophageal varices without bleeding 3/18/2022    EGD (2/25/22) showed moderate portal hypertensive gastropathy, small hiatal hernia, grade 1 esophageal varices    Type 2 diabetes mellitus        Past Surgical History:   Procedure Laterality Date    COLONOSCOPY      FINGER AMPUTATION      LIVER TRANSPLANT N/A 4/10/2022    Procedure: TRANSPLANT, LIVER;  Surgeon: Félix Scott MD;  Location: [facility] OR [address] [place] [address];  Service: Transplant;  Laterality: N/A;    LIVER TRANSPLANT N/A 4/13/2022    Procedure: TRANSPLANT, LIVER;  Surgeon: Keyon Castillo MD;  Location: [facility] OR  2ND FLR;  Service: Transplant;  Laterality: N/A;    LIVER TRANSPLANT N/A 4/20/2022    Procedure: TRANSPLANT, LIVER;  Surgeon: Brant Gonzalez Jr., MD;  Location: [facility] OR 2ND FLR;  Service: Transplant;  Laterality: N/A;    MEDIAL COLLATERAL LIGAMENT REPAIR, KNEE Bilateral     ROTATRO CUFF REPAIR      TONSILLECTOMY         Review of patient's allergies indicates:   Allergen Reactions    Strawberry Anaphylaxis and Rash    Metformin Diarrhea    Pork/porcine containing products Diarrhea and Nausea And Vomiting     Current Facility-Administered Medications   Medication Frequency    albumin human 25% bottle 50 g Q8H    COVID-19 vac, karen(Pfizer)(PF) (Pfizer COVID-19) 30 mcg/0.3 mL injection 0.3 mL vaccine x 1 dose    lactulose 20 gram/30 mL solution Soln 20 g TID    LIDOcaine HCL 10 mg/ml (1%) 10 mg/mL (1 %) injection     midodrine tablet 15 mg Q8H    mupirocin 2 % ointment On Call Procedure    ondansetron disintegrating tablet 4 mg Q8H PRN    rifAXIMin tablet 550 mg BID    sodium bicarbonate tablet 1,300 mg BID     Family History    None       Tobacco Use    Smoking status: Never Smoker    Smokeless tobacco: Never Used   Substance and Sexual Activity    Alcohol use: Not Currently    Drug use: Not on file    Sexual activity: Not on file     Review of Systems   Constitutional:  Negative for activity change, appetite change, fatigue and fever.   HENT:  Negative for congestion and facial swelling.    Respiratory:  Negative for cough, shortness of breath, wheezing and stridor.    Cardiovascular:  Negative for chest pain, palpitations and leg swelling.   Gastrointestinal:  Negative for abdominal pain, diarrhea, nausea and vomiting.   Endocrine: Negative.    Genitourinary:  Negative for dysuria and hematuria.   Musculoskeletal: Negative.    Skin: Negative.    Neurological: Negative.    Objective:     Vital Signs (Most Recent):  Temp: 97.9 °F (36.6 °C) (04/22/22 1151)  Pulse: 109 (04/22/22 1400)  Resp: 15  (04/22/22 1400)  BP: 118/71 (04/22/22 1400)  SpO2: 97 % (04/22/22 1400)  O2 Device (Oxygen Therapy): room air (04/22/22 1400) Vital Signs (24h Range):  Temp:  [97.9 °F (36.6 °C)-98.5 °F (36.9 °C)] 97.9 °F (36.6 °C)  Pulse:  [102-114] 109  Resp:  [12-20] 15  SpO2:  [96 %-99 %] 97 %  BP: (106-136)/(64-85) 118/71     Weight: 116.3 kg (256 lb 4.8 oz) (04/22/22 0500)  Body mass index is 40.14 kg/m².  Body surface area is 2.34 meters squared.    I/O last 3 completed shifts:  In: 1144 [P.O.:1044; I.V.:100]  Out: 900 [Urine:900]    Physical Exam  Vitals reviewed.   Constitutional:       General: He is not in acute distress.     Appearance: He is well-developed.   HENT:      Head: Normocephalic and atraumatic.   Eyes:      Pupils: Pupils are equal, round, and reactive to light.   Neck:      Vascular: No JVD.   Cardiovascular:      Rate and Rhythm: Normal rate and regular rhythm.      Heart sounds: Normal heart sounds. No murmur heard.  Pulmonary:      Effort: Pulmonary effort is normal. No respiratory distress.      Breath sounds: Normal breath sounds. No wheezing or rales.   Abdominal:      General: Bowel sounds are normal. There is distension (ascites).      Palpations: Abdomen is soft.      Tenderness: There is no abdominal tenderness.   Musculoskeletal:         General: No deformity. Normal range of motion.      Cervical back: Normal range of motion and neck supple.   Skin:     General: Skin is warm.   Neurological:      Mental Status: He is alert and oriented to person, place, and time.       Significant Labs:  BMP:   Recent Labs   Lab 04/22/22  0651   *   *   K 4.8   CL 99   CO2 28   BUN 41*   CREATININE 2.6*   CALCIUM 8.9   MG 2.4     CBC:   Recent Labs   Lab 04/22/22  0651   WBC 5.43   RBC 2.39*   HGB 7.3*   HCT 21.7*   PLT 47*   MCV 91   MCH 30.5   MCHC 33.6     CMP:   Recent Labs   Lab 04/22/22  0651   *   CALCIUM 8.9   ALBUMIN 3.7   PROT 6.1   *   K 4.8   CO2 28   CL 99   BUN 41*    CREATININE 2.6*   ALKPHOS 105   ALT 24   AST 43*   BILITOT 4.7*     All labs within the past 24 hours have been reviewed.    Significant Imaging:      Assessment/Plan:     * FAMILIA (acute kidney injury)  Baseline sCr of  1.3-1.5, admission Cr at 2.3  > currently Cr at 2.6      - UA shows protein 21 WBC 5 RBC and hyaline casts  - UPCR  0.2 g/g  - Urine spun today: shows some muddy brown casts consistent with ATN    Ddx include : HRS, ATN secondary to hypovolemia from recent paracentesis but also patient received IV Vancomycin (total of 7 gr in 2 days, 4/13 and 4/14 on recent admission)  Etiology most likely due to vancomycin toxicity.    - Volume status: appears hypervolemic but stable on RA  - Electrolytes:  K   4.8  - Acid/Base:Co2 at 28    Plan:  · No need for RRT at this time.  · Ok to continue albumin, midodrine and octreotide for now  · Tomorrow  would discontinue triple therapy as this is probably ATN 2/2 vancomycin.  · Daily BMP  · Monitor electrolytes closely.  · Avoid nephrotoxic medications, NSAIDs, IV contrast, etc.  · Medication doses adjusted to GFR  · Maintain MAP > 65  ;      End stage liver disease  Admitted for liver transplant, cancelled due to organ quality.  MELD-Na score: 28 at 4/22/2022  6:51 AM  MELD score: 27 at 4/22/2022  6:51 AM  Calculated from:  Serum Creatinine: 2.6 mg/dL at 4/22/2022  6:51 AM  Serum Sodium: 135 mmol/L at 4/22/2022  6:51 AM  Total Bilirubin: 4.7 mg/dL at 4/22/2022  6:51 AM  INR(ratio): 1.7 at 4/22/2022  6:51 AM  Age: 57 years          Thank you for your consult. I will follow-up with patient. Please contact us if you have any additional questions.    Mauricio Upton MD  Nephrology  [facility] - Transplant Stepdown

## 2022-04-22 NOTE — HPI
56 YO male with ESLD 2/2 LOZANO and ETOH abuse related cirrhosis, ascites requiring paracentesis every 2 weeks (last one was last Tuesday), CKD 3 (baseline 1.3-1.5), recent admission rom 4/13 to 4/17 for transplant but cancelled due to fever of unknown etiology that has now resolved, re- admitted for liver transplant surgery. Surgery was cancelled due to organ quality, but noted to have an elevated creatinine. Nephrology consulted for FAMILIA on CKD.    Patient currently denies any symptoms. During his recent admission he received IV Vancomycin/Cefepime for sepsis. Denies any NSAID use, vomiting, dehydration. He does have frequent bms 2/2 lactulose but this has not increased. Denies recent GIB. Last Paracentesis was last Tuesday per patient with 9L of fluid removal.

## 2022-04-22 NOTE — SUBJECTIVE & OBJECTIVE
Scheduled Meds:   albumin human 25%  50 g Intravenous Q8H    lactulose  20 g Oral TID    midodrine  15 mg Oral Q8H    rifAXIMin  550 mg Oral BID    sodium bicarbonate  1,300 mg Oral BID     Continuous Infusions:  PRN Meds:sodium chloride, sodium chloride, ampicillin-sulbactim (UNASYN) IVPB, sars-cov-2 (covid-19), methylPREDNISolone sodium succinate, mupirocin, ondansetron    Review of Systems   Constitutional:  Negative for activity change and appetite change.   Respiratory:  Negative for shortness of breath.    Cardiovascular:  Negative for chest pain.   Gastrointestinal:  Positive for abdominal distention. Negative for abdominal pain, constipation, diarrhea, nausea and vomiting.   Genitourinary:  Negative for decreased urine volume, difficulty urinating and hematuria.   Neurological:  Negative for dizziness, weakness and light-headedness.   Psychiatric/Behavioral:  Negative for agitation and decreased concentration.    Objective:     Vital Signs (Most Recent):  Temp: 98.2 °F (36.8 °C) (04/22/22 0725)  Pulse: 102 (04/22/22 0725)  Resp: 20 (04/22/22 0725)  BP: 121/73 (04/22/22 0725)  SpO2: 97 % (04/22/22 0725) Vital Signs (24h Range):  Temp:  [98.2 °F (36.8 °C)-98.5 °F (36.9 °C)] 98.2 °F (36.8 °C)  Pulse:  [] 102  Resp:  [12-20] 20  SpO2:  [96 %-99 %] 97 %  BP: (121-143)/(73-90) 121/73     Weight: 116.3 kg (256 lb 4.8 oz)  Body mass index is 40.14 kg/m².    Intake/Output - Last 3 Shifts         04/20 0700 04/21 0659 04/21 0700 04/22 0659 04/22 0700 04/23 0659    P.O.  1044     I.V. (mL/kg)  100 (0.9)     Total Intake(mL/kg)  1144 (9.8)     Urine (mL/kg/hr)  900 (0.3)     Stool  0     Total Output  900     Net  +244            Urine Occurrence 3 x 4 x     Stool Occurrence 4 x 7 x     Emesis Occurrence 0 x              Physical Exam  Vitals and nursing note reviewed.   Eyes:      Pupils: Pupils are equal, round, and reactive to light.   Cardiovascular:      Rate and Rhythm: Normal rate and regular rhythm.       Heart sounds: No murmur heard.    No gallop.   Pulmonary:      Effort: Pulmonary effort is normal.      Breath sounds: No wheezing or rales.   Abdominal:      General: Bowel sounds are normal. There is distension.      Tenderness: There is no abdominal tenderness. There is no guarding or rebound.   Musculoskeletal:         General: No swelling. Normal range of motion.      Right lower leg: No edema.      Left lower leg: No edema.   Skin:     General: Skin is warm.   Neurological:      Mental Status: He is alert and oriented to person, place, and time.   Psychiatric:         Mood and Affect: Mood normal.         Behavior: Behavior normal.         Thought Content: Thought content normal.         Judgment: Judgment normal.       Laboratory:  Immunosuppressants       None          CBC:   Recent Labs   Lab 04/22/22  0651   WBC 5.43   RBC 2.39*   HGB 7.3*   HCT 21.7*   PLT 47*   MCV 91   MCH 30.5   MCHC 33.6     CMP:   Recent Labs   Lab 04/22/22  0651   *   CALCIUM 8.9   ALBUMIN 3.7   PROT 6.1   *   K 4.8   CO2 28   CL 99   BUN 41*   CREATININE 2.6*   ALKPHOS 105   ALT 24   AST 43*   BILITOT 4.7*     Coagulation:   Recent Labs   Lab 04/19/22  2209 04/21/22  1007 04/22/22  0651   INR 1.8*   < > 1.7*   APTT 40.2*  --   --     < > = values in this interval not displayed.     Labs within the past 24 hours have been reviewed.    Diagnostic Results:  I have personally reviewed all pertinent imaging studies.    Debility/Functional status: No debility noted.

## 2022-04-22 NOTE — PLAN OF CARE
Patient AAOx3, no distress noted, respirations even and unlabored, vss, will continue to monitor. Acceptance of education, consents signed, H/P done. Labs reviewed. Patient in MPU Room 4 for paracentesis. No sedation to be administered; local anesthetic only.

## 2022-04-22 NOTE — PLAN OF CARE
Pt aaox4 vswnl and no c/o pain. Pt ambulates independently. Creatine increased to 2.3 today. Albumin given per order. =3 BLE edema.

## 2022-04-22 NOTE — PROCEDURES
Radiology Post-Procedure Note    Pre Op Diagnosis: Ascites  Post Op Diagnosis: Same    Procedure: Ultrasound Guided Paracentesis    Procedure performed by: Lj Ellis MD    Written Informed Consent Obtained: Yes  Specimen Removed: YES cloudy yellow  Estimated Blood Loss: Minimal    Findings:   Successful R-sided US-guided paracentesis.  Albumin administered PRN per protocol.    Patient tolerated procedure well.    Lj Ellis MD PGY2  Department of Radiology  [facility]

## 2022-04-22 NOTE — PLAN OF CARE
Patient AAOx3, no distress noted, respirations even and unlabored, vss, will continue to monitor. Right abdominal site clean, dry, and intact; no bleeding or hematoma noted. Patient to be transferred back to Bed [id number] via patient transporter. Patient stable for transport. Report called to GRANT Melissa.

## 2022-04-23 ENCOUNTER — ANESTHESIA (OUTPATIENT)
Dept: SURGERY | Facility: HOSPITAL | Age: 57
DRG: 005 | End: 2022-04-23
Payer: COMMERCIAL

## 2022-04-23 ENCOUNTER — ANESTHESIA EVENT (OUTPATIENT)
Dept: SURGERY | Facility: HOSPITAL | Age: 57
DRG: 005 | End: 2022-04-23
Payer: COMMERCIAL

## 2022-04-23 PROBLEM — K70.31 ALCOHOLIC CIRRHOSIS OF LIVER WITH ASCITES: Status: ACTIVE | Noted: 2022-03-18

## 2022-04-23 LAB
ABO + RH BLD: NORMAL
ALBUMIN SERPL BCP-MCNC: 3.9 G/DL (ref 3.5–5.2)
ALP SERPL-CCNC: 82 U/L (ref 55–135)
ALT SERPL W/O P-5'-P-CCNC: 18 U/L (ref 10–44)
ANION GAP SERPL CALC-SCNC: 10 MMOL/L (ref 8–16)
AST SERPL-CCNC: 31 U/L (ref 10–40)
BASOPHILS # BLD AUTO: 0.03 K/UL (ref 0–0.2)
BASOPHILS NFR BLD: 0.5 % (ref 0–1.9)
BILIRUB SERPL-MCNC: 4.8 MG/DL (ref 0.1–1)
BLD GP AB SCN CELLS X3 SERPL QL: NORMAL
BLD PROD TYP BPU: NORMAL
BLOOD UNIT EXPIRATION DATE: NORMAL
BLOOD UNIT TYPE CODE: 9500
BLOOD UNIT TYPE: NORMAL
BUN SERPL-MCNC: 42 MG/DL (ref 6–20)
CALCIUM SERPL-MCNC: 8.9 MG/DL (ref 8.7–10.5)
CHLORIDE SERPL-SCNC: 99 MMOL/L (ref 95–110)
CO2 SERPL-SCNC: 26 MMOL/L (ref 23–29)
CODING SYSTEM: NORMAL
CREAT SERPL-MCNC: 2.7 MG/DL (ref 0.5–1.4)
DIFFERENTIAL METHOD: ABNORMAL
DISPENSE STATUS: NORMAL
EOSINOPHIL # BLD AUTO: 0.4 K/UL (ref 0–0.5)
EOSINOPHIL NFR BLD: 5.7 % (ref 0–8)
ERYTHROCYTE [DISTWIDTH] IN BLOOD BY AUTOMATED COUNT: 20 % (ref 11.5–14.5)
EST. GFR  (AFRICAN AMERICAN): 29.3 ML/MIN/1.73 M^2
EST. GFR  (NON AFRICAN AMERICAN): 25.4 ML/MIN/1.73 M^2
GLUCOSE SERPL-MCNC: 182 MG/DL (ref 70–110)
HCT VFR BLD AUTO: 20.5 % (ref 40–54)
HGB BLD-MCNC: 6.8 G/DL (ref 14–18)
IMM GRANULOCYTES # BLD AUTO: 0.02 K/UL (ref 0–0.04)
IMM GRANULOCYTES NFR BLD AUTO: 0.3 % (ref 0–0.5)
INR PPP: 1.8 (ref 0.8–1.2)
LYMPHOCYTES # BLD AUTO: 0.7 K/UL (ref 1–4.8)
LYMPHOCYTES NFR BLD: 10.8 % (ref 18–48)
MAGNESIUM SERPL-MCNC: 2.5 MG/DL (ref 1.6–2.6)
MCH RBC QN AUTO: 30 PG (ref 27–31)
MCHC RBC AUTO-ENTMCNC: 33.2 G/DL (ref 32–36)
MCV RBC AUTO: 90 FL (ref 82–98)
MONOCYTES # BLD AUTO: 0.9 K/UL (ref 0.3–1)
MONOCYTES NFR BLD: 14.4 % (ref 4–15)
NEUTROPHILS # BLD AUTO: 4.2 K/UL (ref 1.8–7.7)
NEUTROPHILS NFR BLD: 68.3 % (ref 38–73)
NRBC BLD-RTO: 0 /100 WBC
PHOSPHATE SERPL-MCNC: 3.4 MG/DL (ref 2.7–4.5)
PLATELET # BLD AUTO: 45 K/UL (ref 150–450)
PMV BLD AUTO: 10.6 FL (ref 9.2–12.9)
POTASSIUM SERPL-SCNC: 5.1 MMOL/L (ref 3.5–5.1)
PROT SERPL-MCNC: 5.6 G/DL (ref 6–8.4)
PROTHROMBIN TIME: 18.6 SEC (ref 9–12.5)
RBC # BLD AUTO: 2.27 M/UL (ref 4.6–6.2)
SODIUM SERPL-SCNC: 135 MMOL/L (ref 136–145)
TRANS ERYTHROCYTES VOL PATIENT: NORMAL ML
WBC # BLD AUTO: 6.19 K/UL (ref 3.9–12.7)

## 2022-04-23 PROCEDURE — 27800506 HC CATH, RAPID INFUSION (7.5&8.5): Mod: NTX | Performed by: ANESTHESIOLOGY

## 2022-04-23 PROCEDURE — 27201423 OPTIME MED/SURG SUP & DEVICES STERILE SUPPLY: Performed by: TRANSPLANT SURGERY

## 2022-04-23 PROCEDURE — 47135 TRANSPLANTATION OF LIVER: CPT | Mod: ,,, | Performed by: TRANSPLANT SURGERY

## 2022-04-23 PROCEDURE — D9220A PRA ANESTHESIA: Mod: CRNA,,, | Performed by: NURSE ANESTHETIST, CERTIFIED REGISTERED

## 2022-04-23 PROCEDURE — 27100021 HC MULTIPORT INFUSION MANIFOLD: Mod: NTX | Performed by: ANESTHESIOLOGY

## 2022-04-23 PROCEDURE — 80053 COMPREHEN METABOLIC PANEL: CPT | Mod: NTX | Performed by: PHYSICIAN ASSISTANT

## 2022-04-23 PROCEDURE — 99233 SBSQ HOSP IP/OBS HIGH 50: CPT | Mod: NTX,,, | Performed by: STUDENT IN AN ORGANIZED HEALTH CARE EDUCATION/TRAINING PROGRAM

## 2022-04-23 PROCEDURE — 85025 COMPLETE CBC W/AUTO DIFF WBC: CPT | Mod: NTX | Performed by: PHYSICIAN ASSISTANT

## 2022-04-23 PROCEDURE — 36415 COLL VENOUS BLD VENIPUNCTURE: CPT | Mod: NTX | Performed by: PHYSICIAN ASSISTANT

## 2022-04-23 PROCEDURE — D9220A PRA ANESTHESIA: ICD-10-PCS | Mod: CRNA,,, | Performed by: NURSE ANESTHETIST, CERTIFIED REGISTERED

## 2022-04-23 PROCEDURE — 36415 COLL VENOUS BLD VENIPUNCTURE: CPT | Mod: NTX | Performed by: NURSE PRACTITIONER

## 2022-04-23 PROCEDURE — 37000009 HC ANESTHESIA EA ADD 15 MINS: Performed by: TRANSPLANT SURGERY

## 2022-04-23 PROCEDURE — 93010 EKG 12-LEAD: ICD-10-PCS | Mod: ,,, | Performed by: INTERNAL MEDICINE

## 2022-04-23 PROCEDURE — C1751 CATH, INF, PER/CENT/MIDLINE: HCPCS | Mod: NTX | Performed by: ANESTHESIOLOGY

## 2022-04-23 PROCEDURE — 99233 PR SUBSEQUENT HOSPITAL CARE,LEVL III: ICD-10-PCS | Mod: NTX,,, | Performed by: STUDENT IN AN ORGANIZED HEALTH CARE EDUCATION/TRAINING PROGRAM

## 2022-04-23 PROCEDURE — 27200656 HC CATH, FEMORAL ARTERY: Mod: NTX | Performed by: ANESTHESIOLOGY

## 2022-04-23 PROCEDURE — C1729 CATH, DRAINAGE: HCPCS | Performed by: TRANSPLANT SURGERY

## 2022-04-23 PROCEDURE — 20600001 HC STEP DOWN PRIVATE ROOM: Mod: NTX

## 2022-04-23 PROCEDURE — 83735 ASSAY OF MAGNESIUM: CPT | Mod: NTX | Performed by: PHYSICIAN ASSISTANT

## 2022-04-23 PROCEDURE — 36556 INSERT NON-TUNNEL CV CATH: CPT | Mod: 59,,, | Performed by: ANESTHESIOLOGY

## 2022-04-23 PROCEDURE — 47135 TRANSPLANTATION OF LIVER: CPT | Mod: 82,,, | Performed by: TRANSPLANT SURGERY

## 2022-04-23 PROCEDURE — 93010 ELECTROCARDIOGRAM REPORT: CPT | Mod: ,,, | Performed by: INTERNAL MEDICINE

## 2022-04-23 PROCEDURE — 85610 PROTHROMBIN TIME: CPT | Mod: NTX | Performed by: PHYSICIAN ASSISTANT

## 2022-04-23 PROCEDURE — 93503 PR INSERT/PLACE FLOW DIRECT CATH: ICD-10-PCS | Mod: 59,,, | Performed by: ANESTHESIOLOGY

## 2022-04-23 PROCEDURE — 93503 INSERT/PLACE HEART CATHETER: CPT | Mod: 59,,, | Performed by: ANESTHESIOLOGY

## 2022-04-23 PROCEDURE — 12000002 HC ACUTE/MED SURGE SEMI-PRIVATE ROOM

## 2022-04-23 PROCEDURE — 47135 PR TRANSPLANT LIVER,ALLOTRANSPLANT: ICD-10-PCS | Mod: 82,,, | Performed by: TRANSPLANT SURGERY

## 2022-04-23 PROCEDURE — 36620 PR INSERT CATH,ART,PERCUT,SHORTTERM: ICD-10-PCS | Mod: 59,,, | Performed by: ANESTHESIOLOGY

## 2022-04-23 PROCEDURE — 87086 URINE CULTURE/COLONY COUNT: CPT | Performed by: NURSE PRACTITIONER

## 2022-04-23 PROCEDURE — 47143 PR TRANSPLANT,PREP DONOR LIVER, WHOLE: ICD-10-PCS | Mod: 51,,, | Performed by: TRANSPLANT SURGERY

## 2022-04-23 PROCEDURE — P9021 RED BLOOD CELLS UNIT: HCPCS | Performed by: NURSE PRACTITIONER

## 2022-04-23 PROCEDURE — D9220A PRA ANESTHESIA: Mod: ANES,,, | Performed by: ANESTHESIOLOGY

## 2022-04-23 PROCEDURE — 84100 ASSAY OF PHOSPHORUS: CPT | Mod: NTX | Performed by: PHYSICIAN ASSISTANT

## 2022-04-23 PROCEDURE — C1894 INTRO/SHEATH, NON-LASER: HCPCS | Mod: NTX | Performed by: ANESTHESIOLOGY

## 2022-04-23 PROCEDURE — 36000931 HC OR TIME LEV VII EA ADD 15 MIN: Performed by: TRANSPLANT SURGERY

## 2022-04-23 PROCEDURE — 36000930 HC OR TIME LEV VII 1ST 15 MIN: Performed by: TRANSPLANT SURGERY

## 2022-04-23 PROCEDURE — 93005 ELECTROCARDIOGRAM TRACING: CPT

## 2022-04-23 PROCEDURE — 27800505 HC CATH, RADIAL ARTERY KIT: Mod: NTX | Performed by: ANESTHESIOLOGY

## 2022-04-23 PROCEDURE — 36620 INSERTION CATHETER ARTERY: CPT | Mod: 59,,, | Performed by: ANESTHESIOLOGY

## 2022-04-23 PROCEDURE — 63600175 PHARM REV CODE 636 W HCPCS: Performed by: NURSE ANESTHETIST, CERTIFIED REGISTERED

## 2022-04-23 PROCEDURE — 27100025 HC TUBING, SET FLUID WARMER: Mod: NTX | Performed by: ANESTHESIOLOGY

## 2022-04-23 PROCEDURE — 47135 PR TRANSPLANT LIVER,ALLOTRANSPLANT: ICD-10-PCS | Mod: ,,, | Performed by: TRANSPLANT SURGERY

## 2022-04-23 PROCEDURE — 37000008 HC ANESTHESIA 1ST 15 MINUTES: Performed by: TRANSPLANT SURGERY

## 2022-04-23 PROCEDURE — 36556 PR INSERT NON-TUNNEL CV CATH 5+ YRS OLD: ICD-10-PCS | Mod: 59,,, | Performed by: ANESTHESIOLOGY

## 2022-04-23 PROCEDURE — 86901 BLOOD TYPING SEROLOGIC RH(D): CPT | Mod: 91 | Performed by: NURSE PRACTITIONER

## 2022-04-23 PROCEDURE — 86920 COMPATIBILITY TEST SPIN: CPT | Mod: NTX | Performed by: NURSE PRACTITIONER

## 2022-04-23 PROCEDURE — 25000003 PHARM REV CODE 250: Mod: NTX | Performed by: PHYSICIAN ASSISTANT

## 2022-04-23 PROCEDURE — D9220A PRA ANESTHESIA: ICD-10-PCS | Mod: ANES,,, | Performed by: ANESTHESIOLOGY

## 2022-04-23 RX ORDER — METHYLPREDNISOLONE SODIUM SUCCINATE 500 MG/8ML
500 INJECTION INTRAMUSCULAR; INTRAVENOUS
Status: DISCONTINUED | OUTPATIENT
Start: 2022-04-23 | End: 2022-04-24 | Stop reason: HOSPADM

## 2022-04-23 RX ORDER — MIDAZOLAM HYDROCHLORIDE 1 MG/ML
INJECTION INTRAMUSCULAR; INTRAVENOUS
Status: DISCONTINUED | OUTPATIENT
Start: 2022-04-23 | End: 2022-04-24

## 2022-04-23 RX ORDER — MUPIROCIN 20 MG/G
OINTMENT TOPICAL
Status: DISCONTINUED | OUTPATIENT
Start: 2022-04-23 | End: 2022-04-24 | Stop reason: HOSPADM

## 2022-04-23 RX ORDER — HYDROCODONE BITARTRATE AND ACETAMINOPHEN 500; 5 MG/1; MG/1
TABLET ORAL
Status: DISCONTINUED | OUTPATIENT
Start: 2022-04-23 | End: 2022-04-25

## 2022-04-23 RX ORDER — HYDROCODONE BITARTRATE AND ACETAMINOPHEN 500; 5 MG/1; MG/1
TABLET ORAL
Status: DISCONTINUED | OUTPATIENT
Start: 2022-04-23 | End: 2022-04-24 | Stop reason: HOSPADM

## 2022-04-23 RX ADMIN — MIDAZOLAM HYDROCHLORIDE 2 MG: 1 INJECTION, SOLUTION INTRAMUSCULAR; INTRAVENOUS at 11:04

## 2022-04-23 RX ADMIN — RIFAXIMIN 550 MG: 550 TABLET ORAL at 08:04

## 2022-04-23 RX ADMIN — MIDODRINE HYDROCHLORIDE 15 MG: 5 TABLET ORAL at 03:04

## 2022-04-23 RX ADMIN — SODIUM CHLORIDE, SODIUM GLUCONATE, SODIUM ACETATE, POTASSIUM CHLORIDE, MAGNESIUM CHLORIDE, SODIUM PHOSPHATE, DIBASIC, AND POTASSIUM PHOSPHATE: .53; .5; .37; .037; .03; .012; .00082 INJECTION, SOLUTION INTRAVENOUS at 11:04

## 2022-04-23 RX ADMIN — SODIUM BICARBONATE 650 MG TABLET 1300 MG: at 08:04

## 2022-04-23 RX ADMIN — LACTULOSE 20 G: 20 SOLUTION ORAL at 08:04

## 2022-04-23 NOTE — ANESTHESIA PREPROCEDURE EVALUATION
[facility]  Anesthesia Pre-Operative Evaluation         Patient Name: Pelon Vasquez  YOB: 1965  MRN: [id number]    SUBJECTIVE:     Pre-operative evaluation for Procedure(s) (LRB):  TRANSPLANT, LIVER (N/A)     04/23/2022    Pelon Vasquez is a 57 y.o. male with a PMHx significant for EtOH/LOZANO cirrhosis, esophageal varices, obesity (BMI 42), CKD, HTN, DM2, and BRAYDON presenting for liver transplant. He is s/p diagnostic paracentesis on 4/22 with 4800mL of fluid removed. Patient currently has FAMILIA likely in setting of vancomycin toxicity, per Nephrology note. Plan for intraoperative dialysis.     Patient now presents for the above procedure(s).    MELD-Na score: 29 at 4/23/2022  6:12 AM  MELD score: 28 at 4/23/2022  6:12 AM  Calculated from:  Serum Creatinine: 2.7 mg/dL at 4/23/2022  6:12 AM  Serum Sodium: 135 mmol/L at 4/23/2022  6:12 AM  Total Bilirubin: 4.8 mg/dL at 4/23/2022  6:12 AM  INR(ratio): 1.8 at 4/23/2022  6:12 AM  Age: 57 years     Cardiac PET Stress 3/24/22    The myocardial perfusion images are normal without evidence of ischemia or scar.    The whole heart absolute myocardial perfusion values averaged 1.04 cc/min/g at rest, which is elevated; 1.33 cc/min/g at stress, which is mildly reduced; and CFR is 1.28 , which is moderately reduced in part due to elevated resting flow.    CT attenuation images demonstrate moderate diffuse coronary calcifications in the LAD and LCX territory and no aortic calcifications.    The gated perfusion images showed an ejection fraction of 72% at rest and 76% during stress. A normal ejection fraction is greater than 53%.    The wall motion is normal at rest and during stress.    The LV cavity size is normal at rest and stress.    The EKG portion of this study is negative for ischemia.    There were no arrhythmias during stress.    The patient reported no chest pain during the stress test.         LDA:       Peripheral IV - Single Lumen  04/21/22 22 G Right Forearm (Active)   Site Assessment Clean;Dry;Intact 04/23/22 0352   Extremity Assessment Distal to IV No abnormal discoloration;No redness;No swelling 04/22/22 0725   Line Status Saline locked 04/22/22 2000   Dressing Status Clean;Dry;Intact 04/22/22 2000   Dressing Intervention Integrity maintained 04/22/22 2000   Dressing Change Due 04/25/22 04/22/22 0725   Site Change Due 04/25/22 04/22/22 0725   Reason Not Rotated Not due 04/22/22 2000   Number of days: 2       Prev airway: None documented.    Drips: None documented.      Patient Active Problem List   Diagnosis    Hepatic encephalopathy    Controlled type 2 diabetes mellitus, without long-term current use of insulin    FAMILIA (acute kidney injury)    Secondary esophageal varices without bleeding    Ascites due to alcoholic cirrhosis    Liver transplant candidate    Anemia of chronic disease    End stage liver disease    Acquired circulating anticoagulants    Thrombocytopenia, unspecified    Acquired coagulation factor deficiency       Review of patient's allergies indicates:   Allergen Reactions    Strawberry Anaphylaxis and Rash    Metformin Diarrhea    Pork/porcine containing products Diarrhea and Nausea And Vomiting       Current Inpatient Medications:   lactulose  20 g Oral TID    midodrine  15 mg Oral Q8H    rifAXIMin  550 mg Oral BID    sodium bicarbonate  1,300 mg Oral BID       No current facility-administered medications on file prior to encounter.     Current Outpatient Medications on File Prior to Encounter   Medication Sig Dispense Refill    furosemide (LASIX) 40 MG tablet Take 1 tablet (40 mg total) by mouth once daily. 30 tablet 5    lactulose (CHRONULAC) 10 gram/15 mL solution Take 30 mLs by mouth 3 (three) times daily.      midodrine (PROAMATINE) 5 MG Tab Take 3 tablets (15 mg total) by mouth every 8 (eight) hours. 270 tablet 11    ondansetron (ZOFRAN-ODT) 4 MG TbDL Take 1 tablet by mouth every 8 (eight)  hours as needed (NAUSEA).      pantoprazole (PROTONIX) 40 MG tablet Take 40 mg by mouth once daily.      sodium bicarbonate 650 MG tablet Take 2 tablets (1,300 mg total) by mouth 2 (two) times daily. 120 tablet 11    spironolactone (ALDACTONE) 100 MG tablet Take 1 tablet (100 mg total) by mouth once daily. 30 tablet 5    XIFAXAN 550 mg Tab Take 550 mg by mouth 2 (two) times daily.      zinc sulfate 50 mg zinc (220 mg) Tab tablet Take 1 tablet (220 mg total) by mouth once daily. 90 tablet 1    [DISCONTINUED] atorvastatin (LIPITOR) 20 MG tablet Take 20 mg by mouth once daily.      [DISCONTINUED] triamcinolone acetonide 0.1% (KENALOG) 0.1 % cream Apply topically 2 (two) times daily as needed.         Past Surgical History:   Procedure Laterality Date    COLONOSCOPY      FINGER AMPUTATION      LIVER TRANSPLANT N/A 4/10/2022    Procedure: TRANSPLANT, LIVER;  Surgeon: Félix Scott MD;  [facility];  Service: Transplant;  Laterality: N/A;    LIVER TRANSPLANT N/A 4/13/2022    Procedure: TRANSPLANT, LIVER;  Surgeon: Keyon Castillo MD;  Location: [facility] OR [facility];  Service: Transplant;  Laterality: N/A;    LIVER TRANSPLANT N/A 4/20/2022    Procedure: TRANSPLANT, LIVER;  Surgeon: Brant Gonzalez Jr., MD;  [facility];  Service: Transplant;  Laterality: N/A;    MEDIAL COLLATERAL LIGAMENT REPAIR, KNEE Bilateral     ROTATRO CUFF REPAIR      TONSILLECTOMY         Social History     Socioeconomic History    Marital status: Single   Tobacco Use    Smoking status: Never Smoker    Smokeless tobacco: Never Used   Substance and Sexual Activity    Alcohol use: Not Currently       OBJECTIVE:     Vital Signs Range (Last 24H):  Temp:  [36.6 °C (97.9 °F)-36.8 °C (98.2 °F)]   Pulse:  [106-114]   Resp:  [13-19]   BP: (106-134)/(64-75)   SpO2:  [96 %-98 %]       Significant Labs:  Lab Results   Component Value Date    WBC 6.19 04/23/2022    HGB 6.8 (L) 04/23/2022    HCT 20.5 (L)  04/23/2022    PLT 45 (L) 04/23/2022    ALT 18 04/23/2022    AST 31 04/23/2022     (L) 04/23/2022    K 5.1 04/23/2022    CL 99 04/23/2022    CREATININE 2.7 (H) 04/23/2022    BUN 42 (H) 04/23/2022    CO2 26 04/23/2022    PSA 0.02 02/14/2022    INR 1.8 (H) 04/23/2022    HGBA1C 6.6 (H) 04/19/2022       Diagnostic Studies: No relevant studies.    EKG:   Results for orders placed or performed during the hospital encounter of 04/19/22   EKG 12-lead    Collection Time: 04/19/22  9:59 PM    Narrative    Test Reason : Z01.818,    Vent. Rate : 102 BPM     Atrial Rate : 102 BPM     P-R Int : 154 ms          QRS Dur : 086 ms      QT Int : 376 ms       P-R-T Axes : 051 -22 043 degrees     QTc Int : 490 ms    Sinus tachycardia  Otherwise normal ECG  When compared with ECG of 12-APR-2022 17:04,  No significant change was found  Confirmed by SHAILA COUCH MD ([id number]) on 4/20/2022 5:43:15 PM    Referred By: FREDY RUBIN           Confirmed By:SHAILA COUCH MD       2D ECHO:  TTE:  No results found for this or any previous visit.    SERG:  No results found for this or any previous visit.    ASSESSMENT/PLAN:       Pre-op Assessment    I have reviewed the Patient Summary Reports.     I have reviewed the Nursing Notes. I have reviewed the NPO Status.   I have reviewed the Medications.     Review of Systems  Anesthesia Hx:  History of prior surgery of interest to airway management or planning:  Denies Personal Hx of Anesthesia complications.   Hematology/Oncology:  Hematology Normal   Oncology Normal     EENT/Dental:EENT/Dental Normal   Cardiovascular:   Hypertension    Pulmonary:   Sleep Apnea    Renal/:   Chronic Renal Disease, ARF    Hepatic/GI:   Liver Disease,    Musculoskeletal:  Musculoskeletal Normal    Neurological:  Neurology Normal    Endocrine:   Diabetes    Dermatological:  Skin Normal    Psych:  Psychiatric Normal           Physical Exam  General: Well nourished, Alert and Oriented    Airway:  Mallampati: III / II  Mouth  Opening: Normal  TM Distance: Normal  Tongue: Normal  Neck ROM: Normal ROM    Dental:  Edentulous        Anesthesia Plan  Type of Anesthesia, risks & benefits discussed:    Anesthesia Type: Gen ETT  Intra-op Monitoring Plan: Standard ASA Monitors, Art Line and Central Line  Post Op Pain Control Plan: multimodal analgesia and IV/PO Opioids PRN  Induction:  IV  Airway Plan: Video and Direct  Informed Consent: Informed consent signed with the Patient and all parties understand the risks and agree with anesthesia plan.  All questions answered.   ASA Score: 4  Day of Surgery Review of History & Physical: H&P Update referred to the surgeon/provider.    Ready For Surgery From Anesthesia Perspective.     .

## 2022-04-23 NOTE — TELEPHONE ENCOUNTER
ORGAN OFFER NOTE    Notified by Jessee Blancas, , of liver offer with donor information.  Donor and recipient information read back and verified.  Spoke with Pelon Vasquez and identified no acute medical issues during telephone assessment.   Patient is currently already inpatient on TSU.  At this time patient aware he has no restrictions but will be contacted once he is to become NPO.    Patient was asked if they have had a positive COVID-19 test or if they have any signs or symptoms. Informed patient that they will be tested for COVID-19 upon arrival to the hospital, unless have a previous positive result. If tested and result is positive, the transplant will not be able to occur, they will be inactivated on the wait list for 28 days per protocol and required to quarantine.

## 2022-04-23 NOTE — TELEPHONE ENCOUNTER
Writer updated patient that Case has been given times for the OR tomorrow night and that he is to become NPO for this liver case starting at 2pm tomorrow. Patient verbalized understanding.

## 2022-04-23 NOTE — PROGRESS NOTES
Liver Transplant Service  Hepatology Progress Note      HPI: Mr. Vasquez is a 57 y.o. 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       Subjective: No acute events overnight. Patient is without complaints this morning.      Objective:   Vitals:    04/22/22 2000 04/23/22 0000 04/23/22 0400 04/23/22 0836   BP: 118/71 134/75     BP Location:       Patient Position:       Pulse: (!) 114 107     Resp: 19 15     Temp: 98 °F (36.7 °C) 98.1 °F (36.7 °C) 98.2 °F (36.8 °C) 98 °F (36.7 °C)   TempSrc: Oral Oral Oral Oral   SpO2: 97% 96%     Weight:   114.5 kg (252 lb 8 oz)    Height:            GEN:  Alert and oriented, no acute distress  RESP:  No respiratory distress, CTAB.  CV:  Tachycardic, regular rhythm  ABD:  Bowel sounds normal, soft, nontender, nondistended  SKIN: Warm, dry, +LE edema  PSYCH: Cooperate, appropriate mood and affect    Labs:  Labs within the past 24 hours have been reviewed.    Recent Labs   Lab 04/23/22 0612   WBC 6.19   RBC 2.27*   HGB 6.8*   HCT 20.5*   PLT 45*   MCV 90   MCH 30.0   MCHC 33.2       Recent Labs   Lab 04/23/22 0612   CALCIUM 8.9   PROT 5.6*   *   K 5.1   CO2 26   CL 99   BUN 42*   CREATININE 2.7*   ALKPHOS 82   ALT 18   AST 31   BILITOT 4.8*       Recent Labs   Lab 04/23/22 0612   INR 1.8*       MELD-Na score: 29 at 4/23/2022  6:12 AM  MELD score: 28 at 4/23/2022  6:12 AM  Calculated from:  Serum Creatinine: 2.7 mg/dL at 4/23/2022  6:12 AM  Serum Sodium: 135 mmol/L at 4/23/2022  6:12 AM  Total Bilirubin: 4.8 mg/dL at 4/23/2022  6:12 AM  INR(ratio): 1.8 at 4/23/2022  6:12 AM  Age: 57 years      Assessment/Plan:  1. ESLD: Currently listed for liver. Stable for transplant at this time.     2. FAMILIA: Cr 2.7, hold albumin today as he is receiving blood. Nephrology consulted, appreciate assistance. Nephrology suspects FAMILIA due to ATN.    3. Ascites: 2 gm Na diet. Hold diuretics given FAMILIA.    4. Hepatic encephalopathy:  Continue lactulose. Titrate to maintain 3-4 bowel movements per day. Continue rifaximin 550mg BID. Mentation is good.    5. Anemia of chronic disease: H/H low. Transfuse 1 unit pRBC. Monitor.

## 2022-04-23 NOTE — PLAN OF CARE
Pt is AAOx4, independent, and VSS. 1 unit RBC transfusing. Surgical and blood consent signed. OR time for tonight. Pt NPO since 1400. Pt's wife at bedside. Pt's bed in lowest position, call bell within reach, nonskid socks on, and RN encouraged pt to call for any assistance needed. RN rounded on pt throughout shift. Will continue to monitor.

## 2022-04-24 PROBLEM — Z94.4 S/P LIVER TRANSPLANT: Status: ACTIVE | Noted: 2022-04-24

## 2022-04-24 PROBLEM — Z29.89 PROPHYLACTIC IMMUNOTHERAPY: Status: ACTIVE | Noted: 2022-04-24

## 2022-04-24 PROBLEM — Z99.11 ENCOUNTER FOR WEANING FROM VENTILATOR: Status: ACTIVE | Noted: 2022-04-24

## 2022-04-24 PROBLEM — T38.0X5A ADVERSE EFFECT OF CORTICOSTEROIDS: Status: ACTIVE | Noted: 2022-04-24

## 2022-04-24 PROBLEM — E66.01 CLASS 2 SEVERE OBESITY DUE TO EXCESS CALORIES WITH SERIOUS COMORBIDITY IN ADULT: Status: ACTIVE | Noted: 2022-04-24

## 2022-04-24 PROBLEM — Z91.89 AT RISK FOR OPPORTUNISTIC INFECTIONS: Status: ACTIVE | Noted: 2022-04-24

## 2022-04-24 PROBLEM — E66.812 CLASS 2 SEVERE OBESITY DUE TO EXCESS CALORIES WITH SERIOUS COMORBIDITY IN ADULT: Status: ACTIVE | Noted: 2022-04-24

## 2022-04-24 LAB
ALBUMIN SERPL BCP-MCNC: 2.5 G/DL (ref 3.5–5.2)
ALBUMIN SERPL BCP-MCNC: 2.6 G/DL (ref 3.5–5.2)
ALBUMIN SERPL BCP-MCNC: 2.7 G/DL (ref 3.5–5.2)
ALBUMIN SERPL BCP-MCNC: 3.5 G/DL (ref 3.5–5.2)
ALLENS TEST: ABNORMAL
ALLENS TEST: NORMAL
ALP SERPL-CCNC: 50 U/L (ref 55–135)
ALT SERPL W/O P-5'-P-CCNC: 148 U/L (ref 10–44)
ALT SERPL W/O P-5'-P-CCNC: 155 U/L (ref 10–44)
AMYLASE SERPL-CCNC: 44 U/L (ref 20–110)
ANION GAP SERPL CALC-SCNC: 8 MMOL/L (ref 8–16)
ANION GAP SERPL CALC-SCNC: 8 MMOL/L (ref 8–16)
APPEARANCE FLD: NORMAL
APTT BLDCRRT: 39 SEC (ref 21–32)
APTT BLDCRRT: 42.9 SEC (ref 21–32)
APTT BLDCRRT: 43.1 SEC (ref 21–32)
APTT BLDCRRT: 71.5 SEC (ref 21–32)
AST SERPL-CCNC: 238 U/L (ref 10–40)
AST SERPL-CCNC: 282 U/L (ref 10–40)
AST SERPL-CCNC: 321 U/L (ref 10–40)
AST SERPL-CCNC: 402 U/L (ref 10–40)
AST SERPL-CCNC: 433 U/L (ref 10–40)
BASOPHILS # BLD AUTO: 0.01 K/UL (ref 0–0.2)
BASOPHILS # BLD AUTO: 0.02 K/UL (ref 0–0.2)
BASOPHILS # BLD AUTO: 0.03 K/UL (ref 0–0.2)
BASOPHILS NFR BLD: 0.1 % (ref 0–1.9)
BASOPHILS NFR BLD: 0.1 % (ref 0–1.9)
BASOPHILS NFR BLD: 0.2 % (ref 0–1.9)
BILIRUB SERPL-MCNC: 8.2 MG/DL (ref 0.1–1)
BLD PROD TYP BPU: NORMAL
BLOOD UNIT EXPIRATION DATE: NORMAL
BLOOD UNIT TYPE CODE: 5100
BLOOD UNIT TYPE CODE: 6200
BLOOD UNIT TYPE CODE: 9500
BLOOD UNIT TYPE: NORMAL
BODY FLD TYPE: NORMAL
BUN SERPL-MCNC: 20 MG/DL (ref 6–20)
BUN SERPL-MCNC: 31 MG/DL (ref 6–20)
CA-I BLDV-SCNC: 1.08 MMOL/L (ref 1.06–1.42)
CA-I BLDV-SCNC: 1.09 MMOL/L (ref 1.06–1.42)
CA-I BLDV-SCNC: 1.17 MMOL/L (ref 1.06–1.42)
CALCIUM SERPL-MCNC: 7.7 MG/DL (ref 8.7–10.5)
CALCIUM SERPL-MCNC: 7.7 MG/DL (ref 8.7–10.5)
CHLORIDE SERPL-SCNC: 103 MMOL/L (ref 95–110)
CHLORIDE SERPL-SCNC: 107 MMOL/L (ref 95–110)
CO2 SERPL-SCNC: 23 MMOL/L (ref 23–29)
CO2 SERPL-SCNC: 24 MMOL/L (ref 23–29)
CODING SYSTEM: NORMAL
COLOR FLD: YELLOW
CREAT SERPL-MCNC: 1.2 MG/DL (ref 0.5–1.4)
CREAT SERPL-MCNC: 1.5 MG/DL (ref 0.5–1.4)
DELSYS: ABNORMAL
DIFFERENTIAL METHOD: ABNORMAL
DISPENSE STATUS: NORMAL
EOSINOPHIL # BLD AUTO: 0 K/UL (ref 0–0.5)
EOSINOPHIL NFR BLD: 0 % (ref 0–8)
EOSINOPHIL NFR BLD: 0.2 % (ref 0–8)
EOSINOPHIL NFR BLD: 0.2 % (ref 0–8)
ERYTHROCYTE [DISTWIDTH] IN BLOOD BY AUTOMATED COUNT: 18.6 % (ref 11.5–14.5)
ERYTHROCYTE [DISTWIDTH] IN BLOOD BY AUTOMATED COUNT: 18.6 % (ref 11.5–14.5)
ERYTHROCYTE [DISTWIDTH] IN BLOOD BY AUTOMATED COUNT: 19.3 % (ref 11.5–14.5)
ERYTHROCYTE [DISTWIDTH] IN BLOOD BY AUTOMATED COUNT: 19.3 % (ref 11.5–14.5)
ERYTHROCYTE [DISTWIDTH] IN BLOOD BY AUTOMATED COUNT: 19.4 % (ref 11.5–14.5)
ERYTHROCYTE [SEDIMENTATION RATE] IN BLOOD BY WESTERGREN METHOD: 16 MM/H
EST. GFR  (AFRICAN AMERICAN): 59.7 ML/MIN/1.73 M^2
EST. GFR  (AFRICAN AMERICAN): >60 ML/MIN/1.73 M^2
EST. GFR  (NON AFRICAN AMERICAN): 51.7 ML/MIN/1.73 M^2
EST. GFR  (NON AFRICAN AMERICAN): >60 ML/MIN/1.73 M^2
FIBRINOGEN PPP-MCNC: 139 MG/DL (ref 182–400)
FIBRINOGEN PPP-MCNC: 230 MG/DL (ref 182–400)
FIBRINOGEN PPP-MCNC: 94 MG/DL (ref 182–400)
FIO2: 100
FIO2: 50
FIO2: 60
GLUCOSE SERPL-MCNC: 146 MG/DL (ref 70–110)
GLUCOSE SERPL-MCNC: 150 MG/DL (ref 70–110)
GLUCOSE SERPL-MCNC: 164 MG/DL (ref 70–110)
GLUCOSE SERPL-MCNC: 182 MG/DL (ref 70–110)
GLUCOSE SERPL-MCNC: 189 MG/DL (ref 70–110)
GRAM STN SPEC: NORMAL
GRAM STN SPEC: NORMAL
HCO3 UR-SCNC: 24.3 MMOL/L (ref 24–28)
HCO3 UR-SCNC: 24.4 MMOL/L (ref 24–28)
HCO3 UR-SCNC: 24.5 MMOL/L (ref 24–28)
HCO3 UR-SCNC: 25.9 MMOL/L (ref 24–28)
HCT VFR BLD AUTO: 21.8 % (ref 40–54)
HCT VFR BLD AUTO: 23.1 % (ref 40–54)
HCT VFR BLD AUTO: 25.5 % (ref 40–54)
HCT VFR BLD AUTO: 25.8 % (ref 40–54)
HCT VFR BLD AUTO: 25.9 % (ref 40–54)
HCT VFR BLD AUTO: 26.2 % (ref 40–54)
HCT VFR BLD AUTO: 26.2 % (ref 40–54)
HCT VFR BLD AUTO: 26.9 % (ref 40–54)
HCT VFR BLD CALC: 24 %PCV (ref 36–54)
HCT VFR BLD CALC: 26 %PCV (ref 36–54)
HCT VFR BLD CALC: 26 %PCV (ref 36–54)
HGB BLD-MCNC: 7.4 G/DL (ref 14–18)
HGB BLD-MCNC: 7.7 G/DL (ref 14–18)
HGB BLD-MCNC: 8.4 G/DL (ref 14–18)
HGB BLD-MCNC: 8.5 G/DL (ref 14–18)
HGB BLD-MCNC: 8.5 G/DL (ref 14–18)
HGB BLD-MCNC: 8.8 G/DL (ref 14–18)
HGB BLD-MCNC: 8.8 G/DL (ref 14–18)
HGB BLD-MCNC: 9 G/DL (ref 14–18)
IMM GRANULOCYTES # BLD AUTO: 0.06 K/UL (ref 0–0.04)
IMM GRANULOCYTES # BLD AUTO: 0.07 K/UL (ref 0–0.04)
IMM GRANULOCYTES # BLD AUTO: 0.08 K/UL (ref 0–0.04)
IMM GRANULOCYTES # BLD AUTO: 0.12 K/UL (ref 0–0.04)
IMM GRANULOCYTES # BLD AUTO: 0.12 K/UL (ref 0–0.04)
IMM GRANULOCYTES NFR BLD AUTO: 0.4 % (ref 0–0.5)
IMM GRANULOCYTES NFR BLD AUTO: 0.4 % (ref 0–0.5)
IMM GRANULOCYTES NFR BLD AUTO: 0.5 % (ref 0–0.5)
IMM GRANULOCYTES NFR BLD AUTO: 0.6 % (ref 0–0.5)
IMM GRANULOCYTES NFR BLD AUTO: 0.6 % (ref 0–0.5)
INR PPP: 1.3 (ref 0.8–1.2)
INR PPP: 1.3 (ref 0.8–1.2)
INR PPP: 1.4 (ref 0.8–1.2)
INR PPP: 1.4 (ref 0.8–1.2)
INR PPP: 1.5 (ref 0.8–1.2)
INR PPP: 1.6 (ref 0.8–1.2)
INR PPP: 1.8 (ref 0.8–1.2)
LDH SERPL L TO P-CCNC: 0.74 MMOL/L (ref 0.36–1.25)
LDH SERPL L TO P-CCNC: 0.89 MMOL/L (ref 0.36–1.25)
LDH SERPL L TO P-CCNC: 1.08 MMOL/L (ref 0.36–1.25)
LDH SERPL L TO P-CCNC: 985 U/L (ref 110–260)
LYMPHOCYTES # BLD AUTO: 0.3 K/UL (ref 1–4.8)
LYMPHOCYTES # BLD AUTO: 0.4 K/UL (ref 1–4.8)
LYMPHOCYTES NFR BLD: 2.2 % (ref 18–48)
LYMPHOCYTES NFR BLD: 2.2 % (ref 18–48)
LYMPHOCYTES NFR BLD: 2.4 % (ref 18–48)
LYMPHOCYTES NFR BLD: 2.5 % (ref 18–48)
LYMPHOCYTES NFR BLD: 2.5 % (ref 18–48)
LYMPHOCYTES NFR FLD MANUAL: 46 %
MAGNESIUM SERPL-MCNC: 2 MG/DL (ref 1.6–2.6)
MAGNESIUM SERPL-MCNC: 2.2 MG/DL (ref 1.6–2.6)
MAGNESIUM SERPL-MCNC: 2.2 MG/DL (ref 1.6–2.6)
MCH RBC QN AUTO: 28.8 PG (ref 27–31)
MCH RBC QN AUTO: 28.8 PG (ref 27–31)
MCH RBC QN AUTO: 29.5 PG (ref 27–31)
MCHC RBC AUTO-ENTMCNC: 32.4 G/DL (ref 32–36)
MCHC RBC AUTO-ENTMCNC: 32.4 G/DL (ref 32–36)
MCHC RBC AUTO-ENTMCNC: 33.5 G/DL (ref 32–36)
MCHC RBC AUTO-ENTMCNC: 34 G/DL (ref 32–36)
MCHC RBC AUTO-ENTMCNC: 34.1 G/DL (ref 32–36)
MCV RBC AUTO: 87 FL (ref 82–98)
MCV RBC AUTO: 87 FL (ref 82–98)
MCV RBC AUTO: 88 FL (ref 82–98)
MCV RBC AUTO: 89 FL (ref 82–98)
MCV RBC AUTO: 89 FL (ref 82–98)
MESOTHL CELL NFR FLD MANUAL: 4 %
MODE: ABNORMAL
MONOCYTES # BLD AUTO: 0.5 K/UL (ref 0.3–1)
MONOCYTES # BLD AUTO: 0.6 K/UL (ref 0.3–1)
MONOCYTES # BLD AUTO: 0.9 K/UL (ref 0.3–1)
MONOCYTES # BLD AUTO: 1.1 K/UL (ref 0.3–1)
MONOCYTES # BLD AUTO: 1.1 K/UL (ref 0.3–1)
MONOCYTES NFR BLD: 3.3 % (ref 4–15)
MONOCYTES NFR BLD: 4.4 % (ref 4–15)
MONOCYTES NFR BLD: 5.2 % (ref 4–15)
MONOCYTES NFR BLD: 5.7 % (ref 4–15)
MONOCYTES NFR BLD: 5.7 % (ref 4–15)
MONOS+MACROS NFR FLD MANUAL: 44 %
NEUTROPHILS # BLD AUTO: 12.1 K/UL (ref 1.8–7.7)
NEUTROPHILS # BLD AUTO: 14.1 K/UL (ref 1.8–7.7)
NEUTROPHILS # BLD AUTO: 16.5 K/UL (ref 1.8–7.7)
NEUTROPHILS # BLD AUTO: 17.9 K/UL (ref 1.8–7.7)
NEUTROPHILS # BLD AUTO: 17.9 K/UL (ref 1.8–7.7)
NEUTROPHILS NFR BLD: 91.1 % (ref 38–73)
NEUTROPHILS NFR BLD: 91.1 % (ref 38–73)
NEUTROPHILS NFR BLD: 91.8 % (ref 38–73)
NEUTROPHILS NFR BLD: 92.5 % (ref 38–73)
NEUTROPHILS NFR BLD: 93.7 % (ref 38–73)
NEUTROPHILS NFR FLD MANUAL: 6 %
NRBC BLD-RTO: 0 /100 WBC
NUM UNITS TRANS FFP: NORMAL
PCO2 BLDA: 42.5 MMHG (ref 35–45)
PCO2 BLDA: 44.8 MMHG (ref 35–45)
PCO2 BLDA: 45.7 MMHG (ref 35–45)
PCO2 BLDA: 46 MMHG (ref 35–45)
PEEP: 5
PH SMN: 7.33 [PH] (ref 7.35–7.45)
PH SMN: 7.34 [PH] (ref 7.35–7.45)
PH SMN: 7.36 [PH] (ref 7.35–7.45)
PH SMN: 7.37 [PH] (ref 7.35–7.45)
PLATELET # BLD AUTO: 35 K/UL (ref 150–450)
PLATELET # BLD AUTO: 40 K/UL (ref 150–450)
PLATELET # BLD AUTO: 40 K/UL (ref 150–450)
PLATELET # BLD AUTO: 41 K/UL (ref 150–450)
PLATELET # BLD AUTO: 42 K/UL (ref 150–450)
PLATELET # BLD AUTO: 55 K/UL (ref 150–450)
PLATELET # BLD AUTO: 55 K/UL (ref 150–450)
PLATELET # BLD AUTO: 69 K/UL (ref 150–450)
PLATELET BLD QL SMEAR: ABNORMAL
PMV BLD AUTO: 10 FL (ref 9.2–12.9)
PMV BLD AUTO: 10 FL (ref 9.2–12.9)
PMV BLD AUTO: 10.5 FL (ref 9.2–12.9)
PMV BLD AUTO: 10.8 FL (ref 9.2–12.9)
PMV BLD AUTO: 10.8 FL (ref 9.2–12.9)
PMV BLD AUTO: 11.3 FL (ref 9.2–12.9)
PMV BLD AUTO: 11.5 FL (ref 9.2–12.9)
PMV BLD AUTO: 9.5 FL (ref 9.2–12.9)
PO2 BLDA: 175 MMHG (ref 80–100)
PO2 BLDA: 184 MMHG (ref 80–100)
PO2 BLDA: 306 MMHG (ref 80–100)
PO2 BLDA: 47 MMHG (ref 40–60)
POC BE: -1 MMOL/L
POC BE: 0 MMOL/L
POC IONIZED CALCIUM: 1.2 MMOL/L (ref 1.06–1.42)
POC IONIZED CALCIUM: 1.21 MMOL/L (ref 1.06–1.42)
POC IONIZED CALCIUM: 1.22 MMOL/L (ref 1.06–1.42)
POC SATURATED O2: 100 % (ref 95–100)
POC SATURATED O2: 100 % (ref 95–100)
POC SATURATED O2: 80 % (ref 95–100)
POC SATURATED O2: 99 % (ref 95–100)
POC TCO2: 26 MMOL/L (ref 23–27)
POC TCO2: 26 MMOL/L (ref 23–27)
POC TCO2: 26 MMOL/L (ref 24–29)
POC TCO2: 27 MMOL/L (ref 23–27)
POOLED CRYOPPT GVN BPU: NORMAL
POOLED CRYOPPT GVN BPU: NORMAL
POTASSIUM BLD-SCNC: 4 MMOL/L (ref 3.5–5.1)
POTASSIUM BLD-SCNC: 4.1 MMOL/L (ref 3.5–5.1)
POTASSIUM BLD-SCNC: 4.2 MMOL/L (ref 3.5–5.1)
POTASSIUM SERPL-SCNC: 3.4 MMOL/L (ref 3.5–5.1)
POTASSIUM SERPL-SCNC: 3.9 MMOL/L (ref 3.5–5.1)
POTASSIUM SERPL-SCNC: 4.1 MMOL/L (ref 3.5–5.1)
POTASSIUM SERPL-SCNC: 4.3 MMOL/L (ref 3.5–5.1)
POTASSIUM SERPL-SCNC: 4.8 MMOL/L (ref 3.5–5.1)
PROT SERPL-MCNC: 3.8 G/DL (ref 6–8.4)
PROTHROMBIN TIME: 13.7 SEC (ref 9–12.5)
PROTHROMBIN TIME: 13.8 SEC (ref 9–12.5)
PROTHROMBIN TIME: 14.3 SEC (ref 9–12.5)
PROTHROMBIN TIME: 14.6 SEC (ref 9–12.5)
PROTHROMBIN TIME: 15.1 SEC (ref 9–12.5)
PROTHROMBIN TIME: 16.1 SEC (ref 9–12.5)
PROTHROMBIN TIME: 18.3 SEC (ref 9–12.5)
RBC # BLD AUTO: 2.95 M/UL (ref 4.6–6.2)
RBC # BLD AUTO: 2.95 M/UL (ref 4.6–6.2)
RBC # BLD AUTO: 2.98 M/UL (ref 4.6–6.2)
RBC # BLD AUTO: 2.98 M/UL (ref 4.6–6.2)
RBC # BLD AUTO: 3.05 M/UL (ref 4.6–6.2)
SAMPLE: ABNORMAL
SAMPLE: NORMAL
SITE: ABNORMAL
SITE: NORMAL
SODIUM BLD-SCNC: 135 MMOL/L (ref 136–145)
SODIUM BLD-SCNC: 136 MMOL/L (ref 136–145)
SODIUM BLD-SCNC: 137 MMOL/L (ref 136–145)
SODIUM SERPL-SCNC: 135 MMOL/L (ref 136–145)
SODIUM SERPL-SCNC: 135 MMOL/L (ref 136–145)
SODIUM SERPL-SCNC: 136 MMOL/L (ref 136–145)
SODIUM SERPL-SCNC: 137 MMOL/L (ref 136–145)
SODIUM SERPL-SCNC: 138 MMOL/L (ref 136–145)
UNIT NUMBER: NORMAL
UNIT NUMBER: NORMAL
VT: 430
WBC # BLD AUTO: 13.11 K/UL (ref 3.9–12.7)
WBC # BLD AUTO: 15.01 K/UL (ref 3.9–12.7)
WBC # BLD AUTO: 17.95 K/UL (ref 3.9–12.7)
WBC # BLD AUTO: 19.65 K/UL (ref 3.9–12.7)
WBC # BLD AUTO: 19.65 K/UL (ref 3.9–12.7)
WBC # FLD: 264 /CU MM

## 2022-04-24 PROCEDURE — 88313 SPECIAL STAINS GROUP 2: CPT | Mod: 26,,, | Performed by: PATHOLOGY

## 2022-04-24 PROCEDURE — 88307 TISSUE EXAM BY PATHOLOGIST: CPT | Mod: 26,,, | Performed by: PATHOLOGY

## 2022-04-24 PROCEDURE — 99223 PR INITIAL HOSPITAL CARE,LEVL III: ICD-10-PCS | Mod: ,,, | Performed by: NURSE PRACTITIONER

## 2022-04-24 PROCEDURE — 85520 HEPARIN ASSAY: CPT

## 2022-04-24 PROCEDURE — 99291 CRITICAL CARE FIRST HOUR: CPT | Mod: 24,,, | Performed by: STUDENT IN AN ORGANIZED HEALTH CARE EDUCATION/TRAINING PROGRAM

## 2022-04-24 PROCEDURE — 81300005 HC LIVER TRANSPORT, GROUND 4-5 HOURS

## 2022-04-24 PROCEDURE — 81300000 HC LIVER ACQUISITION CHARGE

## 2022-04-24 PROCEDURE — 85610 PROTHROMBIN TIME: CPT | Mod: 91 | Performed by: STUDENT IN AN ORGANIZED HEALTH CARE EDUCATION/TRAINING PROGRAM

## 2022-04-24 PROCEDURE — 94761 N-INVAS EAR/PLS OXIMETRY MLT: CPT

## 2022-04-24 PROCEDURE — 85018 HEMOGLOBIN: CPT | Mod: NTX | Performed by: STUDENT IN AN ORGANIZED HEALTH CARE EDUCATION/TRAINING PROGRAM

## 2022-04-24 PROCEDURE — 99900035 HC TECH TIME PER 15 MIN (STAT)

## 2022-04-24 PROCEDURE — 82947 ASSAY GLUCOSE BLOOD QUANT: CPT | Mod: 91 | Performed by: STUDENT IN AN ORGANIZED HEALTH CARE EDUCATION/TRAINING PROGRAM

## 2022-04-24 PROCEDURE — 27201041 HC RESERVOIR, CARDIOTOMY

## 2022-04-24 PROCEDURE — 20000000 HC ICU ROOM

## 2022-04-24 PROCEDURE — 88309 TISSUE EXAM BY PATHOLOGIST: CPT | Mod: 26,,, | Performed by: PATHOLOGY

## 2022-04-24 PROCEDURE — 86965 POOLING BLOOD PLATELETS: CPT | Performed by: ANESTHESIOLOGY

## 2022-04-24 PROCEDURE — 87102 FUNGUS ISOLATION CULTURE: CPT | Mod: NTX | Performed by: TRANSPLANT SURGERY

## 2022-04-24 PROCEDURE — 87070 CULTURE OTHR SPECIMN AEROBIC: CPT | Mod: NTX | Performed by: TRANSPLANT SURGERY

## 2022-04-24 PROCEDURE — 87205 SMEAR GRAM STAIN: CPT | Performed by: TRANSPLANT SURGERY

## 2022-04-24 PROCEDURE — 84132 ASSAY OF SERUM POTASSIUM: CPT | Mod: NTX | Performed by: STUDENT IN AN ORGANIZED HEALTH CARE EDUCATION/TRAINING PROGRAM

## 2022-04-24 PROCEDURE — 82330 ASSAY OF CALCIUM: CPT

## 2022-04-24 PROCEDURE — P9045 ALBUMIN (HUMAN), 5%, 250 ML: HCPCS | Mod: JG | Performed by: SURGERY

## 2022-04-24 PROCEDURE — 84450 TRANSFERASE (AST) (SGOT): CPT | Performed by: STUDENT IN AN ORGANIZED HEALTH CARE EDUCATION/TRAINING PROGRAM

## 2022-04-24 PROCEDURE — 88309 PR  SURG PATH,LEVEL VI: ICD-10-PCS | Mod: 26,,, | Performed by: PATHOLOGY

## 2022-04-24 PROCEDURE — 85384 FIBRINOGEN ACTIVITY: CPT | Mod: NTX | Performed by: STUDENT IN AN ORGANIZED HEALTH CARE EDUCATION/TRAINING PROGRAM

## 2022-04-24 PROCEDURE — P9047 ALBUMIN (HUMAN), 25%, 50ML: HCPCS | Mod: JG | Performed by: NURSE ANESTHETIST, CERTIFIED REGISTERED

## 2022-04-24 PROCEDURE — 83605 ASSAY OF LACTIC ACID: CPT

## 2022-04-24 PROCEDURE — 88313 SPECIAL STAINS GROUP 2: CPT | Mod: 59 | Performed by: PATHOLOGY

## 2022-04-24 PROCEDURE — 84295 ASSAY OF SERUM SODIUM: CPT

## 2022-04-24 PROCEDURE — 84450 TRANSFERASE (AST) (SGOT): CPT | Mod: 91 | Performed by: STUDENT IN AN ORGANIZED HEALTH CARE EDUCATION/TRAINING PROGRAM

## 2022-04-24 PROCEDURE — 63600175 PHARM REV CODE 636 W HCPCS

## 2022-04-24 PROCEDURE — 88307 PR  SURG PATH,LEVEL V: ICD-10-PCS | Mod: 26,,, | Performed by: PATHOLOGY

## 2022-04-24 PROCEDURE — 25000003 PHARM REV CODE 250: Performed by: SURGERY

## 2022-04-24 PROCEDURE — P9012 CRYOPRECIPITATE EACH UNIT: HCPCS | Performed by: ANESTHESIOLOGY

## 2022-04-24 PROCEDURE — 88309 TISSUE EXAM BY PATHOLOGIST: CPT | Performed by: PATHOLOGY

## 2022-04-24 PROCEDURE — P9045 ALBUMIN (HUMAN), 5%, 250 ML: HCPCS | Mod: JG | Performed by: TRANSPLANT SURGERY

## 2022-04-24 PROCEDURE — 82040 ASSAY OF SERUM ALBUMIN: CPT | Mod: 91 | Performed by: STUDENT IN AN ORGANIZED HEALTH CARE EDUCATION/TRAINING PROGRAM

## 2022-04-24 PROCEDURE — 82803 BLOOD GASES ANY COMBINATION: CPT

## 2022-04-24 PROCEDURE — 82150 ASSAY OF AMYLASE: CPT | Performed by: STUDENT IN AN ORGANIZED HEALTH CARE EDUCATION/TRAINING PROGRAM

## 2022-04-24 PROCEDURE — 63600175 PHARM REV CODE 636 W HCPCS: Performed by: TRANSPLANT SURGERY

## 2022-04-24 PROCEDURE — 94002 VENT MGMT INPAT INIT DAY: CPT

## 2022-04-24 PROCEDURE — 99233 PR SUBSEQUENT HOSPITAL CARE,LEVL III: ICD-10-PCS | Mod: ,,, | Performed by: INTERNAL MEDICINE

## 2022-04-24 PROCEDURE — 27000191 HC C-V MONITORING

## 2022-04-24 PROCEDURE — 82330 ASSAY OF CALCIUM: CPT | Mod: NTX | Performed by: STUDENT IN AN ORGANIZED HEALTH CARE EDUCATION/TRAINING PROGRAM

## 2022-04-24 PROCEDURE — 85014 HEMATOCRIT: CPT

## 2022-04-24 PROCEDURE — 84132 ASSAY OF SERUM POTASSIUM: CPT

## 2022-04-24 PROCEDURE — 63600175 PHARM REV CODE 636 W HCPCS: Performed by: SURGERY

## 2022-04-24 PROCEDURE — 63600175 PHARM REV CODE 636 W HCPCS: Performed by: NURSE ANESTHETIST, CERTIFIED REGISTERED

## 2022-04-24 PROCEDURE — 85014 HEMATOCRIT: CPT | Mod: NTX | Performed by: STUDENT IN AN ORGANIZED HEALTH CARE EDUCATION/TRAINING PROGRAM

## 2022-04-24 PROCEDURE — 99291 PR CRITICAL CARE, E/M 30-74 MINUTES: ICD-10-PCS | Mod: 24,,, | Performed by: STUDENT IN AN ORGANIZED HEALTH CARE EDUCATION/TRAINING PROGRAM

## 2022-04-24 PROCEDURE — P9017 PLASMA 1 DONOR FRZ W/IN 8 HR: HCPCS | Performed by: NURSE PRACTITIONER

## 2022-04-24 PROCEDURE — 85002 BLEEDING TIME TEST: CPT

## 2022-04-24 PROCEDURE — 83615 LACTATE (LD) (LDH) ENZYME: CPT | Performed by: STUDENT IN AN ORGANIZED HEALTH CARE EDUCATION/TRAINING PROGRAM

## 2022-04-24 PROCEDURE — 99233 SBSQ HOSP IP/OBS HIGH 50: CPT | Mod: ,,, | Performed by: INTERNAL MEDICINE

## 2022-04-24 PROCEDURE — 84460 ALANINE AMINO (ALT) (SGPT): CPT | Performed by: STUDENT IN AN ORGANIZED HEALTH CARE EDUCATION/TRAINING PROGRAM

## 2022-04-24 PROCEDURE — 85025 COMPLETE CBC W/AUTO DIFF WBC: CPT | Performed by: STUDENT IN AN ORGANIZED HEALTH CARE EDUCATION/TRAINING PROGRAM

## 2022-04-24 PROCEDURE — 84295 ASSAY OF SERUM SODIUM: CPT | Mod: NTX | Performed by: STUDENT IN AN ORGANIZED HEALTH CARE EDUCATION/TRAINING PROGRAM

## 2022-04-24 PROCEDURE — P9021 RED BLOOD CELLS UNIT: HCPCS | Performed by: NURSE PRACTITIONER

## 2022-04-24 PROCEDURE — 99900017 HC EXTUBATION W/PARAMETERS (STAT)

## 2022-04-24 PROCEDURE — 80100014 HC HEMODIALYSIS 1:1

## 2022-04-24 PROCEDURE — 83735 ASSAY OF MAGNESIUM: CPT | Mod: 91 | Performed by: STUDENT IN AN ORGANIZED HEALTH CARE EDUCATION/TRAINING PROGRAM

## 2022-04-24 PROCEDURE — P9035 PLATELET PHERES LEUKOREDUCED: HCPCS | Performed by: ANESTHESIOLOGY

## 2022-04-24 PROCEDURE — 27100088 HC CELL SAVER

## 2022-04-24 PROCEDURE — 80048 BASIC METABOLIC PNL TOTAL CA: CPT | Mod: XB | Performed by: SURGERY

## 2022-04-24 PROCEDURE — 94150 VITAL CAPACITY TEST: CPT

## 2022-04-24 PROCEDURE — 25000003 PHARM REV CODE 250: Performed by: STUDENT IN AN ORGANIZED HEALTH CARE EDUCATION/TRAINING PROGRAM

## 2022-04-24 PROCEDURE — 80053 COMPREHEN METABOLIC PANEL: CPT | Performed by: STUDENT IN AN ORGANIZED HEALTH CARE EDUCATION/TRAINING PROGRAM

## 2022-04-24 PROCEDURE — 36415 COLL VENOUS BLD VENIPUNCTURE: CPT | Performed by: STUDENT IN AN ORGANIZED HEALTH CARE EDUCATION/TRAINING PROGRAM

## 2022-04-24 PROCEDURE — 25000003 PHARM REV CODE 250: Performed by: NURSE ANESTHETIST, CERTIFIED REGISTERED

## 2022-04-24 PROCEDURE — 82800 BLOOD PH: CPT

## 2022-04-24 PROCEDURE — 85730 THROMBOPLASTIN TIME PARTIAL: CPT | Mod: NTX | Performed by: STUDENT IN AN ORGANIZED HEALTH CARE EDUCATION/TRAINING PROGRAM

## 2022-04-24 PROCEDURE — 99223 1ST HOSP IP/OBS HIGH 75: CPT | Mod: ,,, | Performed by: NURSE PRACTITIONER

## 2022-04-24 PROCEDURE — 85730 THROMBOPLASTIN TIME PARTIAL: CPT | Mod: 91 | Performed by: STUDENT IN AN ORGANIZED HEALTH CARE EDUCATION/TRAINING PROGRAM

## 2022-04-24 PROCEDURE — 27000221 HC OXYGEN, UP TO 24 HOURS

## 2022-04-24 PROCEDURE — 89051 BODY FLUID CELL COUNT: CPT | Performed by: TRANSPLANT SURGERY

## 2022-04-24 PROCEDURE — 88313 PR  SPECIAL STAINS,GROUP II: ICD-10-PCS | Mod: 26,,, | Performed by: PATHOLOGY

## 2022-04-24 PROCEDURE — 63600175 PHARM REV CODE 636 W HCPCS: Performed by: STUDENT IN AN ORGANIZED HEALTH CARE EDUCATION/TRAINING PROGRAM

## 2022-04-24 PROCEDURE — 87075 CULTR BACTERIA EXCEPT BLOOD: CPT | Performed by: TRANSPLANT SURGERY

## 2022-04-24 PROCEDURE — 85049 AUTOMATED PLATELET COUNT: CPT | Mod: 91 | Performed by: STUDENT IN AN ORGANIZED HEALTH CARE EDUCATION/TRAINING PROGRAM

## 2022-04-24 PROCEDURE — 37799 UNLISTED PX VASCULAR SURGERY: CPT

## 2022-04-24 PROCEDURE — 88307 TISSUE EXAM BY PATHOLOGIST: CPT | Performed by: PATHOLOGY

## 2022-04-24 PROCEDURE — 85025 COMPLETE CBC W/AUTO DIFF WBC: CPT | Mod: 91 | Performed by: SURGERY

## 2022-04-24 RX ORDER — ALBUMIN HUMAN 250 G/1000ML
SOLUTION INTRAVENOUS CONTINUOUS PRN
Status: DISCONTINUED | OUTPATIENT
Start: 2022-04-24 | End: 2022-04-24

## 2022-04-24 RX ORDER — HYDROMORPHONE HYDROCHLORIDE 1 MG/ML
0.2 INJECTION, SOLUTION INTRAMUSCULAR; INTRAVENOUS; SUBCUTANEOUS
Status: DISCONTINUED | OUTPATIENT
Start: 2022-04-24 | End: 2022-04-24

## 2022-04-24 RX ORDER — HYDROMORPHONE HYDROCHLORIDE 1 MG/ML
0.2 INJECTION, SOLUTION INTRAMUSCULAR; INTRAVENOUS; SUBCUTANEOUS
Status: DISCONTINUED | OUTPATIENT
Start: 2022-04-24 | End: 2022-04-25

## 2022-04-24 RX ORDER — HEPARIN SODIUM 1000 [USP'U]/ML
INJECTION, SOLUTION INTRAVENOUS; SUBCUTANEOUS
Status: DISCONTINUED | OUTPATIENT
Start: 2022-04-24 | End: 2022-04-24 | Stop reason: HOSPADM

## 2022-04-24 RX ORDER — METHYLPREDNISOLONE SOD SUCC 125 MG
200 VIAL (EA) INJECTION DAILY
Status: COMPLETED | OUTPATIENT
Start: 2022-04-25 | End: 2022-04-25

## 2022-04-24 RX ORDER — HEPARIN SODIUM 1000 [USP'U]/ML
INJECTION, SOLUTION INTRAVENOUS; SUBCUTANEOUS
Status: DISCONTINUED | OUTPATIENT
Start: 2022-04-24 | End: 2022-04-24

## 2022-04-24 RX ORDER — NOREPINEPHRINE BITARTRATE/D5W 4MG/250ML
0-3 PLASTIC BAG, INJECTION (ML) INTRAVENOUS CONTINUOUS
Status: DISCONTINUED | OUTPATIENT
Start: 2022-04-24 | End: 2022-04-24

## 2022-04-24 RX ORDER — OXYCODONE HYDROCHLORIDE 5 MG/1
5 TABLET ORAL EVERY 4 HOURS PRN
Status: DISCONTINUED | OUTPATIENT
Start: 2022-04-24 | End: 2022-04-25

## 2022-04-24 RX ORDER — ALBUMIN HUMAN 50 G/1000ML
25 SOLUTION INTRAVENOUS ONCE
Status: COMPLETED | OUTPATIENT
Start: 2022-04-24 | End: 2022-04-24

## 2022-04-24 RX ORDER — PROPOFOL 10 MG/ML
VIAL (ML) INTRAVENOUS
Status: DISCONTINUED | OUTPATIENT
Start: 2022-04-24 | End: 2022-04-24

## 2022-04-24 RX ORDER — FUROSEMIDE 10 MG/ML
120 INJECTION INTRAMUSCULAR; INTRAVENOUS ONCE
Status: COMPLETED | OUTPATIENT
Start: 2022-04-24 | End: 2022-04-24

## 2022-04-24 RX ORDER — METHYLPREDNISOLONE SOD SUCC 125 MG
160 VIAL (EA) INJECTION DAILY
Status: COMPLETED | OUTPATIENT
Start: 2022-04-26 | End: 2022-04-26

## 2022-04-24 RX ORDER — ALBUMIN HUMAN 50 G/1000ML
SOLUTION INTRAVENOUS
Status: COMPLETED | OUTPATIENT
Start: 2022-04-24 | End: 2022-04-24

## 2022-04-24 RX ORDER — MYCOPHENOLATE MOFETIL 200 MG/ML
1000 POWDER, FOR SUSPENSION ORAL 2 TIMES DAILY
Status: DISCONTINUED | OUTPATIENT
Start: 2022-04-24 | End: 2022-04-25

## 2022-04-24 RX ORDER — MANNITOL 250 MG/ML
INJECTION, SOLUTION INTRAVENOUS
Status: DISCONTINUED | OUTPATIENT
Start: 2022-04-24 | End: 2022-04-24

## 2022-04-24 RX ORDER — SODIUM CHLORIDE 0.9 % (FLUSH) 0.9 %
10 SYRINGE (ML) INJECTION
Status: DISCONTINUED | OUTPATIENT
Start: 2022-04-24 | End: 2022-05-01 | Stop reason: HOSPADM

## 2022-04-24 RX ORDER — DEXMEDETOMIDINE HYDROCHLORIDE 4 UG/ML
0-1.4 INJECTION, SOLUTION INTRAVENOUS CONTINUOUS
Status: DISCONTINUED | OUTPATIENT
Start: 2022-04-24 | End: 2022-04-24

## 2022-04-24 RX ORDER — FAMOTIDINE 10 MG/ML
20 INJECTION INTRAVENOUS EVERY 12 HOURS
Status: DISCONTINUED | OUTPATIENT
Start: 2022-04-24 | End: 2022-04-25

## 2022-04-24 RX ORDER — METHYLPREDNISOLONE SOD SUCC 125 MG
120 VIAL (EA) INJECTION DAILY
Status: COMPLETED | OUTPATIENT
Start: 2022-04-27 | End: 2022-04-27

## 2022-04-24 RX ORDER — HYDROMORPHONE HYDROCHLORIDE 2 MG/ML
INJECTION, SOLUTION INTRAMUSCULAR; INTRAVENOUS; SUBCUTANEOUS
Status: DISCONTINUED | OUTPATIENT
Start: 2022-04-24 | End: 2022-04-24

## 2022-04-24 RX ORDER — PROPOFOL 10 MG/ML
0-50 INJECTION, EMULSION INTRAVENOUS CONTINUOUS
Status: DISCONTINUED | OUTPATIENT
Start: 2022-04-24 | End: 2022-04-24

## 2022-04-24 RX ORDER — VASOPRESSIN 20 [USP'U]/ML
INJECTION, SOLUTION INTRAMUSCULAR; SUBCUTANEOUS
Status: DISCONTINUED | OUTPATIENT
Start: 2022-04-24 | End: 2022-04-24

## 2022-04-24 RX ORDER — FUROSEMIDE 10 MG/ML
INJECTION INTRAMUSCULAR; INTRAVENOUS
Status: DISCONTINUED | OUTPATIENT
Start: 2022-04-24 | End: 2022-04-24

## 2022-04-24 RX ORDER — METHYLPREDNISOLONE SODIUM SUCCINATE 1 G/16ML
INJECTION INTRAMUSCULAR; INTRAVENOUS
Status: DISCONTINUED | OUTPATIENT
Start: 2022-04-24 | End: 2022-04-24

## 2022-04-24 RX ORDER — FUROSEMIDE 10 MG/ML
INJECTION INTRAMUSCULAR; INTRAVENOUS
Status: COMPLETED
Start: 2022-04-24 | End: 2022-04-24

## 2022-04-24 RX ORDER — METHOCARBAMOL 500 MG/1
500 TABLET, FILM COATED ORAL 3 TIMES DAILY
Status: DISCONTINUED | OUTPATIENT
Start: 2022-04-24 | End: 2022-05-01 | Stop reason: HOSPADM

## 2022-04-24 RX ORDER — MUPIROCIN 20 MG/G
1 OINTMENT TOPICAL 2 TIMES DAILY
Status: COMPLETED | OUTPATIENT
Start: 2022-04-24 | End: 2022-04-28

## 2022-04-24 RX ORDER — ROCURONIUM BROMIDE 10 MG/ML
INJECTION, SOLUTION INTRAVENOUS
Status: DISCONTINUED | OUTPATIENT
Start: 2022-04-24 | End: 2022-04-24

## 2022-04-24 RX ORDER — LIDOCAINE HCL/PF 100 MG/5ML
SYRINGE (ML) INTRAVENOUS
Status: DISCONTINUED | OUTPATIENT
Start: 2022-04-24 | End: 2022-04-24

## 2022-04-24 RX ORDER — FENTANYL CITRATE 50 UG/ML
INJECTION, SOLUTION INTRAMUSCULAR; INTRAVENOUS
Status: DISCONTINUED | OUTPATIENT
Start: 2022-04-24 | End: 2022-04-24

## 2022-04-24 RX ORDER — NYSTATIN 100000 [USP'U]/ML
500000 SUSPENSION ORAL
Status: DISCONTINUED | OUTPATIENT
Start: 2022-04-24 | End: 2022-05-01 | Stop reason: HOSPADM

## 2022-04-24 RX ORDER — NOREPINEPHRINE BITARTRATE 1 MG/ML
INJECTION, SOLUTION INTRAVENOUS
Status: DISCONTINUED | OUTPATIENT
Start: 2022-04-24 | End: 2022-04-24

## 2022-04-24 RX ORDER — OXYCODONE HCL 5 MG/5 ML
2.5 SOLUTION, ORAL ORAL EVERY 4 HOURS PRN
Status: DISCONTINUED | OUTPATIENT
Start: 2022-04-24 | End: 2022-04-25

## 2022-04-24 RX ORDER — VALGANCICLOVIR 450 MG/1
450 TABLET, FILM COATED ORAL DAILY
Status: DISCONTINUED | OUTPATIENT
Start: 2022-05-04 | End: 2022-05-01 | Stop reason: HOSPADM

## 2022-04-24 RX ORDER — PREDNISONE 20 MG/1
20 TABLET ORAL DAILY
Status: DISCONTINUED | OUTPATIENT
Start: 2022-04-30 | End: 2022-05-01 | Stop reason: HOSPADM

## 2022-04-24 RX ORDER — SULFAMETHOXAZOLE AND TRIMETHOPRIM 400; 80 MG/1; MG/1
1 TABLET ORAL EVERY MORNING
Status: DISCONTINUED | OUTPATIENT
Start: 2022-05-01 | End: 2022-05-01 | Stop reason: HOSPADM

## 2022-04-24 RX ORDER — ONDANSETRON 2 MG/ML
INJECTION INTRAMUSCULAR; INTRAVENOUS
Status: DISCONTINUED | OUTPATIENT
Start: 2022-04-24 | End: 2022-04-24

## 2022-04-24 RX ORDER — HEPARIN SODIUM 5000 [USP'U]/ML
5000 INJECTION, SOLUTION INTRAVENOUS; SUBCUTANEOUS EVERY 8 HOURS
Status: DISCONTINUED | OUTPATIENT
Start: 2022-04-24 | End: 2022-05-01 | Stop reason: HOSPADM

## 2022-04-24 RX ORDER — DEXTROSE MONOHYDRATE AND SODIUM CHLORIDE 5; .9 G/100ML; G/100ML
INJECTION, SOLUTION INTRAVENOUS CONTINUOUS
Status: DISCONTINUED | OUTPATIENT
Start: 2022-04-24 | End: 2022-04-25

## 2022-04-24 RX ADMIN — SODIUM CHLORIDE 3 G: 9 INJECTION, SOLUTION INTRAVENOUS at 02:04

## 2022-04-24 RX ADMIN — MUPIROCIN 1 G: 20 OINTMENT TOPICAL at 09:04

## 2022-04-24 RX ADMIN — PROPOFOL 130 MG: 10 INJECTION, EMULSION INTRAVENOUS at 12:04

## 2022-04-24 RX ADMIN — CALCIUM CHLORIDE 500 MG: 100 INJECTION, SOLUTION INTRAVENOUS at 04:04

## 2022-04-24 RX ADMIN — ROCURONIUM BROMIDE 50 MG: 10 INJECTION, SOLUTION INTRAVENOUS at 03:04

## 2022-04-24 RX ADMIN — SODIUM CHLORIDE 3 UNITS/HR: 9 INJECTION, SOLUTION INTRAVENOUS at 07:04

## 2022-04-24 RX ADMIN — NYSTATIN 500000 UNITS: 500000 SUSPENSION ORAL at 07:04

## 2022-04-24 RX ADMIN — AMPICILLIN SODIUM AND SULBACTAM SODIUM 3 G: 2; 1 INJECTION, POWDER, FOR SOLUTION INTRAMUSCULAR; INTRAVENOUS at 03:04

## 2022-04-24 RX ADMIN — ROCURONIUM BROMIDE 50 MG: 10 INJECTION, SOLUTION INTRAVENOUS at 02:04

## 2022-04-24 RX ADMIN — MANNITOL 100 ML: 250 INJECTION, SOLUTION INTRAVENOUS at 02:04

## 2022-04-24 RX ADMIN — NOREPINEPHRINE BITARTRATE 16 MCG: 1 INJECTION, SOLUTION, CONCENTRATE INTRAVENOUS at 03:04

## 2022-04-24 RX ADMIN — HYDROMORPHONE HYDROCHLORIDE 0.5 MG: 2 INJECTION INTRAMUSCULAR; INTRAVENOUS; SUBCUTANEOUS at 07:04

## 2022-04-24 RX ADMIN — HEPARIN SODIUM 5000 UNITS: 5000 INJECTION INTRAVENOUS; SUBCUTANEOUS at 10:04

## 2022-04-24 RX ADMIN — FUROSEMIDE 100 MG: 10 INJECTION, SOLUTION INTRAMUSCULAR; INTRAVENOUS at 04:04

## 2022-04-24 RX ADMIN — FAMOTIDINE 20 MG: 10 INJECTION INTRAVENOUS at 09:04

## 2022-04-24 RX ADMIN — HEPARIN SODIUM 5000 UNITS: 5000 INJECTION INTRAVENOUS; SUBCUTANEOUS at 03:04

## 2022-04-24 RX ADMIN — VASOPRESSIN 1 UNITS: 20 INJECTION, SOLUTION INTRAMUSCULAR; SUBCUTANEOUS at 03:04

## 2022-04-24 RX ADMIN — ALBUMIN (HUMAN): 25 SOLUTION INTRAVENOUS at 02:04

## 2022-04-24 RX ADMIN — NOREPINEPHRINE BITARTRATE 32 MCG: 1 INJECTION, SOLUTION, CONCENTRATE INTRAVENOUS at 04:04

## 2022-04-24 RX ADMIN — PHYTONADIONE 10 MG: 10 INJECTION, EMULSION INTRAMUSCULAR; INTRAVENOUS; SUBCUTANEOUS at 10:04

## 2022-04-24 RX ADMIN — ROCURONIUM BROMIDE 100 MG: 10 INJECTION, SOLUTION INTRAVENOUS at 12:04

## 2022-04-24 RX ADMIN — FUROSEMIDE 120 MG: 10 INJECTION INTRAMUSCULAR; INTRAVENOUS at 10:04

## 2022-04-24 RX ADMIN — CHLOROTHIAZIDE SODIUM 500 MG: 500 INJECTION, POWDER, LYOPHILIZED, FOR SOLUTION INTRAVENOUS at 12:04

## 2022-04-24 RX ADMIN — NOREPINEPHRINE BITARTRATE 0.03 MCG/KG/MIN: 1 INJECTION, SOLUTION, CONCENTRATE INTRAVENOUS at 01:04

## 2022-04-24 RX ADMIN — PHYTONADIONE 10 MG: 10 INJECTION, EMULSION INTRAMUSCULAR; INTRAVENOUS; SUBCUTANEOUS at 02:04

## 2022-04-24 RX ADMIN — FENTANYL CITRATE 100 MCG: 50 INJECTION, SOLUTION INTRAMUSCULAR; INTRAVENOUS at 02:04

## 2022-04-24 RX ADMIN — NYSTATIN 500000 UNITS: 500000 SUSPENSION ORAL at 09:04

## 2022-04-24 RX ADMIN — MYCOPHENOLATE MOFETIL 1000 MG: 200 POWDER, FOR SUSPENSION ORAL at 09:04

## 2022-04-24 RX ADMIN — SODIUM CHLORIDE 3 G: 9 INJECTION, SOLUTION INTRAVENOUS at 05:04

## 2022-04-24 RX ADMIN — METHYLPREDNISOLONE SODIUM SUCCINATE 500 MG: 1 INJECTION, POWDER, LYOPHILIZED, FOR SOLUTION INTRAMUSCULAR; INTRAVENOUS at 02:04

## 2022-04-24 RX ADMIN — HEPARIN SODIUM 2000 UNITS: 1000 INJECTION, SOLUTION INTRAVENOUS; SUBCUTANEOUS at 03:04

## 2022-04-24 RX ADMIN — TACROLIMUS 1 MG: 1 CAPSULE, GELATIN COATED ORAL at 05:04

## 2022-04-24 RX ADMIN — DEXTROSE MONOHYDRATE 15 ML: 25 INJECTION, SOLUTION INTRAVENOUS at 04:04

## 2022-04-24 RX ADMIN — OXYCODONE 5 MG: 5 TABLET ORAL at 07:04

## 2022-04-24 RX ADMIN — SODIUM CHLORIDE, SODIUM GLUCONATE, SODIUM ACETATE, POTASSIUM CHLORIDE, MAGNESIUM CHLORIDE, SODIUM PHOSPHATE, DIBASIC, AND POTASSIUM PHOSPHATE: .53; .5; .37; .037; .03; .012; .00082 INJECTION, SOLUTION INTRAVENOUS at 12:04

## 2022-04-24 RX ADMIN — FUROSEMIDE 120 MG: 10 INJECTION, SOLUTION INTRAMUSCULAR; INTRAVENOUS at 10:04

## 2022-04-24 RX ADMIN — MYCOPHENOLATE MOFETIL 1000 MG: 200 POWDER, FOR SUSPENSION ORAL at 10:04

## 2022-04-24 RX ADMIN — FUROSEMIDE 100 MG: 10 INJECTION, SOLUTION INTRAMUSCULAR; INTRAVENOUS at 02:04

## 2022-04-24 RX ADMIN — AMPICILLIN SODIUM AND SULBACTAM SODIUM 3 G: 2; 1 INJECTION, POWDER, FOR SOLUTION INTRAMUSCULAR; INTRAVENOUS at 10:04

## 2022-04-24 RX ADMIN — ALBUMIN (HUMAN) 25 G: 12.5 SOLUTION INTRAVENOUS at 09:04

## 2022-04-24 RX ADMIN — HYDROMORPHONE HYDROCHLORIDE 0.2 MG: 1 INJECTION, SOLUTION INTRAMUSCULAR; INTRAVENOUS; SUBCUTANEOUS at 12:04

## 2022-04-24 RX ADMIN — INSULIN HUMAN 10 UNITS: 100 INJECTION, SOLUTION PARENTERAL at 04:04

## 2022-04-24 RX ADMIN — ROCURONIUM BROMIDE 50 MG: 10 INJECTION, SOLUTION INTRAVENOUS at 06:04

## 2022-04-24 RX ADMIN — VASOPRESSIN 0.04 UNITS/MIN: 20 INJECTION INTRAVENOUS at 01:04

## 2022-04-24 RX ADMIN — VASOPRESSIN 2 UNITS: 20 INJECTION, SOLUTION INTRAMUSCULAR; SUBCUTANEOUS at 05:04

## 2022-04-24 RX ADMIN — METHOCARBAMOL 500 MG: 500 TABLET, FILM COATED ORAL at 10:04

## 2022-04-24 RX ADMIN — MANNITOL 100 ML: 250 INJECTION, SOLUTION INTRAVENOUS at 04:04

## 2022-04-24 RX ADMIN — DEXTROSE AND SODIUM CHLORIDE: 5; .9 INJECTION, SOLUTION INTRAVENOUS at 09:04

## 2022-04-24 RX ADMIN — VASOPRESSIN 2 UNITS: 20 INJECTION, SOLUTION INTRAMUSCULAR; SUBCUTANEOUS at 04:04

## 2022-04-24 RX ADMIN — CALCIUM CHLORIDE 500 MG: 100 INJECTION, SOLUTION INTRAVENOUS at 03:04

## 2022-04-24 RX ADMIN — LIDOCAINE HYDROCHLORIDE 100 MG: 20 INJECTION, SOLUTION INTRAVENOUS at 12:04

## 2022-04-24 RX ADMIN — AMPICILLIN SODIUM AND SULBACTAM SODIUM 3 G: 2; 1 INJECTION, POWDER, FOR SOLUTION INTRAMUSCULAR; INTRAVENOUS at 09:04

## 2022-04-24 RX ADMIN — FENTANYL CITRATE 100 MCG: 50 INJECTION, SOLUTION INTRAMUSCULAR; INTRAVENOUS at 12:04

## 2022-04-24 NOTE — PROGRESS NOTES
04/24/22 0730   Vital Signs   Pulse 86   BP (!) 130/59   During Hemodialysis Assessment   Blood Flow Rate (mL/min) 200 mL/min   Dialysate Flow Rate (mL/min) 200 ml/min   Ultrafiltration Rate (mL/Hr) 50 mL/Hr   Arteriovenous Lines Secure Yes   Arterial Pressure (mmHg) -120 mmHg   Venous Pressure (mmHg) 120   Blood Volume Processed (Liters) 73.6 L   UF Removed (mL) 753 mL   TMP 0   Venous Line in Air Detector Yes   Intake (mL) 200 mL   Intra-Hemodialysis Comments Closing.   Post-Hemodialysis Assessment   Rinseback Volume (mL) 400 mL   Blood Volume Processed (Liters) 73.6 L   Dialyzer Clearance Clear   Additional Fluid Intake (mL) 400 mL   Total UF (mL) 753 mL   Net Fluid Removal 353   Patient Response to Treatment Intra-op   Post-Hemodialysis Comments Liver transplant

## 2022-04-24 NOTE — SUBJECTIVE & OBJECTIVE
Follow-up For: Procedure(s) (LRB):  TRANSPLANT, LIVER (N/A)    Post-Operative Day: 1 Day Post-Op     Past Medical History:   Diagnosis Date    Arthritis     Cirrhosis of liver     HTN (hypertension)     BRAYDON (obstructive sleep apnea)     Secondary esophageal varices without bleeding 3/18/2022    EGD (2/25/22) showed moderate portal hypertensive gastropathy, small hiatal hernia, grade 1 esophageal varices    Type 2 diabetes mellitus        Past Surgical History:   Procedure Laterality Date    COLONOSCOPY      FINGER AMPUTATION      LIVER TRANSPLANT N/A 4/10/2022    Procedure: TRANSPLANT, LIVER;  Surgeon: Félix Scott MD;  [facility];  Service: Transplant;  Laterality: N/A;    LIVER TRANSPLANT N/A 4/13/2022    Procedure: TRANSPLANT, LIVER;  Surgeon: Keyon Castillo MD;  [facility];  Service: Transplant;  Laterality: N/A;    LIVER TRANSPLANT N/A 4/20/2022    Procedure: TRANSPLANT, LIVER;  Surgeon: Brant Gonzalez Jr., MD;  [facility];  Service: Transplant;  Laterality: N/A;    MEDIAL COLLATERAL LIGAMENT REPAIR, KNEE Bilateral     ROTATRO CUFF REPAIR      TONSILLECTOMY         Review of patient's allergies indicates:   Allergen Reactions    Strawberry Anaphylaxis and Rash    Metformin Diarrhea    Pork/porcine containing products Diarrhea and Nausea And Vomiting       Family History    None       Tobacco Use    Smoking status: Never Smoker    Smokeless tobacco: Never Used   Substance and Sexual Activity    Alcohol use: Not Currently    Drug use: Not on file    Sexual activity: Not on file      Review of Systems   Unable to perform ROS: Intubated   All other systems reviewed and are negative.  Objective:     Vital Signs (Most Recent):  Temp: 98.1 °F (36.7 °C) (04/24/22 0915)  Pulse: 91 (04/24/22 1003)  Resp: 11 (04/24/22 1003)  BP: (!) 130/59 (04/24/22 0730)  SpO2: 100 % (04/24/22 1003)   Vital Signs (24h Range):  Temp:  [98.1 °F (36.7 °C)-98.8 °F (37.1 °C)] 98.1 °F  (36.7 °C)  Pulse:  [] 91  Resp:  [11-18] 11  SpO2:  [95 %-100 %] 100 %  BP: (100-135)/(46-73) 130/59  Arterial Line BP: (119-133)/(57-66) 131/65     Weight: 113.3 kg (249 lb 10.7 oz)  Body mass index is 39.1 kg/m².      Intake/Output Summary (Last 24 hours) at 4/24/2022 1006  Last data filed at 4/24/2022 0955  Gross per 24 hour   Intake 60882.21 ml   Output 67246 ml   Net -2720.79 ml       Physical Exam  Vitals and nursing note reviewed.   Constitutional:       Comments: sedated   HENT:      Head: Normocephalic and atraumatic.   Neck:      Comments: R IJ, swan in place  Cardiovascular:      Rate and Rhythm: Normal rate and regular rhythm.      Pulses: Normal pulses.   Pulmonary:      Comments: intubated  Abdominal:      General: There is no distension.      Palpations: Abdomen is soft.      Comments: Chevron incision w/ island dressing intact   GO drain x 2, serosanguinous    Genitourinary:     Comments: Shirley   Musculoskeletal:      Comments: Femoral a line, CVC  Left radial a line    Skin:     General: Skin is warm and dry.   Neurological:      General: No focal deficit present.       Vents:  Vent Mode: A/C (04/24/22 1003)  Ventilator Initiated: Yes (04/24/22 0843)  Set Rate: 16 BPM (04/24/22 1003)  Vt Set: 430 mL (04/24/22 1003)  PEEP/CPAP: 5 cmH20 (04/24/22 1003)  Oxygen Concentration (%): 50 (04/24/22 1003)  Peak Airway Pressure: 16 cmH2O (04/24/22 1003)  Plateau Pressure: 0 cmH20 (04/24/22 1003)  Total Ve: 7.89 mL (04/24/22 1003)  Negative Inspiratory Force (cm H2O): 0 (04/24/22 1003)  F/VT Ratio<105 (RSBI): (!) 26.51 (04/24/22 1003)    Lines/Drains/Airways       Central Venous Catheter Line  Duration                  Hemodialysis Catheter 04/24/22 0137 <1 day    Percutaneous Central Line Insertion/Assessment - Triple Lumen  04/24/22 0146 right internal jugular <1 day    Pulmonary Artery Catheter Assessment  04/24/22 0141 <1 day              Drain  Duration                  Closed/Suction Drain 04/24/22  0659 Right Abdomen Bulb 19 Fr. <1 day         Closed/Suction Drain 04/24/22 0744 Right Abdomen Bulb 19 Fr. <1 day         Urethral Catheter 04/24/22 0046 Non-latex;Straight-tip 16 Fr. <1 day              Airway  Duration                  Airway - Non-Surgical 04/24/22 0010 <1 day              Arterial Line  Duration             Arterial Line 04/24/22 0023 Left Radial <1 day    Arterial Line 04/24/22 0139 Right Femoral <1 day              Peripheral Intravenous Line  Duration                  Peripheral IV - Single Lumen 04/23/22 2021 20 G Right Forearm <1 day         GIOVANNA 04/24/22 0037 Left Antecubital <1 day                    Significant Labs:    CBC/Anemia Profile:  Recent Labs   Lab 04/23/22  0612 04/24/22  0030 04/24/22  0553 04/24/22  0700 04/24/22  0838 04/24/22  0838   WBC 6.19  --   --   --  19.65*  19.65*  --    HGB 6.8*   < > 8.4* 7.7* 8.5*  8.5*  --    HCT 20.5*   < > 25.5* 23.1* 26.2*  26.2* 26*   PLT 45*   < > 40* 69* 55*  55*  --    MCV 90  --   --   --  89  89  --    RDW 20.0*  --   --   --  18.6*  18.6*  --     < > = values in this interval not displayed.        Chemistries:  Recent Labs   Lab 04/23/22 0612 04/24/22 0030 04/24/22  0553 04/24/22  0700 04/24/22  0838   * 137 135* 136 135*   K 5.1 4.8 3.4* 3.9 4.1   CL 99  --   --   --  103   CO2 26  --   --   --  24   BUN 42*  --   --   --  20   CREATININE 2.7*  --   --   --  1.2   CALCIUM 8.9  --   --   --  7.7*   ALBUMIN 3.9 3.5 2.7* 2.6* 2.5*   PROT 5.6*  --   --   --  3.8*   BILITOT 4.8*  --   --   --  8.2*   ALKPHOS 82  --   --   --  50*   ALT 18  --  148*  --  155*   AST 31  --  282*  --  433*   MG 2.5 2.2 2.2 2.0  --    PHOS 3.4  --   --   --   --        ABGs:   Recent Labs   Lab 04/24/22  0949   PH 7.342*   PCO2 44.8   HCO3 24.3   POCSATURATED 99   BE -1     Coagulation:   Recent Labs   Lab 04/24/22  0838   INR 1.5*   APTT 39.0*     Lactic Acid: No results for input(s): LACTATE in the last 48 hours.    Significant Imaging: I have  reviewed all pertinent imaging results/findings within the past 24 hours.

## 2022-04-24 NOTE — TRANSFER OF CARE
"Anesthesia Transfer of Care Note    Patient: Pelon Vasquez    Procedure(s) Performed: Procedure(s) (LRB):  TRANSPLANT, LIVER (N/A)    Patient location: ICU    Anesthesia Type: general    Transport from OR: Upon arrival to PACU/ICU, patient attached to ventilator and auscultated to confirm bilateral breath sounds and adequate TV. Continuous ECG monitoring in transport. Continuous SpO2 monitoring in transport. Continuos invasive BP monitoring in transport    Post pain: adequate analgesia    Post assessment: no apparent anesthetic complications and tolerated procedure well    Post vital signs: stable    Level of consciousness: sedated    Nausea/Vomiting: no nausea/vomiting    Complications: none    Transfer of care protocol was followed      Last vitals:   Visit Vitals  BP (!) 130/59   Pulse 86   Temp 37.1 °C (98.8 °F) (Oral)   Resp 18   Ht 5' 7" (1.702 m)   Wt 113.3 kg (249 lb 10.7 oz)   SpO2 100%   BMI 39.10 kg/m²     "

## 2022-04-24 NOTE — ASSESSMENT & PLAN NOTE
BG goal 140 - 180     Continue IV insulin infusion protocol  Requires intensive BG monitoring while on protocol     ** Please call Endocrine for any BG related issues **  ** Please notify Endocrine for any change and/or advance in diet**    Discharge planning: DIETER

## 2022-04-24 NOTE — ANESTHESIA PROCEDURE NOTES
Central Line    Diagnosis: esld  Patient location during procedure: done in OR    Staffing  Authorizing Provider: Zhang Bolanos MD  Performing Provider: Zhang Bolanos MD    Staffing  Performed: anesthesiologist   Anesthesiologist: Zhang Bolanos MD  Anesthesiologist was present at the time of the procedure.  Preanesthetic Checklist  Completed: patient identified, IV checked, site marked, risks and benefits discussed, surgical consent, monitors and equipment checked, pre-op evaluation, timeout performed and anesthesia consent given  Indication   Indication: vascular access, hemodialysis     Anesthesia   general anesthesia    Central Line   Skin Prep: skin prepped with ChloraPrep, skin prep agent completely dried prior to procedure  Sterile Barriers Followed: Yes    All five maximal barriers used- gloves, gown, cap, mask, and large sterile sheet    hand hygiene performed prior to central venous catheter insertion  Location: right internal jugular.   Catheter type: triple lumen  Catheter Size: 12 Fr  Inserted Catheter Length: 14 cm  Ultrasound: vascular probe with ultrasound   Vessel Caliber: medium, patent, compressibility normal  Needle advanced into vessel with real time Ultrasound guidance.  Guidewire confirmed in vessel.  sterile gel and probe cover used in ultrasound-guided central venous catheter insertion   Manometry: Venous cannualation confirmed by visual estimation of blood vessel pressure using manometry.  Insertion Attempts: 1   Securement:line sutured, chlorhexidine patch, sterile dressing applied and blood return through all ports    Post-Procedure    Adverse Events:none      Guidewire

## 2022-04-24 NOTE — H&P
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 ESLD complicated by jaundice, FAMILIA, ascites (para 2x/week, no h/o SBP), HE, and hypervolemia. He now presents to the SICU intubated and sedated s/p DBD OLTxp 04/24.     The case went well. He arrives on 0.04 of levo, 0.04 vaso. Intra-operatively he received 7 uFFP, 4upRBC, 2 plt, 2 cryo with 5 L crystalloid and 400 cc of 25% albumin. He underwent intra-op dialysis given pre-op FAMILIA with oliguria and produced 300 cc of urine following reperfusion.       Hospital/ICU Course:  No notes on file    Follow-up For: Procedure(s) (LRB):  TRANSPLANT, LIVER (N/A)    Post-Operative Day: 1 Day Post-Op     Past Medical History:   Diagnosis Date    Arthritis     Cirrhosis of liver     HTN (hypertension)     BRAYDON (obstructive sleep apnea)     Secondary esophageal varices without bleeding 3/18/2022    EGD (2/25/22) showed moderate portal hypertensive gastropathy, small hiatal hernia, grade 1 esophageal varices    Type 2 diabetes mellitus        Past Surgical History:   Procedure Laterality Date    COLONOSCOPY      FINGER AMPUTATION      LIVER TRANSPLANT N/A 4/10/2022    Procedure: TRANSPLANT, LIVER;  Surgeon: Félix Scott MD;  [facility];  Service: Transplant;  Laterality: N/A;    LIVER TRANSPLANT N/A 4/13/2022    Procedure: TRANSPLANT, LIVER;  Surgeon: Keyon Castillo MD;  [facility];  Service: Transplant;  Laterality: N/A;    LIVER TRANSPLANT N/A 4/20/2022    Procedure: TRANSPLANT, LIVER;  Surgeon: Brant Gonzalez Jr., MD;  Location: [facility] OR 2ND FLR;  Service: Transplant;  Laterality: N/A;     MEDIAL COLLATERAL LIGAMENT REPAIR, KNEE Bilateral     ROTATRO CUFF REPAIR      TONSILLECTOMY         Review of patient's allergies indicates:   Allergen Reactions    Strawberry Anaphylaxis and Rash    Metformin Diarrhea    Pork/porcine containing products Diarrhea and Nausea And Vomiting       Family History    None       Tobacco Use    Smoking status: Never Smoker    Smokeless tobacco: Never Used   Substance and Sexual Activity    Alcohol use: Not Currently    Drug use: Not on file    Sexual activity: Not on file      Review of Systems   Unable to perform ROS: Intubated   All other systems reviewed and are negative.  Objective:     Vital Signs (Most Recent):  Temp: 98.1 °F (36.7 °C) (04/24/22 0915)  Pulse: 91 (04/24/22 1003)  Resp: 11 (04/24/22 1003)  BP: (!) 130/59 (04/24/22 0730)  SpO2: 100 % (04/24/22 1003)   Vital Signs (24h Range):  Temp:  [98.1 °F (36.7 °C)-98.8 °F (37.1 °C)] 98.1 °F (36.7 °C)  Pulse:  [] 91  Resp:  [11-18] 11  SpO2:  [95 %-100 %] 100 %  BP: (100-135)/(46-73) 130/59  Arterial Line BP: (119-133)/(57-66) 131/65     Weight: 113.3 kg (249 lb 10.7 oz)  Body mass index is 39.1 kg/m².      Intake/Output Summary (Last 24 hours) at 4/24/2022 1006  Last data filed at 4/24/2022 0955  Gross per 24 hour   Intake 18679.21 ml   Output 43982 ml   Net -2720.79 ml       Physical Exam  Vitals and nursing note reviewed.   Constitutional:       Comments: sedated   HENT:      Head: Normocephalic and atraumatic.   Neck:      Comments: R IJ, swan in place  Cardiovascular:      Rate and Rhythm: Normal rate and regular rhythm.      Pulses: Normal pulses.   Pulmonary:      Comments: intubated  Abdominal:      General: There is no distension.      Palpations: Abdomen is soft.      Comments: Chevron incision w/ island dressing intact   GO drain x 2, serosanguinous    Genitourinary:     Comments: Shirley   Musculoskeletal:      Comments: Femoral a line, CVC  Left radial a line    Skin:     General: Skin  is warm and dry.   Neurological:      General: No focal deficit present.       Vents:  Vent Mode: A/C (04/24/22 1003)  Ventilator Initiated: Yes (04/24/22 0843)  Set Rate: 16 BPM (04/24/22 1003)  Vt Set: 430 mL (04/24/22 1003)  PEEP/CPAP: 5 cmH20 (04/24/22 1003)  Oxygen Concentration (%): 50 (04/24/22 1003)  Peak Airway Pressure: 16 cmH2O (04/24/22 1003)  Plateau Pressure: 0 cmH20 (04/24/22 1003)  Total Ve: 7.89 mL (04/24/22 1003)  Negative Inspiratory Force (cm H2O): 0 (04/24/22 1003)  F/VT Ratio<105 (RSBI): (!) 26.51 (04/24/22 1003)    Lines/Drains/Airways       Central Venous Catheter Line  Duration                  Hemodialysis Catheter 04/24/22 0137 <1 day    Percutaneous Central Line Insertion/Assessment - Triple Lumen  04/24/22 0146 right internal jugular <1 day    Pulmonary Artery Catheter Assessment  04/24/22 0141 <1 day              Drain  Duration                  Closed/Suction Drain 04/24/22 0659 Right Abdomen Bulb 19 Fr. <1 day         Closed/Suction Drain 04/24/22 0744 Right Abdomen Bulb 19 Fr. <1 day         Urethral Catheter 04/24/22 0046 Non-latex;Straight-tip 16 Fr. <1 day              Airway  Duration                  Airway - Non-Surgical 04/24/22 0010 <1 day              Arterial Line  Duration             Arterial Line 04/24/22 0023 Left Radial <1 day    Arterial Line 04/24/22 0139 Right Femoral <1 day              Peripheral Intravenous Line  Duration                  Peripheral IV - Single Lumen 04/23/22 2021 20 G Right Forearm <1 day         GIOVANNA 04/24/22 0037 Left Antecubital <1 day                    Significant Labs:    CBC/Anemia Profile:  Recent Labs   Lab 04/23/22  0612 04/24/22  0030 04/24/22  0553 04/24/22  0700 04/24/22  0838 04/24/22  0838   WBC 6.19  --   --   --  19.65*  19.65*  --    HGB 6.8*   < > 8.4* 7.7* 8.5*  8.5*  --    HCT 20.5*   < > 25.5* 23.1* 26.2*  26.2* 26*   PLT 45*   < > 40* 69* 55*  55*  --    MCV 90  --   --   --  89  89  --    RDW 20.0*  --   --   --   18.6*  18.6*  --     < > = values in this interval not displayed.        Chemistries:  Recent Labs   Lab 04/23/22  0612 04/24/22  0030 04/24/22  0553 04/24/22  0700 04/24/22  0838   * 137 135* 136 135*   K 5.1 4.8 3.4* 3.9 4.1   CL 99  --   --   --  103   CO2 26  --   --   --  24   BUN 42*  --   --   --  20   CREATININE 2.7*  --   --   --  1.2   CALCIUM 8.9  --   --   --  7.7*   ALBUMIN 3.9 3.5 2.7* 2.6* 2.5*   PROT 5.6*  --   --   --  3.8*   BILITOT 4.8*  --   --   --  8.2*   ALKPHOS 82  --   --   --  50*   ALT 18  --  148*  --  155*   AST 31  --  282*  --  433*   MG 2.5 2.2 2.2 2.0  --    PHOS 3.4  --   --   --   --        ABGs:   Recent Labs   Lab 04/24/22  0949   PH 7.342*   PCO2 44.8   HCO3 24.3   POCSATURATED 99   BE -1     Coagulation:   Recent Labs   Lab 04/24/22  0838   INR 1.5*   APTT 39.0*     Lactic Acid: No results for input(s): LACTATE in the last 48 hours.    Significant Imaging: I have reviewed all pertinent imaging results/findings within the past 24 hours.    Assessment/Plan:     Alcoholic cirrhosis of liver with ascites    Neuro/Psych:   -- Sedation: propofol, prexedex   -- Pain: multimodal w/ PRN narcotic   -- h/o encephalopathy on lactulose/rifaximin pre-op             Cards:   -- HDS  -- maps >65  -- vasopressos   Levo 0.04   Vaso 0.04  -- echo 02/17: normal function       Pulm:   -- Goal O2 sat > 90%  -- Wean as able  -- CXR now and daily while intubated   -- ABG serially while intubated and in early resuscitation period       Renal:  -- Keep francisco for strict I/O  -- FAMILIA pre-op, will monitor closely; urine studies 04/21  -- Intra-op dialysis; nephrology consulted, following  -- Did make 300 cc urine following reperfusion; discussed with nephrology.   -- Will try 120 mg lasix, 500 diuril now and monitor UOP. May need CRRT later   -- albumin PRN      FEN / GI:   -- h/o ESLD now s/p DBD OLTxp 04/24  -- Replace lytes as needed  -- Nutrition: NPO; bedside swallow after extuation  --  bowel reg; HSB  -- CMP serially; AST Q6H  -- GO drain x 2 to right lateral side       ID:   -- Tm: afebrile; WBC WNL pre-op  -- reactive leukocytosis expected  -- immunosuppression and abx per txp pharm      Heme/Onc:   -- H/H pending; will trend (anemic pre-op)  -- CBC, coags now and serially  -- 7uFFP, 2plt, 2 cryo, 4pRBC intra-op      Endo:   -- Gluc goal 140-180  -- insulin gtt       PPx:   Feeding: NPO; bedside swallow  Analgesia/Sedation: multimodal w/ prn narc / propofol, precedex    Thromboembolic prevention: Hold today  HOB >30: Yes  Stress Ulcer ppx: H2B   Glucose control: Critical care goal 140-180 g/dl, ISS    Lines/Drains/Airway: ETT, GO x 2, PIV, arterial line x 2, R IJ, femoral CVC       Dispo/Code Status/Palliative:   -- SICU / Full Code            Critical care was time spent personally by me on the following activities: development of treatment plan with patient or surrogate and bedside caregivers, discussions with consultants, evaluation of patient's response to treatment, examination of patient, ordering and performing treatments and interventions, ordering and review of laboratory studies, ordering and review of radiographic studies, pulse oximetry, re-evaluation of patient's condition.  This critical care time did not overlap with that of any other provider or involve time for any procedures.     Michelle Edwards MD  Critical Care - Surgery  [facility]

## 2022-04-24 NOTE — CONSULTS
[facility]  Endocrinology  Diabetes Consult Note    Consult Requested by: Tonio Smith MD   Reason for admit: S/P liver transplant    HISTORY OF PRESENT ILLNESS:  Reason for Consult: Management of T2DM, Hyperglycemia     Surgical Procedure and Date: Liver Transplant 4/24/22    Diabetes diagnosis year: 6 years ago    Home Diabetes Medications:  none currently (took Metformin in the past and could not tolerate side effects)     How often checking glucose at home?  Once daily    BG readings on regimen: < 150  Hypoglycemia on the regimen?  No  Missed doses on regimen? n/a    Diabetes Complications include:     CKD     Complicating diabetes co morbidities:   CKD, Cirrhosis, Obesity, BRAYDON, Glucocorticoid Use       HPI:   Patient is a 57 y.o. male with a diagnosis of EtOH/LOZANO cirrhosis, esophageal varices, obesity (BMI 42), CKD, HTN, DM2, and BRAYDON presenting for liver transplant. He is s/p diagnostic paracentesis on 4/22 with 4800mL of fluid removed. Patient currently has FAMILIA likely in setting of vancomycin toxicity, per Nephrology note. Plan for intraoperative dialysis. Patient now presents for the above procedure(s). Endocrinology consulted for management of T2DM.       Lab Results   Component Value Date    HGBA1C 6.6 (H) 04/19/2022           Interval HPI:   Overnight events: Admitted to SICU s/p liver transplant. Remains intubated at time of consult. BG within goal ranges on IV intensive insulin protocol. Receiving standard steroid taper.   Eating:   NPO  Nausea: No  Hypoglycemia and intervention: No  Fever: No  TPN and/or TF: No  If yes, type of TF/TPN and rate: n/a    PMH, PSH, FH, SH reviewed     ROS:  Unable to obtain due to: Sedation,Intubation,Altered mental status,Critical illness,Reviewed ROS from note dated 4/20/22 by  Sabrina Lepe PA-C    Review of Systems    Current Medications and/or Treatments Impacting Glycemic Control  Immunotherapy:    Immunosuppressants           Stop Route  "Frequency     mycophenolate mofetil 200 mg/mL suspension 1,000 mg         -- Oral 2 times daily     tacrolimus (PROGRAF) 1 mg/mL oral syringe         -- Oral 2 times daily          Steroids:   Hormones (From admission, onward)                Start     Stop Route Frequency Ordered    04/30/22 0900  predniSONE tablet 20 mg  (methylprednisolone taper panel)        "Followed by" Linked Group Details    -- Oral Daily 04/24/22 0831    04/29/22 0900  methylPREDNISolone sodium succinate injection 40 mg  (methylprednisolone taper panel)        "Followed by" Linked Group Details    04/30 0859 IV Daily 04/24/22 0831    04/28/22 0900  methylPREDNISolone sodium succinate injection 80 mg  (methylprednisolone taper panel)        "Followed by" Linked Group Details    04/29 0859 IV Daily 04/24/22 0831    04/27/22 0900  methylPREDNISolone sodium succinate injection 120 mg  (methylprednisolone taper panel)        "Followed by" Linked Group Details    04/28 0859 IV Daily 04/24/22 0831    04/26/22 0900  methylPREDNISolone sodium succinate injection 160 mg  (methylprednisolone taper panel)        "Followed by" Linked Group Details    04/27 0859 IV Daily 04/24/22 0831    04/25/22 0900  methylPREDNISolone sodium succinate injection 200 mg  (methylprednisolone taper panel)        "Followed by" Linked Group Details    04/26 0859 IV Daily 04/24/22 0831    04/24/22 1015  vasopressin (PITRESSIN) 0.2 Units/mL in dextrose 5 % 100 mL infusion         -- IV Continuous 04/24/22 0902          Pressors:    Autonomic Drugs (From admission, onward)                Start     Stop Route Frequency Ordered    04/24/22 1015  NORepinephrine 4 mg in dextrose 5% 250 mL infusion (premix) (titrating)        Question Answer Comment   Begin at (in mcg/kg/min): 0.01    Titrate by: (in mcg/kg/min) 0.01    Titrate interval: (in minutes) 5    Titrate to maintain: (MAP or SBP) MAP    Greater than: (in mmHg) 65    Maximum dose: (in mcg/kg/min) 0.1        -- IV Continuous " 04/24/22 0902          Hyperglycemia/Diabetes Medications:   Antihyperglycemics (From admission, onward)                Start     Stop Route Frequency Ordered    04/24/22 0915  insulin regular in 0.9 % NaCl 100 unit/100 mL (1 unit/mL) infusion        Question Answer Comment   Insulin Rate Adjustment (DO NOT MODIFY ANSWER) [email or web address]    Enter initial dose from Infusion Protocol Chart (Units/hr): 3        -- IV Continuous 04/24/22 0831             PHYSICAL EXAMINATION:  Vitals:    04/24/22 1100   BP:    Pulse: 89   Resp: (!) 6   Temp: 97.5 °F (36.4 °C)     Body mass index is 39.1 kg/m².    Physical Exam  Constitutional:  Well developed, well nourished, obese, NAD.  ENT: External ears no masses with nose patent  Neck:  Supple; trachea midline  Cardiovascular: Normal heart sounds, no LE edema.     Lungs:  Normal effort; lungs anterior bilaterally clear to auscultation.  Intubated on a ventilator.  Abdomen:  Soft, no masses, no hernias. Hypoactive BS noted.  MS: No clubbing or cyanosis of nails noted; unable to assess gait.  Skin: No rashes, lesions, or ulcers; no nodules.  Chevron abdominal incision with dressing; GO x 2 to RLQ.  Psychiatric: SAMUEL  Neurological: SAMUEL        Labs Reviewed and Include   Recent Labs   Lab 04/24/22  0838   *   CALCIUM 7.7*   ALBUMIN 2.5*   PROT 3.8*   *   K 4.1   CO2 24      BUN 20   CREATININE 1.2   ALKPHOS 50*   *   *   BILITOT 8.2*     Lab Results   Component Value Date    WBC 19.65 (H) 04/24/2022    WBC 19.65 (H) 04/24/2022    HGB 8.5 (L) 04/24/2022    HGB 8.5 (L) 04/24/2022    HCT 26 (L) 04/24/2022    MCV 89 04/24/2022    MCV 89 04/24/2022    PLT 55 (L) 04/24/2022    PLT 55 (L) 04/24/2022     No results for input(s): TSH, FREET4 in the last 168 hours.  Lab Results   Component Value Date    HGBA1C 6.6 (H) 04/19/2022       Nutritional status:   Body mass index is 39.1 kg/m².  Lab Results    Component Value Date    ALBUMIN 2.5 (L) 04/24/2022    ALBUMIN 2.6 (L) 04/24/2022    ALBUMIN 2.7 (L) 04/24/2022     No results found for: PREALBUMIN    Estimated Creatinine Clearance: 83.6 mL/min (based on SCr of 1.2 mg/dL).    Accu-Checks  No results for input(s): POCTGLUCOSE in the last 72 hours.     ASSESSMENT and PLAN    * S/P liver transplant  Managed per primary team  Optimize BG control        Type 2 diabetes mellitus without complication, without long-term current use of insulin  BG goal 140 - 180     Continue IV insulin infusion protocol  Requires intensive BG monitoring while on protocol     ** Please call Endocrine for any BG related issues **  ** Please notify Endocrine for any change and/or advance in diet**    Discharge planning: TBD        FAMILIA (acute kidney injury)  Lab Results   Component Value Date    CREATININE 1.2 04/24/2022     Avoid insulin stacking  Titrate insulin slowly      Prophylactic immunotherapy  May increase insulin resistance.         Adverse effect of corticosteroids  Body mass index is 39.1 kg/m².  May increase insulin resistance.             Plan discussed with family and RN at bedside.     Esme Pichardo NP  Endocrinology  [facility] - Surgical Intensive Care

## 2022-04-24 NOTE — ASSESSMENT & PLAN NOTE
Lab Results   Component Value Date    CREATININE 1.2 04/24/2022     Avoid insulin stacking  Titrate insulin slowly

## 2022-04-24 NOTE — ANESTHESIA POSTPROCEDURE EVALUATION
Anesthesia Post Evaluation    Patient: Pelon Vasquez    Procedure(s) Performed: Procedure(s) (LRB):  TRANSPLANT, LIVER (N/A)    Final Anesthesia Type: general      Patient location during evaluation: ICU  Patient participation: Yes- Able to Participate  Level of consciousness: awake and alert and oriented  Post-procedure vital signs: reviewed and stable  Pain management: adequate  Airway patency: patent    PONV status at discharge: No PONV  Anesthetic complications: no      Cardiovascular status: stable  Respiratory status: unassisted, spontaneous ventilation and nasal cannula  Hydration status: euvolemic  Follow-up not needed.          Vitals Value Taken Time   BP  04/24/22 1525   Temp 36.5 °C (97.7 °F) 04/24/22 1400   Pulse 94 04/24/22 1525   Resp 9 04/24/22 1525   SpO2 99 % 04/24/22 1525   Vitals shown include unvalidated device data.      No case tracking events are documented in the log.      Pain/Daly Score: Pain Rating Prior to Med Admin: 4 (4/24/2022 12:42 PM)  Pain Rating Post Med Admin: 2 (4/24/2022  1:04 PM)

## 2022-04-24 NOTE — PROGRESS NOTES
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 Surgery was cancelled due to organ quality, but noted to have an elevated creatinine. Nephrology consulted for FAMILIA on CKD.    Patient currently denies any symptoms. During his recent admission he received IV Vancomycin/Cefepime for sepsis. Denies any NSAID use, vomiting, dehydration. He does have frequent bms 2/2 lactulose but this has not increased. Denies recent GIB. Last Paracentesis was last Tuesday per patient with 9L of fluid removal.       Interval History: liver transplant 4/23 with hypotension and vasopressor requirements    Review of patient's allergies indicates:   Allergen Reactions    Strawberry Anaphylaxis and Rash    Metformin Diarrhea    Pork/porcine containing products Diarrhea and Nausea And Vomiting     Current Facility-Administered Medications   Medication Frequency    0.9%  NaCl infusion (for blood administration) Q24H PRN    ampicillin-sulbactam 3 g in sodium chloride 0.9 % 100 mL IVPB (ready to mix system) Q6H    chlorothiazide (DIURIL) 500 mg in dextrose 5 % 50 mL IVPB Once    COVID-19 vac, karen(Pfizer)(PF) (Pfizer COVID-19) 30 mcg/0.3 mL injection 0.3 mL vaccine x 1 dose    dexmedetomidine (PRECEDEX) 400mcg/100mL 0.9% NaCL infusion Continuous    dextrose 10% bolus 125 mL PRN    dextrose 10% bolus 250 mL PRN    dextrose 5 % and 0.9 % NaCl infusion  Continuous    famotidine (PF) injection 20 mg Q12H    heparin (porcine) injection 5,000 Units Q8H    HYDROmorphone injection 0.2 mg Q2H PRN    insulin regular in 0.9 % NaCl 100 unit/100 mL (1 unit/mL) infusion Continuous    [START ON 4/25/2022] methylPREDNISolone sodium succinate injection 200 mg Daily    Followed by    [START ON 4/26/2022] methylPREDNISolone sodium succinate injection 160 mg Daily    Followed by    [START ON 4/27/2022] methylPREDNISolone sodium succinate injection 120 mg Daily    Followed by    [START ON 4/28/2022] methylPREDNISolone sodium succinate injection 80 mg Daily    Followed by    [START ON 4/29/2022] methylPREDNISolone sodium succinate injection 40 mg Daily    Followed by    [START ON 4/30/2022] predniSONE tablet 20 mg Daily    mupirocin 2 % ointment 1 g BID    mycophenolate mofetil 200 mg/mL suspension 1,000 mg BID    NORepinephrine 4 mg in dextrose 5% 250 mL infusion (premix) (titrating) Continuous    nystatin 100,000 unit/mL suspension 500,000 Units TID PC    ondansetron disintegrating tablet 4 mg Q8H PRN    phytonadione vitamin k (AQUA-MEPHYTON) 10 mg in dextrose 5 % 50 mL IVPB Q8H    propofol (DIPRIVAN) 10 mg/mL infusion Continuous    sodium chloride 0.9% flush 10 mL PRN    [START ON 5/1/2022] sulfamethoxazole-trimethoprim 400-80mg per tablet 1 tablet Daily AM    tacrolimus (PROGRAF) 1 mg/mL oral syringe BID    [START ON 5/4/2022] valGANciclovir tablet 450 mg Daily    vasopressin (PITRESSIN) 0.2 Units/mL in dextrose 5 % 100 mL infusion Continuous       Objective:     Vital Signs (Most Recent):  Temp: 97.5 °F (36.4 °C) (04/24/22 1000)  Pulse: 89 (04/24/22 1045)  Resp: (!) 3 (04/24/22 1045)  BP: (!) 130/59 (04/24/22 0730)  SpO2: 100 % (04/24/22 1045)  O2 Device (Oxygen Therapy): ventilator (04/24/22 1003)   Vital Signs (24h Range):  Temp:  [97.5 °F (36.4 °C)-98.8 °F (37.1 °C)] 97.5 °F (36.4 °C)  Pulse:  [] 89  Resp:  [3-18] 3  SpO2:  [95 %-100 %] 100 %  BP:  (100-135)/(46-73) 130/59  Arterial Line BP: (119-133)/(57-66) 131/64     Weight: 113.3 kg (249 lb 10.7 oz) (04/23/22 2300)  Body mass index is 39.1 kg/m².  Body surface area is 2.31 meters squared.    I/O last 3 completed shifts:  In: 7510 [P.O.:360; I.V.:4400; Blood:2750]  Out: 6200 [Urine:700; Drains:5500]    Physical Exam  Vitals and nursing note reviewed.   Constitutional:       General: He is not in acute distress.     Appearance: He is ill-appearing.   HENT:      Mouth/Throat:      Mouth: Mucous membranes are moist.   Eyes:      General: Scleral icterus present.      Extraocular Movements: Extraocular movements intact.      Pupils: Pupils are equal, round, and reactive to light.   Cardiovascular:      Rate and Rhythm: Normal rate.   Pulmonary:      Comments: intubated  Abdominal:      Palpations: Abdomen is soft.   Musculoskeletal:         General: Swelling present. No deformity.      Right lower leg: Edema present.      Left lower leg: Edema present.   Skin:     Coloration: Skin is jaundiced.       Significant Labs:  All labs within the past 24 hours have been reviewed.     Significant Imaging:  Labs: Reviewed  X-Ray: Reviewed    Assessment/Plan:     * FAMILIA (acute kidney injury)  -oliguric likely ischemic ATN secondary to intraoperative hypotension and norepi and vaso requirements 4/23-4/24  -Baseline sCr of  1.3-1.5, admission Cr at 2.3  -pre op UA shows protein 21 WBC 5 RBC and hyaline casts, UPCR  0.2 g/g, microscopy with mbgc  -intraoperative dialysis performed 3/23  -post op volume overload with pulmonary edema though decreasing oxygen requirements  -recommend high dose diuretic therapy and careful assessment of urine output, if insignificant or oxygenation begins to worsen, he may require KRT  -furosemide 120, chlorothiazide 500; repeat if urine output increases    End stage liver disease  -LTx 4/23      Anemia of chronic disease  -consider IV iron and epo            Red Shane,  MD  Nephrology  [facility]

## 2022-04-24 NOTE — PLAN OF CARE
Patient admitted to SICU [id number].  Plan of care reviewed, orders received and implemented.  Patient extubated to 3LNC and tolerated without difficulty.  Dr. Edwards aware of lab and ABG results throughout the shift.  Questions and concerns addressed with patient and his wife at the bedside.  Refer to flow sheet for vital signs, ggts, and assessments.

## 2022-04-24 NOTE — HPI
Reason for Consult: Management of T2DM, Hyperglycemia     Surgical Procedure and Date: Liver Transplant 4/24/22    Diabetes diagnosis year: 6 years ago    Home Diabetes Medications:  none currently (took Metformin in the past and could not tolerate side effects)     How often checking glucose at home?  Once daily    BG readings on regimen: < 150  Hypoglycemia on the regimen?  No  Missed doses on regimen? n/a    Diabetes Complications include:     CKD     Complicating diabetes co morbidities:   CKD, Cirrhosis, Obesity, BRAYDON, Glucocorticoid Use       HPI:   Patient is a 57 y.o. male with a diagnosis of EtOH/LOZANO cirrhosis, esophageal varices, obesity (BMI 42), CKD, HTN, DM2, and RBAYDON presenting for liver transplant. He is s/p diagnostic paracentesis on 4/22 with 4800mL of fluid removed. Patient currently has FAMILIA likely in setting of vancomycin toxicity, per Nephrology note. Plan for intraoperative dialysis. Patient now presents for the above procedure(s). Endocrinology consulted for management of T2DM.       Lab Results   Component Value Date    HGBA1C 6.6 (H) 04/19/2022

## 2022-04-24 NOTE — SUBJECTIVE & OBJECTIVE
"Interval HPI:   Overnight events: Admitted to SICU s/p liver transplant. Remains intubated at time of consult. BG within goal ranges on IV intensive insulin protocol. Receiving standard steroid taper.   Eating:   NPO  Nausea: No  Hypoglycemia and intervention: No  Fever: No  TPN and/or TF: No  If yes, type of TF/TPN and rate: n/a    PMH, PSH, FH, SH reviewed     ROS:  Unable to obtain due to: Sedation,Intubation,Altered mental status,Critical illness,Reviewed ROS from note dated 4/20/22 by  Sabrina Lepe PAHeshamC    Review of Systems    Current Medications and/or Treatments Impacting Glycemic Control  Immunotherapy:    Immunosuppressants           Stop Route Frequency     mycophenolate mofetil 200 mg/mL suspension 1,000 mg         -- Oral 2 times daily     tacrolimus (PROGRAF) 1 mg/mL oral syringe         -- Oral 2 times daily          Steroids:   Hormones (From admission, onward)                Start     Stop Route Frequency Ordered    04/30/22 0900  predniSONE tablet 20 mg  (methylprednisolone taper panel)        "Followed by" Linked Group Details    -- Oral Daily 04/24/22 0831    04/29/22 0900  methylPREDNISolone sodium succinate injection 40 mg  (methylprednisolone taper panel)        "Followed by" Linked Group Details    04/30 0859 IV Daily 04/24/22 0831    04/28/22 0900  methylPREDNISolone sodium succinate injection 80 mg  (methylprednisolone taper panel)        "Followed by" Linked Group Details    04/29 0859 IV Daily 04/24/22 0831    04/27/22 0900  methylPREDNISolone sodium succinate injection 120 mg  (methylprednisolone taper panel)        "Followed by" Linked Group Details    04/28 0859 IV Daily 04/24/22 0831    04/26/22 0900  methylPREDNISolone sodium succinate injection 160 mg  (methylprednisolone taper panel)        "Followed by" Linked Group Details    04/27 0859 IV Daily 04/24/22 0831    04/25/22 0900  methylPREDNISolone sodium succinate injection 200 mg  (methylprednisolone taper panel)      " "  "Followed by" Linked Group Details    04/26 0859 IV Daily 04/24/22 0831    04/24/22 1015  vasopressin (PITRESSIN) 0.2 Units/mL in dextrose 5 % 100 mL infusion         -- IV Continuous 04/24/22 0902          Pressors:    Autonomic Drugs (From admission, onward)                Start     Stop Route Frequency Ordered    04/24/22 1015  NORepinephrine 4 mg in dextrose 5% 250 mL infusion (premix) (titrating)        Question Answer Comment   Begin at (in mcg/kg/min): 0.01    Titrate by: (in mcg/kg/min) 0.01    Titrate interval: (in minutes) 5    Titrate to maintain: (MAP or SBP) MAP    Greater than: (in mmHg) 65    Maximum dose: (in mcg/kg/min) 0.1        -- IV Continuous 04/24/22 0902          Hyperglycemia/Diabetes Medications:   Antihyperglycemics (From admission, onward)                Start     Stop Route Frequency Ordered    04/24/22 0915  insulin regular in 0.9 % NaCl 100 unit/100 mL (1 unit/mL) infusion        Question Answer Comment   Insulin Rate Adjustment (DO NOT MODIFY ANSWER) [email or web address]    Enter initial dose from Infusion Protocol Chart (Units/hr): 3        -- IV Continuous 04/24/22 0831             PHYSICAL EXAMINATION:  Vitals:    04/24/22 1100   BP:    Pulse: 89   Resp: (!) 6   Temp: 97.5 °F (36.4 °C)     Body mass index is 39.1 kg/m².    Physical Exam  Constitutional:  Well developed, well nourished, obese, NAD.  ENT: External ears no masses with nose patent  Neck:  Supple; trachea midline  Cardiovascular: Normal heart sounds, no LE edema.     Lungs:  Normal effort; lungs anterior bilaterally clear to auscultation.  Intubated on a ventilator.  Abdomen:  Soft, no masses, no hernias. Hypoactive BS noted.  MS: No clubbing or cyanosis of nails noted; unable to assess gait.  Skin: No rashes, lesions, or ulcers; no nodules.  Chevron abdominal incision with dressing; GO x 2 to RLQ.  Psychiatric: SAMUEL  Neurological: SAMUEL    "

## 2022-04-24 NOTE — ASSESSMENT & PLAN NOTE
  Neuro/Psych:   -- Sedation: propofol, prexedex   -- Pain: multimodal w/ PRN narcotic   -- h/o encephalopathy on lactulose/rifaximin pre-op             Cards:   -- HDS  -- maps >65  -- vasopressos   Levo 0.04   Vaso 0.04  -- echo 02/17: normal function       Pulm:   -- Goal O2 sat > 90%  -- Wean as able  -- CXR now and daily while intubated   -- ABG serially while intubated and in early resuscitation period       Renal:  -- Keep francisco for strict I/O  -- FAMILIA pre-op, will monitor closely; urine studies 04/21  -- Intra-op dialysis; nephrology consulted, following  -- Did make 300 cc urine following reperfusion; discussed with nephrology.   -- Will try 120 mg lasix, 500 diuril now and monitor UOP. May need CRRT later   -- albumin PRN      FEN / GI:   -- h/o ESLD now s/p DBD OLTxp 04/24  -- Replace lytes as needed  -- Nutrition: NPO; bedside swallow after extuation  -- bowel reg; HSB  -- CMP serially; AST Q6H  -- GO drain x 2 to right lateral side       ID:   -- Tm: afebrile; WBC WNL pre-op  -- reactive leukocytosis expected  -- immunosuppression and abx per txp pharm      Heme/Onc:   -- H/H pending; will trend (anemic pre-op)  -- CBC, coags now and serially  -- 7uFFP, 2plt, 2 cryo, 4pRBC intra-op      Endo:   -- Gluc goal 140-180  -- insulin gtt       PPx:   Feeding: NPO; bedside swallow  Analgesia/Sedation: multimodal w/ prn narc / propofol, precedex    Thromboembolic prevention: Hold today  HOB >30: Yes  Stress Ulcer ppx: H2B   Glucose control: Critical care goal 140-180 g/dl, ISS    Lines/Drains/Airway: ETT, GO x 2, PIV, arterial line x 2, R IJ, femoral CVC       Dispo/Code Status/Palliative:   -- SICU / Full Code

## 2022-04-24 NOTE — OP NOTE
Operative Report    Date of Procedure: 4/23/2022    Surgeon: Sandrine Nelson MD  First Assistant: Azalea Hahn    Pre-operative Diagnosis: Allograft liver for transplantation  Post-operative Diagnosis: Same    Procedure(s) Performed:   1. Back Table Preparation of Liver with needle biopsy  .    Anesthesia: Not applicable  Estimated Blood Loss: Not applicable  Fluids Administered: Not applicable    Findings: Estimated steatosis 0-10%, normal vascular anatomy and completely replaced circulation.  Drains: Not applicable  Specimens: Core biopsy of allograft liver      Preamble  Indications: This report describes only the backbench preparation of the liver prior to transplantation.  The transplant operation itself is described in a separate report.    ABO Confirmation: Immediately following arrival of the donor organ and prior to implantation, a formal ABO confirmation was done according to hospital and [employer]OS policies.  I confirmed the UNOS ID number of the donor organ and the donor and recipient ABO types, directly verifying these data by comparison with the [employer] report.  This confirmation was personally done by an attending surgeon and circulating nurse, and is officially documented elsewhere.    Time-Out: A complete time out was carried out prior to the procedure, with confirmation of patient identity, correct procedure, correct operative site, appropriate antibiotic prophylaxis, review of any known allergies, and presence of all needed equipment.    Procedure in Detail  The liver was recovered from the transport cooler and inspected for vascular anatomy and overall suitability for transplantation, with findings as noted above.  The remnant of the diaphragm was dissected away.  The phrenic veins and adrenal vein were identified and ligated or sutured as appropriate.  The portal vein and hepatic artery were mobilized toward the hilum of the liver, and extrahepatic branches were ligated.  No vascular  reconstruction was required.  The vessels were tested for leaks.  A needle biopsy was obtained for permanent section histology using a spring-loaded biopsy device. The liver was kept in ice temperature organ preservation solution until the time of implantation.

## 2022-04-24 NOTE — ANESTHESIA PROCEDURE NOTES
Arterial    Diagnosis: ESLD    Patient location during procedure: done in OR  Procedure start time: 4/24/2022 12:23 AM  Timeout: 4/24/2022 12:23 AM  Procedure end time: 4/24/2022 12:24 AM    Staffing  Authorizing Provider: Zhang Bolanos MD  Performing Provider: Maik Sanders CRNA    Anesthesiologist was present at the time of the procedure.    Preanesthetic Checklist  Completed: patient identified, IV checked, site marked, risks and benefits discussed, surgical consent, monitors and equipment checked, pre-op evaluation, timeout performed and anesthesia consent givenArterial  Skin Prep: chlorhexidine gluconate  Local Infiltration: none  Orientation: left  Location: radial    Catheter Size: 20 G  Catheter placement by Anatomical landmarks. Heme positive aspiration all ports. Insertion Attempts: 1  Assessment  Dressing: secured with tape and tegaderm

## 2022-04-24 NOTE — PROGRESS NOTES
04/24/22 0135   Handoff Report   Received From CRNA   Vital Signs   Pulse 82   BP (!) 117/56   Pre-Hemodialysis Assessment   Patient Status Arrived   Treatment Status Started   Gross Bleach Negative Yes   Machine Number K38   Alarms Verified Yes   pH 7   Machine Temperature 96.6 °F (35.9 °C)   Dialyzer Revaclear   Machine Conductivity 13.8   Meter Conductivity 13.8   Dialysate Na (mEq/L) 138   Dialysate K (mEq/L) 2   Dialysate CA (mEq/L) 2.5   Dialysate HCO3 (mEq/L) 25   Prime Ordered (mL) 400 mL   Net UF Goal 0   Total UF Goal 0   Pre-Hemodialysis Comments Patient arrived being prep for transplant, line in treatment initiated.   UF Rate 50   RO # / DI #  14   RO Rejection Within Limits? Yes   During Hemodialysis Assessment   Blood Flow Rate (mL/min) 200 mL/min   Dialysate Flow Rate (mL/min) 200 ml/min   Ultrafiltration Rate (mL/Hr) 50 mL/Hr   Arteriovenous Lines Secure Yes   Arterial Pressure (mmHg) 80 mmHg   Venous Pressure (mmHg) 90   Blood Volume Processed (Liters) 0 L   UF Removed (mL) 0 mL   TMP 10   Venous Line in Air Detector Yes   Intake (mL) 200 mL   Intra-Hemodialysis Comments Patient treatment initiated.

## 2022-04-24 NOTE — ANESTHESIA PROCEDURE NOTES
Central Line    Diagnosis: esld  Patient location during procedure: done in OR    Staffing  Authorizing Provider: Zhang Bolanos MD  Performing Provider: Zhang Bolanos MD    Staffing  Performed: anesthesiologist   Anesthesiologist: Zhang Bolanos MD  Anesthesiologist was present at the time of the procedure.  Preanesthetic Checklist  Completed: patient identified, IV checked, site marked, risks and benefits discussed, surgical consent, monitors and equipment checked, pre-op evaluation, timeout performed and anesthesia consent given  Indication   Indication: hemodialysis, vascular access     Anesthesia   general anesthesia    Central Line   Skin Prep: skin prepped with ChloraPrep, skin prep agent completely dried prior to procedure  Sterile Barriers Followed: Yes    All five maximal barriers used- gloves, gown, cap, mask, and large sterile sheet    hand hygiene performed prior to central venous catheter insertion  Location: right femoral vein.   Catheter type: dialysis  Catheter Size: 12 Fr  Inserted Catheter Length: 19 cm  Ultrasound: vascular probe with ultrasound   Vessel Caliber: medium, patent, compressibility normal  Needle advanced into vessel with real time Ultrasound guidance.  Guidewire confirmed in vessel.  sterile gel and probe cover used in ultrasound-guided central venous catheter insertion   Manometry: Venous cannualation confirmed by visual estimation of blood vessel pressure using manometry.  Insertion Attempts: 1   Securement:chlorhexidine patch, line sutured, sterile dressing applied and blood return through all ports    Post-Procedure    Adverse Events:none      Guidewire Guidewire removed intact. Guidewire removed intact, verified with nurse.

## 2022-04-24 NOTE — ANESTHESIA PROCEDURE NOTES
Arterial    Diagnosis: esld    Patient location during procedure: done in OR    Staffing  Authorizing Provider: Zhang Bolanos MD  Performing Provider: Zhang Bolanos MD    Anesthesiologist was present at the time of the procedure.    Preanesthetic Checklist  Completed: patient identified, IV checked, site marked, risks and benefits discussed, surgical consent, monitors and equipment checked, pre-op evaluation, timeout performed and anesthesia consent givenArterial  Skin Prep: chlorhexidine gluconate and isopropyl alcohol  Local Infiltration: none  Orientation: right  Location: femoral    Catheter Size: 20 G  Catheter placement by Ultrasound guidance. Heme positive aspiration all ports.   Vessel Caliber: medium, patent, compressibility normal  Vascular Doppler:  not done  Needle advanced into vessel with real time Ultrasound guidance.  Guidewire confirmed in vessel.  Sterile sheath used.Insertion Attempts: 1  Assessment  Dressing: sutured in place and taped and tegaderm  Patient: Tolerated well

## 2022-04-24 NOTE — OP NOTE
Pre-operative Discussion Note  Liver Transplant Surgery    Discussion occurred in patients room with spouse present on 4/23/22    Pelon Vasquez is a 57 y.o. male admitted for liver transplant.  I discussed the planned procedure in detail, including expected hospital course and outcomes, benefits, risks, and potential complications.  Complications discussed included death, graft failure, bleeding, infection, rejection, and neurologic problems.  I discussed the risks of anesthesia, as well as the potential need for re-operation.  The possibility of other complications not specifically mentioned was also discussed.  Also, I discussed the need for lifelong immunosuppression and the possibility of serious complications from immunosuppressive drugs.    The discussion included the risks that the patient will incur if he elects to not have the proposed procedure.    Relevant donor-specific risk factors were disclosed and discussed with the patient, including:       Specific PHS donor risk criteria for the organ donor include:  None    HCV Infection Not applicable.    Hepatocellular Carcinoma Recurrence: Not applicable.    The patient was SARS-CoV-2 /COVID-19 tested with negative results.    COVID-19: I discussed the possibility of COVID-19 transmission with the patient. Although MATHEUS testing is available for the virus using a technique which in theory should be very accurate, there is no data yet regarding the likelihood of a  false-negative test leading to virus transmission. Based on accuracy of testing for other viruses, it is expected that this risk is extremely small.     I also discussed that transplant immunosuppression will increase susceptibility to COVID-19 and other viruses, and that although we use stringent precautions to protect patients from infection, it is possible for a transplant recipient to contract this infection. If COVID-19 infection should occur, it would be a serious matter with a significant risk of  death.    All questions were answered.  The patient and available family members voice understanding and agree to proceed with the transplant.    [employer] Patient Status  Note on scores:  ICU = 10 = total assistance  TSU = 20-30 = partial assistance  Outpatient admitted for transplant requiring medical care in last year = 40-50 = partial assistance  Scores 60 or higher indicate no assistance, meaning no need for medical care in last year. This would be very unusual for a transplant candidate.    Functional Status: 20% - Very sick, hospitalization necessary: active treatment necessary  Physical Capacity: Not Applicable (< 1 year old or hospitalized)

## 2022-04-24 NOTE — ANESTHESIA PROCEDURE NOTES
Intubation    Date/Time: 4/24/2022 12:10 AM  Performed by: Carisa Ortiz CRNA  Authorized by: Zhang Bolanos MD     Intubation:     Induction:  Intravenous    Intubated:  Postinduction    Mask Ventilation:  Easy mask    Attempts:  1    Attempted By:  CRNA    Method of Intubation:  Direct    Blade:  Horne 2    Laryngeal View Grade: Grade I - full view of cords      Difficult Airway Encountered?: No      Complications:  None    Airway Device:  Oral endotracheal tube    Airway Device Size:  7.5    Style/Cuff Inflation:  Cuffed    Inflation Amount (mL):  6    Tube secured:  23    Secured at:  The lips    Placement Verified By:  Capnometry    Complicating Factors:  None    Findings Post-Intubation:  BS equal bilateral and atraumatic/condition of teeth unchanged

## 2022-04-24 NOTE — OP NOTE
Certification of Assistant at Surgery       Surgery Date: 4/23/2022     Participating Surgeons:  Surgeon(s) and Role:     * Tom Romero MD - Primary     * Sandrine Nelson MD - Assisting     * Adiel Pacheco MD - Resident - Assisting    Procedures:  Procedure(s) (LRB):  TRANSPLANT, LIVER (N/A)    Assistant Surgeon's Certification of Necessity:  I understand that section 1842 (b) (6) (d) of the Social Security Act generally prohibits Medicare Part B reasonable charge payment for the services of assistants at surgery in [facility] hospitals when qualified residents are available to furnish such services. I certify that the services for which payment is claimed were medically necessary, and that no qualified resident was available to perform the services. I further understand that these services are subject to post-payment review by the Medicare carrier.      Sandrine Nelson MD    04/24/2022  5:46 AM

## 2022-04-24 NOTE — HPI
57yr olf male with 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 ESLD complicated by jaundice, FAMILIA, ascites (para 2x/week, no h/o SBP), HE, and hypervolemia. He now presents to the SICU intubated and sedated s/p DBD OLTxp 04/24.

## 2022-04-24 NOTE — ASSESSMENT & PLAN NOTE
-oliguric likely ischemic ATN secondary to intraoperative hypotension and norepi and vaso requirements 4/23-4/24  -Baseline sCr of  1.3-1.5, admission Cr at 2.3  -pre op UA shows protein 21 WBC 5 RBC and hyaline casts, UPCR  0.2 g/g, microscopy with mbgc  -intraoperative dialysis performed 3/23  -post op volume overload with pulmonary edema though decreasing oxygen requirements  -recommend high dose diuretic therapy and careful assessment of urine output, if insignificant or oxygenation begins to worsen, he may require KRT  -furosemide 120, chlorothiazide 500; repeat if urine output increases

## 2022-04-24 NOTE — PROGRESS NOTES
Autotransfusion/Rapid Infusion Record:      04/24/2022  Autotransfusionist:  Arnold Rodrigez    Surgeon(s) and Role:     * Tom Romero MD - Primary     * Sandrine Nelson MD - Assisting     * Adiel Pacheco MD - Resident - Assisting     * Aga Schmitz MD - Fellow  Anesthesiologist:  Jay Reza Jr., MD    Past Medical History:   Diagnosis Date    Arthritis     Cirrhosis of liver     HTN (hypertension)     BRAYDON (obstructive sleep apnea)     Secondary esophageal varices without bleeding 3/18/2022    EGD (2/25/22) showed moderate portal hypertensive gastropathy, small hiatal hernia, grade 1 esophageal varices    Type 2 diabetes mellitus        Procedure(s) (LRB):  TRANSPLANT, LIVER (N/A)     8:07 AM    Equipment:    Cell Saver     R.I.S.  : [employer] Model: CATSmart or CATSplus : [employer] [place]   Model: [id number]     Serial number: [id number]   Serial number:    Disposable lot #:    Disposable lot #:       Were extra cardiotomies used for cell saver?  YES   if yes, #:  01    Solutions:  Anticoagulant: ACD-A   Expiration date: 08/2023 Volume used: 2100   Wash solution: 0.9% NaCl   Expiration date: 02/2025 Volume used: 50167     Cell saver checklist  Time completed:           []   Circuit assembled correctly     []   Cell saver powered and operational     []   Vacuum connected, functional, adjust to max -150mmHg     []   Anticoagulant drip rate adjusted     []   Transfer bag properly labeled with patient name, expiration time, volume,       anticoagulant, OR number, and initials     []   Cell saver disinfected after use (completed at end of case)       Cell Saver volumes:    Total volume processed:    72081  mL     Total volume pRBCs recovered     6939  mL     Volume pRBCs infused     6939  mL         RIS checklist   Time completed:  []   RIS circuit assembled correctly     []   RIS power and operational     []   RIS disinfected after use (completed at end of case)       RIS volumes:     Total volume infused:    (see anesthesia record for blood       product information)   mL       Additional comments:

## 2022-04-24 NOTE — PROGRESS NOTES
TRANSPLANT NOTE:    Admit Date: 4/19/2022    ORGAN:   LIVER  Disease Etiology: Cirrhosis: Other, Specify  Donor CMV Status: Positive  Donor HCV Status: Negative  Donor HBcAb: Negative  Donor HBV MATHEUS: Negative  Donor HCV MATHEUS: Negative  Whole or Partial: Whole Liver  Biliary Anastomosis:   Arterial Anatomy: Completely Replaced Circulation    Pelon Vasquez is a 57 y.o. male s/p    Donation after Brain Death liver transplant on 4/24/2022 (Liver) for Cirrhosis: Other, Specify.  This patient will follow the Steroid Induction protocol.  This patients immunosuppression will include a steroid taper over 5 weeks, Cellcept for 3 months and Prograf maintenance.  Opportunistic infection prophylaxis will include Valcyte for 3 months (CMV D+ R+), Bactrim for 6 months, and nystatin x4 weeks.  Osteoporosis risk assessment identifies as normal, therapy will include daily ca/vit D.  I have reviewed the pre-op medications and those have been restarted those, as appropriate.

## 2022-04-24 NOTE — ANESTHESIA PROCEDURE NOTES
Bradley Lili Line    Diagnosis: esld  Patient location during procedure: done in OR    Staffing  Authorizing Provider: Zhang Bolanos MD  Performing Provider: Zhang Bolanos MD    Anesthesiologist was present at the time of the procedure.  Preanesthetic Checklist  Completed: patient identified, IV checked, site marked, risks and benefits discussed, surgical consent, monitors and equipment checked, pre-op evaluation, timeout performed and anesthesia consent given  Bradley Lili Line  Skin Prep: chlorhexidine gluconate and isopropyl alcohol  Local Infiltration: none internal jugular vein  Introducer: 9 Fr single lumen, manometry used.  Device: CCO/Oximetric Catheter   Catheter Size: 8 Fr PAC floated with balloon up not wedgedSterile sheath usedInsertion Attempts: 1  Indication: hemodynamic monitoring  Assessment  Verification: pulsatile blood flow  Dressing: sutured in place and taped and tegaderm  Patient: Tolerated Well

## 2022-04-24 NOTE — SUBJECTIVE & OBJECTIVE
Interval History: liver transplant 4/23 with hypotension and vasopressor requirements    Review of patient's allergies indicates:   Allergen Reactions    Strawberry Anaphylaxis and Rash    Metformin Diarrhea    Pork/porcine containing products Diarrhea and Nausea And Vomiting     Current Facility-Administered Medications   Medication Frequency    0.9%  NaCl infusion (for blood administration) Q24H PRN    ampicillin-sulbactam 3 g in sodium chloride 0.9 % 100 mL IVPB (ready to mix system) Q6H    chlorothiazide (DIURIL) 500 mg in dextrose 5 % 50 mL IVPB Once    COVID-19 vac, karen(Pfizer)(PF) (Pfizer COVID-19) 30 mcg/0.3 mL injection 0.3 mL vaccine x 1 dose    dexmedetomidine (PRECEDEX) 400mcg/100mL 0.9% NaCL infusion Continuous    dextrose 10% bolus 125 mL PRN    dextrose 10% bolus 250 mL PRN    dextrose 5 % and 0.9 % NaCl infusion Continuous    famotidine (PF) injection 20 mg Q12H    heparin (porcine) injection 5,000 Units Q8H    HYDROmorphone injection 0.2 mg Q2H PRN    insulin regular in 0.9 % NaCl 100 unit/100 mL (1 unit/mL) infusion Continuous    [START ON 4/25/2022] methylPREDNISolone sodium succinate injection 200 mg Daily    Followed by    [START ON 4/26/2022] methylPREDNISolone sodium succinate injection 160 mg Daily    Followed by    [START ON 4/27/2022] methylPREDNISolone sodium succinate injection 120 mg Daily    Followed by    [START ON 4/28/2022] methylPREDNISolone sodium succinate injection 80 mg Daily    Followed by    [START ON 4/29/2022] methylPREDNISolone sodium succinate injection 40 mg Daily    Followed by    [START ON 4/30/2022] predniSONE tablet 20 mg Daily    mupirocin 2 % ointment 1 g BID    mycophenolate mofetil 200 mg/mL suspension 1,000 mg BID    NORepinephrine 4 mg in dextrose 5% 250 mL infusion (premix) (titrating) Continuous    nystatin 100,000 unit/mL suspension 500,000 Units TID PC    ondansetron disintegrating tablet 4 mg Q8H PRN    phytonadione vitamin k (AQUA-MEPHYTON) 10 mg in  dextrose 5 % 50 mL IVPB Q8H    propofol (DIPRIVAN) 10 mg/mL infusion Continuous    sodium chloride 0.9% flush 10 mL PRN    [START ON 5/1/2022] sulfamethoxazole-trimethoprim 400-80mg per tablet 1 tablet Daily AM    tacrolimus (PROGRAF) 1 mg/mL oral syringe BID    [START ON 5/4/2022] valGANciclovir tablet 450 mg Daily    vasopressin (PITRESSIN) 0.2 Units/mL in dextrose 5 % 100 mL infusion Continuous       Objective:     Vital Signs (Most Recent):  Temp: 97.5 °F (36.4 °C) (04/24/22 1000)  Pulse: 89 (04/24/22 1045)  Resp: (!) 3 (04/24/22 1045)  BP: (!) 130/59 (04/24/22 0730)  SpO2: 100 % (04/24/22 1045)  O2 Device (Oxygen Therapy): ventilator (04/24/22 1003)   Vital Signs (24h Range):  Temp:  [97.5 °F (36.4 °C)-98.8 °F (37.1 °C)] 97.5 °F (36.4 °C)  Pulse:  [] 89  Resp:  [3-18] 3  SpO2:  [95 %-100 %] 100 %  BP: (100-135)/(46-73) 130/59  Arterial Line BP: (119-133)/(57-66) 131/64     Weight: 113.3 kg (249 lb 10.7 oz) (04/23/22 2300)  Body mass index is 39.1 kg/m².  Body surface area is 2.31 meters squared.    I/O last 3 completed shifts:  In: 7510 [P.O.:360; I.V.:4400; Blood:2750]  Out: 6200 [Urine:700; Drains:5500]    Physical Exam  Vitals and nursing note reviewed.   Constitutional:       General: He is not in acute distress.     Appearance: He is ill-appearing.   HENT:      Mouth/Throat:      Mouth: Mucous membranes are moist.   Eyes:      General: Scleral icterus present.      Extraocular Movements: Extraocular movements intact.      Pupils: Pupils are equal, round, and reactive to light.   Cardiovascular:      Rate and Rhythm: Normal rate.   Pulmonary:      Comments: intubated  Abdominal:      Palpations: Abdomen is soft.   Musculoskeletal:         General: Swelling present. No deformity.      Right lower leg: Edema present.      Left lower leg: Edema present.   Skin:     Coloration: Skin is jaundiced.       Significant Labs:  All labs within the past 24 hours have been reviewed.     Significant Imaging:  Labs:  Reviewed  X-Ray: Reviewed

## 2022-04-24 NOTE — OP NOTE
Operative Report    Date of Procedure: 4/23/2022  Date of Transplant (UNOS): 4/24/22    Surgeons:  Surgeon(s) and Role:     * Tom Romero MD - Primary     * Sandrine Nelson MD - Assisting     * Adiel Pacheco MD - Resident - Assisting     * Aga Schmitz MD - Fellow    First Assistant Attestation:  The presence of an additional attending surgeon functioning as first assistant was required due to the complexity of the procedure relative to any available residents. I certify that no resident was available who was qualified to serve as first assistant. Duties performed by the assistant included assisting the primary surgeon.    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 year-old male with Cirrhosis: Other, Specify;  (UNOS terminology) who has been evaluated for a liver transplant.  The procedure was thoroughly discussed with the patient, including potential risks, complications, and alternatives. Specific complications mentioned included death, graft non-function, bleeding, infection, rejection, renal failure, respiratory failure, and neurologic deficits, as well as the possibility of other complications not specifically mentioned.    Donor Risk Factors:  Prior to the operation, the patient was advised of any donor-specific risk factors requiring specific disclosure. Factors in this case included nothing that required specific disclosure.      Specific [facility] Donor Risk  criteria for the organ donor include:  None    All questions were answered, the patient voiced appropriate understanding, and he agreed to proceed with the planned procedure.    ABO Confirmation: Immediately following arrival of the donor organ and prior to implantation, a formal ABO confirmation was done according to hospital and UNOS policies.  I confirmed the UNOS ID number of the donor organ ([id number]) and the donor and recipient ABO types, directly verifying these data by comparison with the UNOS Match Run report ([id number].  This confirmation was personally done by an attending surgeon and circulating nurse, and is officially documented elsewhere.    Time-Out: A complete time out was carried out prior to incision, with confirmation of patient identity, correct procedure, correct operative site, appropriate antibiotic prophylaxis, review of any known allergies, and presence of all needed equipment.    Procedure in Detail  Following the induction of general endotracheal anesthesia, appropriate arterial and venous lines were placed by the anesthesia team.  Care was taken to pad all pressure points and avoid any potential traction injuries from positioning. The urinary bladder was catheterized.  Sequential compression boots and Fatoumata Huggers were used. The chest, abdomen, and upper thighs were sterilely prepped and draped.     The abdomen was entered via a wide bilateral subcostal incision. 5,500 mL of ascites was removed. The round ligament was ligated and divided. The falciform, left triangular, and gastrohepatic ligaments were divided using the electrocautery, with 2-0 silk ties as needed. The Newell self-retaining retractor was placed to provide exposure. Adhesions between the abdominal viscera and the abdominal wall and the undersurface of the liver were divided as needed using cautery dissection and silk ties or suture ligatures. Hilar dissection was then carried out, with ligation of the right and left  hepatic arteries as well as the cystic duct and the common hepatic duct. The recipient arterial anatomy was found to be: standard. The portal vein was exposed circumferentially from its bifurcation down to the superior border of the pancreas. The portal vein was found to be patent.  Attention was next directed to the right side of the liver where the right triangular ligament was taken down, exposing the bare area of the liver, and the IVC. The infrahepatic IVC was isolated, followed by mobilization of the retrohepatic cava, division of the right adrenal vein, and isolation of the suprahepatic IVC. The patient was given 2000 units of heparin prior to caval cross-clamping. . After assuring adequate stability after cross-clamping, the native liver was excised and the allograft liver was brought to the operative field.  The liver was perfused with cold 5% albumin during implantation to displace the organ preservation solution.  IVC reconstruction technique consisted of end to end ivc using 3-0 and 4-0 polypropylene as appropriate.  The donor portal vein was then anastomosed to the recipient portal inflow site (end portal vein) with 5-0 polypropylene. Once this was completed, anesthesia was notified and the liver was re-perfused. Copious irrigation with warm saline and temporary finger occlusion of portal inflow were used as needed to avoid any problems with hypothermia. Temporary packing, electrocautery, and polypropylene suture ligatures were used as appropriate to provide hemostasis. Once this was satisfactory, attention was directed to the arterial reconstruction. This liver did not come from a DCD donor. Arterial inflow was provided to the graft by anastomosing the recipient proper hepatic artery to the donor  common hepatic artery using 6-0 polypropylene. The liver was assessed for adequacy of perfusion, which was satisfactory. The gallbladder was then removed from the allograft liver, and a temporary packing  period was carried out to help assure hemostatis. Attention was next directed toward the bile duct. The donor bile duct was prepared and assessed for adequate vascular supply. Biliary reconstruction was then performed by anastomosing the recipient common hepatic duct to the donor duct using 6-0 PDS sutures.  The biliary stent used was: none.  A final check for hemostasis was made. 2 19f Pelon drains were placed through separate incisions below the transverse abdominal incision and placed in the usual positions. The cavity was irrigated with saline. The abdomen was closed in layers using running #1 PDS suture. The incision was irrigated and the skin was closed with staples. At the end of the case all instrument, needle, and sponge counts were correct. The patient was taken to the ICU in good condition.     Fluids Administered:   Crystalloid (mL) 5,000   RBC (Units) 4   FFP (Units) 7   Cryoprecipitate (Units)  2   Platelets (Units) 2   Cell Saver (CCs) 2,300   Albumin (Units) 200      Specimens: Explant liver  Drains: 19f Pelon drains x 2    Additional Findings:    Estimated Blood Loss: 7,000 mL  Ascites Evacuated: 5,500 mL    Ischemic Times:  Time of portal reperfusion: 4/24/2022  4:48 AM  Time of arterial reperfusion: 4/24/2022  5:40 AM  Anastomosis (warm ischemia) time: 34 minutes  Cold ischemia time: 391 minutes    Surgical Data:  Graft type (whole vs. partial): whole liver  IVC reconstruction: end to end ivc  Portal vein status: patent  Portal graft performed? no  Donor arterial anatomy: completely replaced circulation  Donor arterial inflow: common hepatic artery  Recipient arterial anatomy: standard  Recipient arterial inflow: proper hepatic artery  Arterial graft performed? no  Biliary reconstruction: end to end (donor common bile duct to recipient common hepatic duct)  Biliary stent: none  Disposition of Vessels from donor of transplanted organ: sent to blood bank  Damage during procurement: no  Procurement  damage comments: None    Donor Factors:  UNOS ID: )[id number]  UNOS Match Run: [id number]  Donor type: donation after brain death  Donor with reported PHS increased risk criteria: no  Donor CMV serologic status: Positive  Donor with known cancer: no  Donor HCV serologic status: Negative  Donor HBcAb: Negative  Donor HBV MATHEUS: Negative  Donor HCV MATHEUS: Negative  Liver weight: 1383 grams

## 2022-04-25 ENCOUNTER — TELEPHONE (OUTPATIENT)
Dept: TRANSPLANT | Facility: HOSPITAL | Age: 57
End: 2022-04-25
Payer: MEDICAID

## 2022-04-25 LAB
ALBUMIN SERPL BCP-MCNC: 2.4 G/DL (ref 3.5–5.2)
ALBUMIN SERPL BCP-MCNC: 2.8 G/DL (ref 3.5–5.2)
ALLENS TEST: ABNORMAL
ALP SERPL-CCNC: 42 U/L (ref 55–135)
ALP SERPL-CCNC: 54 U/L (ref 55–135)
ALT SERPL W/O P-5'-P-CCNC: 132 U/L (ref 10–44)
ALT SERPL W/O P-5'-P-CCNC: 139 U/L (ref 10–44)
ANION GAP SERPL CALC-SCNC: 11 MMOL/L (ref 8–16)
ANION GAP SERPL CALC-SCNC: 7 MMOL/L (ref 8–16)
APTT BLDCRRT: 35.6 SEC (ref 21–32)
AST SERPL-CCNC: 134 U/L (ref 10–40)
AST SERPL-CCNC: 201 U/L (ref 10–40)
AST SERPL-CCNC: 201 U/L (ref 10–40)
AST SERPL-CCNC: 207 U/L (ref 10–40)
AST SERPL-CCNC: 211 U/L (ref 10–40)
BACTERIA UR CULT: NO GROWTH
BASOPHILS # BLD AUTO: 0.03 K/UL (ref 0–0.2)
BASOPHILS # BLD AUTO: 0.05 K/UL (ref 0–0.2)
BASOPHILS # BLD AUTO: 0.06 K/UL (ref 0–0.2)
BASOPHILS # BLD AUTO: 0.09 K/UL (ref 0–0.2)
BASOPHILS NFR BLD: 0.3 % (ref 0–1.9)
BASOPHILS NFR BLD: 0.3 % (ref 0–1.9)
BASOPHILS NFR BLD: 0.4 % (ref 0–1.9)
BASOPHILS NFR BLD: 0.6 % (ref 0–1.9)
BILIRUB DIRECT SERPL-MCNC: 6.5 MG/DL (ref 0.1–0.3)
BILIRUB SERPL-MCNC: 7.2 MG/DL (ref 0.1–1)
BILIRUB SERPL-MCNC: 8.5 MG/DL (ref 0.1–1)
BLD PROD TYP BPU: NORMAL
BLOOD UNIT EXPIRATION DATE: NORMAL
BLOOD UNIT TYPE CODE: 6200
BLOOD UNIT TYPE: NORMAL
BUN SERPL-MCNC: 35 MG/DL (ref 6–20)
BUN SERPL-MCNC: 38 MG/DL (ref 6–20)
BUN SERPL-MCNC: 43 MG/DL (ref 6–20)
BUN SERPL-MCNC: 48 MG/DL (ref 6–20)
CALCIUM SERPL-MCNC: 7.5 MG/DL (ref 8.7–10.5)
CALCIUM SERPL-MCNC: 7.5 MG/DL (ref 8.7–10.5)
CALCIUM SERPL-MCNC: 7.9 MG/DL (ref 8.7–10.5)
CALCIUM SERPL-MCNC: 8 MG/DL (ref 8.7–10.5)
CHLORIDE SERPL-SCNC: 100 MMOL/L (ref 95–110)
CHLORIDE SERPL-SCNC: 102 MMOL/L (ref 95–110)
CHLORIDE SERPL-SCNC: 103 MMOL/L (ref 95–110)
CHLORIDE SERPL-SCNC: 103 MMOL/L (ref 95–110)
CO2 SERPL-SCNC: 21 MMOL/L (ref 23–29)
CO2 SERPL-SCNC: 23 MMOL/L (ref 23–29)
CO2 SERPL-SCNC: 24 MMOL/L (ref 23–29)
CO2 SERPL-SCNC: 24 MMOL/L (ref 23–29)
CODING SYSTEM: NORMAL
CREAT SERPL-MCNC: 1.8 MG/DL (ref 0.5–1.4)
CREAT SERPL-MCNC: 1.8 MG/DL (ref 0.5–1.4)
CREAT SERPL-MCNC: 2 MG/DL (ref 0.5–1.4)
CREAT SERPL-MCNC: 2 MG/DL (ref 0.5–1.4)
DELSYS: ABNORMAL
DIFFERENTIAL METHOD: ABNORMAL
DISPENSE STATUS: NORMAL
EOSINOPHIL # BLD AUTO: 0 K/UL (ref 0–0.5)
EOSINOPHIL # BLD AUTO: 0 K/UL (ref 0–0.5)
EOSINOPHIL # BLD AUTO: 0.1 K/UL (ref 0–0.5)
EOSINOPHIL # BLD AUTO: 0.1 K/UL (ref 0–0.5)
EOSINOPHIL NFR BLD: 0 % (ref 0–8)
EOSINOPHIL NFR BLD: 0.2 % (ref 0–8)
EOSINOPHIL NFR BLD: 0.6 % (ref 0–8)
EOSINOPHIL NFR BLD: 0.8 % (ref 0–8)
ERYTHROCYTE [DISTWIDTH] IN BLOOD BY AUTOMATED COUNT: 19 % (ref 11.5–14.5)
ERYTHROCYTE [DISTWIDTH] IN BLOOD BY AUTOMATED COUNT: 19.2 % (ref 11.5–14.5)
ERYTHROCYTE [DISTWIDTH] IN BLOOD BY AUTOMATED COUNT: 19.6 % (ref 11.5–14.5)
ERYTHROCYTE [DISTWIDTH] IN BLOOD BY AUTOMATED COUNT: 19.6 % (ref 11.5–14.5)
EST. GFR  (AFRICAN AMERICAN): 42.2 ML/MIN/1.73 M^2
EST. GFR  (AFRICAN AMERICAN): 42.2 ML/MIN/1.73 M^2
EST. GFR  (AFRICAN AMERICAN): 47.9 ML/MIN/1.73 M^2
EST. GFR  (AFRICAN AMERICAN): 47.9 ML/MIN/1.73 M^2
EST. GFR  (NON AFRICAN AMERICAN): 36.5 ML/MIN/1.73 M^2
EST. GFR  (NON AFRICAN AMERICAN): 36.5 ML/MIN/1.73 M^2
EST. GFR  (NON AFRICAN AMERICAN): 41.4 ML/MIN/1.73 M^2
EST. GFR  (NON AFRICAN AMERICAN): 41.4 ML/MIN/1.73 M^2
FIO2: 21
GLUCOSE SERPL-MCNC: 128 MG/DL (ref 70–110)
GLUCOSE SERPL-MCNC: 133 MG/DL (ref 70–110)
GLUCOSE SERPL-MCNC: 135 MG/DL (ref 70–110)
GLUCOSE SERPL-MCNC: 139 MG/DL (ref 70–110)
GLUCOSE SERPL-MCNC: 149 MG/DL (ref 70–110)
GLUCOSE SERPL-MCNC: 152 MG/DL (ref 70–110)
GLUCOSE SERPL-MCNC: 157 MG/DL (ref 70–110)
GLUCOSE SERPL-MCNC: 165 MG/DL (ref 70–110)
GLUCOSE SERPL-MCNC: 176 MG/DL (ref 70–110)
GLUCOSE SERPL-MCNC: 187 MG/DL (ref 70–110)
GLUCOSE SERPL-MCNC: 285 MG/DL (ref 70–110)
HCO3 UR-SCNC: 23.7 MMOL/L (ref 24–28)
HCO3 UR-SCNC: 25 MMOL/L (ref 24–28)
HCO3 UR-SCNC: 25.3 MMOL/L (ref 24–28)
HCO3 UR-SCNC: 26.1 MMOL/L (ref 24–28)
HCO3 UR-SCNC: 27.2 MMOL/L (ref 24–28)
HCO3 UR-SCNC: 27.4 MMOL/L (ref 24–28)
HCO3 UR-SCNC: 29.2 MMOL/L (ref 24–28)
HCO3 UR-SCNC: 30.8 MMOL/L (ref 24–28)
HCT VFR BLD AUTO: 24.9 % (ref 40–54)
HCT VFR BLD AUTO: 25.1 % (ref 40–54)
HCT VFR BLD AUTO: 26.4 % (ref 40–54)
HCT VFR BLD AUTO: 28.9 % (ref 40–54)
HCT VFR BLD CALC: 22 %PCV (ref 36–54)
HCT VFR BLD CALC: 23 %PCV (ref 36–54)
HCT VFR BLD CALC: 23 %PCV (ref 36–54)
HCT VFR BLD CALC: 24 %PCV (ref 36–54)
HCT VFR BLD CALC: 25 %PCV (ref 36–54)
HGB BLD-MCNC: 8.5 G/DL (ref 14–18)
HGB BLD-MCNC: 8.5 G/DL (ref 14–18)
HGB BLD-MCNC: 8.8 G/DL (ref 14–18)
HGB BLD-MCNC: 9.5 G/DL (ref 14–18)
IMM GRANULOCYTES # BLD AUTO: 0.08 K/UL (ref 0–0.04)
IMM GRANULOCYTES # BLD AUTO: 0.09 K/UL (ref 0–0.04)
IMM GRANULOCYTES NFR BLD AUTO: 0.5 % (ref 0–0.5)
IMM GRANULOCYTES NFR BLD AUTO: 0.5 % (ref 0–0.5)
IMM GRANULOCYTES NFR BLD AUTO: 0.6 % (ref 0–0.5)
IMM GRANULOCYTES NFR BLD AUTO: 0.8 % (ref 0–0.5)
INR PPP: 1.2 (ref 0.8–1.2)
INR PPP: 1.3 (ref 0.8–1.2)
LYMPHOCYTES # BLD AUTO: 0.4 K/UL (ref 1–4.8)
LYMPHOCYTES # BLD AUTO: 0.5 K/UL (ref 1–4.8)
LYMPHOCYTES # BLD AUTO: 0.5 K/UL (ref 1–4.8)
LYMPHOCYTES # BLD AUTO: 0.6 K/UL (ref 1–4.8)
LYMPHOCYTES NFR BLD: 2.7 % (ref 18–48)
LYMPHOCYTES NFR BLD: 3.2 % (ref 18–48)
LYMPHOCYTES NFR BLD: 3.3 % (ref 18–48)
LYMPHOCYTES NFR BLD: 3.4 % (ref 18–48)
MAGNESIUM SERPL-MCNC: 2 MG/DL (ref 1.6–2.6)
MAGNESIUM SERPL-MCNC: 2 MG/DL (ref 1.6–2.6)
MCH RBC QN AUTO: 29.2 PG (ref 27–31)
MCH RBC QN AUTO: 29.9 PG (ref 27–31)
MCH RBC QN AUTO: 29.9 PG (ref 27–31)
MCH RBC QN AUTO: 30.2 PG (ref 27–31)
MCHC RBC AUTO-ENTMCNC: 32.9 G/DL (ref 32–36)
MCHC RBC AUTO-ENTMCNC: 33.3 G/DL (ref 32–36)
MCHC RBC AUTO-ENTMCNC: 33.9 G/DL (ref 32–36)
MCHC RBC AUTO-ENTMCNC: 34.1 G/DL (ref 32–36)
MCV RBC AUTO: 88 FL (ref 82–98)
MCV RBC AUTO: 89 FL (ref 82–98)
MCV RBC AUTO: 89 FL (ref 82–98)
MCV RBC AUTO: 90 FL (ref 82–98)
MODE: ABNORMAL
MONOCYTES # BLD AUTO: 0.4 K/UL (ref 0.3–1)
MONOCYTES # BLD AUTO: 1 K/UL (ref 0.3–1)
MONOCYTES # BLD AUTO: 1.1 K/UL (ref 0.3–1)
MONOCYTES # BLD AUTO: 1.2 K/UL (ref 0.3–1)
MONOCYTES NFR BLD: 3.5 % (ref 4–15)
MONOCYTES NFR BLD: 5.6 % (ref 4–15)
MONOCYTES NFR BLD: 7.1 % (ref 4–15)
MONOCYTES NFR BLD: 7.6 % (ref 4–15)
NEUTROPHILS # BLD AUTO: 10.4 K/UL (ref 1.8–7.7)
NEUTROPHILS # BLD AUTO: 13 K/UL (ref 1.8–7.7)
NEUTROPHILS # BLD AUTO: 14.5 K/UL (ref 1.8–7.7)
NEUTROPHILS # BLD AUTO: 16.1 K/UL (ref 1.8–7.7)
NEUTROPHILS NFR BLD: 87.2 % (ref 38–73)
NEUTROPHILS NFR BLD: 88.4 % (ref 38–73)
NEUTROPHILS NFR BLD: 90.3 % (ref 38–73)
NEUTROPHILS NFR BLD: 92.1 % (ref 38–73)
NRBC BLD-RTO: 0 /100 WBC
PCO2 BLDA: 36.9 MMHG (ref 35–45)
PCO2 BLDA: 38.3 MMHG (ref 35–45)
PCO2 BLDA: 40.2 MMHG (ref 35–45)
PCO2 BLDA: 41.9 MMHG (ref 35–45)
PCO2 BLDA: 43.6 MMHG (ref 35–45)
PCO2 BLDA: 45.2 MMHG (ref 35–45)
PCO2 BLDA: 48.2 MMHG (ref 35–45)
PCO2 BLDA: 48.5 MMHG (ref 35–45)
PH SMN: 7.32 [PH] (ref 7.35–7.45)
PH SMN: 7.34 [PH] (ref 7.35–7.45)
PH SMN: 7.36 [PH] (ref 7.35–7.45)
PH SMN: 7.39 [PH] (ref 7.35–7.45)
PH SMN: 7.41 [PH] (ref 7.35–7.45)
PH SMN: 7.43 [PH] (ref 7.35–7.45)
PH SMN: 7.47 [PH] (ref 7.35–7.45)
PH SMN: 7.51 [PH] (ref 7.35–7.45)
PHOSPHATE SERPL-MCNC: 4.9 MG/DL (ref 2.7–4.5)
PHOSPHATE SERPL-MCNC: 5.8 MG/DL (ref 2.7–4.5)
PLATELET # BLD AUTO: 40 K/UL (ref 150–450)
PLATELET # BLD AUTO: 41 K/UL (ref 150–450)
PLATELET # BLD AUTO: 42 K/UL (ref 150–450)
PLATELET # BLD AUTO: 45 K/UL (ref 150–450)
PMV BLD AUTO: 10.7 FL (ref 9.2–12.9)
PMV BLD AUTO: 11.4 FL (ref 9.2–12.9)
PMV BLD AUTO: 11.6 FL (ref 9.2–12.9)
PMV BLD AUTO: 11.9 FL (ref 9.2–12.9)
PO2 BLDA: 41 MMHG (ref 40–60)
PO2 BLDA: 414 MMHG (ref 80–100)
PO2 BLDA: 431 MMHG (ref 80–100)
PO2 BLDA: 447 MMHG (ref 80–100)
PO2 BLDA: 508 MMHG (ref 80–100)
PO2 BLDA: 517 MMHG (ref 80–100)
PO2 BLDA: 528 MMHG (ref 80–100)
PO2 BLDA: 72 MMHG (ref 80–100)
POC BE: -1 MMOL/L
POC BE: -2 MMOL/L
POC BE: 0 MMOL/L
POC BE: 1 MMOL/L
POC BE: 3 MMOL/L
POC BE: 4 MMOL/L
POC BE: 5 MMOL/L
POC BE: 8 MMOL/L
POC IONIZED CALCIUM: 0.93 MMOL/L (ref 1.06–1.42)
POC IONIZED CALCIUM: 0.97 MMOL/L (ref 1.06–1.42)
POC IONIZED CALCIUM: 1.07 MMOL/L (ref 1.06–1.42)
POC IONIZED CALCIUM: 1.08 MMOL/L (ref 1.06–1.42)
POC IONIZED CALCIUM: 1.11 MMOL/L (ref 1.06–1.42)
POC IONIZED CALCIUM: 1.13 MMOL/L (ref 1.06–1.42)
POC IONIZED CALCIUM: 1.24 MMOL/L (ref 1.06–1.42)
POC SATURATED O2: 100 % (ref 95–100)
POC SATURATED O2: 74 % (ref 95–100)
POC SATURATED O2: 95 % (ref 95–100)
POC TCO2: 25 MMOL/L (ref 24–29)
POC TCO2: 26 MMOL/L (ref 23–27)
POC TCO2: 27 MMOL/L (ref 23–27)
POC TCO2: 28 MMOL/L (ref 23–27)
POC TCO2: 28 MMOL/L (ref 23–27)
POC TCO2: 29 MMOL/L (ref 23–27)
POC TCO2: 30 MMOL/L (ref 23–27)
POC TCO2: 32 MMOL/L (ref 23–27)
POTASSIUM BLD-SCNC: 3.4 MMOL/L (ref 3.5–5.1)
POTASSIUM BLD-SCNC: 3.9 MMOL/L (ref 3.5–5.1)
POTASSIUM BLD-SCNC: 3.9 MMOL/L (ref 3.5–5.1)
POTASSIUM BLD-SCNC: 4 MMOL/L (ref 3.5–5.1)
POTASSIUM BLD-SCNC: 4.4 MMOL/L (ref 3.5–5.1)
POTASSIUM BLD-SCNC: 4.6 MMOL/L (ref 3.5–5.1)
POTASSIUM BLD-SCNC: 4.7 MMOL/L (ref 3.5–5.1)
POTASSIUM SERPL-SCNC: 4.2 MMOL/L (ref 3.5–5.1)
POTASSIUM SERPL-SCNC: 4.2 MMOL/L (ref 3.5–5.1)
POTASSIUM SERPL-SCNC: 4.4 MMOL/L (ref 3.5–5.1)
POTASSIUM SERPL-SCNC: 4.7 MMOL/L (ref 3.5–5.1)
PROT SERPL-MCNC: 3.7 G/DL (ref 6–8.4)
PROT SERPL-MCNC: 4.5 G/DL (ref 6–8.4)
PROTHROMBIN TIME: 12.7 SEC (ref 9–12.5)
PROTHROMBIN TIME: 13.2 SEC (ref 9–12.5)
RBC # BLD AUTO: 2.81 M/UL (ref 4.6–6.2)
RBC # BLD AUTO: 2.84 M/UL (ref 4.6–6.2)
RBC # BLD AUTO: 2.94 M/UL (ref 4.6–6.2)
RBC # BLD AUTO: 3.25 M/UL (ref 4.6–6.2)
SAMPLE: ABNORMAL
SITE: ABNORMAL
SODIUM BLD-SCNC: 135 MMOL/L (ref 136–145)
SODIUM BLD-SCNC: 136 MMOL/L (ref 136–145)
SODIUM SERPL-SCNC: 132 MMOL/L (ref 136–145)
SODIUM SERPL-SCNC: 133 MMOL/L (ref 136–145)
SODIUM SERPL-SCNC: 133 MMOL/L (ref 136–145)
SODIUM SERPL-SCNC: 134 MMOL/L (ref 136–145)
TACROLIMUS BLD-MCNC: <2 NG/ML (ref 5–15)
UNIT NUMBER: NORMAL
WBC # BLD AUTO: 11.23 K/UL (ref 3.9–12.7)
WBC # BLD AUTO: 14.88 K/UL (ref 3.9–12.7)
WBC # BLD AUTO: 16.44 K/UL (ref 3.9–12.7)
WBC # BLD AUTO: 17.88 K/UL (ref 3.9–12.7)

## 2022-04-25 PROCEDURE — 97530 THERAPEUTIC ACTIVITIES: CPT

## 2022-04-25 PROCEDURE — 63600175 PHARM REV CODE 636 W HCPCS: Performed by: NURSE PRACTITIONER

## 2022-04-25 PROCEDURE — 99233 PR SUBSEQUENT HOSPITAL CARE,LEVL III: ICD-10-PCS | Mod: 24,,, | Performed by: STUDENT IN AN ORGANIZED HEALTH CARE EDUCATION/TRAINING PROGRAM

## 2022-04-25 PROCEDURE — 97116 GAIT TRAINING THERAPY: CPT

## 2022-04-25 PROCEDURE — 85610 PROTHROMBIN TIME: CPT | Performed by: STUDENT IN AN ORGANIZED HEALTH CARE EDUCATION/TRAINING PROGRAM

## 2022-04-25 PROCEDURE — 63600175 PHARM REV CODE 636 W HCPCS: Performed by: STUDENT IN AN ORGANIZED HEALTH CARE EDUCATION/TRAINING PROGRAM

## 2022-04-25 PROCEDURE — 99233 SBSQ HOSP IP/OBS HIGH 50: CPT | Mod: ,,, | Performed by: NURSE PRACTITIONER

## 2022-04-25 PROCEDURE — 84100 ASSAY OF PHOSPHORUS: CPT | Mod: 91 | Performed by: STUDENT IN AN ORGANIZED HEALTH CARE EDUCATION/TRAINING PROGRAM

## 2022-04-25 PROCEDURE — 97162 PT EVAL MOD COMPLEX 30 MIN: CPT

## 2022-04-25 PROCEDURE — 85025 COMPLETE CBC W/AUTO DIFF WBC: CPT | Mod: 91 | Performed by: STUDENT IN AN ORGANIZED HEALTH CARE EDUCATION/TRAINING PROGRAM

## 2022-04-25 PROCEDURE — 84450 TRANSFERASE (AST) (SGOT): CPT | Performed by: STUDENT IN AN ORGANIZED HEALTH CARE EDUCATION/TRAINING PROGRAM

## 2022-04-25 PROCEDURE — 80048 BASIC METABOLIC PNL TOTAL CA: CPT | Mod: XB | Performed by: SURGERY

## 2022-04-25 PROCEDURE — P9035 PLATELET PHERES LEUKOREDUCED: HCPCS | Performed by: STUDENT IN AN ORGANIZED HEALTH CARE EDUCATION/TRAINING PROGRAM

## 2022-04-25 PROCEDURE — 80053 COMPREHEN METABOLIC PANEL: CPT | Performed by: PHYSICIAN ASSISTANT

## 2022-04-25 PROCEDURE — 25000003 PHARM REV CODE 250: Performed by: STUDENT IN AN ORGANIZED HEALTH CARE EDUCATION/TRAINING PROGRAM

## 2022-04-25 PROCEDURE — 83735 ASSAY OF MAGNESIUM: CPT | Performed by: PHYSICIAN ASSISTANT

## 2022-04-25 PROCEDURE — 97165 OT EVAL LOW COMPLEX 30 MIN: CPT

## 2022-04-25 PROCEDURE — 83735 ASSAY OF MAGNESIUM: CPT | Mod: 91 | Performed by: STUDENT IN AN ORGANIZED HEALTH CARE EDUCATION/TRAINING PROGRAM

## 2022-04-25 PROCEDURE — 63600175 PHARM REV CODE 636 W HCPCS

## 2022-04-25 PROCEDURE — 84100 ASSAY OF PHOSPHORUS: CPT | Performed by: PHYSICIAN ASSISTANT

## 2022-04-25 PROCEDURE — 99233 PR SUBSEQUENT HOSPITAL CARE,LEVL III: ICD-10-PCS | Mod: ,,, | Performed by: INTERNAL MEDICINE

## 2022-04-25 PROCEDURE — 99900035 HC TECH TIME PER 15 MIN (STAT)

## 2022-04-25 PROCEDURE — 36430 TRANSFUSION BLD/BLD COMPNT: CPT

## 2022-04-25 PROCEDURE — 20000000 HC ICU ROOM

## 2022-04-25 PROCEDURE — 94761 N-INVAS EAR/PLS OXIMETRY MLT: CPT

## 2022-04-25 PROCEDURE — 84450 TRANSFERASE (AST) (SGOT): CPT | Mod: 91 | Performed by: STUDENT IN AN ORGANIZED HEALTH CARE EDUCATION/TRAINING PROGRAM

## 2022-04-25 PROCEDURE — 82803 BLOOD GASES ANY COMBINATION: CPT

## 2022-04-25 PROCEDURE — 25000003 PHARM REV CODE 250: Performed by: SURGERY

## 2022-04-25 PROCEDURE — 80053 COMPREHEN METABOLIC PANEL: CPT | Mod: 91 | Performed by: STUDENT IN AN ORGANIZED HEALTH CARE EDUCATION/TRAINING PROGRAM

## 2022-04-25 PROCEDURE — 94799 UNLISTED PULMONARY SVC/PX: CPT

## 2022-04-25 PROCEDURE — 99233 SBSQ HOSP IP/OBS HIGH 50: CPT | Mod: ,,, | Performed by: INTERNAL MEDICINE

## 2022-04-25 PROCEDURE — 85610 PROTHROMBIN TIME: CPT | Mod: 91 | Performed by: PHYSICIAN ASSISTANT

## 2022-04-25 PROCEDURE — 85730 THROMBOPLASTIN TIME PARTIAL: CPT | Performed by: STUDENT IN AN ORGANIZED HEALTH CARE EDUCATION/TRAINING PROGRAM

## 2022-04-25 PROCEDURE — 85025 COMPLETE CBC W/AUTO DIFF WBC: CPT | Mod: 91 | Performed by: PHYSICIAN ASSISTANT

## 2022-04-25 PROCEDURE — 25000003 PHARM REV CODE 250: Performed by: NURSE PRACTITIONER

## 2022-04-25 PROCEDURE — 80197 ASSAY OF TACROLIMUS: CPT | Performed by: STUDENT IN AN ORGANIZED HEALTH CARE EDUCATION/TRAINING PROGRAM

## 2022-04-25 PROCEDURE — 82248 BILIRUBIN DIRECT: CPT | Performed by: STUDENT IN AN ORGANIZED HEALTH CARE EDUCATION/TRAINING PROGRAM

## 2022-04-25 PROCEDURE — 99233 SBSQ HOSP IP/OBS HIGH 50: CPT | Mod: 24,,, | Performed by: STUDENT IN AN ORGANIZED HEALTH CARE EDUCATION/TRAINING PROGRAM

## 2022-04-25 PROCEDURE — 99233 PR SUBSEQUENT HOSPITAL CARE,LEVL III: ICD-10-PCS | Mod: ,,, | Performed by: NURSE PRACTITIONER

## 2022-04-25 PROCEDURE — 80048 BASIC METABOLIC PNL TOTAL CA: CPT | Mod: 91,XB | Performed by: SURGERY

## 2022-04-25 PROCEDURE — 25000003 PHARM REV CODE 250

## 2022-04-25 RX ORDER — HYDROMORPHONE HYDROCHLORIDE 1 MG/ML
0.2 INJECTION, SOLUTION INTRAMUSCULAR; INTRAVENOUS; SUBCUTANEOUS EVERY 6 HOURS PRN
Status: DISCONTINUED | OUTPATIENT
Start: 2022-04-25 | End: 2022-04-26

## 2022-04-25 RX ORDER — FAMOTIDINE 20 MG/1
20 TABLET, FILM COATED ORAL NIGHTLY
Status: DISCONTINUED | OUTPATIENT
Start: 2022-04-25 | End: 2022-05-01 | Stop reason: HOSPADM

## 2022-04-25 RX ORDER — HYDRALAZINE HYDROCHLORIDE 20 MG/ML
10 INJECTION INTRAMUSCULAR; INTRAVENOUS ONCE
Status: COMPLETED | OUTPATIENT
Start: 2022-04-25 | End: 2022-04-25

## 2022-04-25 RX ORDER — PREDNISONE 5 MG/1
TABLET ORAL
Qty: 75 TABLET | Refills: 0 | Status: ON HOLD | OUTPATIENT
Start: 2022-04-25 | End: 2022-05-31

## 2022-04-25 RX ORDER — TACROLIMUS 1 MG/1
6 CAPSULE ORAL EVERY 12 HOURS
Qty: 360 CAPSULE | Refills: 11 | Status: SHIPPED | OUTPATIENT
Start: 2022-04-25 | End: 2022-04-30 | Stop reason: SDUPTHER

## 2022-04-25 RX ORDER — SULFAMETHOXAZOLE AND TRIMETHOPRIM 400; 80 MG/1; MG/1
1 TABLET ORAL EVERY MORNING
Qty: 30 TABLET | Refills: 5 | Status: SHIPPED | OUTPATIENT
Start: 2022-05-01 | End: 2022-11-16 | Stop reason: ALTCHOICE

## 2022-04-25 RX ORDER — MYCOPHENOLATE MOFETIL 250 MG/1
1000 CAPSULE ORAL 2 TIMES DAILY
Qty: 240 CAPSULE | Refills: 2 | Status: SHIPPED | OUTPATIENT
Start: 2022-04-25 | End: 2022-07-12

## 2022-04-25 RX ORDER — AMLODIPINE BESYLATE 10 MG/1
10 TABLET ORAL DAILY
Status: DISCONTINUED | OUTPATIENT
Start: 2022-04-25 | End: 2022-04-26

## 2022-04-25 RX ORDER — INSULIN ASPART 100 [IU]/ML
0-10 INJECTION, SOLUTION INTRAVENOUS; SUBCUTANEOUS
Status: DISCONTINUED | OUTPATIENT
Start: 2022-04-25 | End: 2022-05-01 | Stop reason: HOSPADM

## 2022-04-25 RX ORDER — HYDROCODONE BITARTRATE AND ACETAMINOPHEN 500; 5 MG/1; MG/1
TABLET ORAL
Status: DISCONTINUED | OUTPATIENT
Start: 2022-04-25 | End: 2022-04-26

## 2022-04-25 RX ORDER — LIDOCAINE HYDROCHLORIDE 20 MG/ML
INJECTION INTRAVENOUS
Status: COMPLETED
Start: 2022-04-25 | End: 2022-04-25

## 2022-04-25 RX ORDER — OXYCODONE HYDROCHLORIDE 5 MG/1
5 TABLET ORAL EVERY 4 HOURS PRN
Status: DISCONTINUED | OUTPATIENT
Start: 2022-04-25 | End: 2022-05-01 | Stop reason: HOSPADM

## 2022-04-25 RX ORDER — OXYCODONE HYDROCHLORIDE 10 MG/1
10 TABLET ORAL EVERY 4 HOURS PRN
Status: DISCONTINUED | OUTPATIENT
Start: 2022-04-25 | End: 2022-05-01 | Stop reason: HOSPADM

## 2022-04-25 RX ORDER — IBUPROFEN 200 MG
16 TABLET ORAL
Status: DISCONTINUED | OUTPATIENT
Start: 2022-04-25 | End: 2022-05-01 | Stop reason: HOSPADM

## 2022-04-25 RX ORDER — MIDAZOLAM HYDROCHLORIDE 1 MG/ML
INJECTION INTRAMUSCULAR; INTRAVENOUS
Status: COMPLETED
Start: 2022-04-25 | End: 2022-04-25

## 2022-04-25 RX ORDER — ADENOSINE 3 MG/ML
6 INJECTION, SOLUTION INTRAVENOUS ONCE
Status: COMPLETED | OUTPATIENT
Start: 2022-04-25 | End: 2022-04-25

## 2022-04-25 RX ORDER — MYCOPHENOLATE MOFETIL 250 MG/1
1000 CAPSULE ORAL 2 TIMES DAILY
Status: DISCONTINUED | OUTPATIENT
Start: 2022-04-25 | End: 2022-05-01 | Stop reason: HOSPADM

## 2022-04-25 RX ORDER — METOPROLOL TARTRATE 1 MG/ML
5 INJECTION, SOLUTION INTRAVENOUS
Status: COMPLETED | OUTPATIENT
Start: 2022-04-25 | End: 2022-04-25

## 2022-04-25 RX ORDER — ADENOSINE 3 MG/ML
INJECTION, SOLUTION INTRAVENOUS
Status: DISCONTINUED
Start: 2022-04-25 | End: 2022-04-25 | Stop reason: WASHOUT

## 2022-04-25 RX ORDER — VALGANCICLOVIR 450 MG/1
450 TABLET, FILM COATED ORAL DAILY
Qty: 30 TABLET | Refills: 2 | Status: SHIPPED | OUTPATIENT
Start: 2022-05-04

## 2022-04-25 RX ORDER — DOCUSATE SODIUM 100 MG/1
100 CAPSULE, LIQUID FILLED ORAL 3 TIMES DAILY
Status: DISCONTINUED | OUTPATIENT
Start: 2022-04-25 | End: 2022-05-01 | Stop reason: HOSPADM

## 2022-04-25 RX ORDER — TACROLIMUS 1 MG/1
2 CAPSULE ORAL 2 TIMES DAILY
Status: DISCONTINUED | OUTPATIENT
Start: 2022-04-25 | End: 2022-04-26

## 2022-04-25 RX ORDER — MIDAZOLAM HYDROCHLORIDE 1 MG/ML
0.5 INJECTION INTRAMUSCULAR; INTRAVENOUS ONCE
Status: COMPLETED | OUTPATIENT
Start: 2022-04-26 | End: 2022-04-25

## 2022-04-25 RX ORDER — MIDAZOLAM HYDROCHLORIDE 1 MG/ML
0.5 INJECTION INTRAMUSCULAR; INTRAVENOUS ONCE
Status: DISCONTINUED | OUTPATIENT
Start: 2022-04-25 | End: 2022-04-25

## 2022-04-25 RX ORDER — GLUCAGON 1 MG
1 KIT INJECTION
Status: DISCONTINUED | OUTPATIENT
Start: 2022-04-25 | End: 2022-05-01 | Stop reason: HOSPADM

## 2022-04-25 RX ORDER — METOPROLOL TARTRATE 1 MG/ML
INJECTION, SOLUTION INTRAVENOUS
Status: COMPLETED
Start: 2022-04-25 | End: 2022-04-25

## 2022-04-25 RX ORDER — NYSTATIN 100000 [USP'U]/ML
500000 SUSPENSION ORAL
Qty: 250 ML | Refills: 0 | Status: SHIPPED | OUTPATIENT
Start: 2022-04-25 | End: 2022-05-31 | Stop reason: CLARIF

## 2022-04-25 RX ORDER — IBUPROFEN 200 MG
24 TABLET ORAL
Status: DISCONTINUED | OUTPATIENT
Start: 2022-04-25 | End: 2022-05-01 | Stop reason: HOSPADM

## 2022-04-25 RX ADMIN — METHOCARBAMOL 500 MG: 500 TABLET, FILM COATED ORAL at 09:04

## 2022-04-25 RX ADMIN — INSULIN HUMAN 1.2 UNITS/HR: 1 INJECTION, SOLUTION INTRAVENOUS at 04:04

## 2022-04-25 RX ADMIN — INSULIN ASPART 8 UNITS: 100 INJECTION, SOLUTION INTRAVENOUS; SUBCUTANEOUS at 09:04

## 2022-04-25 RX ADMIN — METOROPROLOL TARTRATE 5 MG: 5 INJECTION, SOLUTION INTRAVENOUS at 08:04

## 2022-04-25 RX ADMIN — DOCUSATE SODIUM 100 MG: 100 CAPSULE ORAL at 10:04

## 2022-04-25 RX ADMIN — FAMOTIDINE 20 MG: 20 TABLET ORAL at 09:04

## 2022-04-25 RX ADMIN — METHOCARBAMOL 500 MG: 500 TABLET, FILM COATED ORAL at 02:04

## 2022-04-25 RX ADMIN — TACROLIMUS 1 MG: 1 CAPSULE, GELATIN COATED ORAL at 09:04

## 2022-04-25 RX ADMIN — HYDROMORPHONE HYDROCHLORIDE 0.2 MG: 1 INJECTION, SOLUTION INTRAMUSCULAR; INTRAVENOUS; SUBCUTANEOUS at 03:04

## 2022-04-25 RX ADMIN — INSULIN HUMAN 0.5 UNITS/HR: 1 INJECTION, SOLUTION INTRAVENOUS at 10:04

## 2022-04-25 RX ADMIN — METOPROLOL TARTRATE 5 MG: 5 INJECTION, SOLUTION INTRAVENOUS at 08:04

## 2022-04-25 RX ADMIN — HYDROMORPHONE HYDROCHLORIDE 0.2 MG: 1 INJECTION, SOLUTION INTRAMUSCULAR; INTRAVENOUS; SUBCUTANEOUS at 11:04

## 2022-04-25 RX ADMIN — HEPARIN SODIUM 5000 UNITS: 5000 INJECTION INTRAVENOUS; SUBCUTANEOUS at 09:04

## 2022-04-25 RX ADMIN — PHYTONADIONE 10 MG: 10 INJECTION, EMULSION INTRAMUSCULAR; INTRAVENOUS; SUBCUTANEOUS at 06:04

## 2022-04-25 RX ADMIN — NYSTATIN 500000 UNITS: 500000 SUSPENSION ORAL at 09:04

## 2022-04-25 RX ADMIN — AMLODIPINE BESYLATE 10 MG: 10 TABLET ORAL at 02:04

## 2022-04-25 RX ADMIN — HYDROMORPHONE HYDROCHLORIDE 0.2 MG: 1 INJECTION, SOLUTION INTRAMUSCULAR; INTRAVENOUS; SUBCUTANEOUS at 08:04

## 2022-04-25 RX ADMIN — OXYCODONE 5 MG: 5 TABLET ORAL at 06:04

## 2022-04-25 RX ADMIN — MIDAZOLAM HYDROCHLORIDE 0.5 MG: 1 INJECTION INTRAMUSCULAR; INTRAVENOUS at 10:04

## 2022-04-25 RX ADMIN — HEPARIN SODIUM 5000 UNITS: 5000 INJECTION INTRAVENOUS; SUBCUTANEOUS at 01:04

## 2022-04-25 RX ADMIN — INSULIN ASPART 6 UNITS: 100 INJECTION, SOLUTION INTRAVENOUS; SUBCUTANEOUS at 05:04

## 2022-04-25 RX ADMIN — METHYLPREDNISOLONE SODIUM SUCCINATE 200 MG: 125 INJECTION, POWDER, FOR SOLUTION INTRAMUSCULAR; INTRAVENOUS at 09:04

## 2022-04-25 RX ADMIN — HYDROMORPHONE HYDROCHLORIDE 0.2 MG: 1 INJECTION, SOLUTION INTRAMUSCULAR; INTRAVENOUS; SUBCUTANEOUS at 05:04

## 2022-04-25 RX ADMIN — METOPROLOL TARTRATE 5 MG: 5 INJECTION, SOLUTION INTRAVENOUS at 09:04

## 2022-04-25 RX ADMIN — DOCUSATE SODIUM 100 MG: 100 CAPSULE ORAL at 09:04

## 2022-04-25 RX ADMIN — OXYCODONE 5 MG: 5 TABLET ORAL at 02:04

## 2022-04-25 RX ADMIN — FAMOTIDINE 20 MG: 10 INJECTION INTRAVENOUS at 09:04

## 2022-04-25 RX ADMIN — MYCOPHENOLATE MOFETIL 1000 MG: 200 POWDER, FOR SUSPENSION ORAL at 09:04

## 2022-04-25 RX ADMIN — HYDRALAZINE HYDROCHLORIDE 10 MG: 20 INJECTION, SOLUTION INTRAMUSCULAR; INTRAVENOUS at 02:04

## 2022-04-25 RX ADMIN — OXYCODONE HYDROCHLORIDE 10 MG: 10 TABLET ORAL at 10:04

## 2022-04-25 RX ADMIN — MYCOPHENOLATE MOFETIL 1000 MG: 250 CAPSULE ORAL at 09:04

## 2022-04-25 RX ADMIN — MUPIROCIN 1 G: 20 OINTMENT TOPICAL at 09:04

## 2022-04-25 RX ADMIN — DOCUSATE SODIUM 100 MG: 100 CAPSULE ORAL at 02:04

## 2022-04-25 RX ADMIN — NYSTATIN 500000 UNITS: 500000 SUSPENSION ORAL at 08:04

## 2022-04-25 RX ADMIN — TACROLIMUS 2 MG: 1 CAPSULE ORAL at 05:04

## 2022-04-25 RX ADMIN — NYSTATIN 500000 UNITS: 500000 SUSPENSION ORAL at 01:04

## 2022-04-25 RX ADMIN — MIDAZOLAM 0.5 MG: 1 INJECTION INTRAMUSCULAR; INTRAVENOUS at 10:04

## 2022-04-25 RX ADMIN — OXYCODONE HYDROCHLORIDE 10 MG: 10 TABLET ORAL at 02:04

## 2022-04-25 RX ADMIN — INSULIN ASPART 4 UNITS: 100 INJECTION, SOLUTION INTRAVENOUS; SUBCUTANEOUS at 11:04

## 2022-04-25 RX ADMIN — HYDROMORPHONE HYDROCHLORIDE 0.2 MG: 1 INJECTION, SOLUTION INTRAMUSCULAR; INTRAVENOUS; SUBCUTANEOUS at 07:04

## 2022-04-25 RX ADMIN — HEPARIN SODIUM 5000 UNITS: 5000 INJECTION INTRAVENOUS; SUBCUTANEOUS at 06:04

## 2022-04-25 RX ADMIN — ADENOSINE 6 MG: 3 INJECTION, SOLUTION INTRAVENOUS at 10:04

## 2022-04-25 NOTE — PROGRESS NOTES
[facility]  Critical Care - Surgery  Progress Note    Patient Name: Pelon Vasquez  MRN: [id number]  Admission Date: 4/19/2022  Hospital Length of Stay: 5 days  Code Status: Full Code  Attending Provider: Tonio Smith MD  Primary Care Provider: Primary Doctor No   Principal Problem: S/P liver transplant    Subjective:     Hospital/ICU Course:  No notes on file    Interval History/Significant Events:   AF, HDS. NAEON.   AST/ALT trending down.  Extubated, now on room air  Making urine    Follow-up For: Procedure(s) (LRB):  TRANSPLANT, LIVER (N/A)    Post-Operative Day: 2 Days Post-Op    Objective:     Vital Signs (Most Recent):  Temp: 98.7 °F (37.1 °C) (04/25/22 0700)  Pulse: 96 (04/25/22 0700)  Resp: 15 (04/25/22 0719)  BP: (!) 130/59 (04/24/22 0730)  SpO2: 98 % (04/25/22 0719)   Vital Signs (24h Range):  Temp:  [97.5 °F (36.4 °C)-98.7 °F (37.1 °C)] 98.7 °F (37.1 °C)  Pulse:  [] 96  Resp:  [2-22] 15  SpO2:  [94 %-100 %] 98 %  Arterial Line BP: (119-162)/(57-80) 145/69     Weight: 112.7 kg (248 lb 7.3 oz)  Body mass index is 38.91 kg/m².      Intake/Output Summary (Last 24 hours) at 4/25/2022 0732  Last data filed at 4/25/2022 0700  Gross per 24 hour   Intake 4711.79 ml   Output 3704 ml   Net 1007.79 ml       Physical Exam  Vitals and nursing note reviewed.   Constitutional:       General: He is not in acute distress.  HENT:      Head: Normocephalic and atraumatic.   Neck:      Comments: R IJ, swan in place  Cardiovascular:      Rate and Rhythm: Normal rate and regular rhythm.      Pulses: Normal pulses.   Pulmonary:      Comments: On room air  Abdominal:      General: There is no distension.      Palpations: Abdomen is soft.      Comments: Chevron incision w/ island dressing intact   GO drain x 2, serosanguinous    Genitourinary:     Comments: [place]   Musculoskeletal:      Comments: Femoral a line, CVC  Left radial a line    Skin:     General: Skin is warm and dry.   Neurological:       General: No focal deficit present.      Mental Status: He is alert.       Vents:  Vent Mode: Spont (04/24/22 1400)  Ventilator Initiated: Yes (04/24/22 0843)  Set Rate: 16 BPM (04/24/22 1150)  Vt Set: 430 mL (04/24/22 1150)  Pressure Support: 6 cmH20 (04/24/22 1400)  PEEP/CPAP: 5 cmH20 (04/24/22 1400)  Oxygen Concentration (%): 30 (04/24/22 1445)  Peak Airway Pressure: 12 cmH2O (04/24/22 1400)  Plateau Pressure: 0 cmH20 (04/24/22 1400)  Total Ve: 7.46 mL (04/24/22 1400)  Negative Inspiratory Force (cm H2O): 0 (04/24/22 1400)  F/VT Ratio<105 (RSBI): (!) 7.54 (04/24/22 1400)    Lines/Drains/Airways       Central Venous Catheter Line  Duration                  Hemodialysis Catheter 04/24/22 0137 right femoral 1 day    Percutaneous Central Line Insertion/Assessment - Triple Lumen  04/24/22 0146 right internal jugular 1 day    Pulmonary Artery Catheter Assessment  04/24/22 0141 1 day              Drain  Duration                  Closed/Suction Drain 04/24/22 0659 Right Abdomen Bulb 19 Fr. 1 day         Urethral Catheter 04/24/22 0046 Non-latex;Straight-tip 16 Fr. 1 day         Closed/Suction Drain 04/24/22 0744 Right Abdomen Bulb 19 Fr. <1 day              Arterial Line  Duration             Arterial Line 04/24/22 0023 Left Radial 1 day    Arterial Line 04/24/22 0139 Right Femoral 1 day              Peripheral Intravenous Line  Duration                  Peripheral IV - Single Lumen 04/23/22 2021 20 G Right Forearm 1 day         GIOVANNA 04/24/22 0037 Left Antecubital 1 day                    Significant Labs:    CBC/Anemia Profile:  Recent Labs   Lab 04/24/22 2005 04/25/22  0003 04/25/22  0417   WBC 17.95* 17.88* 16.44*   HGB 8.8* 8.5* 8.5*   HCT 25.8* 25.1* 24.9*   PLT 40* 41* 40*   MCV 87 89 88   RDW 19.4* 19.2* 19.6*        Chemistries:  Recent Labs   Lab 04/24/22  0553 04/24/22  0700 04/24/22  0700 04/24/22  0838 04/24/22  1216 04/24/22  1750 04/24/22  2005 04/25/22  0003 04/25/22  0417   * 136  --  135*  --  138   --  133* 133*   K 3.4* 3.9  --  4.1  --  4.3  --  4.4 4.2   CL  --   --    < > 103  --  107  --  103 102   CO2  --   --    < > 24  --  23  --  23 24   BUN  --   --    < > 20  --  31*  --  35* 38*   CREATININE  --   --    < > 1.2  --  1.5*  --  1.8* 1.8*   CALCIUM  --   --    < > 7.7*  --  7.7*  --  7.5* 7.5*   ALBUMIN 2.7* 2.6*  --  2.5*  --   --   --   --  2.4*   PROT  --   --   --  3.8*  --   --   --   --  3.7*   BILITOT  --   --   --  8.2*  --   --   --   --  8.5*   ALKPHOS  --   --   --  50*  --   --   --   --  42*   *  --   --  155*  --   --   --   --  132*   *  --   --  433*   < >  --  238* 211* 201*  201*   MG 2.2 2.0  --   --   --   --   --   --  2.0   PHOS  --   --   --   --   --   --   --   --  4.9*    < > = values in this interval not displayed.       ABGs:   Recent Labs   Lab 04/25/22  0423   PH 7.361   PCO2 41.9   HCO3 23.7*   POCSATURATED 74*   BE -2     CMP:   Recent Labs   Lab 04/24/22  0553 04/24/22  0700 04/24/22  0700 04/24/22  0838 04/24/22  1216 04/24/22  1750 04/24/22  2005 04/25/22  0003 04/25/22 0417   * 136  --  135*  --  138  --  133* 133*   K 3.4* 3.9  --  4.1  --  4.3  --  4.4 4.2   CL  --   --    < > 103  --  107  --  103 102   CO2  --   --    < > 24  --  23 -- 23 24   * 182*  --  189*  --  146*  --  157* 149*   BUN  --   --    < > 20  --  31*  --  35* 38*   CREATININE  --   --    < > 1.2  --  1.5*  --  1.8* 1.8*   CALCIUM  --   --    < > 7.7*  --  7.7*  --  7.5* 7.5*   PROT  --   --   --  3.8*  --   --   --   --  3.7*   ALBUMIN 2.7* 2.6*  --  2.5*  --   --   --   --  2.4*   BILITOT  --   --   --  8.2*  --   --   --   --  8.5*   ALKPHOS  --   --   --  50*  --   --   --   --  42*   *  --   --  433*   < >  --  238* 211* 201*  201*   *  --   --  155*  --   --   --   --  132*   ANIONGAP  --   --    < > 8  --  8  --  7* 7*   EGFRNONAA  --   --    < > >60.0  --  51.7*  --  41.4* 41.4*    < > = values in this interval not displayed.        Significant Imaging:  I have reviewed all pertinent imaging results/findings within the past 24 hours.    Assessment/Plan:     Alcoholic cirrhosis of liver with ascites    Neuro/Psych:   -- Sedation: none  -- Pain: multimodal w/ PRN narcotic   -- h/o encephalopathy on lactulose/rifaximin pre-op             Cards:   -- HDS  -- maps >65  -- vasopressors: none  -- echo 02/17: normal function   -- platelets 40, will need platelet transfusion before delining      Pulm:   -- Goal O2 sat > 90%  -- Wean as able  -- CXR daily  -- ABG PRN      Renal:  -- Keep francisco for strict I/O  -- FAMILIA pre-op, will monitor closely; urine studies 04/21  -- Intra-op dialysis; nephrology consulted, following  -- albumin PRN      FEN / GI:   -- h/o ESLD now s/p DBD OLTxp 04/24  -- Replace lytes as needed  -- Nutrition: NPO for bedside liver ultrasound, ok to advance diet as tolerated  -- bowel reg; HSB  -- CMP serially; AST Q6H  -- GO drain x 2 to right lateral side. 1L output from both drains  -- liver ultrasound this morning 4/25      ID:   -- Tm: afebrile; WBC WNL pre-op  -- reactive leukocytosis expected  -- immunosuppression and abx per txp pharm      Heme/Onc:   -- Hgb 8.5  -- CBC, coags serially  -- 7uFFP, 2plt, 2 cryo, 4pRBC intra-op      Endo:   -- Gluc goal 140-180  -- insulin gtt       PPx:   Feeding: NPO for now, advance after liver US  Analgesia/Sedation: multimodal w/ prn narc / none  Thromboembolic prevention: heparin TID  HOB >30: Yes  Stress Ulcer ppx: H2B   Glucose control: Critical care goal 140-180 g/dl, ISS    Lines/Drains/Airway: GO x 2, PIV, arterial line x 2, R IJ, femoral CVC. OK to deline today      Dispo/Code Status/Palliative:   -- SICU / Full Code           Critical care was time spent personally by me on the following activities: development of treatment plan with patient or surrogate and bedside caregivers, discussions with consultants, evaluation of patient's response to treatment, examination of  patient, ordering and performing treatments and interventions, ordering and review of laboratory studies, ordering and review of radiographic studies, pulse oximetry, re-evaluation of patient's condition.  This critical care time did not overlap with that of any other provider or involve time for any procedures.     Rodrick Joseph MD  Critical Care - Surgery  [facility]

## 2022-04-25 NOTE — ASSESSMENT & PLAN NOTE
BG goal 140 - 180     Discontinue IV insulin infusion protocol  Start Transition IV insulin infusion at 0.5 u/hr with stepdown parameters (Rate based on current IV insulin requirements)  Start Moderate Dose Correction Scale  BG monitoring /hs/0200    ** Please call Endocrine for any BG related issues **  ** Please notify Endocrine for any change and/or advance in diet**    Discharge planning: TBD

## 2022-04-25 NOTE — PT/OT/SLP EVAL
Occupational Therapy   Evaluation and Treatment with PT     Name: Pelon Vasquez  MRN: [id number]  Admitting Diagnosis:  S/P liver transplant  Recent Surgery: Procedure(s) (LRB):  TRANSPLANT, LIVER (N/A) 2 Days Post-Op    Recommendations:     Discharge Recommendations: home health OT, home health PT  Discharge Equipment Recommendations:  bedside commode, walker, rolling  Barriers to discharge:  None    Assessment:     Pelon Vasquez is a 57 y.o. male with a medical diagnosis of S/P liver transplant.  He presents with performance deficits affecting function: weakness, impaired endurance, impaired self care skills, impaired functional mobilty, gait instability, impaired balance, pain. Liver transplant 4/23/22. Pt tolerated session well and motivated to participate in therapy session. Pt would benefit from continued skilled acute OT services in order to maximize independence and safety with ADLs and functional mobility to ensure safe return to PLOF in the least restrictive environment. OT recommending HH PT/OT once pt is medically appropriate for d/c.     Rehab Prognosis: Good; patient would benefit from acute skilled OT services to address these deficits and reach maximum level of function.       Plan:     Patient to be seen 4 x/week to address the above listed problems via self-care/home management, therapeutic activities, therapeutic exercises, neuromuscular re-education  · Plan of Care Expires: 05/24/22  · Plan of Care Reviewed with: patient, spouse    Subjective     Chief Complaint: abdominal pain   Patient/Family Comments/goals: to get better and return home     Occupational Profile:  Living Environment: Pt lives with his wife in a [employer] with threshold to enter. Pt has a tub/shower combo with no DME present. 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   Previous level of function: PTA, pt was (I) with ADLs and functional mobility   Roles and Routines:    Equipment Used at Home:  cane, straight  Assistance upon  Discharge: Pt will have assistance upon d/c.     Pain/Comfort:  · Pain Rating 1: 7/10  · Location 1: abdomen  · Pain Addressed 1: Reposition, Distraction  · Pain Rating Post-Intervention 1: 7/10    Patients cultural, spiritual, Restorationist conflicts given the current situation: no    Objective:     Communicated with: RN prior to session.  Patient found HOB elevated with blood pressure cuff, pulse ox (continuous), telemetry, arterial line, peripheral IV, francisco catheter, central line, pressure relief boots, SCD, GO drain upon OT entry to room.    General Precautions: Standard, fall   Orthopedic Precautions:N/A   Braces: N/A  Respiratory Status: Room air    Occupational Performance:    Bed Mobility:    · Patient completed Scooting/Bridging with contact guard assistance  · Patient completed Supine to Sit with moderate assistance and 2 persons    Functional Mobility/Transfers:  · Patient completed Sit <> Stand Transfer from EOB with contact guard assistance  with  no assistive device   · Patient completed Bed <> Chair Transfer using Step Transfer technique with contact guard assistance with hand-held assist    Activities of Daily Living:  · Upper Body Dressing: total assistance to don gown due to line management   · Lower Body Dressing: total assistance to don B  socks     Cognitive/Visual Perceptual:  Cognitive/Psychosocial Skills:     -       Oriented to: Person, Place, Time and Situation   -       Follows Commands/attention:Follows multistep  commands  -       Communication: clear/fluent  -       Memory: No Deficits noted  -       Safety awareness/insight to disability: impaired   -       Mood/Affect/Coping skills/emotional control: Appropriate to situation and Cooperative  Visual/Perceptual:      -Intact      Physical Exam:  Balance:     Static sit: SBA   Dynamic sit: SBA   Static standing: CGA   Dynamic Standing: CGA <> min A    Postural examination/scapula alignment:    -       Rounded shoulders  Skin  integrity: Visible skin intact  Edema:  None noted  Sensation:    -       Intact  Dominant hand:    -       right   Upper Extremity Range of Motion:     -       Right Upper Extremity: WFL  -       Left Upper Extremity: WFL except limited shoulder flexion due to past surgery   Upper Extremity Strength:    -       Right Upper Extremity: WFL  -       Left Upper Extremity: WFL    AMPAC 6 Click ADL:  AMPAC Total Score: 8    Treatment & Education:   Pt educated on role of OT, POC, and goals for therapy.     POC was dicussed with patient/caregiver, who was included in its development and is in agreement with the identified goals and treatment plan.    Patient and family aware of patient's deficits and therapy progression.    Time provided for therapeutic counseling and discussion of health disposition.    Educated on importance of EOB/OOB mobility, maintaining routine, sitting up in chair, and maximizing independence with ADLs during admission    Pt completed ADLs and functional mobility for treatment session as noted above    Pt/caregiver verbalized understanding and expressed no further concerns/questions.   Updated communication board with level of assist required (min A x 1-2 person assistance for line management) & educated RN/patient that pt is appropriate for transfers with RN/PCT.     Co-evaluation/treatment performed due to patient's multiple deficits requiring two skilled therapists to appropriately and safely assess patient's strength and endurance while facilitating functional tasks in addition to accommodating for patient's activity tolerance.   Education:    Patient left up in chair with all lines intact, call button in reach and RN  notified    GOALS:   Multidisciplinary Problems     Occupational Therapy Goals        Problem: Occupational Therapy    Goal Priority Disciplines Outcome Interventions   Occupational Therapy Goal     OT, PT/OT Ongoing, Progressing    Description: Goals to be met by:  5/9/2022    Patient will increase functional independence with ADLs by performing:    UE Dressing with Modified [place].  LE Dressing with Modified [place].  Grooming while standing at sink with Modified [place].  Toileting from toilet with Modified [place] for hygiene and clothing management.   Toilet transfer to toilet with Modified [place].                     History:     Past Medical History:   Diagnosis Date    Arthritis     Cirrhosis of liver     HTN (hypertension)     BRAYDON (obstructive sleep apnea)     Secondary esophageal varices without bleeding 3/18/2022    EGD (2/25/22) showed moderate portal hypertensive gastropathy, small hiatal hernia, grade 1 esophageal varices    Type 2 diabetes mellitus        Past Surgical History:   Procedure Laterality Date    COLONOSCOPY      FINGER AMPUTATION      LIVER TRANSPLANT N/A 4/10/2022    Procedure: TRANSPLANT, LIVER;  Surgeon: Félix Scott MD;  Location: [facility] 2ND FLR;  Service: Transplant;  Laterality: N/A;    LIVER TRANSPLANT N/A 4/13/2022    Procedure: TRANSPLANT, LIVER;  Surgeon: Keyon Castillo MD;  Location: [facility]ND FLR;  Service: Transplant;  Laterality: N/A;    LIVER TRANSPLANT N/A 4/20/2022    Procedure: TRANSPLANT, LIVER;  Surgeon: Brant Gonzalez Jr., MD;  Location: [facility] 2ND FLR;  Service: Transplant;  Laterality: N/A;    LIVER TRANSPLANT N/A 4/23/2022    Procedure: TRANSPLANT, LIVER;  Surgeon: Tom Romero MD;  Location: [facility] 2ND FLR;  Service: Transplant;  Laterality: N/A;    MEDIAL COLLATERAL LIGAMENT REPAIR, KNEE Bilateral     ROTATRO CUFF REPAIR      TONSILLECTOMY         Time Tracking:     OT Date of Treatment: 04/25/22  OT Start Time: 1403  OT Stop Time: 1421  OT Total Time (min): 18 min    Billable Minutes:Evaluation 10  Therapeutic Activity 8    4/25/2022

## 2022-04-25 NOTE — PLAN OF CARE
SICU PLAN OF CARE NOTE    Dx: S/P liver transplant    Shift Events: Patient NS-sinus tach 90s-100s.  Titrated off NC to RA O2 sats >95%. No events overnight. Pain controled with PRN medication. Patient tolerating PO intake. NPO this AM for post op liver US. POC discussed with patient and wife throughout shift.     Goals of Care: -160    Neuro: AAO x4, Follows Commands, and Moves All Extremities      Respiratory: Room Air    Diet: Clear Liquid    Gtts: Insulin and Abx    Urine Output: Urinary Catheter 40-75 cc/hour    Drains: GO 1 > GO 2  JP1:  397cc/shift  JP2:  675cc/shift    Cardiac profile:   SVO2 72-73%  CO: 9.9-11.4  CI: 4.3-5    Labs/Accuchecks: q4 CBC, PT INR AST, q8 BMP, q1hr accucheck     Skin: No breakdown noted. Foam padding and heel boots in use. Waffle over mattress inflated. Alternating pressure bed plugged in and working. Weight shifting assisted.

## 2022-04-25 NOTE — SUBJECTIVE & OBJECTIVE
Interval History/Significant Events:   AF, HDS. NAEON.   AST/ALT trending down.  Extubated, now on room air  Making urine    Follow-up For: Procedure(s) (LRB):  TRANSPLANT, LIVER (N/A)    Post-Operative Day: 2 Days Post-Op    Objective:     Vital Signs (Most Recent):  Temp: 98.7 °F (37.1 °C) (04/25/22 0700)  Pulse: 96 (04/25/22 0700)  Resp: 15 (04/25/22 0719)  BP: (!) 130/59 (04/24/22 0730)  SpO2: 98 % (04/25/22 0719)   Vital Signs (24h Range):  Temp:  [97.5 °F (36.4 °C)-98.7 °F (37.1 °C)] 98.7 °F (37.1 °C)  Pulse:  [] 96  Resp:  [2-22] 15  SpO2:  [94 %-100 %] 98 %  Arterial Line BP: (119-162)/(57-80) 145/69     Weight: 112.7 kg (248 lb 7.3 oz)  Body mass index is 38.91 kg/m².      Intake/Output Summary (Last 24 hours) at 4/25/2022 0732  Last data filed at 4/25/2022 0700  Gross per 24 hour   Intake 4711.79 ml   Output 3704 ml   Net 1007.79 ml       Physical Exam  Vitals and nursing note reviewed.   Constitutional:       General: He is not in acute distress.  HENT:      Head: Normocephalic and atraumatic.   Neck:      Comments: R IJ, swan in place  Cardiovascular:      Rate and Rhythm: Normal rate and regular rhythm.      Pulses: Normal pulses.   Pulmonary:      Comments: On room air  Abdominal:      General: There is no distension.      Palpations: Abdomen is soft.      Comments: Chevron incision w/ island dressing intact   GO drain x 2, serosanguinous    Genitourinary:     Comments: [place]   Musculoskeletal:      Comments: Femoral a line, CVC  Left radial a line    Skin:     General: Skin is warm and dry.   Neurological:      General: No focal deficit present.      Mental Status: He is alert.       Vents:  Vent Mode: Spont (04/24/22 1400)  Ventilator Initiated: Yes (04/24/22 0843)  Set Rate: 16 BPM (04/24/22 1150)  Vt Set: 430 mL (04/24/22 1150)  Pressure Support: 6 cmH20 (04/24/22 1400)  PEEP/CPAP: 5 cmH20 (04/24/22 1400)  Oxygen Concentration (%): 30 (04/24/22 1445)  Peak Airway Pressure: 12 cmH2O (04/24/22  1400)  Plateau Pressure: 0 cmH20 (04/24/22 1400)  Total Ve: 7.46 mL (04/24/22 1400)  Negative Inspiratory Force (cm H2O): 0 (04/24/22 1400)  F/VT Ratio<105 (RSBI): (!) 7.54 (04/24/22 1400)    Lines/Drains/Airways       Central Venous Catheter Line  Duration                  Hemodialysis Catheter 04/24/22 0137 right femoral 1 day    Percutaneous Central Line Insertion/Assessment - Triple Lumen  04/24/22 0146 right internal jugular 1 day    Pulmonary Artery Catheter Assessment  04/24/22 0141 1 day              Drain  Duration                  Closed/Suction Drain 04/24/22 0659 Right Abdomen Bulb 19 Fr. 1 day         Urethral Catheter 04/24/22 0046 Non-latex;Straight-tip 16 Fr. 1 day         Closed/Suction Drain 04/24/22 0744 Right Abdomen Bulb 19 Fr. <1 day              Arterial Line  Duration             Arterial Line 04/24/22 0023 Left Radial 1 day    Arterial Line 04/24/22 0139 Right Femoral 1 day              Peripheral Intravenous Line  Duration                  Peripheral IV - Single Lumen 04/23/22 2021 20 G Right Forearm 1 day         GIOVANNA 04/24/22 0037 Left Antecubital 1 day                    Significant Labs:    CBC/Anemia Profile:  Recent Labs   Lab 04/24/22 2005 04/25/22 0003 04/25/22 0417   WBC 17.95* 17.88* 16.44*   HGB 8.8* 8.5* 8.5*   HCT 25.8* 25.1* 24.9*   PLT 40* 41* 40*   MCV 87 89 88   RDW 19.4* 19.2* 19.6*        Chemistries:  Recent Labs   Lab 04/24/22  0553 04/24/22  0700 04/24/22  0700 04/24/22  0838 04/24/22  1216 04/24/22  1750 04/24/22 2005 04/25/22 0003 04/25/22  0417   * 136  --  135*  --  138  --  133* 133*   K 3.4* 3.9  --  4.1  --  4.3  --  4.4 4.2   CL  --   --    < > 103  --  107  --  103 102   CO2  --   --    < > 24  --  23  --  23 24   BUN  --   --    < > 20  --  31*  --  35* 38*   CREATININE  --   --    < > 1.2  --  1.5*  --  1.8* 1.8*   CALCIUM  --   --    < > 7.7*  --  7.7*  --  7.5* 7.5*   ALBUMIN 2.7* 2.6*  --  2.5*  --   --   --   --  2.4*   PROT  --   --   --  3.8*   --   --   --   --  3.7*   BILITOT  --   --   --  8.2*  --   --   --   --  8.5*   ALKPHOS  --   --   --  50*  --   --   --   --  42*   *  --   --  155*  --   --   --   --  132*   *  --   --  433*   < >  --  238* 211* 201*  201*   MG 2.2 2.0  --   --   --   --   --   --  2.0   PHOS  --   --   --   --   --   --   --   --  4.9*    < > = values in this interval not displayed.       ABGs:   Recent Labs   Lab 04/25/22  0423   PH 7.361   PCO2 41.9   HCO3 23.7*   POCSATURATED 74*   BE -2     CMP:   Recent Labs   Lab 04/24/22  0553 04/24/22  0700 04/24/22  0700 04/24/22  0838 04/24/22  1216 04/24/22  1750 04/24/22 2005 04/25/22 0003 04/25/22  0417   * 136  --  135*  --  138  --  133* 133*   K 3.4* 3.9  --  4.1  --  4.3  --  4.4 4.2   CL  --   --    < > 103  --  107  --  103 102   CO2  --   --    < > 24  --  23  --  23 24   * 182*  --  189*  --  146*  --  157* 149*   BUN  --   --    < > 20  --  31*  --  35* 38*   CREATININE  --   --    < > 1.2  --  1.5*  --  1.8* 1.8*   CALCIUM  --   --    < > 7.7*  --  7.7*  --  7.5* 7.5*   PROT  --   --   --  3.8*  --   --   --   --  3.7*   ALBUMIN 2.7* 2.6*  --  2.5*  --   --   --   --  2.4*   BILITOT  --   --   --  8.2*  --   --   --   --  8.5*   ALKPHOS  --   --   --  50*  --   --   --   --  42*   *  --   --  433*   < >  --  238* 211* 201*  201*   *  --   --  155*  --   --   --   --  132*   ANIONGAP  --   --    < > 8  --  8  --  7* 7*   EGFRNONAA  --   --    < > >60.0  --  51.7*  --  41.4* 41.4*    < > = values in this interval not displayed.       Significant Imaging:  I have reviewed all pertinent imaging results/findings within the past 24 hours.

## 2022-04-25 NOTE — ASSESSMENT & PLAN NOTE
Nutrition consulted. Most recent weight and BMI monitored-                         Measurements:  Wt Readings from Last 1 Encounters:   04/25/22 112.7 kg (248 lb 7.3 oz)   Body mass index is 38.91 kg/m².    Recommendations:      Patient has been screened and assessed by RD. RD will follow patient.

## 2022-04-25 NOTE — PLAN OF CARE
Problem: Occupational Therapy  Goal: Occupational Therapy Goal  Description: Goals to be met by: 5/9/2022    Patient will increase functional independence with ADLs by performing:    UE Dressing with Modified [place].  LE Dressing with Modified [place].  Grooming while standing at sink with Modified [place].  Toileting from toilet with Modified [place] for hygiene and clothing management.   Toilet transfer to toilet with Modified [place].    Outcome: Ongoing, Progressing

## 2022-04-25 NOTE — ASSESSMENT & PLAN NOTE
Oliguric likely ischemic ATN secondary to intraoperative hypotension and norepi and vaso requirements 4/23-4/24  -Baseline sCr of  1.3-1.5, admission Cr at 2.3    -pre op UA shows protein 21 WBC 5 RBC and hyaline casts, UPCR  0.2 g/g, microscopy with mb  -intraoperative dialysis performed 3/23  -post op volume overload with pulmonary edema though decreasing oxygen requirements  -recommend high dose diuretic therapy and careful assessment of urine output, if insignificant or oxygenation begins to worsen, he may require KRT    Plan/Recommendations:  - Creatinine still worsening 1.8 from 1.5. Electrolytes and acid-base acceptable.  - Still overloaded on exam- Can repeat Furosemide 120 mg + chlorothiazide 500   - Strict in/out  - Renal diet  - Avoid nephrotoxic agents.

## 2022-04-25 NOTE — PHYSICIAN QUERY
PT Name: Pelon Vasquez  MR #: [id number]     DOCUMENTATION CLARIFICATION      CDS/: Alie Gill               Contact information:[email or web address]  This form is a permanent document in the medical record.    Query Date: April 25, 2022    By submitting this query, we are merely seeking further clarification of documentation to reflect the severity of illness of your patient. Please utilize your independent clinical judgment when addressing the question(s) below.     The Medical Record contains the following:   Indicators   Supporting Clinical Findings Location in Medical Record   x Documentation/History of condition -post op volume overload with pulmonary edema  Nephrology note 4-24    Lab Value(s)      Radiology Findings     x Treatment/Medication IV Furosemide  IVPB Diuril Mar 4-24 only   x Other -post op volume overload with pulmonary edema though decreasing oxygen requirements  -recommend high dose diuretic therapy and careful assessment of urine output, if insignificant or oxygenation begins to worsen, he may require KRT Nephrology note 4-24      Provider, please specify the acuity/chronicity of _Pulmonary edema:    [  x ] Acute   [   ] Chronic   [   ] Other (please specify): _______________   [   ] Clinically Undetermined       Present on admission (POA) status:  [   ] Yes (Y)   [  x ] No (N)   [   ] Documentation insufficient to determine if condition is POA (U)   [   ]  Clinically Undetermined (W)     Please document in your progress notes daily for the duration of treatment until resolved, and include in your discharge summary.  Form No. [id number]

## 2022-04-25 NOTE — PROGRESS NOTES
"[facility] - [facility] Intensive Care  Endocrinology  Progress Note    Admit Date: 4/19/2022     Reason for Consult: Management of T2DM, Hyperglycemia     Surgical Procedure and Date: Liver Transplant 4/24/22    Diabetes diagnosis year: 6 years ago    Home Diabetes Medications:  none currently (took Metformin in the past and could not tolerate side effects)     How often checking glucose at home?  Once daily    BG readings on regimen: < 150  Hypoglycemia on the regimen?  No  Missed doses on regimen? n/a    Diabetes Complications include:     CKD     Complicating diabetes co morbidities:   CKD, Cirrhosis, Obesity, BRAYDON, Glucocorticoid Use       HPI:   Patient is a 57 y.o. male with a diagnosis of EtOH/LOZANO cirrhosis, esophageal varices, obesity (BMI 42), CKD, HTN, DM2, and BRAYDON presenting for liver transplant. He is s/p diagnostic paracentesis on 4/22 with 4800mL of fluid removed. Patient currently has FAMILIA likely in setting of vancomycin toxicity, per Nephrology note. Plan for intraoperative dialysis. Patient now presents for the above procedure(s). Endocrinology consulted for management of T2DM.       Lab Results   Component Value Date    HGBA1C 6.6 (H) 04/19/2022           Interval HPI:   Overnight events: Remains in SICU. POD 2 s/p liver transplant. BG well controlled with IV intensive insulin protocol, infusion rate at 0.4 u/hr this morning.  Receiving Solu Medrol 200 mg, standard steroid taper. Diet Clear liquid (no sugar)/Bariatric  Eating:   Bariatric sugar free clears   Nausea: No  Hypoglycemia and intervention: No  Fever: No  TPN and/or TF: No  If yes, type of TF/TPN and rate: n/a    BP (!) 130/59   Pulse 96   Temp 98.7 °F (37.1 °C) (Oral)   Resp 15   Ht 5' 7" (1.702 m)   Wt 112.7 kg (248 lb 7.3 oz)   SpO2 (!) 94%   BMI 38.91 kg/m²     Labs Reviewed and Include    Recent Labs   Lab 04/25/22  0417   *   CALCIUM 7.5*   ALBUMIN 2.4*   PROT 3.7*   *   K 4.2   CO2 24      BUN 38*   CREATININE " "1.8*   ALKPHOS 42*   *   *  201*   BILITOT 8.5*     Lab Results   Component Value Date    WBC 14.88 (H) 04/25/2022    HGB 8.8 (L) 04/25/2022    HCT 26.4 (L) 04/25/2022    MCV 90 04/25/2022    PLT 42 (L) 04/25/2022     No results for input(s): TSH, FREET4 in the last 168 hours.  Lab Results   Component Value Date    HGBA1C 6.6 (H) 04/19/2022       Nutritional status:   Body mass index is 38.91 kg/m².  Lab Results   Component Value Date    ALBUMIN 2.4 (L) 04/25/2022    ALBUMIN 2.5 (L) 04/24/2022    ALBUMIN 2.6 (L) 04/24/2022     No results found for: PREALBUMIN    Estimated Creatinine Clearance: 55.6 mL/min (A) (based on SCr of 1.8 mg/dL (H)).    Accu-Checks  No results for input(s): POCTGLUCOSE in the last 72 hours.    Current Medications and/or Treatments Impacting Glycemic Control  Immunotherapy:    Immunosuppressants           Stop Route Frequency     mycophenolate mofetil 200 mg/mL suspension 1,000 mg         -- Oral 2 times daily     tacrolimus (PROGRAF) 1 mg/mL oral syringe         -- Oral 2 times daily          Steroids:   Hormones (From admission, onward)                Start     Stop Route Frequency Ordered    04/30/22 0900  predniSONE tablet 20 mg  (methylprednisolone taper panel)        "Followed by" Linked Group Details    -- Oral Daily 04/24/22 0831    04/29/22 0900  methylPREDNISolone sodium succinate injection 40 mg  (methylprednisolone taper panel)        "Followed by" Linked Group Details    04/30 0859 IV Daily 04/24/22 0831    04/28/22 0900  methylPREDNISolone sodium succinate injection 80 mg  (methylprednisolone taper panel)        "Followed by" Linked Group Details    04/29 0859 IV Daily 04/24/22 0831    04/27/22 0900  methylPREDNISolone sodium succinate injection 120 mg  (methylprednisolone taper panel)        "Followed by" Linked Group Details    04/28 0859 IV Daily 04/24/22 0831    04/26/22 0900  methylPREDNISolone sodium succinate injection 160 mg  (methylprednisolone taper " "panel)        "Followed by" Linked Group Details    04/27 0859 IV Daily 04/24/22 0831 04/25/22 0900  methylPREDNISolone sodium succinate injection 200 mg  (methylprednisolone taper panel)        "Followed by" Linked Group Details    04/26 0859 IV Daily 04/24/22 0831          Pressors:    Autonomic Drugs (From admission, onward)                None          Hyperglycemia/Diabetes Medications:   Antihyperglycemics (From admission, onward)                Start     Stop Route Frequency Ordered    04/25/22 0945  insulin regular in 0.9 % NaCl 100 unit/100 mL (1 unit/mL) infusion        Question:  Enter initial dose (Units/hr):  Answer:  0.5    -- IV Continuous 04/25/22 0831 04/25/22 0931  insulin aspart U-100 pen 0-10 Units         -- SubQ As needed (PRN) 04/25/22 0831            ASSESSMENT and PLAN    * S/P liver transplant  Managed per primary team  Optimize BG control        Type 2 diabetes mellitus without complication, without long-term current use of insulin  BG goal 140 - 180     Discontinue IV insulin infusion protocol  Start Transition IV insulin infusion at 0.5 u/hr with stepdown parameters (Rate based on current IV insulin requirements)  Start Moderate Dose Correction Scale  BG monitoring ac/hs/0200    ** Please call Endocrine for any BG related issues **  ** Please notify Endocrine for any change and/or advance in diet**    Discharge planning: TBD        FAMILIA (acute kidney injury)  Lab Results   Component Value Date    CREATININE 2.0 (H) 04/25/2022     Avoid insulin stacking  Titrate insulin slowly      Prophylactic immunotherapy  May increase insulin resistance.         Adverse effect of corticosteroids  Body mass index is 38.91 kg/m².  May increase insulin resistance.         Class 2 severe obesity due to excess calories with serious comorbidity in adult  Nutrition consulted. Most recent weight and BMI monitored-                         Measurements:  Wt Readings from Last 1 Encounters:   04/25/22 112.7 " kg (248 lb 7.3 oz)   Body mass index is 38.91 kg/m².    Recommendations:      Patient has been screened and assessed by RD. RD will follow patient.          Esme Pichardo NP  Endocrinology  [facility] - Surgical Intensive Care

## 2022-04-25 NOTE — PLAN OF CARE
Pt free from falls and injury this shift. All VSS at this time. Pain moderately controlled with prn and scheduled medications. Sats >95% on RA. SBP maintained <160 intermittently, hydralazine given once this shift, scheduled amlodipine started. Continuous insulin infusing. Abdomen rounded/soft/tender, no BM this shift. Chevron incision covered with island/border/CDI. GO drains with 340cc serous output this shift, 2nd drain with large clot present. MD Jake aware. Shirley in place, remains intact. UOP adequate, 50-75cc/hr. Bedside liver ultrasound completed. Patient de-lined this shift. Worked well with PT/OT, OOB to chair this afternoon. No new skin breakdown noted this shift. No other significant events this shift. Step down orders in place, awaiting bed placement. POC reviewed pt and spouse, all questions answered. Will continue to monitor closely.

## 2022-04-25 NOTE — TELEPHONE ENCOUNTER
On Call SW:     Chart Review Note:    On Call Transplant SW reviewed patients chart for psychosocial barriers or concerns as patient may have an organ offer. No concerns/needs identified or indicated at this time. Transplant SW remains available.    If patient is transplanted, transplant social work to follow. Transplant social work team remains available.

## 2022-04-25 NOTE — ASSESSMENT & PLAN NOTE
Lab Results   Component Value Date    CREATININE 2.0 (H) 04/25/2022     Avoid insulin stacking  Titrate insulin slowly

## 2022-04-25 NOTE — SUBJECTIVE & OBJECTIVE
Interval History: liver transplant 4/24 with hypotension and vasopressor requirements. Today feels well, extubated, UO 1.1L in last 24 hours.    Review of patient's allergies indicates:   Allergen Reactions    Strawberry Anaphylaxis and Rash    Metformin Diarrhea    Pork/porcine containing products Diarrhea and Nausea And Vomiting     Current Facility-Administered Medications   Medication Frequency    0.9%  NaCl infusion (for blood administration) Q24H PRN    amLODIPine tablet 10 mg Daily    dextrose 10% bolus 125 mL PRN    dextrose 10% bolus 250 mL PRN    docusate sodium capsule 100 mg TID    famotidine tablet 20 mg Nightly    glucagon (human recombinant) injection 1 mg PRN    glucose chewable tablet 16 g PRN    glucose chewable tablet 24 g PRN    heparin (porcine) injection 5,000 Units Q8H    HYDROmorphone injection 0.2 mg Q6H PRN    insulin aspart U-100 pen 0-10 Units PRN    insulin regular in 0.9 % NaCl 100 unit/100 mL (1 unit/mL) infusion Continuous    methocarbamoL tablet 500 mg TID    [START ON 4/26/2022] methylPREDNISolone sodium succinate injection 160 mg Daily    Followed by    [START ON 4/27/2022] methylPREDNISolone sodium succinate injection 120 mg Daily    Followed by    [START ON 4/28/2022] methylPREDNISolone sodium succinate injection 80 mg Daily    Followed by    [START ON 4/29/2022] methylPREDNISolone sodium succinate injection 40 mg Daily    Followed by    [START ON 4/30/2022] predniSONE tablet 20 mg Daily    mupirocin 2 % ointment 1 g BID    mycophenolate capsule 1,000 mg BID    nystatin 100,000 unit/mL suspension 500,000 Units TID PC    ondansetron disintegrating tablet 4 mg Q8H PRN    oxyCODONE immediate release tablet 5 mg Q4H PRN    oxyCODONE immediate release tablet Tab 10 mg Q4H PRN    sodium chloride 0.9% flush 10 mL PRN    [START ON 5/1/2022] sulfamethoxazole-trimethoprim 400-80mg per tablet 1 tablet Daily AM    tacrolimus capsule 2 mg BID    [START ON 5/4/2022] valGANciclovir tablet 450  mg Daily       Objective:     Vital Signs (Most Recent):  Temp: 98.8 °F (37.1 °C) (04/25/22 1100)  Pulse: 103 (04/25/22 1400)  Resp: 15 (04/25/22 1400)  BP: (!) 181/86 (04/25/22 1400)  SpO2: 96 % (04/25/22 1400)  O2 Device (Oxygen Therapy): room air (04/25/22 1400)   Vital Signs (24h Range):  Temp:  [98.2 °F (36.8 °C)-98.8 °F (37.1 °C)] 98.8 °F (37.1 °C)  Pulse:  [] 103  Resp:  [7-22] 15  SpO2:  [93 %-100 %] 96 %  BP: (163-181)/(83-87) 181/86  Arterial Line BP: (131-190)/(63-80) 188/79     Weight: 112.7 kg (248 lb 7.3 oz) (04/25/22 0500)  Body mass index is 38.91 kg/m².  Body surface area is 2.31 meters squared.    I/O last 3 completed shifts:  In: 83369.8 [P.O.:1180; I.V.:5486.9; Blood:5820; Other:400; IV Piggyback:444.9]  Out: 27897 [Urine:1500; Emesis/NG output:300; Drains:7784; Other:753; Blood:7000]    Physical Exam  Vitals and nursing note reviewed.   Constitutional:       General: He is not in acute distress.     Appearance: He is ill-appearing.   HENT:      Mouth/Throat:      Mouth: Mucous membranes are moist.   Eyes:      General: Scleral icterus present.      Extraocular Movements: Extraocular movements intact.      Pupils: Pupils are equal, round, and reactive to light.   Cardiovascular:      Rate and Rhythm: Normal rate.   Abdominal:      Palpations: Abdomen is soft.   Musculoskeletal:         General: Swelling present. No deformity.      Right lower leg: Edema present.      Left lower leg: Edema present.   Skin:     Coloration: Skin is jaundiced.       Significant Labs:  All labs within the past 24 hours have been reviewed.     Significant Imaging:  Labs: Reviewed  X-Ray: Reviewed

## 2022-04-25 NOTE — SUBJECTIVE & OBJECTIVE
"Interval HPI:   Overnight events: Remains in SICU. POD 2 s/p liver transplant. BG well controlled with IV intensive insulin protocol, infusion rate at 0.4 u/hr this morning.  Receiving Solu Medrol 200 mg, standard steroid taper. Diet Clear liquid (no sugar)/Bariatric  Eating:   Bariatric sugar free clears   Nausea: No  Hypoglycemia and intervention: No  Fever: No  TPN and/or TF: No  If yes, type of TF/TPN and rate: n/a    BP (!) 130/59   Pulse 96   Temp 98.7 °F (37.1 °C) (Oral)   Resp 15   Ht 5' 7" (1.702 m)   Wt 112.7 kg (248 lb 7.3 oz)   SpO2 (!) 94%   BMI 38.91 kg/m²     Labs Reviewed and Include    Recent Labs   Lab 04/25/22 0417   *   CALCIUM 7.5*   ALBUMIN 2.4*   PROT 3.7*   *   K 4.2   CO2 24      BUN 38*   CREATININE 1.8*   ALKPHOS 42*   *   *  201*   BILITOT 8.5*     Lab Results   Component Value Date    WBC 14.88 (H) 04/25/2022    HGB 8.8 (L) 04/25/2022    HCT 26.4 (L) 04/25/2022    MCV 90 04/25/2022    PLT 42 (L) 04/25/2022     No results for input(s): TSH, FREET4 in the last 168 hours.  Lab Results   Component Value Date    HGBA1C 6.6 (H) 04/19/2022       Nutritional status:   Body mass index is 38.91 kg/m².  Lab Results   Component Value Date    ALBUMIN 2.4 (L) 04/25/2022    ALBUMIN 2.5 (L) 04/24/2022    ALBUMIN 2.6 (L) 04/24/2022     No results found for: PREALBUMIN    Estimated Creatinine Clearance: 55.6 mL/min (A) (based on SCr of 1.8 mg/dL (H)).    Accu-Checks  No results for input(s): POCTGLUCOSE in the last 72 hours.    Current Medications and/or Treatments Impacting Glycemic Control  Immunotherapy:    Immunosuppressants           Stop Route Frequency     mycophenolate mofetil 200 mg/mL suspension 1,000 mg         -- Oral 2 times daily     tacrolimus (PROGRAF) 1 mg/mL oral syringe         -- Oral 2 times daily          Steroids:   Hormones (From admission, onward)                Start     Stop Route Frequency Ordered    04/30/22 0900  predniSONE tablet 20 " "mg  (methylprednisolone taper panel)        "Followed by" Linked Group Details    -- Oral Daily 04/24/22 0831 04/29/22 0900  methylPREDNISolone sodium succinate injection 40 mg  (methylprednisolone taper panel)        "Followed by" Linked Group Details    04/30 0859 IV Daily 04/24/22 0831 04/28/22 0900  methylPREDNISolone sodium succinate injection 80 mg  (methylprednisolone taper panel)        "Followed by" Linked Group Details    04/29 0859 IV Daily 04/24/22 0831 04/27/22 0900  methylPREDNISolone sodium succinate injection 120 mg  (methylprednisolone taper panel)        "Followed by" Linked Group Details    04/28 0859 IV Daily 04/24/22 0831 04/26/22 0900  methylPREDNISolone sodium succinate injection 160 mg  (methylprednisolone taper panel)        "Followed by" Linked Group Details    04/27 0859 IV Daily 04/24/22 0831 04/25/22 0900  methylPREDNISolone sodium succinate injection 200 mg  (methylprednisolone taper panel)        "Followed by" Linked Group Details    04/26 0859 IV Daily 04/24/22 0831          Pressors:    Autonomic Drugs (From admission, onward)                None          Hyperglycemia/Diabetes Medications:   Antihyperglycemics (From admission, onward)                Start     Stop Route Frequency Ordered    04/25/22 0945  insulin regular in 0.9 % NaCl 100 unit/100 mL (1 unit/mL) infusion        Question:  Enter initial dose (Units/hr):  Answer:  0.5    -- IV Continuous 04/25/22 0831 04/25/22 0931  insulin aspart U-100 pen 0-10 Units         -- SubQ As needed (PRN) 04/25/22 0831          "

## 2022-04-25 NOTE — PROGRESS NOTES
[facility] - [facility]  Nephrology  Progress Note    Patient Name: Pelon Vasquez  MRN: [id number]  Admission Date: 4/19/2022  Hospital Length of Stay: 5 days  Attending Provider: Keyon Castillo MD   Primary Care Physician: Primary Doctor No  Principal Problem:S/P liver transplant    Subjective:     HPI: 58 YO male with ESLD 2/2 LOZANO and ETOH abuse related cirrhosis, ascites requiring paracentesis every 2 weeks (last one was last Tuesday), CKD 3 (baseline 1.3-1.5), recent admission rom 4/13 to 4/17 for transplant but cancelled due to fever of unknown etiology that has now resolved, re- admitted for liver transplant surgery. Surgery was cancelled due to organ quality, but noted to have an elevated creatinine. Nephrology consulted for FAMILIA on CKD.    Patient currently denies any symptoms. During his recent admission he received IV Vancomycin/Cefepime for sepsis. Denies any NSAID use, vomiting, dehydration. He does have frequent bms 2/2 lactulose but this has not increased. Denies recent GIB. Last Paracentesis was last Tuesday per patient with 9L of fluid removal.       Interval History: liver transplant 4/24 with hypotension and vasopressor requirements. Today feels well, extubated, UO 1.1L in last 24 hours.    Review of patient's allergies indicates:   Allergen Reactions    Strawberry Anaphylaxis and Rash    Metformin Diarrhea    Pork/porcine containing products Diarrhea and Nausea And Vomiting     Current Facility-Administered Medications   Medication Frequency    0.9%  NaCl infusion (for blood administration) Q24H PRN    amLODIPine tablet 10 mg Daily    dextrose 10% bolus 125 mL PRN    dextrose 10% bolus 250 mL PRN    docusate sodium capsule 100 mg TID    famotidine tablet 20 mg Nightly    glucagon (human recombinant) injection 1 mg PRN    glucose chewable tablet 16 g PRN    glucose chewable tablet 24 g PRN    heparin (porcine) injection 5,000 Units Q8H    HYDROmorphone injection 0.2 mg  Q6H PRN    insulin aspart U-100 pen 0-10 Units PRN    insulin regular in 0.9 % NaCl 100 unit/100 mL (1 unit/mL) infusion Continuous    methocarbamoL tablet 500 mg TID    [START ON 4/26/2022] methylPREDNISolone sodium succinate injection 160 mg Daily    Followed by    [START ON 4/27/2022] methylPREDNISolone sodium succinate injection 120 mg Daily    Followed by    [START ON 4/28/2022] methylPREDNISolone sodium succinate injection 80 mg Daily    Followed by    [START ON 4/29/2022] methylPREDNISolone sodium succinate injection 40 mg Daily    Followed by    [START ON 4/30/2022] predniSONE tablet 20 mg Daily    mupirocin 2 % ointment 1 g BID    mycophenolate capsule 1,000 mg BID    nystatin 100,000 unit/mL suspension 500,000 Units TID PC    ondansetron disintegrating tablet 4 mg Q8H PRN    oxyCODONE immediate release tablet 5 mg Q4H PRN    oxyCODONE immediate release tablet Tab 10 mg Q4H PRN    sodium chloride 0.9% flush 10 mL PRN    [START ON 5/1/2022] sulfamethoxazole-trimethoprim 400-80mg per tablet 1 tablet Daily AM    tacrolimus capsule 2 mg BID    [START ON 5/4/2022] valGANciclovir tablet 450 mg Daily       Objective:     Vital Signs (Most Recent):  Temp: 98.8 °F (37.1 °C) (04/25/22 1100)  Pulse: 103 (04/25/22 1400)  Resp: 15 (04/25/22 1400)  BP: (!) 181/86 (04/25/22 1400)  SpO2: 96 % (04/25/22 1400)  O2 Device (Oxygen Therapy): room air (04/25/22 1400)   Vital Signs (24h Range):  Temp:  [98.2 °F (36.8 °C)-98.8 °F (37.1 °C)] 98.8 °F (37.1 °C)  Pulse:  [] 103  Resp:  [7-22] 15  SpO2:  [93 %-100 %] 96 %  BP: (163-181)/(83-87) 181/86  Arterial Line BP: (131-190)/(63-80) 188/79     Weight: 112.7 kg (248 lb 7.3 oz) (04/25/22 0500)  Body mass index is 38.91 kg/m².  Body surface area is 2.31 meters squared.    I/O last 3 completed shifts:  In: 74463.8 [P.O.:1180; I.V.:5486.9; Blood:5820; Other:400; IV Piggyback:444.9]  Out: 82263 [Urine:1500; Emesis/NG output:300; Drains:7784; Other:753;  Blood:7000]    Physical Exam  Vitals and nursing note reviewed.   Constitutional:       General: He is not in acute distress.     Appearance: He is ill-appearing.   HENT:      Mouth/Throat:      Mouth: Mucous membranes are moist.   Eyes:      General: Scleral icterus present.      Extraocular Movements: Extraocular movements intact.      Pupils: Pupils are equal, round, and reactive to light.   Cardiovascular:      Rate and Rhythm: Normal rate.   Abdominal:      Palpations: Abdomen is soft.   Musculoskeletal:         General: Swelling present. No deformity.      Right lower leg: Edema present.      Left lower leg: Edema present.   Skin:     Coloration: Skin is jaundiced.       Significant Labs:  All labs within the past 24 hours have been reviewed.     Significant Imaging:  Labs: Reviewed  X-Ray: Reviewed    Assessment/Plan:     FAMILIA (acute kidney injury)  Oliguric likely ischemic ATN secondary to intraoperative hypotension and norepi and vaso requirements 4/23-4/24  -Baseline sCr of  1.3-1.5, admission Cr at 2.3    -pre op UA shows protein 21 WBC 5 RBC and hyaline casts, UPCR  0.2 g/g, microscopy with mbgc  -intraoperative dialysis performed 3/23  -post op volume overload with pulmonary edema though decreasing oxygen requirements  -recommend high dose diuretic therapy and careful assessment of urine output, if insignificant or oxygenation begins to worsen, he may require KRT    Plan/Recommendations:  - Creatinine still worsening 1.8 from 1.5. Electrolytes and acid-base acceptable.  - Still overloaded on exam- Can repeat Furosemide 120 mg + chlorothiazide 500   - Strict in/out  - Renal diet  - Avoid nephrotoxic agents.         Thank you for your consult. I will follow-up with patient. Please contact us if you have any additional questions.    Mauricio Upton MD  Nephrology  [facility] - [facility]

## 2022-04-25 NOTE — PROGRESS NOTES
Met with patient in the ICU.  Gave him My New Journey: Living Smart After My Liver Transplant.  Asked him to read the book in preparation for education.     Called patient's caregiver, Pita. Informed her that My New Journey was left at patient's bedside to read in preparation for education.  Allowed time for questions and answers.

## 2022-04-25 NOTE — CONSULTS
Consult completed by Student Dietitian on 4/25. Please see progress note for full assessment. Recs below.    Recommendations     1. Advance diet as tolerated to diabetic diet. Encourage PO intake.      2. Once diet advanced, add Boost Glucose control TID if PO intake <75% of EEN.      3. Post-transplant nutrition education provided 4/25      Goals: Diet advanced to diabetic diet by RD follow-up.  Nutrition Goal Status: new  Communication of RD Recs:  (POC)     Thank you!    Dominique Ruggiero, MS, RD, LDN  Ext: [phone]

## 2022-04-25 NOTE — PROGRESS NOTES
"[facility] - [facility]  Adult Nutrition  Consult Note    SUMMARY     Recommendations    1. Advance diet as tolerated to diabetic diet. Encourage PO intake.     2. Once diet advanced, recommend Boost Glucose control TID if PO intake <75% of EEN.     3. RD following.    Goals: Diet advanced to diabetic diet by RD follow-up.  Nutrition Goal Status: new  Communication of RD Recs:  (POC)    Assessment and Plan    Inadequate protein/energy intake related to inability to consume sufficient protein/energy as evidence by liquid diet, increased demand for protein/energy 2/2 OLTx.       Reason for Assessment    Reason For Assessment: consult  Diagnosis:  (s/p OLTx)  Relevant Medical History:  (T2DM, cirrhosis, esophageal varices, CKD, hepatic encephalopathy, HTN, LOZANO)  Interdisciplinary Rounds: did not attend  General Information Comments: Pt resting in bed with caregiver at bedside. S/p OLTx x 2 days. Denies N/V/D/C. PTA, pt reported eating 1 meal, followed by decreased appetite for the rest of the day. Pt currently tolerating clear liqud (no sugar) diet. Paracentisis completed 4/22 w/ 4800 mL of fluid removed. .7 kg. Wt loss of 3.5 kg x 1 week, most likely attributed to fluid. Pt reported .8 kg. Did not complete NFPE at consult. Complete NFPE at next visit. Nutrition education provdied to patient/caregiver utilizing the "Nutrition therapy for immunocompromised patients" handout. Pt/caregiver expressed understanding. Will continue to follow.  Nutrition Discharge Planning: Post transplant nutrition education provided. Food safety/drug interactions emphasized. General healthy/low salt diet recommended. Education material elft. No other needs identified. Caregiver present.    Nutrition Risk Screen    Nutrition Risk Screen: no indicators present    Nutrition/Diet History    Patient Reported Diet/Restrictions/Preferences:  (pt reported unable to tolerated Pork products)  Spiritual, Cultural Beliefs, " "Oriental orthodox Practices, Values that Affect Care: no  Food Allergies: other (see comments) (strawberry)  Factors Affecting Nutritional Intake: clear liquid diet    Anthropometrics    Temp: 98.8 °F (37.1 °C)  Height Method: Stated  Height: 5' 7" (170.2 cm)  Height (inches): 67 in  Weight Method: Bed Scale  Weight: 112.7 kg (248 lb 7.3 oz)  Weight (lb): 248.46 lb  Ideal Body Weight (IBW), Male: 148 lb  % Ideal Body Weight, Male (lb): 168.7 %  BMI (Calculated): 38.9  BMI Grade: 35 - 39.9 - obesity - grade II  Usual Body Weight (UBW), k.8 kg  % Usual Body Weight: 105.74       Lab/Procedures/Meds    Pertinent Labs Reviewed: reviewed  Pertinent Labs Comments: Na 134, Glucose 128, Cr 2.0, Ca 7.9, GFR 36.5, phosphorus 4.9,   Pertinent Medications Reviewed: reviewed  Pertinent Medications Comments: prenisone, tacrolimus, nystatin, mycophenolate, NaCl, insulin, glucagon, dextrose      Estimated/Assessed Needs    Weight Used For Calorie Calculations: 112.7 kg (248 lb 7.3 oz)  Energy Calorie Requirements (kcal):   Energy Need Method: Burleson-St Jeor (no AF)  Protein Requirements: 90 - 113 gm pro/day (0.8-1.0 gm/kg)  Weight Used For Protein Calculations: 112.7 kg (248 lb 7.3 oz)  Fluid Requirements (mL): 1 mL/aubree or per MD  Estimated Fluid Requirement Method: RDA Method  RDA Method (mL):          Nutrition Prescription Ordered    Current Diet Order: Clear Liquid/bariatric    Evaluation of Received Nutrient/Fluid Intake    I/O: -3.1 L since admit  Comments: LB   Tolerance: tolerating  % Intake of Estimated Energy Needs: 25 - 50 %  % Meal Intake: 25 - 50 %    Nutrition Risk    Level of Risk/Frequency of Follow-up: moderate - high       Monitor and Evaluation    Food and Nutrient Intake: food and beverage intake, energy intake  Food and Nutrient Adminstration: diet order  Knowledge/Beliefs/Attitudes: food and nutrition knowledge/skill  Physical Activity and Function: nutrition-related ADLs and IADLs  Anthropometric " Measurements: weight, weight change, body mass index  Biochemical Data, Medical Tests and Procedures: electrolyte and renal panel, gastrointestinal profile, glucose/endocrine profile, inflammatory profile, lipid profile  Nutrition-Focused Physical Findings: overall appearance       Nutrition Follow-Up    RD Follow-up?: Yes

## 2022-04-25 NOTE — PROGRESS NOTES
Transplant Note:     presented to the patient's room for follow up and continuity of care. Patient's transplant offer from last week was shut down, and patient received another offer over weekend. Patient was finally transplanted on 4/24/2022. Patient is now observed in bed presents alert and oriented, however notes he is in discomfort and slow to respond due to pain. Patient being attended to by ICU nurse as appropriate. Patient's caregiver/significant other, Leonarda (ph: [phone]), is currently at bedside. Patient's caregier reports they are coping adequately with support from friends and family. She notes they are very thankful and excited patient had his transplant. Patient's caregiver completed financial profile for [employer] apartments, with fee determined to be $0 and patient is on waitlist. Patient nor caregiver had any other questions or concerns at this time. SW remains available and will continue to follow, providing psychosocial support, education and assistance as needed.

## 2022-04-25 NOTE — PLAN OF CARE
[facility]  Initial Discharge Assessment       BEING FOLLOWED BY TRANSPLANT UR/CM/PANCHO

## 2022-04-25 NOTE — ASSESSMENT & PLAN NOTE
  Neuro/Psych:   -- Sedation: none  -- Pain: multimodal w/ PRN narcotic   -- h/o encephalopathy on lactulose/rifaximin pre-op             Cards:   -- HDS  -- maps >65  -- vasopressors: none  -- echo 02/17: normal function   -- platelets 40, will need platelet transfusion before delining      Pulm:   -- Goal O2 sat > 90%  -- Wean as able  -- CXR daily  -- ABG PRN      Renal:  -- Keep francisco for strict I/O  -- FAMILIA pre-op, will monitor closely; urine studies 04/21  -- Intra-op dialysis; nephrology consulted, following  -- albumin PRN      FEN / GI:   -- h/o ESLD now s/p DBD OLTxp 04/24  -- Replace lytes as needed  -- Nutrition: NPO for bedside liver ultrasound, ok to advance diet as tolerated  -- bowel reg; HSB  -- CMP serially; AST Q6H  -- GO drain x 2 to right lateral side. 1L output from both drains  -- liver ultrasound this morning 4/25      ID:   -- Tm: afebrile; WBC WNL pre-op  -- reactive leukocytosis expected  -- immunosuppression and abx per txp pharm      Heme/Onc:   -- Hgb 8.5  -- CBC, coags serially  -- 7uFFP, 2plt, 2 cryo, 4pRBC intra-op      Endo:   -- Gluc goal 140-180  -- insulin gtt       PPx:   Feeding: NPO for now, advance after liver US  Analgesia/Sedation: multimodal w/ prn narc / none  Thromboembolic prevention: heparin TID  HOB >30: Yes  Stress Ulcer ppx: H2B   Glucose control: Critical care goal 140-180 g/dl, ISS    Lines/Drains/Airway: GO x 2, PIV, arterial line x 2, R IJ, femoral CVC. OK to deline today      Dispo/Code Status/Palliative:   -- SICU / Full Code

## 2022-04-26 LAB
ALBUMIN SERPL BCP-MCNC: 2.5 G/DL (ref 3.5–5.2)
ALP SERPL-CCNC: 55 U/L (ref 55–135)
ALT SERPL W/O P-5'-P-CCNC: 113 U/L (ref 10–44)
ANION GAP SERPL CALC-SCNC: 9 MMOL/L (ref 8–16)
ANISOCYTOSIS BLD QL SMEAR: SLIGHT
APTT BLDCRRT: 29.9 SEC (ref 21–32)
AST SERPL-CCNC: 91 U/L (ref 10–40)
BASOPHILS # BLD AUTO: 0.07 K/UL (ref 0–0.2)
BASOPHILS NFR BLD: 0.7 % (ref 0–1.9)
BILIRUB DIRECT SERPL-MCNC: 3.9 MG/DL (ref 0.1–0.3)
BILIRUB SERPL-MCNC: 5.2 MG/DL (ref 0.1–1)
BLD PROD TYP BPU: NORMAL
BLOOD UNIT EXPIRATION DATE: NORMAL
BLOOD UNIT TYPE CODE: 600
BLOOD UNIT TYPE CODE: 7300
BLOOD UNIT TYPE: NORMAL
BUN SERPL-MCNC: 51 MG/DL (ref 6–20)
CALCIUM SERPL-MCNC: 7.9 MG/DL (ref 8.7–10.5)
CHLORIDE SERPL-SCNC: 101 MMOL/L (ref 95–110)
CO2 SERPL-SCNC: 23 MMOL/L (ref 23–29)
CODING SYSTEM: NORMAL
CREAT SERPL-MCNC: 1.8 MG/DL (ref 0.5–1.4)
DIFFERENTIAL METHOD: ABNORMAL
DISPENSE STATUS: NORMAL
EOSINOPHIL # BLD AUTO: 0 K/UL (ref 0–0.5)
EOSINOPHIL NFR BLD: 0.4 % (ref 0–8)
ERYTHROCYTE [DISTWIDTH] IN BLOOD BY AUTOMATED COUNT: 18.8 % (ref 11.5–14.5)
EST. GFR  (AFRICAN AMERICAN): 47.9 ML/MIN/1.73 M^2
EST. GFR  (NON AFRICAN AMERICAN): 41.4 ML/MIN/1.73 M^2
GLUCOSE SERPL-MCNC: 220 MG/DL (ref 70–110)
HCT VFR BLD AUTO: 24.7 % (ref 40–54)
HGB BLD-MCNC: 8.4 G/DL (ref 14–18)
HYPOCHROMIA BLD QL SMEAR: ABNORMAL
IMM GRANULOCYTES # BLD AUTO: 0.04 K/UL (ref 0–0.04)
IMM GRANULOCYTES NFR BLD AUTO: 0.4 % (ref 0–0.5)
INR PPP: 1.1 (ref 0.8–1.2)
LYMPHOCYTES # BLD AUTO: 0.5 K/UL (ref 1–4.8)
LYMPHOCYTES NFR BLD: 4.7 % (ref 18–48)
MAGNESIUM SERPL-MCNC: 1.9 MG/DL (ref 1.6–2.6)
MCH RBC QN AUTO: 30 PG (ref 27–31)
MCHC RBC AUTO-ENTMCNC: 34 G/DL (ref 32–36)
MCV RBC AUTO: 88 FL (ref 82–98)
MONOCYTES # BLD AUTO: 0.8 K/UL (ref 0.3–1)
MONOCYTES NFR BLD: 8.4 % (ref 4–15)
NEUTROPHILS # BLD AUTO: 8.2 K/UL (ref 1.8–7.7)
NEUTROPHILS NFR BLD: 85.4 % (ref 38–73)
NRBC BLD-RTO: 0 /100 WBC
NUM UNITS TRANS FFP: NORMAL
OVALOCYTES BLD QL SMEAR: ABNORMAL
PHOSPHATE SERPL-MCNC: 5.9 MG/DL (ref 2.7–4.5)
PLATELET # BLD AUTO: 36 K/UL (ref 150–450)
PMV BLD AUTO: 10.8 FL (ref 9.2–12.9)
POCT GLUCOSE: 104 MG/DL (ref 70–110)
POCT GLUCOSE: 113 MG/DL (ref 70–110)
POCT GLUCOSE: 116 MG/DL (ref 70–110)
POCT GLUCOSE: 117 MG/DL (ref 70–110)
POCT GLUCOSE: 127 MG/DL (ref 70–110)
POCT GLUCOSE: 138 MG/DL (ref 70–110)
POCT GLUCOSE: 144 MG/DL (ref 70–110)
POCT GLUCOSE: 145 MG/DL (ref 70–110)
POCT GLUCOSE: 146 MG/DL (ref 70–110)
POCT GLUCOSE: 149 MG/DL (ref 70–110)
POCT GLUCOSE: 152 MG/DL (ref 70–110)
POCT GLUCOSE: 161 MG/DL (ref 70–110)
POCT GLUCOSE: 162 MG/DL (ref 70–110)
POCT GLUCOSE: 164 MG/DL (ref 70–110)
POCT GLUCOSE: 165 MG/DL (ref 70–110)
POCT GLUCOSE: 168 MG/DL (ref 70–110)
POCT GLUCOSE: 172 MG/DL (ref 70–110)
POCT GLUCOSE: 174 MG/DL (ref 70–110)
POCT GLUCOSE: 176 MG/DL (ref 70–110)
POCT GLUCOSE: 177 MG/DL (ref 70–110)
POCT GLUCOSE: 180 MG/DL (ref 70–110)
POCT GLUCOSE: 190 MG/DL (ref 70–110)
POCT GLUCOSE: 191 MG/DL (ref 70–110)
POCT GLUCOSE: 222 MG/DL (ref 70–110)
POCT GLUCOSE: 225 MG/DL (ref 70–110)
POCT GLUCOSE: 239 MG/DL (ref 70–110)
POCT GLUCOSE: 257 MG/DL (ref 70–110)
POCT GLUCOSE: 289 MG/DL (ref 70–110)
POCT GLUCOSE: 295 MG/DL (ref 70–110)
POCT GLUCOSE: 322 MG/DL (ref 70–110)
POCT GLUCOSE: 331 MG/DL (ref 70–110)
POCT GLUCOSE: 352 MG/DL (ref 70–110)
POIKILOCYTOSIS BLD QL SMEAR: SLIGHT
POLYCHROMASIA BLD QL SMEAR: ABNORMAL
POTASSIUM SERPL-SCNC: 4.5 MMOL/L (ref 3.5–5.1)
PROT SERPL-MCNC: 4 G/DL (ref 6–8.4)
PROTHROMBIN TIME: 11.9 SEC (ref 9–12.5)
RBC # BLD AUTO: 2.8 M/UL (ref 4.6–6.2)
SODIUM SERPL-SCNC: 133 MMOL/L (ref 136–145)
TACROLIMUS BLD-MCNC: 2.9 NG/ML (ref 5–15)
UNIT NUMBER: NORMAL
WBC # BLD AUTO: 9.57 K/UL (ref 3.9–12.7)

## 2022-04-26 PROCEDURE — 85610 PROTHROMBIN TIME: CPT | Performed by: PHYSICIAN ASSISTANT

## 2022-04-26 PROCEDURE — 99233 SBSQ HOSP IP/OBS HIGH 50: CPT | Mod: ,,, | Performed by: INTERNAL MEDICINE

## 2022-04-26 PROCEDURE — 85730 THROMBOPLASTIN TIME PARTIAL: CPT | Performed by: STUDENT IN AN ORGANIZED HEALTH CARE EDUCATION/TRAINING PROGRAM

## 2022-04-26 PROCEDURE — 25000003 PHARM REV CODE 250: Performed by: STUDENT IN AN ORGANIZED HEALTH CARE EDUCATION/TRAINING PROGRAM

## 2022-04-26 PROCEDURE — 63600175 PHARM REV CODE 636 W HCPCS: Performed by: STUDENT IN AN ORGANIZED HEALTH CARE EDUCATION/TRAINING PROGRAM

## 2022-04-26 PROCEDURE — P9035 PLATELET PHERES LEUKOREDUCED: HCPCS | Performed by: STUDENT IN AN ORGANIZED HEALTH CARE EDUCATION/TRAINING PROGRAM

## 2022-04-26 PROCEDURE — 20600001 HC STEP DOWN PRIVATE ROOM

## 2022-04-26 PROCEDURE — 97116 GAIT TRAINING THERAPY: CPT

## 2022-04-26 PROCEDURE — 82248 BILIRUBIN DIRECT: CPT | Performed by: STUDENT IN AN ORGANIZED HEALTH CARE EDUCATION/TRAINING PROGRAM

## 2022-04-26 PROCEDURE — 97535 SELF CARE MNGMENT TRAINING: CPT

## 2022-04-26 PROCEDURE — 83735 ASSAY OF MAGNESIUM: CPT | Performed by: PHYSICIAN ASSISTANT

## 2022-04-26 PROCEDURE — 99232 PR SUBSEQUENT HOSPITAL CARE,LEVL II: ICD-10-PCS | Mod: ,,, | Performed by: NURSE PRACTITIONER

## 2022-04-26 PROCEDURE — 99233 PR SUBSEQUENT HOSPITAL CARE,LEVL III: ICD-10-PCS | Mod: ,,, | Performed by: INTERNAL MEDICINE

## 2022-04-26 PROCEDURE — 36430 TRANSFUSION BLD/BLD COMPNT: CPT

## 2022-04-26 PROCEDURE — 25000003 PHARM REV CODE 250: Performed by: SURGERY

## 2022-04-26 PROCEDURE — 84100 ASSAY OF PHOSPHORUS: CPT | Performed by: PHYSICIAN ASSISTANT

## 2022-04-26 PROCEDURE — 99233 PR SUBSEQUENT HOSPITAL CARE,LEVL III: ICD-10-PCS | Mod: 24,,, | Performed by: STUDENT IN AN ORGANIZED HEALTH CARE EDUCATION/TRAINING PROGRAM

## 2022-04-26 PROCEDURE — 97530 THERAPEUTIC ACTIVITIES: CPT

## 2022-04-26 PROCEDURE — 85025 COMPLETE CBC W/AUTO DIFF WBC: CPT | Performed by: STUDENT IN AN ORGANIZED HEALTH CARE EDUCATION/TRAINING PROGRAM

## 2022-04-26 PROCEDURE — 99233 SBSQ HOSP IP/OBS HIGH 50: CPT | Mod: 24,,, | Performed by: STUDENT IN AN ORGANIZED HEALTH CARE EDUCATION/TRAINING PROGRAM

## 2022-04-26 PROCEDURE — 99232 SBSQ HOSP IP/OBS MODERATE 35: CPT | Mod: ,,, | Performed by: NURSE PRACTITIONER

## 2022-04-26 PROCEDURE — 80197 ASSAY OF TACROLIMUS: CPT | Performed by: STUDENT IN AN ORGANIZED HEALTH CARE EDUCATION/TRAINING PROGRAM

## 2022-04-26 PROCEDURE — 80053 COMPREHEN METABOLIC PANEL: CPT | Performed by: STUDENT IN AN ORGANIZED HEALTH CARE EDUCATION/TRAINING PROGRAM

## 2022-04-26 PROCEDURE — 63600175 PHARM REV CODE 636 W HCPCS: Performed by: NURSE PRACTITIONER

## 2022-04-26 PROCEDURE — 63600175 PHARM REV CODE 636 W HCPCS: Performed by: SURGERY

## 2022-04-26 RX ORDER — HYDRALAZINE HYDROCHLORIDE 20 MG/ML
10 INJECTION INTRAMUSCULAR; INTRAVENOUS ONCE
Status: COMPLETED | OUTPATIENT
Start: 2022-04-26 | End: 2022-04-26

## 2022-04-26 RX ORDER — TACROLIMUS 1 MG/1
4 CAPSULE ORAL 2 TIMES DAILY
Status: DISCONTINUED | OUTPATIENT
Start: 2022-04-26 | End: 2022-04-27

## 2022-04-26 RX ORDER — TACROLIMUS 1 MG/1
2 CAPSULE ORAL ONCE
Status: COMPLETED | OUTPATIENT
Start: 2022-04-26 | End: 2022-04-26

## 2022-04-26 RX ORDER — NIFEDIPINE 30 MG/1
60 TABLET, EXTENDED RELEASE ORAL DAILY
Status: DISCONTINUED | OUTPATIENT
Start: 2022-04-26 | End: 2022-04-29

## 2022-04-26 RX ORDER — HYDROCODONE BITARTRATE AND ACETAMINOPHEN 500; 5 MG/1; MG/1
TABLET ORAL
Status: DISCONTINUED | OUTPATIENT
Start: 2022-04-26 | End: 2022-05-01 | Stop reason: HOSPADM

## 2022-04-26 RX ORDER — INSULIN ASPART 100 [IU]/ML
6 INJECTION, SOLUTION INTRAVENOUS; SUBCUTANEOUS
Status: DISCONTINUED | OUTPATIENT
Start: 2022-04-26 | End: 2022-04-28

## 2022-04-26 RX ADMIN — TACROLIMUS 4 MG: 1 CAPSULE ORAL at 05:04

## 2022-04-26 RX ADMIN — AMLODIPINE BESYLATE 10 MG: 10 TABLET ORAL at 09:04

## 2022-04-26 RX ADMIN — OXYCODONE HYDROCHLORIDE 10 MG: 10 TABLET ORAL at 10:04

## 2022-04-26 RX ADMIN — INSULIN ASPART 8 UNITS: 100 INJECTION, SOLUTION INTRAVENOUS; SUBCUTANEOUS at 05:04

## 2022-04-26 RX ADMIN — OXYCODONE HYDROCHLORIDE 10 MG: 10 TABLET ORAL at 09:04

## 2022-04-26 RX ADMIN — HYDRALAZINE HYDROCHLORIDE 10 MG: 20 INJECTION, SOLUTION INTRAMUSCULAR; INTRAVENOUS at 01:04

## 2022-04-26 RX ADMIN — INSULIN ASPART 6 UNITS: 100 INJECTION, SOLUTION INTRAVENOUS; SUBCUTANEOUS at 01:04

## 2022-04-26 RX ADMIN — MUPIROCIN 1 G: 20 OINTMENT TOPICAL at 09:04

## 2022-04-26 RX ADMIN — DOCUSATE SODIUM 100 MG: 100 CAPSULE ORAL at 02:04

## 2022-04-26 RX ADMIN — INSULIN ASPART 6 UNITS: 100 INJECTION, SOLUTION INTRAVENOUS; SUBCUTANEOUS at 05:04

## 2022-04-26 RX ADMIN — TACROLIMUS 2 MG: 1 CAPSULE ORAL at 09:04

## 2022-04-26 RX ADMIN — METHYLPREDNISOLONE SODIUM SUCCINATE 160 MG: 125 INJECTION, POWDER, FOR SOLUTION INTRAMUSCULAR; INTRAVENOUS at 09:04

## 2022-04-26 RX ADMIN — HYDROMORPHONE HYDROCHLORIDE 0.2 MG: 1 INJECTION, SOLUTION INTRAMUSCULAR; INTRAVENOUS; SUBCUTANEOUS at 07:04

## 2022-04-26 RX ADMIN — FAMOTIDINE 20 MG: 20 TABLET ORAL at 08:04

## 2022-04-26 RX ADMIN — DOCUSATE SODIUM 100 MG: 100 CAPSULE ORAL at 09:04

## 2022-04-26 RX ADMIN — INSULIN ASPART 6 UNITS: 100 INJECTION, SOLUTION INTRAVENOUS; SUBCUTANEOUS at 02:04

## 2022-04-26 RX ADMIN — DOCUSATE SODIUM 100 MG: 100 CAPSULE ORAL at 08:04

## 2022-04-26 RX ADMIN — INSULIN ASPART 4 UNITS: 100 INJECTION, SOLUTION INTRAVENOUS; SUBCUTANEOUS at 01:04

## 2022-04-26 RX ADMIN — HYDROMORPHONE HYDROCHLORIDE 0.2 MG: 1 INJECTION, SOLUTION INTRAMUSCULAR; INTRAVENOUS; SUBCUTANEOUS at 06:04

## 2022-04-26 RX ADMIN — HEPARIN SODIUM 5000 UNITS: 5000 INJECTION INTRAVENOUS; SUBCUTANEOUS at 08:04

## 2022-04-26 RX ADMIN — MYCOPHENOLATE MOFETIL 1000 MG: 250 CAPSULE ORAL at 09:04

## 2022-04-26 RX ADMIN — OXYCODONE 5 MG: 5 TABLET ORAL at 04:04

## 2022-04-26 RX ADMIN — NYSTATIN 500000 UNITS: 500000 SUSPENSION ORAL at 01:04

## 2022-04-26 RX ADMIN — NYSTATIN 500000 UNITS: 500000 SUSPENSION ORAL at 09:04

## 2022-04-26 RX ADMIN — MYCOPHENOLATE MOFETIL 1000 MG: 250 CAPSULE ORAL at 08:04

## 2022-04-26 RX ADMIN — METHOCARBAMOL 500 MG: 500 TABLET, FILM COATED ORAL at 09:04

## 2022-04-26 RX ADMIN — METHOCARBAMOL 500 MG: 500 TABLET, FILM COATED ORAL at 08:04

## 2022-04-26 RX ADMIN — TACROLIMUS 2 MG: 1 CAPSULE ORAL at 07:04

## 2022-04-26 RX ADMIN — METHOCARBAMOL 500 MG: 500 TABLET, FILM COATED ORAL at 02:04

## 2022-04-26 RX ADMIN — INSULIN ASPART 2 UNITS: 100 INJECTION, SOLUTION INTRAVENOUS; SUBCUTANEOUS at 09:04

## 2022-04-26 RX ADMIN — NYSTATIN 500000 UNITS: 500000 SUSPENSION ORAL at 06:04

## 2022-04-26 RX ADMIN — HYDRALAZINE HYDROCHLORIDE 10 MG: 20 INJECTION, SOLUTION INTRAMUSCULAR; INTRAVENOUS at 06:04

## 2022-04-26 RX ADMIN — HEPARIN SODIUM 5000 UNITS: 5000 INJECTION INTRAVENOUS; SUBCUTANEOUS at 01:04

## 2022-04-26 RX ADMIN — NIFEDIPINE 60 MG: 30 TABLET, FILM COATED, EXTENDED RELEASE ORAL at 09:04

## 2022-04-26 RX ADMIN — OXYCODONE HYDROCHLORIDE 10 MG: 10 TABLET ORAL at 04:04

## 2022-04-26 RX ADMIN — HEPARIN SODIUM 5000 UNITS: 5000 INJECTION INTRAVENOUS; SUBCUTANEOUS at 06:04

## 2022-04-26 RX ADMIN — INSULIN ASPART 8 UNITS: 100 INJECTION, SOLUTION INTRAVENOUS; SUBCUTANEOUS at 08:04

## 2022-04-26 NOTE — ASSESSMENT & PLAN NOTE
Lab Results   Component Value Date    CREATININE 1.8 (H) 04/26/2022     Avoid insulin stacking  Titrate insulin slowly

## 2022-04-26 NOTE — NURSING TRANSFER
Nursing Transfer Note      4/26/2022     Reason patient is being transferred: Appropriate for stepdown     Transfer From: [id number]    Transfer via wheelchair    Transfer with cardiac monitoring    Transported by RN and PCT    Medicines sent: Insulin pen    Any special needs or follow-up needed: None    Chart send with patient: Yes    Notified: spouse    Patient reassessed at: 4/26, 1800    Upon arrival to floor: cardiac monitor applied, patient oriented to room, call bell in reach and bed in lowest position

## 2022-04-26 NOTE — PROGRESS NOTES
@2018: Pt transferred back to bed with RN assist x2, upon returning to bed HR of 160s noted on monitor, EKG obtained, SVT noted, MD Saucedo notified and at bedside to assess pt, pt's BP stable at this time, metoprolol 5mg dose x3 given, HR still sustaining in 140s  @2145: Pt's BP dropped as a result of metoprolol, SBP in low 90s, MD aware, no new orders  @2230: MD notified that pt's HR remains in 130s with SBP now in low 100s, no new orders at this time.

## 2022-04-26 NOTE — SUBJECTIVE & OBJECTIVE
Interval History/Significant Events:   SVT last night unresolved with metop. Given adenosine with return to normal sinus  AST 91,   Remains on room air with adequate urine output. Creatinine stable compared to yesterday at 1.8    Follow-up For: Procedure(s) (LRB):  TRANSPLANT, LIVER (N/A)    Post-Operative Day: 2 Days Post-Op    Objective:     Vital Signs (Most Recent):  Temp: 98.3 °F (36.8 °C) (04/26/22 0705)  Pulse: 90 (04/26/22 0745)  Resp: (!) 9 (04/26/22 0745)  BP: (!) 151/62 (04/26/22 0745)  SpO2: 98 % (04/26/22 0745)   Vital Signs (24h Range):  Temp:  [98.3 °F (36.8 °C)-98.8 °F (37.1 °C)] 98.3 °F (36.8 °C)  Pulse:  [] 90  Resp:  [5-23] 9  SpO2:  [93 %-100 %] 98 %  BP: ()/(53-92) 151/62  Arterial Line BP: (131-190)/(61-80) 180/62     Weight: 109.8 kg (242 lb 1 oz)  Body mass index is 37.91 kg/m².      Intake/Output Summary (Last 24 hours) at 4/26/2022 0822  Last data filed at 4/26/2022 0705  Gross per 24 hour   Intake 707.5 ml   Output 2395 ml   Net -1687.5 ml         Physical Exam  Vitals and nursing note reviewed.   Constitutional:       General: He is not in acute distress.  HENT:      Head: Normocephalic and atraumatic.   Neck:      Comments: R IJ, swan in place  Cardiovascular:      Rate and Rhythm: Normal rate and regular rhythm.      Pulses: Normal pulses.   Pulmonary:      Comments: On room air  Abdominal:      General: There is no distension.      Palpations: Abdomen is soft.      Comments: Chevron incision w/ island dressing intact   GO drain x 2, serosanguinous    Genitourinary:     Comments: Shirley   Musculoskeletal:      Comments: Femoral a line, CVC  Left radial a line    Skin:     General: Skin is warm and dry.   Neurological:      General: No focal deficit present.      Mental Status: He is alert.       Vents:  Vent Mode: Spont (04/24/22 1400)  Ventilator Initiated: Yes (04/24/22 0843)  Set Rate: 16 BPM (04/24/22 1150)  Vt Set: 430 mL (04/24/22 1150)  Pressure Support: 6 cmH20  (04/24/22 1400)  PEEP/CPAP: 5 cmH20 (04/24/22 1400)  Oxygen Concentration (%): 30 (04/24/22 1445)  Peak Airway Pressure: 12 cmH2O (04/24/22 1400)  Plateau Pressure: 0 cmH20 (04/24/22 1400)  Total Ve: 7.46 mL (04/24/22 1400)  Negative Inspiratory Force (cm H2O): 0 (04/24/22 1400)  F/VT Ratio<105 (RSBI): (!) 7.54 (04/24/22 1400)    Lines/Drains/Airways       Central Venous Catheter Line  Duration             Trialysis (Dialysis) Catheter 04/24/22 0146 right internal jugular 2 days              Drain  Duration                  Closed/Suction Drain 04/24/22 0659 Right Abdomen Bulb 19 Fr. 2 days         Closed/Suction Drain 04/24/22 0744 Right Abdomen Bulb 19 Fr. 2 days         Urethral Catheter 04/24/22 0046 Non-latex;Straight-tip 16 Fr. 2 days              Peripheral Intravenous Line  Duration                  Peripheral IV - Single Lumen 04/23/22 2021 20 G Right Forearm 2 days         GIOVANNA 04/24/22 0037 Left Antecubital 2 days                    Significant Labs:    CBC/Anemia Profile:  Recent Labs   Lab 04/25/22  0818 04/25/22 2047 04/26/22  0404   WBC 14.88* 11.23 9.57   HGB 8.8* 9.5* 8.4*   HCT 26.4* 28.9* 24.7*   PLT 42* 45* 36*   MCV 90 89 88   RDW 19.6* 19.0* 18.8*          Chemistries:  Recent Labs   Lab 04/25/22  0417 04/25/22 0818 04/25/22 2047 04/26/22  0404   * 134* 132* 133*   K 4.2 4.2 4.7 4.5    103 100 101   CO2 24 24 21* 23   BUN 38* 43* 48* 51*   CREATININE 1.8* 2.0* 2.0* 1.8*   CALCIUM 7.5* 7.9* 8.0* 7.9*   ALBUMIN 2.4*  --  2.8* 2.5*   PROT 3.7*  --  4.5* 4.0*   BILITOT 8.5*  --  7.2* 5.2*   ALKPHOS 42*  --  54* 55   *  --  139* 113*   *  201* 207* 134* 91*   MG 2.0  --  2.0 1.9   PHOS 4.9*  --  5.8* 5.9*         ABGs:   Recent Labs   Lab 04/25/22  0423   PH 7.361   PCO2 41.9   HCO3 23.7*   POCSATURATED 74*   BE -2       CMP:   Recent Labs   Lab 04/25/22  0417 04/25/22  0818 04/25/22 2047 04/26/22  0404   * 134* 132* 133*   K 4.2 4.2 4.7 4.5    103 100 101    CO2 24 24 21* 23   * 128* 285* 220*   BUN 38* 43* 48* 51*   CREATININE 1.8* 2.0* 2.0* 1.8*   CALCIUM 7.5* 7.9* 8.0* 7.9*   PROT 3.7*  --  4.5* 4.0*   ALBUMIN 2.4*  --  2.8* 2.5*   BILITOT 8.5*  --  7.2* 5.2*   ALKPHOS 42*  --  54* 55   *  201* 207* 134* 91*   *  --  139* 113*   ANIONGAP 7* 7* 11 9   EGFRNONAA 41.4* 36.5* 36.5* 41.4*         Significant Imaging:  I have reviewed all pertinent imaging results/findings within the past 24 hours.

## 2022-04-26 NOTE — ASSESSMENT & PLAN NOTE
BG goal 140 - 180     Increase Transition IV insulin infusion to 0.7 u/hr with stepdown parameters (20% dose increase)  Start Novolog 6 units TID with meals (0.3 u/kg dosing) Prandial BG excursions noted.    Moderate Dose Correction Scale  BG monitoring ac/hs/0200    ** Please call Endocrine for any BG related issues **  ** Please notify Endocrine for any change and/or advance in diet**    Discharge planning: TBD

## 2022-04-26 NOTE — ASSESSMENT & PLAN NOTE
Oliguric likely ischemic ATN secondary to intraoperative hypotension and norepi and vaso requirements 4/23-4/24  -Baseline sCr of  1.3-1.5, admission Cr at 2.3    -pre op UA shows protein 21 WBC 5 RBC and hyaline casts, UPCR  0.2 g/g, [place] [facility] [place]intra[place] dialysis performed 3/23    - Creatinine is now improving.  -  Electrolytes and acid-base acceptable.  - Overloaded on exam    Plan/Recommendations:  · No need for interventions today. Creatinine is improving and expect it to continue to improve.   · Ok to Remove Trialysis line.  Can give Lasix  mg for volume control if UO is inadequate today.  · Strict in/out  · Renal diet  · Avoid nephrotoxic agents.

## 2022-04-26 NOTE — PT/OT/SLP PROGRESS
Physical Therapy Treatment    Patient Name:  Pelon Vasquez   MRN:  [id number]  Admit Date: 4/19/2022  Admitting Diagnosis:  S/P liver transplant   Length of Stay: 6 days  Recent Surgery: Procedure(s) (LRB):  TRANSPLANT, LIVER (N/A) 3 Days Post-Op    Recommendations:     Discharge Recommendations:  home health PT   Discharge Equipment Recommendations: bedside commode, walker, rolling   Barriers to discharge: None    Plan:     During this hospitalization, patient to be seen 4 x/week to address the listed problems via gait training, therapeutic activities, therapeutic exercises, neuromuscular re-education  · Plan of Care Expires:  05/24/22   Plan of Care Reviewed with: patient, spouse    Assessment:     Pelon Vasquez is a 57 y.o. male admitted with a medical diagnosis of S/P liver transplant. Pt progressing towards goals, but not at PLOF. Pt tolerated session well. Pt is improving with therapy evidenced by increased gait distance. Pt would benefit from continued PT services to improve overall functional mobility. Recommend d/c to HHPT to maximize functional independence.        Problem List: weakness, impaired endurance, gait instability, impaired functional mobilty, pain, edema, impaired cardiopulmonary response to activity.  Rehab Prognosis: Good     GOALS:   Multidisciplinary Problems     Physical Therapy Goals        Problem: Physical Therapy    Goal Priority Disciplines Outcome Goal Variances Interventions   Physical Therapy Goal     PT, PT/OT Ongoing, Progressing     Description: Goals to be met by: 5/15/2022    1. Supine to sit with Supervision   2. Sit to stand transfer with Supervision using LRAD  3. Gait  x 150 feet with Supervision using  LRAD                     Subjective   Communicated with RN prior to session.  Patient found up in chair upon PT entry to room, agreeable to evaluation. Pelon Vasquez's wife present during session.    Chief Complaint: pain   Patient/Family Comments/goals: to get better and  "return home   Pain/Comfort:  · Pain Rating 1: 5/10  · Location 1:  (abdomen)  · Pain Addressed 1: Reposition, Distraction  · Pain Rating Post-Intervention 1: 5/10    Objective:   Patient found with: telemetry, pulse ox (continuous), blood pressure cuff, central line, peripheral IV, GO drain, francisco catheter   General Precautions: Standard, Cardiac fall, sternal   Orthopedic Precautions:N/A   Braces: N/A   Oxygen Device: Room Air  Vitals: BP (!) 152/72 (BP Location: Right arm, Patient Position: Lying)   Pulse 105   Temp 98.2 °F (36.8 °C) (Oral)   Resp (!) 21   Ht 5' 7" (1.702 m)   Wt 109.8 kg (242 lb 1 oz)   SpO2 97%   BMI 37.91 kg/m²     Outcome Measures:  AM-PAC 6 CLICK MOBILITY  Turning over in bed (including adjusting bedclothes, sheets and blankets)?: 2  Sitting down on and standing up from a chair with arms (e.g., wheelchair, bedside commode, etc.): 3  Moving from lying on back to sitting on the side of the bed?: 2  Moving to and from a bed to a chair (including a wheelchair)?: 3  Need to walk in hospital room?: 3  Climbing 3-5 steps with a railing?: 2  Basic Mobility Total Score: 15       Functional Mobility:  Additional staff present: OT - due to pt requiring 2 skilled therapists to safely perform functional mobility and to accommodate pt's activity tolerance    Bed Mobility:   Not performed 2nd to pt found in chair     Transfers:    Sit <> Stand Transfer: stand by assistance with rolling walker   o Verbal cuing for hand placement       Gait:  · Patient ambulated: 10ft + 20ft (standing ADLs between trials)  · Patient required: standy by assistance  · Patient used: rolling walker  · Gait Pattern observed: reciprocal gait  · Gait Deviation(s): decreased step length, wide base of support and decreased toma  · Impairments due to: impaired balance, pain and decreased endurance  · Comments:   · Verbal cuing for RW management  · Verbal cuing for pacing and to increase step length     Therapeutic " Activities, Exercises, and Education:   Educated pt on PT role/POC  Educated pt on importance of OOB activity and daily ambulation   Pt verbalized understanding    Pt performed standing ADLs with OT    Patient left up in chair with all lines intact, call button in reach, RN notified and wife present..    Time Tracking:     PT Received On: 04/26/22  PT Start Time: 0835     PT Stop Time: 0858  PT Total Time (min): 23 min       Billable Minutes:   · Gait Training 23    Treatment Type: Treatment  PT/PTA: PT

## 2022-04-26 NOTE — PROGRESS NOTES
Pt arrived from SICU. AAO. Wife at the BS. VISI put on, and cardiac monitoring resumed. Reports incisional pain 7/10, will give PRN Dilaudid. Insulin gtt infusing at 0.7 units/hr. Urinal placed at the BS, instructed to void in it. Oriented to room and call bell. Fall education reviewed with pt. Instructed to call if assistance needed, verbalized understanding.

## 2022-04-26 NOTE — PT/OT/SLP EVAL
Physical Therapy Evaluation    Patient Name:  Pelon Vasquez   MRN:  [id number]  Admit Date: 4/19/2022  Admitting Diagnosis:  S/P liver transplant  Length of Stay: 5 days  Recent Surgery: Procedure(s) (LRB):  TRANSPLANT, LIVER (N/A) 2 Days Post-Op    Recommendations:     Discharge Recommendations:  home health PT   Discharge Equipment Recommendations: bedside commode, walker, rolling   Barriers to discharge: None    Assessment:     Pelon Vasquez is a 57 y.o. male admitted with a medical diagnosis of S/P liver transplant.      Problem List: weakness, impaired endurance, impaired functional mobilty, gait instability, pain, impaired cardiopulmonary response to activity, edema  Rehab Prognosis: Good; patient would benefit from acute skilled PT services to address these deficits and reach maximum level of function.      Plan:     During this hospitalization, patient to be seen 4 x/week to address the identified rehab impairments via gait training, therapeutic activities, therapeutic exercises, neuromuscular re-education and progress towards the established goals.    · Plan of Care Expires:  05/24/22    Subjective   Communicated with RN prior to session.  Patient found HOB elevated upon PT entry to room, agreeable to evaluation. Pelon Vasquez's fiance present during session.    Chief Complaint: No chief complaint on file.    Patient/Family Comments/goals: to get better and return home   Pain/Comfort:  · Pain Rating 1: 7/10  · Location 1:  (abdomen)  · Pain Addressed 1: Reposition, Distraction  · Pain Rating Post-Intervention 1: 7/10    Living Environment:  Living Environment: Pt lives with his wife in a [facility] with threshold to enter. Pt has a tub/shower combo with no DME present. 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   Previous level of function: PTA, pt was (I) with ADLs and functional mobility   Roles and Routines:    Equipment Used at Home:  cane, straight  Assistance upon Discharge: Pt will have assistance  "upon d/c.     Objective:   Patient found with: telemetry, pulse ox (continuous), blood pressure cuff, central line, peripheral IV, GO drain, francisco catheter, arterial line ([place] Lili Catheter)     General Precautions: Standard, Cardiac fall   Orthopedic Precautions:N/A   Braces: N/A   Oxygen Device: Room Air  Vitals: /68   Pulse (!) 142   Temp 98.4 °F (36.9 °C) (Oral)   Resp (!) 9   Ht 5' 7" (1.702 m)   Wt 112.7 kg (248 lb 7.3 oz)   SpO2 96%   BMI 38.91 kg/m²     Exams:  · Cognition:   · Alert and Cooperative  · Ox4  · Command following: Follows multistep  commands  · Fluency: clear/fluent    · RLE ROM: WFL  · RLE Strength: WFL  · LLE ROM: WFL  · LLE Strength: WFL    Outcome Measures:  AM-PAC 6 CLICK MOBILITY  Turning over in bed (including adjusting bedclothes, sheets and blankets)?: 2  Sitting down on and standing up from a chair with arms (e.g., wheelchair, bedside commode, etc.): 3  Moving from lying on back to sitting on the side of the bed?: 2  Moving to and from a bed to a chair (including a wheelchair)?: 3  Need to walk in hospital room?: 3  Climbing 3-5 steps with a railing?: 2  Basic Mobility Total Score: 15     Functional Mobility:  Additional staff present: OT  Bed Mobility:  · Supine to Sit: moderate assistance and 2 persons; HOB elevated  · Scooting anteriorly to EOB to have both feet planted on floor: contact guard assistance    Sitting Balance at Edge of Bed:   Assistance Level Required: Stand-by Assistance    Transfers:   · Sit <> Stand Transfer: contact guard assistance with no assistive device from EOB      Gait:   · Patient ambulated: 4ft to chair    · Patient required: contact guard  · Patient used: no assistive device  · Gait Pattern observed: reciprocal gait  · Gait Deviation(s): decreased step length, wide base of support, flexed posture and decreased toma  · Impairments due to: impaired balance, pain, decreased endurance and post surgical instability   · Comments:   · Verbal " cuing for upright posture and to decrease JUSTIN    Therapeutic Activities, Exercises, & Education:   Educated pt on PT role/POC  Educated pt on importance of OOB activity and daily ambulation   Pt verbalized understanding     T/f to chair to increase tolerance to OOB activity and to create optimal positioning for lung expansion       Patient left up in chair with all lines intact, call button in reach and RN notified fiance present    GOALS:   Multidisciplinary Problems     Physical Therapy Goals        Problem: Physical Therapy    Goal Priority Disciplines Outcome Goal Variances Interventions   Physical Therapy Goal     PT, PT/OT Ongoing, Progressing     Description: Goals to be met by: 5/15/2022    Patient will increase functional independence with mobility by performin. Supine to sit with Supervision   2. Sit to stand transfer with Supervision using LRAD  3. Gait  x 150 feet with Supervision using  LRAD                     History:     Past Medical History:   Diagnosis Date    Arthritis     Cirrhosis of liver     HTN (hypertension)     BRAYDON (obstructive sleep apnea)     Secondary esophageal varices without bleeding 3/18/2022    EGD (22) showed moderate portal hypertensive gastropathy, small hiatal hernia, grade 1 esophageal varices    Type 2 diabetes mellitus        Past Surgical History:   Procedure Laterality Date    COLONOSCOPY      FINGER AMPUTATION      LIVER TRANSPLANT N/A 4/10/2022    Procedure: TRANSPLANT, LIVER;  Surgeon: Félix Scott MD;  Location: [facility] OR [address] [place] [address];  Service: Transplant;  Laterality: N/A;    LIVER TRANSPLANT N/A 2022    Procedure: TRANSPLANT, LIVER;  Surgeon: Keyon Castillo MD;  Location: [facility] OR [address] [place] [address];  Service: Transplant;  Laterality: N/A;    LIVER TRANSPLANT N/A 2022    Procedure: TRANSPLANT, LIVER;  Surgeon: Brant Gonzalez Jr., MD;  Location: [facility] OR [address] [place] [address];  Service: Transplant;  Laterality: N/A;    LIVER TRANSPLANT N/A 2022     Procedure: TRANSPLANT, LIVER;  Surgeon: Tom Romero MD;  Location: [facility] OR [address] [place] [address];  Service: Transplant;  Laterality: N/A;    MEDIAL COLLATERAL LIGAMENT REPAIR, KNEE Bilateral     ROTATRO CUFF REPAIR      TONSILLECTOMY         Time Tracking:     PT Received On: 04/25/22  PT Start Time: 1404     PT Stop Time: 1425  PT Total Time (min): 21 min     Billable Minutes: Evaluation 8 and Gait Training 8

## 2022-04-26 NOTE — PROGRESS NOTES
Social work 2nd Note    Social work presented to patients room for continuity of care and any needs. Patient presented sitting up in the chair and alert and oriented x 4. Patients caregiver Leonarda () patients significant other was at patients bedside and also alert and oriented x 4. Patient reported that he is coping adequately and in some pain. Patients caregiver reported that she turned in the financial profile sheet to social work and their fee at [employer] will be $0. Social work let the patient and patients caregiver know that social work will assist with getting them checked into the apartments when it is closer to the patients discharge. Patient and patients caregiver both report understanding and with no other questions or concerns at this time. Social work to continue to follow for psychosocial needs, resources and continuity of care.

## 2022-04-26 NOTE — SUBJECTIVE & OBJECTIVE
Interval History: Pt had an episode of SVT yesterday with MAPS dropping to 65-67, resolved with adenosine. Today pt feels well, denies any complaints.   - Uo 1.3L in last 24 hours. Net negative 1.4L    Review of patient's allergies indicates:   Allergen Reactions    Strawberry Anaphylaxis and Rash    Metformin Diarrhea    Pork/porcine containing products Diarrhea and Nausea And Vomiting     Current Facility-Administered Medications   Medication Frequency    0.9%  NaCl infusion (for blood administration) Q24H PRN    0.9%  NaCl infusion (for blood administration) Q24H PRN    dextrose 10% bolus 125 mL PRN    dextrose 10% bolus 250 mL PRN    docusate sodium capsule 100 mg TID    famotidine tablet 20 mg Nightly    glucagon (human recombinant) injection 1 mg PRN    glucose chewable tablet 16 g PRN    glucose chewable tablet 24 g PRN    heparin (porcine) injection 5,000 Units Q8H    HYDROmorphone injection 0.2 mg Q6H PRN    insulin aspart U-100 pen 0-10 Units PRN    insulin aspart U-100 pen 6 Units TIDWM    insulin regular in 0.9 % NaCl 100 unit/100 mL (1 unit/mL) infusion Continuous    methocarbamoL tablet 500 mg TID    [START ON 4/27/2022] methylPREDNISolone sodium succinate injection 120 mg Daily    Followed by    [START ON 4/28/2022] methylPREDNISolone sodium succinate injection 80 mg Daily    Followed by    [START ON 4/29/2022] methylPREDNISolone sodium succinate injection 40 mg Daily    Followed by    [START ON 4/30/2022] predniSONE tablet 20 mg Daily    mupirocin 2 % ointment 1 g BID    mycophenolate capsule 1,000 mg BID    NIFEdipine 24 hr tablet 60 mg Daily    nystatin 100,000 unit/mL suspension 500,000 Units TID PC    ondansetron disintegrating tablet 4 mg Q8H PRN    oxyCODONE immediate release tablet 5 mg Q4H PRN    oxyCODONE immediate release tablet Tab 10 mg Q4H PRN    sodium chloride 0.9% flush 10 mL PRN    [START ON 5/1/2022] sulfamethoxazole-trimethoprim 400-80mg per tablet 1 tablet Daily AM    tacrolimus  capsule 4 mg BID    [START ON 5/4/2022] valGANciclovir tablet 450 mg Daily       Objective:     Vital Signs (Most Recent):  Temp: 98.2 °F (36.8 °C) (04/26/22 1100)  Pulse: 105 (04/26/22 1515)  Resp: (!) 21 (04/26/22 1515)  BP: (!) 152/72 (04/26/22 1500)  SpO2: 97 % (04/26/22 1515)  O2 Device (Oxygen Therapy): room air (04/26/22 1500)   Vital Signs (24h Range):  Temp:  [98.2 °F (36.8 °C)-98.7 °F (37.1 °C)] 98.2 °F (36.8 °C)  Pulse:  [] 105  Resp:  [5-27] 21  SpO2:  [95 %-100 %] 97 %  BP: ()/(53-88) 152/72     Weight: 109.8 kg (242 lb 1 oz) (04/26/22 0443)  Body mass index is 37.91 kg/m².  Body surface area is 2.28 meters squared.    I/O last 3 completed shifts:  In: 2152.5 [P.O.:1580; I.V.:89.3; Blood:287; IV Piggyback:196.2]  Out: 3917 [Urine:1950; Drains:1967]    Physical Exam  Vitals and nursing note reviewed.   Constitutional:       General: He is not in acute distress.     Appearance: He is ill-appearing.   HENT:      Mouth/Throat:      Mouth: Mucous membranes are moist.   Eyes:      General: Scleral icterus present.      Extraocular Movements: Extraocular movements intact.      Pupils: Pupils are equal, round, and reactive to light.   Cardiovascular:      Rate and Rhythm: Normal rate.   Abdominal:      Palpations: Abdomen is soft.   Musculoskeletal:         General: Swelling present. No deformity.      Right lower leg: Edema present.      Left lower leg: Edema present.   Skin:     Coloration: Skin is jaundiced.       Significant Labs:  BMP:   Recent Labs   Lab 04/26/22  0404   *   *   K 4.5      CO2 23   BUN 51*   CREATININE 1.8*   CALCIUM 7.9*   MG 1.9     Cardiac Markers: No results for input(s): CKMB, TROPONINT, MYOGLOBIN in the last 168 hours.  CBC:   Recent Labs   Lab 04/26/22  0404   WBC 9.57   RBC 2.80*   HGB 8.4*   HCT 24.7*   PLT 36*   MCV 88   MCH 30.0   MCHC 34.0     All labs within the past 24 hours have been reviewed.     Significant Imaging:  N/a

## 2022-04-26 NOTE — PLAN OF CARE
Problem: Physical Therapy  Goal: Physical Therapy Goal  Description: Goals to be met by: 5/15/2022    Patient will increase functional independence with mobility by performin. Supine to sit with Supervision   2. Sit to stand transfer with Supervision using LRAD  3. Gait  x 150 feet with Supervision using  LRAD    Outcome: Ongoing, Progressing     Eval completed. Goals appropriate.

## 2022-04-26 NOTE — PROGRESS NOTES
[facility] - [facility] Intensive [facility]  Critical Care - Surgery  Progress Note    Patient Name: Pelon Vasquez  MRN: [id number]  Admission Date: 4/19/2022  Hospital Length of Stay: 6 days  Code Status: Full Code  Attending Provider: Keyon Castillo MD  Primary Care Provider: Primary Doctor No   Principal Problem: S/P liver transplant    Subjective:     Hospital/ICU Course:  No notes on file    Interval History/Significant Events:   SVT last night unresolved with metop. Given adenosine with return to normal sinus  AST 91,   Remains on room air with adequate urine output. Creatinine stable compared to yesterday at 1.8    Follow-up For: Procedure(s) (LRB):  TRANSPLANT, LIVER (N/A)    Post-Operative Day: 2 Days Post-Op    Objective:     Vital Signs (Most Recent):  Temp: 98.3 °F (36.8 °C) (04/26/22 0705)  Pulse: 90 (04/26/22 0745)  Resp: (!) 9 (04/26/22 0745)  BP: (!) 151/62 (04/26/22 0745)  SpO2: 98 % (04/26/22 0745)   Vital Signs (24h Range):  Temp:  [98.3 °F (36.8 °C)-98.8 °F (37.1 °C)] 98.3 °F (36.8 °C)  Pulse:  [] 90  Resp:  [5-23] 9  SpO2:  [93 %-100 %] 98 %  BP: ()/(53-92) 151/62  Arterial Line BP: (131-190)/(61-80) 180/62     Weight: 109.8 kg (242 lb 1 oz)  Body mass index is 37.91 kg/m².      Intake/Output Summary (Last 24 hours) at 4/26/2022 0822  Last data filed at 4/26/2022 0705  Gross per 24 hour   Intake 707.5 ml   Output 2395 ml   Net -1687.5 ml         Physical Exam  Vitals and nursing note reviewed.   Constitutional:       General: He is not in acute distress.  HENT:      Head: Normocephalic and atraumatic.   Neck:      Comments: R IJ, swan in place  Cardiovascular:      Rate and Rhythm: Normal rate and regular rhythm.      Pulses: Normal pulses.   Pulmonary:      Comments: On room air  Abdominal:      General: There is no distension.      Palpations: Abdomen is soft.      Comments: Chevron incision w/ island dressing intact   GO drain x 2, serosanguinous    Genitourinary:     Comments:  Fern   Musculoskeletal:      Comments: Femoral a line, CVC  Left radial a line    Skin:     General: Skin is warm and dry.   Neurological:      General: No focal deficit present.      Mental Status: He is alert.       Vents:  Vent Mode: Spont (04/24/22 1400)  Ventilator Initiated: Yes (04/24/22 0843)  Set Rate: 16 BPM (04/24/22 1150)  Vt Set: 430 mL (04/24/22 1150)  Pressure Support: 6 cmH20 (04/24/22 1400)  PEEP/CPAP: 5 cmH20 (04/24/22 1400)  Oxygen Concentration (%): 30 (04/24/22 1445)  Peak Airway Pressure: 12 cmH2O (04/24/22 1400)  Plateau Pressure: 0 cmH20 (04/24/22 1400)  Total Ve: 7.46 mL (04/24/22 1400)  Negative Inspiratory Force (cm H2O): 0 (04/24/22 1400)  F/VT Ratio<105 (RSBI): (!) 7.54 (04/24/22 1400)    Lines/Drains/Airways       Central Venous Catheter Line  Duration             Trialysis (Dialysis) Catheter 04/24/22 0146 right internal jugular 2 days              Drain  Duration                  Closed/Suction Drain 04/24/22 0659 Right Abdomen Bulb 19 Fr. 2 days         Closed/Suction Drain 04/24/22 0744 Right Abdomen Bulb 19 Fr. 2 days         Urethral Catheter 04/24/22 0046 Non-latex;Straight-tip 16 Fr. 2 days              Peripheral Intravenous Line  Duration                  Peripheral IV - Single Lumen 04/23/22 2021 20 G Right Forearm 2 days         GIOVANNA 04/24/22 0037 Left Antecubital 2 days                    Significant Labs:    CBC/Anemia Profile:  Recent Labs   Lab 04/25/22  0818 04/25/22 2047 04/26/22  0404   WBC 14.88* 11.23 9.57   HGB 8.8* 9.5* 8.4*   HCT 26.4* 28.9* 24.7*   PLT 42* 45* 36*   MCV 90 89 88   RDW 19.6* 19.0* 18.8*          Chemistries:  Recent Labs   Lab 04/25/22  0417 04/25/22  0818 04/25/22 2047 04/26/22  0404   * 134* 132* 133*   K 4.2 4.2 4.7 4.5    103 100 101   CO2 24 24 21* 23   BUN 38* 43* 48* 51*   CREATININE 1.8* 2.0* 2.0* 1.8*   CALCIUM 7.5* 7.9* 8.0* 7.9*   ALBUMIN 2.4*  --  2.8* 2.5*   PROT 3.7*  --  4.5* 4.0*   BILITOT 8.5*  --  7.2* 5.2*    ALKPHOS 42*  --  54* 55   *  --  139* 113*   *  201* 207* 134* 91*   MG 2.0  --  2.0 1.9   PHOS 4.9*  --  5.8* 5.9*         ABGs:   Recent Labs   Lab 04/25/22  0423   PH 7.361   PCO2 41.9   HCO3 23.7*   POCSATURATED 74*   BE -2       CMP:   Recent Labs   Lab 04/25/22  0417 04/25/22  0818 04/25/22 2047 04/26/22  0404   * 134* 132* 133*   K 4.2 4.2 4.7 4.5    103 100 101   CO2 24 24 21* 23   * 128* 285* 220*   BUN 38* 43* 48* 51*   CREATININE 1.8* 2.0* 2.0* 1.8*   CALCIUM 7.5* 7.9* 8.0* 7.9*   PROT 3.7*  --  4.5* 4.0*   ALBUMIN 2.4*  --  2.8* 2.5*   BILITOT 8.5*  --  7.2* 5.2*   ALKPHOS 42*  --  54* 55   *  201* 207* 134* 91*   *  --  139* 113*   ANIONGAP 7* 7* 11 9   EGFRNONAA 41.4* 36.5* 36.5* 41.4*         Significant Imaging:  I have reviewed all pertinent imaging results/findings within the past 24 hours.    Assessment/Plan:     Alcoholic cirrhosis of liver with ascites    Neuro/Psych:   -- Sedation: none  -- Pain: multimodal w/ PRN narcotic   -- h/o encephalopathy on lactulose/rifaximin pre-op             Cards:   -- s/p adenosine on 4/25 due to an episode of SVT  -- maps >65  -- vasopressors: none  -- echo 02/17: normal function   -- platelets 36, will need platelets if delining today      Pulm:   -- Goal O2 sat > 90%  -- on room air  -- CXR daily  -- ABG PRN      Renal:  -- Shirley in place. May be able to dc today  -- FAMILIA pre-op, will monitor closely; urine studies 04/21  -- Intra-op dialysis; nephrology consulted, following  -- albumin PRN      FEN / GI:   -- h/o ESLD now s/p DBD OLTxp 04/24  -- Replace lytes as needed  -- Nutrition: Diabetic diet  -- bowel reg; HSB  -- CMP serially; AST Q6H  -- GO drain x 2 to right lateral side. 450/500 output from drains  -- s/p liver ultrasound 4/25      ID:   -- Tm: afebrile; WBC WNL pre-op  -- immunosuppression and abx per txp pharm      Heme/Onc:   -- Hgb 8.4  -- CBC, coags serially  -- 7uFFP, 2plt, 2 cryo,  4pRBC intra-op      Endo:   -- Gluc goal 140-180  -- insulin gtt       PPx:   Feeding: diabetic diet  Analgesia/Sedation: multimodal w/ prn narc / none  Thromboembolic prevention: heparin TID  HOB >30: Yes  Stress Ulcer ppx: H2B   Glucose control: Critical care goal 140-180 g/dl, ISS    Lines/Drains/Airway: GO x 2, PIV, arterial line, R IJ. OK to deline today      Dispo/Code Status/Palliative:   -- SICU / Full Code, stepdown           Critical care was time spent personally by me on the following activities: development of treatment plan with patient or surrogate and bedside caregivers, discussions with consultants, evaluation of patient's response to treatment, examination of patient, ordering and performing treatments and interventions, ordering and review of laboratory studies, ordering and review of radiographic studies, pulse oximetry, re-evaluation of patient's condition.  This critical care time did not overlap with that of any other provider or involve time for any procedures.     Rodrick Joseph MD  Critical Care - Surgery  [facility] - [facility]

## 2022-04-26 NOTE — PLAN OF CARE
"      SICU PLAN OF CARE NOTE    Dx: S/P liver transplant    Shift Events: Pt's V/S stable at this time. Pt had episode of SVT unresolved by metoprolol IVP x3 doses, adenosine given, pt converted to NSR, see notes for details. Pt able to rest intermittently over night, PRN pain meds given x3 doses, working well to control pt's pain. Pt OOBTC this AM with RN assist x2, tolerating well at this time. One time dose of hydralazine given for SBP in 170s, pt not reporting pain, BP decreased with SBP in 140-150s. Plan to stepdown out of ICU today, monitor V/S and labs closely, further independence with PT/OT. POC reviewed with pt and pt's spouse, all questions answered and encouraged. Will continue to monitor.    Goals of Care: -160    Neuro: AAO x4, Follows Commands and Moves All Extremities    Vital Signs: BP (!) 160/77 (BP Location: Right arm, Patient Position: Lying)   Pulse 91   Temp 98.5 °F (36.9 °C) (Oral)   Resp 16   Ht 5' 7" (1.702 m)   Wt 109.8 kg (242 lb 1 oz)   SpO2 99%   BMI 37.91 kg/m²     Cardiac: NSR, HR 80-160s over shift    Respiratory: Room Air    Diet: Diabetic Diet    Gtts: Insulin    Urine Output: Urinary Catheter 610 cc/shift    Drains:   GO #1, total output of 255 ml/shift  OG #2, total output of 300 ml/shift    Labs: daily, MD Callier notified of decreased H/H this AM, no new orders at this time / Accuchecks: ACHS, supplemental insulin given x2    Skin: No skin breakdown noted over night. Pt's chevron incision cleansed with betadine and left AMARIS, GO drain sites cleansed with chloraprep and redressed with gauze/tape. Pt repositioning frequently over night weight shift assistance and pillow support provided. Skin foam in place for prevention. Margi bed plugged in and working properly, waffle mattress inflated.          "

## 2022-04-26 NOTE — PROGRESS NOTES
Per MD Saucedo, adenosine 6mg ordered. Charge RN and MD at bedside, pt connected to Zoll monitor, 0.5mg of Versed given per MD for pt comfort. Adenosine given, pt's HR back to baseline in NSR, HR 80-90s. Pt's BP stable with SBP in 110-130s. Continuing to monitor pt status.

## 2022-04-26 NOTE — SUBJECTIVE & OBJECTIVE
"Interval HPI:   Overnight events: Remains in SICU. POD 3. BG above goal ranges on IV insulin infusion at 0.5 u/hr and prn correction scale. Creatinine 1.8. Receiving Solu Medrol 160 mg, standard steroid taper. Diet diabetic [facility]; 2000 Calorie; Isolation Tray - Regular [place]  Eatin%  Nausea: No  Hypoglycemia and intervention: No  Fever: No  TPN and/or TF: No  If yes, type of TF/TPN and rate: n/a    BP (!) 172/82   Pulse 98   Temp 98.3 °F (36.8 °C) (Oral)   Resp 12   Ht 5' 7" (1.702 m)   Wt 109.8 kg (242 lb 1 oz)   SpO2 98%   BMI 37.91 kg/m²     Labs Reviewed and Include    Recent Labs   Lab 22  0404   *   CALCIUM 7.9*   ALBUMIN 2.5*   PROT 4.0*   *   K 4.5   CO2 23      BUN 51*   CREATININE 1.8*   ALKPHOS 55   *   AST 91*   BILITOT 5.2*     Lab Results   Component Value Date    WBC 9.57 2022    HGB 8.4 (L) 2022    HCT 24.7 (L) 2022    MCV 88 2022    PLT 36 (LL) 2022     No results for input(s): TSH, FREET4 in the last 168 hours.  Lab Results   Component Value Date    HGBA1C 6.6 (H) 2022       Nutritional status:   Body mass index is 37.91 kg/m².  Lab Results   Component Value Date    ALBUMIN 2.5 (L) 2022    ALBUMIN 2.8 (L) 2022    ALBUMIN 2.4 (L) 2022     No results found for: PREALBUMIN    Estimated Creatinine Clearance: 54.8 mL/min (A) (based on SCr of 1.8 mg/dL (H)).    Accu-Checks  No results for input(s): POCTGLUCOSE in the last 72 hours.    Current Medications and/or Treatments Impacting Glycemic Control  Immunotherapy:    Immunosuppressants           Stop Route Frequency     tacrolimus capsule 4 mg         -- Oral 2 times daily     mycophenolate capsule 1,000 mg         -- Oral 2 times daily          Steroids:   Hormones (From admission, onward)                Start     Stop Route Frequency Ordered    22 0900  predniSONE tablet 20 mg  (methylprednisolone taper panel)        "Followed by" " "Linked Group Details    -- Oral Daily 04/24/22 0831 04/29/22 0900  methylPREDNISolone sodium succinate injection 40 mg  (methylprednisolone taper panel)        "Followed by" Linked Group Details    04/30 0859 IV Daily 04/24/22 0831 04/28/22 0900  methylPREDNISolone sodium succinate injection 80 mg  (methylprednisolone taper panel)        "Followed by" Linked Group Details    04/29 0859 IV Daily 04/24/22 0831 04/27/22 0900  methylPREDNISolone sodium succinate injection 120 mg  (methylprednisolone taper panel)        "Followed by" Linked Group Details    04/28 0859 IV Daily 04/24/22 0831          Pressors:    Autonomic Drugs (From admission, onward)                None          Hyperglycemia/Diabetes Medications:   Antihyperglycemics (From admission, onward)                Start     Stop Route Frequency Ordered    04/26/22 1130  insulin aspart U-100 pen 6 Units         -- SubQ 3 times daily with meals 04/26/22 0947    04/25/22 0945  insulin regular in 0.9 % NaCl 100 unit/100 mL (1 unit/mL) infusion        Question:  Enter initial dose (Units/hr):  Answer:  0.5    -- IV Continuous 04/25/22 0831 04/25/22 0931  insulin aspart U-100 pen 0-10 Units         -- SubQ As needed (PRN) 04/25/22 0831          "

## 2022-04-26 NOTE — ASSESSMENT & PLAN NOTE
Nutrition consulted. Most recent weight and BMI monitored-                         Measurements:  Wt Readings from Last 1 Encounters:   04/26/22 109.8 kg (242 lb 1 oz)   Body mass index is 37.91 kg/m².    Recommendations: Recommendation/Intervention: 1. Advance diet as tolerated to diabetic diet. Encourage PO intake. 2. Once diet advanced, recommend Boost Glucose control TID if PO intake <75% of EEN. 3. RD following.  Goals: Diet advanced to diabetic diet by RD follow-up.    Patient has been screened and assessed by RD. RD will follow patient.

## 2022-04-26 NOTE — PROGRESS NOTES
[facility]  Nephrology  Progress Note    Patient Name: Pelon Vasquez  MRN: [id number]  Admission Date: 4/19/2022  Hospital Length of Stay: 6 days  Attending Provider: Keyon Castillo MD   Primary Care Physician: Primary Doctor No  Principal Problem:S/P liver transplant    Subjective:     HPI: 58 YO male with ESLD 2/2 LOZANO and ETOH abuse related cirrhosis, ascites requiring paracentesis every 2 weeks (last one was last Tuesday), CKD 3 (baseline 1.3-1.5), recent admission rom 4/13 to 4/17 for transplant but cancelled due to fever of unknown etiology that has now resolved, re- admitted for liver transplant surgery. Surgery was cancelled due to organ quality, but noted to have an elevated creatinine. Nephrology consulted for FAMILIA on CKD.    Patient currently denies any symptoms. During his recent admission he received IV Vancomycin/Cefepime for sepsis. Denies any NSAID use, vomiting, dehydration. He does have frequent bms 2/2 lactulose but this has not increased. Denies recent GIB. Last Paracentesis was last Tuesday per patient with 9L of fluid removal.       Interval History: Pt had an episode of SVT yesterday with MAPS dropping to 65-67, resolved with adenosine. Today pt feels well, denies any complaints.   - Uo 1.3L in last 24 hours. Net negative 1.4L    Review of patient's allergies indicates:   Allergen Reactions    Strawberry Anaphylaxis and Rash    Metformin Diarrhea    Pork/porcine containing products Diarrhea and Nausea And Vomiting     Current Facility-Administered Medications   Medication Frequency    0.9%  NaCl infusion (for blood administration) Q24H PRN    0.9%  NaCl infusion (for blood administration) Q24H PRN    dextrose 10% bolus 125 mL PRN    dextrose 10% bolus 250 mL PRN    docusate sodium capsule 100 mg TID    famotidine tablet 20 mg Nightly    glucagon (human recombinant) injection 1 mg PRN    glucose chewable tablet 16 g PRN    glucose chewable tablet 24 g PRN     heparin (porcine) injection 5,000 Units Q8H    HYDROmorphone injection 0.2 mg Q6H PRN    insulin aspart U-100 pen 0-10 Units PRN    insulin aspart U-100 pen 6 Units TIDWM    insulin regular in 0.9 % NaCl 100 unit/100 mL (1 unit/mL) infusion Continuous    methocarbamoL tablet 500 mg TID    [START ON 4/27/2022] methylPREDNISolone sodium succinate injection 120 mg Daily    Followed by    [START ON 4/28/2022] methylPREDNISolone sodium succinate injection 80 mg Daily    Followed by    [START ON 4/29/2022] methylPREDNISolone sodium succinate injection 40 mg Daily    Followed by    [START ON 4/30/2022] predniSONE tablet 20 mg Daily    mupirocin 2 % ointment 1 g BID    mycophenolate capsule 1,000 mg BID    NIFEdipine 24 hr tablet 60 mg Daily    nystatin 100,000 unit/mL suspension 500,000 Units TID PC    ondansetron disintegrating tablet 4 mg Q8H PRN    oxyCODONE immediate release tablet 5 mg Q4H PRN    oxyCODONE immediate release tablet Tab 10 mg Q4H PRN    sodium chloride 0.9% flush 10 mL PRN    [START ON 5/1/2022] sulfamethoxazole-trimethoprim 400-80mg per tablet 1 tablet Daily AM    tacrolimus capsule 4 mg BID    [START ON 5/4/2022] valGANciclovir tablet 450 mg Daily       Objective:     Vital Signs (Most Recent):  Temp: 98.2 °F (36.8 °C) (04/26/22 1100)  Pulse: 105 (04/26/22 1515)  Resp: (!) 21 (04/26/22 1515)  BP: (!) 152/72 (04/26/22 1500)  SpO2: 97 % (04/26/22 1515)  O2 Device (Oxygen Therapy): room air (04/26/22 1500)   Vital Signs (24h Range):  Temp:  [98.2 °F (36.8 °C)-98.7 °F (37.1 °C)] 98.2 °F (36.8 °C)  Pulse:  [] 105  Resp:  [5-27] 21  SpO2:  [95 %-100 %] 97 %  BP: ()/(53-88) 152/72     Weight: 109.8 kg (242 lb 1 oz) (04/26/22 0443)  Body mass index is 37.91 kg/m².  Body surface area is 2.28 meters squared.    I/O last 3 completed shifts:  In: 2152.5 [P.O.:1580; I.V.:89.3; Blood:287; IV Piggyback:196.2]  Out: 3917 [Urine:1950; Drains:1967]    Physical Exam  Vitals and  nursing note reviewed.   Constitutional:       General: He is not in acute distress.     Appearance: He is ill-appearing.   HENT:      Mouth/Throat:      Mouth: Mucous membranes are moist.   Eyes:      General: Scleral icterus present.      Extraocular Movements: Extraocular movements intact.      Pupils: Pupils are equal, round, and reactive to light.   Cardiovascular:      Rate and Rhythm: Normal rate.   Abdominal:      Palpations: Abdomen is soft.   Musculoskeletal:         General: Swelling present. No deformity.      Right lower leg: Edema present.      Left lower leg: Edema present.   Skin:     Coloration: Skin is jaundiced.       Significant Labs:  BMP:   Recent Labs   Lab 04/26/22  0404   *   *   K 4.5      CO2 23   BUN 51*   CREATININE 1.8*   CALCIUM 7.9*   MG 1.9     Cardiac Markers: No results for input(s): CKMB, TROPONINT, MYOGLOBIN in the last 168 hours.  CBC:   Recent Labs   Lab 04/26/22  0404   WBC 9.57   RBC 2.80*   HGB 8.4*   HCT 24.7*   PLT 36*   MCV 88   MCH 30.0   MCHC 34.0     All labs within the past 24 hours have been reviewed.     Significant Imaging:  N/a    Assessment/Plan:     * S/P liver transplant  On 4/24  Immunosuppression per primary.    FAMILIA (acute kidney injury)  Oliguric likely ischemic ATN secondary to intraoperative hypotension and norepi and vaso requirements 4/23-4/24  -Baseline sCr of  1.3-1.5, admission Cr at 2.3    -pre op UA shows protein 21 WBC 5 RBC and hyaline casts, UPCR  0.2 g/g, microscopy with [facility]  -intraoperative dialysis performed 3/23    - Creatinine is now improving.  -  Electrolytes and acid-base acceptable.  - Overloaded on exam    Plan/Recommendations:  · No need for interventions today. Creatinine is improving and expect it to continue to improve.   · Ok to Remove Trialysis line.  Can give Lasix  mg for volume control if UO is inadequate today.  · Strict in/out  · Renal diet  · Avoid nephrotoxic agents.         Thank you for your  consult. I will follow-up with patient. Please contact us if you have any additional questions.    Mauricio Upton MD  Nephrology  [facility]

## 2022-04-26 NOTE — PROGRESS NOTES
"[facility]  Endocrinology  Progress Note    Admit Date: 2022     Reason for Consult: Management of T2DM, Hyperglycemia     Surgical Procedure and Date: Liver Transplant 22    Diabetes diagnosis year: 6 years ago    Home Diabetes Medications:  none currently (took Metformin in the past and could not tolerate side effects)     How often checking glucose at home?  Once daily    BG readings on regimen: < 150  Hypoglycemia on the regimen?  No  Missed doses on regimen? n/a    Diabetes Complications include:     CKD     Complicating diabetes co morbidities:   CKD, Cirrhosis, Obesity, BRAYDON, Glucocorticoid Use       HPI:   Patient is a 57 y.o. male with a diagnosis of EtOH/LOZANO cirrhosis, esophageal varices, obesity (BMI 42), CKD, HTN, DM2, and BRAYDON presenting for liver transplant. He is s/p diagnostic paracentesis on  with 4800mL of fluid removed. Patient currently has FAMILIA likely in setting of vancomycin toxicity, per Nephrology note. Plan for intraoperative dialysis. Patient now presents for the above procedure(s). Endocrinology consulted for management of T2DM.       Lab Results   Component Value Date    HGBA1C 6.6 (H) 2022           Interval HPI:   Overnight events: Remains in SICU. POD 3. BG above goal ranges on IV insulin infusion at 0.5 u/hr and prn correction scale. Creatinine 1.8. Receiving Solu Medrol 160 mg, standard steroid taper. Diet diabetic [facility]; 2000 Calorie; Isolation Tray - Regular [place]  Eatin%  Nausea: No  Hypoglycemia and intervention: No  Fever: No  TPN and/or TF: No  If yes, type of TF/TPN and rate: n/a    BP (!) 172/82   Pulse 98   Temp 98.3 °F (36.8 °C) (Oral)   Resp 12   Ht 5' 7" (1.702 m)   Wt 109.8 kg (242 lb 1 oz)   SpO2 98%   BMI 37.91 kg/m²     Labs Reviewed and Include    Recent Labs   Lab 22  0404   *   CALCIUM 7.9*   ALBUMIN 2.5*   PROT 4.0*   *   K 4.5   CO2 23      BUN 51*   CREATININE 1.8* " "  ALKPHOS 55   *   AST 91*   BILITOT 5.2*     Lab Results   Component Value Date    WBC 9.57 04/26/2022    HGB 8.4 (L) 04/26/2022    HCT 24.7 (L) 04/26/2022    MCV 88 04/26/2022    PLT 36 (LL) 04/26/2022     No results for input(s): TSH, FREET4 in the last 168 hours.  Lab Results   Component Value Date    HGBA1C 6.6 (H) 04/19/2022       Nutritional status:   Body mass index is 37.91 kg/m².  Lab Results   Component Value Date    ALBUMIN 2.5 (L) 04/26/2022    ALBUMIN 2.8 (L) 04/25/2022    ALBUMIN 2.4 (L) 04/25/2022     No results found for: PREALBUMIN    Estimated Creatinine Clearance: 54.8 mL/min (A) (based on SCr of 1.8 mg/dL (H)).    Accu-Checks  No results for input(s): POCTGLUCOSE in the last 72 hours.    Current Medications and/or Treatments Impacting Glycemic Control  Immunotherapy:    Immunosuppressants           Stop Route Frequency     tacrolimus capsule 4 mg         -- Oral 2 times daily     mycophenolate capsule 1,000 mg         -- Oral 2 times daily          Steroids:   Hormones (From admission, onward)                Start     Stop Route Frequency Ordered    04/30/22 0900  predniSONE tablet 20 mg  (methylprednisolone taper panel)        "Followed by" Linked Group Details    -- Oral Daily 04/24/22 0831 04/29/22 0900  methylPREDNISolone sodium succinate injection 40 mg  (methylprednisolone taper panel)        "Followed by" Linked Group Details    04/30 0859 IV Daily 04/24/22 0831    04/28/22 0900  methylPREDNISolone sodium succinate injection 80 mg  (methylprednisolone taper panel)        "Followed by" Linked Group Details    04/29 0859 IV Daily 04/24/22 0831    04/27/22 0900  methylPREDNISolone sodium succinate injection 120 mg  (methylprednisolone taper panel)        "Followed by" Linked Group Details    04/28 0859 IV Daily 04/24/22 0831          Pressors:    Autonomic Drugs (From admission, onward)                None          Hyperglycemia/Diabetes Medications:   Antihyperglycemics (From " admission, onward)                Start     Stop Route Frequency Ordered    04/26/22 1130  insulin aspart U-100 pen 6 Units         -- SubQ 3 times daily with meals 04/26/22 0947    04/25/22 0945  insulin regular in 0.9 % NaCl 100 unit/100 mL (1 unit/mL) infusion        Question:  Enter initial dose (Units/hr):  Answer:  0.5    -- IV Continuous 04/25/22 0831    04/25/22 0931  insulin aspart U-100 pen 0-10 Units         -- SubQ As needed (PRN) 04/25/22 0831            ASSESSMENT and PLAN    * S/P liver transplant    Managed per primary team  Optimize BG control        Type 2 diabetes mellitus without complication, without long-term current use of insulin  BG goal 140 - 180     Increase Transition IV insulin infusion to 0.7 u/hr with stepdown parameters (20% dose increase)  Start Novolog 6 units TID with meals (0.3 u/kg dosing) Prandial BG excursions noted.    Moderate Dose Correction Scale  BG monitoring ac/hs/0200    ** Please call Endocrine for any BG related issues **  ** Please notify Endocrine for any change and/or advance in diet**    Discharge planning: TBD        FAMILIA (acute kidney injury)  Lab Results   Component Value Date    CREATININE 1.8 (H) 04/26/2022     Avoid insulin stacking  Titrate insulin slowly      Prophylactic immunotherapy  May increase insulin resistance.         Adverse effect of corticosteroids  Body mass index is 37.91 kg/m².  May increase insulin resistance.         Class 2 severe obesity due to excess calories with serious comorbidity in adult  Nutrition consulted. Most recent weight and BMI monitored-                         Measurements:  Wt Readings from Last 1 Encounters:   04/26/22 109.8 kg (242 lb 1 oz)   Body mass index is 37.91 kg/m².    Recommendations: Recommendation/Intervention: 1. Advance diet as tolerated to diabetic diet. Encourage PO intake. 2. Once diet advanced, recommend Boost Glucose control TID if PO intake <75% of EEN. 3. RD following.  Goals: Diet advanced to  diabetic diet by RD follow-up.    Patient has been screened and assessed by RD. RD will follow patient.          Esme Pichardo NP  Endocrinology  [facility]

## 2022-04-26 NOTE — ASSESSMENT & PLAN NOTE
  Neuro/Psych:   -- Sedation: none  -- Pain: multimodal w/ PRN narcotic   -- h/o encephalopathy on lactulose/rifaximin pre-op             Cards:   -- s/p adenosine on 4/25 due to an episode of SVT  -- maps >65  -- vasopressors: none  -- echo 02/17: normal function   -- platelets 36, will need platelets if delining today      Pulm:   -- Goal O2 sat > 90%  -- on room air  -- CXR daily  -- ABG PRN      Renal:  -- Shirley in place. May be able to dc today  -- FAMILIA pre-op, will monitor closely; urine studies 04/21  -- Intra-op dialysis; nephrology consulted, following  -- albumin PRN      FEN / GI:   -- h/o ESLD now s/p DBD OLTxp 04/24  -- Replace lytes as needed  -- Nutrition: Diabetic diet  -- bowel reg; HSB  -- CMP serially; AST Q6H  -- GO drain x 2 to right lateral side. 450/500 output from drains  -- s/p liver ultrasound 4/25      ID:   -- Tm: afebrile; WBC WNL pre-op  -- immunosuppression and abx per txp pharm      Heme/Onc:   -- Hgb 8.4  -- CBC, coags serially  -- 7uFFP, 2plt, 2 cryo, 4pRBC intra-op      Endo:   -- Gluc goal 140-180  -- insulin gtt       PPx:   Feeding: diabetic diet  Analgesia/Sedation: multimodal w/ prn narc / none  Thromboembolic prevention: heparin TID  HOB >30: Yes  Stress Ulcer ppx: H2B   Glucose control: Critical care goal 140-180 g/dl, ISS    Lines/Drains/Airway: GO x 2, PIV, arterial line, R IJ. OK to deline today      Dispo/Code Status/Palliative:   -- SICU / Full Code, stepdown

## 2022-04-27 PROBLEM — D68.318: Status: RESOLVED | Noted: 2022-04-09 | Resolved: 2022-04-27

## 2022-04-27 PROBLEM — Z76.82 LIVER TRANSPLANT CANDIDATE: Status: RESOLVED | Noted: 2022-03-18 | Resolved: 2022-04-27

## 2022-04-27 PROBLEM — I85.10 SECONDARY ESOPHAGEAL VARICES WITHOUT BLEEDING: Chronic | Status: RESOLVED | Noted: 2022-03-18 | Resolved: 2022-04-27

## 2022-04-27 PROBLEM — D68.4 ACQUIRED COAGULATION FACTOR DEFICIENCY: Status: RESOLVED | Noted: 2022-04-13 | Resolved: 2022-04-27

## 2022-04-27 PROBLEM — R60.1 GENERALIZED EDEMA: Status: ACTIVE | Noted: 2022-04-27

## 2022-04-27 PROBLEM — Z79.60 LONG-TERM USE OF IMMUNOSUPPRESSANT MEDICATION: Status: ACTIVE | Noted: 2022-04-27

## 2022-04-27 PROBLEM — K70.31 ALCOHOLIC CIRRHOSIS OF LIVER WITH ASCITES: Status: RESOLVED | Noted: 2022-03-18 | Resolved: 2022-04-27

## 2022-04-27 PROBLEM — K72.10 END STAGE LIVER DISEASE: Status: RESOLVED | Noted: 2022-04-09 | Resolved: 2022-04-27

## 2022-04-27 PROBLEM — Z99.11 ENCOUNTER FOR WEANING FROM VENTILATOR: Status: RESOLVED | Noted: 2022-04-24 | Resolved: 2022-04-27

## 2022-04-27 PROBLEM — K76.82 HEPATIC ENCEPHALOPATHY: Status: RESOLVED | Noted: 2022-02-04 | Resolved: 2022-04-27

## 2022-04-27 PROBLEM — I10 ESSENTIAL HYPERTENSION: Status: ACTIVE | Noted: 2022-04-27

## 2022-04-27 LAB
ABO + RH BLD: NORMAL
ALBUMIN SERPL BCP-MCNC: 2.4 G/DL (ref 3.5–5.2)
ALBUMIN SERPL BCP-MCNC: 2.4 G/DL (ref 3.5–5.2)
ALP SERPL-CCNC: 60 U/L (ref 55–135)
ALP SERPL-CCNC: 60 U/L (ref 55–135)
ALT SERPL W/O P-5'-P-CCNC: 134 U/L (ref 10–44)
ALT SERPL W/O P-5'-P-CCNC: 134 U/L (ref 10–44)
ANION GAP SERPL CALC-SCNC: 6 MMOL/L (ref 8–16)
ANION GAP SERPL CALC-SCNC: 6 MMOL/L (ref 8–16)
AST SERPL-CCNC: 108 U/L (ref 10–40)
AST SERPL-CCNC: 108 U/L (ref 10–40)
BACTERIA FLD AEROBE CULT: NO GROWTH
BACTERIA SPEC AEROBE CULT: NO GROWTH
BASOPHILS # BLD AUTO: 0.06 K/UL (ref 0–0.2)
BASOPHILS # BLD AUTO: 0.06 K/UL (ref 0–0.2)
BASOPHILS NFR BLD: 0.8 % (ref 0–1.9)
BASOPHILS NFR BLD: 0.8 % (ref 0–1.9)
BILIRUB SERPL-MCNC: 3.1 MG/DL (ref 0.1–1)
BILIRUB SERPL-MCNC: 3.1 MG/DL (ref 0.1–1)
BLD GP AB SCN CELLS X3 SERPL QL: NORMAL
BLD PROD TYP BPU: NORMAL
BLOOD UNIT EXPIRATION DATE: NORMAL
BLOOD UNIT TYPE CODE: 5100
BLOOD UNIT TYPE: NORMAL
BUN SERPL-MCNC: 55 MG/DL (ref 6–20)
BUN SERPL-MCNC: 55 MG/DL (ref 6–20)
CALCIUM SERPL-MCNC: 7.7 MG/DL (ref 8.7–10.5)
CALCIUM SERPL-MCNC: 7.7 MG/DL (ref 8.7–10.5)
CHLORIDE SERPL-SCNC: 104 MMOL/L (ref 95–110)
CHLORIDE SERPL-SCNC: 104 MMOL/L (ref 95–110)
CO2 SERPL-SCNC: 24 MMOL/L (ref 23–29)
CO2 SERPL-SCNC: 24 MMOL/L (ref 23–29)
CODING SYSTEM: NORMAL
CREAT SERPL-MCNC: 1.5 MG/DL (ref 0.5–1.4)
CREAT SERPL-MCNC: 1.5 MG/DL (ref 0.5–1.4)
DIFFERENTIAL METHOD: ABNORMAL
DIFFERENTIAL METHOD: ABNORMAL
DISPENSE STATUS: NORMAL
EOSINOPHIL # BLD AUTO: 0.1 K/UL (ref 0–0.5)
EOSINOPHIL # BLD AUTO: 0.1 K/UL (ref 0–0.5)
EOSINOPHIL NFR BLD: 1.6 % (ref 0–8)
EOSINOPHIL NFR BLD: 1.6 % (ref 0–8)
ERYTHROCYTE [DISTWIDTH] IN BLOOD BY AUTOMATED COUNT: 18.4 % (ref 11.5–14.5)
ERYTHROCYTE [DISTWIDTH] IN BLOOD BY AUTOMATED COUNT: 18.4 % (ref 11.5–14.5)
EST. GFR  (AFRICAN AMERICAN): 59.7 ML/MIN/1.73 M^2
EST. GFR  (AFRICAN AMERICAN): 59.7 ML/MIN/1.73 M^2
EST. GFR  (NON AFRICAN AMERICAN): 51.7 ML/MIN/1.73 M^2
EST. GFR  (NON AFRICAN AMERICAN): 51.7 ML/MIN/1.73 M^2
GLUCOSE SERPL-MCNC: 135 MG/DL (ref 70–110)
GLUCOSE SERPL-MCNC: 135 MG/DL (ref 70–110)
GRAM STN SPEC: NORMAL
GRAM STN SPEC: NORMAL
HCT VFR BLD AUTO: 25.5 % (ref 40–54)
HCT VFR BLD AUTO: 25.5 % (ref 40–54)
HGB BLD-MCNC: 8.4 G/DL (ref 14–18)
HGB BLD-MCNC: 8.4 G/DL (ref 14–18)
IMM GRANULOCYTES # BLD AUTO: 0.03 K/UL (ref 0–0.04)
IMM GRANULOCYTES # BLD AUTO: 0.03 K/UL (ref 0–0.04)
IMM GRANULOCYTES NFR BLD AUTO: 0.4 % (ref 0–0.5)
IMM GRANULOCYTES NFR BLD AUTO: 0.4 % (ref 0–0.5)
LYMPHOCYTES # BLD AUTO: 0.6 K/UL (ref 1–4.8)
LYMPHOCYTES # BLD AUTO: 0.6 K/UL (ref 1–4.8)
LYMPHOCYTES NFR BLD: 7.8 % (ref 18–48)
LYMPHOCYTES NFR BLD: 7.8 % (ref 18–48)
MAGNESIUM SERPL-MCNC: 1.8 MG/DL (ref 1.6–2.6)
MCH RBC QN AUTO: 29.5 PG (ref 27–31)
MCH RBC QN AUTO: 29.5 PG (ref 27–31)
MCHC RBC AUTO-ENTMCNC: 32.9 G/DL (ref 32–36)
MCHC RBC AUTO-ENTMCNC: 32.9 G/DL (ref 32–36)
MCV RBC AUTO: 90 FL (ref 82–98)
MCV RBC AUTO: 90 FL (ref 82–98)
MONOCYTES # BLD AUTO: 0.8 K/UL (ref 0.3–1)
MONOCYTES # BLD AUTO: 0.8 K/UL (ref 0.3–1)
MONOCYTES NFR BLD: 9.9 % (ref 4–15)
MONOCYTES NFR BLD: 9.9 % (ref 4–15)
NEUTROPHILS # BLD AUTO: 6.4 K/UL (ref 1.8–7.7)
NEUTROPHILS # BLD AUTO: 6.4 K/UL (ref 1.8–7.7)
NEUTROPHILS NFR BLD: 79.5 % (ref 38–73)
NEUTROPHILS NFR BLD: 79.5 % (ref 38–73)
NRBC BLD-RTO: 0 /100 WBC
NRBC BLD-RTO: 0 /100 WBC
PHOSPHATE SERPL-MCNC: 4.9 MG/DL (ref 2.7–4.5)
PLATELET # BLD AUTO: 52 K/UL (ref 150–450)
PLATELET # BLD AUTO: 52 K/UL (ref 150–450)
PMV BLD AUTO: 11.6 FL (ref 9.2–12.9)
PMV BLD AUTO: 11.6 FL (ref 9.2–12.9)
POCT GLUCOSE: 213 MG/DL (ref 70–110)
POCT GLUCOSE: 292 MG/DL (ref 70–110)
POCT GLUCOSE: 309 MG/DL (ref 70–110)
POCT GLUCOSE: 311 MG/DL (ref 70–110)
POCT GLUCOSE: 81 MG/DL (ref 70–110)
POCT GLUCOSE: 86 MG/DL (ref 70–110)
POTASSIUM SERPL-SCNC: 3.8 MMOL/L (ref 3.5–5.1)
POTASSIUM SERPL-SCNC: 3.8 MMOL/L (ref 3.5–5.1)
PROT SERPL-MCNC: 4.1 G/DL (ref 6–8.4)
PROT SERPL-MCNC: 4.1 G/DL (ref 6–8.4)
RBC # BLD AUTO: 2.85 M/UL (ref 4.6–6.2)
RBC # BLD AUTO: 2.85 M/UL (ref 4.6–6.2)
SODIUM SERPL-SCNC: 134 MMOL/L (ref 136–145)
SODIUM SERPL-SCNC: 134 MMOL/L (ref 136–145)
TACROLIMUS BLD-MCNC: 4 NG/ML (ref 5–15)
TRANS ERYTHROCYTES VOL PATIENT: NORMAL ML
WBC # BLD AUTO: 7.98 K/UL (ref 3.9–12.7)
WBC # BLD AUTO: 7.98 K/UL (ref 3.9–12.7)

## 2022-04-27 PROCEDURE — 99233 SBSQ HOSP IP/OBS HIGH 50: CPT | Mod: 24,,, | Performed by: PHYSICIAN ASSISTANT

## 2022-04-27 PROCEDURE — 63600175 PHARM REV CODE 636 W HCPCS: Performed by: PHYSICIAN ASSISTANT

## 2022-04-27 PROCEDURE — 63600175 PHARM REV CODE 636 W HCPCS: Performed by: STUDENT IN AN ORGANIZED HEALTH CARE EDUCATION/TRAINING PROGRAM

## 2022-04-27 PROCEDURE — 20600001 HC STEP DOWN PRIVATE ROOM

## 2022-04-27 PROCEDURE — 86901 BLOOD TYPING SEROLOGIC RH(D): CPT | Performed by: TRANSPLANT SURGERY

## 2022-04-27 PROCEDURE — 25000003 PHARM REV CODE 250: Performed by: STUDENT IN AN ORGANIZED HEALTH CARE EDUCATION/TRAINING PROGRAM

## 2022-04-27 PROCEDURE — 99232 SBSQ HOSP IP/OBS MODERATE 35: CPT | Mod: ,,, | Performed by: NURSE PRACTITIONER

## 2022-04-27 PROCEDURE — 25000003 PHARM REV CODE 250: Performed by: SURGERY

## 2022-04-27 PROCEDURE — 99233 PR SUBSEQUENT HOSPITAL CARE,LEVL III: ICD-10-PCS | Mod: 24,,, | Performed by: PHYSICIAN ASSISTANT

## 2022-04-27 PROCEDURE — 83735 ASSAY OF MAGNESIUM: CPT | Performed by: PHYSICIAN ASSISTANT

## 2022-04-27 PROCEDURE — 80197 ASSAY OF TACROLIMUS: CPT | Performed by: STUDENT IN AN ORGANIZED HEALTH CARE EDUCATION/TRAINING PROGRAM

## 2022-04-27 PROCEDURE — 99232 PR SUBSEQUENT HOSPITAL CARE,LEVL II: ICD-10-PCS | Mod: ,,, | Performed by: NURSE PRACTITIONER

## 2022-04-27 PROCEDURE — P9047 ALBUMIN (HUMAN), 25%, 50ML: HCPCS | Mod: JG | Performed by: PHYSICIAN ASSISTANT

## 2022-04-27 PROCEDURE — 84100 ASSAY OF PHOSPHORUS: CPT | Performed by: PHYSICIAN ASSISTANT

## 2022-04-27 PROCEDURE — 80053 COMPREHEN METABOLIC PANEL: CPT | Performed by: STUDENT IN AN ORGANIZED HEALTH CARE EDUCATION/TRAINING PROGRAM

## 2022-04-27 PROCEDURE — 85025 COMPLETE CBC W/AUTO DIFF WBC: CPT | Performed by: STUDENT IN AN ORGANIZED HEALTH CARE EDUCATION/TRAINING PROGRAM

## 2022-04-27 RX ORDER — FUROSEMIDE 10 MG/ML
80 INJECTION INTRAMUSCULAR; INTRAVENOUS ONCE
Status: COMPLETED | OUTPATIENT
Start: 2022-04-27 | End: 2022-04-27

## 2022-04-27 RX ORDER — TACROLIMUS 1 MG/1
2 CAPSULE ORAL ONCE
Status: COMPLETED | OUTPATIENT
Start: 2022-04-27 | End: 2022-04-27

## 2022-04-27 RX ORDER — TACROLIMUS 1 MG/1
6 CAPSULE ORAL 2 TIMES DAILY
Status: DISCONTINUED | OUTPATIENT
Start: 2022-04-27 | End: 2022-04-28

## 2022-04-27 RX ORDER — ALBUMIN HUMAN 250 G/1000ML
25 SOLUTION INTRAVENOUS
Status: COMPLETED | OUTPATIENT
Start: 2022-04-27 | End: 2022-04-27

## 2022-04-27 RX ADMIN — INSULIN ASPART 8 UNITS: 100 INJECTION, SOLUTION INTRAVENOUS; SUBCUTANEOUS at 01:04

## 2022-04-27 RX ADMIN — DOCUSATE SODIUM 100 MG: 100 CAPSULE ORAL at 08:04

## 2022-04-27 RX ADMIN — INSULIN ASPART 8 UNITS: 100 INJECTION, SOLUTION INTRAVENOUS; SUBCUTANEOUS at 05:04

## 2022-04-27 RX ADMIN — MUPIROCIN 1 G: 20 OINTMENT TOPICAL at 08:04

## 2022-04-27 RX ADMIN — INSULIN ASPART 6 UNITS: 100 INJECTION, SOLUTION INTRAVENOUS; SUBCUTANEOUS at 12:04

## 2022-04-27 RX ADMIN — HEPARIN SODIUM 5000 UNITS: 5000 INJECTION INTRAVENOUS; SUBCUTANEOUS at 08:04

## 2022-04-27 RX ADMIN — OXYCODONE 5 MG: 5 TABLET ORAL at 11:04

## 2022-04-27 RX ADMIN — TACROLIMUS 4 MG: 1 CAPSULE ORAL at 08:04

## 2022-04-27 RX ADMIN — METHOCARBAMOL 500 MG: 500 TABLET, FILM COATED ORAL at 02:04

## 2022-04-27 RX ADMIN — FAMOTIDINE 20 MG: 20 TABLET ORAL at 08:04

## 2022-04-27 RX ADMIN — DOCUSATE SODIUM 100 MG: 100 CAPSULE ORAL at 02:04

## 2022-04-27 RX ADMIN — ALBUMIN (HUMAN) 25 G: 12.5 SOLUTION INTRAVENOUS at 10:04

## 2022-04-27 RX ADMIN — FUROSEMIDE 80 MG: 10 INJECTION, SOLUTION INTRAMUSCULAR; INTRAVENOUS at 10:04

## 2022-04-27 RX ADMIN — METHOCARBAMOL 500 MG: 500 TABLET, FILM COATED ORAL at 08:04

## 2022-04-27 RX ADMIN — OXYCODONE HYDROCHLORIDE 10 MG: 10 TABLET ORAL at 05:04

## 2022-04-27 RX ADMIN — TACROLIMUS 6 MG: 1 CAPSULE ORAL at 06:04

## 2022-04-27 RX ADMIN — NIFEDIPINE 60 MG: 30 TABLET, FILM COATED, EXTENDED RELEASE ORAL at 08:04

## 2022-04-27 RX ADMIN — ALBUMIN (HUMAN) 25 G: 12.5 SOLUTION INTRAVENOUS at 06:04

## 2022-04-27 RX ADMIN — INSULIN ASPART 6 UNITS: 100 INJECTION, SOLUTION INTRAVENOUS; SUBCUTANEOUS at 09:04

## 2022-04-27 RX ADMIN — METHYLPREDNISOLONE SODIUM SUCCINATE 120 MG: 125 INJECTION, POWDER, FOR SOLUTION INTRAMUSCULAR; INTRAVENOUS at 08:04

## 2022-04-27 RX ADMIN — OXYCODONE HYDROCHLORIDE 10 MG: 10 TABLET ORAL at 08:04

## 2022-04-27 RX ADMIN — MYCOPHENOLATE MOFETIL 1000 MG: 250 CAPSULE ORAL at 08:04

## 2022-04-27 RX ADMIN — NYSTATIN 500000 UNITS: 500000 SUSPENSION ORAL at 08:04

## 2022-04-27 RX ADMIN — INSULIN ASPART 4 UNITS: 100 INJECTION, SOLUTION INTRAVENOUS; SUBCUTANEOUS at 12:04

## 2022-04-27 RX ADMIN — INSULIN ASPART 6 UNITS: 100 INJECTION, SOLUTION INTRAVENOUS; SUBCUTANEOUS at 05:04

## 2022-04-27 RX ADMIN — NYSTATIN 500000 UNITS: 500000 SUSPENSION ORAL at 02:04

## 2022-04-27 RX ADMIN — NYSTATIN 500000 UNITS: 500000 SUSPENSION ORAL at 06:04

## 2022-04-27 RX ADMIN — OXYCODONE HYDROCHLORIDE 10 MG: 10 TABLET ORAL at 10:04

## 2022-04-27 RX ADMIN — HEPARIN SODIUM 5000 UNITS: 5000 INJECTION INTRAVENOUS; SUBCUTANEOUS at 02:04

## 2022-04-27 RX ADMIN — TACROLIMUS 2 MG: 1 CAPSULE ORAL at 10:04

## 2022-04-27 RX ADMIN — OXYCODONE HYDROCHLORIDE 10 MG: 10 TABLET ORAL at 04:04

## 2022-04-27 NOTE — PROGRESS NOTES
"[facility] - Transplant Stepdown  Endocrinology  Progress Note    Admit Date: 2022     Reason for Consult: Management of T2DM, Hyperglycemia     Surgical Procedure and Date: Liver Transplant 22    Diabetes diagnosis year: 6 years ago    Home Diabetes Medications:  none currently (took Metformin in the past and could not tolerate side effects)     How often checking glucose at home?  Once daily    BG readings on regimen: < 150  Hypoglycemia on the regimen?  No  Missed doses on regimen? n/a    Diabetes Complications include:     CKD     Complicating diabetes co morbidities:   CKD, Cirrhosis, Obesity, BRAYDON, Glucocorticoid Use       HPI:   Patient is a 57 y.o. male with a diagnosis of EtOH/LOZANO cirrhosis, esophageal varices, obesity (BMI 42), CKD, HTN, DM2, and BRAYDON presenting for liver transplant. He is s/p diagnostic paracentesis on  with 4800mL of fluid removed. Patient currently has FAMILIA likely in setting of vancomycin toxicity, per Nephrology note. Plan for intraoperative dialysis. Patient now presents for the above procedure(s). Endocrinology consulted for management of T2DM.       Lab Results   Component Value Date    HGBA1C 6.6 (H) 2022           Interval HPI:   Overnight events: Transferred from SICU to [facility]. POD 4. BG trending down overnight and IV insulin infusion stopped this morning per protocol. Receiving Solu Medrol 120 mg, standard steorid therapy. Diet diabetic [facility]; 2000 Calorie; Isolation Tray - Regular [place]  Eatin%  Nausea: No  Hypoglycemia and intervention: No  Fever: No  TPN and/or TF: No  If yes, type of TF/TPN and rate: n/a    BP (!) 152/97   Pulse 100   Temp 98 °F (36.7 °C)   Resp 12   Ht 5' 7" (1.702 m)   Wt 109.6 kg (241 lb 10 oz)   SpO2 (!) 94%   BMI 37.84 kg/m²     Labs Reviewed and Include    Recent Labs   Lab 22  0442   *  135*   CALCIUM 7.7*  7.7*   ALBUMIN 2.4*  2.4*   PROT 4.1*  4.1*   *  134*   K 3.8  3.8   CO2 " "24 24     104   BUN 55*  55*   CREATININE 1.5*  1.5*   ALKPHOS 60  60   *  134*   *  108*   BILITOT 3.1*  3.1*     Lab Results   Component Value Date    WBC 7.98 04/27/2022    WBC 7.98 04/27/2022    HGB 8.4 (L) 04/27/2022    HGB 8.4 (L) 04/27/2022    HCT 25.5 (L) 04/27/2022    HCT 25.5 (L) 04/27/2022    MCV 90 04/27/2022    MCV 90 04/27/2022    PLT 52 (L) 04/27/2022    PLT 52 (L) 04/27/2022     No results for input(s): TSH, FREET4 in the last 168 hours.  Lab Results   Component Value Date    HGBA1C 6.6 (H) 04/19/2022       Nutritional status:   Body mass index is 37.84 kg/m².  Lab Results   Component Value Date    ALBUMIN 2.4 (L) 04/27/2022    ALBUMIN 2.4 (L) 04/27/2022    ALBUMIN 2.5 (L) 04/26/2022     No results found for: PREALBUMIN    Estimated Creatinine Clearance: 65.7 mL/min (A) (based on SCr of 1.5 mg/dL (H)).    Accu-Checks  Recent Labs     04/25/22  2124 04/26/22  0213 04/26/22  0719 04/26/22  1301 04/26/22  1707 04/26/22  2055 04/27/22  0155 04/27/22  0809 04/27/22  0843 04/27/22  1212   POCTGLUCOSE 322* 295* 176* 222* 331* 352* 311* 86 81 213*       Current Medications and/or Treatments Impacting Glycemic Control  Immunotherapy:    Immunosuppressants           Stop Route Frequency     tacrolimus capsule 6 mg         -- Oral 2 times daily     mycophenolate capsule 1,000 mg         -- Oral 2 times daily          Steroids:   Hormones (From admission, onward)                Start     Stop Route Frequency Ordered    04/30/22 0900  predniSONE tablet 20 mg  (methylprednisolone taper panel)        "Followed by" Linked Group Details    -- Oral Daily 04/24/22 0831    04/29/22 0900  methylPREDNISolone sodium succinate injection 40 mg  (methylprednisolone taper panel)        "Followed by" Linked Group Details    04/30 0859 IV Daily 04/24/22 0831    04/28/22 0900  methylPREDNISolone sodium succinate injection 80 mg  (methylprednisolone taper panel)        "Followed by" Linked Group " Details    04/29 0859 IV Daily 04/24/22 0831          Pressors:    Autonomic Drugs (From admission, onward)                None          Hyperglycemia/Diabetes Medications:   Antihyperglycemics (From admission, onward)                Start     Stop Route Frequency Ordered    04/26/22 1130  insulin aspart U-100 pen 6 Units         -- SubQ 3 times daily with meals 04/26/22 0947    04/25/22 0931  insulin aspart U-100 pen 0-10 Units         -- SubQ As needed (PRN) 04/25/22 0831            ASSESSMENT and PLAN    * S/P liver transplant    Managed per primary team  Optimize BG control        Type 2 diabetes mellitus without complication, without long-term current use of insulin  BG goal 140 - 180     Discontinue Transition IV insulin infusion to 0.7 u/hr with stepdown parameters (titrated off per protocol) BG below goal ranges.  Continue Novolog 6 units TID with meals (0.3 u/kg dosing)   Moderate Dose Correction Scale  BG monitoring ac/hs    ** Please call Endocrine for any BG related issues **  ** Please notify Endocrine for any change and/or advance in diet**    Discharge planning: TBD        FAMILIA (acute kidney injury)  Lab Results   Component Value Date    CREATININE 1.5 (H) 04/27/2022    CREATININE 1.5 (H) 04/27/2022     Avoid insulin stacking  Titrate insulin slowly      Prophylactic immunotherapy  May increase insulin resistance.         Adverse effect of corticosteroids  Body mass index is 37.84 kg/m².  May increase insulin resistance.         Class 2 severe obesity due to excess calories with serious comorbidity in adult  Nutrition consulted. Most recent weight and BMI monitored-               Final Summary  Subcutaneous Fat Loss (Final Summary): well nourished  Muscle Loss Evaluation (Final Summary): well nourished         Measurements:  Wt Readings from Last 1 Encounters:   04/27/22 109.6 kg (241 lb 10 oz)   Body mass index is 37.84 kg/m².    Recommendations: Recommendation/Intervention: 1. Intensify bowel regimen  prn (LBM 4/23) 2. Continue Diabetic diet - If PO intake <50%, add Boost GC TID 3. RD to follow and monitor  Goals: Diet advanced to diabetic diet by RD follow-up.    Patient has been screened and assessed by RD. RD will follow patient.          Esme Pichardo NP  Endocrinology  [facility]

## 2022-04-27 NOTE — PROGRESS NOTES
RIJ Trialysis removed. Pressure held, and Vaseline gauze dressing applied. Pt tolerated well. Instructed pt to lie flat for 30 minutes, and to leave dressing in place for 24 hours. Will continue to monitor.

## 2022-04-27 NOTE — ASSESSMENT & PLAN NOTE
- Continue Prograf. Monitor trough daily and adjust dose as needed to achieve therapeutic level.  - Continue Cellcept.  - Continue steroids.

## 2022-04-27 NOTE — ASSESSMENT & PLAN NOTE
Lab Results   Component Value Date    CREATININE 1.5 (H) 04/27/2022    CREATININE 1.5 (H) 04/27/2022     Avoid insulin stacking  Titrate insulin slowly

## 2022-04-27 NOTE — SUBJECTIVE & OBJECTIVE
"Interval HPI:   Overnight events: [facility]. BG trending down overnight and IV insulin infusion stopped this morning per protocol. Receiving Solu Medrol 120 mg, standard steorid therapy. Diet diabetic [facility]; 2000 Calorie; Isolation Tray - Regular [place]  Eatin%  Nausea: No  Hypoglycemia and intervention: No  Fever: No  TPN and/or TF: No  If yes, type of TF/TPN and rate: n/a    BP (!) 152/97   Pulse 100   Temp 98 °F (36.7 °C)   Resp 12   Ht 5' 7" (1.702 m)   Wt 109.6 kg (241 lb 10 oz)   SpO2 (!) 94%   BMI 37.84 kg/m²     Labs Reviewed and Include    Recent Labs   Lab 22  0442   *  135*   CALCIUM 7.7*  7.7*   ALBUMIN 2.4*  2.4*   PROT 4.1*  4.1*   *  134*   K 3.8  3.8   CO2 24  24     104   BUN 55*  55*   CREATININE 1.5*  1.5*   ALKPHOS 60  60   *  134*   *  108*   BILITOT 3.1*  3.1*     Lab Results   Component Value Date    WBC 7.98 2022    WBC 7.98 2022    HGB 8.4 (L) 2022    HGB 8.4 (L) 2022    HCT 25.5 (L) 2022    HCT 25.5 (L) 2022    MCV 90 2022    MCV 90 2022    PLT 52 (L) 2022    PLT 52 (L) 2022     No results for input(s): TSH, FREET4 in the last 168 hours.  Lab Results   Component Value Date    HGBA1C 6.6 (H) 2022       Nutritional status:   Body mass index is 37.84 kg/m².  Lab Results   Component Value Date    ALBUMIN 2.4 (L) 2022    ALBUMIN 2.4 (L) 2022    ALBUMIN 2.5 (L) 2022     No results found for: PREALBUMIN    Estimated Creatinine Clearance: 65.7 mL/min (A) (based on SCr of 1.5 mg/dL (H)).    Accu-Checks  Recent Labs     22  2124 22  0213 22  0719 22  1301 22  1707 22  2055 22  0155 22  0809 22  0843 22  1212   POCTGLUCOSE 322* 295* 176* 222* 331* 352* 311* 86 81 213*       Current Medications and/or Treatments Impacting Glycemic " "Control  Immunotherapy:    Immunosuppressants           Stop Route Frequency     tacrolimus capsule 6 mg         -- Oral 2 times daily     mycophenolate capsule 1,000 mg         -- Oral 2 times daily          Steroids:   Hormones (From admission, onward)                Start     Stop Route Frequency Ordered    04/30/22 0900  predniSONE tablet 20 mg  (methylprednisolone taper panel)        "Followed by" Linked Group Details    -- Oral Daily 04/24/22 0831    04/29/22 0900  methylPREDNISolone sodium succinate injection 40 mg  (methylprednisolone taper panel)        "Followed by" Linked Group Details    04/30 0859 IV Daily 04/24/22 0831 04/28/22 0900  methylPREDNISolone sodium succinate injection 80 mg  (methylprednisolone taper panel)        "Followed by" Linked Group Details    04/29 0859 IV Daily 04/24/22 0831          Pressors:    Autonomic Drugs (From admission, onward)                None          Hyperglycemia/Diabetes Medications:   Antihyperglycemics (From admission, onward)                Start     Stop Route Frequency Ordered    04/26/22 1130  insulin aspart U-100 pen 6 Units         -- SubQ 3 times daily with meals 04/26/22 0947    04/25/22 0931  insulin aspart U-100 pen 0-10 Units         -- SubQ As needed (PRN) 04/25/22 0831          "

## 2022-04-27 NOTE — ASSESSMENT & PLAN NOTE
BG goal 140 - 180     Discontinue Transition IV insulin infusion to 0.7 u/hr with stepdown parameters (titrated off per protocol) BG below goal ranges.  Continue Novolog 6 units TID with meals (0.3 u/kg dosing)   Moderate Dose Correction Scale  BG monitoring ac/hs    ** Please call Endocrine for any BG related issues **  ** Please notify Endocrine for any change and/or advance in diet**    Discharge planning: TBD

## 2022-04-27 NOTE — ASSESSMENT & PLAN NOTE
- Cr 2.6 from baseline ~1 on admission 4/24  - Hydrated with albumin, held diuretics, consulted nephrology  - Para 4/22 negative for SBP  - Now s/p OLTx 4/24. Required intra-op HD.   - Renal function improving post op.   - Remove CVC 4/27.  - Diuresis with lasix/albumin. Strict I/O. Daily weights.  - Monitor.

## 2022-04-27 NOTE — ASSESSMENT & PLAN NOTE
- s/p OLTx 4/24/22 2/2 LOZANO & ETOH (steroid induction, CMV +/+).   - Operation straight forward per op note. Did require intra-op HD.  - POD#1 US satisfactory.   - Post op SICU course notable for SVT requiring adenosine for conversation back to NSR.  - Shirley removed in SICU. Stepped down to TSU on 4/26.  - 2 GO drains remain with ss output.  - TB trending down, but AST/ALT slightly increased 4/27. Push Prograf and obtain liver US.   - Renal function continues to improve  - CVC removal 4/27.   - Tolerating diet, (+) flatus (-) BM. Continue bowel regimen and encourage ambulation with assistance.   - PT/OT following. Recommending HH @ discharge. Monitor.

## 2022-04-27 NOTE — SUBJECTIVE & OBJECTIVE
Scheduled Meds:   albumin human 25%  25 g Intravenous Q8H    docusate sodium  100 mg Oral TID    famotidine  20 mg Oral Nightly    heparin (porcine)  5,000 Units Subcutaneous Q8H    insulin aspart U-100  6 Units Subcutaneous TIDWM    methocarbamoL  500 mg Oral TID    [START ON 4/28/2022] methylPREDNISolone sodium succinate injection  80 mg Intravenous Daily    Followed by    [START ON 4/29/2022] methylPREDNISolone sodium succinate injection  40 mg Intravenous Daily    Followed by    [START ON 4/30/2022] predniSONE  20 mg Oral Daily    mupirocin  1 g Nasal BID    mycophenolate  1,000 mg Oral BID    NIFEdipine  60 mg Oral Daily    nystatin  500,000 Units Mouth/Throat TID PC    [START ON 5/1/2022] sulfamethoxazole-trimethoprim 400-80mg  1 tablet Oral Daily AM    tacrolimus  6 mg Oral BID    [START ON 5/4/2022] valGANciclovir  450 mg Oral Daily     Continuous Infusions:  PRN Meds:sodium chloride, dextrose 10%, dextrose 10%, glucagon (human recombinant), glucose, glucose, insulin aspart U-100, ondansetron, oxyCODONE, oxyCODONE, sodium chloride 0.9%    Review of Systems   Constitutional:  Negative for appetite change, chills and fever.   HENT:  Negative for facial swelling and trouble swallowing.    Eyes:  Negative for photophobia.   Respiratory:  Negative for cough, shortness of breath, wheezing and stridor.    Cardiovascular:  Positive for leg swelling. Negative for chest pain and palpitations.   Gastrointestinal:  Positive for abdominal distention, abdominal pain (incisional/appropriate) and constipation. Negative for diarrhea, nausea and vomiting.   Genitourinary:  Negative for decreased urine volume, difficulty urinating and dysuria.   Musculoskeletal:  Negative for neck pain and neck stiffness.   Skin:  Positive for wound.   Allergic/Immunologic: Positive for immunocompromised state.   Neurological:  Positive for weakness. Negative for dizziness and light-headedness.   Psychiatric/Behavioral:  Negative for  agitation, behavioral problems, confusion and decreased concentration.    Objective:     Vital Signs (Most Recent):  Temp: 97.9 °F (36.6 °C) (04/27/22 0736)  Pulse: 94 (04/27/22 0736)  Resp: 18 (04/27/22 1018)  BP: 122/78 (04/27/22 0736)  SpO2: 95 % (04/27/22 0736) Vital Signs (24h Range):  Temp:  [97.9 °F (36.6 °C)-98.2 °F (36.8 °C)] 97.9 °F (36.6 °C)  Pulse:  [] 94  Resp:  [10-27] 18  SpO2:  [95 %-98 %] 95 %  BP: (115-168)/(65-81) 122/78     Weight: 109.6 kg (241 lb 10 oz)  Body mass index is 37.84 kg/m².    Intake/Output - Last 3 Shifts         04/25 0700 04/26 0659 04/26 0700 04/27 0659 04/27 0700  04/28 0659    P.O. 400 240 840    I.V. (mL/kg) 25.9 (0.2) 6.9 (0.1) 100 (0.9)    Blood 287 278     Other  0     IV Piggyback 49.3      Total Intake(mL/kg) 762.2 (6.9) 524.9 (4.8) 940 (8.6)    Urine (mL/kg/hr) 1420 (0.5) 1845 (0.7) 550 (1.2)    Emesis/NG output  0     Drains 895 1105 170    Other  0     Stool  0     Blood  0     Total Output 2315 2950 720    Net -1552.8 -2425.1 +220                   Physical Exam  Vitals and nursing note reviewed.   Constitutional:       General: He is not in acute distress.  HENT:      Head: Normocephalic and atraumatic.   Eyes:      General: No scleral icterus.        Right eye: No discharge.         Left eye: No discharge.   Cardiovascular:      Rate and Rhythm: Normal rate and regular rhythm.      Heart sounds: No murmur heard.  Pulmonary:      Effort: Pulmonary effort is normal. No respiratory distress.      Breath sounds: No wheezing or rales.   Abdominal:      General: Bowel sounds are normal. There is distension.      Tenderness: There is no abdominal tenderness. There is no guarding.      Comments: Chevron incision AMARIS with staples no s/s/I  2 R GO drains with ss output   Musculoskeletal:         General: Swelling present.      Right lower leg: Edema present.      Left lower leg: Edema present.   Skin:     General: Skin is warm and dry.      Capillary Refill:  Capillary refill takes less than 2 seconds.      Coloration: Skin is not jaundiced.   Neurological:      General: No focal deficit present.      Mental Status: He is alert and oriented to person, place, and time.   Psychiatric:         Mood and Affect: Mood normal.         Behavior: Behavior normal.         Thought Content: Thought content normal.       Laboratory:  Immunosuppressants           Stop Route Frequency     tacrolimus capsule 6 mg         -- Oral 2 times daily     mycophenolate capsule 1,000 mg         -- Oral 2 times daily          CBC:   Recent Labs   Lab 04/27/22  0442   WBC 7.98  7.98   RBC 2.85*  2.85*   HGB 8.4*  8.4*   HCT 25.5*  25.5*   PLT 52*  52*   MCV 90  90   MCH 29.5  29.5   MCHC 32.9  32.9     CMP:   Recent Labs   Lab 04/27/22  0442   *  135*   CALCIUM 7.7*  7.7*   ALBUMIN 2.4*  2.4*   PROT 4.1*  4.1*   *  134*   K 3.8  3.8   CO2 24  24     104   BUN 55*  55*   CREATININE 1.5*  1.5*   ALKPHOS 60  60   *  134*   *  108*   BILITOT 3.1*  3.1*     Coagulation:   Recent Labs   Lab 04/26/22  0404   INR 1.1   APTT 29.9     Labs within the past 24 hours have been reviewed.    Diagnostic Results:  I have personally reviewed all pertinent imaging studies.    Debility/Functional status: Patient debilitated by evidence of Weakness, Chronic fatigue, unspecified, Other malaise, and Other reduced mobility. Physical and occupational therapy ordered daily to evaluate and treat. Debility was: present on admission.

## 2022-04-27 NOTE — ASSESSMENT & PLAN NOTE
Nutrition consulted. Most recent weight and BMI monitored-               Final Summary  Subcutaneous Fat Loss (Final Summary): well nourished  Muscle Loss Evaluation (Final Summary): well nourished         Measurements:  Wt Readings from Last 1 Encounters:   04/27/22 109.6 kg (241 lb 10 oz)   Body mass index is 37.84 kg/m².    Recommendations: Recommendation/Intervention: 1. Intensify bowel regimen prn (LBM 4/23) 2. Continue Diabetic diet - If PO intake <50%, add Boost GC TID 3. RD to follow and monitor  Goals: Diet advanced to diabetic diet by RD follow-up.    Patient has been screened and assessed by RD. RD will follow patient.

## 2022-04-27 NOTE — PLAN OF CARE
-Pt free from fall or injury so far this shift. Instructed to call if assistance needed, verbalized understanding.   -Sats mid-high 90's on RA. IS in use.  -Cardiac monitoring in progress, currently SR.   -BG 86 this AM, insulin gtt off. BG checked AC/HS. Last , 10 units Novolog given (6 MTI + 4 SSI).   -Creat 1.5. Diuresed with Albumin and IVP Lasix.   -LE's slightly elevated, liver US done. GO drains still in place.   -Oxycodone given for pain.   -Passing gas, but no BM. Colace continued.   -Afebrile. Hand hygiene reinforced. Daily labs monitored.   -Taught pt and fiancee how to pull self meds. Went over blue card in detail, verbalized understanding. Also taught how to paint incision with betadine.

## 2022-04-27 NOTE — PROGRESS NOTES
[facility] - Transplant Stepdown  Liver Transplant  Progress Note    Patient Name: Pelon Vasquez  MRN: [id number]  Admission Date: 4/19/2022  Hospital Length of Stay: 7 days  Code Status: Full Code  Primary Care Provider: Primary Doctor No  Post-Operative Day: 3    ORGAN:   LIVER  Disease Etiology: Cirrhosis: Other, Specify  Donor Type:   Donation after Brain Death  CDC High Risk:   No  Donor CMV Status:   Donor CMV Status: Positive  Donor HBcAB:   Negative  Donor HCV Status:   Negative  Donor HBV MATHEUS: Negative  Donor HCV MATHEUS: Negative  Whole or Partial: Whole Liver  Biliary Anastomosis: End to End  Arterial Anatomy: Completely Replaced Circulation  Subjective:     History of Present Illness:  Pelon Vasquez is a 57yr olf male with 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 ESLD complicated by jaundice, FAMILIA, ascites (para 2x/week, no h/o SBP), HE, and hypervolemia. He is listed for a liver transplant with MELD 30. Patient admitted for liver transplant surgery. He denies any recent fevers or hospitalizations unknown to transplant team. He reports feel well in his usual state of health. Pre op labs and imaging pending      Hospital Course:  Patient was admitted for liver transplant 4/19 but case cancelled due to donor organ quality. He was kept inpatient due to FAMILIA noted on admit. Urine sodium < 10. Cr continued to trend up despite holding diuretics and hydrating with albumin. Nephrology consulted. Paracentesis ordered to rule out SBP. 5.5L removed, no SBP. Patient ultimately became primary for another liver transplant. He is now s/p OLTx 4/24/22 2/2 LOZANO & ETOH (steroid induction, CMV +/+). Operation straight forward per op note. POD#1 US satisfactory. Post op SICU course notable for SVT requiring adenosine for conversation back to NSR. Fern removed in SICU. Stepped down to TSU on 4/26.    Interval History: No acute events overnight. POD# 3 s/p OLTx. TB trending down, but AST/ALT slightly increased today. Push Prograf  and obtain liver US. 2 R GO drains remain with ~500 cc ss output in each past 24h. Leave drains in place for now. Renal function continues to improve.Cr 1.5 from 1.8 with good UOP. Nephrology following. Patient still has significant generalized edema. Plan for diuresis with 25% albumin and 80 mg IV lasix. Additional albumin ordered for this afternoon, will determine need for more lasix pending response. Plan for CVC removal today. Patient overall progressing well. Tolerating diet, (+) flatus (-) BM. Continue bowel regimen and encourage ambulation with assistance. Pain appropriately controlled. PT/OT following. Recommending HH @ discharge. Monitor.      Scheduled Meds:   albumin human 25%  25 g Intravenous Q8H    docusate sodium  100 mg Oral TID    famotidine  20 mg Oral Nightly    heparin (porcine)  5,000 Units Subcutaneous Q8H    insulin aspart U-100  6 Units Subcutaneous TIDWM    methocarbamoL  500 mg Oral TID    [START ON 4/28/2022] methylPREDNISolone sodium succinate injection  80 mg Intravenous Daily    Followed by    [START ON 4/29/2022] methylPREDNISolone sodium succinate injection  40 mg Intravenous Daily    Followed by    [START ON 4/30/2022] predniSONE  20 mg Oral Daily    mupirocin  1 g Nasal BID    mycophenolate  1,000 mg Oral BID    NIFEdipine  60 mg Oral Daily    nystatin  500,000 Units Mouth/Throat TID PC    [START ON 5/1/2022] sulfamethoxazole-trimethoprim 400-80mg  1 tablet Oral Daily AM    tacrolimus  6 mg Oral BID    [START ON 5/4/2022] valGANciclovir  450 mg Oral Daily     Continuous Infusions:  PRN Meds:sodium chloride, dextrose 10%, dextrose 10%, glucagon (human recombinant), glucose, glucose, insulin aspart U-100, ondansetron, oxyCODONE, oxyCODONE, sodium chloride 0.9%    Review of Systems   Constitutional:  Negative for appetite change, chills and fever.   HENT:  Negative for facial swelling and trouble swallowing.    Eyes:  Negative for photophobia.   Respiratory:  Negative  for cough, shortness of breath, wheezing and stridor.    Cardiovascular:  Positive for leg swelling. Negative for chest pain and palpitations.   Gastrointestinal:  Positive for abdominal distention, abdominal pain (incisional/appropriate) and constipation. Negative for diarrhea, nausea and vomiting.   Genitourinary:  Negative for decreased urine volume, difficulty urinating and dysuria.   Musculoskeletal:  Negative for neck pain and neck stiffness.   Skin:  Positive for wound.   Allergic/Immunologic: Positive for immunocompromised state.   Neurological:  Positive for weakness. Negative for dizziness and light-headedness.   Psychiatric/Behavioral:  Negative for agitation, behavioral problems, confusion and decreased concentration.    Objective:     Vital Signs (Most Recent):  Temp: 97.9 °F (36.6 °C) (04/27/22 0736)  Pulse: 94 (04/27/22 0736)  Resp: 18 (04/27/22 1018)  BP: 122/78 (04/27/22 0736)  SpO2: 95 % (04/27/22 0736) Vital Signs (24h Range):  Temp:  [97.9 °F (36.6 °C)-98.2 °F (36.8 °C)] 97.9 °F (36.6 °C)  Pulse:  [] 94  Resp:  [10-27] 18  SpO2:  [95 %-98 %] 95 %  BP: (115-168)/(65-81) 122/78     Weight: 109.6 kg (241 lb 10 oz)  Body mass index is 37.84 kg/m².    Intake/Output - Last 3 Shifts         04/25 0700 04/26 0659 04/26 0700 04/27 0659 04/27 0700 04/28 0659    P.O. 400 240 840    I.V. (mL/kg) 25.9 (0.2) 6.9 (0.1) 100 (0.9)    Blood 287 278     Other  0     IV Piggyback 49.3      Total Intake(mL/kg) 762.2 (6.9) 524.9 (4.8) 940 (8.6)    Urine (mL/kg/hr) 1420 (0.5) 1845 (0.7) 550 (1.2)    Emesis/NG output  0     Drains 895 1105 170    Other  0     Stool  0     Blood  0     Total Output 2315 2950 720    Net -1552.8 -2425.1 +220                   Physical Exam  Vitals and nursing note reviewed.   Constitutional:       General: He is not in acute distress.  HENT:      Head: Normocephalic and atraumatic.   Eyes:      General: No scleral icterus.        Right eye: No discharge.         Left eye: No  discharge.   Cardiovascular:      Rate and Rhythm: Normal rate and regular rhythm.      Heart sounds: No murmur heard.  Pulmonary:      Effort: Pulmonary effort is normal. No respiratory distress.      Breath sounds: No wheezing or rales.   Abdominal:      General: Bowel sounds are normal. There is distension.      Tenderness: There is no abdominal tenderness. There is no guarding.      Comments: Chevron incision AMARIS with staples no s/s/I  2 R GO drains with ss output   Musculoskeletal:         General: Swelling present.      Right lower leg: Edema present.      Left lower leg: Edema present.   Skin:     General: Skin is warm and dry.      Capillary Refill: Capillary refill takes less than 2 seconds.      Coloration: Skin is not jaundiced.   Neurological:      General: No focal deficit present.      Mental Status: He is alert and oriented to person, place, and time.   Psychiatric:         Mood and Affect: Mood normal.         Behavior: Behavior normal.         Thought Content: Thought content normal.       Laboratory:  Immunosuppressants           Stop Route Frequency     tacrolimus capsule 6 mg         -- Oral 2 times daily     mycophenolate capsule 1,000 mg         -- Oral 2 times daily          CBC:   Recent Labs   Lab 04/27/22  0442   WBC 7.98  7.98   RBC 2.85*  2.85*   HGB 8.4*  8.4*   HCT 25.5*  25.5*   PLT 52*  52*   MCV 90  90   MCH 29.5  29.5   MCHC 32.9  32.9     CMP:   Recent Labs   Lab 04/27/22  0442   *  135*   CALCIUM 7.7*  7.7*   ALBUMIN 2.4*  2.4*   PROT 4.1*  4.1*   *  134*   K 3.8  3.8   CO2 24  24     104   BUN 55*  55*   CREATININE 1.5*  1.5*   ALKPHOS 60  60   *  134*   *  108*   BILITOT 3.1*  3.1*     Coagulation:   Recent Labs   Lab 04/26/22  0404   INR 1.1   APTT 29.9     Labs within the past 24 hours have been reviewed.    Diagnostic Results:  I have personally reviewed all pertinent imaging studies.    Debility/Functional status:  "Patient debilitated by evidence of Weakness, Chronic fatigue, unspecified, Other malaise, and Other reduced mobility. Physical and occupational therapy ordered daily to evaluate and treat. Debility was: present on admission.    Assessment/Plan:     * S/P liver transplant  - s/p OLTx 4/24/22 2/2 LOZANO & ETOH (steroid induction, CMV +/+).   - Operation straight forward per op note. Did require intra-op HD.  - POD#1 US satisfactory.   - Post op SICU course notable for SVT requiring adenosine for conversation back to NSR.  - Shirley removed in SICU. Stepped down to TSU on 4/26.  - 2 GO drains remain with ss output.  - TB trending down, but AST/ALT slightly increased 4/27. Push Prograf and obtain liver US.   - Renal function continues to improve  - CVC removal 4/27.   - Tolerating diet, (+) flatus (-) BM. Continue bowel regimen and encourage ambulation with assistance.   - PT/OT following. Recommending HH @ discharge. Monitor.    Generalized edema  - Diuresis with lasix/albumin  - Monitor      Essential hypertension  - Continue Nifedipine.      Long-term use of immunosuppressant medication  - See "prophylactic immunotherapy."      Class 2 severe obesity due to excess calories with serious comorbidity in adult  - Monitor. RD following.      Adverse effect of corticosteroids  - Monitor. Endocrine following.      Prophylactic immunotherapy  - Continue Prograf. Monitor trough daily and adjust dose as needed to achieve therapeutic level.  - Continue Cellcept.  - Continue steroids.    At risk for opportunistic infections  - Bactrim for PCP ppx  - Valcyte for CMV ppx  - Nystatin for thrush ppx      Thrombocytopenia, unspecified  - Due to ESLD  - Improving post op  - Monitor      Anemia of chronic disease  - H/H stable. Will continue to monitor with daily cbc.           FAMILIA (acute kidney injury)  - Cr 2.6 from baseline ~1 on admission 4/24  - Hydrated with albumin, held diuretics, consulted nephrology  - Para 4/22 negative for " SBP  - Now s/p OLTx 4/24. Required intra-op HD.   - Renal function improving post op.   - Remove CVC 4/27.  - Diuresis with lasix/albumin. Strict I/O. Daily weights.  - Monitor.    Type 2 diabetes mellitus without complication, without long-term current use of insulin  - Endocrine following, appreciate assistance.           VTE Risk Mitigation (From admission, onward)         Ordered     heparin (porcine) injection 5,000 Units  Every 8 hours         04/24/22 0831     Place sequential compression device  Until discontinued         04/24/22 0831     IP VTE HIGH RISK PATIENT  Once         04/24/22 0831                The patients clinical status was discussed at multidisplinary rounds, involving transplant surgery, transplant medicine, pharmacy, nursing, nutrition, and social work    Discharge Planning:  Not a candidate for dc at this time.    zAalea Malin PA-C  Liver Transplant  [facility] - Transplant Stepdown

## 2022-04-27 NOTE — ASSESSMENT & PLAN NOTE
Oliguric likely ischemic ATN secondary to intraoperative hypotension and norepi and vaso requirements 4/23-4/24  -Baseline sCr of  1.3-1.5, admission Cr at 2.3    -pre op UA shows protein 21 WBC 5 RBC and hyaline casts, UPCR  0.2 g/g, microscopy with [facility] [place]  -intraoperative dialysis performed 3/23    - Creatinine is now improving.  -  Electrolytes and acid-base acceptable.  - Overloaded on exam    Plan/Recommendations:  · Creatinine is improving and expect it to continue to improve.   · Ok to Remove Trialysis line.  · Can start Lasix 60 mg IV daily for volume control and transition to home dose of Lasix once lower extremity edema improves.  · Nephrology will sign off.    · Follow-up with nephrology as outpatient.   · Strict in/out  · Renal diet  · Avoid nephrotoxic agents.

## 2022-04-27 NOTE — PLAN OF CARE
[facility]  TELE NSR/STACH  VISI ON   OXY GIVEN X 2   GO X 2 WITH GOOD OUTPUT   GOOD UOP  LABS DRAWN AND SENT TO LAB   BED LOW RAILS UP X2 CALL LIGHT WITHIN REACH  VERBAL UNDERSTANDING NOTED TO CALL PRN

## 2022-04-27 NOTE — SUBJECTIVE & OBJECTIVE
Interval History: NAEON. Reports feeling better today.   Creatinine continues to improve.     Review of patient's allergies indicates:   Allergen Reactions    Strawberry Anaphylaxis and Rash    Metformin Diarrhea    Pork/porcine containing products Diarrhea and Nausea And Vomiting     Current Facility-Administered Medications   Medication Frequency    0.9%  NaCl infusion (for blood administration) Q24H PRN    albumin human 25% bottle 25 g Q8H    dextrose 10% bolus 125 mL PRN    dextrose 10% bolus 250 mL PRN    docusate sodium capsule 100 mg TID    famotidine tablet 20 mg Nightly    glucagon (human recombinant) injection 1 mg PRN    glucose chewable tablet 16 g PRN    glucose chewable tablet 24 g PRN    heparin (porcine) injection 5,000 Units Q8H    insulin aspart U-100 pen 0-10 Units PRN    insulin aspart U-100 pen 6 Units TIDWM    methocarbamoL tablet 500 mg TID    [START ON 4/28/2022] methylPREDNISolone sodium succinate injection 80 mg Daily    Followed by    [START ON 4/29/2022] methylPREDNISolone sodium succinate injection 40 mg Daily    Followed by    [START ON 4/30/2022] predniSONE tablet 20 mg Daily    mupirocin 2 % ointment 1 g BID    mycophenolate capsule 1,000 mg BID    NIFEdipine 24 hr tablet 60 mg Daily    nystatin 100,000 unit/mL suspension 500,000 Units TID PC    ondansetron disintegrating tablet 4 mg Q8H PRN    oxyCODONE immediate release tablet 5 mg Q4H PRN    oxyCODONE immediate release tablet Tab 10 mg Q4H PRN    sodium chloride 0.9% flush 10 mL PRN    [START ON 5/1/2022] sulfamethoxazole-trimethoprim 400-80mg per tablet 1 tablet Daily AM    tacrolimus capsule 6 mg BID    [START ON 5/4/2022] valGANciclovir tablet 450 mg Daily       Objective:     Vital Signs (Most Recent):  Temp: 98 °F (36.7 °C) (04/27/22 1230)  Pulse: 100 (04/27/22 1141)  Resp: 12 (04/27/22 1135)  BP: (!) 152/97 (04/27/22 1135)  SpO2: (!) 94 % (04/27/22 1135)  O2 Device (Oxygen Therapy): room air (04/27/22 1135)   Vital Signs (24h  Range):  Temp:  [97.9 °F (36.6 °C)-98.2 °F (36.8 °C)] 98 °F (36.7 °C)  Pulse:  [] 100  Resp:  [10-27] 12  SpO2:  [94 %-98 %] 94 %  BP: (115-161)/(65-97) 152/97     Weight: 109.6 kg (241 lb 10 oz) (04/27/22 0448)  Body mass index is 37.84 kg/m².  Body surface area is 2.28 meters squared.    I/O last 3 completed shifts:  In: 930.9 [P.O.:640; I.V.:12.9; Blood:278]  Out: 4185 [Urine:2525; Drains:1660]    Physical Exam  Vitals and nursing note reviewed.   Constitutional:       General: He is not in acute distress.     Appearance: He is ill-appearing.   HENT:      Mouth/Throat:      Mouth: Mucous membranes are moist.   Eyes:      General: Scleral icterus present.      Extraocular Movements: Extraocular movements intact.      Pupils: Pupils are equal, round, and reactive to light.   Cardiovascular:      Rate and Rhythm: Normal rate.   Abdominal:      Palpations: Abdomen is soft.   Musculoskeletal:         General: Swelling present. No deformity.      Right lower leg: Edema present.      Left lower leg: Edema present.   Skin:     Coloration: Skin is jaundiced.       Significant Labs:  BMP:   Recent Labs   Lab 04/27/22 0442   *  135*   *  134*   K 3.8  3.8     104   CO2 24  24   BUN 55*  55*   CREATININE 1.5*  1.5*   CALCIUM 7.7*  7.7*   MG 1.8       Cardiac Markers: No results for input(s): CKMB, TROPONINT, MYOGLOBIN in the last 168 hours.  CBC:   Recent Labs   Lab 04/27/22 0442   WBC 7.98  7.98   RBC 2.85*  2.85*   HGB 8.4*  8.4*   HCT 25.5*  25.5*   PLT 52*  52*   MCV 90  90   MCH 29.5  29.5   MCHC 32.9  32.9       All labs within the past 24 hours have been reviewed.     Significant Imaging:  N/a

## 2022-04-27 NOTE — PT/OT/SLP PROGRESS
Physical Therapy      Patient Name:  Pelon Vasquez   MRN:  [id number]    Patient not seen today secondary to pt with central line removed and needing to lay flat. Unable to re attmept  . Will follow-up next scheduled treatment per PT POC.

## 2022-04-27 NOTE — PROGRESS NOTES
[facility] - [facility]plant Stepdown  Adult Nutrition  Progress Note    SUMMARY       Recommendations    1. Intensify bowel regimen prn (LBM 4/23)     2. Continue Diabetic diet    - If PO intake <50%, add Boost GC TID     3. RD to follow and monitor    Goals: Diet advanced to diabetic diet by RD follow-up.  Nutrition Goal Status: goal met    Communication of RD Recs: reviewed with RN    Assessment and Plan    Nutrition Problem  Increased nutrient needs, protein    Related to (etiology):   Physiological demands, healing    Signs and Symptoms (as evidenced by):   S/p OLTx on 4/23    Interventions/Recommendations (treatment strategy):  Collaboration with other providers  Nutrition education    Nutrition Diagnosis Status:   New    Malnutrition Assessment       Orbital Region (Subcutaneous Fat Loss): well nourished  Upper Arm Region (Subcutaneous Fat Loss): well nourished   Jain Region (Muscle Loss): well nourished  Clavicle Bone Region (Muscle Loss): well nourished  Clavicle and Acromion Bone Region (Muscle Loss): well nourished  Patellar Region (Muscle Loss): well nourished  Anterior Thigh Region (Muscle Loss): well nourished  Posterior Calf Region (Muscle Loss): well nourished   Edema (Fluid Accumulation): 2-->mild   Subcutaneous Fat Loss (Final Summary): well nourished  Muscle Loss Evaluation (Final Summary): well nourished         Reason for Assessment    Reason For Assessment: RD follow-up  Diagnosis:  (s/p OLTx)  Relevant Medical History:  (T2DM, cirrhosis, esophageal varices, CKD, hepatic encephalopathy, HTN, LOZANO)  Interdisciplinary Rounds: did not attend    General Information Comments: S/p OLTx 4/23. Stepped down 4/26. Pt reports great appetite since surgery tolerating 50-75% PO intake with no c/o n/v/d/c or difficulties chewing/swallowing. No ONS ordered. 2+ generalized edema noted. Pt appears somewhat jaundiced. 19# wt loss noted x 3 weeks; likely fluid related as pt is -7.3L since admit. NFPE completed 4/27; pt  "appears edematous but nourished with no s/s of malnutrition.    Nutrition Discharge Planning: Post transplant nutrition education provided on  and reinforced . Food safety/drug interactions emphasized. General healthy/low salt diet recommended. Eduation material left at bedside. No other needs identified.    Nutrition Risk Screen    Nutrition Risk Screen: no indicators present    Nutrition/Diet History    Patient Reported Diet/Restrictions/Preferences:  (pt reported unable to tolerated Pork products)  Spiritual, Cultural Beliefs, Bahai Practices, Values that Affect Care: no  Food Allergies: other (see comments) (strawberry)  Factors Affecting Nutritional Intake: None identified at this time    Anthropometrics    Temp: 98 °F (36.7 °C)  Height Method: Stated  Height: 5' 7" (170.2 cm)  Height (inches): 67 in  Weight Method: Bed Scale  Weight: 109.6 kg (241 lb 10 oz)  Weight (lb): 241.63 lb  Ideal Body Weight (IBW), Male: 148 lb  % Ideal Body Weight, Male (lb): 168.7 %  BMI (Calculated): 37.8  BMI Grade: 35 - 39.9 - obesity - grade II  Usual Body Weight (UBW), k.8 kg  % Usual Body Weight: 105.74       Lab/Procedures/Meds    Pertinent Labs Reviewed: reviewed  Pertinent Labs Comments: Na 134, BUN 55, GFR 51.7, Phos 4.9, Total protein 4.1, ,   Pertinent Medications Reviewed: reviewed  Pertinent Medications Comments: heparin, senna, prednisone, mycophenolate, tacrolimus    Estimated/Assessed Needs    Weight Used For Calorie Calculations: 112.7 kg (248 lb 7.3 oz)  Energy Calorie Requirements (kcal):   Energy Need Method: [place]-St Jeor (no AF)  Protein Requirements: 90 - 113 gm pro/day (0.8-1.0 gm/kg)  Weight Used For Protein Calculations: 112.7 kg (248 lb 7.3 oz)  Fluid Requirements (mL): 1 mL/aubree or per MD  Estimated Fluid Requirement Method: RDA Method  RDA Method (mL):        Nutrition Prescription Ordered    Current Diet Order: Diabetic 2000 kcal    Evaluation of Received " Nutrient/Fluid Intake    I/O: -7.3L since admit  Energy Calories Required: meeting needs  Protein Required: meeting needs  Comments: LBM 4/23  Tolerance: tolerating  % Intake of Estimated Energy Needs: 50 - 75 %  % Meal Intake: 50 - 75 %    Nutrition Risk    Level of Risk/Frequency of Follow-up: low     Monitor and Evaluation    Food and Nutrient Intake: food and beverage intake, energy intake  Food and Nutrient Adminstration: diet order  Knowledge/Beliefs/Attitudes: food and nutrition knowledge/skill  Physical Activity and Function: nutrition-related ADLs and IADLs  Anthropometric Measurements: weight, weight change, body mass index  Biochemical Data, Medical Tests and Procedures: electrolyte and renal panel, gastrointestinal profile, glucose/endocrine profile, inflammatory profile, lipid profile  Nutrition-Focused Physical Findings: overall appearance     Nutrition Follow-Up    RD Follow-up?: Yes

## 2022-04-28 LAB
ALBUMIN SERPL BCP-MCNC: 2.6 G/DL (ref 3.5–5.2)
ALP SERPL-CCNC: 83 U/L (ref 55–135)
ALT SERPL W/O P-5'-P-CCNC: 143 U/L (ref 10–44)
ANION GAP SERPL CALC-SCNC: 7 MMOL/L (ref 8–16)
AST SERPL-CCNC: 108 U/L (ref 10–40)
BASOPHILS # BLD AUTO: 0.03 K/UL (ref 0–0.2)
BASOPHILS NFR BLD: 0.5 % (ref 0–1.9)
BILIRUB SERPL-MCNC: 2.6 MG/DL (ref 0.1–1)
BUN SERPL-MCNC: 57 MG/DL (ref 6–20)
CALCIUM SERPL-MCNC: 7.8 MG/DL (ref 8.7–10.5)
CHLORIDE SERPL-SCNC: 101 MMOL/L (ref 95–110)
CO2 SERPL-SCNC: 25 MMOL/L (ref 23–29)
CREAT SERPL-MCNC: 1.3 MG/DL (ref 0.5–1.4)
DIFFERENTIAL METHOD: ABNORMAL
EOSINOPHIL # BLD AUTO: 0.3 K/UL (ref 0–0.5)
EOSINOPHIL NFR BLD: 5.6 % (ref 0–8)
ERYTHROCYTE [DISTWIDTH] IN BLOOD BY AUTOMATED COUNT: 17.9 % (ref 11.5–14.5)
EST. GFR  (AFRICAN AMERICAN): >60 ML/MIN/1.73 M^2
EST. GFR  (NON AFRICAN AMERICAN): >60 ML/MIN/1.73 M^2
GLUCOSE SERPL-MCNC: 170 MG/DL (ref 70–110)
HCT VFR BLD AUTO: 24.7 % (ref 40–54)
HGB BLD-MCNC: 8.2 G/DL (ref 14–18)
IMM GRANULOCYTES # BLD AUTO: 0.01 K/UL (ref 0–0.04)
IMM GRANULOCYTES NFR BLD AUTO: 0.2 % (ref 0–0.5)
LYMPHOCYTES # BLD AUTO: 0.6 K/UL (ref 1–4.8)
LYMPHOCYTES NFR BLD: 10.5 % (ref 18–48)
MAGNESIUM SERPL-MCNC: 1.7 MG/DL (ref 1.6–2.6)
MCH RBC QN AUTO: 29.7 PG (ref 27–31)
MCHC RBC AUTO-ENTMCNC: 33.2 G/DL (ref 32–36)
MCV RBC AUTO: 90 FL (ref 82–98)
MONOCYTES # BLD AUTO: 0.6 K/UL (ref 0.3–1)
MONOCYTES NFR BLD: 11 % (ref 4–15)
NEUTROPHILS # BLD AUTO: 4.1 K/UL (ref 1.8–7.7)
NEUTROPHILS NFR BLD: 72.2 % (ref 38–73)
NRBC BLD-RTO: 0 /100 WBC
PHOSPHATE SERPL-MCNC: 4.3 MG/DL (ref 2.7–4.5)
PLATELET # BLD AUTO: 46 K/UL (ref 150–450)
PMV BLD AUTO: 10.6 FL (ref 9.2–12.9)
POCT GLUCOSE: 176 MG/DL (ref 70–110)
POCT GLUCOSE: 189 MG/DL (ref 70–110)
POCT GLUCOSE: 267 MG/DL (ref 70–110)
POCT GLUCOSE: 275 MG/DL (ref 70–110)
POTASSIUM SERPL-SCNC: 3.5 MMOL/L (ref 3.5–5.1)
PROT SERPL-MCNC: 4.3 G/DL (ref 6–8.4)
RBC # BLD AUTO: 2.76 M/UL (ref 4.6–6.2)
SODIUM SERPL-SCNC: 133 MMOL/L (ref 136–145)
TACROLIMUS BLD-MCNC: 3.4 NG/ML (ref 5–15)
WBC # BLD AUTO: 5.73 K/UL (ref 3.9–12.7)

## 2022-04-28 PROCEDURE — 63600175 PHARM REV CODE 636 W HCPCS: Performed by: PHYSICIAN ASSISTANT

## 2022-04-28 PROCEDURE — 20600001 HC STEP DOWN PRIVATE ROOM

## 2022-04-28 PROCEDURE — 85025 COMPLETE CBC W/AUTO DIFF WBC: CPT | Performed by: STUDENT IN AN ORGANIZED HEALTH CARE EDUCATION/TRAINING PROGRAM

## 2022-04-28 PROCEDURE — 99232 PR SUBSEQUENT HOSPITAL CARE,LEVL II: ICD-10-PCS | Mod: ,,, | Performed by: NURSE PRACTITIONER

## 2022-04-28 PROCEDURE — 83735 ASSAY OF MAGNESIUM: CPT | Performed by: PHYSICIAN ASSISTANT

## 2022-04-28 PROCEDURE — 63600175 PHARM REV CODE 636 W HCPCS: Performed by: STUDENT IN AN ORGANIZED HEALTH CARE EDUCATION/TRAINING PROGRAM

## 2022-04-28 PROCEDURE — 80197 ASSAY OF TACROLIMUS: CPT | Performed by: STUDENT IN AN ORGANIZED HEALTH CARE EDUCATION/TRAINING PROGRAM

## 2022-04-28 PROCEDURE — 25000003 PHARM REV CODE 250: Performed by: STUDENT IN AN ORGANIZED HEALTH CARE EDUCATION/TRAINING PROGRAM

## 2022-04-28 PROCEDURE — 97116 GAIT TRAINING THERAPY: CPT | Mod: CQ

## 2022-04-28 PROCEDURE — 99233 SBSQ HOSP IP/OBS HIGH 50: CPT | Mod: 24,,, | Performed by: PHYSICIAN ASSISTANT

## 2022-04-28 PROCEDURE — P9047 ALBUMIN (HUMAN), 25%, 50ML: HCPCS | Mod: JG | Performed by: PHYSICIAN ASSISTANT

## 2022-04-28 PROCEDURE — 99232 SBSQ HOSP IP/OBS MODERATE 35: CPT | Mod: ,,, | Performed by: NURSE PRACTITIONER

## 2022-04-28 PROCEDURE — 25000003 PHARM REV CODE 250: Performed by: SURGERY

## 2022-04-28 PROCEDURE — 80053 COMPREHEN METABOLIC PANEL: CPT | Performed by: STUDENT IN AN ORGANIZED HEALTH CARE EDUCATION/TRAINING PROGRAM

## 2022-04-28 PROCEDURE — 84100 ASSAY OF PHOSPHORUS: CPT | Performed by: PHYSICIAN ASSISTANT

## 2022-04-28 PROCEDURE — 36415 COLL VENOUS BLD VENIPUNCTURE: CPT | Performed by: PHYSICIAN ASSISTANT

## 2022-04-28 PROCEDURE — 99233 PR SUBSEQUENT HOSPITAL CARE,LEVL III: ICD-10-PCS | Mod: 24,,, | Performed by: PHYSICIAN ASSISTANT

## 2022-04-28 RX ORDER — TACROLIMUS 1 MG/1
8 CAPSULE ORAL 2 TIMES DAILY
Status: DISCONTINUED | OUTPATIENT
Start: 2022-04-28 | End: 2022-05-01 | Stop reason: HOSPADM

## 2022-04-28 RX ORDER — ALBUMIN HUMAN 250 G/1000ML
25 SOLUTION INTRAVENOUS EVERY 6 HOURS
Status: COMPLETED | OUTPATIENT
Start: 2022-04-28 | End: 2022-04-28

## 2022-04-28 RX ORDER — ACETAMINOPHEN 500 MG
1 TABLET ORAL DAILY
Qty: 30 TABLET | Refills: 5 | Status: SHIPPED | OUTPATIENT
Start: 2022-04-28 | End: 2022-10-19 | Stop reason: SDUPTHER

## 2022-04-28 RX ORDER — DOCUSATE SODIUM 100 MG/1
100 CAPSULE, LIQUID FILLED ORAL 3 TIMES DAILY PRN
Refills: 0 | Status: ON HOLD | COMMUNITY
Start: 2022-04-28 | End: 2022-06-03 | Stop reason: SDUPTHER

## 2022-04-28 RX ORDER — FAMOTIDINE 20 MG/1
20 TABLET, FILM COATED ORAL NIGHTLY
Qty: 30 TABLET | Refills: 0 | Status: ON HOLD | OUTPATIENT
Start: 2022-04-28 | End: 2022-06-03 | Stop reason: HOSPADM

## 2022-04-28 RX ORDER — TACROLIMUS 1 MG/1
2 CAPSULE ORAL ONCE
Status: COMPLETED | OUTPATIENT
Start: 2022-04-28 | End: 2022-04-28

## 2022-04-28 RX ORDER — FUROSEMIDE 10 MG/ML
60 INJECTION INTRAMUSCULAR; INTRAVENOUS EVERY 6 HOURS
Status: COMPLETED | OUTPATIENT
Start: 2022-04-28 | End: 2022-04-28

## 2022-04-28 RX ORDER — NIFEDIPINE 60 MG/1
60 TABLET, EXTENDED RELEASE ORAL DAILY
Qty: 30 TABLET | Refills: 11 | Status: SHIPPED | OUTPATIENT
Start: 2022-04-29 | End: 2022-04-29 | Stop reason: SDUPTHER

## 2022-04-28 RX ORDER — INSULIN ASPART 100 [IU]/ML
10 INJECTION, SOLUTION INTRAVENOUS; SUBCUTANEOUS
Status: DISCONTINUED | OUTPATIENT
Start: 2022-04-28 | End: 2022-05-01 | Stop reason: HOSPADM

## 2022-04-28 RX ADMIN — INSULIN ASPART 6 UNITS: 100 INJECTION, SOLUTION INTRAVENOUS; SUBCUTANEOUS at 09:04

## 2022-04-28 RX ADMIN — DOCUSATE SODIUM 100 MG: 100 CAPSULE ORAL at 02:04

## 2022-04-28 RX ADMIN — NYSTATIN 500000 UNITS: 500000 SUSPENSION ORAL at 08:04

## 2022-04-28 RX ADMIN — INSULIN ASPART 6 UNITS: 100 INJECTION, SOLUTION INTRAVENOUS; SUBCUTANEOUS at 05:04

## 2022-04-28 RX ADMIN — HEPARIN SODIUM 5000 UNITS: 5000 INJECTION INTRAVENOUS; SUBCUTANEOUS at 02:04

## 2022-04-28 RX ADMIN — FAMOTIDINE 20 MG: 20 TABLET ORAL at 09:04

## 2022-04-28 RX ADMIN — METHOCARBAMOL 500 MG: 500 TABLET, FILM COATED ORAL at 02:04

## 2022-04-28 RX ADMIN — DOCUSATE SODIUM 100 MG: 100 CAPSULE ORAL at 08:04

## 2022-04-28 RX ADMIN — METHOCARBAMOL 500 MG: 500 TABLET, FILM COATED ORAL at 09:04

## 2022-04-28 RX ADMIN — FUROSEMIDE 60 MG: 10 INJECTION, SOLUTION INTRAMUSCULAR; INTRAVENOUS at 05:04

## 2022-04-28 RX ADMIN — FUROSEMIDE 60 MG: 10 INJECTION, SOLUTION INTRAMUSCULAR; INTRAVENOUS at 11:04

## 2022-04-28 RX ADMIN — ALBUMIN (HUMAN) 25 G: 12.5 SOLUTION INTRAVENOUS at 11:04

## 2022-04-28 RX ADMIN — INSULIN ASPART 2 UNITS: 100 INJECTION, SOLUTION INTRAVENOUS; SUBCUTANEOUS at 12:04

## 2022-04-28 RX ADMIN — TACROLIMUS 2 MG: 1 CAPSULE ORAL at 11:04

## 2022-04-28 RX ADMIN — OXYCODONE HYDROCHLORIDE 10 MG: 10 TABLET ORAL at 11:04

## 2022-04-28 RX ADMIN — NIFEDIPINE 60 MG: 30 TABLET, FILM COATED, EXTENDED RELEASE ORAL at 08:04

## 2022-04-28 RX ADMIN — OXYCODONE HYDROCHLORIDE 10 MG: 10 TABLET ORAL at 09:04

## 2022-04-28 RX ADMIN — METHOCARBAMOL 500 MG: 500 TABLET, FILM COATED ORAL at 08:04

## 2022-04-28 RX ADMIN — NYSTATIN 500000 UNITS: 500000 SUSPENSION ORAL at 07:04

## 2022-04-28 RX ADMIN — MUPIROCIN 1 G: 20 OINTMENT TOPICAL at 08:04

## 2022-04-28 RX ADMIN — INSULIN ASPART 10 UNITS: 100 INJECTION, SOLUTION INTRAVENOUS; SUBCUTANEOUS at 12:04

## 2022-04-28 RX ADMIN — OXYCODONE 5 MG: 5 TABLET ORAL at 07:04

## 2022-04-28 RX ADMIN — METHYLPREDNISOLONE SODIUM SUCCINATE 80 MG: 40 INJECTION, POWDER, FOR SOLUTION INTRAMUSCULAR; INTRAVENOUS at 08:04

## 2022-04-28 RX ADMIN — INSULIN ASPART 6 UNITS: 100 INJECTION, SOLUTION INTRAVENOUS; SUBCUTANEOUS at 07:04

## 2022-04-28 RX ADMIN — MYCOPHENOLATE MOFETIL 1000 MG: 250 CAPSULE ORAL at 09:04

## 2022-04-28 RX ADMIN — TACROLIMUS 8 MG: 1 CAPSULE ORAL at 05:04

## 2022-04-28 RX ADMIN — TACROLIMUS 6 MG: 1 CAPSULE ORAL at 08:04

## 2022-04-28 RX ADMIN — ALBUMIN (HUMAN) 25 G: 12.5 SOLUTION INTRAVENOUS at 05:04

## 2022-04-28 RX ADMIN — INSULIN ASPART 10 UNITS: 100 INJECTION, SOLUTION INTRAVENOUS; SUBCUTANEOUS at 05:04

## 2022-04-28 RX ADMIN — DOCUSATE SODIUM 100 MG: 100 CAPSULE ORAL at 09:04

## 2022-04-28 RX ADMIN — OXYCODONE HYDROCHLORIDE 10 MG: 10 TABLET ORAL at 04:04

## 2022-04-28 RX ADMIN — NYSTATIN 500000 UNITS: 500000 SUSPENSION ORAL at 02:04

## 2022-04-28 RX ADMIN — MUPIROCIN 1 G: 20 OINTMENT TOPICAL at 09:04

## 2022-04-28 RX ADMIN — MYCOPHENOLATE MOFETIL 1000 MG: 250 CAPSULE ORAL at 08:04

## 2022-04-28 RX ADMIN — INSULIN ASPART 2 UNITS: 100 INJECTION, SOLUTION INTRAVENOUS; SUBCUTANEOUS at 07:04

## 2022-04-28 NOTE — SUBJECTIVE & OBJECTIVE
Scheduled Meds:   albumin human 25%  25 g Intravenous Q6H    docusate sodium  100 mg Oral TID    famotidine  20 mg Oral Nightly    furosemide (LASIX) injection  60 mg Intravenous Q6H    heparin (porcine)  5,000 Units Subcutaneous Q8H    insulin aspart U-100  10 Units Subcutaneous TIDWM    insulin detemir U-100  10 Units Subcutaneous Daily    methocarbamoL  500 mg Oral TID    [START ON 4/29/2022] methylPREDNISolone sodium succinate injection  40 mg Intravenous Daily    Followed by    [START ON 4/30/2022] predniSONE  20 mg Oral Daily    mupirocin  1 g Nasal BID    mycophenolate  1,000 mg Oral BID    NIFEdipine  60 mg Oral Daily    nystatin  500,000 Units Mouth/Throat TID PC    [START ON 5/1/2022] sulfamethoxazole-trimethoprim 400-80mg  1 tablet Oral Daily AM    tacrolimus  2 mg Oral Once    tacrolimus  8 mg Oral BID    [START ON 5/4/2022] valGANciclovir  450 mg Oral Daily     Continuous Infusions:  PRN Meds:sodium chloride, dextrose 10%, dextrose 10%, glucagon (human recombinant), glucose, glucose, insulin aspart U-100, ondansetron, oxyCODONE, oxyCODONE, sodium chloride 0.9%    Review of Systems   Constitutional:  Negative for appetite change, chills and fever.   HENT:  Negative for facial swelling and trouble swallowing.    Eyes:  Negative for photophobia.   Respiratory:  Negative for cough, shortness of breath, wheezing and stridor.    Cardiovascular:  Positive for leg swelling. Negative for chest pain and palpitations.   Gastrointestinal:  Positive for abdominal distention, abdominal pain (incisional/appropriate) and constipation. Negative for diarrhea, nausea and vomiting.   Genitourinary:  Negative for decreased urine volume, difficulty urinating and dysuria.   Musculoskeletal:  Negative for neck pain and neck stiffness.   Skin:  Positive for wound.   Allergic/Immunologic: Positive for immunocompromised state.   Neurological:  Positive for weakness. Negative for dizziness and light-headedness.    Psychiatric/Behavioral:  Negative for agitation, behavioral problems, confusion and decreased concentration.    Objective:     Vital Signs (Most Recent):  Temp: 98 °F (36.7 °C) (04/28/22 0745)  Pulse: 99 (04/28/22 0745)  Resp: 18 (04/28/22 0751)  BP: 127/80 (04/28/22 0745)  SpO2: 97 % (04/28/22 0745) Vital Signs (24h Range):  Temp:  [98 °F (36.7 °C)-98.6 °F (37 °C)] 98 °F (36.7 °C)  Pulse:  [] 99  Resp:  [11-18] 18  SpO2:  [94 %-100 %] 97 %  BP: (116-152)/(64-97) 127/80     Weight: 108.8 kg (239 lb 13.8 oz)  Body mass index is 37.57 kg/m².    Intake/Output - Last 3 Shifts         04/26 0700 04/27 0659 04/27 0700 04/28 0659 04/28 0700  04/29 0659    P.O. 240 2850 240    I.V. (mL/kg) 6.9 (0.1) 200 (1.8)     Blood 278      Other 0 0     IV Piggyback       Total Intake(mL/kg) 524.9 (4.8) 3050 (28) 240 (2.2)    Urine (mL/kg/hr) 1845 (0.7) 3125 (1.2) 500 (1)    Emesis/NG output 0      Drains 1105 1165     Other 0 0     Stool 0 0     Blood 0 0     Total Output 2950 4290 500    Net -2425.1 -1240 -260           Urine Occurrence  0 x     Stool Occurrence  0 x             Physical Exam  Vitals and nursing note reviewed.   Constitutional:       General: He is not in acute distress.  HENT:      Head: Normocephalic and atraumatic.   Eyes:      General: No scleral icterus.        Right eye: No discharge.         Left eye: No discharge.   Cardiovascular:      Rate and Rhythm: Normal rate and regular rhythm.      Heart sounds: No murmur heard.  Pulmonary:      Effort: Pulmonary effort is normal. No respiratory distress.      Breath sounds: No wheezing or rales.   Abdominal:      General: Bowel sounds are normal. There is distension.      Tenderness: There is no abdominal tenderness. There is no guarding.      Comments: Chevron incision AMARIS with staples no s/s/I  2 R GO drains with ss output   Musculoskeletal:         General: Swelling present.      Right lower leg: Edema present.      Left lower leg: Edema present.    Skin:     General: Skin is warm and dry.      Capillary Refill: Capillary refill takes less than 2 seconds.      Coloration: Skin is not jaundiced.   Neurological:      General: No focal deficit present.      Mental Status: He is alert and oriented to person, place, and time.   Psychiatric:         Mood and Affect: Mood normal.         Behavior: Behavior normal.         Thought Content: Thought content normal.       Laboratory:  Immunosuppressants           Stop Route Frequency     tacrolimus capsule 8 mg         -- Oral 2 times daily     tacrolimus capsule 2 mg         -- Oral Once     mycophenolate capsule 1,000 mg         -- Oral 2 times daily          CBC:   Recent Labs   Lab 04/28/22  0700   WBC 5.73   RBC 2.76*   HGB 8.2*   HCT 24.7*   PLT 46*   MCV 90   MCH 29.7   MCHC 33.2     CMP:   Recent Labs   Lab 04/28/22  0700   *   CALCIUM 7.8*   ALBUMIN 2.6*   PROT 4.3*   *   K 3.5   CO2 25      BUN 57*   CREATININE 1.3   ALKPHOS 83   *   *   BILITOT 2.6*     Coagulation:   Recent Labs   Lab 04/26/22  0404   INR 1.1   APTT 29.9     Labs within the past 24 hours have been reviewed.    Diagnostic Results:  I have personally reviewed all pertinent imaging studies.    Debility/Functional status: Patient debilitated by evidence of Weakness, Limitation of activities due to disability, and Other reduced mobility. Physical and occupational therapy ordered daily to evaluate and treat. Debility was: present on admission.

## 2022-04-28 NOTE — PROGRESS NOTES
[facility]  Liver Transplant  Progress Note    Patient Name: Pelon Vasquez  MRN: [id number]  Admission Date: 4/19/2022  Hospital Length of Stay: 8 days  Code Status: Full Code  Primary Care Provider: Primary Doctor No  Post-Operative Day: 4    ORGAN:   LIVER  Disease Etiology: Cirrhosis: Other, Specify  Donor Type:   Donation after Brain Death  CDC High Risk:   No  Donor CMV Status:   Donor CMV Status: Positive  Donor HBcAB:   Negative  Donor HCV Status:   Negative  Donor HBV MATHEUS: Negative  Donor HCV MATHEUS: Negative  Whole or Partial: Whole Liver  Biliary Anastomosis: End to End  Arterial Anatomy: Completely Replaced Circulation  Subjective:     History of Present Illness:  Pelon Vasquez is a 57yr olf male with 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 ESLD complicated by jaundice, FAMILIA, ascites (para 2x/week, no h/o SBP), HE, and hypervolemia. He is listed for a liver transplant with MELD 30. Patient admitted for liver transplant surgery. He denies any recent fevers or hospitalizations unknown to transplant team. He reports feel well in his usual state of health. Pre op labs and imaging pending      Hospital Course:  Patient was admitted for liver transplant 4/19 but case cancelled due to donor organ quality. He was kept inpatient due to FAMILIA noted on admit. Urine sodium < 10. Cr continued to trend up despite holding diuretics and hydrating with albumin. Nephrology consulted. Paracentesis ordered to rule out SBP. 5.5L removed, no SBP. Patient ultimately became primary for another liver transplant. He is now s/p OLTx 4/24/22 2/2 LOZANO & ETOH (steroid induction, CMV +/+). Operation straight forward per op note. POD#1 US satisfactory. Post op SICU course notable for SVT requiring adenosine for conversation back to NSR. Shirley removed in SICU. Stepped down to TSU on 4/26. CVC removed 4/27 as renal function improving. TB trending down, but AST/ALT slightly increased 4/27. Increased Prograf and obtained  liver US. 2    Interval History: No acute events overnight. POD# 4 s/p OLTx. Liver US obtained 4/27 for elevated LFT satisfactory doppler with 3 small peritransplant fluid collections. Today, TB trending down, but AST and ALT stagnant. Continue to push Prograf and monitor. R GO drains remain with ~500 cc ss output in each past 24h. Leave drains in place for now. Renal function continues to improve.Cr 1.3 from 1.5 with good UOP. Continue diuresis for generalized edema - 25% albumin x 2 with 60 mg IV lasix BID today. Patient overall progressing well. Tolerating diet, (+) flatus (-) BM. Continue bowel regimen and encourage ambulation with assistance (refused suppository at this time, but will need one tomorrow if no BM by then). Pain appropriately controlled. PT/OT following. Ambulated in halls with cane today. Recommending HH @ discharge. Monitor.      Scheduled Meds:   albumin human 25%  25 g Intravenous Q6H    docusate sodium  100 mg Oral TID    famotidine  20 mg Oral Nightly    furosemide (LASIX) injection  60 mg Intravenous Q6H    heparin (porcine)  5,000 Units Subcutaneous Q8H    insulin aspart U-100  10 Units Subcutaneous TIDWM    insulin detemir U-100  10 Units Subcutaneous Daily    methocarbamoL  500 mg Oral TID    [START ON 4/29/2022] methylPREDNISolone sodium succinate injection  40 mg Intravenous Daily    Followed by    [START ON 4/30/2022] predniSONE  20 mg Oral Daily    mupirocin  1 g Nasal BID    mycophenolate  1,000 mg Oral BID    NIFEdipine  60 mg Oral Daily    nystatin  500,000 Units Mouth/Throat TID PC    [START ON 5/1/2022] sulfamethoxazole-trimethoprim 400-80mg  1 tablet Oral Daily AM    tacrolimus  2 mg Oral Once    tacrolimus  8 mg Oral BID    [START ON 5/4/2022] valGANciclovir  450 mg Oral Daily     Continuous Infusions:  PRN Meds:sodium chloride, dextrose 10%, dextrose 10%, glucagon (human recombinant), glucose, glucose, insulin aspart U-100, ondansetron, oxyCODONE, oxyCODONE,  sodium chloride 0.9%    Review of Systems   Constitutional:  Negative for appetite change, chills and fever.   HENT:  Negative for facial swelling and trouble swallowing.    Eyes:  Negative for photophobia.   Respiratory:  Negative for cough, shortness of breath, wheezing and stridor.    Cardiovascular:  Positive for leg swelling. Negative for chest pain and palpitations.   Gastrointestinal:  Positive for abdominal distention, abdominal pain (incisional/appropriate) and constipation. Negative for diarrhea, nausea and vomiting.   Genitourinary:  Negative for decreased urine volume, difficulty urinating and dysuria.   Musculoskeletal:  Negative for neck pain and neck stiffness.   Skin:  Positive for wound.   Allergic/Immunologic: Positive for immunocompromised state.   Neurological:  Positive for weakness. Negative for dizziness and light-headedness.   Psychiatric/Behavioral:  Negative for agitation, behavioral problems, confusion and decreased concentration.    Objective:     Vital Signs (Most Recent):  Temp: 98 °F (36.7 °C) (04/28/22 0745)  Pulse: 99 (04/28/22 0745)  Resp: 18 (04/28/22 0751)  BP: 127/80 (04/28/22 0745)  SpO2: 97 % (04/28/22 0745) Vital Signs (24h Range):  Temp:  [98 °F (36.7 °C)-98.6 °F (37 °C)] 98 °F (36.7 °C)  Pulse:  [] 99  Resp:  [11-18] 18  SpO2:  [94 %-100 %] 97 %  BP: (116-152)/(64-97) 127/80     Weight: 108.8 kg (239 lb 13.8 oz)  Body mass index is 37.57 kg/m².    Intake/Output - Last 3 Shifts         04/26 0700 04/27 0659 04/27 0700 04/28 0659 04/28 0700 04/29 0659    P.O. 240 2850 240    I.V. (mL/kg) 6.9 (0.1) 200 (1.8)     Blood 278      Other 0 0     IV Piggyback       Total Intake(mL/kg) 524.9 (4.8) 3050 (28) 240 (2.2)    Urine (mL/kg/hr) 1845 (0.7) 3125 (1.2) 500 (1)    Emesis/NG output 0      Drains 1105 1165     Other 0 0     Stool 0 0     Blood 0 0     Total Output 2950 4290 500    Net -2425.1 -1240 -260           Urine Occurrence  0 x     Stool Occurrence  0 x              Physical Exam  Vitals and nursing note reviewed.   Constitutional:       General: He is not in acute distress.  HENT:      Head: Normocephalic and atraumatic.   Eyes:      General: No scleral icterus.        Right eye: No discharge.         Left eye: No discharge.   Cardiovascular:      Rate and Rhythm: Normal rate and regular rhythm.      Heart sounds: No murmur heard.  Pulmonary:      Effort: Pulmonary effort is normal. No respiratory distress.      Breath sounds: No wheezing or rales.   Abdominal:      General: Bowel sounds are normal. There is distension.      Tenderness: There is no abdominal tenderness. There is no guarding.      Comments: Chevron incision AMARIS with staples no s/s/I  2 R GO drains with ss output   Musculoskeletal:         General: Swelling present.      Right lower leg: Edema present.      Left lower leg: Edema present.   Skin:     General: Skin is warm and dry.      Capillary Refill: Capillary refill takes less than 2 seconds.      Coloration: Skin is not jaundiced.   Neurological:      General: No focal deficit present.      Mental Status: He is alert and oriented to person, place, and time.   Psychiatric:         Mood and Affect: Mood normal.         Behavior: Behavior normal.         Thought Content: Thought content normal.       Laboratory:  Immunosuppressants           Stop Route Frequency     tacrolimus capsule 8 mg         -- Oral 2 times daily     tacrolimus capsule 2 mg         -- Oral Once     mycophenolate capsule 1,000 mg         -- Oral 2 times daily          CBC:   Recent Labs   Lab 04/28/22  0700   WBC 5.73   RBC 2.76*   HGB 8.2*   HCT 24.7*   PLT 46*   MCV 90   MCH 29.7   MCHC 33.2     CMP:   Recent Labs   Lab 04/28/22  0700   *   CALCIUM 7.8*   ALBUMIN 2.6*   PROT 4.3*   *   K 3.5   CO2 25      BUN 57*   CREATININE 1.3   ALKPHOS 83   *   *   BILITOT 2.6*     Coagulation:   Recent Labs   Lab 04/26/22  0404   INR 1.1   APTT 29.9     Labs  "within the past 24 hours have been reviewed.    Diagnostic Results:  I have personally reviewed all pertinent imaging studies.    Debility/Functional status: Patient debilitated by evidence of Weakness, Limitation of activities due to disability, and Other reduced mobility. Physical and occupational therapy ordered daily to evaluate and treat. Debility was: present on admission.    Assessment/Plan:     * S/P liver transplant  - s/p OLTx 4/24/22 2/2 LOZANO & ETOH (steroid induction, CMV +/+).   - Operation straight forward per op note. Did require intra-op HD.  - POD#1 US satisfactory.   - Post op SICU course notable for SVT requiring adenosine for conversation back to NSR.  - Shirley removed in SICU. Stepped down to TSU on 4/26.  - 2 GO drains remain with ss output.  - TB trending down, but AST/ALT slightly increased 4/27. Pushed Prograf and obtained liver US.  - Liver US 4/27 satisfactory doppler with 3 small melody-transplant fluid collections   - Renal function continues to improve  - CVC removed 4/27  - AST/ALT remain elevated 4/28. Continue to push Prograf and monitor. TB trending down.  - Tolerating diet, (+) flatus (-) BM. Continue bowel regimen and encourage ambulation with assistance.   - PT/OT following. Recommending HH @ discharge. Monitor.    Generalized edema  - Diuresis with lasix/albumin  - Monitor      Essential hypertension  - Continue Nifedipine.      Long-term use of immunosuppressant medication  - See "prophylactic immunotherapy."      Class 2 severe obesity due to excess calories with serious comorbidity in adult  - Monitor. RD following.      Adverse effect of corticosteroids  - Monitor. Endocrine following.      Prophylactic immunotherapy  - Continue Prograf. Monitor trough daily and adjust dose as needed to achieve therapeutic level.  - Continue Cellcept.  - Continue steroids.    At risk for opportunistic infections  - Bactrim for PCP ppx  - Valcyte for CMV ppx  - Nystatin for thrush " ppx      Thrombocytopenia, unspecified  - Due to ESLD  - Improving post op  - Monitor      Anemia of chronic disease  - H/H stable. Will continue to monitor with daily cbc.           FAMILIA (acute kidney injury)  - Cr 2.6 from baseline ~1 on admission 4/24  - Hydrated with albumin, held diuretics, consulted nephrology  - Para 4/22 negative for SBP  - Now s/p OLTx 4/24. Required intra-op HD.   - Renal function improving post op.   - Removed CVC 4/27.  - Diuresis with lasix/albumin. Strict I/O. Daily weights.  - Monitor.    Type 2 diabetes mellitus without complication, without long-term current use of insulin  - Endocrine following, appreciate assistance.           VTE Risk Mitigation (From admission, onward)         Ordered     heparin (porcine) injection 5,000 Units  Every 8 hours         04/24/22 0831     Place sequential compression device  Until discontinued         04/24/22 0831     IP VTE HIGH RISK PATIENT  Once         04/24/22 0831                The patients clinical status was discussed at multidisplinary rounds, involving transplant surgery, transplant medicine, pharmacy, nursing, nutrition, and social work    Discharge Planning:  Not today, possibly within next few days.    Azalea Malin PA-C  Liver Transplant  [facility] - [facility]

## 2022-04-28 NOTE — PLAN OF CARE
[facility]  TELE NSR/S-TACH  VISI ON   OXY GIVEN X 2   GO X 2 WITH GOOD OUTPUT   GOOD UOP  WIFE AT BEDSIDE   DRESSING CHANGED TO GO SITE   BED LOW RAILS UP X2 CALL LIGHT WITHIN REACH  VERBAL UNDERSTANDING NOTED TO CALL PRN

## 2022-04-28 NOTE — ASSESSMENT & PLAN NOTE
- s/p OLTx 4/24/22 2/2 LOZANO & ETOH (steroid induction, CMV +/+).   - Operation straight forward per op note. Did require intra-op HD.  - POD#1 US satisfactory.   - Post op SICU course notable for SVT requiring adenosine for conversation back to NSR.  - Shirley removed in SICU. Stepped down to TSU on 4/26.  - 2 GO drains remain with ss output.  - TB trending down, but AST/ALT slightly increased 4/27. Pushed Prograf and obtained liver US.  - Liver US 4/27 satisfactory doppler with 3 small melody-transplant fluid collections   - Renal function continues to improve  - CVC removed 4/27  - AST/ALT remain elevated 4/28. Continue to push Prograf and monitor. TB trending down.  - Tolerating diet, (+) flatus (-) BM. Continue bowel regimen and encourage ambulation with assistance.   - PT/OT following. Recommending HH @ discharge. Monitor.

## 2022-04-28 NOTE — ASSESSMENT & PLAN NOTE
Lab Results   Component Value Date    CREATININE 1.3 04/28/2022     Avoid insulin stacking  Titrate insulin slowly

## 2022-04-28 NOTE — PROGRESS NOTES
Met with patient and his significant other, Leonarda,  for  discharge teaching.  Reviewed [facility] New Journey: Living Smart After My Liver Transplant.  Sections reviewed were: First Steps, Appointments and Prevention.  Medication section will be reviewed by Pharm D.  Allowed time for questions and answers.  Asked patient to complete the review questions in the discharge book.

## 2022-04-28 NOTE — PLAN OF CARE
Patient remaining free from injury.  Patient ambulating in hallway using cane with therapy and wife.  Patient reports pain well controlled with po pain medication.  Patient diuresing well today and voided > 2 L.  Patient had BM.  Patient wife pulling self meds with 100% accuracy.  Patient BG being monitored and < 200.  Insulin adjustments made.  Patient also administered Albumin.  Patient wife painting incision with Betadine.  Wife attentive to patient.  Patient anticipating dc this weekend.

## 2022-04-28 NOTE — ASSESSMENT & PLAN NOTE
- Cr 2.6 from baseline ~1 on admission 4/24  - Hydrated with albumin, held diuretics, consulted nephrology  - Para 4/22 negative for SBP  - Now s/p OLTx 4/24. Required intra-op HD.   - Renal function improving post op.   - Removed CVC 4/27.  - Diuresis with lasix/albumin. Strict I/O. Daily weights.  - Monitor.

## 2022-04-28 NOTE — ASSESSMENT & PLAN NOTE
BG goal 140 - 180     Increase Novolog to 10 units TID with meals (0.5 u/kg dosing) Prandial BG excursions noted on steroid therapy.   Moderate Dose Correction Scale  BG monitoring ac/hs    ** Please call Endocrine for any BG related issues **  ** Please notify Endocrine for any change and/or advance in diet**    Discharge planning: TBD

## 2022-04-28 NOTE — ASSESSMENT & PLAN NOTE
Nutrition consulted. Most recent weight and BMI monitored-               Final Summary  Subcutaneous Fat Loss (Final Summary): well nourished  Muscle Loss Evaluation (Final Summary): well nourished         Measurements:  Wt Readings from Last 1 Encounters:   04/28/22 108.8 kg (239 lb 13.8 oz)   Body mass index is 37.57 kg/m².    Recommendations: Recommendation/Intervention: 1. Intensify bowel regimen prn (LBM 4/23) 2. Continue Diabetic diet - If PO intake <50%, add Boost GC TID 3. RD to follow and monitor  Goals: Diet advanced to diabetic diet by RD follow-up.    Patient has been screened and assessed by RD. RD will follow patient.

## 2022-04-28 NOTE — PT/OT/SLP PROGRESS
Physical Therapy Treatment    Patient Name:  Pelon Vasquez   MRN:  [id number]    Recommendations:     Discharge Recommendations:  home health PT   Discharge Equipment Recommendations: bedside commode, walker, rolling   Barriers to discharge: None    Assessment:     Pelon Vasquez is a 57 y.o. male admitted with a medical diagnosis of S/P liver transplant.  He presents with the following impairments/functional limitations:   (weakness; impaired endurance; gait instability; impaired functional mobilty; pain; edema; impaired cardiopulmonary response to activity) .Pt Progressing with PT Intervention. Pt Progressing with improving gait distance. Pt would continue to benefit from skilled PT to address overall functional mobility, goals , and to return to functional baseline.  Goals remain appropriate.    Rehab Prognosis: Good; patient would benefit from acute skilled PT services to address these deficits and reach maximum level of function.    Recent Surgery: Procedure(s) (LRB):  TRANSPLANT, LIVER (N/A) 5 Days Post-Op    Plan:     During this hospitalization, patient to be seen 4 x/week to address the identified rehab impairments via gait training, therapeutic activities, therapeutic exercises, neuromuscular re-education and progress toward the following goals:    · Plan of Care Expires:  05/24/22    Subjective     Patient/Family Comments/goals: I like to walk with my cane  Pain/Comfort:  · Pain Rating 1: 5/10  · Location 1: abdomen  · Pain Addressed 1: Reposition, Distraction      Objective:     Communicated with RN prior to session.  Patient found up in chair with  (telemetry; pulse ox (continuous);  GO drain) upon PT entry to room.     General Precautions: Standard, fall   Orthopedic Precautions:N/A   Braces: N/A  Respiratory Status: Room air     Functional Mobility:  · Transfers:     · Sit to Stand:  stand by assistance with straight cane  · Gait: pt amb with SC x 220 ft with SBA for safety with no LOB   · Pt demo  decreased step length, wide base of support and decreased toma      AM-PAC 6 CLICK MOBILITY  Turning over in bed (including adjusting bedclothes, sheets and blankets)?: 2  Sitting down on and standing up from a chair with arms (e.g., wheelchair, bedside commode, etc.): 3  Moving from lying on back to sitting on the side of the bed?: 2  Moving to and from a bed to a chair (including a wheelchair)?: 3  Need to walk in hospital room?: 3  Climbing 3-5 steps with a railing?: 2  Basic Mobility Total Score: 15       Therapeutic Activities and Exercises:   therapist provided instruction and educated of  patient on progress, safety,d/c,PT POC,   proper body mechanics, energy conservation, and fall prevention strategies during tasks listed above, on the effects of prolonged immobility and the importance of performing OOB activity and exercises to promote healing and reduce recovery time    Patient  facilitated therex  seated in bedside chair B LE AROM AP, LAQ, Hip Flexion, Hip Abd/Add with facilitation for correct performance and sequencing. Exercises performed to develop and maintain pt's strength, endurance and flexibility.    Updated white board with appropriate PT mobility information for medical team notification     Donned an extra gown   pt safe to ambulate in hallway with RN or PCT assistance      Call nursing/pct to transfer to chair/use bathroom. Pt stated understanding      Bedside table in front of patient and area set up for function, convenience, and safety. RN aware of patient's mobility needs and status. Questions/concerns addressed within PTA scope of practice; patient  with no further questions. Time was provided for active listening, discussion of health disposition, and discussion of safe discharge. Pt?verbalized?agreement .    Patient left up in chair with all lines intact, call button in reach and nsg notified..    GOALS:   Multidisciplinary Problems     Physical Therapy Goals        Problem: Physical  Therapy    Goal Priority Disciplines Outcome Goal Variances Interventions   Physical Therapy Goal     PT, PT/OT Ongoing, Progressing     Description: Goals to be met by: 5/15/2022    1. Supine to sit with Supervision   2. Sit to stand transfer with Supervision using LRAD  3. Gait  x 150 feet with Supervision using  LRAD                     Time Tracking:     PT Received On: 04/28/22  PT Start Time: 0920     PT Stop Time: 0943  PT Total Time (min): 23 min     Billable Minutes: Gait Training 13 and Therapeutic Activity 10    Treatment Type: Treatment  PT/PTA: PTA     PTA Visit Number: 1     04/28/2022

## 2022-04-28 NOTE — PROGRESS NOTES
"[facility] - Transplant Stepdown  Endocrinology  Progress Note    Admit Date: 2022     Reason for Consult: Management of T2DM, Hyperglycemia     Surgical Procedure and Date: Liver Transplant 22    Diabetes diagnosis year: 6 years ago    Home Diabetes Medications:  none currently (took Metformin in the past and could not tolerate side effects)     How often checking glucose at home?  Once daily    BG readings on regimen: < 150  Hypoglycemia on the regimen?  No  Missed doses on regimen? n/a    Diabetes Complications include:     CKD     Complicating diabetes co morbidities:   CKD, Cirrhosis, Obesity, BRAYDON, Glucocorticoid Use       HPI:   Patient is a 57 y.o. male with a diagnosis of EtOH/LOZANO cirrhosis, esophageal varices, obesity (BMI 42), CKD, HTN, DM2, and BRAYDON presenting for liver transplant. He is s/p diagnostic paracentesis on  with 4800mL of fluid removed. Patient currently has FAMILIA likely in setting of vancomycin toxicity, per Nephrology note. Plan for intraoperative dialysis. Patient now presents for the above procedure(s). Endocrinology consulted for management of T2DM.       Lab Results   Component Value Date    HGBA1C 6.6 (H) 2022           Interval HPI:   Overnight events: Remains in TSU. POD 5. BG above goal ranges on current SQ insulin regimen. Receiving Solu Medrol 80 mg, standard steroid taper. Diet diabetic [facility]; 2000 Calorie; Isolation Tray - Regular [place]  Eatin%  Nausea: No  Hypoglycemia and intervention: No  Fever: No  TPN and/or TF: No  If yes, type of TF/TPN and rate: n/a    /80   Pulse 99   Temp 98 °F (36.7 °C)   Resp 18   Ht 5' 7" (1.702 m)   Wt 108.8 kg (239 lb 13.8 oz)   SpO2 97%   BMI 37.57 kg/m²     Labs Reviewed and Include    Recent Labs   Lab 22  0700   *   CALCIUM 7.8*   ALBUMIN 2.6*   PROT 4.3*   *   K 3.5   CO2 25      BUN 57*   CREATININE 1.3   ALKPHOS 83   *   *   BILITOT 2.6*     Lab Results " "  Component Value Date    WBC 5.73 04/28/2022    HGB 8.2 (L) 04/28/2022    HCT 24.7 (L) 04/28/2022    MCV 90 04/28/2022    PLT 46 (L) 04/28/2022     No results for input(s): TSH, FREET4 in the last 168 hours.  Lab Results   Component Value Date    HGBA1C 6.6 (H) 04/19/2022       Nutritional status:   Body mass index is 37.57 kg/m².  Lab Results   Component Value Date    ALBUMIN 2.6 (L) 04/28/2022    ALBUMIN 2.4 (L) 04/27/2022    ALBUMIN 2.4 (L) 04/27/2022     No results found for: PREALBUMIN    Estimated Creatinine Clearance: 75.6 mL/min (based on SCr of 1.3 mg/dL).    Accu-Checks  Recent Labs     04/26/22  1301 04/26/22  1707 04/26/22  2055 04/27/22  0155 04/27/22  0809 04/27/22  0843 04/27/22  1212 04/27/22  1704 04/27/22  2133 04/28/22  0741   POCTGLUCOSE 222* 331* 352* 311* 86 81 213* 309* 292* 189*       Current Medications and/or Treatments Impacting Glycemic Control  Immunotherapy:    Immunosuppressants           Stop Route Frequency     tacrolimus capsule 6 mg         -- Oral 2 times daily     mycophenolate capsule 1,000 mg         -- Oral 2 times daily          Steroids:   Hormones (From admission, onward)                Start     Stop Route Frequency Ordered    04/30/22 0900  predniSONE tablet 20 mg  (methylprednisolone taper panel)        "Followed by" Linked Group Details    -- Oral Daily 04/24/22 0831    04/29/22 0900  methylPREDNISolone sodium succinate injection 40 mg  (methylprednisolone taper panel)        "Followed by" Linked Group Details    04/30 0859 IV Daily 04/24/22 0831          Pressors:    Autonomic Drugs (From admission, onward)                None          Hyperglycemia/Diabetes Medications:   Antihyperglycemics (From admission, onward)                Start     Stop Route Frequency Ordered    04/28/22 1130  insulin aspart U-100 pen 10 Units         -- SubQ 3 times daily with meals 04/28/22 0956    04/25/22 0931  insulin aspart U-100 pen 0-10 Units         -- SubQ As needed (PRN) 04/25/22 " 0831            ASSESSMENT and PLAN    * S/P liver transplant    Managed per primary team  Optimize BG control        Type 2 diabetes mellitus without complication, without long-term current use of insulin  BG goal 140 - 180     Increase Novolog to 10 units TID with meals (0.5 u/kg dosing) Prandial BG excursions noted on steroid therapy.   Moderate Dose Correction Scale  BG monitoring ac/hs    ** Please call Endocrine for any BG related issues **  ** Please notify Endocrine for any change and/or advance in diet**    Discharge planning: TBD        FAMILIA (acute kidney injury)  Lab Results   Component Value Date    CREATININE 1.3 04/28/2022     Avoid insulin stacking  Titrate insulin slowly      Prophylactic immunotherapy  May increase insulin resistance.         Adverse effect of corticosteroids  Body mass index is 37.57 kg/m².  May increase insulin resistance.         Class 2 severe obesity due to excess calories with serious comorbidity in adult  Nutrition consulted. Most recent weight and BMI monitored-               Final Summary  Subcutaneous Fat Loss (Final Summary): well nourished  Muscle Loss Evaluation (Final Summary): well nourished         Measurements:  Wt Readings from Last 1 Encounters:   04/28/22 108.8 kg (239 lb 13.8 oz)   Body mass index is 37.57 kg/m².    Recommendations: Recommendation/Intervention: 1. Intensify bowel regimen prn (LBM 4/23) 2. Continue Diabetic diet - If PO intake <50%, add Boost GC TID 3. RD to follow and monitor  Goals: Diet advanced to diabetic diet by RD follow-up.    Patient has been screened and assessed by RD. RD will follow patient.          Esme Pichardo NP  Endocrinology  [facility] - Transplant Step[facility]

## 2022-04-29 DIAGNOSIS — Z94.4 LIVER REPLACED BY TRANSPLANT: Primary | ICD-10-CM

## 2022-04-29 PROBLEM — N17.9 AKI (ACUTE KIDNEY INJURY): Status: RESOLVED | Noted: 2022-03-17 | Resolved: 2022-04-29

## 2022-04-29 LAB
ALBUMIN SERPL BCP-MCNC: 3.1 G/DL (ref 3.5–5.2)
ALP SERPL-CCNC: 92 U/L (ref 55–135)
ALT SERPL W/O P-5'-P-CCNC: 122 U/L (ref 10–44)
ANION GAP SERPL CALC-SCNC: 9 MMOL/L (ref 8–16)
AST SERPL-CCNC: 64 U/L (ref 10–40)
BASOPHILS # BLD AUTO: 0.02 K/UL (ref 0–0.2)
BASOPHILS NFR BLD: 0.3 % (ref 0–1.9)
BILIRUB SERPL-MCNC: 2.8 MG/DL (ref 0.1–1)
BUN SERPL-MCNC: 50 MG/DL (ref 6–20)
CALCIUM SERPL-MCNC: 8.1 MG/DL (ref 8.7–10.5)
CHLORIDE SERPL-SCNC: 101 MMOL/L (ref 95–110)
CO2 SERPL-SCNC: 26 MMOL/L (ref 23–29)
COMMENT: NORMAL
CREAT SERPL-MCNC: 1.2 MG/DL (ref 0.5–1.4)
DIFFERENTIAL METHOD: ABNORMAL
EOSINOPHIL # BLD AUTO: 0.5 K/UL (ref 0–0.5)
EOSINOPHIL NFR BLD: 8 % (ref 0–8)
ERYTHROCYTE [DISTWIDTH] IN BLOOD BY AUTOMATED COUNT: 17.3 % (ref 11.5–14.5)
EST. GFR  (AFRICAN AMERICAN): >60 ML/MIN/1.73 M^2
EST. GFR  (NON AFRICAN AMERICAN): >60 ML/MIN/1.73 M^2
FINAL PATHOLOGIC DIAGNOSIS: NORMAL
GLUCOSE SERPL-MCNC: 116 MG/DL (ref 70–110)
GROSS: NORMAL
HCT VFR BLD AUTO: 25.6 % (ref 40–54)
HGB BLD-MCNC: 8.4 G/DL (ref 14–18)
IMM GRANULOCYTES # BLD AUTO: 0.02 K/UL (ref 0–0.04)
IMM GRANULOCYTES NFR BLD AUTO: 0.3 % (ref 0–0.5)
LYMPHOCYTES # BLD AUTO: 0.6 K/UL (ref 1–4.8)
LYMPHOCYTES NFR BLD: 8.3 % (ref 18–48)
Lab: NORMAL
MAGNESIUM SERPL-MCNC: 1.4 MG/DL (ref 1.6–2.6)
MCH RBC QN AUTO: 28.9 PG (ref 27–31)
MCHC RBC AUTO-ENTMCNC: 32.8 G/DL (ref 32–36)
MCV RBC AUTO: 88 FL (ref 82–98)
MONOCYTES # BLD AUTO: 0.8 K/UL (ref 0.3–1)
MONOCYTES NFR BLD: 11.3 % (ref 4–15)
NEUTROPHILS # BLD AUTO: 4.8 K/UL (ref 1.8–7.7)
NEUTROPHILS NFR BLD: 71.8 % (ref 38–73)
NRBC BLD-RTO: 0 /100 WBC
PHOSPHATE SERPL-MCNC: 3.3 MG/DL (ref 2.7–4.5)
PLATELET # BLD AUTO: 60 K/UL (ref 150–450)
PMV BLD AUTO: 10.5 FL (ref 9.2–12.9)
POCT GLUCOSE: 115 MG/DL (ref 70–110)
POCT GLUCOSE: 167 MG/DL (ref 70–110)
POCT GLUCOSE: 273 MG/DL (ref 70–110)
POCT GLUCOSE: 294 MG/DL (ref 70–110)
POTASSIUM SERPL-SCNC: 3.6 MMOL/L (ref 3.5–5.1)
PROT SERPL-MCNC: 4.9 G/DL (ref 6–8.4)
RBC # BLD AUTO: 2.91 M/UL (ref 4.6–6.2)
SODIUM SERPL-SCNC: 136 MMOL/L (ref 136–145)
TACROLIMUS BLD-MCNC: 4.2 NG/ML (ref 5–15)
WBC # BLD AUTO: 6.63 K/UL (ref 3.9–12.7)

## 2022-04-29 PROCEDURE — 97530 THERAPEUTIC ACTIVITIES: CPT | Mod: CQ

## 2022-04-29 PROCEDURE — 97116 GAIT TRAINING THERAPY: CPT | Mod: CQ

## 2022-04-29 PROCEDURE — 85025 COMPLETE CBC W/AUTO DIFF WBC: CPT | Performed by: STUDENT IN AN ORGANIZED HEALTH CARE EDUCATION/TRAINING PROGRAM

## 2022-04-29 PROCEDURE — 63600175 PHARM REV CODE 636 W HCPCS: Performed by: STUDENT IN AN ORGANIZED HEALTH CARE EDUCATION/TRAINING PROGRAM

## 2022-04-29 PROCEDURE — 36415 COLL VENOUS BLD VENIPUNCTURE: CPT | Performed by: PHYSICIAN ASSISTANT

## 2022-04-29 PROCEDURE — 99233 SBSQ HOSP IP/OBS HIGH 50: CPT | Mod: 24,,, | Performed by: PHYSICIAN ASSISTANT

## 2022-04-29 PROCEDURE — 99232 SBSQ HOSP IP/OBS MODERATE 35: CPT | Mod: ,,, | Performed by: NURSE PRACTITIONER

## 2022-04-29 PROCEDURE — 80197 ASSAY OF TACROLIMUS: CPT | Performed by: STUDENT IN AN ORGANIZED HEALTH CARE EDUCATION/TRAINING PROGRAM

## 2022-04-29 PROCEDURE — 80053 COMPREHEN METABOLIC PANEL: CPT | Performed by: STUDENT IN AN ORGANIZED HEALTH CARE EDUCATION/TRAINING PROGRAM

## 2022-04-29 PROCEDURE — 99233 PR SUBSEQUENT HOSPITAL CARE,LEVL III: ICD-10-PCS | Mod: 24,,, | Performed by: PHYSICIAN ASSISTANT

## 2022-04-29 PROCEDURE — 25000003 PHARM REV CODE 250: Performed by: STUDENT IN AN ORGANIZED HEALTH CARE EDUCATION/TRAINING PROGRAM

## 2022-04-29 PROCEDURE — 99232 PR SUBSEQUENT HOSPITAL CARE,LEVL II: ICD-10-PCS | Mod: ,,, | Performed by: NURSE PRACTITIONER

## 2022-04-29 PROCEDURE — 63600175 PHARM REV CODE 636 W HCPCS: Performed by: PHYSICIAN ASSISTANT

## 2022-04-29 PROCEDURE — 25000003 PHARM REV CODE 250: Performed by: SURGERY

## 2022-04-29 PROCEDURE — 83735 ASSAY OF MAGNESIUM: CPT | Performed by: PHYSICIAN ASSISTANT

## 2022-04-29 PROCEDURE — 25000003 PHARM REV CODE 250: Performed by: PHYSICIAN ASSISTANT

## 2022-04-29 PROCEDURE — 84100 ASSAY OF PHOSPHORUS: CPT | Performed by: PHYSICIAN ASSISTANT

## 2022-04-29 PROCEDURE — 25000003 PHARM REV CODE 250: Performed by: TRANSPLANT SURGERY

## 2022-04-29 PROCEDURE — 20600001 HC STEP DOWN PRIVATE ROOM

## 2022-04-29 RX ORDER — NIFEDIPINE 30 MG/1
30 TABLET, EXTENDED RELEASE ORAL DAILY
Status: DISCONTINUED | OUTPATIENT
Start: 2022-04-30 | End: 2022-05-01 | Stop reason: HOSPADM

## 2022-04-29 RX ORDER — LANCETS
1 EACH MISCELLANEOUS
Qty: 100 EACH | Refills: 0 | Status: SHIPPED | OUTPATIENT
Start: 2022-04-29 | End: 2022-06-14 | Stop reason: SDUPTHER

## 2022-04-29 RX ORDER — LANOLIN ALCOHOL/MO/W.PET/CERES
400 CREAM (GRAM) TOPICAL 2 TIMES DAILY
Qty: 60 TABLET | Refills: 5 | Status: SHIPPED | OUTPATIENT
Start: 2022-04-29 | End: 2022-05-01 | Stop reason: SDUPTHER

## 2022-04-29 RX ORDER — DEXTROSE 4 G
TABLET,CHEWABLE ORAL
Qty: 1 EACH | Refills: 0 | Status: SHIPPED | OUTPATIENT
Start: 2022-04-29 | End: 2022-06-20 | Stop reason: SDUPTHER

## 2022-04-29 RX ORDER — KETOCONAZOLE 200 MG/1
100 TABLET ORAL DAILY
Qty: 15 TABLET | Refills: 11 | Status: ON HOLD | OUTPATIENT
Start: 2022-04-29 | End: 2022-07-12 | Stop reason: HOSPADM

## 2022-04-29 RX ORDER — OXYCODONE HYDROCHLORIDE 5 MG/1
5-10 TABLET ORAL EVERY 6 HOURS PRN
Qty: 50 TABLET | Refills: 0 | Status: CANCELLED | OUTPATIENT
Start: 2022-04-29

## 2022-04-29 RX ORDER — NIFEDIPINE 30 MG/1
30 TABLET, EXTENDED RELEASE ORAL DAILY
Qty: 30 TABLET | Refills: 11 | Status: SHIPPED | OUTPATIENT
Start: 2022-04-29 | End: 2022-06-08 | Stop reason: SDUPTHER

## 2022-04-29 RX ORDER — FUROSEMIDE 40 MG/1
80 TABLET ORAL 2 TIMES DAILY
Status: DISCONTINUED | OUTPATIENT
Start: 2022-04-29 | End: 2022-05-01 | Stop reason: HOSPADM

## 2022-04-29 RX ORDER — LIDOCAINE HYDROCHLORIDE 10 MG/ML
1 INJECTION, SOLUTION EPIDURAL; INFILTRATION; INTRACAUDAL; PERINEURAL ONCE
Status: COMPLETED | OUTPATIENT
Start: 2022-04-29 | End: 2022-04-29

## 2022-04-29 RX ORDER — LANOLIN ALCOHOL/MO/W.PET/CERES
800 CREAM (GRAM) TOPICAL 2 TIMES DAILY
Status: DISCONTINUED | OUTPATIENT
Start: 2022-04-29 | End: 2022-05-01 | Stop reason: HOSPADM

## 2022-04-29 RX ORDER — METHOCARBAMOL 500 MG/1
500 TABLET, FILM COATED ORAL 3 TIMES DAILY PRN
Qty: 30 TABLET | Refills: 0 | Status: SHIPPED | OUTPATIENT
Start: 2022-04-29 | End: 2022-05-17 | Stop reason: SDUPTHER

## 2022-04-29 RX ADMIN — OXYCODONE HYDROCHLORIDE 10 MG: 10 TABLET ORAL at 10:04

## 2022-04-29 RX ADMIN — OXYCODONE HYDROCHLORIDE 10 MG: 10 TABLET ORAL at 09:04

## 2022-04-29 RX ADMIN — LIDOCAINE HYDROCHLORIDE 10 MG: 10 INJECTION, SOLUTION EPIDURAL; INFILTRATION; INTRACAUDAL at 03:04

## 2022-04-29 RX ADMIN — FUROSEMIDE 80 MG: 40 TABLET ORAL at 05:04

## 2022-04-29 RX ADMIN — Medication 800 MG: at 10:04

## 2022-04-29 RX ADMIN — INSULIN ASPART 10 UNITS: 100 INJECTION, SOLUTION INTRAVENOUS; SUBCUTANEOUS at 05:04

## 2022-04-29 RX ADMIN — NYSTATIN 500000 UNITS: 500000 SUSPENSION ORAL at 02:04

## 2022-04-29 RX ADMIN — INSULIN ASPART 10 UNITS: 100 INJECTION, SOLUTION INTRAVENOUS; SUBCUTANEOUS at 01:04

## 2022-04-29 RX ADMIN — INSULIN ASPART 2 UNITS: 100 INJECTION, SOLUTION INTRAVENOUS; SUBCUTANEOUS at 01:04

## 2022-04-29 RX ADMIN — NIFEDIPINE 60 MG: 30 TABLET, FILM COATED, EXTENDED RELEASE ORAL at 08:04

## 2022-04-29 RX ADMIN — Medication 800 MG: at 09:04

## 2022-04-29 RX ADMIN — FAMOTIDINE 20 MG: 20 TABLET ORAL at 09:04

## 2022-04-29 RX ADMIN — INSULIN ASPART 10 UNITS: 100 INJECTION, SOLUTION INTRAVENOUS; SUBCUTANEOUS at 08:04

## 2022-04-29 RX ADMIN — METHOCARBAMOL 500 MG: 500 TABLET, FILM COATED ORAL at 08:04

## 2022-04-29 RX ADMIN — INSULIN ASPART 6 UNITS: 100 INJECTION, SOLUTION INTRAVENOUS; SUBCUTANEOUS at 05:04

## 2022-04-29 RX ADMIN — KETOCONAZOLE 100 MG: 200 TABLET ORAL at 02:04

## 2022-04-29 RX ADMIN — INSULIN ASPART 6 UNITS: 100 INJECTION, SOLUTION INTRAVENOUS; SUBCUTANEOUS at 09:04

## 2022-04-29 RX ADMIN — METHOCARBAMOL 500 MG: 500 TABLET, FILM COATED ORAL at 02:04

## 2022-04-29 RX ADMIN — TACROLIMUS 8 MG: 1 CAPSULE ORAL at 05:04

## 2022-04-29 RX ADMIN — FUROSEMIDE 80 MG: 40 TABLET ORAL at 10:04

## 2022-04-29 RX ADMIN — TACROLIMUS 8 MG: 1 CAPSULE ORAL at 08:04

## 2022-04-29 RX ADMIN — NYSTATIN 500000 UNITS: 500000 SUSPENSION ORAL at 08:04

## 2022-04-29 RX ADMIN — OXYCODONE HYDROCHLORIDE 10 MG: 10 TABLET ORAL at 06:04

## 2022-04-29 RX ADMIN — METHOCARBAMOL 500 MG: 500 TABLET, FILM COATED ORAL at 09:04

## 2022-04-29 RX ADMIN — MYCOPHENOLATE MOFETIL 1000 MG: 250 CAPSULE ORAL at 08:04

## 2022-04-29 RX ADMIN — DOCUSATE SODIUM 100 MG: 100 CAPSULE ORAL at 09:04

## 2022-04-29 RX ADMIN — HEPARIN SODIUM 5000 UNITS: 5000 INJECTION INTRAVENOUS; SUBCUTANEOUS at 02:04

## 2022-04-29 RX ADMIN — MYCOPHENOLATE MOFETIL 1000 MG: 250 CAPSULE ORAL at 09:04

## 2022-04-29 RX ADMIN — NYSTATIN 500000 UNITS: 500000 SUSPENSION ORAL at 09:04

## 2022-04-29 RX ADMIN — METHYLPREDNISOLONE SODIUM SUCCINATE 40 MG: 40 INJECTION, POWDER, FOR SOLUTION INTRAMUSCULAR; INTRAVENOUS at 08:04

## 2022-04-29 RX ADMIN — HEPARIN SODIUM 5000 UNITS: 5000 INJECTION INTRAVENOUS; SUBCUTANEOUS at 09:04

## 2022-04-29 RX ADMIN — OXYCODONE HYDROCHLORIDE 10 MG: 10 TABLET ORAL at 04:04

## 2022-04-29 NOTE — PROGRESS NOTES
[facility] - Transplant Stepdown  Liver Transplant  Progress Note    Patient Name: Pelon Vasquez  MRN: [id number]  Admission Date: 4/19/2022  Hospital Length of Stay: 9 days  Code Status: Full Code  Primary Care Provider: Primary Doctor No  Post-Operative Day: 5    ORGAN:   LIVER  Disease Etiology: Cirrhosis: Other, Specify  Donor Type:   Donation after Brain Death  CDC High Risk:   No  Donor CMV Status:   Donor CMV Status: Positive  Donor HBcAB:   Negative  Donor HCV Status:   Negative  Donor HBV MATHEUS: Negative  Donor HCV MATHEUS: Negative  Whole or Partial: Whole Liver  Biliary Anastomosis: End to End  Arterial Anatomy: Completely Replaced Circulation  Subjective:     History of Present Illness:  Pelon Vasquez is a 57yr olf male with 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 ESLD complicated by jaundice, FAMILIA, ascites (para 2x/week, no h/o SBP), HE, and hypervolemia. He is listed for a liver transplant with MELD 30. Patient admitted for liver transplant surgery. He denies any recent fevers or hospitalizations unknown to transplant team. He reports feel well in his usual state of health. Pre op labs and imaging pending      Hospital Course:  Patient was admitted for liver transplant 4/19 but case cancelled due to donor organ quality. He was kept inpatient due to FAMILIA noted on admit. Urine sodium < 10. Cr continued to trend up despite holding diuretics and hydrating with albumin. Nephrology consulted. Paracentesis ordered to rule out SBP. 5.5L removed, no SBP. Patient ultimately became primary for another liver transplant. He is now s/p OLTx 4/24/22 2/2 LOZANO & ETOH (steroid induction, CMV +/+). Operation straight forward per op note. POD#1 US satisfactory. Post op SICU course notable for SVT requiring adenosine for conversation back to NSR. Shirley removed in SICU. Stepped down to TSU on 4/26. CVC removed 4/27 as renal function improving. TB trending down, but AST/ALT slightly increased 4/27. Increased Prograf and obtained  liver US. Liver US 4/27 satisfactory doppler with 3 small peritransplant fluid collections. Continued to push Prograf and monitored.    Interval History: No acute events overnight. POD# 5 s/p OLTx. Patient c/o increased abdominal soreness this AM, but reports he feels this is due to an active day yesterday. He had 3 Bms in the past 24h, and continues to tolerate PO intake without nausea. AST/ALT slightly improved today, but TB slightly increased. Plan to push Prograf and obtain US in AM. R GO drains remain with ss output, decreased from prior days in the past 24h. Plan to remove lateral drain today. Renal function now wnl with good UOP. Continue diuresis for generalized edema - switched to oral lasix 80 mg PO BID.  PT/OT following. Recommending HH @ discharge (orders placed). Tentative discharge tomorrow if LFTs improving. Monitor.      Scheduled Meds:   docusate sodium  100 mg Oral TID    famotidine  20 mg Oral Nightly    furosemide  80 mg Oral BID    heparin (porcine)  5,000 Units Subcutaneous Q8H    insulin aspart U-100  10 Units Subcutaneous TIDWM    LIDOcaine (PF) 10 mg/ml (1%)  1 mL Other Once    magnesium oxide  800 mg Oral BID    methocarbamoL  500 mg Oral TID    mycophenolate  1,000 mg Oral BID    [START ON 4/30/2022] NIFEdipine  30 mg Oral Daily    nystatin  500,000 Units Mouth/Throat TID PC    [START ON 4/30/2022] predniSONE  20 mg Oral Daily    [START ON 5/1/2022] sulfamethoxazole-trimethoprim 400-80mg  1 tablet Oral Daily AM    tacrolimus  8 mg Oral BID    [START ON 5/4/2022] valGANciclovir  450 mg Oral Daily     Continuous Infusions:  PRN Meds:sodium chloride, dextrose 10%, dextrose 10%, glucagon (human recombinant), glucose, glucose, insulin aspart U-100, ondansetron, oxyCODONE, oxyCODONE, sodium chloride 0.9%    Review of Systems   Constitutional:  Negative for appetite change, chills and fever.   HENT:  Negative for facial swelling and trouble swallowing.    Eyes:  Negative for  photophobia.   Respiratory:  Negative for cough, shortness of breath, wheezing and stridor.    Cardiovascular:  Positive for leg swelling. Negative for chest pain and palpitations.   Gastrointestinal:  Positive for abdominal distention and abdominal pain (incisional/appropriate). Negative for constipation, diarrhea, nausea and vomiting.   Genitourinary:  Negative for decreased urine volume, difficulty urinating and dysuria.   Musculoskeletal:  Negative for neck pain and neck stiffness.   Skin:  Positive for wound.   Allergic/Immunologic: Positive for immunocompromised state.   Neurological:  Positive for weakness. Negative for dizziness and light-headedness.   Psychiatric/Behavioral:  Negative for agitation, behavioral problems, confusion and decreased concentration.    Objective:     Vital Signs (Most Recent):  Temp: 98.4 °F (36.9 °C) (04/29/22 1215)  Pulse: 87 (04/29/22 1215)  Resp: 18 (04/29/22 1044)  BP: (!) 145/78 (04/29/22 1215)  SpO2: 95 % (04/29/22 1215)   Vital Signs (24h Range):  Temp:  [98 °F (36.7 °C)-98.4 °F (36.9 °C)] 98.4 °F (36.9 °C)  Pulse:  [] 87  Resp:  [9-18] 18  SpO2:  [95 %-99 %] 95 %  BP: (114-166)/(63-98) 145/78     Weight: 108.8 kg (239 lb 13.8 oz)  Body mass index is 37.57 kg/m².    Intake/Output - Last 3 Shifts         04/27 0700  04/28 0659 04/28 0700  04/29 0659 04/29 0700  04/30 0659    P.O. 2850 2350 540    I.V. (mL/kg) 200 (1.8) 0 (0)     Blood       Other 0 0     Total Intake(mL/kg) 3050 (28) 2350 (21.6) 540 (5)    Urine (mL/kg/hr) 3125 (1.2) 3800 (1.5) 500 (0.7)    Emesis/NG output  0     Drains 1165 430     Other 0 0     Stool 0 0 2    Blood 0 0     Total Output 4290 4230 502    Net -1240 -1880 +38           Urine Occurrence 0 x 1 x     Stool Occurrence 0 x 1 x             Physical Exam  Vitals and nursing note reviewed.   Constitutional:       General: He is not in acute distress.  HENT:      Head: Normocephalic and atraumatic.   Eyes:      General: No scleral icterus.         Right eye: No discharge.         Left eye: No discharge.   Cardiovascular:      Rate and Rhythm: Normal rate and regular rhythm.      Heart sounds: No murmur heard.  Pulmonary:      Effort: Pulmonary effort is normal. No respiratory distress.      Breath sounds: No wheezing or rales.   Abdominal:      General: Bowel sounds are normal. There is distension.      Tenderness: There is abdominal tenderness (mild, R side). There is no guarding.      Comments: Chevron incision AMARIS with staples no s/s/I  2 R GO drains with ss output   Musculoskeletal:         General: Swelling present.      Right lower leg: Edema present.      Left lower leg: Edema present.   Skin:     General: Skin is warm and dry.      Capillary Refill: Capillary refill takes less than 2 seconds.      Coloration: Skin is not jaundiced.   Neurological:      General: No focal deficit present.      Mental Status: He is alert and oriented to person, place, and time.   Psychiatric:         Mood and Affect: Mood normal.         Behavior: Behavior normal.         Thought Content: Thought content normal.       Laboratory:  Immunosuppressants           Stop Route Frequency     tacrolimus capsule 8 mg         -- Oral 2 times daily     mycophenolate capsule 1,000 mg         -- Oral 2 times daily          CBC:   Recent Labs   Lab 04/29/22  0834   WBC 6.63   RBC 2.91*   HGB 8.4*   HCT 25.6*   PLT 60*   MCV 88   MCH 28.9   MCHC 32.8     CMP:   Recent Labs   Lab 04/29/22  0834   *   CALCIUM 8.1*   ALBUMIN 3.1*   PROT 4.9*      K 3.6   CO2 26      BUN 50*   CREATININE 1.2   ALKPHOS 92   *   AST 64*   BILITOT 2.8*     Coagulation:   Recent Labs   Lab 04/26/22  0404   INR 1.1   APTT 29.9     Labs within the past 24 hours have been reviewed.    Diagnostic Results:  I have personally reviewed all pertinent imaging studies.    Debility/Functional status: Patient debilitated by evidence of Weakness and Other reduced mobility. Physical and  "occupational therapy ordered daily to evaluate and treat. Debility was: present on admission.    Assessment/Plan:     * S/P liver transplant  - s/p OLTx 4/24/22 2/2 LOZANO & ETOH (steroid induction, CMV +/+).   - Operation straight forward per op note. Did require intra-op HD.  - POD#1 US satisfactory.   - Post op SICU course notable for SVT requiring adenosine for conversation back to NSR.  - Shirley removed in SICU. Stepped down to TSU on 4/26.  - 2 GO drains remain with ss output. Plan to remove lateral drain 4/29.  - TB trending down, but AST/ALT slightly increased 4/27. Pushed Prograf and obtained liver US.  - Liver US 4/27 satisfactory doppler with 3 small melody-transplant fluid collections   - Renal function has returned to baseline.  - CVC removed 4/27  - LFTs remain remained elevated 4/28-4/29. Continue to push Prograf and monitor.   - Plan for US 4/29 AM  - Tolerating diet, (+) flatus (+) BM. Continue bowel regimen and encourage ambulation with assistance.   - PT/OT following. Recommending HH @ discharge (orders placed). Monitor.    Generalized edema  - Diuresis with lasix/albumin  - Monitor      Essential hypertension  - Continue Nifedipine.      Long-term use of immunosuppressant medication  - See "prophylactic immunotherapy."      Class 2 severe obesity due to excess calories with serious comorbidity in adult  - Monitor. RD following.      Adverse effect of corticosteroids  - Monitor. Endocrine following.      Prophylactic immunotherapy  - Continue Prograf. Monitor trough daily and adjust dose as needed to achieve therapeutic level.  - Continue Cellcept.  - Continue steroids.    At risk for opportunistic infections  - Bactrim for PCP ppx  - Valcyte for CMV ppx  - Nystatin for thrush ppx      Thrombocytopenia, unspecified  - Due to ESLD  - Improving post op  - Monitor      Anemia of chronic disease  - H/H stable. Will continue to monitor with daily cbc.           Type 2 diabetes mellitus without complication, " without long-term current use of insulin  - Endocrine following, appreciate assistance.           VTE Risk Mitigation (From admission, onward)         Ordered     heparin (porcine) injection 5,000 Units  Every 8 hours         04/24/22 0831     Place sequential compression device  Until discontinued         04/24/22 0831     IP VTE HIGH RISK PATIENT  Once         04/24/22 0831                The patients clinical status was discussed at multidisplinary rounds, involving transplant surgery, transplant medicine, pharmacy, nursing, nutrition, and social work    Discharge Planning:  Tentative dc tomorrow if LFTs improving.    Azalea Malin PA-C  Liver Transplant  [facility] - [facility]

## 2022-04-29 NOTE — PROGRESS NOTES
Met with patient and caregiver, Leonarda, in preparation for discharge tomorrow.  Reviewed their answers to the questions in [facility].  All questions were answered correctly.  These questions cover: post op care, medication management, infection prevention and emergency contacts.  Outpatient coordinator assigned.  Outpatient appointments reviewed.  Allowed time for questions and answers.

## 2022-04-29 NOTE — ASSESSMENT & PLAN NOTE
- s/p OLTx 4/24/22 2/2 LOZANO & ETOH (steroid induction, CMV +/+).   - Operation straight forward per op note. Did require intra-op HD.  - POD#1 US satisfactory.   - Post op SICU course notable for SVT requiring adenosine for conversation back to NSR.  - Shirley removed in SICU. Stepped down to TSU on 4/26.  - 2 GO drains remain with ss output. Plan to remove lateral drain 4/29.  - TB trending down, but AST/ALT slightly increased 4/27. Pushed Prograf and obtained liver US.  - Liver US 4/27 satisfactory doppler with 3 small melody-transplant fluid collections   - Renal function has returned to baseline.  - CVC removed 4/27  - LFTs remain remained elevated 4/28-4/29. Continue to push Prograf and monitor.   - Plan for US 4/29 AM  - Tolerating diet, (+) flatus (+) BM. Continue bowel regimen and encourage ambulation with assistance.   - PT/OT following. Recommending HH @ discharge (orders placed). Monitor.

## 2022-04-29 NOTE — SUBJECTIVE & OBJECTIVE
Scheduled Meds:   docusate sodium  100 mg Oral TID    famotidine  20 mg Oral Nightly    furosemide  80 mg Oral BID    heparin (porcine)  5,000 Units Subcutaneous Q8H    insulin aspart U-100  10 Units Subcutaneous TIDWM    LIDOcaine (PF) 10 mg/ml (1%)  1 mL Other Once    magnesium oxide  800 mg Oral BID    methocarbamoL  500 mg Oral TID    mycophenolate  1,000 mg Oral BID    [START ON 4/30/2022] NIFEdipine  30 mg Oral Daily    nystatin  500,000 Units Mouth/Throat TID PC    [START ON 4/30/2022] predniSONE  20 mg Oral Daily    [START ON 5/1/2022] sulfamethoxazole-trimethoprim 400-80mg  1 tablet Oral Daily AM    tacrolimus  8 mg Oral BID    [START ON 5/4/2022] valGANciclovir  450 mg Oral Daily     Continuous Infusions:  PRN Meds:sodium chloride, dextrose 10%, dextrose 10%, glucagon (human recombinant), glucose, glucose, insulin aspart U-100, ondansetron, oxyCODONE, oxyCODONE, sodium chloride 0.9%    Review of Systems   Constitutional:  Negative for appetite change, chills and fever.   HENT:  Negative for facial swelling and trouble swallowing.    Eyes:  Negative for photophobia.   Respiratory:  Negative for cough, shortness of breath, wheezing and stridor.    Cardiovascular:  Positive for leg swelling. Negative for chest pain and palpitations.   Gastrointestinal:  Positive for abdominal distention and abdominal pain (incisional/appropriate). Negative for constipation, diarrhea, nausea and vomiting.   Genitourinary:  Negative for decreased urine volume, difficulty urinating and dysuria.   Musculoskeletal:  Negative for neck pain and neck stiffness.   Skin:  Positive for wound.   Allergic/Immunologic: Positive for immunocompromised state.   Neurological:  Positive for weakness. Negative for dizziness and light-headedness.   Psychiatric/Behavioral:  Negative for agitation, behavioral problems, confusion and decreased concentration.    Objective:     Vital Signs (Most Recent):  Temp: 98.4 °F (36.9 °C) (04/29/22  1215)  Pulse: 87 (04/29/22 1215)  Resp: 18 (04/29/22 1044)  BP: (!) 145/78 (04/29/22 1215)  SpO2: 95 % (04/29/22 1215)   Vital Signs (24h Range):  Temp:  [98 °F (36.7 °C)-98.4 °F (36.9 °C)] 98.4 °F (36.9 °C)  Pulse:  [] 87  Resp:  [9-18] 18  SpO2:  [95 %-99 %] 95 %  BP: (114-166)/(63-98) 145/78     Weight: 108.8 kg (239 lb 13.8 oz)  Body mass index is 37.57 kg/m².    Intake/Output - Last 3 Shifts         04/27 0700  04/28 0659 04/28 0700  04/29 0659 04/29 0700  04/30 0659    P.O. 2850 2350 540    I.V. (mL/kg) 200 (1.8) 0 (0)     Blood       Other 0 0     Total Intake(mL/kg) 3050 (28) 2350 (21.6) 540 (5)    Urine (mL/kg/hr) 3125 (1.2) 3800 (1.5) 500 (0.7)    Emesis/NG output  0     Drains 1165 430     Other 0 0     Stool 0 0 2    Blood 0 0     Total Output 4290 4230 502    Net -1240 -1880 +38           Urine Occurrence 0 x 1 x     Stool Occurrence 0 x 1 x             Physical Exam  Vitals and nursing note reviewed.   Constitutional:       General: He is not in acute distress.  HENT:      Head: Normocephalic and atraumatic.   Eyes:      General: No scleral icterus.        Right eye: No discharge.         Left eye: No discharge.   Cardiovascular:      Rate and Rhythm: Normal rate and regular rhythm.      Heart sounds: No murmur heard.  Pulmonary:      Effort: Pulmonary effort is normal. No respiratory distress.      Breath sounds: No wheezing or rales.   Abdominal:      General: Bowel sounds are normal. There is distension.      Tenderness: There is abdominal tenderness (mild, R side). There is no guarding.      Comments: Chevron incision AMARIS with staples no s/s/I  2 R GO drains with ss output   Musculoskeletal:         General: Swelling present.      Right lower leg: Edema present.      Left lower leg: Edema present.   Skin:     General: Skin is warm and dry.      Capillary Refill: Capillary refill takes less than 2 seconds.      Coloration: Skin is not jaundiced.   Neurological:      General: No focal deficit  present.      Mental Status: He is alert and oriented to person, place, and time.   Psychiatric:         Mood and Affect: Mood normal.         Behavior: Behavior normal.         Thought Content: Thought content normal.       Laboratory:  Immunosuppressants           Stop Route Frequency     tacrolimus capsule 8 mg         -- Oral 2 times daily     mycophenolate capsule 1,000 mg         -- Oral 2 times daily          CBC:   Recent Labs   Lab 04/29/22  0834   WBC 6.63   RBC 2.91*   HGB 8.4*   HCT 25.6*   PLT 60*   MCV 88   MCH 28.9   MCHC 32.8     CMP:   Recent Labs   Lab 04/29/22  0834   *   CALCIUM 8.1*   ALBUMIN 3.1*   PROT 4.9*      K 3.6   CO2 26      BUN 50*   CREATININE 1.2   ALKPHOS 92   *   AST 64*   BILITOT 2.8*     Coagulation:   Recent Labs   Lab 04/26/22  0404   INR 1.1   APTT 29.9     Labs within the past 24 hours have been reviewed.    Diagnostic Results:  I have personally reviewed all pertinent imaging studies.    Debility/Functional status: Patient debilitated by evidence of Weakness and Other reduced mobility. Physical and occupational therapy ordered daily to evaluate and treat. Debility was: present on admission.

## 2022-04-29 NOTE — PLAN OF CARE
Patient remaining free from injury and ambulating in the room and to the restroom.  Patient wife pulling self meds with 100% accuracy.  Patient's wife learning insulin pen.  Patient reports pain was 8/10 this am but now reports pain improved after oxycodone 10mg and Robaxin.  Patient BG < 200 today.  Patient wife painting incision with betadine and no s/s of infection noted.  Patient receiving po Lasix and UO > 1L today.  Patient eating 100% of meals and reports excellent appetite.  Patient with 2 BMs today and colace held.  Patient anticipating discharge to [facility] tomorrow under care of wife.   [Good] : ~his/her~ current health as good [No] : In the past 12 months have you used drugs other than those required for medical reasons? No [No falls in past year] : Patient reported no falls in the past year [0] : 2) Feeling down, depressed, or hopeless: Not at all (0) [No Retinopathy] : No retinopathy [Patient reported bone density results were abnormal] : Patient reported bone density results were abnormal [Patient reported mammogram was normal] : Patient reported mammogram was normal [Patient reported colonoscopy was abnormal] : Patient reported colonoscopy was abnormal [Hepatitis C test declined] : Hepatitis C test declined [HIV test declined] : HIV test declined [Alone] : lives alone [Employed] : employed [None] : None [# Of Children ___] : has [unfilled] children [Fully functional (using the telephone, shopping, preparing meals, housekeeping, doing laundry, using] : Fully functional and needs no help or supervision to perform IADLs (using the telephone, shopping, preparing meals, housekeeping, doing laundry, using transportation, managing medications and managing finances) [Fully functional (bathing, dressing, toileting, transferring, walking, feeding)] : Fully functional (bathing, dressing, toileting, transferring, walking, feeding) [Feels Safe at Home] : Feels safe at home [Smoke Detector] : smoke detector [Seat Belt] :  uses seat belt [Designated Healthcare Proxy] : Designated healthcare proxy [Name: ___] : Health Care Proxy's Name: [unfilled]  [Relationship: ___] : Relationship: [unfilled] [de-identified] : Walks [] : No [de-identified] : diabetic not 100% compliants [EyeExamDate] : 12/19 [Change in mental status noted] : No change in mental status noted [Handling Complex Tasks] : denies difficulty handling complex tasks [Language] : denies difficulty with language [Sexually Active] : not sexually active [Reports changes in hearing] : Reports no changes in hearing [Reports changes in vision] : Reports no changes in vision [Reports changes in dental health] : Reports no changes in dental health [BoneDensityDate] : 2018 [MammogramDate] : 2019 [BoneDensityComments] : osteopenia [ColonoscopyDate] : 2015 [ColonoscopyComments] : GI: Dr Manzano in [place] [FreeTextEntry2] : Per sm[facility]a at [facility] [AdvancecareDate] : 2/19/20

## 2022-04-29 NOTE — PLAN OF CARE
[facility]  TELE NSR/S-TACH  VISI ON   OXY GIVEN X 2   GO X 2 WITH GOOD OUTPUT   GOOD UOP  WIFE AT BEDSIDE   BED LOW RAILS UP X2 CALL LIGHT WITHIN REACH  VERBAL UNDERSTANDING NOTED TO CALL PRN

## 2022-04-29 NOTE — ASSESSMENT & PLAN NOTE
"BG goal 140 - 180     Continue Novolog to 10 units TID with meals (0.5 u/kg dosing) Prandial BG excursions noted on steroid therapy.   Moderate Dose Correction Scale  BG monitoring ac/hs    ** Please call Endocrine for any BG related issues **  ** Please notify Endocrine for any change and/or advance in diet**    Lab Results   Component Value Date    HGBA1C 6.6 (H) 04/19/2022       Discharge Planning:  - Insurance preferred diabetes testing supplies  - Ultra-Fine Oliva Pen Needles 4mm x 32 G (5/32" x 0.23mm).   - Novolog 7 units TIDWM in addition to the following correction scale:     150 - 200 + 1 unit  201 - 250 + 2 units  251 - 300 + 3 units  301 - 350 + 4 units   > 350   + 5 units  - Patient will need to keep blood sugar logs, and follow-up with [facility] endocrinology discharge clinic.       "

## 2022-04-29 NOTE — PROGRESS NOTES
"[facility] - [facility]  Endocrinology  Progress Note    Admit Date: 2022     Reason for Consult: Management of T2DM, Hyperglycemia     Surgical Procedure and Date: Liver Transplant 22    Diabetes diagnosis year: 6 years ago    Home Diabetes Medications:  none currently (took Metformin in the past and could not tolerate side effects)     How often checking glucose at home?  Once daily    BG readings on regimen: < 150  Hypoglycemia on the regimen?  No  Missed doses on regimen? n/a    Diabetes Complications include:     CKD     Complicating diabetes co morbidities:   CKD, Cirrhosis, Obesity, BRAYDON, Glucocorticoid Use       HPI:   Patient is a 57 y.o. male with a diagnosis of EtOH/LOZANO cirrhosis, esophageal varices, obesity (BMI 42), CKD, HTN, DM2, and BRAYDON presenting for liver transplant. He is s/p diagnostic paracentesis on  with 4800mL of fluid removed. Patient currently has FAMILIA likely in setting of vancomycin toxicity, per Nephrology note. Plan for intraoperative dialysis. Patient now presents for the above procedure(s). Endocrinology consulted for management of T2DM.       Lab Results   Component Value Date    HGBA1C 6.6 (H) 2022           Interval HPI:   Overnight events:   BG stable and within goal today. Patient still requiring insulin while on steroid therapy. Possible discharged is scheduled for tomorrow. Endo will continue to follow, and manage glycemic control while inpatient.   Diet diabetic [facility]; 2000 Calorie; Isolation Tray - Regular [place]  6 Days Post-Op    Eatin%  Nausea: No  Hypoglycemia and intervention: No  Fever: No  TPN and/or TF: No  If yes, type of TF/TPN and rate: None    BP (!) 141/81 (BP Location: Left arm, Patient Position: Lying)   Pulse 97   Temp 98.4 °F (36.9 °C) (Oral)   Resp 18   Ht 5' 7" (1.702 m)   Wt 108.8 kg (239 lb 13.8 oz)   SpO2 97%   BMI 37.57 kg/m²     Labs Reviewed and Include    Recent Labs   Lab 22  0834   * " "  CALCIUM 8.1*   ALBUMIN 3.1*   PROT 4.9*      K 3.6   CO2 26      BUN 50*   CREATININE 1.2   ALKPHOS 92   *   AST 64*   BILITOT 2.8*     Lab Results   Component Value Date    WBC 6.63 04/29/2022    HGB 8.4 (L) 04/29/2022    HCT 25.6 (L) 04/29/2022    MCV 88 04/29/2022    PLT 60 (L) 04/29/2022     No results for input(s): TSH, FREET4 in the last 168 hours.  Lab Results   Component Value Date    HGBA1C 6.6 (H) 04/19/2022       Nutritional status:   Body mass index is 37.57 kg/m².  Lab Results   Component Value Date    ALBUMIN 3.1 (L) 04/29/2022    ALBUMIN 2.6 (L) 04/28/2022    ALBUMIN 2.4 (L) 04/27/2022    ALBUMIN 2.4 (L) 04/27/2022     No results found for: PREALBUMIN    Estimated Creatinine Clearance: 81.9 mL/min (based on SCr of 1.2 mg/dL).    Accu-Checks  Recent Labs     04/27/22  0809 04/27/22  0843 04/27/22  1212 04/27/22  1704 04/27/22  2133 04/28/22  0741 04/28/22  1125 04/28/22  1727 04/28/22  2122 04/29/22  1156   POCTGLUCOSE 86 81 213* 309* 292* 189* 176* 267* 275* 167*       Current Medications and/or Treatments Impacting Glycemic Control  Immunotherapy:    Immunosuppressants           Stop Route Frequency     tacrolimus capsule 8 mg         -- Oral 2 times daily     mycophenolate capsule 1,000 mg         -- Oral 2 times daily          Steroids:   Hormones (From admission, onward)                Start     Stop Route Frequency Ordered    04/30/22 0900  predniSONE tablet 20 mg  (methylprednisolone taper panel)        "Followed by" Linked Group Details    -- Oral Daily 04/24/22 0831          Pressors:    Autonomic Drugs (From admission, onward)                None          Hyperglycemia/Diabetes Medications:   Antihyperglycemics (From admission, onward)                Start     Stop Route Frequency Ordered    04/28/22 1130  insulin aspart U-100 pen 10 Units         -- SubQ 3 times daily with meals 04/28/22 0956    04/25/22 0931  insulin aspart U-100 pen 0-10 Units         -- SubQ As needed " "(PRN) 04/25/22 0831            ASSESSMENT and PLAN    * S/P liver transplant    Managed per primary team  Optimize BG control        Type 2 diabetes mellitus without complication, without long-term current use of insulin  BG goal 140 - 180     Continue Novolog to 10 units TID with meals (0.5 u/kg dosing) Prandial BG excursions noted on steroid therapy.   Moderate Dose Correction Scale  BG monitoring ac/hs    ** Please call Endocrine for any BG related issues **  ** Please notify Endocrine for any change and/or advance in diet**    Lab Results   Component Value Date    HGBA1C 6.6 (H) 04/19/2022       Discharge Planning:  - Insurance preferred diabetes testing supplies  - Ultra-Fine Oliva Pen Needles 4mm x 32 G (5/32" x 0.23mm).   - Novolog 7 units TIDWM in addition to the following correction scale:     150 - 200 + 1 unit  201 - 250 + 2 units  251 - 300 + 3 units  301 - 350 + 4 units   > 350   + 5 units  - Patient will need to keep blood sugar logs, and follow-up with [facility] endocrinology discharge clinic.         Class 2 severe obesity due to excess calories with serious comorbidity in adult  Nutrition consulted. Most recent weight and BMI monitored-              Final Summary  Subcutaneous Fat Loss (Final Summary): well nourished  Muscle Loss Evaluation (Final Summary): well nourished         Measurements:  Wt Readings from Last 1 Encounters:   04/28/22 108.8 kg (239 lb 13.8 oz)   Body mass index is 37.57 kg/m².    Recommendations: Recommendation/Intervention: 1. Intensify bowel regimen prn (LBM 4/23) 2. Continue Diabetic diet - If PO intake <50%, add Boost GC TID 3. RD to follow and monitor  Goals: Diet advanced to diabetic diet by RD follow-up.    Patient has been screened and assessed by RD. RD will follow patient.      Adverse effect of corticosteroids  Body mass index is 37.57 kg/m².  May increase insulin resistance.                 Tristin Watkins NP  Endocrinology  [facility]  "

## 2022-04-29 NOTE — PROGRESS NOTES
Nutrition-Related Diabetes Education      Time Spent: 15 minutes    Learners: Patient and spouse    Current HbA1c: 6.6%    Is patient aware of their A1c and their goal A1c? Yes    Nutrition Education with handouts: Educated pt on CHO counting for people with diabetes. Pt verbalized understanding. Emphasized the importance of diet adherence. Pt with no additional questions. No other needs identified. Left all education material with pt at bedside.    Comments: Pt requested education prior to discharge. Wt stable. Denies any significant wt changes. No indicators of malnutrition.      Barriers to Learning: None; pt and spouse with many good questions    Follow up: Yes     Please consult as needed.  Thank you!    Dominique Ruggiero, MS, RD, LDN  Ext: [phone]

## 2022-04-29 NOTE — SUBJECTIVE & OBJECTIVE
"Interval HPI:   Overnight events:   BG stable and within goal today. Patient still requiring insulin while on steroid therapy. Possible discharged is scheduled for tomorrow. Endo will continue to follow, and manage glycemic control while inpatient.   Diet diabetic [facility]; 2000 Calorie; Isolation Tray - Regular [place]  6 Days Post-Op    Eatin%  Nausea: No  Hypoglycemia and intervention: No  Fever: No  TPN and/or TF: No  If yes, type of TF/TPN and rate: None    BP (!) 141/81 (BP Location: Left arm, Patient Position: Lying)   Pulse 97   Temp 98.4 °F (36.9 °C) (Oral)   Resp 18   Ht 5' 7" (1.702 m)   Wt 108.8 kg (239 lb 13.8 oz)   SpO2 97%   BMI 37.57 kg/m²     Labs Reviewed and Include    Recent Labs   Lab 22  0834   *   CALCIUM 8.1*   ALBUMIN 3.1*   PROT 4.9*      K 3.6   CO2 26      BUN 50*   CREATININE 1.2   ALKPHOS 92   *   AST 64*   BILITOT 2.8*     Lab Results   Component Value Date    WBC 6.63 2022    HGB 8.4 (L) 2022    HCT 25.6 (L) 2022    MCV 88 2022    PLT 60 (L) 2022     No results for input(s): TSH, FREET4 in the last 168 hours.  Lab Results   Component Value Date    HGBA1C 6.6 (H) 2022       Nutritional status:   Body mass index is 37.57 kg/m².  Lab Results   Component Value Date    ALBUMIN 3.1 (L) 2022    ALBUMIN 2.6 (L) 2022    ALBUMIN 2.4 (L) 2022    ALBUMIN 2.4 (L) 2022     No results found for: PREALBUMIN    Estimated Creatinine Clearance: 81.9 mL/min (based on SCr of 1.2 mg/dL).    Accu-Checks  Recent Labs     22  0809 22  0843 22  1212 22  1704 22  2133 22  0741 22  1125 22  1727 222 22  1156   POCTGLUCOSE 86 81 213* 309* 292* 189* 176* 267* 275* 167*       Current Medications and/or Treatments Impacting Glycemic Control  Immunotherapy:    Immunosuppressants           Stop Route Frequency     tacrolimus capsule 8 mg      " "   -- Oral 2 times daily     mycophenolate capsule 1,000 mg         -- Oral 2 times daily          Steroids:   Hormones (From admission, onward)                Start     Stop Route Frequency Ordered    04/30/22 0900  predniSONE tablet 20 mg  (methylprednisolone taper panel)        "Followed by" Linked Group Details    -- Oral Daily 04/24/22 0831          Pressors:    Autonomic Drugs (From admission, onward)                None          Hyperglycemia/Diabetes Medications:   Antihyperglycemics (From admission, onward)                Start     Stop Route Frequency Ordered    04/28/22 1130  insulin aspart U-100 pen 10 Units         -- SubQ 3 times daily with meals 04/28/22 0956    04/25/22 0931  insulin aspart U-100 pen 0-10 Units         -- SubQ As needed (PRN) 04/25/22 0831          "

## 2022-04-29 NOTE — PT/OT/SLP PROGRESS
Occupational Therapy      Patient Name:  Pelon Vasquez   MRN:  [id number]    Patient not seen today secondary to Other (Comment). Pt working with PT this am and preparing for discharge this pm/. Will follow-up should Pt not discharge.    4/29/2022

## 2022-04-29 NOTE — PROCEDURES
Right lateral GO drain removed. Site cleansed with alcohol swab. Injected 1% lidocaine to site to numb area. Drain removed with tip intact. Sutured with 3.0 prolene. Gauze dressing applied. Patient tolerated procedure well.

## 2022-04-29 NOTE — PT/OT/SLP PROGRESS
Physical Therapy Treatment    Patient Name:  Pelon Vasquez   MRN:  [id number]    Recommendations:     Discharge Recommendations:  home health PT   Discharge Equipment Recommendations: bedside commode, walker, rolling   Barriers to discharge: None    Assessment:     Pelon Vasquez is a 57 y.o. male admitted with a medical diagnosis of S/P liver transplant.  He presents with the following impairments/functional limitations:   (weakness; impaired endurance; gait instability; impaired functional mobilty; pain; edema; impaired cardiopulmonary response to activity) . Pt presents with  improved overall functional mobility requiring decreased assistance and increased activity tolerance to perform functional mobility.Pt would continue to benefit from skilled PT to address overall functional mobility, to return to functional baseline and goals. Goals remain appropriate.      Rehab Prognosis: Good; patient would benefit from acute skilled PT services to address these deficits and reach maximum level of function.    Recent Surgery: Procedure(s) (LRB):  TRANSPLANT, LIVER (N/A) 6 Days Post-Op    Plan:     During this hospitalization, patient to be seen 4 x/week to address the identified rehab impairments via gait training, therapeutic exercises, therapeutic activities, neuromuscular re-education and progress toward the following goals:    · Plan of Care Expires:  05/24/22    Subjective     Chief Complaint: abd pain  Patient/Family Comments/goals: I think I walked to much, when I walked later in the day yesterday, now I am sore  Pain/Comfort:  · Pain Rating 1: 5/10  · Pain Addressed 1: Distraction, Reposition      Objective:     Communicated with RN prior to session.  Patient found up in chair with  (telemetry; pulse ox (continuous); GO drain) upon PT entry to room.     General Precautions: Standard, fall   Orthopedic Precautions:N/A   Braces: N/A  Respiratory Status: Room air     Functional Mobility:  · Transfers:     · Sit to Stand:   (S) with straight cane from chair and toilet  · Gait: pt amb with SC x 100 ft  ft with SBA for safety and then 200 ft with no AD with SBA/CGA for safety with no LOB   · Pt demo decreased step length, wide base of support and decreased toma    AM-PAC 6 CLICK MOBILITY  Turning over in bed (including adjusting bedclothes, sheets and blankets)?: 2  Sitting down on and standing up from a chair with arms (e.g., wheelchair, bedside commode, etc.): 3  Moving from lying on back to sitting on the side of the bed?: 2  Moving to and from a bed to a chair (including a wheelchair)?: 3  Need to walk in hospital room?: 3  Climbing 3-5 steps with a railing?: 2  Basic Mobility Total Score: 15       Therapeutic Activities and Exercises:   therapist provided instruction and educated of  patient on progress, safety,d/c,PT POC,   proper body mechanics, energy conservation, and fall prevention strategies during tasks listed above, on the effects of prolonged immobility and the importance of performing OOB activity and exercises to promote healing and reduce recovery time     Patient  facilitated therex  seated in bedside chair B LE AROM AP, LAQ, Hip Flexion, Hip Abd/Add with facilitation for correct performance and sequencing. Exercises performed to develop and maintain pt's strength, endurance and flexibility.     Updated white board with appropriate PT mobility information for medical team notification      Donned an extra gown   pt safe to ambulate in hallway with RN or PCT assistance        Call nursing/pct to transfer to chair/use bathroom. Pt stated understanding   Pt issued and instructed to perform  supine and seated HEP 2-3 times daily, with verabal understanding   Bedside table in front of patient and area set up for function, convenience, and safety. RN aware of patient's mobility needs and status. Questions/concerns addressed within PTA scope of practice; patient  with no further questions. Time was provided for active  listening, discussion of health disposition, and discussion of safe discharge. Pt?verbalized?agreement .     Patient left up in chair with all lines intact, call button in reach and nsg notified..     GOALS:   Multidisciplinary Problems     Physical Therapy Goals        Problem: Physical Therapy    Goal Priority Disciplines Outcome Goal Variances Interventions   Physical Therapy Goal     PT, PT/OT Ongoing, Progressing     Description: Goals to be met by: 5/15/2022    1. Supine to sit with Supervision   2. Sit to stand transfer with Supervision using LRAD-met  3. Gait  x 150 feet with Supervision using  LRAD                     Time Tracking:     PT Received On: 04/29/22  PT Start Time: 0952     PT Stop Time: 1031  PT Total Time (min): 39 min     Billable Minutes: Gait Training 24 and Therapeutic Activity 15    Treatment Type: Treatment  PT/PTA: PTA     PTA Visit Number: 2     04/29/2022

## 2022-04-29 NOTE — PROGRESS NOTES
EDUCATION NOTE:    Met with Pelon Vasquez and his caregivers to provide teaching re: immunosuppressant medications.  Reviewed medication section of the Liver Transplant Education book that was provided.  Emphasized the importance of compliance, role of the blue medication card, concerns for drug interactions, and process of obtaining refills.  Counseled regarding Prograf, Cellcept , prednisone, including directions for use, monitoring, how to handle missed doses, and side effects.  Mr. Vasquez verbalized understanding and had the opportunity to ask questions.

## 2022-04-30 ENCOUNTER — TELEPHONE (OUTPATIENT)
Dept: ENDOCRINOLOGY | Facility: HOSPITAL | Age: 57
End: 2022-04-30
Payer: MEDICAID

## 2022-04-30 ENCOUNTER — PATIENT MESSAGE (OUTPATIENT)
Dept: ENDOCRINOLOGY | Facility: HOSPITAL | Age: 57
End: 2022-04-30
Payer: MEDICAID

## 2022-04-30 LAB
ALBUMIN SERPL BCP-MCNC: 2.8 G/DL (ref 3.5–5.2)
ALP SERPL-CCNC: 103 U/L (ref 55–135)
ALT SERPL W/O P-5'-P-CCNC: 97 U/L (ref 10–44)
ANION GAP SERPL CALC-SCNC: 8 MMOL/L (ref 8–16)
AST SERPL-CCNC: 39 U/L (ref 10–40)
BASOPHILS # BLD AUTO: 0.03 K/UL (ref 0–0.2)
BASOPHILS NFR BLD: 0.4 % (ref 0–1.9)
BILIRUB SERPL-MCNC: 2.2 MG/DL (ref 0.1–1)
BUN SERPL-MCNC: 43 MG/DL (ref 6–20)
CALCIUM SERPL-MCNC: 7.9 MG/DL (ref 8.7–10.5)
CHLORIDE SERPL-SCNC: 100 MMOL/L (ref 95–110)
CO2 SERPL-SCNC: 30 MMOL/L (ref 23–29)
CREAT SERPL-MCNC: 1.1 MG/DL (ref 0.5–1.4)
DIFFERENTIAL METHOD: ABNORMAL
EOSINOPHIL # BLD AUTO: 0.7 K/UL (ref 0–0.5)
EOSINOPHIL NFR BLD: 8.7 % (ref 0–8)
ERYTHROCYTE [DISTWIDTH] IN BLOOD BY AUTOMATED COUNT: 17.4 % (ref 11.5–14.5)
EST. GFR  (AFRICAN AMERICAN): >60 ML/MIN/1.73 M^2
EST. GFR  (NON AFRICAN AMERICAN): >60 ML/MIN/1.73 M^2
GLUCOSE SERPL-MCNC: 180 MG/DL (ref 70–110)
HCT VFR BLD AUTO: 25.5 % (ref 40–54)
HGB BLD-MCNC: 8.6 G/DL (ref 14–18)
IMM GRANULOCYTES # BLD AUTO: 0.06 K/UL (ref 0–0.04)
IMM GRANULOCYTES NFR BLD AUTO: 0.7 % (ref 0–0.5)
LYMPHOCYTES # BLD AUTO: 0.7 K/UL (ref 1–4.8)
LYMPHOCYTES NFR BLD: 8.7 % (ref 18–48)
MAGNESIUM SERPL-MCNC: 1.2 MG/DL (ref 1.6–2.6)
MCH RBC QN AUTO: 29.4 PG (ref 27–31)
MCHC RBC AUTO-ENTMCNC: 33.7 G/DL (ref 32–36)
MCV RBC AUTO: 87 FL (ref 82–98)
MONOCYTES # BLD AUTO: 0.9 K/UL (ref 0.3–1)
MONOCYTES NFR BLD: 11.6 % (ref 4–15)
NEUTROPHILS # BLD AUTO: 5.6 K/UL (ref 1.8–7.7)
NEUTROPHILS NFR BLD: 69.9 % (ref 38–73)
NRBC BLD-RTO: 0 /100 WBC
PHOSPHATE SERPL-MCNC: 3.6 MG/DL (ref 2.7–4.5)
PLATELET # BLD AUTO: 59 K/UL (ref 150–450)
PMV BLD AUTO: 9.4 FL (ref 9.2–12.9)
POCT GLUCOSE: 205 MG/DL (ref 70–110)
POCT GLUCOSE: 225 MG/DL (ref 70–110)
POCT GLUCOSE: 455 MG/DL (ref 70–110)
POCT GLUCOSE: 460 MG/DL (ref 70–110)
POTASSIUM SERPL-SCNC: 4 MMOL/L (ref 3.5–5.1)
PROT SERPL-MCNC: 4.5 G/DL (ref 6–8.4)
RBC # BLD AUTO: 2.93 M/UL (ref 4.6–6.2)
SODIUM SERPL-SCNC: 138 MMOL/L (ref 136–145)
TACROLIMUS BLD-MCNC: 6.2 NG/ML (ref 5–15)
WBC # BLD AUTO: 8.04 K/UL (ref 3.9–12.7)

## 2022-04-30 PROCEDURE — 99232 PR SUBSEQUENT HOSPITAL CARE,LEVL II: ICD-10-PCS | Mod: ,,, | Performed by: NURSE PRACTITIONER

## 2022-04-30 PROCEDURE — 25000003 PHARM REV CODE 250: Performed by: PHYSICIAN ASSISTANT

## 2022-04-30 PROCEDURE — 25000003 PHARM REV CODE 250: Performed by: SURGERY

## 2022-04-30 PROCEDURE — 63600175 PHARM REV CODE 636 W HCPCS: Performed by: STUDENT IN AN ORGANIZED HEALTH CARE EDUCATION/TRAINING PROGRAM

## 2022-04-30 PROCEDURE — 83735 ASSAY OF MAGNESIUM: CPT | Performed by: PHYSICIAN ASSISTANT

## 2022-04-30 PROCEDURE — 63600175 PHARM REV CODE 636 W HCPCS: Performed by: PHYSICIAN ASSISTANT

## 2022-04-30 PROCEDURE — 36415 COLL VENOUS BLD VENIPUNCTURE: CPT | Performed by: PHYSICIAN ASSISTANT

## 2022-04-30 PROCEDURE — 99232 SBSQ HOSP IP/OBS MODERATE 35: CPT | Mod: ,,, | Performed by: NURSE PRACTITIONER

## 2022-04-30 PROCEDURE — 80053 COMPREHEN METABOLIC PANEL: CPT | Performed by: STUDENT IN AN ORGANIZED HEALTH CARE EDUCATION/TRAINING PROGRAM

## 2022-04-30 PROCEDURE — 25000003 PHARM REV CODE 250: Performed by: STUDENT IN AN ORGANIZED HEALTH CARE EDUCATION/TRAINING PROGRAM

## 2022-04-30 PROCEDURE — P9047 ALBUMIN (HUMAN), 25%, 50ML: HCPCS | Mod: JG | Performed by: NURSE PRACTITIONER

## 2022-04-30 PROCEDURE — 80197 ASSAY OF TACROLIMUS: CPT | Performed by: STUDENT IN AN ORGANIZED HEALTH CARE EDUCATION/TRAINING PROGRAM

## 2022-04-30 PROCEDURE — 20600001 HC STEP DOWN PRIVATE ROOM

## 2022-04-30 PROCEDURE — 63600175 PHARM REV CODE 636 W HCPCS: Mod: JG | Performed by: NURSE PRACTITIONER

## 2022-04-30 PROCEDURE — 84100 ASSAY OF PHOSPHORUS: CPT | Performed by: PHYSICIAN ASSISTANT

## 2022-04-30 PROCEDURE — 25000003 PHARM REV CODE 250: Performed by: TRANSPLANT SURGERY

## 2022-04-30 PROCEDURE — 85025 COMPLETE CBC W/AUTO DIFF WBC: CPT | Performed by: STUDENT IN AN ORGANIZED HEALTH CARE EDUCATION/TRAINING PROGRAM

## 2022-04-30 RX ORDER — ALBUMIN HUMAN 250 G/1000ML
25 SOLUTION INTRAVENOUS ONCE
Status: COMPLETED | OUTPATIENT
Start: 2022-04-30 | End: 2022-04-30

## 2022-04-30 RX ORDER — TACROLIMUS 1 MG/1
8 CAPSULE ORAL EVERY 12 HOURS
Qty: 480 CAPSULE | Refills: 11 | Status: SHIPPED | OUTPATIENT
Start: 2022-04-30 | End: 2022-05-02 | Stop reason: DRUGHIGH

## 2022-04-30 RX ADMIN — INSULIN ASPART 10 UNITS: 100 INJECTION, SOLUTION INTRAVENOUS; SUBCUTANEOUS at 08:04

## 2022-04-30 RX ADMIN — TACROLIMUS 8 MG: 1 CAPSULE ORAL at 05:04

## 2022-04-30 RX ADMIN — INSULIN ASPART 10 UNITS: 100 INJECTION, SOLUTION INTRAVENOUS; SUBCUTANEOUS at 05:04

## 2022-04-30 RX ADMIN — FUROSEMIDE 80 MG: 40 TABLET ORAL at 05:04

## 2022-04-30 RX ADMIN — FAMOTIDINE 20 MG: 20 TABLET ORAL at 08:04

## 2022-04-30 RX ADMIN — MYCOPHENOLATE MOFETIL 1000 MG: 250 CAPSULE ORAL at 08:04

## 2022-04-30 RX ADMIN — INSULIN ASPART 4 UNITS: 100 INJECTION, SOLUTION INTRAVENOUS; SUBCUTANEOUS at 12:04

## 2022-04-30 RX ADMIN — OXYCODONE HYDROCHLORIDE 10 MG: 10 TABLET ORAL at 01:04

## 2022-04-30 RX ADMIN — NYSTATIN 500000 UNITS: 500000 SUSPENSION ORAL at 05:04

## 2022-04-30 RX ADMIN — METHOCARBAMOL 500 MG: 500 TABLET, FILM COATED ORAL at 08:04

## 2022-04-30 RX ADMIN — PREDNISONE 20 MG: 20 TABLET ORAL at 08:04

## 2022-04-30 RX ADMIN — KETOCONAZOLE 100 MG: 200 TABLET ORAL at 08:04

## 2022-04-30 RX ADMIN — Medication 800 MG: at 08:04

## 2022-04-30 RX ADMIN — HEPARIN SODIUM 5000 UNITS: 5000 INJECTION INTRAVENOUS; SUBCUTANEOUS at 01:04

## 2022-04-30 RX ADMIN — INSULIN ASPART 4 UNITS: 100 INJECTION, SOLUTION INTRAVENOUS; SUBCUTANEOUS at 08:04

## 2022-04-30 RX ADMIN — OXYCODONE HYDROCHLORIDE 10 MG: 10 TABLET ORAL at 08:04

## 2022-04-30 RX ADMIN — TACROLIMUS 8 MG: 1 CAPSULE ORAL at 08:04

## 2022-04-30 RX ADMIN — NYSTATIN 500000 UNITS: 500000 SUSPENSION ORAL at 08:04

## 2022-04-30 RX ADMIN — ALBUMIN (HUMAN) 25 G: 12.5 SOLUTION INTRAVENOUS at 10:04

## 2022-04-30 RX ADMIN — DOCUSATE SODIUM 100 MG: 100 CAPSULE ORAL at 08:04

## 2022-04-30 RX ADMIN — METHOCARBAMOL 500 MG: 500 TABLET, FILM COATED ORAL at 02:04

## 2022-04-30 RX ADMIN — NYSTATIN 500000 UNITS: 500000 SUSPENSION ORAL at 01:04

## 2022-04-30 RX ADMIN — FUROSEMIDE 80 MG: 40 TABLET ORAL at 08:04

## 2022-04-30 RX ADMIN — NIFEDIPINE 30 MG: 30 TABLET, FILM COATED, EXTENDED RELEASE ORAL at 08:04

## 2022-04-30 RX ADMIN — INSULIN ASPART 10 UNITS: 100 INJECTION, SOLUTION INTRAVENOUS; SUBCUTANEOUS at 12:04

## 2022-04-30 NOTE — PROGRESS NOTES
[facility] - Transplant Stepdown  Liver Transplant  Progress Note    Patient Name: Pelon Vasquez  MRN: [id number]  Admission Date: 4/19/2022  Hospital Length of Stay: 10 days  Code Status: Full Code  Primary Care Provider: Primary Doctor No  Post-Operative Day: 6    ORGAN:   LIVER  Disease Etiology: Cirrhosis: Other, Specify  Donor Type:   Donation after Brain Death  CDC High Risk:   No  Donor CMV Status:   Donor CMV Status: Positive  Donor HBcAB:   Negative  Donor HCV Status:   Negative  Donor HBV MATHEUS: Negative  Donor HCV MATHEUS: Negative  Whole or Partial: Whole Liver  Biliary Anastomosis: End to End  Arterial Anatomy: Completely Replaced Circulation  Subjective:     History of Present Illness:  Pelon Vasquez is a 57yr olf male with 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 ESLD complicated by jaundice, FAMILIA, ascites (para 2x/week, no h/o SBP), HE, and hypervolemia. He is listed for a liver transplant with MELD 30. Patient admitted for liver transplant surgery. He denies any recent fevers or hospitalizations unknown to transplant team. He reports feel well in his usual state of health. Pre op labs and imaging pending      Hospital Course:  Patient was admitted for liver transplant 4/19 but case cancelled due to donor organ quality. He was kept inpatient due to FAMILIA noted on admit. Urine sodium < 10. Cr continued to trend up despite holding diuretics and hydrating with albumin. Nephrology consulted. Paracentesis ordered to rule out SBP. 5.5L removed, no SBP. Patient ultimately became primary for another liver transplant. He is now s/p OLTx 4/24/22 2/2 LOZANO & ETOH (steroid induction, CMV +/+). Operation straight forward per op note. POD#1 US satisfactory. Post op SICU course notable for SVT requiring adenosine for conversation back to NSR. Shirley removed in SICU. Stepped down to TSU on 4/26. CVC removed 4/27 as renal function improving. TB trending down, but AST/ALT slightly increased 4/27. Increased Prograf and obtained  liver US. Liver US 4/27 satisfactory doppler with 3 small peritransplant fluid collections. Continued to push Prograf and monitored.    Interval History: No acute events overnight. POD# 6 s/p OLTx. Continues to tolerate PO intake without nausea. LFT's stable. Plan to push Prograf and obtain US in AM. R GO drains remain with ss output, 490 cc's past 24h. Renal function now wnl with good UOP. Continue diuresis for generalized edema - switched to oral lasix 80 mg PO BID.  PT/OT following. Recommending HH @ discharge (orders placed). Tentative discharge Sunday if LFTs improving. Monitor.      Scheduled Meds:   docusate sodium  100 mg Oral TID    famotidine  20 mg Oral Nightly    furosemide  80 mg Oral BID    heparin (porcine)  5,000 Units Subcutaneous Q8H    insulin aspart U-100  10 Units Subcutaneous TIDWM    ketoconazole  100 mg Oral Daily    magnesium oxide  800 mg Oral BID    methocarbamoL  500 mg Oral TID    mycophenolate  1,000 mg Oral BID    NIFEdipine  30 mg Oral Daily    nystatin  500,000 Units Mouth/Throat TID PC    predniSONE  20 mg Oral Daily    [START ON 5/1/2022] sulfamethoxazole-trimethoprim 400-80mg  1 tablet Oral Daily AM    tacrolimus  8 mg Oral BID    [START ON 5/4/2022] valGANciclovir  450 mg Oral Daily     Continuous Infusions:  PRN Meds:sodium chloride, dextrose 10%, dextrose 10%, glucagon (human recombinant), glucose, glucose, insulin aspart U-100, ondansetron, oxyCODONE, oxyCODONE, sodium chloride 0.9%    Review of Systems   Constitutional:  Negative for appetite change, chills and fever.   HENT:  Negative for facial swelling and trouble swallowing.    Eyes:  Negative for photophobia.   Respiratory:  Negative for cough, shortness of breath, wheezing and stridor.    Cardiovascular:  Positive for leg swelling. Negative for chest pain and palpitations.   Gastrointestinal:  Positive for abdominal distention and abdominal pain (incisional/appropriate). Negative for constipation,  diarrhea, nausea and vomiting.   Genitourinary:  Negative for decreased urine volume, difficulty urinating and dysuria.   Musculoskeletal:  Negative for neck pain and neck stiffness.   Skin:  Positive for wound.   Allergic/Immunologic: Positive for immunocompromised state.   Neurological:  Positive for weakness. Negative for dizziness and light-headedness.   Psychiatric/Behavioral:  Negative for agitation, behavioral problems, confusion and decreased concentration.    Objective:     Vital Signs (Most Recent):  Temp: 98.3 °F (36.8 °C) (04/30/22 1542)  Pulse: 97 (04/30/22 1542)  Resp: 15 (04/30/22 1542)  BP: 127/75 (04/30/22 1542)  SpO2: 96 % (04/30/22 1542)   Vital Signs (24h Range):  Temp:  [97.7 °F (36.5 °C)-98.3 °F (36.8 °C)] 98.3 °F (36.8 °C)  Pulse:  [] 97  Resp:  [10-20] 15  SpO2:  [95 %-98 %] 96 %  BP: (117-140)/(66-85) 127/75     Weight: 109.2 kg (240 lb 11.9 oz)  Body mass index is 37.71 kg/m².    Intake/Output - Last 3 Shifts         04/28 0700  04/29 0659 04/29 0700 04/30 0659 04/30 0700  05/01 0659    P.O. 2350 1440 240    I.V. (mL/kg) 0 (0)      Other 0      Total Intake(mL/kg) 2350 (21.6) 1440 (13.2) 240 (2.2)    Urine (mL/kg/hr) 3800 (1.5) 3145 (1.2) 300 (0.3)    Emesis/NG output 0      Drains 430 490 200    Other 0      Stool 0 2 0    Blood 0      Total Output 4230 3637 500    Net -1880 -2197 -974           Urine Occurrence 1 x 1 x     Stool Occurrence 1 x 1 x 1 x            Physical Exam  Vitals and nursing note reviewed.   Constitutional:       General: He is not in acute distress.  HENT:      Head: Normocephalic and atraumatic.   Eyes:      General: No scleral icterus.        Right eye: No discharge.         Left eye: No discharge.   Cardiovascular:      Rate and Rhythm: Normal rate and regular rhythm.      Heart sounds: No murmur heard.  Pulmonary:      Effort: Pulmonary effort is normal. No respiratory distress.      Breath sounds: No wheezing or rales.   Abdominal:      General: Bowel  sounds are normal. There is distension.      Tenderness: There is abdominal tenderness (mild, R side). There is no guarding.      Comments: Chevron incision AMARIS with staples no s/s/I  2 R GO drains with ss output   Musculoskeletal:         General: Swelling present.      Right lower leg: Edema present.      Left lower leg: Edema present.   Skin:     General: Skin is warm and dry.      Capillary Refill: Capillary refill takes less than 2 seconds.      Coloration: Skin is not jaundiced.   Neurological:      General: No focal deficit present.      Mental Status: He is alert and oriented to person, place, and time.   Psychiatric:         Mood and Affect: Mood normal.         Behavior: Behavior normal.         Thought Content: Thought content normal.       Laboratory:  Immunosuppressants           Stop Route Frequency     tacrolimus capsule 8 mg         -- Oral 2 times daily     mycophenolate capsule 1,000 mg         -- Oral 2 times daily          CBC:   Recent Labs   Lab 04/30/22  0755   WBC 8.04   RBC 2.93*   HGB 8.6*   HCT 25.5*   PLT 59*   MCV 87   MCH 29.4   MCHC 33.7     CMP:   Recent Labs   Lab 04/30/22  0755   *   CALCIUM 7.9*   ALBUMIN 2.8*   PROT 4.5*      K 4.0   CO2 30*      BUN 43*   CREATININE 1.1   ALKPHOS 103   ALT 97*   AST 39   BILITOT 2.2*     Coagulation:   Recent Labs   Lab 04/26/22  0404   INR 1.1   APTT 29.9     Labs within the past 24 hours have been reviewed.    Diagnostic Results:  I have personally reviewed all pertinent imaging studies.    Debility/Functional status: Patient debilitated by evidence of Weakness and Other reduced mobility. Physical and occupational therapy ordered daily to evaluate and treat. Debility was: present on admission.    Assessment/Plan:     * S/P liver transplant  - s/p OLTx 4/24/22 2/2 LOZANO & ETOH (steroid induction, CMV +/+).   - Operation straight forward per op note. Did require intra-op HD.  - POD#1 US satisfactory.   - Post op SICU course  "notable for SVT requiring adenosine for conversation back to NSR.  - Shirley removed in SICU. Stepped down to TSU on 4/26.  - 2 GO drains remain with ss output. Plan to remove lateral drain 4/29.  - TB trending down, but AST/ALT slightly increased 4/27. Pushed Prograf and obtained liver US.  - Liver US 4/27 satisfactory doppler with 3 small melody-transplant fluid collections   - Renal function has returned to baseline.  - CVC removed 4/27  - LFTs remain remained elevated 4/28-4/29. Continue to push Prograf and monitor.   - Plan for US 4/29 AM  - Tolerating diet, (+) flatus (+) BM. Continue bowel regimen and encourage ambulation with assistance.   - PT/OT following. Recommending HH @ discharge (orders placed). Monitor.    Generalized edema  - Diuresis with lasix/albumin  - Monitor      Essential hypertension  - Continue Nifedipine.      Long-term use of immunosuppressant medication  - See "prophylactic immunotherapy."      Class 2 severe obesity due to excess calories with serious comorbidity in adult  - Monitor. RD following.      Adverse effect of corticosteroids  - Monitor. Endocrine following.      Prophylactic immunotherapy  - Continue Prograf. Monitor trough daily and adjust dose as needed to achieve therapeutic level.  - Continue Cellcept.  - Continue steroids.    At risk for opportunistic infections  - Bactrim for PCP ppx  - Valcyte for CMV ppx  - Nystatin for thrush ppx      Thrombocytopenia, unspecified  - Due to ESLD  - Improving post op  - Monitor      Anemia of chronic disease  - H/H stable. Will continue to monitor with daily cbc.           Type 2 diabetes mellitus without complication, without long-term current use of insulin  - Endocrine following, appreciate assistance.         VTE Risk Mitigation (From admission, onward)         Ordered     heparin (porcine) injection 5,000 Units  Every 8 hours         04/24/22 0831     Place sequential compression device  Until discontinued         04/24/22 0831     " IP VTE HIGH RISK PATIENT  Once         04/24/22 0831                The patients clinical status was discussed at multidisplinary rounds, involving transplant surgery, transplant medicine, pharmacy, nursing, nutrition, and social work    Discharge Planning:  No Patient Care Coordination Note on file.      Hien Carrillo, WILFRIDP  Liver Transplant  [facility] - Transplant Stepdown

## 2022-04-30 NOTE — ASSESSMENT & PLAN NOTE
"BG goal 140 - 180     - Continue Novolog to 10 units TID with meals (0.5 u/kg dosing) Prandial BG excursions noted on steroid therapy.   - Moderate Dose Correction Scale  - BG monitoring ac/hs  - Bedside nurse to instruct on insulin pen use and blood sugar monitor. Have patient administer own injections after education completed supervised by nurse. Have patient watch insulin educatoin video's via i-pad if available on unit.    ** Please call Endocrine for any BG related issues **  ** Please notify Endocrine for any change and/or advance in diet**    Lab Results   Component Value Date    HGBA1C 6.6 (H) 04/19/2022       Discharge Planning:  - Insurance preferred diabetes testing supplies  - Ultra-Fine Oliva Pen Needles 4mm x 32 G (5/32" x 0.23mm).   - Novolog 7 units TIDWM in addition to the following correction scale:     150 - 200 + 1 unit  201 - 250 + 2 units  251 - 300 + 3 units  301 - 350 + 4 units   > 350   + 5 units  - Patient will need to keep blood sugar logs, and follow-up with [facility] endocrinology discharge clinic.       "

## 2022-04-30 NOTE — PROGRESS NOTES
AAOx4. VSS. T-bili 2.2 (down from 2.8 prior) on this mornings labs. Telemetry box d/c'd per RACHEL Diaz verbal order. Liver U/S ordered for today 4/30 at bedside - not released yet. Patient up ad anuj - ambulates independently in room and bathroom w/ cane. Patient had 1 BM today - 1500 colace held. RLQ GO drain w/ SS fluid - put out 290cc this shift. Incision AMARIS/staples intact - spouse painted w/ betadine. Albumin given once. PRN oxy 10mg given x2. Med box filled. Plan for patient to be discharged tomorrow 5/1/2022. No complaints from patient throughout shift. Non-skid socks on. Bed in low position. Call light within reach. Wife at bedside.

## 2022-04-30 NOTE — SUBJECTIVE & OBJECTIVE
Scheduled Meds:   docusate sodium  100 mg Oral TID    famotidine  20 mg Oral Nightly    furosemide  80 mg Oral BID    heparin (porcine)  5,000 Units Subcutaneous Q8H    insulin aspart U-100  10 Units Subcutaneous TIDWM    ketoconazole  100 mg Oral Daily    magnesium oxide  800 mg Oral BID    methocarbamoL  500 mg Oral TID    mycophenolate  1,000 mg Oral BID    NIFEdipine  30 mg Oral Daily    nystatin  500,000 Units Mouth/Throat TID PC    predniSONE  20 mg Oral Daily    [START ON 5/1/2022] sulfamethoxazole-trimethoprim 400-80mg  1 tablet Oral Daily AM    tacrolimus  8 mg Oral BID    [START ON 5/4/2022] valGANciclovir  450 mg Oral Daily     Continuous Infusions:  PRN Meds:sodium chloride, dextrose 10%, dextrose 10%, glucagon (human recombinant), glucose, glucose, insulin aspart U-100, ondansetron, oxyCODONE, oxyCODONE, sodium chloride 0.9%    Review of Systems   Constitutional:  Negative for appetite change, chills and fever.   HENT:  Negative for facial swelling and trouble swallowing.    Eyes:  Negative for photophobia.   Respiratory:  Negative for cough, shortness of breath, wheezing and stridor.    Cardiovascular:  Positive for leg swelling. Negative for chest pain and palpitations.   Gastrointestinal:  Positive for abdominal distention and abdominal pain (incisional/appropriate). Negative for constipation, diarrhea, nausea and vomiting.   Genitourinary:  Negative for decreased urine volume, difficulty urinating and dysuria.   Musculoskeletal:  Negative for neck pain and neck stiffness.   Skin:  Positive for wound.   Allergic/Immunologic: Positive for immunocompromised state.   Neurological:  Positive for weakness. Negative for dizziness and light-headedness.   Psychiatric/Behavioral:  Negative for agitation, behavioral problems, confusion and decreased concentration.    Objective:     Vital Signs (Most Recent):  Temp: 98.3 °F (36.8 °C) (04/30/22 1542)  Pulse: 97 (04/30/22 1542)  Resp: 15 (04/30/22 1542)  BP:  127/75 (04/30/22 1542)  SpO2: 96 % (04/30/22 1542)   Vital Signs (24h Range):  Temp:  [97.7 °F (36.5 °C)-98.3 °F (36.8 °C)] 98.3 °F (36.8 °C)  Pulse:  [] 97  Resp:  [10-20] 15  SpO2:  [95 %-98 %] 96 %  BP: (117-140)/(66-85) 127/75     Weight: 109.2 kg (240 lb 11.9 oz)  Body mass index is 37.71 kg/m².    Intake/Output - Last 3 Shifts         04/28 0700  04/29 0659 04/29 0700 04/30 0659 04/30 0700 05/01 0659    P.O. 2350 1440 240    I.V. (mL/kg) 0 (0)      Other 0      Total Intake(mL/kg) 2350 (21.6) 1440 (13.2) 240 (2.2)    Urine (mL/kg/hr) 3800 (1.5) 3145 (1.2) 300 (0.3)    Emesis/NG output 0      Drains 430 490 200    Other 0      Stool 0 2 0    Blood 0      Total Output 4230 3637 500    Net -1880 -2197 -260           Urine Occurrence 1 x 1 x     Stool Occurrence 1 x 1 x 1 x            Physical Exam  Vitals and nursing note reviewed.   Constitutional:       General: He is not in acute distress.  HENT:      Head: Normocephalic and atraumatic.   Eyes:      General: No scleral icterus.        Right eye: No discharge.         Left eye: No discharge.   Cardiovascular:      Rate and Rhythm: Normal rate and regular rhythm.      Heart sounds: No murmur heard.  Pulmonary:      Effort: Pulmonary effort is normal. No respiratory distress.      Breath sounds: No wheezing or rales.   Abdominal:      General: Bowel sounds are normal. There is distension.      Tenderness: There is abdominal tenderness (mild, R side). There is no guarding.      Comments: Chevron incision AMARIS with staples no s/s/I  2 R GO drains with ss output   Musculoskeletal:         General: Swelling present.      Right lower leg: Edema present.      Left lower leg: Edema present.   Skin:     General: Skin is warm and dry.      Capillary Refill: Capillary refill takes less than 2 seconds.      Coloration: Skin is not jaundiced.   Neurological:      General: No focal deficit present.      Mental Status: He is alert and oriented to person, place, and  time.   Psychiatric:         Mood and Affect: Mood normal.         Behavior: Behavior normal.         Thought Content: Thought content normal.       Laboratory:  Immunosuppressants           Stop Route Frequency     tacrolimus capsule 8 mg         -- Oral 2 times daily     mycophenolate capsule 1,000 mg         -- Oral 2 times daily          CBC:   Recent Labs   Lab 04/30/22  0755   WBC 8.04   RBC 2.93*   HGB 8.6*   HCT 25.5*   PLT 59*   MCV 87   MCH 29.4   MCHC 33.7     CMP:   Recent Labs   Lab 04/30/22  0755   *   CALCIUM 7.9*   ALBUMIN 2.8*   PROT 4.5*      K 4.0   CO2 30*      BUN 43*   CREATININE 1.1   ALKPHOS 103   ALT 97*   AST 39   BILITOT 2.2*     Coagulation:   Recent Labs   Lab 04/26/22  0404   INR 1.1   APTT 29.9     Labs within the past 24 hours have been reviewed.    Diagnostic Results:  I have personally reviewed all pertinent imaging studies.    Debility/Functional status: Patient debilitated by evidence of Weakness and Other reduced mobility. Physical and occupational therapy ordered daily to evaluate and treat. Debility was: present on admission.

## 2022-04-30 NOTE — SUBJECTIVE & OBJECTIVE
"Interval HPI:   Overnight events:  BG slightly above goal this AM. Steroids have decreased to PO intake. Expect improved BG readings in absence of IV steroid therapy. Possible discharge scheduled for today. Endo will continue to follow, and manage glycemic control while inpatient.   Diet diabetic [facility]; 2000 Calorie; Isolation Tray - Regular [place]  7 Days Post-Op    Eatin%  Nausea: No  Hypoglycemia and intervention: No  Fever: No  TPN and/or TF: No  If yes, type of TF/TPN and rate: None    /80 (BP Location: Left arm, Patient Position: Sitting)   Pulse 97   Temp 97.8 °F (36.6 °C) (Oral)   Resp 18   Ht 5' 7" (1.702 m)   Wt 109.2 kg (240 lb 11.9 oz)   SpO2 98%   BMI 37.71 kg/m²     Labs Reviewed and Include    Recent Labs   Lab 22  0755   *   CALCIUM 7.9*   ALBUMIN 2.8*   PROT 4.5*      K 4.0   CO2 30*      BUN 43*   CREATININE 1.1   ALKPHOS 103   ALT 97*   AST 39   BILITOT 2.2*     Lab Results   Component Value Date    WBC 8.04 2022    HGB 8.6 (L) 2022    HCT 25.5 (L) 2022    MCV 87 2022    PLT 59 (L) 2022     No results for input(s): TSH, FREET4 in the last 168 hours.  Lab Results   Component Value Date    HGBA1C 6.6 (H) 2022       Nutritional status:   Body mass index is 37.71 kg/m².  Lab Results   Component Value Date    ALBUMIN 2.8 (L) 2022    ALBUMIN 3.1 (L) 2022    ALBUMIN 2.6 (L) 2022     No results found for: PREALBUMIN    Estimated Creatinine Clearance: 89.4 mL/min (based on SCr of 1.1 mg/dL).    Accu-Checks  Recent Labs     22  2133 22  0741 22  1125 22  1727 222 22  0805 22  1156 22  1627 22  0823   POCTGLUCOSE 292* 189* 176* 267* 275* 115* 167* 273* 294* 205*       Current Medications and/or Treatments Impacting Glycemic Control  Immunotherapy:    Immunosuppressants           Stop Route Frequency     tacrolimus capsule 8 mg  " "       -- Oral 2 times daily     mycophenolate capsule 1,000 mg         -- Oral 2 times daily          Steroids:   Hormones (From admission, onward)                Start     Stop Route Frequency Ordered    04/30/22 0900  predniSONE tablet 20 mg  (methylprednisolone taper panel)        "Followed by" Linked Group Details    -- Oral Daily 04/24/22 0831          Pressors:    Autonomic Drugs (From admission, onward)                None          Hyperglycemia/Diabetes Medications:   Antihyperglycemics (From admission, onward)                Start     Stop Route Frequency Ordered    04/28/22 1130  insulin aspart U-100 pen 10 Units         -- SubQ 3 times daily with meals 04/28/22 0956    04/25/22 0931  insulin aspart U-100 pen 0-10 Units         -- SubQ As needed (PRN) 04/25/22 0831          "

## 2022-04-30 NOTE — ASSESSMENT & PLAN NOTE
Nutrition consulted. Most recent weight and BMI monitored-              Final Summary  Subcutaneous Fat Loss (Final Summary): well nourished  Muscle Loss Evaluation (Final Summary): well nourished         Measurements:  Wt Readings from Last 1 Encounters:   04/30/22 109.2 kg (240 lb 11.9 oz)   Body mass index is 37.71 kg/m².    Recommendations: Recommendation/Intervention: 1. Intensify bowel regimen prn (LBM 4/23) 2. Continue Diabetic diet - If PO intake <50%, add Boost GC TID 3. RD to follow and monitor  Goals: Diet advanced to diabetic diet by RD follow-up.    Patient has been screened and assessed by RD. RD will follow patient.

## 2022-04-30 NOTE — PROGRESS NOTES
"[facility] - Transplant Stepdown  Endocrinology  Progress Note    Admit Date: 2022     Reason for Consult: Management of T2DM, Hyperglycemia     Surgical Procedure and Date: Liver Transplant 22    Diabetes diagnosis year: 6 years ago    Home Diabetes Medications:  none currently (took Metformin in the past and could not tolerate side effects)     How often checking glucose at home?  Once daily    BG readings on regimen: < 150  Hypoglycemia on the regimen?  No  Missed doses on regimen? n/a    Diabetes Complications include:     CKD     Complicating diabetes co morbidities:   CKD, Cirrhosis, Obesity, BRAYDON, Glucocorticoid Use       HPI:   Patient is a 57 y.o. male with a diagnosis of EtOH/LOZANO cirrhosis, esophageal varices, obesity (BMI 42), CKD, HTN, DM2, and BRAYDON presenting for liver transplant. He is s/p diagnostic paracentesis on  with 4800mL of fluid removed. Patient currently has FAMILIA likely in setting of vancomycin toxicity, per Nephrology note. Plan for intraoperative dialysis. Patient now presents for the above procedure(s). Endocrinology consulted for management of T2DM.       Lab Results   Component Value Date    HGBA1C 6.6 (H) 2022           Interval HPI:   Overnight events:  BG slightly above goal this AM. Steroids have decreased to PO intake. Expect improved BG readings in absence of IV steroid therapy. Possible discharge scheduled for today. Endo will continue to follow, and manage glycemic control while inpatient.   Diet diabetic [facility]; 2000 Calorie; Isolation Tray - Regular [place]  7 Days Post-Op    Eatin%  Nausea: No  Hypoglycemia and intervention: No  Fever: No  TPN and/or TF: No  If yes, type of TF/TPN and rate: None    /80 (BP Location: Left arm, Patient Position: Sitting)   Pulse 97   Temp 97.8 °F (36.6 °C) (Oral)   Resp 18   Ht 5' 7" (1.702 m)   Wt 109.2 kg (240 lb 11.9 oz)   SpO2 98%   BMI 37.71 kg/m²     Labs Reviewed and Include    Recent Labs " "  Lab 04/30/22  0755   *   CALCIUM 7.9*   ALBUMIN 2.8*   PROT 4.5*      K 4.0   CO2 30*      BUN 43*   CREATININE 1.1   ALKPHOS 103   ALT 97*   AST 39   BILITOT 2.2*     Lab Results   Component Value Date    WBC 8.04 04/30/2022    HGB 8.6 (L) 04/30/2022    HCT 25.5 (L) 04/30/2022    MCV 87 04/30/2022    PLT 59 (L) 04/30/2022     No results for input(s): TSH, FREET4 in the last 168 hours.  Lab Results   Component Value Date    HGBA1C 6.6 (H) 04/19/2022       Nutritional status:   Body mass index is 37.71 kg/m².  Lab Results   Component Value Date    ALBUMIN 2.8 (L) 04/30/2022    ALBUMIN 3.1 (L) 04/29/2022    ALBUMIN 2.6 (L) 04/28/2022     No results found for: PREALBUMIN    Estimated Creatinine Clearance: 89.4 mL/min (based on SCr of 1.1 mg/dL).    Accu-Checks  Recent Labs     04/27/22  2133 04/28/22  0741 04/28/22  1125 04/28/22  1727 04/28/22 2122 04/29/22  0805 04/29/22  1156 04/29/22  1627 04/29/22 2126 04/30/22  0823   POCTGLUCOSE 292* 189* 176* 267* 275* 115* 167* 273* 294* 205*       Current Medications and/or Treatments Impacting Glycemic Control  Immunotherapy:    Immunosuppressants           Stop Route Frequency     tacrolimus capsule 8 mg         -- Oral 2 times daily     mycophenolate capsule 1,000 mg         -- Oral 2 times daily          Steroids:   Hormones (From admission, onward)                Start     Stop Route Frequency Ordered    04/30/22 0900  predniSONE tablet 20 mg  (methylprednisolone taper panel)        "Followed by" Linked Group Details    -- Oral Daily 04/24/22 0831          Pressors:    Autonomic Drugs (From admission, onward)                None          Hyperglycemia/Diabetes Medications:   Antihyperglycemics (From admission, onward)                Start     Stop Route Frequency Ordered    04/28/22 1130  insulin aspart U-100 pen 10 Units         -- SubQ 3 times daily with meals 04/28/22 0956    04/25/22 0931  insulin aspart U-100 pen 0-10 Units         -- SubQ As " "needed (PRN) 04/25/22 0831            ASSESSMENT and PLAN    * S/P liver transplant    Managed per primary team  Optimize BG control        Type 2 diabetes mellitus without complication, without long-term current use of insulin  BG goal 140 - 180     - Continue Novolog to 10 units TID with meals (0.5 u/kg dosing) Prandial BG excursions noted on steroid therapy.   - Moderate Dose Correction Scale  - BG monitoring ac/hs  - Bedside nurse to instruct on insulin pen use and blood sugar monitor. Have patient administer own injections after education completed supervised by nurse. Have patient watch insulin educatoin video's via i-pad if available on unit.    ** Please call Endocrine for any BG related issues **  ** Please notify Endocrine for any change and/or advance in diet**    Lab Results   Component Value Date    HGBA1C 6.6 (H) 04/19/2022       Discharge Planning:  - Insurance preferred diabetes testing supplies  - Ultra-Fine Oliva Pen Needles 4mm x 32 G (5/32" x 0.23mm).   - Novolog 7 units TIDWM in addition to the following correction scale:     150 - 200 + 1 unit  201 - 250 + 2 units  251 - 300 + 3 units  301 - 350 + 4 units   > 350   + 5 units  - Patient will need to keep blood sugar logs, and follow-up with [facility] endocrinology discharge clinic.         Class 2 severe obesity due to excess calories with serious comorbidity in adult  Nutrition consulted. Most recent weight and BMI monitored-              Final Summary  Subcutaneous Fat Loss (Final Summary): well nourished  Muscle Loss Evaluation (Final Summary): well nourished         Measurements:  Wt Readings from Last 1 Encounters:   04/30/22 109.2 kg (240 lb 11.9 oz)   Body mass index is 37.71 kg/m².    Recommendations: Recommendation/Intervention: 1. Intensify bowel regimen prn (LBM 4/23) 2. Continue Diabetic diet - If PO intake <50%, add Boost GC TID 3. RD to follow and monitor  Goals: Diet advanced to diabetic diet by RD follow-up.    Patient has been screened " and assessed by RD. RD will follow patient.      Adverse effect of corticosteroids  Body mass index is 37.71 kg/m².  May increase insulin resistance.               Tristin Watkins NP  Endocrinology  [facility] - Transplant Stepdown

## 2022-04-30 NOTE — TELEPHONE ENCOUNTER
Patient is s/p liver transplant on steroid therapy. Patient will need discharge clinic follow-up in aprox 1-2 weeks.

## 2022-05-01 VITALS
HEART RATE: 93 BPM | WEIGHT: 239.44 LBS | DIASTOLIC BLOOD PRESSURE: 60 MMHG | SYSTOLIC BLOOD PRESSURE: 114 MMHG | OXYGEN SATURATION: 98 % | BODY MASS INDEX: 37.58 KG/M2 | TEMPERATURE: 98 F | HEIGHT: 67 IN | RESPIRATION RATE: 16 BRPM

## 2022-05-01 LAB
ALBUMIN SERPL BCP-MCNC: 2.9 G/DL (ref 3.5–5.2)
ALP SERPL-CCNC: 96 U/L (ref 55–135)
ALT SERPL W/O P-5'-P-CCNC: 75 U/L (ref 10–44)
ANION GAP SERPL CALC-SCNC: 11 MMOL/L (ref 8–16)
AST SERPL-CCNC: 25 U/L (ref 10–40)
BASOPHILS # BLD AUTO: 0.05 K/UL (ref 0–0.2)
BASOPHILS NFR BLD: 0.5 % (ref 0–1.9)
BILIRUB SERPL-MCNC: 2 MG/DL (ref 0.1–1)
BUN SERPL-MCNC: 39 MG/DL (ref 6–20)
CALCIUM SERPL-MCNC: 8 MG/DL (ref 8.7–10.5)
CHLORIDE SERPL-SCNC: 98 MMOL/L (ref 95–110)
CO2 SERPL-SCNC: 29 MMOL/L (ref 23–29)
CREAT SERPL-MCNC: 1.1 MG/DL (ref 0.5–1.4)
DIFFERENTIAL METHOD: ABNORMAL
EOSINOPHIL # BLD AUTO: 0.7 K/UL (ref 0–0.5)
EOSINOPHIL NFR BLD: 6.1 % (ref 0–8)
ERYTHROCYTE [DISTWIDTH] IN BLOOD BY AUTOMATED COUNT: 17.3 % (ref 11.5–14.5)
EST. GFR  (AFRICAN AMERICAN): >60 ML/MIN/1.73 M^2
EST. GFR  (NON AFRICAN AMERICAN): >60 ML/MIN/1.73 M^2
GLUCOSE SERPL-MCNC: 138 MG/DL (ref 70–110)
HCT VFR BLD AUTO: 26.5 % (ref 40–54)
HGB BLD-MCNC: 8.9 G/DL (ref 14–18)
IMM GRANULOCYTES # BLD AUTO: 0.17 K/UL (ref 0–0.04)
IMM GRANULOCYTES NFR BLD AUTO: 1.6 % (ref 0–0.5)
LYMPHOCYTES # BLD AUTO: 1.3 K/UL (ref 1–4.8)
LYMPHOCYTES NFR BLD: 12.2 % (ref 18–48)
MAGNESIUM SERPL-MCNC: 1.2 MG/DL (ref 1.6–2.6)
MCH RBC QN AUTO: 29.9 PG (ref 27–31)
MCHC RBC AUTO-ENTMCNC: 33.6 G/DL (ref 32–36)
MCV RBC AUTO: 89 FL (ref 82–98)
MONOCYTES # BLD AUTO: 1.2 K/UL (ref 0.3–1)
MONOCYTES NFR BLD: 11.2 % (ref 4–15)
NEUTROPHILS # BLD AUTO: 7.3 K/UL (ref 1.8–7.7)
NEUTROPHILS NFR BLD: 68.4 % (ref 38–73)
NRBC BLD-RTO: 0 /100 WBC
PHOSPHATE SERPL-MCNC: 3.3 MG/DL (ref 2.7–4.5)
PLATELET # BLD AUTO: 106 K/UL (ref 150–450)
PMV BLD AUTO: 10.7 FL (ref 9.2–12.9)
POCT GLUCOSE: 141 MG/DL (ref 70–110)
POCT GLUCOSE: 206 MG/DL (ref 70–110)
POCT GLUCOSE: 241 MG/DL (ref 70–110)
POTASSIUM SERPL-SCNC: 3.4 MMOL/L (ref 3.5–5.1)
PROT SERPL-MCNC: 4.8 G/DL (ref 6–8.4)
RBC # BLD AUTO: 2.98 M/UL (ref 4.6–6.2)
SODIUM SERPL-SCNC: 138 MMOL/L (ref 136–145)
TACROLIMUS BLD-MCNC: 8.8 NG/ML (ref 5–15)
WBC # BLD AUTO: 10.64 K/UL (ref 3.9–12.7)

## 2022-05-01 PROCEDURE — 63600175 PHARM REV CODE 636 W HCPCS: Performed by: STUDENT IN AN ORGANIZED HEALTH CARE EDUCATION/TRAINING PROGRAM

## 2022-05-01 PROCEDURE — 25000003 PHARM REV CODE 250: Performed by: TRANSPLANT SURGERY

## 2022-05-01 PROCEDURE — 99238 HOSP IP/OBS DSCHRG MGMT 30/<: CPT | Mod: ,,, | Performed by: NURSE PRACTITIONER

## 2022-05-01 PROCEDURE — 25000003 PHARM REV CODE 250: Performed by: SURGERY

## 2022-05-01 PROCEDURE — 25000003 PHARM REV CODE 250: Performed by: PHYSICIAN ASSISTANT

## 2022-05-01 PROCEDURE — 25000003 PHARM REV CODE 250: Performed by: STUDENT IN AN ORGANIZED HEALTH CARE EDUCATION/TRAINING PROGRAM

## 2022-05-01 PROCEDURE — 25000003 PHARM REV CODE 250: Performed by: NURSE PRACTITIONER

## 2022-05-01 PROCEDURE — 36415 COLL VENOUS BLD VENIPUNCTURE: CPT | Performed by: PHYSICIAN ASSISTANT

## 2022-05-01 PROCEDURE — 99238 PR HOSPITAL DISCHARGE DAY,<30 MIN: ICD-10-PCS | Mod: ,,, | Performed by: NURSE PRACTITIONER

## 2022-05-01 PROCEDURE — 83735 ASSAY OF MAGNESIUM: CPT | Performed by: PHYSICIAN ASSISTANT

## 2022-05-01 PROCEDURE — 63600175 PHARM REV CODE 636 W HCPCS: Performed by: NURSE PRACTITIONER

## 2022-05-01 PROCEDURE — 84100 ASSAY OF PHOSPHORUS: CPT | Performed by: PHYSICIAN ASSISTANT

## 2022-05-01 PROCEDURE — 80053 COMPREHEN METABOLIC PANEL: CPT | Performed by: STUDENT IN AN ORGANIZED HEALTH CARE EDUCATION/TRAINING PROGRAM

## 2022-05-01 PROCEDURE — 85025 COMPLETE CBC W/AUTO DIFF WBC: CPT | Performed by: STUDENT IN AN ORGANIZED HEALTH CARE EDUCATION/TRAINING PROGRAM

## 2022-05-01 PROCEDURE — 80197 ASSAY OF TACROLIMUS: CPT | Performed by: STUDENT IN AN ORGANIZED HEALTH CARE EDUCATION/TRAINING PROGRAM

## 2022-05-01 PROCEDURE — 63600175 PHARM REV CODE 636 W HCPCS: Performed by: PHYSICIAN ASSISTANT

## 2022-05-01 RX ORDER — OXYCODONE HYDROCHLORIDE 5 MG/1
5-10 TABLET ORAL EVERY 6 HOURS PRN
Qty: 50 TABLET | Refills: 0 | Status: SHIPPED | OUTPATIENT
Start: 2022-05-01 | End: 2022-05-06 | Stop reason: SDUPTHER

## 2022-05-01 RX ORDER — MAGNESIUM SULFATE HEPTAHYDRATE 40 MG/ML
2 INJECTION, SOLUTION INTRAVENOUS ONCE
Status: COMPLETED | OUTPATIENT
Start: 2022-05-01 | End: 2022-05-01

## 2022-05-01 RX ORDER — INSULIN PUMP SYRINGE, 3 ML
EACH MISCELLANEOUS
Qty: 1 EACH | Refills: 0 | Status: SHIPPED | OUTPATIENT
Start: 2022-05-01 | End: 2022-05-04 | Stop reason: SDUPTHER

## 2022-05-01 RX ORDER — INSULIN LISPRO 100 [IU]/ML
7 INJECTION, SOLUTION INTRAVENOUS; SUBCUTANEOUS
Qty: 12 ML | Refills: 0 | Status: ON HOLD | OUTPATIENT
Start: 2022-05-01 | End: 2022-05-31

## 2022-05-01 RX ORDER — POTASSIUM CHLORIDE 20 MEQ/1
40 TABLET, EXTENDED RELEASE ORAL ONCE
Status: COMPLETED | OUTPATIENT
Start: 2022-05-01 | End: 2022-05-01

## 2022-05-01 RX ORDER — LIDOCAINE HYDROCHLORIDE 10 MG/ML
10 INJECTION INFILTRATION; PERINEURAL ONCE
Status: COMPLETED | OUTPATIENT
Start: 2022-05-01 | End: 2022-05-01

## 2022-05-01 RX ORDER — ASPIRIN 81 MG/1
81 TABLET ORAL DAILY
Qty: 30 TABLET | Refills: 5 | Status: SHIPPED | OUTPATIENT
Start: 2022-05-01 | End: 2022-10-19 | Stop reason: SDUPTHER

## 2022-05-01 RX ORDER — LANOLIN ALCOHOL/MO/W.PET/CERES
800 CREAM (GRAM) TOPICAL 2 TIMES DAILY
Qty: 120 TABLET | Refills: 5 | Status: ON HOLD | OUTPATIENT
Start: 2022-05-01 | End: 2022-06-03 | Stop reason: HOSPADM

## 2022-05-01 RX ORDER — LANCETS
1 EACH MISCELLANEOUS
Qty: 100 EACH | Refills: 0 | Status: SHIPPED | OUTPATIENT
Start: 2022-05-01 | End: 2022-05-02

## 2022-05-01 RX ORDER — FUROSEMIDE 80 MG/1
80 TABLET ORAL 2 TIMES DAILY
Qty: 60 TABLET | Refills: 0 | Status: ON HOLD | OUTPATIENT
Start: 2022-05-01 | End: 2022-05-31

## 2022-05-01 RX ORDER — PEN NEEDLE, DIABETIC 30 GX3/16"
1 NEEDLE, DISPOSABLE MISCELLANEOUS
Qty: 100 EACH | Refills: 0 | Status: SHIPPED | OUTPATIENT
Start: 2022-05-01 | End: 2022-06-14 | Stop reason: SDUPTHER

## 2022-05-01 RX ADMIN — INSULIN ASPART 10 UNITS: 100 INJECTION, SOLUTION INTRAVENOUS; SUBCUTANEOUS at 08:05

## 2022-05-01 RX ADMIN — FUROSEMIDE 80 MG: 40 TABLET ORAL at 08:05

## 2022-05-01 RX ADMIN — OXYCODONE HYDROCHLORIDE 10 MG: 10 TABLET ORAL at 02:05

## 2022-05-01 RX ADMIN — TACROLIMUS 8 MG: 1 CAPSULE ORAL at 08:05

## 2022-05-01 RX ADMIN — MYCOPHENOLATE MOFETIL 1000 MG: 250 CAPSULE ORAL at 08:05

## 2022-05-01 RX ADMIN — SULFAMETHOXAZOLE AND TRIMETHOPRIM 1 TABLET: 400; 80 TABLET ORAL at 08:05

## 2022-05-01 RX ADMIN — NYSTATIN 500000 UNITS: 500000 SUSPENSION ORAL at 09:05

## 2022-05-01 RX ADMIN — DOCUSATE SODIUM 100 MG: 100 CAPSULE ORAL at 08:05

## 2022-05-01 RX ADMIN — METHOCARBAMOL 500 MG: 500 TABLET, FILM COATED ORAL at 08:05

## 2022-05-01 RX ADMIN — MAGNESIUM SULFATE 2 G: 2 INJECTION INTRAVENOUS at 12:05

## 2022-05-01 RX ADMIN — METHOCARBAMOL 500 MG: 500 TABLET, FILM COATED ORAL at 02:05

## 2022-05-01 RX ADMIN — KETOCONAZOLE 100 MG: 200 TABLET ORAL at 08:05

## 2022-05-01 RX ADMIN — INSULIN ASPART 10 UNITS: 100 INJECTION, SOLUTION INTRAVENOUS; SUBCUTANEOUS at 12:05

## 2022-05-01 RX ADMIN — INSULIN ASPART 4 UNITS: 100 INJECTION, SOLUTION INTRAVENOUS; SUBCUTANEOUS at 01:05

## 2022-05-01 RX ADMIN — POTASSIUM CHLORIDE 40 MEQ: 20 TABLET, EXTENDED RELEASE ORAL at 12:05

## 2022-05-01 RX ADMIN — Medication 800 MG: at 08:05

## 2022-05-01 RX ADMIN — OXYCODONE HYDROCHLORIDE 10 MG: 10 TABLET ORAL at 07:05

## 2022-05-01 RX ADMIN — INSULIN ASPART 4 UNITS: 100 INJECTION, SOLUTION INTRAVENOUS; SUBCUTANEOUS at 12:05

## 2022-05-01 RX ADMIN — LIDOCAINE HYDROCHLORIDE 5 ML: 10 INJECTION, SOLUTION EPIDURAL; INFILTRATION; INTRACAUDAL; PERINEURAL at 12:05

## 2022-05-01 RX ADMIN — PREDNISONE 20 MG: 20 TABLET ORAL at 08:05

## 2022-05-01 RX ADMIN — NYSTATIN 500000 UNITS: 500000 SUSPENSION ORAL at 02:05

## 2022-05-01 NOTE — PROGRESS NOTES
DISCHARGE NOTE:    Pelon Vasquez is a 57 y.o. male s/p   LIVER   Donation after Brain Death transplant on 4/24/2022 (Liver) for ESlD secondary to Cirrhosis: Other, Specify.      Past Medical History:   Diagnosis Date    Arthritis     Cirrhosis of liver     HTN (hypertension)     BRAYDON (obstructive sleep apnea)     Secondary esophageal varices without bleeding 3/18/2022    EGD (2/25/22) showed moderate portal hypertensive gastropathy, small hiatal hernia, grade 1 esophageal varices    Type 2 diabetes mellitus        Hospital Course:    Post op course complicated by single episode of SVT, resolved with adenosine.  Also quite fluid overloaded, discharged with standing Lasix dose.  LFTs up a bit prior to dc, trending down with higher tacrolimus dose plus ketoconazole.        Allergies:   Review of patient's allergies indicates:   Allergen Reactions    Strawberry Anaphylaxis and Rash    Metformin Diarrhea    Pork/porcine containing products Diarrhea and Nausea And Vomiting       Patient Pharmacy: [facility]    Discharge Medications:     Medication List        START taking these medications      aspirin 81 MG EC tablet  Commonly known as: ECOTRIN  Take 1 tablet (81 mg total) by mouth once daily.     * blood sugar diagnostic Strp  1 strip by Misc.(Non-Drug; Combo Route) route 4 (four) times daily before meals and nightly.     * blood sugar diagnostic Strp  1 strip by Misc.(Non-Drug; Combo Route) route 4 (four) times daily before meals and nightly.     * blood-glucose meter Misc  use as instructed     * blood-glucose meter kit  Commonly known as: FREESTYLE SYSTEM KIT  Use as instructed     calcium carbonate-vitamin D3 600 mg-20 mcg (800 unit) Tab  Take 1 tablet by mouth once daily.     docusate sodium 100 MG capsule  Commonly known as: COLACE  Take 1 capsule (100 mg total) by mouth 3 (three) times daily as needed for Constipation.     famotidine 20 MG tablet  Commonly known as: PEPCID  Take 1 tablet (20 mg total) by mouth  "nightly.     insulin lispro 100 unit/mL pen  Commonly known as: HumaLOG KwikPen Insulin  Inject 7 Units into the skin 3 (three) times daily with meals. Plus sliding scale insulin (MDD 36 units daily)     ketoconazole 200 mg Tab  Commonly known as: NIZORAL  Take 0.5 tablets (100 mg total) by mouth once daily.     * lancets Misc  1 each by Misc.(Non-Drug; Combo Route) route 4 (four) times daily before meals and nightly.     * lancets Misc  1 each by Misc.(Non-Drug; Combo Route) route 4 (four) times daily before meals and nightly.     magnesium oxide 400 mg (241.3 mg magnesium) tablet  Commonly known as: MAG-OX  Take 2 tablets (800 mg total) by mouth 2 (two) times daily.     methocarbamoL 500 MG Tab  Commonly known as: ROBAXIN  Take 1 tablet (500 mg total) by mouth 3 (three) times daily as needed (muscle spasm/back pain).     mycophenolate 250 mg Cap  Commonly known as: CELLCEPT  Take 4 capsules (1,000 mg total) by mouth 2 (two) times daily.     NIFEdipine 30 MG (OSM) 24 hr tablet  Commonly known as: PROCARDIA-XL  Take 1 tablet (30 mg total) by mouth once daily.     nystatin 100,000 unit/mL suspension  Commonly known as: MYCOSTATIN  Take 5 mLs (500,000 Units total) by mouth 3 (three) times daily after meals. STOP 5/14/22     oxyCODONE 5 MG immediate release tablet  Commonly known as: ROXICODONE  Take 1-2 tablets (5-10 mg total) by mouth every 6 (six) hours as needed for Pain.     pen needle, diabetic 32 gauge x 5/32" Ndle  1 each by Misc.(Non-Drug; Combo Route) route 3 (three) times daily with meals.     predniSONE 5 MG tablet  Commonly known as: DELTASONE  Take by mouth daily:  20mg 4/30-5/6, 15mg 5/7-5/13, 10mg 5/14-5/20, 5mg 5/21-5/27. STOP 5/28/22     sulfamethoxazole-trimethoprim 400-80mg 400-80 mg per tablet  Commonly known as: BACTRIM,SEPTRA  Take 1 tablet by mouth every morning. STOP 10/24/22     tacrolimus 1 MG Cap  Commonly known as: PROGRAF  Take 8 capsules (8 mg total) by mouth every 12 (twelve) hours.   "   valGANciclovir 450 mg Tab  Commonly known as: VALCYTE  Take 1 tablet (450 mg total) by mouth once daily. STOP 7/24/22  Start taking on: May 4, 2022           * This list has 6 medication(s) that are the same as other medications prescribed for you. Read the directions carefully, and ask your doctor or other care provider to review them with you.                CHANGE how you take these medications      furosemide 80 MG tablet  Commonly known as: LASIX  Take 1 tablet (80 mg total) by mouth 2 (two) times daily.  What changed:   medication strength  how much to take  when to take this            STOP taking these medications      atorvastatin 20 MG tablet  Commonly known as: LIPITOR     lactulose 10 gram/15 mL solution  Commonly known as: CHRONULAC     midodrine 5 MG Tab  Commonly known as: PROAMATINE     ondansetron 4 MG Tbdl  Commonly known as: ZOFRAN-ODT     pantoprazole 40 MG tablet  Commonly known as: PROTONIX     sodium bicarbonate 650 MG tablet     spironolactone 100 MG tablet  Commonly known as: ALDACTONE     triamcinolone acetonide 0.1% 0.1 % cream  Commonly known as: KENALOG     XIFAXAN 550 mg Tab  Generic drug: rifAXIMin     zinc sulfate 50 mg zinc (220 mg) Tab tablet               Where to Get Your Medications        These medications were sent to [facility] [address], [place] [address]      Hours: Mon-Fri 7a-7p, Sat-Sun 10a-4p Phone: [phone]   aspirin 81 MG EC tablet  blood sugar diagnostic Strp  blood sugar diagnostic Strp  blood-glucose meter kit  blood-glucose meter Misc  calcium carbonate-vitamin D3 600 mg-20 mcg (800 unit) Tab  famotidine 20 MG tablet  furosemide 80 MG tablet  insulin lispro 100 unit/mL pen  ketoconazole 200 mg Tab  lancets Misc  lancets Misc  magnesium oxide 400 mg (241.3 mg magnesium) tablet  methocarbamoL 500 MG Tab  mycophenolate 250 mg Cap  NIFEdipine 30 MG (OSM) 24 hr tablet  nystatin 100,000 unit/mL suspension  oxyCODONE 5 MG immediate  "release tablet  pen needle, diabetic 32 gauge x 5/32" Ndle  predniSONE 5 MG tablet  sulfamethoxazole-trimethoprim 400-80mg 400-80 mg per tablet  tacrolimus 1 MG Cap  valGANciclovir 450 mg Tab       You can get these medications from any pharmacy    You don't need a prescription for these medications  docusate sodium 100 MG capsule          Pharmacy Interventions/Recommendations:  1) Transplant Immunosuppression: Induction methylprednisolone and maintenance tacrolimus + ketoconazole/MMF/prednisone taper per protocol    2) Opportunistic Infection prophylaxis: Valcyte until 7/24/2022, bactrim until 10/24/2022, and nystatin    3) Osteoporosis Prevention measures (liver txp): oscal D daily    4) Patient Counseling/Education:  .  Demonstrated the use of the BP cuff, thermometer.    5) Follow-Up/Discharge Needs: Insurance has termed, dc with insulin pen in room, paid cash for Magnesium, Oxycodone, aspirin, Lasix    6) Patient Assistance Information: n/a    7) The following medications have been placed on HOLD and should be restarted in the outpatient setting (when appropriate): none    Pelon and his caregiver verbalized their understanding and had the opportunity to ask questions.  "

## 2022-05-01 NOTE — NURSING
Patient discharged to [facility] as ordered.  Left via wheelchair accompanied by staff and spouse, with all personal belongings and discharge handouts.  Prior to discharge patient was able to give teachback instructions r/t insulin injection with insulin pen, use of sliding scale, medications, and incision care.  He and spouse had no further questions or needs stated

## 2022-05-01 NOTE — PLAN OF CARE
Problem: Adult Inpatient Plan of Care  Goal: Plan of Care Review  Outcome: Ongoing, Progressing  Goal: Patient-Specific Goal (Individualized)  Outcome: Ongoing, Progressing  Flowsheets (Taken 5/1/2022 1038)  Anxieties, Fears or Concerns: none expressed  Individualized Care Needs: education in prep for discharge  Patient-Specific Goals (Include Timeframe): discharge today  Goal: Absence of Hospital-Acquired Illness or Injury  Outcome: Ongoing, Progressing  Goal: Optimal Comfort and Wellbeing  Outcome: Ongoing, Progressing     Problem: Diabetes Comorbidity  Goal: Blood Glucose Level Within Targeted Range  Outcome: Ongoing, Progressing     Problem: Bariatric Environmental Safety  Goal: Safety Maintained with Care  Outcome: Ongoing, Progressing     Problem: Fall Injury Risk  Goal: Absence of Fall and Fall-Related Injury  Outcome: Ongoing, Progressing     Problem: Fluid and Electrolyte Imbalance (Acute Kidney Injury/Impairment)  Goal: Fluid and Electrolyte Balance  Outcome: Ongoing, Progressing     Problem: Oral Intake Inadequate (Acute Kidney Injury/Impairment)  Goal: Optimal Nutrition Intake  Outcome: Met     Problem: Renal Function Impairment (Acute Kidney Injury/Impairment)  Goal: Effective Renal Function  Outcome: Ongoing, Progressing     Problem: Infection  Goal: Absence of Infection Signs and Symptoms  Outcome: Ongoing, Progressing     Problem: Skin Injury Risk Increased  Goal: Skin Health and Integrity  Outcome: Ongoing, Progressing     Problem: Adjustment to Transplant (Liver Transplant)  Goal: Optimal Coping with Organ Transplant  Outcome: Ongoing, Progressing     Problem: Bleeding (Liver Transplant)  Goal: Absence of Bleeding  Outcome: Met     Problem: Bowel Motility Impaired (Liver Transplant)  Goal: Effective Bowel Elimination  Outcome: Ongoing, Progressing     Problem: Donor Organ Function (Liver Transplant)  Goal: Effective Organ Function  Outcome: Ongoing, Progressing     Problem: Fluid and Electrolyte  Imbalance (Liver Transplant)  Goal: Fluid and Electrolyte Balance  Outcome: Ongoing, Progressing     Problem: Glycemic Control Impaired (Liver Transplant)  Goal: Blood Glucose Level Within Targeted Range  Outcome: Ongoing, Progressing     Problem: Infection (Liver Transplant)  Goal: Absence of Infection Signs and Symptoms  Outcome: Ongoing, Progressing     Problem: Neurologic Impairment (Liver Transplant)  Goal: Optimal Neurologic Function  Outcome: Met     Problem: Ongoing Anesthesia Effects (Liver Transplant)  Goal: Anesthesia/Sedation Recovery  Outcome: Met     Problem: Oral Intake Inadequate (Liver Transplant)  Goal: Optimal Nutrition Intake  Outcome: Ongoing, Progressing     Problem: Pain (Liver Transplant)  Goal: Acceptable Pain Control  Outcome: Ongoing, Progressing     Problem: Postoperative Nausea and Vomiting (Liver Transplant)  Goal: Nausea and Vomiting Relief  Outcome: Met     Problem: Postoperative Urinary Retention (Liver Transplant)  Goal: Effective Urinary Elimination  Outcome: Met     Problem: Respiratory Compromise (Liver Transplant)  Goal: Effective Oxygenation and Ventilation  Outcome: Met

## 2022-05-02 ENCOUNTER — LAB VISIT (OUTPATIENT)
Dept: LAB | Facility: HOSPITAL | Age: 57
End: 2022-05-02
Attending: SURGERY
Payer: MEDICAID

## 2022-05-02 ENCOUNTER — CLINICAL SUPPORT (OUTPATIENT)
Dept: TRANSPLANT | Facility: CLINIC | Age: 57
End: 2022-05-02
Payer: MEDICAID

## 2022-05-02 ENCOUNTER — TELEPHONE (OUTPATIENT)
Dept: TRANSPLANT | Facility: HOSPITAL | Age: 57
End: 2022-05-02
Payer: MEDICAID

## 2022-05-02 VITALS
DIASTOLIC BLOOD PRESSURE: 64 MMHG | SYSTOLIC BLOOD PRESSURE: 136 MMHG | HEIGHT: 67 IN | HEIGHT: 67 IN | WEIGHT: 235 LBS | WEIGHT: 235 LBS | DIASTOLIC BLOOD PRESSURE: 64 MMHG | HEART RATE: 96 BPM | TEMPERATURE: 97 F | OXYGEN SATURATION: 95 % | TEMPERATURE: 97 F | RESPIRATION RATE: 16 BRPM | SYSTOLIC BLOOD PRESSURE: 136 MMHG | BODY MASS INDEX: 36.88 KG/M2 | RESPIRATION RATE: 16 BRPM | BODY MASS INDEX: 36.88 KG/M2 | HEART RATE: 96 BPM | OXYGEN SATURATION: 95 %

## 2022-05-02 DIAGNOSIS — Z94.4 LIVER REPLACED BY TRANSPLANT: Primary | ICD-10-CM

## 2022-05-02 DIAGNOSIS — Z94.4 LIVER REPLACED BY TRANSPLANT: ICD-10-CM

## 2022-05-02 LAB
ALBUMIN SERPL BCP-MCNC: 2.8 G/DL (ref 3.5–5.2)
ALP SERPL-CCNC: 86 U/L (ref 55–135)
ALT SERPL W/O P-5'-P-CCNC: 60 U/L (ref 10–44)
ANION GAP SERPL CALC-SCNC: 7 MMOL/L (ref 8–16)
AST SERPL-CCNC: 20 U/L (ref 10–40)
BACTERIA SPEC ANAEROBE CULT: NORMAL
BACTERIA SPEC ANAEROBE CULT: NORMAL
BASOPHILS # BLD AUTO: 0.07 K/UL (ref 0–0.2)
BASOPHILS NFR BLD: 0.7 % (ref 0–1.9)
BILIRUB DIRECT SERPL-MCNC: 1 MG/DL (ref 0.1–0.3)
BILIRUB SERPL-MCNC: 1.6 MG/DL (ref 0.1–1)
BUN SERPL-MCNC: 42 MG/DL (ref 6–20)
CALCIUM SERPL-MCNC: 8.3 MG/DL (ref 8.7–10.5)
CHLORIDE SERPL-SCNC: 98 MMOL/L (ref 95–110)
CO2 SERPL-SCNC: 31 MMOL/L (ref 23–29)
CREAT SERPL-MCNC: 1.5 MG/DL (ref 0.5–1.4)
DIFFERENTIAL METHOD: ABNORMAL
EOSINOPHIL # BLD AUTO: 0.4 K/UL (ref 0–0.5)
EOSINOPHIL NFR BLD: 4.4 % (ref 0–8)
ERYTHROCYTE [DISTWIDTH] IN BLOOD BY AUTOMATED COUNT: 17.8 % (ref 11.5–14.5)
EST. GFR  (AFRICAN AMERICAN): 59.7 ML/MIN/1.73 M^2
EST. GFR  (NON AFRICAN AMERICAN): 51.7 ML/MIN/1.73 M^2
GLUCOSE SERPL-MCNC: 128 MG/DL (ref 70–110)
HCT VFR BLD AUTO: 24.4 % (ref 40–54)
HGB BLD-MCNC: 8.1 G/DL (ref 14–18)
IMM GRANULOCYTES # BLD AUTO: 0.25 K/UL (ref 0–0.04)
IMM GRANULOCYTES NFR BLD AUTO: 2.6 % (ref 0–0.5)
LYMPHOCYTES # BLD AUTO: 1.5 K/UL (ref 1–4.8)
LYMPHOCYTES NFR BLD: 15.5 % (ref 18–48)
MCH RBC QN AUTO: 29.9 PG (ref 27–31)
MCHC RBC AUTO-ENTMCNC: 33.2 G/DL (ref 32–36)
MCV RBC AUTO: 90 FL (ref 82–98)
MONOCYTES # BLD AUTO: 1.1 K/UL (ref 0.3–1)
MONOCYTES NFR BLD: 11.3 % (ref 4–15)
NEUTROPHILS # BLD AUTO: 6.3 K/UL (ref 1.8–7.7)
NEUTROPHILS NFR BLD: 65.5 % (ref 38–73)
NRBC BLD-RTO: 0 /100 WBC
PLATELET # BLD AUTO: 121 K/UL (ref 150–450)
PMV BLD AUTO: 10 FL (ref 9.2–12.9)
POTASSIUM SERPL-SCNC: 3.7 MMOL/L (ref 3.5–5.1)
PROT SERPL-MCNC: 5.1 G/DL (ref 6–8.4)
RBC # BLD AUTO: 2.71 M/UL (ref 4.6–6.2)
SODIUM SERPL-SCNC: 136 MMOL/L (ref 136–145)
TACROLIMUS BLD-MCNC: 11.5 NG/ML (ref 5–15)
WBC # BLD AUTO: 9.64 K/UL (ref 3.9–12.7)

## 2022-05-02 PROCEDURE — 82248 BILIRUBIN DIRECT: CPT | Performed by: SURGERY

## 2022-05-02 PROCEDURE — 80053 COMPREHEN METABOLIC PANEL: CPT | Performed by: SURGERY

## 2022-05-02 PROCEDURE — 99213 OFFICE O/P EST LOW 20 MIN: CPT | Mod: PBBFAC,27

## 2022-05-02 PROCEDURE — 85025 COMPLETE CBC W/AUTO DIFF WBC: CPT | Performed by: SURGERY

## 2022-05-02 PROCEDURE — 80197 ASSAY OF TACROLIMUS: CPT | Performed by: SURGERY

## 2022-05-02 PROCEDURE — 99999 PR PBB SHADOW E&M-EST. PATIENT-LVL III: ICD-10-PCS | Mod: PBBFAC,,,

## 2022-05-02 PROCEDURE — 99999 PR PBB SHADOW E&M-EST. PATIENT-LVL III: CPT | Mod: PBBFAC,,,

## 2022-05-02 PROCEDURE — 36415 COLL VENOUS BLD VENIPUNCTURE: CPT | Performed by: SURGERY

## 2022-05-02 RX ORDER — TACROLIMUS 1 MG/1
4 CAPSULE ORAL EVERY 12 HOURS
Qty: 240 CAPSULE | Refills: 11 | Status: SHIPPED | OUTPATIENT
Start: 2022-05-02 | End: 2022-05-24 | Stop reason: DRUGHIGH

## 2022-05-02 NOTE — PROGRESS NOTES
Clinic Note: First Return to Clinic Post-  Liver Transplant    Pelon Vasquez  is a 57 y.o. male  S/p   LIVER transplant on 4/24/2022 (Liver) for Cirrhosis: Other, Specify.      Discharge Course (Issues/Concerns): Patient presents with only complaint of frequent urination (patient currently on lasix 80mg BID). Of note, patient's SCr is elevated today (1.5). Patient admits his fluid intake is decreased, but the increase in creatinine could also be over diuresis and/or elevated tac level (11.5).    Objective:   Vitals:    05/02/22 0937   BP: 136/64   Pulse: 96   Resp: 16   Temp: 97.2 °F (36.2 °C)       Met with patient and his caregiver in the clinic to review current medication list.     Current Outpatient Medications   Medication Sig Dispense Refill    aspirin (ECOTRIN) 81 MG EC tablet Take 1 tablet (81 mg total) by mouth once daily. 30 tablet 5    blood sugar diagnostic Strp 1 strip by Misc.(Non-Drug; Combo Route) route 4 (four) times daily before meals and nightly. 100 strip 5    blood-glucose meter (FREESTYLE SYSTEM KIT) kit Use as instructed 1 each 0    blood-glucose meter Misc use as instructed 1 each 0    calcium carbonate-vitamin D3 600 mg-20 mcg (800 unit) Tab Take 1 tablet by mouth once daily. 30 tablet 5    docusate sodium (COLACE) 100 MG capsule Take 1 capsule (100 mg total) by mouth 3 (three) times daily as needed for Constipation.  0    famotidine (PEPCID) 20 MG tablet Take 1 tablet (20 mg total) by mouth nightly. 30 tablet 0    furosemide (LASIX) 80 MG tablet Take 1 tablet (80 mg total) by mouth 2 (two) times daily. 60 tablet 0    insulin lispro (HUMALOG KWIKPEN INSULIN) 100 unit/mL pen Inject 7 Units into the skin 3 (three) times daily with meals. Plus sliding scale insulin (MDD 36 units daily) 12 mL 0    ketoconazole (NIZORAL) 200 mg Tab Take 0.5 tablets (100 mg total) by mouth once daily. 15 tablet 11    lancets Misc 1 each by Misc.(Non-Drug; Combo Route) route 4 (four) times daily before  "meals and nightly. 100 each 0    magnesium oxide (MAG-OX) 400 mg (241.3 mg magnesium) tablet Take 2 tablets (800 mg total) by mouth 2 (two) times daily. 120 tablet 5    methocarbamoL (ROBAXIN) 500 MG Tab Take 1 tablet (500 mg total) by mouth 3 (three) times daily as needed (muscle spasm/back pain). 30 tablet 0    mycophenolate (CELLCEPT) 250 mg Cap Take 4 capsules (1,000 mg total) by mouth 2 (two) times daily. 240 capsule 2    NIFEdipine (PROCARDIA-XL) 30 MG (OSM) 24 hr tablet Take 1 tablet (30 mg total) by mouth once daily. 30 tablet 11    nystatin (MYCOSTATIN) 100,000 unit/mL suspension Take 5 mLs (500,000 Units total) by mouth 3 (three) times daily after meals. STOP 5/14/22 250 mL 0    oxyCODONE (ROXICODONE) 5 MG immediate release tablet Take 1-2 tablets (5-10 mg total) by mouth every 6 (six) hours as needed for Pain. 50 tablet 0    pen needle, diabetic 32 gauge x 5/32" Ndle 1 each by Misc.(Non-Drug; Combo Route) route 3 (three) times daily with meals. 100 each 0    predniSONE (DELTASONE) 5 MG tablet Take by mouth daily:  20mg 4/30-5/6, 15mg 5/7-5/13, 10mg 5/14-5/20, 5mg 5/21-5/27. STOP 5/28/22 75 tablet 0    sulfamethoxazole-trimethoprim 400-80mg (BACTRIM,SEPTRA) 400-80 mg per tablet Take 1 tablet by mouth every morning. STOP 10/24/22 30 tablet 5    tacrolimus (PROGRAF) 1 MG Cap Take 8 capsules (8 mg total) by mouth every 12 (twelve) hours. 480 capsule 11    [START ON 5/4/2022] valGANciclovir (VALCYTE) 450 mg Tab Take 1 tablet (450 mg total) by mouth once daily. STOP 7/24/22 30 tablet 2     No current facility-administered medications for this visit.       Pharmacy Interventions/Recommendations:     1) Graft Function & Immunosuppression Issues: Prograf 8/8 with keto 100mg daily, MMF 1000mg BID, and Pred taper    2) Opportunistic Infection prophylaxis:   PCP ppx: Bactrim until 10/24/22  CMV ppx: Valcyte until 7/24/22  Fungal ppx: Nystatin until 5/14/22    3) Donor Serologies & Monitoring:     Donor CMV " Status: Positive  Donor HCV Status: Negative  Donor HBcAb: Negative  Donor HBV MATHEUS: Negative  Donor HCV MATHEUS: Negative      4) Pain Management & Bowel Regimen: Oxycodone and robaxin/ colace    5) Blood Pressure Management: Procardia    6) Blood Sugar Management & Follow-up: 7 units plus SSI, needs endo follow-up    7) Electrolyte Management: None    8) Osteoporosis Prevention Strategy (Liver Transplant): Ca/Vit D    8) OTHER medication follow-up (patient assistance, held medications, etc): Notified over weekend patient has lost insurance coverage. Financial coordinators and SW aware. Patient states he is also working on a resolution.    9) Sent an EUA [employer] request for this patient as it is being offered to all freshly transplanted patients.  [employer] FAQ provided to patient and answered their questions about the monoclonal antibody.      10) Reinforced medication education conducted in the hospital, including medication indications, dosing, administration, side effects, monitoring-- including timing of immunosuppressant levels.     Patient received their FIRST fill of medications from [facility].  Discussed the process for obtaining refills of medications, including verifying the dose and mailing address to have medications delivered.     Pelon and his caregivers verbalized understanding and had the opportunity to ask questions.

## 2022-05-02 NOTE — TELEPHONE ENCOUNTER
team was notified that patient's insurance termed this past Saturday prior to hospital discharge.  contacted patient to follow up. Patient reports his insurance was termed due to him not working at his company for a required min of hours in the past month, as patient has been in the hospital. Patient was able to fill his discharge medications in time, and had 1 medication out of pocket that cost about $20 at discharge. Patient reports he has a 30 day supply of the medications he currently needs. Patient was contacted by  and [facility] Department and completed emergency Medicaid application for [place]. Patient reports he should have an answer within 7 days.      set up appointment for patient tomorrow at 2pm to discuss further needs and plans until patient may receive Medicaid.  will assess for any medications patient may need assistance with. Per pharmacy, patient filled everything needed except Novolog. SW to inquire if patient has this medication. Patient may also have issue receiving home health services due to no insurance at this time. Patient was previously scheduled to be seen by [facility], SW notified [facility] of this situation.     advised patient to contact his employer and past insurance provider in meantime, to see if they can reinstate his benefits due to current transplant status and illness. Patient reports he will do so. Patient to also see about getting past pay stubs from work office in [place] in case it may be needed for any PAP application. Although, patient reports no income due to being unable to work for the last month and most likely will be out of work for several months due to transplant.    Patient verbalizes understanding and denies any other concerns at this time. SW remains available and will continue to follow, providing psychosocial support, education and assistance as needed.

## 2022-05-02 NOTE — PROGRESS NOTES
1ST NURSING VISIT POST DISCHARGE NOTE    1st RN appointment with Pelon Vasquez post discharge 5/1/22 s/p liver transplant 4/24/22.  Patient's significant other accompanied him.  Upon assessment, patient complains of incisional pain.  Incision intact with staples.  Patient is able to explain daily incision care and showering instructions.  Medication list and rationale were reviewed by pharmD, Allen Centeno.  Patient did bring blue medication card and medication bottles for review with pharmD.  Time allowed for questions.  Patient expressed understanding of daily care including BID VS and medications.  Patient aware that nurse will review today's labs with a transplant physician and call with any dose changes indicated.  Next labs TBD and first post-operative transplant team appointment scheduled for 5/3/22.

## 2022-05-02 NOTE — TELEPHONE ENCOUNTER
Spoke with Piat. Reviewed lab results and dose change. She repeated back instructions and agreed with plan.  ----- Message from Tonio Smith MD sent at 5/2/2022 12:56 PM CDT -----  Fk 4 bid   Labs Wednesday

## 2022-05-03 ENCOUNTER — SOCIAL WORK (OUTPATIENT)
Dept: TRANSPLANT | Facility: CLINIC | Age: 57
End: 2022-05-03
Payer: MEDICAID

## 2022-05-03 ENCOUNTER — OFFICE VISIT (OUTPATIENT)
Dept: TRANSPLANT | Facility: CLINIC | Age: 57
End: 2022-05-03
Payer: MEDICAID

## 2022-05-03 VITALS
SYSTOLIC BLOOD PRESSURE: 143 MMHG | HEART RATE: 100 BPM | OXYGEN SATURATION: 96 % | WEIGHT: 235.25 LBS | HEIGHT: 67 IN | DIASTOLIC BLOOD PRESSURE: 72 MMHG | RESPIRATION RATE: 18 BRPM | BODY MASS INDEX: 36.92 KG/M2 | TEMPERATURE: 98 F

## 2022-05-03 DIAGNOSIS — Z29.89 PROPHYLACTIC IMMUNOTHERAPY: Primary | ICD-10-CM

## 2022-05-03 DIAGNOSIS — Z79.60 LONG-TERM USE OF IMMUNOSUPPRESSANT MEDICATION: ICD-10-CM

## 2022-05-03 DIAGNOSIS — Z79.899 ENCOUNTER FOR LONG-TERM (CURRENT) USE OF OTHER MEDICATIONS: ICD-10-CM

## 2022-05-03 DIAGNOSIS — Z94.4 LIVER REPLACED BY TRANSPLANT: ICD-10-CM

## 2022-05-03 DIAGNOSIS — Z51.81 ENCOUNTER FOR THERAPEUTIC DRUG MONITORING: ICD-10-CM

## 2022-05-03 DIAGNOSIS — Z91.89 AT RISK FOR OPPORTUNISTIC INFECTIONS: ICD-10-CM

## 2022-05-03 DIAGNOSIS — Z94.4 S/P LIVER TRANSPLANT: ICD-10-CM

## 2022-05-03 PROCEDURE — 3078F PR MOST RECENT DIASTOLIC BLOOD PRESSURE < 80 MM HG: ICD-10-PCS | Mod: CPTII,,, | Performed by: TRANSPLANT SURGERY

## 2022-05-03 PROCEDURE — 99215 PR OFFICE/OUTPT VISIT, EST, LEVL V, 40-54 MIN: ICD-10-PCS | Mod: 24,S$PBB,, | Performed by: TRANSPLANT SURGERY

## 2022-05-03 PROCEDURE — 3044F PR MOST RECENT HEMOGLOBIN A1C LEVEL <7.0%: ICD-10-PCS | Mod: CPTII,,, | Performed by: TRANSPLANT SURGERY

## 2022-05-03 PROCEDURE — 99215 OFFICE O/P EST HI 40 MIN: CPT | Mod: 24,S$PBB,, | Performed by: TRANSPLANT SURGERY

## 2022-05-03 PROCEDURE — 3044F HG A1C LEVEL LT 7.0%: CPT | Mod: CPTII,,, | Performed by: TRANSPLANT SURGERY

## 2022-05-03 PROCEDURE — 1111F PR DISCHARGE MEDS RECONCILED W/ CURRENT OUTPATIENT MED LIST: ICD-10-PCS | Mod: CPTII,,, | Performed by: TRANSPLANT SURGERY

## 2022-05-03 PROCEDURE — 1111F DSCHRG MED/CURRENT MED MERGE: CPT | Mod: CPTII,,, | Performed by: TRANSPLANT SURGERY

## 2022-05-03 PROCEDURE — 3008F PR BODY MASS INDEX (BMI) DOCUMENTED: ICD-10-PCS | Mod: CPTII,,, | Performed by: TRANSPLANT SURGERY

## 2022-05-03 PROCEDURE — 99211 OFF/OP EST MAY X REQ PHY/QHP: CPT | Mod: PBBFAC,27

## 2022-05-03 PROCEDURE — 1160F RVW MEDS BY RX/DR IN RCRD: CPT | Mod: CPTII,,, | Performed by: TRANSPLANT SURGERY

## 2022-05-03 PROCEDURE — 3078F DIAST BP <80 MM HG: CPT | Mod: CPTII,,, | Performed by: TRANSPLANT SURGERY

## 2022-05-03 PROCEDURE — 99213 OFFICE O/P EST LOW 20 MIN: CPT | Mod: PBBFAC

## 2022-05-03 PROCEDURE — 1160F PR REVIEW ALL MEDS BY PRESCRIBER/CLIN PHARMACIST DOCUMENTED: ICD-10-PCS | Mod: CPTII,,, | Performed by: TRANSPLANT SURGERY

## 2022-05-03 PROCEDURE — 3077F PR MOST RECENT SYSTOLIC BLOOD PRESSURE >= 140 MM HG: ICD-10-PCS | Mod: CPTII,,, | Performed by: TRANSPLANT SURGERY

## 2022-05-03 PROCEDURE — 1159F MED LIST DOCD IN RCRD: CPT | Mod: CPTII,,, | Performed by: TRANSPLANT SURGERY

## 2022-05-03 PROCEDURE — 3077F SYST BP >= 140 MM HG: CPT | Mod: CPTII,,, | Performed by: TRANSPLANT SURGERY

## 2022-05-03 PROCEDURE — 1159F PR MEDICATION LIST DOCUMENTED IN MEDICAL RECORD: ICD-10-PCS | Mod: CPTII,,, | Performed by: TRANSPLANT SURGERY

## 2022-05-03 PROCEDURE — 4010F ACE/ARB THERAPY RXD/TAKEN: CPT | Mod: CPTII,,, | Performed by: TRANSPLANT SURGERY

## 2022-05-03 PROCEDURE — 3008F BODY MASS INDEX DOCD: CPT | Mod: CPTII,,, | Performed by: TRANSPLANT SURGERY

## 2022-05-03 PROCEDURE — 99999 PR PBB SHADOW E&M-EST. PATIENT-LVL I: ICD-10-PCS | Mod: PBBFAC,,,

## 2022-05-03 PROCEDURE — 99999 PR PBB SHADOW E&M-EST. PATIENT-LVL III: ICD-10-PCS | Mod: PBBFAC,,,

## 2022-05-03 PROCEDURE — 4010F PR ACE/ARB THEARPY RXD/TAKEN: ICD-10-PCS | Mod: CPTII,,, | Performed by: TRANSPLANT SURGERY

## 2022-05-03 PROCEDURE — 99999 PR PBB SHADOW E&M-EST. PATIENT-LVL III: CPT | Mod: PBBFAC,,,

## 2022-05-03 PROCEDURE — 99999 PR PBB SHADOW E&M-EST. PATIENT-LVL I: CPT | Mod: PBBFAC,,,

## 2022-05-03 NOTE — PROGRESS NOTES
Transplant Surgery  Liver Transplant Recipient Follow-up    Original Referring Physician: Irvin Sandhu  Current Corresponding Physician: Irvin Sandhu    Chief Complaint: Pelon is here for follow up of his liver transplant performed 4/24/2022 for the primary diagnosis (UNOS) of Cirrhosis: Other, Specify    ORGAN: LIVER  Whole or Partial: whole liver  Donor Type: donation after brain death  PHS Increased Risk: no  Donor CMV Status: Positive  Donor HCV Status: Negative  Donor HBcAb: Negative  Donor HBV MATHEUS: Negative  Donor HCV MATHEUS: Negative    Biliary Anastomosis: end to end  Arterial Anatomy: completely replaced circulation  IVC reconstruction: end to end ivc  Portal vein status: patent    Subjective:     History of Present Illness: He has had the following complications since transplant: none.  The noted complications are well controlled.    Interval History: Currently, he is doing well.  Current complaints include none.  Pelon is here for management of his immunosuppression medication.    External provider notes reviewed: Yes    Review of Systems   Constitutional: Negative for activity change, appetite change, chills, diaphoresis, fatigue, fever and unexpected weight change.   HENT: Negative for congestion, dental problem, ear pain, facial swelling, mouth sores, nosebleeds, sore throat, tinnitus, trouble swallowing and voice change.    Eyes: Negative for photophobia, pain and visual disturbance.   Respiratory: Negative for apnea, cough, choking, chest tightness and shortness of breath.    Cardiovascular: Negative for chest pain, palpitations and leg swelling.   Gastrointestinal: Negative for abdominal distention, abdominal pain, blood in stool, constipation, diarrhea, nausea and vomiting.   Endocrine: Negative for cold intolerance and heat intolerance.   Genitourinary: Negative for difficulty urinating, dysuria, flank pain, hematuria and urgency.   Musculoskeletal: Negative for arthralgias and gait problem.    Skin: Negative for color change, pallor and rash.   Neurological: Negative for dizziness, tremors, seizures, syncope and light-headedness.   Hematological: Negative for adenopathy. Does not bruise/bleed easily.   Psychiatric/Behavioral: Negative for agitation and confusion.     Objective:     Physical Exam  Constitutional:       General: He is not in acute distress.     Appearance: He is well-developed.   HENT:      Head: Normocephalic and atraumatic.      Mouth/Throat:      Pharynx: No oropharyngeal exudate.   Eyes:      General: No scleral icterus.     Conjunctiva/sclera: Conjunctivae normal.      Pupils: Pupils are equal, round, and reactive to light.   Cardiovascular:      Rate and Rhythm: Normal rate and regular rhythm.      Heart sounds: Normal heart sounds.   Pulmonary:      Effort: Pulmonary effort is normal. No respiratory distress.      Breath sounds: Normal breath sounds.   Abdominal:      General: Bowel sounds are normal. There is no distension.      Palpations: Abdomen is soft.      Tenderness: There is no abdominal tenderness. There is no guarding or rebound.   Musculoskeletal:         General: Normal range of motion.      Cervical back: Normal range of motion and neck supple.      Right lower leg: Edema present.      Left lower leg: Edema present.   Lymphadenopathy:      Cervical: No cervical adenopathy.   Skin:     General: Skin is warm and dry.      Findings: No erythema or rash.   Neurological:      Mental Status: He is alert and oriented to person, place, and time.   Psychiatric:         Behavior: Behavior normal.       Lab Results   Component Value Date    BILITOT 1.6 (H) 05/02/2022    AST 20 05/02/2022    ALT 60 (H) 05/02/2022    ALKPHOS 86 05/02/2022    CREATININE 1.5 (H) 05/02/2022    ALBUMIN 2.8 (L) 05/02/2022     Lab Results   Component Value Date    WBC 9.64 05/02/2022    HGB 8.1 (L) 05/02/2022    HCT 24.4 (L) 05/02/2022    HCT 24 (L) 04/24/2022     (L) 05/02/2022     Lab Results    Component Value Date    TACROLIMUS 11.5 05/02/2022       Diagnostics:  The following labs have been reviewed: CBC  CMP  PT  INR  TACROLIMUS LEVEL  The following radiology images have been independently reviewed and interpreted: Liver US    Assessment/Plan:          · S/P liver transplant.  · Chronic immunosuppressive medications for rejection prophylaxis supratherapeutic.  Plan: tacro decreased yesterday.  · Continue monitoring symptoms, labs and drug levels for drug-related toxicity and side effects.  · Incision: staples in place; wound clean, dry, and intact  · Femoral arterial line site: no complications evident    Additional testing to be completed according to Written Order Guidelines for Post-Liver and Combined Liver/Kidney Transplant Follow-up (LI-09)    Interpretation of tests and discussion of patient management involves all members of the multidisciplinary transplant team  Patient advised that it is recommended that all transplanted patients, and their close contacts and household members receive Covid vaccination.  Sandrine Nelson MD       [facility] Patient Status  Functional Status: 60% - Requires occasional assistance but is able to care for needs  Physical Capacity: No Limitations

## 2022-05-03 NOTE — PROGRESS NOTES
met with patient and caregiver, Leonarda, in clinic for follow up regarding patient's loss of insurance. Patient reports he met with [facility] rep this morning and will apply for [place] Medicaid. Patient has also applied for [place] Medicaid. Patient reports both were done as emergency application and he should hear from them within a week. Patient reports he will also contact his insurance provider through his work to see if they can possibly reinstate his plan. Patient lost insurance through work due to not being able to work for over a month. Patient's short term disability has run out and patient is currently waiting for long term disability through employer. Patient reports $0 income at this time. Patient states he has some financial support from group of friends he plays golf with but this amount can vary from week to week. Patient reports having a 30 day supply of medications he left the hospital with. Patient may apply for PAP for some transplant medications (Valcyte and Cellcept). If patient is in need of help with Prograf, team will need to discuss if patient may have a discount card or be switched to Envarsus that has a PAP.  reaching out to PAP coordinator regarding these applications. Patient also inquiring if there is assistance for Novolog.  reaching out to retail pharmacy team regarding this medication since it is not transplant medicine.      team will touch base with patient next week to inquire if patient heard a decision from Medicaid or work insurance plan. SW remains available and will continue to follow, providing psychosocial support, education and assistance as needed.

## 2022-05-04 ENCOUNTER — TELEPHONE (OUTPATIENT)
Dept: TRANSPLANT | Facility: HOSPITAL | Age: 57
End: 2022-05-04
Payer: MEDICAID

## 2022-05-04 ENCOUNTER — LAB VISIT (OUTPATIENT)
Dept: LAB | Facility: HOSPITAL | Age: 57
End: 2022-05-04
Attending: SURGERY
Payer: MEDICAID

## 2022-05-04 ENCOUNTER — TELEPHONE (OUTPATIENT)
Dept: TRANSPLANT | Facility: CLINIC | Age: 57
End: 2022-05-04
Payer: MEDICAID

## 2022-05-04 DIAGNOSIS — Z94.4 LIVER REPLACED BY TRANSPLANT: ICD-10-CM

## 2022-05-04 LAB
ALBUMIN SERPL BCP-MCNC: 2.9 G/DL (ref 3.5–5.2)
ALP SERPL-CCNC: 88 U/L (ref 55–135)
ALT SERPL W/O P-5'-P-CCNC: 39 U/L (ref 10–44)
ANION GAP SERPL CALC-SCNC: 8 MMOL/L (ref 8–16)
AST SERPL-CCNC: 14 U/L (ref 10–40)
BASOPHILS # BLD AUTO: 0.09 K/UL (ref 0–0.2)
BASOPHILS NFR BLD: 0.9 % (ref 0–1.9)
BILIRUB DIRECT SERPL-MCNC: 0.9 MG/DL (ref 0.1–0.3)
BILIRUB SERPL-MCNC: 1.4 MG/DL (ref 0.1–1)
BUN SERPL-MCNC: 37 MG/DL (ref 6–20)
CALCIUM SERPL-MCNC: 8.9 MG/DL (ref 8.7–10.5)
CHLORIDE SERPL-SCNC: 94 MMOL/L (ref 95–110)
CO2 SERPL-SCNC: 32 MMOL/L (ref 23–29)
CREAT SERPL-MCNC: 1.4 MG/DL (ref 0.5–1.4)
DIFFERENTIAL METHOD: ABNORMAL
EOSINOPHIL # BLD AUTO: 0.3 K/UL (ref 0–0.5)
EOSINOPHIL NFR BLD: 3.2 % (ref 0–8)
ERYTHROCYTE [DISTWIDTH] IN BLOOD BY AUTOMATED COUNT: 18.5 % (ref 11.5–14.5)
EST. GFR  (AFRICAN AMERICAN): >60 ML/MIN/1.73 M^2
EST. GFR  (NON AFRICAN AMERICAN): 56.2 ML/MIN/1.73 M^2
GLUCOSE SERPL-MCNC: 166 MG/DL (ref 70–110)
HCT VFR BLD AUTO: 24.3 % (ref 40–54)
HGB BLD-MCNC: 8 G/DL (ref 14–18)
IMM GRANULOCYTES # BLD AUTO: 0.39 K/UL (ref 0–0.04)
IMM GRANULOCYTES NFR BLD AUTO: 3.9 % (ref 0–0.5)
LYMPHOCYTES # BLD AUTO: 1.4 K/UL (ref 1–4.8)
LYMPHOCYTES NFR BLD: 13.9 % (ref 18–48)
MCH RBC QN AUTO: 29.3 PG (ref 27–31)
MCHC RBC AUTO-ENTMCNC: 32.9 G/DL (ref 32–36)
MCV RBC AUTO: 89 FL (ref 82–98)
MONOCYTES # BLD AUTO: 1.2 K/UL (ref 0.3–1)
MONOCYTES NFR BLD: 11.7 % (ref 4–15)
NEUTROPHILS # BLD AUTO: 6.6 K/UL (ref 1.8–7.7)
NEUTROPHILS NFR BLD: 66.4 % (ref 38–73)
NRBC BLD-RTO: 0 /100 WBC
PLATELET # BLD AUTO: 194 K/UL (ref 150–450)
PMV BLD AUTO: 9.3 FL (ref 9.2–12.9)
POTASSIUM SERPL-SCNC: 4.1 MMOL/L (ref 3.5–5.1)
PROT SERPL-MCNC: 5.5 G/DL (ref 6–8.4)
RBC # BLD AUTO: 2.73 M/UL (ref 4.6–6.2)
SODIUM SERPL-SCNC: 134 MMOL/L (ref 136–145)
TACROLIMUS BLD-MCNC: 7.9 NG/ML (ref 5–15)
WBC # BLD AUTO: 9.88 K/UL (ref 3.9–12.7)

## 2022-05-04 PROCEDURE — 82248 BILIRUBIN DIRECT: CPT | Performed by: SURGERY

## 2022-05-04 PROCEDURE — 80197 ASSAY OF TACROLIMUS: CPT | Performed by: SURGERY

## 2022-05-04 PROCEDURE — 85025 COMPLETE CBC W/AUTO DIFF WBC: CPT | Performed by: SURGERY

## 2022-05-04 PROCEDURE — 80053 COMPREHEN METABOLIC PANEL: CPT | Performed by: SURGERY

## 2022-05-04 PROCEDURE — 36415 COLL VENOUS BLD VENIPUNCTURE: CPT | Performed by: SURGERY

## 2022-05-04 NOTE — TELEPHONE ENCOUNTER
Spoke with Pita pt's significant other. Reviewed labs results and that no medications changes needed. She verbalized understanding that pt needs to repeat labs Friday.  ----- Message from Tonio Smith MD sent at 5/4/2022  2:29 PM CDT -----  Labs Friday

## 2022-05-04 NOTE — TELEPHONE ENCOUNTER
completed documents for PAP for Valcyte and Cellcept with patient and turned in signed documents to PAP coordinator.  also spoke with retail pharmacy and if patient is in need of Novolog, he may utilize trial.  team will follow up regarding patient's Medicaid application status and any other needs. SW remains available and will continue to follow, providing psychosocial support, education and assistance as needed.

## 2022-05-04 NOTE — TELEPHONE ENCOUNTER
SW received a call from [facility] who stated pt's insurance bounced back as no coverage. SW explained that pt's insurance was dropped over the weekend and he currently has no insurance coverage and he is Medicaid pending for [place] and [place].     Pt unable to receive home health without insurance coverage. Pt will likely still not be able to receive home health with medicaid.     Coordinator notified.    SW remains available.

## 2022-05-05 ENCOUNTER — CONFERENCE (OUTPATIENT)
Dept: TRANSPLANT | Facility: CLINIC | Age: 57
End: 2022-05-05
Payer: MEDICAID

## 2022-05-06 ENCOUNTER — LAB VISIT (OUTPATIENT)
Dept: LAB | Facility: HOSPITAL | Age: 57
End: 2022-05-06
Attending: SURGERY
Payer: MEDICAID

## 2022-05-06 DIAGNOSIS — Z94.4 S/P LIVER TRANSPLANT: Primary | ICD-10-CM

## 2022-05-06 DIAGNOSIS — Z94.4 LIVER REPLACED BY TRANSPLANT: ICD-10-CM

## 2022-05-06 LAB
ALBUMIN SERPL BCP-MCNC: 3 G/DL (ref 3.5–5.2)
ALP SERPL-CCNC: 83 U/L (ref 55–135)
ALT SERPL W/O P-5'-P-CCNC: 27 U/L (ref 10–44)
ANION GAP SERPL CALC-SCNC: 10 MMOL/L (ref 8–16)
AST SERPL-CCNC: 11 U/L (ref 10–40)
BASOPHILS # BLD AUTO: 0.14 K/UL (ref 0–0.2)
BASOPHILS NFR BLD: 1 % (ref 0–1.9)
BILIRUB DIRECT SERPL-MCNC: 0.8 MG/DL (ref 0.1–0.3)
BILIRUB SERPL-MCNC: 1.1 MG/DL (ref 0.1–1)
BUN SERPL-MCNC: 30 MG/DL (ref 6–20)
CALCIUM SERPL-MCNC: 8.9 MG/DL (ref 8.7–10.5)
CHLORIDE SERPL-SCNC: 94 MMOL/L (ref 95–110)
CO2 SERPL-SCNC: 33 MMOL/L (ref 23–29)
CREAT SERPL-MCNC: 1.4 MG/DL (ref 0.5–1.4)
DIFFERENTIAL METHOD: ABNORMAL
EOSINOPHIL # BLD AUTO: 0.2 K/UL (ref 0–0.5)
EOSINOPHIL NFR BLD: 1.5 % (ref 0–8)
ERYTHROCYTE [DISTWIDTH] IN BLOOD BY AUTOMATED COUNT: 18.2 % (ref 11.5–14.5)
EST. GFR  (AFRICAN AMERICAN): >60 ML/MIN/1.73 M^2
EST. GFR  (NON AFRICAN AMERICAN): 56.2 ML/MIN/1.73 M^2
GLUCOSE SERPL-MCNC: 190 MG/DL (ref 70–110)
HCT VFR BLD AUTO: 25.1 % (ref 40–54)
HGB BLD-MCNC: 8.1 G/DL (ref 14–18)
IMM GRANULOCYTES # BLD AUTO: 0.3 K/UL (ref 0–0.04)
IMM GRANULOCYTES NFR BLD AUTO: 2.2 % (ref 0–0.5)
LYMPHOCYTES # BLD AUTO: 1.6 K/UL (ref 1–4.8)
LYMPHOCYTES NFR BLD: 11.6 % (ref 18–48)
MCH RBC QN AUTO: 29.5 PG (ref 27–31)
MCHC RBC AUTO-ENTMCNC: 32.3 G/DL (ref 32–36)
MCV RBC AUTO: 91 FL (ref 82–98)
MONOCYTES # BLD AUTO: 1.3 K/UL (ref 0.3–1)
MONOCYTES NFR BLD: 9.7 % (ref 4–15)
NEUTROPHILS # BLD AUTO: 10 K/UL (ref 1.8–7.7)
NEUTROPHILS NFR BLD: 74 % (ref 38–73)
NRBC BLD-RTO: 0 /100 WBC
PLATELET # BLD AUTO: 297 K/UL (ref 150–450)
PMV BLD AUTO: 8.7 FL (ref 9.2–12.9)
POTASSIUM SERPL-SCNC: 4.3 MMOL/L (ref 3.5–5.1)
PROT SERPL-MCNC: 5.6 G/DL (ref 6–8.4)
RBC # BLD AUTO: 2.75 M/UL (ref 4.6–6.2)
SODIUM SERPL-SCNC: 137 MMOL/L (ref 136–145)
TACROLIMUS BLD-MCNC: 8.1 NG/ML (ref 5–15)
WBC # BLD AUTO: 13.46 K/UL (ref 3.9–12.7)

## 2022-05-06 PROCEDURE — 36415 COLL VENOUS BLD VENIPUNCTURE: CPT | Performed by: SURGERY

## 2022-05-06 PROCEDURE — 80197 ASSAY OF TACROLIMUS: CPT | Performed by: SURGERY

## 2022-05-06 PROCEDURE — 80053 COMPREHEN METABOLIC PANEL: CPT | Performed by: SURGERY

## 2022-05-06 PROCEDURE — 82248 BILIRUBIN DIRECT: CPT | Performed by: SURGERY

## 2022-05-06 PROCEDURE — 85025 COMPLETE CBC W/AUTO DIFF WBC: CPT | Performed by: SURGERY

## 2022-05-06 RX ORDER — OXYCODONE HYDROCHLORIDE 5 MG/1
5-10 TABLET ORAL EVERY 6 HOURS PRN
Qty: 50 TABLET | Refills: 0 | Status: SHIPPED | OUTPATIENT
Start: 2022-05-06 | End: 2022-05-07

## 2022-05-06 NOTE — TELEPHONE ENCOUNTER
Patient contacted and made aware his Txp labs have been reviewed and no action is required at this time with repeat labs requested Monday 5/9. patient verbalized understanding.    Pain med refill requested by patient, script routed to physician.    ----- Message from Tonio Smith MD sent at 5/6/2022  1:32 PM CDT -----  Results ok. No action needed

## 2022-05-07 DIAGNOSIS — Z94.4 S/P LIVER TRANSPLANT: ICD-10-CM

## 2022-05-07 RX ORDER — OXYCODONE HYDROCHLORIDE 5 MG/1
5-10 TABLET ORAL EVERY 6 HOURS PRN
Qty: 50 TABLET | Refills: 0 | Status: SHIPPED | OUTPATIENT
Start: 2022-05-07 | End: 2022-05-16 | Stop reason: SDUPTHER

## 2022-05-09 ENCOUNTER — LAB VISIT (OUTPATIENT)
Dept: LAB | Facility: HOSPITAL | Age: 57
End: 2022-05-09
Attending: SURGERY
Payer: MEDICAID

## 2022-05-09 ENCOUNTER — TELEPHONE (OUTPATIENT)
Dept: TRANSPLANT | Facility: CLINIC | Age: 57
End: 2022-05-09
Payer: MEDICAID

## 2022-05-09 ENCOUNTER — PATIENT MESSAGE (OUTPATIENT)
Dept: TRANSPLANT | Facility: CLINIC | Age: 57
End: 2022-05-09
Payer: MEDICAID

## 2022-05-09 DIAGNOSIS — Z94.4 LIVER REPLACED BY TRANSPLANT: ICD-10-CM

## 2022-05-09 LAB
ALBUMIN SERPL BCP-MCNC: 3.1 G/DL (ref 3.5–5.2)
ALP SERPL-CCNC: 94 U/L (ref 55–135)
ALT SERPL W/O P-5'-P-CCNC: 24 U/L (ref 10–44)
ANION GAP SERPL CALC-SCNC: 9 MMOL/L (ref 8–16)
AST SERPL-CCNC: 17 U/L (ref 10–40)
BASOPHILS # BLD AUTO: 0.19 K/UL (ref 0–0.2)
BASOPHILS NFR BLD: 1.5 % (ref 0–1.9)
BILIRUB DIRECT SERPL-MCNC: 0.7 MG/DL (ref 0.1–0.3)
BILIRUB SERPL-MCNC: 1.1 MG/DL (ref 0.1–1)
BUN SERPL-MCNC: 27 MG/DL (ref 6–20)
CALCIUM SERPL-MCNC: 8.9 MG/DL (ref 8.7–10.5)
CHLORIDE SERPL-SCNC: 94 MMOL/L (ref 95–110)
CO2 SERPL-SCNC: 33 MMOL/L (ref 23–29)
CREAT SERPL-MCNC: 1.3 MG/DL (ref 0.5–1.4)
DIFFERENTIAL METHOD: ABNORMAL
EOSINOPHIL # BLD AUTO: 0.3 K/UL (ref 0–0.5)
EOSINOPHIL NFR BLD: 2.5 % (ref 0–8)
ERYTHROCYTE [DISTWIDTH] IN BLOOD BY AUTOMATED COUNT: 18.6 % (ref 11.5–14.5)
EST. GFR  (AFRICAN AMERICAN): >60 ML/MIN/1.73 M^2
EST. GFR  (NON AFRICAN AMERICAN): >60 ML/MIN/1.73 M^2
GLUCOSE SERPL-MCNC: 139 MG/DL (ref 70–110)
HCT VFR BLD AUTO: 25.5 % (ref 40–54)
HGB BLD-MCNC: 8.5 G/DL (ref 14–18)
IMM GRANULOCYTES # BLD AUTO: 0.21 K/UL (ref 0–0.04)
IMM GRANULOCYTES NFR BLD AUTO: 1.6 % (ref 0–0.5)
LYMPHOCYTES # BLD AUTO: 1.8 K/UL (ref 1–4.8)
LYMPHOCYTES NFR BLD: 13.7 % (ref 18–48)
MCH RBC QN AUTO: 29.8 PG (ref 27–31)
MCHC RBC AUTO-ENTMCNC: 33.3 G/DL (ref 32–36)
MCV RBC AUTO: 90 FL (ref 82–98)
MONOCYTES # BLD AUTO: 1.2 K/UL (ref 0.3–1)
MONOCYTES NFR BLD: 9.4 % (ref 4–15)
NEUTROPHILS # BLD AUTO: 9.3 K/UL (ref 1.8–7.7)
NEUTROPHILS NFR BLD: 71.3 % (ref 38–73)
NRBC BLD-RTO: 0 /100 WBC
PLATELET # BLD AUTO: 349 K/UL (ref 150–450)
PMV BLD AUTO: 8.5 FL (ref 9.2–12.9)
POCT GLUCOSE: 187 MG/DL (ref 70–110)
POCT GLUCOSE: 317 MG/DL (ref 70–110)
POTASSIUM SERPL-SCNC: 4.6 MMOL/L (ref 3.5–5.1)
PROT SERPL-MCNC: 6 G/DL (ref 6–8.4)
RBC # BLD AUTO: 2.85 M/UL (ref 4.6–6.2)
SODIUM SERPL-SCNC: 136 MMOL/L (ref 136–145)
TACROLIMUS BLD-MCNC: 5.9 NG/ML (ref 5–15)
WBC # BLD AUTO: 13.07 K/UL (ref 3.9–12.7)

## 2022-05-09 PROCEDURE — 85025 COMPLETE CBC W/AUTO DIFF WBC: CPT | Performed by: SURGERY

## 2022-05-09 PROCEDURE — 80053 COMPREHEN METABOLIC PANEL: CPT | Performed by: SURGERY

## 2022-05-09 PROCEDURE — 80197 ASSAY OF TACROLIMUS: CPT | Performed by: SURGERY

## 2022-05-09 PROCEDURE — 82248 BILIRUBIN DIRECT: CPT | Performed by: SURGERY

## 2022-05-09 PROCEDURE — 36415 COLL VENOUS BLD VENIPUNCTURE: CPT | Performed by: SURGERY

## 2022-05-09 NOTE — TELEPHONE ENCOUNTER
Pt notified via portal of stable labs and that no medication changes are needed. Repeat labs due 5/12/22 per protocol.   ----- Message from Tonio Smith MD sent at 5/9/2022  2:12 PM CDT -----  Results ok. No action needed

## 2022-05-10 ENCOUNTER — OFFICE VISIT (OUTPATIENT)
Dept: TRANSPLANT | Facility: CLINIC | Age: 57
End: 2022-05-10
Payer: MEDICAID

## 2022-05-10 VITALS
WEIGHT: 221.31 LBS | RESPIRATION RATE: 16 BRPM | HEART RATE: 90 BPM | BODY MASS INDEX: 34.73 KG/M2 | OXYGEN SATURATION: 95 % | DIASTOLIC BLOOD PRESSURE: 71 MMHG | SYSTOLIC BLOOD PRESSURE: 142 MMHG | TEMPERATURE: 97 F | HEIGHT: 67 IN

## 2022-05-10 DIAGNOSIS — Z79.60 LONG-TERM USE OF IMMUNOSUPPRESSANT MEDICATION: ICD-10-CM

## 2022-05-10 DIAGNOSIS — Z51.81 ENCOUNTER FOR THERAPEUTIC DRUG MONITORING: ICD-10-CM

## 2022-05-10 DIAGNOSIS — Z94.4 S/P LIVER TRANSPLANT: Primary | ICD-10-CM

## 2022-05-10 PROCEDURE — 99215 OFFICE O/P EST HI 40 MIN: CPT | Mod: 24,S$PBB,, | Performed by: TRANSPLANT SURGERY

## 2022-05-10 PROCEDURE — 3077F PR MOST RECENT SYSTOLIC BLOOD PRESSURE >= 140 MM HG: ICD-10-PCS | Mod: CPTII,,, | Performed by: TRANSPLANT SURGERY

## 2022-05-10 PROCEDURE — 3008F PR BODY MASS INDEX (BMI) DOCUMENTED: ICD-10-PCS | Mod: CPTII,,, | Performed by: TRANSPLANT SURGERY

## 2022-05-10 PROCEDURE — 3044F PR MOST RECENT HEMOGLOBIN A1C LEVEL <7.0%: ICD-10-PCS | Mod: CPTII,,, | Performed by: TRANSPLANT SURGERY

## 2022-05-10 PROCEDURE — 99215 PR OFFICE/OUTPT VISIT, EST, LEVL V, 40-54 MIN: ICD-10-PCS | Mod: 24,S$PBB,, | Performed by: TRANSPLANT SURGERY

## 2022-05-10 PROCEDURE — 99999 PR PBB SHADOW E&M-EST. PATIENT-LVL III: ICD-10-PCS | Mod: PBBFAC,,,

## 2022-05-10 PROCEDURE — 3078F DIAST BP <80 MM HG: CPT | Mod: CPTII,,, | Performed by: TRANSPLANT SURGERY

## 2022-05-10 PROCEDURE — 4010F ACE/ARB THERAPY RXD/TAKEN: CPT | Mod: CPTII,,, | Performed by: TRANSPLANT SURGERY

## 2022-05-10 PROCEDURE — 3078F PR MOST RECENT DIASTOLIC BLOOD PRESSURE < 80 MM HG: ICD-10-PCS | Mod: CPTII,,, | Performed by: TRANSPLANT SURGERY

## 2022-05-10 PROCEDURE — 1111F DSCHRG MED/CURRENT MED MERGE: CPT | Mod: CPTII,,, | Performed by: TRANSPLANT SURGERY

## 2022-05-10 PROCEDURE — 99999 PR PBB SHADOW E&M-EST. PATIENT-LVL III: CPT | Mod: PBBFAC,,,

## 2022-05-10 PROCEDURE — 1111F PR DISCHARGE MEDS RECONCILED W/ CURRENT OUTPATIENT MED LIST: ICD-10-PCS | Mod: CPTII,,, | Performed by: TRANSPLANT SURGERY

## 2022-05-10 PROCEDURE — 4010F PR ACE/ARB THEARPY RXD/TAKEN: ICD-10-PCS | Mod: CPTII,,, | Performed by: TRANSPLANT SURGERY

## 2022-05-10 PROCEDURE — 99213 OFFICE O/P EST LOW 20 MIN: CPT | Mod: PBBFAC

## 2022-05-10 PROCEDURE — 3044F HG A1C LEVEL LT 7.0%: CPT | Mod: CPTII,,, | Performed by: TRANSPLANT SURGERY

## 2022-05-10 PROCEDURE — 3077F SYST BP >= 140 MM HG: CPT | Mod: CPTII,,, | Performed by: TRANSPLANT SURGERY

## 2022-05-10 PROCEDURE — 3008F BODY MASS INDEX DOCD: CPT | Mod: CPTII,,, | Performed by: TRANSPLANT SURGERY

## 2022-05-10 NOTE — PROGRESS NOTES
Transplant Surgery  Liver Transplant Recipient Follow-up    Original Referring Physician: Irvin Sandhu  Current Corresponding Physician: Irvin Sandhu    Chief Complaint: Pelon is here for follow up of his liver transplant performed 4/24/2022 for the primary diagnosis (UNOS) of Cirrhosis: Other, Specify    ORGAN: LIVER  Whole or Partial: whole liver  Donor Type: donation after brain death  PHS Increased Risk: no  Donor CMV Status: Positive  Donor HCV Status: Negative  Donor HBcAb: Negative  Donor HBV MATHEUS: Negative  Donor HCV MATHEUS: Negative    Biliary Anastomosis: end to end  Arterial Anatomy: completely replaced circulation  IVC reconstruction: end to end ivc  Portal vein status: patent    Subjective:     History of Present Illness: He has had the following complications since transplant: none.  The noted complications are well controlled.    Interval History: Currently, he is doing well.  Current complaints include edema and sleep disturbance.  Pelon is here for management of his immunosuppression medication.    External provider notes reviewed: Yes    Review of Systems  Objective:     Physical Exam  Constitutional:       Appearance: He is well-developed.   HENT:      Head: Normocephalic and atraumatic.   Eyes:      Pupils: Pupils are equal, round, and reactive to light.   Neck:      Vascular: No JVD.   Cardiovascular:      Rate and Rhythm: Normal rate and regular rhythm.      Heart sounds: Normal heart sounds.   Pulmonary:      Effort: Pulmonary effort is normal.      Breath sounds: Normal breath sounds. No stridor.   Abdominal:      General: There is no distension.      Palpations: Abdomen is soft.      Tenderness: There is no abdominal tenderness.      Comments: Incision c/d/i   Musculoskeletal:         General: Normal range of motion.      Right lower leg: No edema.      Left lower leg: No edema.   Skin:     General: Skin is warm and dry.   Neurological:      Mental Status: He is alert and oriented to  person, place, and time.   Psychiatric:         Behavior: Behavior normal.       Lab Results   Component Value Date    BILITOT 1.1 (H) 05/09/2022    AST 17 05/09/2022    ALT 24 05/09/2022    ALKPHOS 94 05/09/2022    CREATININE 1.3 05/09/2022    ALBUMIN 3.1 (L) 05/09/2022     Lab Results   Component Value Date    WBC 13.07 (H) 05/09/2022    HGB 8.5 (L) 05/09/2022    HCT 25.5 (L) 05/09/2022    HCT 24 (L) 04/24/2022     05/09/2022     Lab Results   Component Value Date    TACROLIMUS 5.9 05/09/2022       Diagnostics:  The following labs have been reviewed: CBC  CMP  INR  TACROLIMUS LEVEL  The following radiology images have been independently reviewed and interpreted: Liver US    Assessment/Plan:          · S/P liver transplant.  · Edema improving; decrease lasix next week likely  · Chronic immunosuppressive medications for rejection prophylaxis at target.  Plan: no adjustment needed.  · Continue monitoring symptoms, labs and drug levels for drug-related toxicity and side effects.  · Incision: staples in place; wound clean, dry, and intact  · Femoral arterial line site: no complications evident    Additional testing to be completed according to Written Order Guidelines for Post-Liver and Combined Liver/Kidney Transplant Follow-up (LI-09)    Interpretation of tests and discussion of patient management involves all members of the multidisciplinary transplant team  Patient advised that it is recommended that all transplanted patients, and their close contacts and household members receive Covid vaccination.  MD VICTOR M Matthews Jr Patient Status  Functional Status: 90% - Able to carry on normal activity: minor symptoms of disease  Physical Capacity: No Limitations

## 2022-05-11 ENCOUNTER — PATIENT MESSAGE (OUTPATIENT)
Dept: RESEARCH | Facility: CLINIC | Age: 57
End: 2022-05-11
Payer: MEDICAID

## 2022-05-11 DIAGNOSIS — E11.9 TYPE 2 DIABETES MELLITUS WITHOUT COMPLICATION, WITHOUT LONG-TERM CURRENT USE OF INSULIN: Primary | ICD-10-CM

## 2022-05-11 RX ORDER — INSULIN ASPART 100 [IU]/ML
7 INJECTION, SOLUTION INTRAVENOUS; SUBCUTANEOUS
Qty: 30 ML | Refills: 0 | Status: SHIPPED | OUTPATIENT
Start: 2022-05-11 | End: 2022-05-25

## 2022-05-12 ENCOUNTER — PATIENT MESSAGE (OUTPATIENT)
Dept: TRANSPLANT | Facility: CLINIC | Age: 57
End: 2022-05-12
Payer: MEDICAID

## 2022-05-12 ENCOUNTER — LAB VISIT (OUTPATIENT)
Dept: LAB | Facility: HOSPITAL | Age: 57
End: 2022-05-12
Attending: SURGERY
Payer: MEDICAID

## 2022-05-12 ENCOUNTER — TELEPHONE (OUTPATIENT)
Dept: TRANSPLANT | Facility: CLINIC | Age: 57
End: 2022-05-12
Payer: MEDICAID

## 2022-05-12 DIAGNOSIS — Z94.4 LIVER REPLACED BY TRANSPLANT: ICD-10-CM

## 2022-05-12 LAB
ALBUMIN SERPL BCP-MCNC: 3.1 G/DL (ref 3.5–5.2)
ALP SERPL-CCNC: 90 U/L (ref 55–135)
ALT SERPL W/O P-5'-P-CCNC: 18 U/L (ref 10–44)
ANION GAP SERPL CALC-SCNC: 7 MMOL/L (ref 8–16)
AST SERPL-CCNC: 15 U/L (ref 10–40)
BASOPHILS # BLD AUTO: 0.12 K/UL (ref 0–0.2)
BASOPHILS NFR BLD: 1.3 % (ref 0–1.9)
BILIRUB DIRECT SERPL-MCNC: 0.6 MG/DL (ref 0.1–0.3)
BILIRUB SERPL-MCNC: 1 MG/DL (ref 0.1–1)
BUN SERPL-MCNC: 24 MG/DL (ref 6–20)
CALCIUM SERPL-MCNC: 9 MG/DL (ref 8.7–10.5)
CHLORIDE SERPL-SCNC: 93 MMOL/L (ref 95–110)
CO2 SERPL-SCNC: 34 MMOL/L (ref 23–29)
CREAT SERPL-MCNC: 1.4 MG/DL (ref 0.5–1.4)
DIFFERENTIAL METHOD: ABNORMAL
EOSINOPHIL # BLD AUTO: 0.3 K/UL (ref 0–0.5)
EOSINOPHIL NFR BLD: 2.9 % (ref 0–8)
ERYTHROCYTE [DISTWIDTH] IN BLOOD BY AUTOMATED COUNT: 18.4 % (ref 11.5–14.5)
EST. GFR  (AFRICAN AMERICAN): >60 ML/MIN/1.73 M^2
EST. GFR  (NON AFRICAN AMERICAN): 56.2 ML/MIN/1.73 M^2
GLUCOSE SERPL-MCNC: 133 MG/DL (ref 70–110)
HCT VFR BLD AUTO: 26.8 % (ref 40–54)
HGB BLD-MCNC: 8.6 G/DL (ref 14–18)
IMM GRANULOCYTES # BLD AUTO: 0.11 K/UL (ref 0–0.04)
IMM GRANULOCYTES NFR BLD AUTO: 1.2 % (ref 0–0.5)
LYMPHOCYTES # BLD AUTO: 1.4 K/UL (ref 1–4.8)
LYMPHOCYTES NFR BLD: 15.3 % (ref 18–48)
MCH RBC QN AUTO: 29.5 PG (ref 27–31)
MCHC RBC AUTO-ENTMCNC: 32.1 G/DL (ref 32–36)
MCV RBC AUTO: 92 FL (ref 82–98)
MONOCYTES # BLD AUTO: 0.8 K/UL (ref 0.3–1)
MONOCYTES NFR BLD: 8.5 % (ref 4–15)
NEUTROPHILS # BLD AUTO: 6.5 K/UL (ref 1.8–7.7)
NEUTROPHILS NFR BLD: 70.8 % (ref 38–73)
NRBC BLD-RTO: 0 /100 WBC
PLATELET # BLD AUTO: 292 K/UL (ref 150–450)
PMV BLD AUTO: 8.3 FL (ref 9.2–12.9)
POTASSIUM SERPL-SCNC: 4.8 MMOL/L (ref 3.5–5.1)
PROT SERPL-MCNC: 6.2 G/DL (ref 6–8.4)
RBC # BLD AUTO: 2.92 M/UL (ref 4.6–6.2)
SODIUM SERPL-SCNC: 134 MMOL/L (ref 136–145)
TACROLIMUS BLD-MCNC: 6.8 NG/ML (ref 5–15)
WBC # BLD AUTO: 9.23 K/UL (ref 3.9–12.7)

## 2022-05-12 PROCEDURE — 80197 ASSAY OF TACROLIMUS: CPT | Performed by: SURGERY

## 2022-05-12 PROCEDURE — 36415 COLL VENOUS BLD VENIPUNCTURE: CPT | Performed by: SURGERY

## 2022-05-12 PROCEDURE — 80053 COMPREHEN METABOLIC PANEL: CPT | Performed by: SURGERY

## 2022-05-12 PROCEDURE — 85025 COMPLETE CBC W/AUTO DIFF WBC: CPT | Performed by: SURGERY

## 2022-05-12 PROCEDURE — 82248 BILIRUBIN DIRECT: CPT | Performed by: SURGERY

## 2022-05-12 NOTE — TELEPHONE ENCOUNTER
Reviewed labs with Dr. Smith. Per MD, labs stable and no medication changes needed.    Pt advised. Repeat labs due 5/16/22.

## 2022-05-16 ENCOUNTER — LAB VISIT (OUTPATIENT)
Dept: LAB | Facility: HOSPITAL | Age: 57
End: 2022-05-16
Attending: SURGERY
Payer: MEDICAID

## 2022-05-16 ENCOUNTER — TELEPHONE (OUTPATIENT)
Dept: TRANSPLANT | Facility: CLINIC | Age: 57
End: 2022-05-16
Payer: MEDICAID

## 2022-05-16 DIAGNOSIS — Z94.4 LIVER REPLACED BY TRANSPLANT: ICD-10-CM

## 2022-05-16 DIAGNOSIS — Z94.4 S/P LIVER TRANSPLANT: ICD-10-CM

## 2022-05-16 LAB
ALBUMIN SERPL BCP-MCNC: 3.1 G/DL (ref 3.5–5.2)
ALP SERPL-CCNC: 83 U/L (ref 55–135)
ALT SERPL W/O P-5'-P-CCNC: 17 U/L (ref 10–44)
ANION GAP SERPL CALC-SCNC: 9 MMOL/L (ref 8–16)
AST SERPL-CCNC: 14 U/L (ref 10–40)
BASOPHILS # BLD AUTO: 0.1 K/UL (ref 0–0.2)
BASOPHILS NFR BLD: 1.2 % (ref 0–1.9)
BILIRUB DIRECT SERPL-MCNC: 0.5 MG/DL (ref 0.1–0.3)
BILIRUB SERPL-MCNC: 0.9 MG/DL (ref 0.1–1)
BUN SERPL-MCNC: 26 MG/DL (ref 6–20)
CALCIUM SERPL-MCNC: 8.7 MG/DL (ref 8.7–10.5)
CHLORIDE SERPL-SCNC: 96 MMOL/L (ref 95–110)
CO2 SERPL-SCNC: 28 MMOL/L (ref 23–29)
CREAT SERPL-MCNC: 1.2 MG/DL (ref 0.5–1.4)
DIFFERENTIAL METHOD: ABNORMAL
EOSINOPHIL # BLD AUTO: 0.3 K/UL (ref 0–0.5)
EOSINOPHIL NFR BLD: 3.2 % (ref 0–8)
ERYTHROCYTE [DISTWIDTH] IN BLOOD BY AUTOMATED COUNT: 18.6 % (ref 11.5–14.5)
EST. GFR  (AFRICAN AMERICAN): >60 ML/MIN/1.73 M^2
EST. GFR  (NON AFRICAN AMERICAN): >60 ML/MIN/1.73 M^2
GLUCOSE SERPL-MCNC: 179 MG/DL (ref 70–110)
HCT VFR BLD AUTO: 28.1 % (ref 40–54)
HGB BLD-MCNC: 9.3 G/DL (ref 14–18)
IMM GRANULOCYTES # BLD AUTO: 0.14 K/UL (ref 0–0.04)
IMM GRANULOCYTES NFR BLD AUTO: 1.7 % (ref 0–0.5)
LYMPHOCYTES # BLD AUTO: 1.3 K/UL (ref 1–4.8)
LYMPHOCYTES NFR BLD: 14.9 % (ref 18–48)
MCH RBC QN AUTO: 30.1 PG (ref 27–31)
MCHC RBC AUTO-ENTMCNC: 33.1 G/DL (ref 32–36)
MCV RBC AUTO: 91 FL (ref 82–98)
MONOCYTES # BLD AUTO: 0.8 K/UL (ref 0.3–1)
MONOCYTES NFR BLD: 9.6 % (ref 4–15)
NEUTROPHILS # BLD AUTO: 5.9 K/UL (ref 1.8–7.7)
NEUTROPHILS NFR BLD: 69.4 % (ref 38–73)
NRBC BLD-RTO: 0 /100 WBC
PLATELET # BLD AUTO: 214 K/UL (ref 150–450)
PMV BLD AUTO: 8.3 FL (ref 9.2–12.9)
POTASSIUM SERPL-SCNC: 4.2 MMOL/L (ref 3.5–5.1)
PROT SERPL-MCNC: 6.4 G/DL (ref 6–8.4)
RBC # BLD AUTO: 3.09 M/UL (ref 4.6–6.2)
SODIUM SERPL-SCNC: 133 MMOL/L (ref 136–145)
TACROLIMUS BLD-MCNC: 7.5 NG/ML (ref 5–15)
WBC # BLD AUTO: 8.47 K/UL (ref 3.9–12.7)

## 2022-05-16 PROCEDURE — 80197 ASSAY OF TACROLIMUS: CPT | Performed by: SURGERY

## 2022-05-16 PROCEDURE — 80053 COMPREHEN METABOLIC PANEL: CPT | Performed by: SURGERY

## 2022-05-16 PROCEDURE — 36415 COLL VENOUS BLD VENIPUNCTURE: CPT | Performed by: SURGERY

## 2022-05-16 PROCEDURE — 82248 BILIRUBIN DIRECT: CPT | Performed by: SURGERY

## 2022-05-16 PROCEDURE — 85025 COMPLETE CBC W/AUTO DIFF WBC: CPT | Performed by: SURGERY

## 2022-05-16 RX ORDER — OXYCODONE HYDROCHLORIDE 5 MG/1
5-10 TABLET ORAL EVERY 6 HOURS PRN
Qty: 50 TABLET | Refills: 0 | Status: ON HOLD | OUTPATIENT
Start: 2022-05-16 | End: 2022-06-03 | Stop reason: HOSPADM

## 2022-05-16 NOTE — TELEPHONE ENCOUNTER
Informed pt and wife labs reviewed, no med changes needed. Will be seen in transplant clinic tomorrow.       ----- Message from aSndrine Nelson MD sent at 5/16/2022  3:26 PM CDT -----  Results ok. No action needed.

## 2022-05-17 ENCOUNTER — HOSPITAL ENCOUNTER (OUTPATIENT)
Dept: RADIOLOGY | Facility: HOSPITAL | Age: 57
Discharge: HOME | End: 2022-05-17
Attending: SURGERY
Payer: MEDICAID

## 2022-05-17 ENCOUNTER — OFFICE VISIT (OUTPATIENT)
Dept: TRANSPLANT | Facility: CLINIC | Age: 57
End: 2022-05-17
Payer: MEDICAID

## 2022-05-17 VITALS
OXYGEN SATURATION: 96 % | HEART RATE: 89 BPM | RESPIRATION RATE: 16 BRPM | HEIGHT: 67 IN | WEIGHT: 216.5 LBS | BODY MASS INDEX: 33.98 KG/M2 | DIASTOLIC BLOOD PRESSURE: 73 MMHG | SYSTOLIC BLOOD PRESSURE: 145 MMHG | TEMPERATURE: 98 F

## 2022-05-17 DIAGNOSIS — Z79.60 LONG-TERM USE OF IMMUNOSUPPRESSANT MEDICATION: ICD-10-CM

## 2022-05-17 DIAGNOSIS — Z94.4 LIVER REPLACED BY TRANSPLANT: ICD-10-CM

## 2022-05-17 DIAGNOSIS — M62.838 MUSCLE SPASM: ICD-10-CM

## 2022-05-17 DIAGNOSIS — Z51.81 ENCOUNTER FOR THERAPEUTIC DRUG MONITORING: ICD-10-CM

## 2022-05-17 DIAGNOSIS — Z29.89 PROPHYLACTIC IMMUNOTHERAPY: ICD-10-CM

## 2022-05-17 DIAGNOSIS — Z91.89 AT RISK FOR OPPORTUNISTIC INFECTIONS: Primary | ICD-10-CM

## 2022-05-17 DIAGNOSIS — Z79.899 ENCOUNTER FOR LONG-TERM (CURRENT) USE OF OTHER MEDICATIONS: ICD-10-CM

## 2022-05-17 DIAGNOSIS — Z94.4 S/P LIVER TRANSPLANT: ICD-10-CM

## 2022-05-17 PROCEDURE — 99215 PR OFFICE/OUTPT VISIT, EST, LEVL V, 40-54 MIN: ICD-10-PCS | Mod: 24,S$PBB,, | Performed by: TRANSPLANT SURGERY

## 2022-05-17 PROCEDURE — 76705 US DOPPLER LIVER TRANSPLANT POST (XPD): ICD-10-PCS | Mod: 26,59,, | Performed by: RADIOLOGY

## 2022-05-17 PROCEDURE — 93976 VASCULAR STUDY: CPT | Mod: 26,,, | Performed by: RADIOLOGY

## 2022-05-17 PROCEDURE — 3078F DIAST BP <80 MM HG: CPT | Mod: CPTII,,, | Performed by: TRANSPLANT SURGERY

## 2022-05-17 PROCEDURE — 3077F SYST BP >= 140 MM HG: CPT | Mod: CPTII,,, | Performed by: TRANSPLANT SURGERY

## 2022-05-17 PROCEDURE — 3078F PR MOST RECENT DIASTOLIC BLOOD PRESSURE < 80 MM HG: ICD-10-PCS | Mod: CPTII,,, | Performed by: TRANSPLANT SURGERY

## 2022-05-17 PROCEDURE — 76705 ECHO EXAM OF ABDOMEN: CPT | Mod: 26,59,, | Performed by: RADIOLOGY

## 2022-05-17 PROCEDURE — 76705 ECHO EXAM OF ABDOMEN: CPT | Mod: 59,TC

## 2022-05-17 PROCEDURE — 1111F PR DISCHARGE MEDS RECONCILED W/ CURRENT OUTPATIENT MED LIST: ICD-10-PCS | Mod: CPTII,,, | Performed by: TRANSPLANT SURGERY

## 2022-05-17 PROCEDURE — 99999 PR PBB SHADOW E&M-EST. PATIENT-LVL IV: ICD-10-PCS | Mod: PBBFAC,,, | Performed by: TRANSPLANT SURGERY

## 2022-05-17 PROCEDURE — 4010F ACE/ARB THERAPY RXD/TAKEN: CPT | Mod: CPTII,,, | Performed by: TRANSPLANT SURGERY

## 2022-05-17 PROCEDURE — 99214 OFFICE O/P EST MOD 30 MIN: CPT | Mod: PBBFAC,25 | Performed by: TRANSPLANT SURGERY

## 2022-05-17 PROCEDURE — 3008F PR BODY MASS INDEX (BMI) DOCUMENTED: ICD-10-PCS | Mod: CPTII,,, | Performed by: TRANSPLANT SURGERY

## 2022-05-17 PROCEDURE — 1159F MED LIST DOCD IN RCRD: CPT | Mod: CPTII,,, | Performed by: TRANSPLANT SURGERY

## 2022-05-17 PROCEDURE — 93976 US DOPPLER LIVER TRANSPLANT POST (XPD): ICD-10-PCS | Mod: 26,,, | Performed by: RADIOLOGY

## 2022-05-17 PROCEDURE — 1111F DSCHRG MED/CURRENT MED MERGE: CPT | Mod: CPTII,,, | Performed by: TRANSPLANT SURGERY

## 2022-05-17 PROCEDURE — 99215 OFFICE O/P EST HI 40 MIN: CPT | Mod: 24,S$PBB,, | Performed by: TRANSPLANT SURGERY

## 2022-05-17 PROCEDURE — 3008F BODY MASS INDEX DOCD: CPT | Mod: CPTII,,, | Performed by: TRANSPLANT SURGERY

## 2022-05-17 PROCEDURE — 99999 PR PBB SHADOW E&M-EST. PATIENT-LVL IV: CPT | Mod: PBBFAC,,, | Performed by: TRANSPLANT SURGERY

## 2022-05-17 PROCEDURE — 93976 VASCULAR STUDY: CPT | Mod: TC

## 2022-05-17 PROCEDURE — 1159F PR MEDICATION LIST DOCUMENTED IN MEDICAL RECORD: ICD-10-PCS | Mod: CPTII,,, | Performed by: TRANSPLANT SURGERY

## 2022-05-17 PROCEDURE — 3044F HG A1C LEVEL LT 7.0%: CPT | Mod: CPTII,,, | Performed by: TRANSPLANT SURGERY

## 2022-05-17 PROCEDURE — 4010F PR ACE/ARB THEARPY RXD/TAKEN: ICD-10-PCS | Mod: CPTII,,, | Performed by: TRANSPLANT SURGERY

## 2022-05-17 PROCEDURE — 3044F PR MOST RECENT HEMOGLOBIN A1C LEVEL <7.0%: ICD-10-PCS | Mod: CPTII,,, | Performed by: TRANSPLANT SURGERY

## 2022-05-17 PROCEDURE — 3077F PR MOST RECENT SYSTOLIC BLOOD PRESSURE >= 140 MM HG: ICD-10-PCS | Mod: CPTII,,, | Performed by: TRANSPLANT SURGERY

## 2022-05-17 RX ORDER — METHOCARBAMOL 500 MG/1
500 TABLET, FILM COATED ORAL 3 TIMES DAILY PRN
Qty: 30 TABLET | Refills: 0 | Status: SHIPPED | OUTPATIENT
Start: 2022-05-17 | End: 2022-05-27

## 2022-05-17 NOTE — PROGRESS NOTES
STAPLE REMOVAL NOTE    Staples removed from liver transplant incision, steri-strips applied with Benzoin per Dr. Nelson's order.  Patient tolerated procedure well.  Skin dry and intact.  Patient instructed to shower with back to water spray and to pat dry incision and to let the steri-strips wear off on their own.  Patient instructed to report any redness, warmth, or drainage from incision to transplant coordinators.  All questions answered.

## 2022-05-17 NOTE — LETTER
May 17, 2022        Irvin Sandhu  [address]  [address]  [place] [address]  Phone: [phone]  Fax: [phone]             [facility]  [address]  [facility] [address]  Phone: [phone]   Patient: Pelon Vasquez   MR Number: [id number]   YOB: 1965   Date of Visit: 5/17/2022       Dear Dr. Irvin Sandhu    Thank you for referring Pelon Vasquez to me for evaluation. Attached you will find relevant portions of my assessment and plan of care.    If you have questions, please do not hesitate to call me. I look forward to following Pelon Vasquez along with you.    Sincerely,    Sandrine Nelson MD    Enclosure    If you would like to receive this communication electronically, please contact [email or web address] or [phone] to request [employer] access.    [employer] is a tool which provides read-only access to select patient information with whom you have a relationship. Its easy to use and provides real time access to review your patients record including encounter summaries, notes, results, and demographic information.    If you feel you have received this communication in error or would no longer like to receive these types of communications, please e-mail [email or web address]

## 2022-05-17 NOTE — PROGRESS NOTES
Transplant Surgery  Liver Transplant Recipient Follow-up    Original Referring Physician: Irvin Sandhu  Current Corresponding Physician: Irvin Sandhu    Chief Complaint: Pelon is here for follow up of his liver transplant performed 4/24/2022 for the primary diagnosis (UNOS) of Cirrhosis: Other, Specify    ORGAN: LIVER  Whole or Partial: whole liver  Donor Type: donation after brain death  PHS Increased Risk: no  Donor CMV Status: Positive  Donor HCV Status: Negative  Donor HBcAb: Negative  Donor HBV MATHEUS: Negative  Donor HCV MATHEUS: Negative    Biliary Anastomosis: end to end  Arterial Anatomy: completely replaced circulation  IVC reconstruction: end to end ivc  Portal vein status: patent    Subjective:     History of Present Illness: He has had the following complications since transplant: none.  The noted complications are well controlled.    Interval History: Currently, he is doing well.  Current complaints include none.  Pelon is here for management of his immunosuppression medication.    External provider notes reviewed: Yes    Review of Systems   Constitutional: Negative for activity change, appetite change, chills, diaphoresis, fatigue, fever and unexpected weight change.   HENT: Negative for congestion, dental problem, ear pain, facial swelling, mouth sores, nosebleeds, sore throat, tinnitus, trouble swallowing and voice change.    Eyes: Negative for photophobia, pain and visual disturbance.   Respiratory: Negative for apnea, cough, choking, chest tightness and shortness of breath.    Cardiovascular: Negative for chest pain, palpitations and leg swelling.   Gastrointestinal: Negative for abdominal distention, abdominal pain, blood in stool, constipation, diarrhea, nausea and vomiting.   Endocrine: Negative for cold intolerance and heat intolerance.   Genitourinary: Negative for difficulty urinating, dysuria, flank pain, hematuria and urgency.   Musculoskeletal: Negative for arthralgias and gait problem.    Skin: Negative for color change, pallor and rash.   Neurological: Negative for dizziness, tremors, seizures, syncope and light-headedness.   Hematological: Negative for adenopathy. Does not bruise/bleed easily.   Psychiatric/Behavioral: Negative for agitation and confusion.     Objective:     Physical Exam  Constitutional:       General: He is not in acute distress.     Appearance: He is well-developed.   HENT:      Head: Normocephalic and atraumatic.      Mouth/Throat:      Pharynx: No oropharyngeal exudate.   Eyes:      General: No scleral icterus.     Conjunctiva/sclera: Conjunctivae normal.      Pupils: Pupils are equal, round, and reactive to light.   Cardiovascular:      Rate and Rhythm: Normal rate and regular rhythm.      Heart sounds: Normal heart sounds.   Pulmonary:      Effort: Pulmonary effort is normal. No respiratory distress.      Breath sounds: Normal breath sounds.   Abdominal:      General: Bowel sounds are normal. There is no distension.      Palpations: Abdomen is soft.      Tenderness: There is no abdominal tenderness. There is no guarding or rebound.   Musculoskeletal:         General: Normal range of motion.      Cervical back: Normal range of motion and neck supple.   Lymphadenopathy:      Cervical: No cervical adenopathy.   Skin:     General: Skin is warm and dry.      Findings: No erythema or rash.   Neurological:      Mental Status: He is alert and oriented to person, place, and time.   Psychiatric:         Behavior: Behavior normal.       Lab Results   Component Value Date    BILITOT 0.9 05/16/2022    AST 14 05/16/2022    ALT 17 05/16/2022    ALKPHOS 83 05/16/2022    CREATININE 1.2 05/16/2022    ALBUMIN 3.1 (L) 05/16/2022     Lab Results   Component Value Date    WBC 8.47 05/16/2022    HGB 9.3 (L) 05/16/2022    HCT 28.1 (L) 05/16/2022    HCT 24 (L) 04/24/2022     05/16/2022     Lab Results   Component Value Date    TACROLIMUS 7.5 05/16/2022       Diagnostics:  The following labs  have been reviewed: CBC  CMP  PT  INR  TACROLIMUS LEVEL  The following radiology images have been independently reviewed and interpreted: Liver US    Assessment/Plan:          · S/P liver transplant.  · Chronic immunosuppressive medications for rejection prophylaxis at target.  Plan: no adjustment needed.  · Continue monitoring symptoms, labs and drug levels for drug-related toxicity and side effects.  · Incision: staples in place; wound clean, dry, and intact - remove today  · Femoral arterial line site: no complications evident   · Edema resolved. Stop lasix    Additional testing to be completed according to Written Order Guidelines for Post-Liver and Combined Liver/Kidney Transplant Follow-up (LI-09)    Interpretation of tests and discussion of patient management involves all members of the multidisciplinary transplant team  Patient advised that it is recommended that all transplanted patients, and their close contacts and household members receive Covid vaccination.  Sandrine Nelson MD       [facility] Patient Status  Functional Status: 60% - Requires occasional assistance but is able to care for needs  Physical Capacity: No Limitations

## 2022-05-18 ENCOUNTER — PATIENT MESSAGE (OUTPATIENT)
Dept: TRANSPLANT | Facility: CLINIC | Age: 57
End: 2022-05-18
Payer: MEDICAID

## 2022-05-18 LAB — FUNGUS SPEC CULT: NORMAL

## 2022-05-21 NOTE — DISCHARGE SUMMARY
DISCHARGE SUMMARY  Hospital Medicine    [place] [facility] [place]    Patient Name: Pelon Vasquez  YOB: 1965    Admit Date: 3/17/2022    Discharge Date: 3/24/22    Discharge Attending Physician: Indiana Dutton      Admitting Resident    Diagnoses:  Active Hospital Problems    Diagnosis  POA    Anemia of chronic disease [D63.8]  Yes    Type 2 diabetes mellitus without complication, without long-term current use of insulin [E11.9]  Yes      Resolved Hospital Problems    Diagnosis Date Resolved POA    *Hepatic encephalopathy [K72.90] 04/27/2022 Yes    Secondary esophageal varices without bleeding [I85.10] 04/27/2022 Yes     Chronic     EGD (2/25/22) showed moderate portal hypertensive gastropathy, small hiatal hernia, grade 1 esophageal varices      Alcoholic cirrhosis of liver with ascites [K70.31] 04/27/2022 Yes    Liver transplant candidate [Z76.82] 04/27/2022 Not Applicable    Hyperkalemia [E87.5] 03/22/2022 Yes    FAMILIA (acute kidney injury) [N17.9] 04/29/2022 Yes       Discharged Condition: admit problems have stabilized       HOSPITAL COURSE:      Initial Presentation:    56 y/o M with a PMH of cirrhosis c/b ascites who presents for liver transplant evaluation and renal failure.     Patient was seen in hepatology clinic this morning where he mentioned to staff that he was recently hospitalized for ascites as well as acute kidney failure and hyperkalemia. He was discharged yesterday and made his way to his hepatology appointment. Patient reports that he had not been feeling all to well and that his ascites reaccumulated hence his presentation to OSH. There he had a potassium of 7 and Cr of 2.3~. It was believed to be potassium supplementation + aldactone in the setting of worsening renal function that caused hyperkalemia. Furthermore, he saw his cardiologist due to questions if he needed LHC prior to being listed for transplant or not. Per their discussion, they would like his renal function to  return to his baseline before [facility]     Patient being admitted for hyperkalemia/FAMILIA/transplant w/u.       Course of Principle Problem for Admission:    Decompensation of cirrhosis of liver  MELD-Na score: 25 at 3/23/2022  4:30 AM  MELD score: 24 at 3/23/2022  4:30 AM  Calculated from:  Serum Creatinine: 1.8 mg/dL at 3/23/2022  4:30 AM  Serum Sodium: 136 mmol/L at 3/23/2022  4:30 AM  Total Bilirubin: 4.2 mg/dL at 3/23/2022  4:30 AM  INR(ratio): 1.8 at 3/23/2022  4:30 AM  Age: 57 years  Secondary to LOZANO vs alcohol. Patient presents with volume overload and FAMILIA despite taking diuretics as prescribed. Patient's abdomen remains soft but his legs are extremely edematous. Patient seen in hepatology clinic and referred to inpatient for transplant eval. Patient's wife reports that at OSH they had given him albumin due to his FAMILIA and his Cr downtreded from 2.4 to 2.1  - Hepatology following  - Responded to albumin challenge initially; however with further HRS treatment, patients Cr has plateaued. Currently continuing HRS treatment with albumin, midodrine, and octreotide  - Continue lactulose and rifaximin for HE  - Interventional cardiology consulted for [facility], currently held off secondary to patients Cr. Anesthesia requesting possible PET stress instead.  - Received paracentesis on 3/18 but fluid was not sent for analysis. Will empirically treat for SBP  - Volume - Patient with less LE edema but more abdominal distension from ascites since starting diuretics. Patient to go for paracentesis today. Continue lasix 40mg BID     FAMILIA (acute kidney injury)  Either from intravascular depletion vs HRS. Patient was taking lasix/aldactone prior to admission at OSH where he had FAMILIA + hyperK. Aldactone and Kcl supp were discontinued. Patient is volume overloaded but renal function still not at baseline. Urine microscopy showed WBCs possibly indicating AIN. Hyperkalemia resolved  - Continue HRS treatment as above  - Avoid nephrotoxic  medications            CONSULTS: hepatology     Last CBC/BMP/HgbA1c (if applicable):  Recent Results (from the past 336 hour(s))   CBC Auto Differential    Collection Time: 05/16/22  7:51 AM   Result Value Ref Range    WBC 8.47 3.90 - 12.70 K/uL    Hemoglobin 9.3 (L) 14.0 - 18.0 g/dL    Hematocrit 28.1 (L) 40.0 - 54.0 %    Platelets 214 150 - 450 K/uL   CBC Auto Differential    Collection Time: 05/12/22  8:26 AM   Result Value Ref Range    WBC 9.23 3.90 - 12.70 K/uL    Hemoglobin 8.6 (L) 14.0 - 18.0 g/dL    Hematocrit 26.8 (L) 40.0 - 54.0 %    Platelets 292 150 - 450 K/uL   CBC Auto Differential    Collection Time: 05/09/22  7:41 AM   Result Value Ref Range    WBC 13.07 (H) 3.90 - 12.70 K/uL    Hemoglobin 8.5 (L) 14.0 - 18.0 g/dL    Hematocrit 25.5 (L) 40.0 - 54.0 %    Platelets 349 150 - 450 K/uL     No results found for this or any previous visit (from the past 336 hour(s)).  Lab Results   Component Value Date    HGBA1C 6.6 (H) 04/19/2022     Disposition:    Home with Home Health       Future Scheduled Appointments:  Future Appointments   Date Time Provider Department Center   5/25/2022  1:30 PM DIABETES DISCHARGE CLINIC [facility] END[facility]   6/6/2022  3:30 PM Tito Mcclure MD [facility] LIVERT[facility]       Follow-up Plans from This Hospitalization:  Hepatology     Discharge Medication List:     Medication List      STOP taking these medications    acetaminophen 325 MG tablet  Commonly known as: TYLENOL     atorvastatin 20 MG tablet  Commonly known as: LIPITOR     lactulose 10 gram/15 mL solution  Commonly known as: CHRONULAC     ondansetron 4 MG Tbdl  Commonly known as: ZOFRAN-ODT     pantoprazole 40 MG tablet  Commonly known as: PROTONIX     triamcinolone acetonide 0.1% 0.1 % cream  Commonly known as: KENALOG     vitamin A palmitate 7,500 mcg (25,000 unit) Cap     XIFAXAN 550 mg Tab  Generic drug: rifAXIMin     zinc sulfate 50 mg zinc (220 mg) Tab tablet          Patient Instructions:  Discharge Procedure  Orders   Ambulatory referral/consult to Hepatology   Standing Status: Future   Referral Priority: Routine Referral Type: Consultation   Referral Reason: Specialty Services Required   Requested Specialty: Hepatology   Number of Visits Requested: 1     Cardiac PET Scan Stress   Standing Status: Future Standing Exp. Date: 03/23/23     Order Specific Question Answer Comments   Which medicaton for the stress procedure? Regadenoson    Release to patient Immediate        Signing Physician:  Indiana Dutton MD

## 2022-05-23 ENCOUNTER — TELEPHONE (OUTPATIENT)
Dept: TRANSPLANT | Facility: CLINIC | Age: 57
End: 2022-05-23
Payer: MEDICAID

## 2022-05-23 ENCOUNTER — PATIENT MESSAGE (OUTPATIENT)
Dept: ENDOCRINOLOGY | Facility: HOSPITAL | Age: 57
End: 2022-05-23
Payer: MEDICAID

## 2022-05-23 DIAGNOSIS — M62.838 MUSCLE SPASM: ICD-10-CM

## 2022-05-23 DIAGNOSIS — Z94.4 S/P LIVER TRANSPLANT: ICD-10-CM

## 2022-05-23 RX ORDER — METHOCARBAMOL 500 MG/1
500 TABLET, FILM COATED ORAL 3 TIMES DAILY PRN
Qty: 30 TABLET | Refills: 0 | Status: CANCELLED | OUTPATIENT
Start: 2022-05-23 | End: 2022-06-02

## 2022-05-23 RX ORDER — NYSTATIN 100000 [USP'U]/ML
500000 SUSPENSION ORAL
Qty: 250 ML | Refills: 0 | Status: CANCELLED | OUTPATIENT
Start: 2022-05-23

## 2022-05-23 NOTE — TELEPHONE ENCOUNTER
Returned call. Spoke with Leonarda. She will contact the Medicaid office for pt's new Medicaid card but provided her with pt's insurance in the system so she could give to the lab in [place].  ----- Message from Angelo Shanks sent at 5/23/2022 11:39 AM CDT -----  Regarding: olesya Brower , pt caregiver is calling to speak with nurse. Reason unspecified.    Contact:  [phone]

## 2022-05-24 DIAGNOSIS — Z94.4 LIVER REPLACED BY TRANSPLANT: ICD-10-CM

## 2022-05-24 RX ORDER — TACROLIMUS 1 MG/1
5 CAPSULE ORAL EVERY 12 HOURS
Qty: 300 CAPSULE | Refills: 11 | Status: SHIPPED | OUTPATIENT
Start: 2022-05-24 | End: 2022-10-06 | Stop reason: DRUGHIGH

## 2022-05-24 NOTE — TELEPHONE ENCOUNTER
Reviewed labs with Dr. Gan. Per MD, pt to increase tac to 5/5 and repeat labs Monday.    Spoke with pt's SO, Leonarda. Reviewed lab results, tac dose change to 5/5 and repeat labs needed Monday. She verbalized understanding.

## 2022-05-25 ENCOUNTER — TELEPHONE (OUTPATIENT)
Dept: TRANSPLANT | Facility: CLINIC | Age: 57
End: 2022-05-25
Payer: MEDICAID

## 2022-05-25 ENCOUNTER — CLINICAL SUPPORT (OUTPATIENT)
Dept: ENDOCRINOLOGY | Facility: CLINIC | Age: 57
End: 2022-05-25
Payer: MEDICAID

## 2022-05-25 DIAGNOSIS — E11.9 TYPE 2 DIABETES MELLITUS WITHOUT COMPLICATION, WITHOUT LONG-TERM CURRENT USE OF INSULIN: Primary | ICD-10-CM

## 2022-05-25 DIAGNOSIS — Z94.4 S/P LIVER TRANSPLANT: ICD-10-CM

## 2022-05-25 DIAGNOSIS — Z79.60 LONG-TERM USE OF IMMUNOSUPPRESSANT MEDICATION: ICD-10-CM

## 2022-05-25 LAB
FUNGUS SPEC CULT: NORMAL
FUNGUS SPEC CULT: NORMAL

## 2022-05-25 PROCEDURE — 99214 PR OFFICE/OUTPT VISIT, EST, LEVL IV, 30-39 MIN: ICD-10-PCS | Mod: 95,,, | Performed by: INTERNAL MEDICINE

## 2022-05-25 PROCEDURE — 99214 OFFICE O/P EST MOD 30 MIN: CPT | Mod: 95,,, | Performed by: INTERNAL MEDICINE

## 2022-05-25 RX ORDER — INSULIN ASPART 100 [IU]/ML
INJECTION, SOLUTION INTRAVENOUS; SUBCUTANEOUS
Qty: 30 ML | Refills: 0 | Status: ON HOLD | OUTPATIENT
Start: 2022-05-25 | End: 2022-06-03 | Stop reason: SDUPTHER

## 2022-05-25 NOTE — TELEPHONE ENCOUNTER
Spoke with  at [facility]. HIV, HBV and HCV were drawn and are pending from 5/23/22.

## 2022-05-25 NOTE — ASSESSMENT & PLAN NOTE
Novolog 9 units with meals (Hold if BG < 100 mg/dL) + sliding scale   Changed to MDC due to prandial excursions    150 - 200 + 2 unit    201 - 250 + 4 units    251 - 300 + 6 units    301 - 350 + 8 units       > 350   + 10 units  Patient to decrease scheduled Novolog to 4 units with meals when Prednisone taper is complete.   Refer to diabetes education  Will likely require oral/ non insulin medications to achieve glucose control post steroid taper.

## 2022-05-25 NOTE — PROGRESS NOTES
Subjective:      Patient ID: Pelon Vasquez is a 57 y.o. male.    Chief Complaint:    No chief complaint on file.    The patient location is: [place], [place]  The chief complaint leading to consultation is: Hospital follow-up glycemic management.     Visit type: audiovisual    Face to Face time with patient:  30 minutes of total time spent on the encounter, which includes face to face time and non-face to face time preparing to see the patient (eg, review of tests), Obtaining and/or reviewing separately obtained history, Documenting clinical information in the electronic or other health record, Independently interpreting results (not separately reported) and communicating results to the patient/family/caregiver, or Care coordination (not separately reported).     Each patient to whom he or she provides medical services by telemedicine is:  (1) informed of the relationship between the physician and patient and the respective role of any other health care provider with respect to management of the patient; and (2) notified that he or she may decline to receive medical services by telemedicine and may withdraw from such care at any time.      History of Present Illness  This is a follow up visit after recent hospitalization  Endocrinology was consulted for management of hyperglycemia. Consult was documented as follow:  Admit Date: 4/19/2022      Reason for Consult: Management of T2DM, Hyperglycemia      Surgical Procedure and Date: Liver Transplant 4/24/22     Diabetes diagnosis year: 6 years ago     Home Diabetes Medications:  none currently (took Metformin in the past and could not tolerate side effects)      How often checking glucose at home?  Once daily    BG readings on regimen: < 150  Hypoglycemia on the regimen?  No  Missed doses on regimen? n/a     Diabetes Complications include:     CKD      Complicating diabetes co morbidities:   CKD, Cirrhosis, Obesity, BRAYDON, Glucocorticoid Use         HPI:   Patient is a 57 y.o.  "male with a diagnosis of EtOH/LOZANO cirrhosis, esophageal varices, obesity (BMI 42), CKD, HTN, DM2, and BRAYDON presenting for liver transplant. He is s/p diagnostic paracentesis on 4/22 with 4800mL of fluid removed. Patient currently has FAMILIA likely in setting of vancomycin toxicity, per Nephrology note. Plan for intraoperative dialysis. Patient now presents for the above procedure(s). Endocrinology consulted for management of T2DM.     Lab Results   Component Value Date    HGBA1C 6.6 (H) 04/19/2022     Type 2 diabetes mellitus without complication, without long-term current use of insulin  BG goal 140 - 180      - Continue Novolog to 10 units TID with meals (0.5 u/kg dosing) Prandial BG excursions noted on steroid therapy.   - Moderate Dose Correction Scale  - BG monitoring ac/hs  - Bedside nurse to instruct on insulin pen use and blood sugar monitor. Have patient administer own injections after education completed supervised by nurse. Have patient watch insulin educatoin video's via i-pad if available on unit.     ** Please call Endocrine for any BG related issues **  ** Please notify Endocrine for any change and/or advance in diet**           Lab Results   Component Value Date     HGBA1C 6.6 (H) 04/19/2022         Discharge Planning:  - Insurance preferred diabetes testing supplies  - Ultra-Fine Oliva Pen Needles 4mm x 32 G (5/32" x 0.23mm).   - Novolog 7 units TIDWM in addition to the following correction scale:     150 - 200 + 1 unit  201 - 250 + 2 units  251 - 300 + 3 units  301 - 350 + 4 units   > 350   + 5 units  - Patient will need to keep blood sugar logs, and follow-up with [facility] endocr[facility]ology discharge clinic.        Etiology of the hyperglycemia: known T2DM, steroid induced hyperglycemia   Discharge Date: 04/30/2022  Home Glucocorticoid Regimen: 20mg 4/30-5/6, 15mg 5/7-5/13, 10mg 5/14-5/20, 5mg 5/21-5/27. STOP 5/28/22  Discharge DM Regimen: See above  Was able to fill prescription for medications and supplies. " YES     Missed doses?  No    Prior medications not tolerated or Failed treatments: Metformin (could not tolerate side effects)    Current Home DM management after discharge:  Increase Novolog (fast acting insulin) to 9 units WITH MEALS in addition to the following correction scale:  150 - 200 + 1 unit  201 - 250 + 2 units  251 - 300 + 3 units  301 - 350 + 4 units   > 350   + 5 units    # times a day testing 4  Log reviewed: yes - see picture (Recent BG logs from 5/24/2022)    AM BG- 135; 149  Lunch- 251; 129  Dinner 146    Hypoglycemic event at home? None       Typical meals at home:   Breakfast- Scrambled eggs; seasonings; diana; biscuit or toast with jelly   Lunch- Grilled chicken or peanut butter sandwich   Dinner- Taco salad; grilled chicken    Snacks- sugar-free pops     *30-40 grams of carbohydrates     DM Education - last visit: N/A     With regards to reason for admit:  Doing better     Review of Systems   Constitutional: Negative for weight changes.  Eyes: Positive for visual disturbance.  Respiratory: Negative for cough.   Cardiovascular: Negative for chest pain.  Gastrointestinal: Negative for nausea.  Endocrine: Negative for polyuria, polydipsia.  Musculoskeletal: Negative for back pain.  Skin: Negative for rash.  Neurological: Negative for syncope.  Psychiatric/Behavioral: Negative for depression.    Objective:   Physical Exam   Physical exam deferred due to physical  There is no height or weight on file to calculate BMI.    Lab Review:   Lab Results   Component Value Date    HGBA1C 6.6 (H) 04/19/2022     No results found for: CHOL, HDL, LDLCALC, TRIG, CHOLHDL  Lab Results   Component Value Date     05/23/2022    K 4.6 05/23/2022     05/23/2022    CO2 30 05/23/2022     (H) 05/16/2022    BUN 27 (H) 05/23/2022    CREATININE 1.04 05/23/2022    CALCIUM 8.9 05/23/2022    PROT 6.4 05/16/2022    ALBUMIN 3.1 (L) 05/23/2022    BILITOT 0.9 05/16/2022    ALKPHOS 66 05/23/2022    AST 15  05/23/2022    ALT 20 05/23/2022    ANIONGAP 9 05/16/2022    ESTGFRAFRICA >60 05/23/2022    EGFRNONAA >60 05/23/2022       Assessment and Plan   Reviewed goals of therapy are to get the best control we can without hypoglycemia    Refer to education    Reviewed patient's current insulin regimen. Clarified proper insulin dose and timing in relation to meals, etc. Insulin injection sites and proper rotation instructed.       -Advised frequent self blood glucose monitoring.  Patient encouraged to document glucose results and bring them to every clinic visit      -Hypoglycemia precautions discussed. Instructed on precautions before driving.      -Close adherence to lifestyle changes recommended.      -Periodic follow ups for eye evaluations, foot care and dental care suggested.      Problem List Items Addressed This Visit        Endocrine    Type 2 diabetes mellitus without complication, without long-term current use of insulin    Current Assessment & Plan     Novolog 9 units with meals (Hold if BG < 100 mg/dL) + sliding scale   Changed to MDC due to prandial excursions    150 - 200 + 2 unit    201 - 250 + 4 units    251 - 300 + 6 units    301 - 350 + 8 units       > 350   + 10 units  Patient to decrease scheduled Novolog to 4 units with meals when Prednisone taper is complete.   Refer to diabetes education  Will likely require oral/ non insulin medications to achieve glucose control post steroid taper.               GI    S/P liver transplant    Current Assessment & Plan     Managed per primary team  Avoid hypoglycemia  Optimize BG control to improve wound healing                Palliative Care    Long-term use of immunosuppressant medication    Current Assessment & Plan     On steroid therapy per transplant team; may elevate BG readings  See decreased insulin dosing instructions when prednisone is stopped on 5/28.                  Allen Corrales DNP, FNP-C  Department of Endocrinology  Inpatient Glycemic Management

## 2022-05-25 NOTE — ASSESSMENT & PLAN NOTE
On steroid therapy per transplant team; may elevate BG readings  See decreased insulin dosing instructions when prednisone is stopped on 5/28.

## 2022-05-27 LAB
EXT HIV RNA QUANT PCR: NOT DETECTED
HEP B DNA, QUANT, IU/ML: NOT DETECTED
HEPATITIS C VIRUS RNA: NOT DETECTED

## 2022-05-29 ENCOUNTER — NURSE TRIAGE (OUTPATIENT)
Dept: ADMINISTRATIVE | Facility: CLINIC | Age: 57
End: 2022-05-29
Payer: MEDICAID

## 2022-05-29 NOTE — TELEPHONE ENCOUNTER
Dr. Cornelius calling to give report to On call provider with Liver transplant.  Dr. Cornelius connected with Dr. Jimmie Leon.  No further assistance needed from nurse on call line.

## 2022-05-29 NOTE — TELEPHONE ENCOUNTER
Patient is a liver transplant patient from 5 weeks ago.  He states he is having severe 10/10 pain near his incision.  States he can't eat due to the pain.  Advised patient to go to ER for further evaluation.   Patient verbalized understanding.  Contact information given for Care Coordination.  Patient going to [facility] in [place].     Reason for Disposition   Severe pain in the incision    Additional Information   Negative: [1] Major abdominal surgical incision AND [2] wound gaping open AND [3] visible internal organs   Negative: Sounds like a life-threatening emergency to the triager   Negative: [1] Bleeding from incision AND [2] won't stop after 10 minutes of direct pressure   Negative: [1] Widespread rash AND [2] bright red, sunburn-like    Protocols used: POST-OP INCISION SYMPTOMS AND VYROMFFIX-H-LK

## 2022-05-30 ENCOUNTER — TELEPHONE (OUTPATIENT)
Dept: TRANSPLANT | Facility: CLINIC | Age: 57
End: 2022-05-30
Payer: MEDICAID

## 2022-05-30 DIAGNOSIS — Z94.4 S/P LIVER TRANSPLANT: ICD-10-CM

## 2022-05-30 RX ORDER — OXYCODONE HYDROCHLORIDE 5 MG/1
5-10 TABLET ORAL EVERY 6 HOURS PRN
Qty: 50 TABLET | Refills: 0 | Status: CANCELLED | OUTPATIENT
Start: 2022-05-30

## 2022-05-30 NOTE — TELEPHONE ENCOUNTER
"Writer reached out to patient to see his status post weekend ER visit.    Patient states he is still having the same amount of pain he was having that brought him to the ER, specific to "golf ball size" lumps located to his lower right side abdomen lower part of incision and the exact same area but top his back is another lump.    Patient denies fever, states his BP's have gone up a little to range 160's/80's.  Patient asked for refill on pain meds.      Writer requested patient come in and be seen this afternoon by surgeon (as Tuesday surgery clinic moved to Monday afternoon). Patient states he cannot make it down today for evaluation.    Patient also stated he did go to do his labs this am however his laboratory was closed in observance for memorial day weekend but that he will report tomorrow morning for his labs.    Writer will discuss with surgeon today patients status.  "

## 2022-05-30 NOTE — LETTER
May 31, 2022             To [facility] Medical Records:    Please send a disc of CT our mutual patient had done over this past weekend for our surgeons review. He is a liver transplant recipient that came to [facility] ER for abdominal pain, we have been able to access the report but would like a disc of the actual imaging please.    RE: Pelon Vasquez   MRN: [id number]   SEX male   : 1965     Insurance:   Payer/Plan Subscr  Sex Relation Sub. Ins. ID Effective Group Num   1. [facility]* PELON VASQUEZ 1965 Male Self [id number]                                    [address] [address]   2. MEDICAID - * PELON VASQUEZ 1965 Male Self [id number] [facility]                                   [address] [address]       Sincerely,   Leonela Fernandez BSN, RN        [facility]  [address] [place] [id number]  [phone]

## 2022-05-31 ENCOUNTER — HOSPITAL ENCOUNTER (OUTPATIENT)
Facility: HOSPITAL | Age: 57
Discharge: HOME | End: 2022-06-03
Attending: EMERGENCY MEDICINE | Admitting: TRANSPLANT SURGERY
Payer: MEDICAID

## 2022-05-31 ENCOUNTER — TELEPHONE (OUTPATIENT)
Dept: DIABETES | Facility: CLINIC | Age: 57
End: 2022-05-31
Payer: MEDICAID

## 2022-05-31 DIAGNOSIS — Z94.4 S/P LIVER TRANSPLANT: ICD-10-CM

## 2022-05-31 DIAGNOSIS — K59.00 CONSTIPATION, UNSPECIFIED CONSTIPATION TYPE: ICD-10-CM

## 2022-05-31 DIAGNOSIS — E11.9 TYPE 2 DIABETES MELLITUS WITHOUT COMPLICATION, WITHOUT LONG-TERM CURRENT USE OF INSULIN: ICD-10-CM

## 2022-05-31 DIAGNOSIS — Z79.60 LONG-TERM USE OF IMMUNOSUPPRESSANT MEDICATION: ICD-10-CM

## 2022-05-31 DIAGNOSIS — Z29.89 PROPHYLACTIC IMMUNOTHERAPY: ICD-10-CM

## 2022-05-31 DIAGNOSIS — R10.11 RIGHT UPPER QUADRANT ABDOMINAL PAIN: Primary | ICD-10-CM

## 2022-05-31 DIAGNOSIS — D63.8 ANEMIA OF CHRONIC DISEASE: ICD-10-CM

## 2022-05-31 DIAGNOSIS — D69.6 THROMBOCYTOPENIA, UNSPECIFIED: ICD-10-CM

## 2022-05-31 DIAGNOSIS — I10 ESSENTIAL HYPERTENSION: ICD-10-CM

## 2022-05-31 DIAGNOSIS — Z91.89 AT RISK FOR OPPORTUNISTIC INFECTIONS: ICD-10-CM

## 2022-05-31 PROBLEM — R10.12 LEFT UPPER QUADRANT ABDOMINAL PAIN: Status: ACTIVE | Noted: 2022-05-31

## 2022-05-31 LAB
ABO + RH BLD: NORMAL
ALBUMIN SERPL BCP-MCNC: 3.4 G/DL (ref 3.5–5.2)
ALP SERPL-CCNC: 64 U/L (ref 55–135)
ALT SERPL W/O P-5'-P-CCNC: 11 U/L (ref 10–44)
ANION GAP SERPL CALC-SCNC: 9 MMOL/L (ref 8–16)
AST SERPL-CCNC: 11 U/L (ref 10–40)
BACTERIA #/AREA URNS AUTO: NORMAL /HPF
BASOPHILS # BLD AUTO: 0.04 K/UL (ref 0–0.2)
BASOPHILS NFR BLD: 0.7 % (ref 0–1.9)
BILIRUB SERPL-MCNC: 0.8 MG/DL (ref 0.1–1)
BILIRUB UR QL STRIP: NEGATIVE
BLD GP AB SCN CELLS X3 SERPL QL: NORMAL
BUN SERPL-MCNC: 22 MG/DL (ref 6–20)
CALCIUM SERPL-MCNC: 9.1 MG/DL (ref 8.7–10.5)
CHLORIDE SERPL-SCNC: 100 MMOL/L (ref 95–110)
CLARITY UR REFRACT.AUTO: CLEAR
CO2 SERPL-SCNC: 25 MMOL/L (ref 23–29)
COLOR UR AUTO: ABNORMAL
CREAT SERPL-MCNC: 1.2 MG/DL (ref 0.5–1.4)
CTP QC/QA: YES
DIFFERENTIAL METHOD: ABNORMAL
EOSINOPHIL # BLD AUTO: 0.1 K/UL (ref 0–0.5)
EOSINOPHIL NFR BLD: 2 % (ref 0–8)
ERYTHROCYTE [DISTWIDTH] IN BLOOD BY AUTOMATED COUNT: 15.8 % (ref 11.5–14.5)
EST. GFR  (AFRICAN AMERICAN): >60 ML/MIN/1.73 M^2
EST. GFR  (NON AFRICAN AMERICAN): >60 ML/MIN/1.73 M^2
GLUCOSE SERPL-MCNC: 117 MG/DL (ref 70–110)
GLUCOSE UR QL STRIP: NEGATIVE
HCT VFR BLD AUTO: 27.7 % (ref 40–54)
HGB BLD-MCNC: 9.4 G/DL (ref 14–18)
HGB UR QL STRIP: ABNORMAL
IMM GRANULOCYTES # BLD AUTO: 0.02 K/UL (ref 0–0.04)
IMM GRANULOCYTES NFR BLD AUTO: 0.4 % (ref 0–0.5)
KETONES UR QL STRIP: NEGATIVE
LACTATE SERPL-SCNC: 1 MMOL/L (ref 0.5–2.2)
LEUKOCYTE ESTERASE UR QL STRIP: NEGATIVE
LIPASE SERPL-CCNC: 11 U/L (ref 4–60)
LYMPHOCYTES # BLD AUTO: 0.7 K/UL (ref 1–4.8)
LYMPHOCYTES NFR BLD: 12.6 % (ref 18–48)
MCH RBC QN AUTO: 29.7 PG (ref 27–31)
MCHC RBC AUTO-ENTMCNC: 33.9 G/DL (ref 32–36)
MCV RBC AUTO: 88 FL (ref 82–98)
MICROSCOPIC COMMENT: NORMAL
MONOCYTES # BLD AUTO: 0.5 K/UL (ref 0.3–1)
MONOCYTES NFR BLD: 8 % (ref 4–15)
NEUTROPHILS # BLD AUTO: 4.3 K/UL (ref 1.8–7.7)
NEUTROPHILS NFR BLD: 76.3 % (ref 38–73)
NITRITE UR QL STRIP: NEGATIVE
NRBC BLD-RTO: 0 /100 WBC
PH UR STRIP: 6 [PH] (ref 5–8)
PLATELET # BLD AUTO: 148 K/UL (ref 150–450)
PMV BLD AUTO: 8 FL (ref 9.2–12.9)
POCT GLUCOSE: 109 MG/DL (ref 70–110)
POCT GLUCOSE: 127 MG/DL (ref 70–110)
POCT GLUCOSE: 161 MG/DL (ref 70–110)
POTASSIUM SERPL-SCNC: 5 MMOL/L (ref 3.5–5.1)
PROT SERPL-MCNC: 6.7 G/DL (ref 6–8.4)
PROT UR QL STRIP: NEGATIVE
RBC # BLD AUTO: 3.16 M/UL (ref 4.6–6.2)
RBC #/AREA URNS AUTO: 2 /HPF (ref 0–4)
SARS-COV-2 RDRP RESP QL NAA+PROBE: NEGATIVE
SODIUM SERPL-SCNC: 134 MMOL/L (ref 136–145)
SP GR UR STRIP: 1 (ref 1–1.03)
SQUAMOUS #/AREA URNS AUTO: 1 /HPF
TACROLIMUS BLD-MCNC: 25.9 NG/ML (ref 5–15)
URN SPEC COLLECT METH UR: ABNORMAL
WBC # BLD AUTO: 5.62 K/UL (ref 3.9–12.7)
WBC #/AREA URNS AUTO: 3 /HPF (ref 0–5)

## 2022-05-31 PROCEDURE — 99215 PR OFFICE/OUTPT VISIT, EST, LEVL V, 40-54 MIN: ICD-10-PCS | Mod: ,,,

## 2022-05-31 PROCEDURE — 25000003 PHARM REV CODE 250

## 2022-05-31 PROCEDURE — 99214 OFFICE O/P EST MOD 30 MIN: CPT | Mod: ,,, | Performed by: NURSE PRACTITIONER

## 2022-05-31 PROCEDURE — 63600175 PHARM REV CODE 636 W HCPCS: Performed by: PHYSICIAN ASSISTANT

## 2022-05-31 PROCEDURE — 99214 PR OFFICE/OUTPT VISIT, EST, LEVL IV, 30-39 MIN: ICD-10-PCS | Mod: ,,, | Performed by: NURSE PRACTITIONER

## 2022-05-31 PROCEDURE — 83690 ASSAY OF LIPASE: CPT | Performed by: PHYSICIAN ASSISTANT

## 2022-05-31 PROCEDURE — 83605 ASSAY OF LACTIC ACID: CPT | Performed by: PHYSICIAN ASSISTANT

## 2022-05-31 PROCEDURE — U0002 COVID-19 LAB TEST NON-CDC: HCPCS

## 2022-05-31 PROCEDURE — 80197 ASSAY OF TACROLIMUS: CPT | Performed by: PHYSICIAN ASSISTANT

## 2022-05-31 PROCEDURE — 86850 RBC ANTIBODY SCREEN: CPT | Performed by: PHYSICIAN ASSISTANT

## 2022-05-31 PROCEDURE — 85025 COMPLETE CBC W/AUTO DIFF WBC: CPT | Performed by: PHYSICIAN ASSISTANT

## 2022-05-31 PROCEDURE — 80053 COMPREHEN METABOLIC PANEL: CPT | Performed by: PHYSICIAN ASSISTANT

## 2022-05-31 PROCEDURE — G0378 HOSPITAL OBSERVATION PER HR: HCPCS

## 2022-05-31 PROCEDURE — 99285 EMERGENCY DEPT VISIT HI MDM: CPT | Mod: CS,,, | Performed by: PHYSICIAN ASSISTANT

## 2022-05-31 PROCEDURE — 99285 PR EMERGENCY DEPT VISIT,LEVEL V: ICD-10-PCS | Mod: CS,,, | Performed by: PHYSICIAN ASSISTANT

## 2022-05-31 PROCEDURE — 99215 OFFICE O/P EST HI 40 MIN: CPT | Mod: ,,,

## 2022-05-31 PROCEDURE — 25000003 PHARM REV CODE 250: Performed by: PHYSICIAN ASSISTANT

## 2022-05-31 PROCEDURE — 82962 GLUCOSE BLOOD TEST: CPT

## 2022-05-31 PROCEDURE — 25000003 PHARM REV CODE 250: Performed by: EMERGENCY MEDICINE

## 2022-05-31 PROCEDURE — 96375 TX/PRO/DX INJ NEW DRUG ADDON: CPT

## 2022-05-31 PROCEDURE — 81001 URINALYSIS AUTO W/SCOPE: CPT | Performed by: PHYSICIAN ASSISTANT

## 2022-05-31 PROCEDURE — 96361 HYDRATE IV INFUSION ADD-ON: CPT

## 2022-05-31 PROCEDURE — 96374 THER/PROPH/DIAG INJ IV PUSH: CPT

## 2022-05-31 PROCEDURE — 99285 EMERGENCY DEPT VISIT HI MDM: CPT | Mod: 25

## 2022-05-31 RX ORDER — INSULIN ASPART 100 [IU]/ML
0-5 INJECTION, SOLUTION INTRAVENOUS; SUBCUTANEOUS
Status: DISCONTINUED | OUTPATIENT
Start: 2022-05-31 | End: 2022-06-03 | Stop reason: HOSPADM

## 2022-05-31 RX ORDER — OXYCODONE HYDROCHLORIDE 10 MG/1
10 TABLET ORAL EVERY 4 HOURS PRN
Status: DISCONTINUED | OUTPATIENT
Start: 2022-05-31 | End: 2022-06-01

## 2022-05-31 RX ORDER — ONDANSETRON 8 MG/1
8 TABLET, ORALLY DISINTEGRATING ORAL EVERY 8 HOURS PRN
Status: DISCONTINUED | OUTPATIENT
Start: 2022-05-31 | End: 2022-06-03 | Stop reason: HOSPADM

## 2022-05-31 RX ORDER — NIFEDIPINE 30 MG/1
30 TABLET, EXTENDED RELEASE ORAL DAILY
Status: DISCONTINUED | OUTPATIENT
Start: 2022-05-31 | End: 2022-05-31

## 2022-05-31 RX ORDER — MORPHINE SULFATE 4 MG/ML
4 INJECTION, SOLUTION INTRAMUSCULAR; INTRAVENOUS
Status: COMPLETED | OUTPATIENT
Start: 2022-05-31 | End: 2022-05-31

## 2022-05-31 RX ORDER — NYSTATIN 100000 [USP'U]/ML
500000 SUSPENSION ORAL
Status: DISCONTINUED | OUTPATIENT
Start: 2022-05-31 | End: 2022-06-01

## 2022-05-31 RX ORDER — SULFAMETHOXAZOLE AND TRIMETHOPRIM 400; 80 MG/1; MG/1
1 TABLET ORAL EVERY MORNING
Status: DISCONTINUED | OUTPATIENT
Start: 2022-05-31 | End: 2022-06-03 | Stop reason: HOSPADM

## 2022-05-31 RX ORDER — TALC
6 POWDER (GRAM) TOPICAL NIGHTLY PRN
Status: DISCONTINUED | OUTPATIENT
Start: 2022-05-31 | End: 2022-06-03 | Stop reason: HOSPADM

## 2022-05-31 RX ORDER — LANOLIN ALCOHOL/MO/W.PET/CERES
800 CREAM (GRAM) TOPICAL 2 TIMES DAILY
Status: DISCONTINUED | OUTPATIENT
Start: 2022-05-31 | End: 2022-06-02

## 2022-05-31 RX ORDER — OXYCODONE HYDROCHLORIDE 5 MG/1
5 TABLET ORAL ONCE
Status: COMPLETED | OUTPATIENT
Start: 2022-05-31 | End: 2022-05-31

## 2022-05-31 RX ORDER — SODIUM CHLORIDE 0.9 % (FLUSH) 0.9 %
10 SYRINGE (ML) INJECTION
Status: DISCONTINUED | OUTPATIENT
Start: 2022-05-31 | End: 2022-06-03 | Stop reason: HOSPADM

## 2022-05-31 RX ORDER — HYDROMORPHONE HYDROCHLORIDE 1 MG/ML
0.5 INJECTION, SOLUTION INTRAMUSCULAR; INTRAVENOUS; SUBCUTANEOUS
Status: COMPLETED | OUTPATIENT
Start: 2022-05-31 | End: 2022-05-31

## 2022-05-31 RX ORDER — IBUPROFEN 200 MG
16 TABLET ORAL
Status: DISCONTINUED | OUTPATIENT
Start: 2022-05-31 | End: 2022-06-03 | Stop reason: HOSPADM

## 2022-05-31 RX ORDER — NIFEDIPINE 30 MG/1
30 TABLET, EXTENDED RELEASE ORAL DAILY
Status: DISCONTINUED | OUTPATIENT
Start: 2022-05-31 | End: 2022-06-02

## 2022-05-31 RX ORDER — TACROLIMUS 1 MG/1
5 CAPSULE ORAL 2 TIMES DAILY
Status: DISCONTINUED | OUTPATIENT
Start: 2022-05-31 | End: 2022-06-03 | Stop reason: HOSPADM

## 2022-05-31 RX ORDER — IBUPROFEN 200 MG
24 TABLET ORAL
Status: DISCONTINUED | OUTPATIENT
Start: 2022-05-31 | End: 2022-06-03 | Stop reason: HOSPADM

## 2022-05-31 RX ORDER — GLUCAGON 1 MG
1 KIT INJECTION
Status: DISCONTINUED | OUTPATIENT
Start: 2022-05-31 | End: 2022-06-03 | Stop reason: HOSPADM

## 2022-05-31 RX ORDER — DOCUSATE SODIUM 100 MG/1
100 CAPSULE, LIQUID FILLED ORAL 2 TIMES DAILY
Status: DISCONTINUED | OUTPATIENT
Start: 2022-05-31 | End: 2022-06-03 | Stop reason: HOSPADM

## 2022-05-31 RX ORDER — OXYCODONE HYDROCHLORIDE 5 MG/1
5 TABLET ORAL EVERY 6 HOURS PRN
Status: DISCONTINUED | OUTPATIENT
Start: 2022-05-31 | End: 2022-05-31

## 2022-05-31 RX ORDER — PANTOPRAZOLE SODIUM 40 MG/1
40 TABLET, DELAYED RELEASE ORAL
Status: DISCONTINUED | OUTPATIENT
Start: 2022-05-31 | End: 2022-06-03 | Stop reason: HOSPADM

## 2022-05-31 RX ORDER — HEPARIN SODIUM 5000 [USP'U]/ML
5000 INJECTION, SOLUTION INTRAVENOUS; SUBCUTANEOUS EVERY 8 HOURS
Status: DISCONTINUED | OUTPATIENT
Start: 2022-05-31 | End: 2022-05-31

## 2022-05-31 RX ORDER — VALGANCICLOVIR 450 MG/1
450 TABLET, FILM COATED ORAL DAILY
Status: DISCONTINUED | OUTPATIENT
Start: 2022-05-31 | End: 2022-06-03 | Stop reason: HOSPADM

## 2022-05-31 RX ORDER — ACETAMINOPHEN 325 MG/1
650 TABLET ORAL EVERY 8 HOURS PRN
Status: DISCONTINUED | OUTPATIENT
Start: 2022-05-31 | End: 2022-06-03 | Stop reason: HOSPADM

## 2022-05-31 RX ORDER — FERROUS SULFATE, DRIED 160(50) MG
1 TABLET, EXTENDED RELEASE ORAL DAILY
Refills: 5 | Status: DISCONTINUED | OUTPATIENT
Start: 2022-05-31 | End: 2022-06-03 | Stop reason: HOSPADM

## 2022-05-31 RX ORDER — FAMOTIDINE 20 MG/1
20 TABLET, FILM COATED ORAL NIGHTLY
Status: DISCONTINUED | OUTPATIENT
Start: 2022-05-31 | End: 2022-05-31

## 2022-05-31 RX ORDER — OXYCODONE HYDROCHLORIDE 10 MG/1
10 TABLET ORAL EVERY 6 HOURS PRN
Status: DISCONTINUED | OUTPATIENT
Start: 2022-05-31 | End: 2022-05-31

## 2022-05-31 RX ORDER — ONDANSETRON 2 MG/ML
4 INJECTION INTRAMUSCULAR; INTRAVENOUS
Status: COMPLETED | OUTPATIENT
Start: 2022-05-31 | End: 2022-05-31

## 2022-05-31 RX ADMIN — MORPHINE SULFATE 4 MG: 4 INJECTION INTRAVENOUS at 11:05

## 2022-05-31 RX ADMIN — NIFEDIPINE 30 MG: 30 TABLET, FILM COATED, EXTENDED RELEASE ORAL at 10:05

## 2022-05-31 RX ADMIN — ONDANSETRON 4 MG: 2 INJECTION INTRAMUSCULAR; INTRAVENOUS at 11:05

## 2022-05-31 RX ADMIN — SODIUM CHLORIDE 1000 ML: 0.9 INJECTION, SOLUTION INTRAVENOUS at 11:05

## 2022-05-31 RX ADMIN — OXYCODONE 5 MG: 5 TABLET ORAL at 07:05

## 2022-05-31 RX ADMIN — PANTOPRAZOLE SODIUM 40 MG: 40 TABLET, DELAYED RELEASE ORAL at 08:05

## 2022-05-31 RX ADMIN — Medication 800 MG: at 08:05

## 2022-05-31 RX ADMIN — DOCUSATE SODIUM 100 MG: 100 CAPSULE ORAL at 08:05

## 2022-05-31 RX ADMIN — NYSTATIN 500000 UNITS: 500000 SUSPENSION ORAL at 08:05

## 2022-05-31 RX ADMIN — OXYCODONE HYDROCHLORIDE 10 MG: 10 TABLET ORAL at 05:05

## 2022-05-31 RX ADMIN — HYDROMORPHONE HYDROCHLORIDE 0.5 MG: 1 INJECTION, SOLUTION INTRAMUSCULAR; INTRAVENOUS; SUBCUTANEOUS at 02:05

## 2022-05-31 RX ADMIN — OXYCODONE HYDROCHLORIDE 10 MG: 10 TABLET ORAL at 10:05

## 2022-05-31 RX ADMIN — TACROLIMUS 5 MG: 1 CAPSULE ORAL at 06:05

## 2022-05-31 NOTE — CONSULTS
[facility] - Emergency Dept  Endocrinology  Diabetes Consult Note    Consult Requested by: Jaswant Mariscal III, MD   Reason for admit: Right upper quadrant abdominal pain    HISTORY OF PRESENT ILLNESS:  Reason for Consult: Management of T2DM, Hyperglycemia      Surgical Procedure and Date: Liver Transplant 4/24/22     Diabetes diagnosis year: 6 years ago     Home Diabetes Medications:  novolog 4 units plus low correction scale 150/50     How often checking glucose at home?  3 times a day  BG readings on regimen: 130-200s   Hypoglycemia on the regimen?  No  Missed doses on regimen? no     Diabetes Complications include:     CKD      Complicating diabetes co morbidities:   CKD, Cirrhosis, Obesity, BRAYDON, Glucocorticoid Use         HPI:   Patient is a 57 y.o. male with a diagnosis of type 2 DM, HTN, BRAYDON, DM, cirrhosis s/p liver transplant 4/23/22 who presents to the ED with chief complaint of abdominal pain. Endocrinology consulted for management of T2DM.       Interval HPI:   Overnight events: Remains in ED obs. BG reasonably well controlled this AM without insulin.   Eating:  No diet orders on file  Nausea: No  Hypoglycemia and intervention: No  Fever: No  TPN and/or TF: No    PMH, PSH, FH, SH  reviewed       Review of Systems  Constitutional: Negative for weight changes.  Eyes: Negative for visual disturbance.  Respiratory: Negative for cough.   Cardiovascular: Negative for chest pain.  Gastrointestinal: Negative for nausea. + vomiting this AM; r/t pain  Endocrine: Negative for polyuria, polydipsia.  Musculoskeletal: Negative for back pain.  Skin: Negative for rash.  Neurological: Negative for syncope.  Psychiatric/Behavioral: Negative for depression.      Current Medications and/or Treatments Impacting Glycemic Control  Immunotherapy:    Immunosuppressants           Stop Route Frequency     tacrolimus capsule 5 mg         -- Oral 2 times daily          Steroids:   Hormones (From admission, onward)                 Start     Stop Route Frequency Ordered    05/31/22 1456  melatonin tablet 6 mg         -- Oral Nightly PRN 05/31/22 1400          Pressors:    Autonomic Drugs (From admission, onward)                None          Hyperglycemia/Diabetes Medications:   Antihyperglycemics (From admission, onward)                Start     Stop Route Frequency Ordered    05/31/22 1502  insulin aspart U-100 pen 0-5 Units         -- SubQ Before meals & nightly PRN 05/31/22 1402             PHYSICAL EXAMINATION:  Vitals:    05/31/22 1434   BP:    Pulse:    Resp: 15   Temp:      Body mass index is 34.61 kg/m².    Physical Exam  Constitutional: Well developed, well nourished, NAD.  ENT: External ears no masses with nose patent; normal hearing.  Neck: Supple; trachea midline.  Cardiovascular: Normal heart sounds, no LE edema. DP +2 bilaterally.  Lungs: Normal effort; lungs anterior bilaterally clear to auscultation.  Abdomen: Soft, no masses, no hernias.  MS: No clubbing or cyanosis of nails noted; unable to assess gait.  Skin: No rashes, lesions, or ulcers; no nodules. Injection sites are ok. No lipo hypertropthy or atrophy. Incision well healed.   Psychiatric: Good judgement and insight; normal mood and affect.  Neurological: Cranial nerves are grossly   Foot: nails in good condition, no amputations noted.        Labs Reviewed and Include   Recent Labs   Lab 05/31/22  1146   *   CALCIUM 9.1   ALBUMIN 3.4*   PROT 6.7   *   K 5.0   CO2 25      BUN 22*   CREATININE 1.2   ALKPHOS 64   ALT 11   AST 11   BILITOT 0.8     Lab Results   Component Value Date    WBC 5.62 05/31/2022    HGB 9.4 (L) 05/31/2022    HCT 27.7 (L) 05/31/2022    MCV 88 05/31/2022     (L) 05/31/2022     No results for input(s): TSH, FREET4 in the last 168 hours.  Lab Results   Component Value Date    HGBA1C 6.6 (H) 04/19/2022       Nutritional status:   Body mass index is 34.61 kg/m².  Lab Results   Component Value Date    ALBUMIN 3.4 (L) 05/31/2022     ALBUMIN 3.4 05/29/2022    ALBUMIN 3.1 (L) 05/23/2022     No results found for: PREALBUMIN    Estimated Creatinine Clearance: 78.4 mL/min (based on SCr of 1.2 mg/dL).    Accu-Checks  Recent Labs     05/31/22  1440   POCTGLUCOSE 109        ASSESSMENT and PLAN    * Right upper quadrant abdominal pain  Managed per primary.   May impact BG.       Type 2 diabetes mellitus without complication, without long-term current use of insulin  Bg goal 140-180    BG monitoring ac/hs and low dose correction scale.   Will likely consider novolog 3 units with meals once patient tolerating PO.     S/P liver transplant  Managed per primary.   avoid hypoglycemia        Prophylactic immunotherapy  May increase insulin resistance.             Plan discussed with patient and family at bedside.     Sandrine Nix NP  Endocrinology  [facility] - Emergency Dept

## 2022-05-31 NOTE — ED PROVIDER NOTES
Encounter Date: 5/31/2022       History     Chief Complaint   Patient presents with    Abdominal Pain     Liver xplant in april     58 yo M PMHx of HTN, BRAYDON, DM, cirrhosis s/p liver transplant 4/23/22 who presents to the ED with chief complaint of abdominal pain.  Pain located to the right upper quadrant and radiates around to the back.  It has been relatively constant in nature.  He endorses nausea and vomiting that began yesterday.  He was seen at an outside hospital 2 days ago.  He was discharged home with oxycodone.  He denies chest pain, shortness of breath, fever, diarrhea, dysuria, hematuria.  He denies other worsening or alleviating factors.        Review of patient's allergies indicates:   Allergen Reactions    Strawberry Anaphylaxis and Rash    Metformin Diarrhea    Pork/porcine containing products Diarrhea and Nausea And Vomiting     Past Medical History:   Diagnosis Date    Arthritis     Cirrhosis of liver     Encounter for blood transfusion     HTN (hypertension)     BRAYDON (obstructive sleep apnea)     Secondary esophageal varices without bleeding 03/18/2022    EGD (2/25/22) showed moderate portal hypertensive gastropathy, small hiatal hernia, grade 1 esophageal varices    Type 2 diabetes mellitus      Past Surgical History:   Procedure Laterality Date    ABDOMINAL SURGERY      COLONOSCOPY      ESOPHAGOGASTRODUODENOSCOPY N/A 6/1/2022    Procedure: EGD (ESOPHAGOGASTRODUODENOSCOPY);  Surgeon: Saad Nguyễn MD;  Location: [facility] ([address] [place] [address]);  Service: Endoscopy;  Laterality: N/A;    FINGER AMPUTATION      LIVER TRANSPLANT N/A 04/10/2022    Procedure: TRANSPLANT, LIVER;  Surgeon: Félix Scott MD;  [facility];  Service: Transplant;  Laterality: N/A;    LIVER TRANSPLANT N/A 04/13/2022    Procedure: TRANSPLANT, LIVER;  Surgeon: Keyon Castillo MD;  [facility];  Service: Transplant;  Laterality: N/A;    LIVER TRANSPLANT N/A 04/20/2022    Procedure:  TRANSPLANT, LIVER;  Surgeon: Brant Gonzalez Jr., MD;  Location: [facility] OR [address] [place] [address];  Service: Transplant;  Laterality: N/A;    LIVER TRANSPLANT N/A 04/23/2022    Procedure: TRANSPLANT, LIVER;  Surgeon: Tom Romero MD;  Location: [facility] OR [address] [place] [address];  Service: Transplant;  Laterality: N/A;    MEDIAL COLLATERAL LIGAMENT REPAIR, KNEE Bilateral     ROTATRO CUFF REPAIR      TONSILLECTOMY       History reviewed. No pertinent family history.  Social History     Tobacco Use    Smoking status: Never Smoker    Smokeless tobacco: Never Used   Substance Use Topics    Alcohol use: Not Currently    Drug use: Not Currently     Review of Systems   Constitutional: Negative for fever.   HENT: Negative for sore throat.    Respiratory: Negative for shortness of breath.    Cardiovascular: Negative for chest pain.   Gastrointestinal: Positive for abdominal pain, nausea and vomiting.   Genitourinary: Negative for dysuria.   Musculoskeletal: Negative for back pain.   Skin: Negative for rash.   Neurological: Negative for weakness.   Hematological: Does not bruise/bleed easily.       Physical Exam     Initial Vitals [05/31/22 1048]   BP Pulse Resp Temp SpO2   125/67 97 18 99.1 °F (37.3 °C) 98 %      MAP       --         Physical Exam    Nursing note and vitals reviewed.  Constitutional: He appears well-developed and well-nourished. He is not diaphoretic. No distress.   HENT:   Head: Normocephalic and atraumatic.   Mouth/Throat: Oropharynx is clear and moist.   Eyes: EOM are normal. Pupils are equal, round, and reactive to light.   Neck: Neck supple.   Normal range of motion.  Cardiovascular: Normal rate, regular rhythm, normal heart sounds and intact distal pulses. Exam reveals no gallop and no friction rub.    No murmur heard.  Pulmonary/Chest: Breath sounds normal. He has no wheezes. He has no rhonchi. He has no rales.   Abdominal: Abdomen is soft. Bowel sounds are normal. There is abdominal tenderness in the right upper quadrant.    Well appearing surgical incision    Musculoskeletal:         General: Normal range of motion.      Cervical back: Normal range of motion and neck supple.     Neurological: He is alert and oriented to person, place, and time. He has normal strength. GCS score is 15. GCS eye subscore is 4. GCS verbal subscore is 5. GCS motor subscore is 6.   Skin: Skin is warm and dry. Capillary refill takes less than 2 seconds.   Psychiatric: He has a normal mood and affect. His behavior is normal. Judgment and thought content normal.         ED Course   Procedures  Labs Reviewed   CBC W/ AUTO DIFFERENTIAL - Abnormal; Notable for the following components:       Result Value    RBC 3.16 (*)     Hemoglobin 9.4 (*)     Hematocrit 27.7 (*)     RDW 15.8 (*)     Platelets 148 (*)     MPV 8.0 (*)     Lymph # 0.7 (*)     Gran % 76.3 (*)     Lymph % 12.6 (*)     All other components within normal limits   COMPREHENSIVE METABOLIC PANEL - Abnormal; Notable for the following components:    Sodium 134 (*)     Glucose 117 (*)     BUN 22 (*)     Albumin 3.4 (*)     All other components within normal limits   URINALYSIS, REFLEX TO URINE CULTURE - Abnormal; Notable for the following components:    Occult Blood UA 2+ (*)     All other components within normal limits    Narrative:     Specimen Source->Urine   TACROLIMUS LEVEL - Abnormal; Notable for the following components:    Tacrolimus Lvl 25.9 (*)     All other components within normal limits   SARS-COV-2 RDRP GENE - Normal   LIPASE   LACTIC ACID, PLASMA   URINALYSIS MICROSCOPIC    Narrative:     Specimen Source->Urine   POCT GLUCOSE          Imaging Results          CT Abdomen Pelvis  Without Contrast (Final result)  Result time 05/31/22 16:35:35    Final result by Win Manzo MD (05/31/22 16:35:35)                 Impression:      No acute abdominal pathology.    Postoperative change including orthotopic liver transplant with no focal parenchymal abnormalities.    Small right pleural  effusion with associated compressive atelectasis.    Mild splenomegaly.    Mild scattered mesenteric haziness and adenopathy without evidence for ascites or free intra-abdominal air.  No pneumatosis intestinalis.  Findings may relate to reactive etiology.    Punctate left nonobstructing renal stone.    Electronically signed by resident: Bal Reilly  Date:    05/31/2022  Time:    15:59    Electronically signed by: Win Manzo MD  Date:    05/31/2022  Time:    16:35             Narrative:    EXAMINATION:  CT ABDOMEN PELVIS WITHOUT CONTRAST    CLINICAL HISTORY:  RLQ abdominal pain (Age >= 14y);    TECHNIQUE:  Routine axial CT images of the abdomen and pelvis were obtained without the use of IV contrast.  Sagittal and coronal reformatted images were also acquired.    COMPARISON:  Ultrasound Doppler liver transplant post 05/17/2022, MRI abdomen with without contrast 03/17/2022    FINDINGS:  Heart: The heart is normal in size with no pericardial effusion or calcification.  Partially visualized coronary artery calcification.    Lungs: Small right pleural effusion with associated compressive atelectasis.  Lungs demonstrate no evidence for consolidation or pneumothorax.  No suspicious pulmonary nodules or pulmonary masses.    Liver: Postoperative change including orthotopic liver transplant.  Liver allograft demonstrates no focal parenchymal abnormality.    Gallbladder: Surgically absent.    Bile ducts: No evidence of dilated ducts.    Pancreas: No mass or peripancreatic fat stranding.    Spleen: The spleen is upper limits of normal in size with no focal splenic lesions.    Adrenals: Normal.    GI Tract/ Mesentery: Gastroesophageal junction is unremarkable.  The stomach is filled with high density previously ingested contrast material.  Visualized stomach, duodenum, small bowel, and colon demonstrate no evidence for obstructive or inflammatory process.  Multiple colonic diverticula without evidence for diverticulitis.   The appendix is visualized without evidence for appendicitis.  Scattered regions of mesenteric haziness and adenopathy, which may reflect reactive inflammatory change.  No ascites or free intra-abdominal air.  No pneumatosis intestinalis.    Kidneys/Ureters: Normal in size and location. No hydronephrosis.  Punctate left nonobstructing renal stone (axial series 2, image 80).  No ureteral dilatation.    Bladder: No evidence of wall thickening.    Reproductive organs: Normal.    Retroperitoneum: Few prominent retroperitoneal lymph nodes without evidence for pathologic adenopathy.    Abdominal wall: Small bilateral fat containing inguinal hernias.    Vasculature: The visualized aorta demonstrates mild calcific atherosclerosis.  No evidence for aneurysm.    Bones: No acute fracture. Age-appropriate degenerative changes.                                 Medications   glycerin 99.5% topical solution 100 mL (has no administration in time range)     And   magnesium citrate solution 296 mL (has no administration in time range)     And   sodium chloride 0.9% bolus 500 mL (has no administration in time range)   sodium chloride 0.9% bolus 1,000 mL (0 mLs Intravenous Stopped 5/31/22 1251)   morphine injection 4 mg (4 mg Intravenous Given 5/31/22 1153)   ondansetron injection 4 mg (4 mg Intravenous Given 5/31/22 1152)   HYDROmorphone injection 0.5 mg (0.5 mg Intravenous Given 5/31/22 1434)   oxyCODONE immediate release tablet 5 mg (5 mg Oral Given 5/31/22 1917)   glycerin 99.5% topical solution 100 mL (100 mLs Rectal Given 6/1/22 1557)     And   magnesium citrate solution 296 mL (296 mLs Rectal Given 6/1/22 1557)     And   sodium chloride 0.9% bolus 500 mL (0 mLs Rectal Stopped 6/1/22 1630)   aluminum-magnesium hydroxide-simethicone 200-200-20 mg/5 mL suspension 30 mL (30 mLs Oral Given 6/2/22 1109)     And   LIDOcaine HCl 2% oral solution 10 mL (10 mLs Oral Given 6/2/22 1109)   sodium chloride 0.9% bolus 500 mL (0 mLs Intravenous  Stopped 6/3/22 1132)     Medical Decision Making:   History:   Old Medical Records: I decided to obtain old medical records.  Initial Assessment:   Emergent evaluation of a 57-year-old male presents to the emergency department with chief complaint of abdominal pain, nausea, vomiting.  Patient is afebrile, hemodynamically stable, nontoxic appearing.  Will order labs, fluids, analgesia, urinalysis, and consult liver transplant service.  Differential Diagnosis:   DDx includes but is not limited to transplant rejection, intra abdominal abscess, cellulitis.   Clinical Tests:   Lab Tests: Ordered and Reviewed  ED Management:  No significant metabolic or hematologic derangements.  Lipase 11. Lactate 1.0.  UA without infection.    Liver Transplant Service has evaluated the patient.  They recommended admission to their service.  They have ordered a CT abdomen pelvis with oral contrast.  They will follow up these results. The patient's history, physical exam, and plan of care was discussed with and agreed upon with my supervising physician.             Attending Attestation:     Physician Attestation Statement for NP/PA:   I discussed this assessment and plan of this patient with the NP/PA, but I did not personally examine the patient. The face to face encounter was performed by the NP/PA.              ED Course as of 06/06/22 1351   Tue May 31, 2022   1224 WBC: 5.62 [JM]   1224 Hemoglobin(!): 9.4 [JM]   1224 Hematocrit(!): 27.7 [JM]   1224 Platelets(!): 148 [JM]   1227 Lactate, Adilson: 1.0 [JM]   1233 Sodium(!): 134 [JM]   1233 Glucose(!): 117 [JM]   1233 BUN(!): 22 [JM]   1233 Creatinine: 1.2 [JM]   1233 Lipase: 11 [JM]      ED Course User Index  [JM] Elaina Bowman PA-C             Clinical Impression:   Final diagnoses:  [R10.11] Right upper quadrant abdominal pain (Primary)          ED Disposition Condition    Observation               Elaina Bowman PA-C  05/31/22 [id number]       Jaswant Mariscal III, MD  06/06/22  [id number]

## 2022-05-31 NOTE — ED NOTES
Report called to Patricia BURNS on TSU, notified Samson BURNS in EDOU. Pt to go to floor after CT.

## 2022-05-31 NOTE — SUBJECTIVE & OBJECTIVE
Interval HPI:   Overnight events: Remains in ED obs. BG reasonably well controlled this AM without insulin.   Eating:  No diet orders on file  Nausea: No  Hypoglycemia and intervention: No  Fever: No  TPN and/or TF: No    PMH, PSH, FH, SH  reviewed       Review of Systems  Constitutional: Negative for weight changes.  Eyes: Negative for visual disturbance.  Respiratory: Negative for cough.   Cardiovascular: Negative for chest pain.  Gastrointestinal: Negative for nausea. + vomiting this AM; r/t pain  Endocrine: Negative for polyuria, polydipsia.  Musculoskeletal: Negative for back pain.  Skin: Negative for rash.  Neurological: Negative for syncope.  Psychiatric/Behavioral: Negative for depression.      Current Medications and/or Treatments Impacting Glycemic Control  Immunotherapy:    Immunosuppressants           Stop Route Frequency     tacrolimus capsule 5 mg         -- Oral 2 times daily          Steroids:   Hormones (From admission, onward)                Start     Stop Route Frequency Ordered    05/31/22 1456  melatonin tablet 6 mg         -- Oral Nightly PRN 05/31/22 1400          Pressors:    Autonomic Drugs (From admission, onward)                None          Hyperglycemia/Diabetes Medications:   Antihyperglycemics (From admission, onward)                Start     Stop Route Frequency Ordered    05/31/22 1502  insulin aspart U-100 pen 0-5 Units         -- SubQ Before meals & nightly PRN 05/31/22 1402             PHYSICAL EXAMINATION:  Vitals:    05/31/22 1434   BP:    Pulse:    Resp: 15   Temp:      Body mass index is 34.61 kg/m².    Physical Exam  Constitutional: Well developed, well nourished, NAD.  ENT: External ears no masses with nose patent; normal hearing.  Neck: Supple; trachea midline.  Cardiovascular: Normal heart sounds, no LE edema. DP +2 bilaterally.  Lungs: Normal effort; lungs anterior bilaterally clear to auscultation.  Abdomen: Soft, no masses, no hernias.  MS: No clubbing or cyanosis of  nails noted; unable to assess gait.  Skin: No rashes, lesions, or ulcers; no nodules. Injection sites are ok. No lipo hypertropthy or atrophy. Incision well healed.   Psychiatric: Good judgement and insight; normal mood and affect.  Neurological: Cranial nerves are grossly   Foot: nails in good condition, no amputations noted.

## 2022-05-31 NOTE — HPI
Mr. Pelon Vasquez is s/p OLTx 4/24/22 2/2 LOZANO & ETOH (steroid induction, CMV +/+). Operation straight forward per op note. Post op SICU course notable for SVT requiring adenosine for conversation back to NSR. After stepping down to TSU, LFTs trended down and renal function stabilized. Patient reports a sudden onset of pain in abdominal RUQ on 5/28/22, prompting him to visit OSH where he received unremarkable labs and a CT AP without contrast that showed inflammation and fluid surrounding the liver. Post op USs have been stable with very small collections. He describes the pain as constant but with occasional stronger cramps that feel more stabbing. He reports NV, constipation, RUQ pain/swelling. Denies changes in urinary habits, fever, chills, trauma. LFTs WNL, CBC stable, will get a repeat CT with oral contrast and place patient in observation.

## 2022-05-31 NOTE — TELEPHONE ENCOUNTER
----- Message from Auryjoon James sent at 5/31/2022  8:35 AM CDT -----  Regarding: patient advice  Patient's Leonarda greenwood, calling to speak to coordinator regarding patient advice.  Patient is experiencing pain in the abdomen above his incision on the right side and towards his back. Patient  has gone to ER. They gave him oxycodone and it has stopped working. Please advise.      Call: [phone] (Mobile or [phone] (Mobile

## 2022-05-31 NOTE — H&P
[facility] - Emergency Dept  Liver Transplant  History & Physical    Patient Name: Pelon Vasquez  MRN: [id number]  Admission Date: 5/31/2022  Code Status: Full Code  Primary Care Provider: Primary Doctor No  Post-Operative Day: 37     ORGAN:   LIVER  Disease Etiology: Cirrhosis: Other, Specify  Donor Type:   Donation after Brain Death  CDC High Risk:   No  Donor CMV Status:   Donor CMV Status: Positive  Donor HBcAB:   Negative  Donor HCV Status:   Negative  Donor HBV MATHEUS: Negative  Donor HCV MATHEUS: Negative  Whole or Partial: Whole Liver  Biliary Anastomosis: End to End  Arterial Anatomy: Completely Replaced Circulation    Subjective:     History of Present Illness:  Mr. Pelon Vasquez is s/p OLTx 4/24/22 2/2 LOZANO & ETOH (steroid induction, CMV +/+). Operation straight forward per op note. Post op SICU course notable for SVT requiring adenosine for conversation back to NSR. After stepping down to TSU, LFTs trended down and renal function stabilized. Patient reports a sudden onset of pain in abdominal RUQ on 5/28/22, prompting him to visit OSH where he received unremarkable labs and a CT AP without contrast that showed inflammation and fluid surrounding the liver. Post op USs have been stable with very small collections. He describes the pain as constant but with occasional stronger cramps that feel more stabbing. He reports NV, constipation, RUQ pain/swelling. Denies changes in urinary habits, fever, chills, trauma. LFTs WNL, CBC stable, will get a repeat CT with oral contrast and place patient in observation.       Past Medical History:   Diagnosis Date    Arthritis     Cirrhosis of liver     HTN (hypertension)     BRAYDON (obstructive sleep apnea)     Secondary esophageal varices without bleeding 3/18/2022    EGD (2/25/22) showed moderate portal hypertensive gastropathy, small hiatal hernia, grade 1 esophageal varices    Type 2 diabetes mellitus        Past Surgical History:   Procedure Laterality Date    COLONOSCOPY       FINGER AMPUTATION      LIVER TRANSPLANT N/A 4/10/2022    Procedure: TRANSPLANT, LIVER;  Surgeon: Félix Scott MD;  [facility]: [facility];  Service: Transplant;  Laterality: N/A;    LIVER TRANSPLANT N/A 4/13/2022    Procedure: TRANSPLANT, LIVER;  Surgeon: Keyon Castillo MD;  Location: [facility] OR 2ND FLR;  Service: Transplant;  Laterality: N/A;    LIVER TRANSPLANT N/A 4/20/2022    Procedure: TRANSPLANT, LIVER;  Surgeon: Brant Gonzalez Jr., MD;  Location: [facility] OR 2ND FLR;  Service: Transplant;  Laterality: N/A;    LIVER TRANSPLANT N/A 4/23/2022    Procedure: TRANSPLANT, LIVER;  Surgeon: Tom Romero MD;  [facility];  Service: Transplant;  Laterality: N/A;    MEDIAL COLLATERAL LIGAMENT REPAIR, KNEE Bilateral     ROTATRO CUFF REPAIR      TONSILLECTOMY         Review of patient's allergies indicates:   Allergen Reactions    Strawberry Anaphylaxis and Rash    Metformin Diarrhea    Pork/porcine containing products Diarrhea and Nausea And Vomiting       Family History    None       Tobacco Use    Smoking status: Never Smoker    Smokeless tobacco: Never Used   Substance and Sexual Activity    Alcohol use: Not Currently    Drug use: Not on file    Sexual activity: Not on file       (Not in a hospital admission)      Review of Systems   Constitutional:  Positive for appetite change. Negative for fatigue and fever.   HENT:  Negative for congestion and sore throat.    Respiratory:  Negative for cough, shortness of breath and wheezing.    Cardiovascular:  Negative for chest pain, palpitations and leg swelling.   Gastrointestinal:  Positive for abdominal pain and constipation. Negative for diarrhea, nausea and vomiting.   Genitourinary:  Negative for decreased urine volume, difficulty urinating, dysuria and frequency.   Musculoskeletal:  Negative for arthralgias and back pain.   Skin:  Negative for wound.   Allergic/Immunologic: Positive for immunocompromised state.   Neurological:   Negative for dizziness, syncope and weakness.   Psychiatric/Behavioral:  The patient is not nervous/anxious.    Objective:     Vital Signs (Most Recent):  Temp: 99.1 °F (37.3 °C) (05/31/22 1048)  Pulse: 98 (05/31/22 1215)  Resp: 12 (05/31/22 1215)  BP: (!) 145/72 (05/31/22 1215)  SpO2: 99 % (05/31/22 1215)   Vital Signs (24h Range):  Temp:  [99.1 °F (37.3 °C)] 99.1 °F (37.3 °C)  Pulse:  [97-98] 98  Resp:  [12-20] 12  SpO2:  [98 %-99 %] 99 %  BP: (125-145)/(67-72) 145/72     Weight: 100.2 kg (221 lb)  Body mass index is 34.61 kg/m².    No intake or output data in the 24 hours ending 05/31/22 1405    Physical Exam  Vitals and nursing note reviewed.   Constitutional:       General: He is not in acute distress.  HENT:      Head: Normocephalic.      Mouth/Throat:      Mouth: Mucous membranes are moist.   Eyes:      General: No scleral icterus.  Cardiovascular:      Rate and Rhythm: Regular rhythm. Tachycardia present.      Pulses: Normal pulses.      Heart sounds: Normal heart sounds.   Pulmonary:      Effort: Pulmonary effort is normal.      Breath sounds: Normal breath sounds.   Abdominal:      General: Bowel sounds are normal. There is no distension. Chevron incision well healed, no SSI.      Palpations: Abdomen is soft.      Tenderness: There is abdominal tenderness. There is no guarding.   Musculoskeletal:         General: Normal range of motion.      Right lower leg: No edema.      Left lower leg: No edema.   Skin:     General: Skin is warm and dry.   Neurological:      Mental Status: He is alert and oriented to person, place, and time.      Motor: No weakness.   Psychiatric:         Mood and Affect: Mood normal.         Behavior: Behavior normal.         Thought Content: Thought content normal.         Judgment: Judgment normal.       Laboratory:  CBC:   Recent Labs   Lab 05/31/22  1146   WBC 5.62   RBC 3.16*   HGB 9.4*   HCT 27.7*   *   MCV 88   MCH 29.7   MCHC 33.9     CMP:   Recent Labs   Lab  05/31/22  1146   *   CALCIUM 9.1   ALBUMIN 3.4*   PROT 6.7   *   K 5.0   CO2 25      BUN 22*   CREATININE 1.2   ALKPHOS 64   ALT 11   AST 11   BILITOT 0.8     Labs within the past 24 hours have been reviewed.    Diagnostic Results:  I have personally reviewed all pertinent imaging studies.    Assessment/Plan:     * Right upper quadrant abdominal pain  - CT AP with oral contrast pending       Essential hypertension  - Cont home medication       Long-term use of immunosuppressant medication  - Continue prograf, cellcept, steroids.  - Continue to monitor prograf levels daily, monitor for toxic side effects, and adjust for therapeutic dose.         Prophylactic immunotherapy  - see long term use of immunosuppression         At risk for opportunistic infections  - resume appropriate OI prophylaxis per protocol.         S/P liver transplant  - s/p OLTx 4/24/22 2/2 LOZANO & ETOH (steroid induction, CMV +/+).   - Operation straight forward per op note.  - Tbili and LFTs wnl      Thrombocytopenia, unspecified  - Due to end stage liver disease, improving post liver transplant.         Anemia of chronic disease  - H/H stable. Will continue to monitor with daily cbc.         Type 2 diabetes mellitus without complication, without long-term current use of insulin  - Endocrine consulted. Appreciate their assistance.              Azalea Sy PA-C  Liver Transplant  [facility] - Emergency Dept

## 2022-05-31 NOTE — PROGRESS NOTES
Patient arrived to floor via stretcher.  Patient reports pain 8/10 not relieved by Dilauded earlier.  PA notified and new orders received.  Patient AAOx4, VSS, and in NAD.  Admit assessment complete.  Patient given urinal and instructed to measure all output.  Patient denies nausea at this time.  Will continue to monitor patient.  No skin breakdown noted.  Non slip socks in use and call bell within reach.

## 2022-05-31 NOTE — ASSESSMENT & PLAN NOTE
Bg goal 140-180    Bg monitoring every 6 hours and low dose correction scale while npo.   When diet advanced change to Ac/hs BG checks.   Once diet resumed will likely start novolog 3 units with meals; hold if patient nauseated/ questionable about eating.

## 2022-05-31 NOTE — ED NOTES
Pt presents to [facility] ER c/o r-sided abd pain that started on 5/28, went to [facility] and was d/c home c PO pain meds. Pt reports the pain has not resolved, reports pain is 8-9/10, located in RUQ/RLQ and radiates to r-flank/r-lower back. States he called his [facility] RN coordinator and was told to come to the ER. PMHx includes OLTXP 4/24/22 r/t ETOH cirrhosis, LOZANO-abd chevron incision AMARIS CDI. Pt noted to have tenderness c palpation, hypoactive bowel sounds-reports last normal BM was 3 days ago. Pt states he experienced N/V, last episode td, denies BRB/coffee ground emesis. Pt reports being compliant c home medications, took morning meds td. Pt AAOx4, VSS, NADN at this time. Pt bed in lowest locked position, call bell next to pt, side rails up x2, spouse at bedside. Will continue to monitor pt while in the ER.

## 2022-05-31 NOTE — HPI
Reason for Consult: Management of T2DM, Hyperglycemia      Surgical Procedure and Date: Liver Transplant 4/24/22     Diabetes diagnosis year: 6 years ago     Home Diabetes Medications:  novolog 4 units plus low correction scale 150/50     How often checking glucose at home?  3 times a day  BG readings on regimen: 130-200s   Hypoglycemia on the regimen?  No  Missed doses on regimen? no     Diabetes Complications include:     CKD      Complicating diabetes co morbidities:   CKD, Cirrhosis, Obesity, BRAYDON, Glucocorticoid Use         HPI:   Patient is a 57 y.o. male with a diagnosis of type 2 DM, HTN, BRAYDON, DM, cirrhosis s/p liver transplant 4/23/22 who presents to the ED with chief complaint of abdominal pain. Endocrinology consulted for management of T2DM.

## 2022-05-31 NOTE — SUBJECTIVE & OBJECTIVE
Past Medical History:   Diagnosis Date    Arthritis     Cirrhosis of liver     HTN (hypertension)     BRAYDON (obstructive sleep apnea)     Secondary esophageal varices without bleeding 3/18/2022    EGD (2/25/22) showed moderate portal hypertensive gastropathy, small hiatal hernia, grade 1 esophageal varices    Type 2 diabetes mellitus        Past Surgical History:   Procedure Laterality Date    COLONOSCOPY      FINGER AMPUTATION      LIVER TRANSPLANT N/A 4/10/2022    Procedure: TRANSPLANT, LIVER;  Surgeon: Félix Scott MD;  Location: [facility] OR 2ND FLR;  Service: Transplant;  Laterality: N/A;    LIVER TRANSPLANT N/A 4/13/2022    Procedure: TRANSPLANT, LIVER;  Surgeon: Keyon Castillo MD;  Location: [facility] OR 2ND FLR;  Service: Transplant;  Laterality: N/A;    LIVER TRANSPLANT N/A 4/20/2022    Procedure: TRANSPLANT, LIVER;  Surgeon: Brant Gonzalez Jr., MD;  Location: [facility] OR 2ND FLR;  Service: Transplant;  Laterality: N/A;    LIVER TRANSPLANT N/A 4/23/2022    Procedure: TRANSPLANT, LIVER;  Surgeon: Tom Romero MD;  Location: [facility] OR 2ND FLR;  Service: Transplant;  Laterality: N/A;    MEDIAL COLLATERAL LIGAMENT REPAIR, KNEE Bilateral     ROTATRO CUFF REPAIR      TONSILLECTOMY         Review of patient's allergies indicates:   Allergen Reactions    Strawberry Anaphylaxis and Rash    Metformin Diarrhea    Pork/porcine containing products Diarrhea and Nausea And Vomiting       Family History    None       Tobacco Use    Smoking status: Never Smoker    Smokeless tobacco: Never Used   Substance and Sexual Activity    Alcohol use: Not Currently    Drug use: Not on file    Sexual activity: Not on file       (Not in a hospital admission)      Review of Systems   Constitutional:  Positive for appetite change. Negative for fatigue and fever.   HENT:  Negative for congestion and sore throat.    Respiratory:  Negative for cough, shortness of breath and wheezing.    Cardiovascular:  Negative for chest pain, palpitations and  leg swelling.   Gastrointestinal:  Positive for abdominal pain and constipation. Negative for diarrhea, nausea and vomiting.   Genitourinary:  Negative for decreased urine volume, difficulty urinating, dysuria and frequency.   Musculoskeletal:  Negative for arthralgias and back pain.   Skin:  Negative for wound.   Allergic/Immunologic: Positive for immunocompromised state.   Neurological:  Negative for dizziness, syncope and weakness.   Psychiatric/Behavioral:  The patient is not nervous/anxious.    Objective:     Vital Signs (Most Recent):  Temp: 99.1 °F (37.3 °C) (05/31/22 1048)  Pulse: 98 (05/31/22 1215)  Resp: 12 (05/31/22 1215)  BP: (!) 145/72 (05/31/22 1215)  SpO2: 99 % (05/31/22 1215)   Vital Signs (24h Range):  Temp:  [99.1 °F (37.3 °C)] 99.1 °F (37.3 °C)  Pulse:  [97-98] 98  Resp:  [12-20] 12  SpO2:  [98 %-99 %] 99 %  BP: (125-145)/(67-72) 145/72     Weight: 100.2 kg (221 lb)  Body mass index is 34.61 kg/m².    No intake or output data in the 24 hours ending 05/31/22 1405    Physical Exam  Vitals and nursing note reviewed.   Constitutional:       General: He is not in acute distress.  HENT:      Head: Normocephalic.      Mouth/Throat:      Mouth: Mucous membranes are moist.   Eyes:      General: No scleral icterus.  Cardiovascular:      Rate and Rhythm: Regular rhythm. Tachycardia present.      Pulses: Normal pulses.      Heart sounds: Normal heart sounds.   Pulmonary:      Effort: Pulmonary effort is normal.      Breath sounds: Normal breath sounds.   Abdominal:      General: Bowel sounds are normal. There is no distension.      Palpations: Abdomen is soft.      Tenderness: There is abdominal tenderness. There is no guarding.   Musculoskeletal:         General: Normal range of motion.      Right lower leg: No edema.      Left lower leg: No edema.   Skin:     General: Skin is warm and dry.   Neurological:      Mental Status: He is alert and oriented to person, place, and time.      Motor: No weakness.    Psychiatric:         Mood and Affect: Mood normal.         Behavior: Behavior normal.         Thought Content: Thought content normal.         Judgment: Judgment normal.       Laboratory:  CBC:   Recent Labs   Lab 05/31/22  1146   WBC 5.62   RBC 3.16*   HGB 9.4*   HCT 27.7*   *   MCV 88   MCH 29.7   MCHC 33.9     CMP:   Recent Labs   Lab 05/31/22  1146   *   CALCIUM 9.1   ALBUMIN 3.4*   PROT 6.7   *   K 5.0   CO2 25      BUN 22*   CREATININE 1.2   ALKPHOS 64   ALT 11   AST 11   BILITOT 0.8     Labs within the past 24 hours have been reviewed.    Diagnostic Results:  I have personally reviewed all pertinent imaging studies.

## 2022-05-31 NOTE — ASSESSMENT & PLAN NOTE
- Continue prograf, cellcept, steroids.  - Continue to monitor prograf levels daily, monitor for toxic side effects, and adjust for therapeutic dose.

## 2022-05-31 NOTE — TELEPHONE ENCOUNTER
Writer returned patients call. Patient made aware that surgeon in clinic reviewed the report of CT but not helpful without the actual imaging. surgeon stated patient should of come to clinic that afternoon for evaluation and requested writer seek out a disc of the images done.  writer contacted [facility] yesterday however due to the memorial day weekend, Med records was closed.    Patient states pain is an 8/9 out of 10 to his abdomen. Patient advised to go to ER and if he felt the local ER he went to over the weekend would not be helpful, then he could drive to [facility] for evaluation here in our ER.      Writer has placed a call to [facility] medical records this am for the CD request.    Patient states he went to [facility] for labs this am but they did not have a standing lab order. Patient now just going to head to [facility] [place] ER.    [facility] made aware of patient coming to our ER.

## 2022-05-31 NOTE — ASSESSMENT & PLAN NOTE
- s/p OLTx 4/24/22 2/2 LOZANO & ETOH (steroid induction, CMV +/+).   - Operation straight forward per op note.  - Tbili and LFTs wnl

## 2022-06-01 ENCOUNTER — ANESTHESIA (OUTPATIENT)
Dept: ENDOSCOPY | Facility: HOSPITAL | Age: 57
End: 2022-06-01
Payer: MEDICAID

## 2022-06-01 ENCOUNTER — ANESTHESIA EVENT (OUTPATIENT)
Dept: ENDOSCOPY | Facility: HOSPITAL | Age: 57
End: 2022-06-01
Payer: MEDICAID

## 2022-06-01 ENCOUNTER — TELEPHONE (OUTPATIENT)
Dept: TRANSPLANT | Facility: CLINIC | Age: 57
End: 2022-06-01
Payer: MEDICAID

## 2022-06-01 LAB
ALBUMIN SERPL BCP-MCNC: 3.3 G/DL (ref 3.5–5.2)
ALP SERPL-CCNC: 65 U/L (ref 55–135)
ALT SERPL W/O P-5'-P-CCNC: 11 U/L (ref 10–44)
ANION GAP SERPL CALC-SCNC: 10 MMOL/L (ref 8–16)
AST SERPL-CCNC: 13 U/L (ref 10–40)
BASOPHILS # BLD AUTO: 0.04 K/UL (ref 0–0.2)
BASOPHILS NFR BLD: 0.8 % (ref 0–1.9)
BILIRUB SERPL-MCNC: 0.9 MG/DL (ref 0.1–1)
BUN SERPL-MCNC: 15 MG/DL (ref 6–20)
CALCIUM SERPL-MCNC: 9 MG/DL (ref 8.7–10.5)
CHLORIDE SERPL-SCNC: 100 MMOL/L (ref 95–110)
CO2 SERPL-SCNC: 24 MMOL/L (ref 23–29)
CREAT SERPL-MCNC: 1 MG/DL (ref 0.5–1.4)
DIFFERENTIAL METHOD: ABNORMAL
EOSINOPHIL # BLD AUTO: 0.1 K/UL (ref 0–0.5)
EOSINOPHIL NFR BLD: 2.5 % (ref 0–8)
ERYTHROCYTE [DISTWIDTH] IN BLOOD BY AUTOMATED COUNT: 15.7 % (ref 11.5–14.5)
EST. GFR  (AFRICAN AMERICAN): >60 ML/MIN/1.73 M^2
EST. GFR  (NON AFRICAN AMERICAN): >60 ML/MIN/1.73 M^2
GLUCOSE SERPL-MCNC: 130 MG/DL (ref 70–110)
HCT VFR BLD AUTO: 28.1 % (ref 40–54)
HGB BLD-MCNC: 9.6 G/DL (ref 14–18)
IMM GRANULOCYTES # BLD AUTO: 0.02 K/UL (ref 0–0.04)
IMM GRANULOCYTES NFR BLD AUTO: 0.4 % (ref 0–0.5)
LYMPHOCYTES # BLD AUTO: 0.6 K/UL (ref 1–4.8)
LYMPHOCYTES NFR BLD: 13.4 % (ref 18–48)
MAGNESIUM SERPL-MCNC: 2.1 MG/DL (ref 1.6–2.6)
MCH RBC QN AUTO: 30.3 PG (ref 27–31)
MCHC RBC AUTO-ENTMCNC: 34.2 G/DL (ref 32–36)
MCV RBC AUTO: 89 FL (ref 82–98)
MONOCYTES # BLD AUTO: 0.4 K/UL (ref 0.3–1)
MONOCYTES NFR BLD: 9.2 % (ref 4–15)
NEUTROPHILS # BLD AUTO: 3.5 K/UL (ref 1.8–7.7)
NEUTROPHILS NFR BLD: 73.7 % (ref 38–73)
NRBC BLD-RTO: 0 /100 WBC
PHOSPHATE SERPL-MCNC: 4.2 MG/DL (ref 2.7–4.5)
PLATELET # BLD AUTO: 148 K/UL (ref 150–450)
PMV BLD AUTO: 8.1 FL (ref 9.2–12.9)
POCT GLUCOSE: 127 MG/DL (ref 70–110)
POCT GLUCOSE: 137 MG/DL (ref 70–110)
POCT GLUCOSE: 140 MG/DL (ref 70–110)
POCT GLUCOSE: 205 MG/DL (ref 70–110)
POTASSIUM SERPL-SCNC: 4.6 MMOL/L (ref 3.5–5.1)
PROT SERPL-MCNC: 6.6 G/DL (ref 6–8.4)
RBC # BLD AUTO: 3.17 M/UL (ref 4.6–6.2)
SODIUM SERPL-SCNC: 134 MMOL/L (ref 136–145)
TACROLIMUS BLD-MCNC: 9.7 NG/ML (ref 5–15)
WBC # BLD AUTO: 4.79 K/UL (ref 3.9–12.7)

## 2022-06-01 PROCEDURE — 37000008 HC ANESTHESIA 1ST 15 MINUTES: Performed by: INTERNAL MEDICINE

## 2022-06-01 PROCEDURE — 00731 ANES UPR GI NDSC PX NOS: CPT | Performed by: INTERNAL MEDICINE

## 2022-06-01 PROCEDURE — D9220A PRA ANESTHESIA: Mod: CRNA,,, | Performed by: NURSE ANESTHETIST, CERTIFIED REGISTERED

## 2022-06-01 PROCEDURE — 36415 COLL VENOUS BLD VENIPUNCTURE: CPT | Performed by: PHYSICIAN ASSISTANT

## 2022-06-01 PROCEDURE — 27201012 HC FORCEPS, HOT/COLD, DISP: Performed by: INTERNAL MEDICINE

## 2022-06-01 PROCEDURE — 80197 ASSAY OF TACROLIMUS: CPT | Performed by: PHYSICIAN ASSISTANT

## 2022-06-01 PROCEDURE — 99214 OFFICE O/P EST MOD 30 MIN: CPT | Mod: 25,,, | Performed by: INTERNAL MEDICINE

## 2022-06-01 PROCEDURE — 25000003 PHARM REV CODE 250: Performed by: EMERGENCY MEDICINE

## 2022-06-01 PROCEDURE — 88305 TISSUE EXAM BY PATHOLOGIST: ICD-10-PCS | Mod: 26,,, | Performed by: PATHOLOGY

## 2022-06-01 PROCEDURE — 43239 EGD BIOPSY SINGLE/MULTIPLE: CPT | Performed by: INTERNAL MEDICINE

## 2022-06-01 PROCEDURE — 83735 ASSAY OF MAGNESIUM: CPT | Performed by: PHYSICIAN ASSISTANT

## 2022-06-01 PROCEDURE — 63600175 PHARM REV CODE 636 W HCPCS: Performed by: NURSE ANESTHETIST, CERTIFIED REGISTERED

## 2022-06-01 PROCEDURE — 25000003 PHARM REV CODE 250: Performed by: PHYSICIAN ASSISTANT

## 2022-06-01 PROCEDURE — 82962 GLUCOSE BLOOD TEST: CPT | Performed by: INTERNAL MEDICINE

## 2022-06-01 PROCEDURE — 37000009 HC ANESTHESIA EA ADD 15 MINS: Performed by: INTERNAL MEDICINE

## 2022-06-01 PROCEDURE — 96372 THER/PROPH/DIAG INJ SC/IM: CPT | Performed by: PHYSICIAN ASSISTANT

## 2022-06-01 PROCEDURE — D9220A PRA ANESTHESIA: Mod: ANES,,, | Performed by: ANESTHESIOLOGY

## 2022-06-01 PROCEDURE — 84100 ASSAY OF PHOSPHORUS: CPT | Performed by: PHYSICIAN ASSISTANT

## 2022-06-01 PROCEDURE — 43239 PR EGD, FLEX, W/BIOPSY, SGL/MULTI: ICD-10-PCS | Mod: ,,, | Performed by: INTERNAL MEDICINE

## 2022-06-01 PROCEDURE — 25000003 PHARM REV CODE 250: Performed by: NURSE ANESTHETIST, CERTIFIED REGISTERED

## 2022-06-01 PROCEDURE — 99214 PR OFFICE/OUTPT VISIT, EST, LEVL IV, 30-39 MIN: ICD-10-PCS | Mod: 25,,, | Performed by: INTERNAL MEDICINE

## 2022-06-01 PROCEDURE — 88305 TISSUE EXAM BY PATHOLOGIST: CPT | Performed by: PATHOLOGY

## 2022-06-01 PROCEDURE — 63600175 PHARM REV CODE 636 W HCPCS: Performed by: PHYSICIAN ASSISTANT

## 2022-06-01 PROCEDURE — 25000003 PHARM REV CODE 250

## 2022-06-01 PROCEDURE — D9220A PRA ANESTHESIA: ICD-10-PCS | Mod: ANES,,, | Performed by: ANESTHESIOLOGY

## 2022-06-01 PROCEDURE — 99214 OFFICE O/P EST MOD 30 MIN: CPT | Mod: 24,,, | Performed by: PHYSICIAN ASSISTANT

## 2022-06-01 PROCEDURE — G0378 HOSPITAL OBSERVATION PER HR: HCPCS

## 2022-06-01 PROCEDURE — 99214 PR OFFICE/OUTPT VISIT, EST, LEVL IV, 30-39 MIN: ICD-10-PCS | Mod: 24,,, | Performed by: PHYSICIAN ASSISTANT

## 2022-06-01 PROCEDURE — 94761 N-INVAS EAR/PLS OXIMETRY MLT: CPT

## 2022-06-01 PROCEDURE — 88305 TISSUE EXAM BY PATHOLOGIST: CPT | Mod: 26,,, | Performed by: PATHOLOGY

## 2022-06-01 PROCEDURE — 85025 COMPLETE CBC W/AUTO DIFF WBC: CPT | Performed by: PHYSICIAN ASSISTANT

## 2022-06-01 PROCEDURE — 43239 EGD BIOPSY SINGLE/MULTIPLE: CPT | Mod: ,,, | Performed by: INTERNAL MEDICINE

## 2022-06-01 PROCEDURE — D9220A PRA ANESTHESIA: ICD-10-PCS | Mod: CRNA,,, | Performed by: NURSE ANESTHETIST, CERTIFIED REGISTERED

## 2022-06-01 PROCEDURE — 80053 COMPREHEN METABOLIC PANEL: CPT | Performed by: PHYSICIAN ASSISTANT

## 2022-06-01 RX ORDER — LIDOCAINE HYDROCHLORIDE 20 MG/ML
INJECTION INTRAVENOUS
Status: DISCONTINUED | OUTPATIENT
Start: 2022-06-01 | End: 2022-06-01

## 2022-06-01 RX ORDER — PROPOFOL 10 MG/ML
VIAL (ML) INTRAVENOUS CONTINUOUS PRN
Status: DISCONTINUED | OUTPATIENT
Start: 2022-06-01 | End: 2022-06-01

## 2022-06-01 RX ORDER — OXYCODONE HYDROCHLORIDE 10 MG/1
10 TABLET ORAL EVERY 4 HOURS PRN
Status: DISCONTINUED | OUTPATIENT
Start: 2022-06-01 | End: 2022-06-03 | Stop reason: HOSPADM

## 2022-06-01 RX ORDER — POLYETHYLENE GLYCOL 3350 17 G/17G
17 POWDER, FOR SOLUTION ORAL DAILY
Status: DISCONTINUED | OUTPATIENT
Start: 2022-06-01 | End: 2022-06-03 | Stop reason: HOSPADM

## 2022-06-01 RX ORDER — SYRING-NEEDL,DISP,INSUL,0.3 ML 29 G X1/2"
296 SYRINGE, EMPTY DISPOSABLE MISCELLANEOUS
Status: COMPLETED | OUTPATIENT
Start: 2022-06-01 | End: 2022-06-01

## 2022-06-01 RX ORDER — OXYCODONE HYDROCHLORIDE 5 MG/1
5 TABLET ORAL EVERY 4 HOURS PRN
Status: DISCONTINUED | OUTPATIENT
Start: 2022-06-01 | End: 2022-06-03 | Stop reason: HOSPADM

## 2022-06-01 RX ORDER — SODIUM CHLORIDE 0.9 % (FLUSH) 0.9 %
3 SYRINGE (ML) INJECTION
Status: DISCONTINUED | OUTPATIENT
Start: 2022-06-01 | End: 2022-06-03 | Stop reason: HOSPADM

## 2022-06-01 RX ORDER — BISACODYL 10 MG
10 SUPPOSITORY, RECTAL RECTAL DAILY
Status: DISCONTINUED | OUTPATIENT
Start: 2022-06-01 | End: 2022-06-03 | Stop reason: HOSPADM

## 2022-06-01 RX ORDER — CYCLOBENZAPRINE HCL 5 MG
5 TABLET ORAL 3 TIMES DAILY PRN
Status: DISCONTINUED | OUTPATIENT
Start: 2022-06-01 | End: 2022-06-03 | Stop reason: HOSPADM

## 2022-06-01 RX ORDER — PROPOFOL 10 MG/ML
VIAL (ML) INTRAVENOUS
Status: DISCONTINUED | OUTPATIENT
Start: 2022-06-01 | End: 2022-06-01

## 2022-06-01 RX ORDER — PSEUDOEPHEDRINE/ACETAMINOPHEN 30MG-500MG
100 TABLET ORAL
Status: COMPLETED | OUTPATIENT
Start: 2022-06-01 | End: 2022-06-01

## 2022-06-01 RX ADMIN — CYCLOBENZAPRINE HYDROCHLORIDE 5 MG: 5 TABLET, FILM COATED ORAL at 08:06

## 2022-06-01 RX ADMIN — POLYETHYLENE GLYCOL 3350 17 G: 17 POWDER, FOR SOLUTION ORAL at 11:06

## 2022-06-01 RX ADMIN — SULFAMETHOXAZOLE AND TRIMETHOPRIM 1 TABLET: 400; 80 TABLET ORAL at 08:06

## 2022-06-01 RX ADMIN — OXYCODONE HYDROCHLORIDE 10 MG: 10 TABLET ORAL at 04:06

## 2022-06-01 RX ADMIN — TACROLIMUS 5 MG: 1 CAPSULE ORAL at 06:06

## 2022-06-01 RX ADMIN — BISACODYL 10 MG: 10 SUPPOSITORY RECTAL at 11:06

## 2022-06-01 RX ADMIN — Medication 200 MCG/KG/MIN: at 01:06

## 2022-06-01 RX ADMIN — SODIUM CHLORIDE 500 ML: 0.9 INJECTION, SOLUTION INTRAVENOUS at 03:06

## 2022-06-01 RX ADMIN — Medication 6 MG: at 08:06

## 2022-06-01 RX ADMIN — OXYCODONE HYDROCHLORIDE 10 MG: 10 TABLET ORAL at 11:06

## 2022-06-01 RX ADMIN — MAGNESIUM CITRATE 296 ML: 1.75 LIQUID ORAL at 03:06

## 2022-06-01 RX ADMIN — VALGANCICLOVIR 450 MG: 450 TABLET, FILM COATED ORAL at 08:06

## 2022-06-01 RX ADMIN — ONDANSETRON 8 MG: 8 TABLET, ORALLY DISINTEGRATING ORAL at 08:06

## 2022-06-01 RX ADMIN — OXYCODONE HYDROCHLORIDE 10 MG: 10 TABLET ORAL at 08:06

## 2022-06-01 RX ADMIN — DOCUSATE SODIUM 100 MG: 100 CAPSULE ORAL at 08:06

## 2022-06-01 RX ADMIN — PROPOFOL 70 MG: 10 INJECTION, EMULSION INTRAVENOUS at 01:06

## 2022-06-01 RX ADMIN — NYSTATIN 500000 UNITS: 500000 SUSPENSION ORAL at 08:06

## 2022-06-01 RX ADMIN — Medication 100 ML: at 03:06

## 2022-06-01 RX ADMIN — Medication 800 MG: at 08:06

## 2022-06-01 RX ADMIN — TACROLIMUS 5 MG: 1 CAPSULE ORAL at 08:06

## 2022-06-01 RX ADMIN — PANTOPRAZOLE SODIUM 40 MG: 40 TABLET, DELAYED RELEASE ORAL at 04:06

## 2022-06-01 RX ADMIN — OXYCODONE HYDROCHLORIDE 10 MG: 10 TABLET ORAL at 03:06

## 2022-06-01 RX ADMIN — SODIUM CHLORIDE: 0.9 INJECTION, SOLUTION INTRAVENOUS at 01:06

## 2022-06-01 RX ADMIN — LIDOCAINE HYDROCHLORIDE 100 MG: 20 INJECTION, SOLUTION INTRAVENOUS at 01:06

## 2022-06-01 RX ADMIN — GLYCOPYRROLATE 0.2 MG: 0.2 INJECTION, SOLUTION INTRAMUSCULAR; INTRAVITREAL at 01:06

## 2022-06-01 RX ADMIN — KETOCONAZOLE 100 MG: 200 TABLET ORAL at 08:06

## 2022-06-01 RX ADMIN — Medication 1 TABLET: at 08:06

## 2022-06-01 RX ADMIN — INSULIN ASPART 1 UNITS: 100 INJECTION, SOLUTION INTRAVENOUS; SUBCUTANEOUS at 08:06

## 2022-06-01 RX ADMIN — NIFEDIPINE 30 MG: 30 TABLET, FILM COATED, EXTENDED RELEASE ORAL at 08:06

## 2022-06-01 NOTE — DISCHARGE SUMMARY
[facility] Transplant [facility]down  Liver Transplant  Discharge Summary      Patient Name: Pelon Vasquez  MRN: [id number]  Admission Date: 5/31/2022  Hospital Length of Stay: 0 days  Discharge Date and Time:  06/03/2022 1235pm  Attending Physician: Brant Gonzalez Jr., MD   Discharging Provider: Azalea Sy PA-C  Primary Care Provider: Primary Doctor No  Post-Operative Day: 40     ORGAN:   LIVER  Disease Etiology: Cirrhosis: Other, Specify  Donor Type:   Donation after Brain Death  CDC High Risk:   No  Donor CMV Status:   Donor CMV Status: Positive  Donor HBcAB:   Negative  Donor HCV Status:   Negative  Whole or Partial: Whole Liver  Biliary Anastomosis: End to End  Arterial Anatomy: Completely Replaced Circulation    HPI:   Mr. Pelon Vasquez is s/p OLTx 4/24/22 2/2 LOZANO & ETOH (steroid induction, CMV +/+). Operation straight forward per op note. Post op SICU course notable for SVT requiring adenosine for conversation back to NSR. After stepping down to TSU, LFTs trended down and renal function stabilized. Patient reports a sudden onset of pain in abdominal RUQ on 5/28/22, prompting him to visit OSH where he received unremarkable labs and a CT AP without contrast that showed inflammation and fluid surrounding the liver. Post op USs have been stable with very small collections. He describes the pain as constant but with occasional stronger cramps that feel more stabbing. He reports NV, constipation, RUQ pain/swelling. Denies changes in urinary habits, fever, chills, trauma. LFTs WNL, CBC stable, will get a repeat CT with oral contrast and place patient in observation.       Procedure(s) (LRB):  EGD (ESOPHAGOGASTRODUODENOSCOPY) (N/A)     Hospital Course:    PT admitted from the ED for worsening RUQ pain. CT AP 5/31 with oral contrast unremarkable, GI consulted. Liver US 6/1 reviewed by surgeon, satisfactory. EGD 6/1 with congestive gastropathy, no other findings, biopsy taken and pending. Started PPI BID and  bowel regimen. Enema 6/1 unsuccessful and pain continued. KUB 6/2 without ileus or obstruction. Suppository was given 6/2 with successful BM 6/3 AM. LFTs and tbili wnl throughout stay. Cr did rise to 1.6 from BL ~1.2, likely 2/2 dehydration. Patient to receive NS bolus before leaving hospital.    Pt is now stable and ready for discharge. He has no complaints. He has met with PharmD and received education. He will follow up with labs 6/6 and clinic appointment 6/6. Pt expressed understanding of discharge instructions and importance of follow up.    Goals of Care Treatment Preferences:  Code Status: Full Code    Living Will: Yes              Final Active Diagnoses:    Diagnosis Date Noted POA    Long-term use of immunosuppressant medication [Z79.899] 04/27/2022 Not Applicable    Essential hypertension [I10] 04/27/2022 Yes    At risk for opportunistic infections [Z91.89] 04/24/2022 Yes    Prophylactic immunotherapy [Z29.8] 04/24/2022 Not Applicable    S/P liver transplant [Z94.4] 04/24/2022 Not Applicable    Thrombocytopenia, unspecified [D69.6] 04/09/2022 Yes    Anemia of chronic disease [D63.8] 03/18/2022 Yes    Type 2 diabetes mellitus without complication, without long-term current use of insulin [E11.9] 02/04/2022 Yes      Problems Resolved During this Admission:    Diagnosis Date Noted Date Resolved POA    PRINCIPAL PROBLEM:  Right upper quadrant abdominal pain [R10.11] 05/31/2022 06/03/2022 Yes    Constipation [K59.00] 06/02/2022 06/03/2022 Yes       Consults (From admission, onward)        Status Ordering Provider     Inpatient consult to Gastroenterology  Once        Provider:  (Not yet assigned)    Completed FRANK DUMONT     Inpatient consult to Endocrinology  Once        Provider:  (Not yet assigned)    Completed KELLEN CHAND     Inpatient consult to Liver Transplant  Once        Provider:  (Not yet assigned)    Acknowledged LINDA KEMP          Pending Diagnostic Studies:      Procedure Component Value Units Date/Time    CMV DNA, quantitative, PCR [[id number]] Collected: 06/01/22 0655    Order Status: Sent Lab Status: In process Updated: 06/01/22 0717    Specimen: Blood     Specimen to Pathology, Surgery Gastrointestinal tract [[id number]] Collected: 06/01/22 1409    Order Status: Sent Lab Status: In process Updated: 06/02/22 0112    Specimen: Tissue         Significant Diagnostic Studies: Labs:   CMP   Recent Labs   Lab 06/02/22  0723 06/03/22  0716   * 132*   K 4.5 4.3    97   CO2 26 27   * 139*   BUN 13 17   CREATININE 1.2 1.6*   CALCIUM 9.3 9.2   PROT 6.5 6.5   ALBUMIN 3.3* 3.3*   BILITOT 0.8 0.7   ALKPHOS 63 61   AST 13 12   ALT 10 9*   ANIONGAP 7* 8   ESTGFRAFRICA >60.0 55.2*   EGFRNONAA >60.0 47.8*   , CBC   Recent Labs   Lab 06/02/22  0723 06/03/22  0716   WBC 4.06 4.31   HGB 9.7* 9.7*   HCT 28.1* 28.4*   * 136*    and All labs within the past 24 hours have been reviewed    The patients clinical status was discussed at multidisplinary rounds, involving transplant surgery, transplant medicine, pharmacy, nursing, nutrition, and social work    Discharged Condition: good    Disposition: Home or Self Care    Follow Up:    Patient Instructions:      Diet diabetic     Notify your health care provider if you experience any of the following:  increased confusion or weakness     Notify your health care provider if you experience any of the following:  persistent dizziness, light-headedness, or visual disturbances     Notify your health care provider if you experience any of the following:  worsening rash     Notify your health care provider if you experience any of the following:  severe persistent headache     Notify your health care provider if you experience any of the following:  difficulty breathing or increased cough     Notify your health care provider if you experience any of the following:  redness, tenderness, or signs of infection (pain, swelling,  "redness, odor or green/yellow discharge around incision site)     Notify your health care provider if you experience any of the following:  severe uncontrolled pain     Notify your health care provider if you experience any of the following:  persistent nausea and vomiting or diarrhea     Notify your health care provider if you experience any of the following:  temperature >100.4     Activity as tolerated     Medications:  Reconciled Home Medications:      Medication List      START taking these medications    polyethylene glycol 17 gram Pwpk  Commonly known as: GLYCOLAX  Take 17 g by mouth daily as needed (constipation).        CHANGE how you take these medications    insulin aspart U-100 100 unit/mL (3 mL) Inpn pen  Commonly known as: NovoLOG  Inject 4 Units into the skin 3 (three) times daily with meals. May also inject 0-10 Units as needed (for hyperglycemia.). Plus SSI: 150 - 200 + 2 unit; 201 - 250 + 4 units; 251 - 300 + 6 units;  301 - 350 + 8 units;  > 350   + 10 units. Max TDD 60 units.  What changed: See the new instructions.     NIFEdipine 30 MG (OSM) 24 hr tablet  Commonly known as: PROCARDIA-XL  Take 1 tablet (30 mg total) by mouth once daily.  What changed: when to take this     oxyCODONE 5 MG immediate release tablet  Commonly known as: ROXICODONE  Take 1 tablet (5 mg total) by mouth every 8 (eight) hours as needed for Pain.  What changed:   · how much to take  · when to take this     pantoprazole 20 MG tablet  Commonly known as: PROTONIX  Take 1 tablet (20 mg total) by mouth once daily.  What changed:   · medication strength  · how much to take        CONTINUE taking these medications    aspirin 81 MG EC tablet  Commonly known as: ECOTRIN  Take 1 tablet (81 mg total) by mouth once daily.     BD ULTRA-FINE JACKIE PEN NEEDLE 32 gauge x 5/32" Ndle  Generic drug: pen needle, diabetic  1 each by Misc.(Non-Drug; Combo Route) route 3 (three) times daily with meals.     calcium carbonate-vitamin D3 600 mg-20 " mcg (800 unit) Tab  Take 1 tablet by mouth once daily.     CONTOUR NEXT METER Misc  Generic drug: blood-glucose meter  use as instructed     CONTOUR NEXT TEST STRIPS Strp  Generic drug: blood sugar diagnostic  1 strip by Misc.(Non-Drug; Combo Route) route 4 (four) times daily before meals and nightly.     docusate sodium 100 MG capsule  Commonly known as: COLACE  Take 1 capsule (100 mg total) by mouth 3 (three) times daily as needed for Constipation.     ketoconazole 200 mg Tab  Commonly known as: NIZORAL  Take 0.5 tablets (100 mg total) by mouth once daily.     MICROLET LANCET Misc  Generic drug: lancets  1 each by Misc.(Non-Drug; Combo Route) route 4 (four) times daily before meals and nightly.     mycophenolate 250 mg Cap  Commonly known as: CELLCEPT  Take 4 capsules (1,000 mg total) by mouth 2 (two) times daily.     sulfamethoxazole-trimethoprim 400-80mg 400-80 mg per tablet  Commonly known as: BACTRIM,SEPTRA  Take 1 tablet by mouth every morning. STOP 10/24/22     tacrolimus 1 MG Cap  Commonly known as: PROGRAF  Take 5 capsules (5 mg total) by mouth every 12 (twelve) hours.     valGANciclovir 450 mg Tab  Commonly known as: VALCYTE  Take 1 tablet (450 mg total) by mouth once daily. STOP 7/24/22        STOP taking these medications    atorvastatin 20 MG tablet  Commonly known as: LIPITOR     famotidine 20 MG tablet  Commonly known as: PEPCID     magnesium oxide 400 mg (241.3 mg magnesium) tablet  Commonly known as: MAG-OX     midodrine 5 MG Tab  Commonly known as: PROAMATINE     sodium bicarbonate 650 MG tablet     spironolactone 100 MG tablet  Commonly known as: ALDACTONE     triamcinolone acetonide 0.1% 0.1 % cream  Commonly known as: KENALOG          Time spent caring for patient (Greater than 1/2 spent in direct face-to-face contact): > 30 minutes    Azalea Sy PA-C  Liver Transplant  [facility] - Trans[facility] Step[facility]

## 2022-06-01 NOTE — TREATMENT PLAN
GI Treatment Plan:  EGD for abdominal pain  Impression:            - Normal esophagus.                          - Esophagogastric landmarks identified.                          - Congestive gastropathy.                          - Normal antrum.                          - Normal examined duodenum.                          - Biopsies were obtained in the gastric body and                          in the gastric antrum.     Recommendation:        - Return patient to [facility] for ongoing care.                          - Advance diet as tolerated.                          - Continue present medications.                          - Await pathology results.     Please call back with questions or if patient has change in clinical status.    Jose Roberto Knight MD  Gastroenterology/Hepatology, PGY IV  [facility]

## 2022-06-01 NOTE — INTERVAL H&P NOTE
The patient has been examined and the H&P has been reviewed:    Pre-Procedure H and P Addendum    Patient seen and examined.  History and exam unchanged from prior history and physical.      Procedure: EGD  Indication: RUQ abdominal pain  ASA Class: per anesthesiology  Airway: normal  Neck Mobility: full range of motion  Mallampatti score: per anesthesia  History of anesthesia problems: no  Family history of anesthesia problems: no  Anesthesia Plan: MAC      Active Hospital Problems    Diagnosis  POA    *Right upper quadrant abdominal pain [R10.11]  Yes    Long-term use of immunosuppressant medication [Z79.899]  Not Applicable    Essential hypertension [I10]  Yes    At risk for opportunistic infections [Z91.89]  Yes    Prophylactic immunotherapy [Z29.8]  Not Applicable    S/P liver transplant [Z94.4]  Not Applicable    Thrombocytopenia, unspecified [D69.6]  Yes    Anemia of chronic disease [D63.8]  Yes    Type 2 diabetes mellitus without complication, without long-term current use of insulin [E11.9]  Yes      Resolved Hospital Problems   No resolved problems to display.

## 2022-06-01 NOTE — CONSULTS
[facility]  Gastroenterology  Consult Note    Patient Name: Pelon Vasquez  MRN: [id number]  Admission Date: 5/31/2022  Hospital Length of Stay: 0 days  Code Status: Full Code   Attending Provider: Brant Gonzalez Jr., MD   Consulting Provider: Jose Roberto Knight MD  Primary Care Physician: Primary Doctor No  Principal Problem:Right upper quadrant abdominal pain    Inpatient consult to Gastroenterology  Consult performed by: Jose Roberto Knight MD  Consult ordered by: Azalea Sy PA-C        Subjective:     HPI:   Mr Vasquez is a 58yo PMHx HTN, takes ASA, ID-T2DM, LOZANO/ EtOH cirrhosis c/p OLTx (04/24/22) immunosuppressed w/ tacro/ MMF presents w/ new onset RUQ pain.    Reports RUQ abd pain since 04/28 w/ associated episode of NBNB emesis. Reports normal BMs w/o melena, hematochezia. Only use ASA but no additional NSAIDs. Endorses some constipation, last BM 3d agos. Some difficulty w/ po tolerance.    Patient had EGD 02/25/2022  Notable for G1 EV, HH, PHG. Records demonstrate colonoscopy 10/2021 w/ poor prep. States he had a colonoscopy at [facility] earlier this year that was reportedly w/o polyps. Prior to this had a colonoscopy 2017 with 9 polyps.    VS since arrival notable for AF, , hypertensive 180s systolic. CBC w/o leukocytosis, Hgb stable at 9.6 (near baseline), mild thrombocytopneia 148. BMP w/ BUN/ Cr 15/ 1. LFTs unremarkable. CTAP w/p contrast notable for no biliary dilation, no obstruction, no divertculitis.    Past Medical History:   Diagnosis Date    Arthritis     Cirrhosis of liver     Encounter for blood transfusion     HTN (hypertension)     BRAYDON (obstructive sleep apnea)     Secondary esophageal varices without bleeding 03/18/2022    EGD (2/25/22) showed moderate portal hypertensive gastropathy, small hiatal hernia, grade 1 esophageal varices    Type 2 diabetes mellitus        Past Surgical History:   Procedure Laterality Date    ABDOMINAL SURGERY       COLONOSCOPY      FINGER AMPUTATION      LIVER TRANSPLANT N/A 04/10/2022    Procedure: TRANSPLANT, LIVER;  Surgeon: Félix Scott MD;  [facility];  Service: Transplant;  Laterality: N/A;    LIVER TRANSPLANT N/A 04/13/2022    Procedure: TRANSPLANT, LIVER;  Surgeon: Keyon Castillo MD;  Location: [facility] OR 2ND FLR;  Service: Transplant;  Laterality: N/A;    LIVER TRANSPLANT N/A 04/20/2022    Procedure: TRANSPLANT, LIVER;  Surgeon: Brant Gonzalez Jr., MD;  Location: [facility] OR 2ND FLR;  Service: Transplant;  Laterality: N/A;    LIVER TRANSPLANT N/A 04/23/2022    Procedure: TRANSPLANT, LIVER;  Surgeon: Tom Romero MD;  [facility];  Service: Transplant;  Laterality: N/A;    MEDIAL COLLATERAL LIGAMENT REPAIR, KNEE Bilateral     ROTATRO CUFF REPAIR      TONSILLECTOMY         History reviewed. No pertinent family history.    Social History     Socioeconomic History    Marital status: Single   Tobacco Use    Smoking status: Never Smoker    Smokeless tobacco: Never Used   Substance and Sexual Activity    Alcohol use: Not Currently    Drug use: Not Currently     Social Determinants of Health     Financial Resource Strain: High Risk    Difficulty of Paying Living Expenses: Very hard   Food Insecurity: Food Insecurity Present    Worried About Running Out of Food in the Last Year: Often true    Ran Out of Food in the Last Year: Sometimes true   Transportation Needs: No Transportation Needs    Lack of Transportation (Medical): No    Lack of Transportation (Non-Medical): No   Physical Activity: Unknown    Days of Exercise per Week: 4 days   Stress: Stress Concern Present    Feeling of Stress : To some extent   Social Connections: Unknown    Frequency of Communication with Friends and Family: Three times a week    Frequency of Social Gatherings with Friends and Family: Once a week    Active Member of Clubs or Organizations: Yes    Attends Club or Organization Meetings:  More than 4 times per year    Marital Status: Living with partner   Housing Stability: High Risk    Unable to Pay for Housing in the Last Year: Yes    Number of Places Lived in the Last Year: 1    Unstable Housing in the Last Year: No       No current facility-administered medications on file prior to encounter.     Current Outpatient Medications on File Prior to Encounter   Medication Sig Dispense Refill    aspirin (ECOTRIN) 81 MG EC tablet Take 1 tablet (81 mg total) by mouth once daily. 30 tablet 5    calcium carbonate-vitamin D3 600 mg-20 mcg (800 unit) Tab Take 1 tablet by mouth once daily. 30 tablet 5    docusate sodium (COLACE) 100 MG capsule Take 1 capsule (100 mg total) by mouth 3 (three) times daily as needed for Constipation.  0    famotidine (PEPCID) 20 MG tablet Take 1 tablet (20 mg total) by mouth nightly. 30 tablet 0    ketoconazole (NIZORAL) 200 mg Tab Take 0.5 tablets (100 mg total) by mouth once daily. 15 tablet 11    magnesium oxide (MAG-OX) 400 mg (241.3 mg magnesium) tablet Take 2 tablets (800 mg total) by mouth 2 (two) times daily. 120 tablet 5    mycophenolate (CELLCEPT) 250 mg Cap Take 4 capsules (1,000 mg total) by mouth 2 (two) times daily. 240 capsule 2    NIFEdipine (PROCARDIA-XL) 30 MG (OSM) 24 hr tablet Take 1 tablet (30 mg total) by mouth once daily. (Patient taking differently: Take 30 mg by mouth 2 (two) times a day.) 30 tablet 11    oxyCODONE (ROXICODONE) 5 MG immediate release tablet Take 1-2 tablets (5-10 mg total) by mouth every 6 (six) hours as needed for Pain. 50 tablet 0    sulfamethoxazole-trimethoprim 400-80mg (BACTRIM,SEPTRA) 400-80 mg per tablet Take 1 tablet by mouth every morning. STOP 10/24/22 30 tablet 5    tacrolimus (PROGRAF) 1 MG Cap Take 5 capsules (5 mg total) by mouth every 12 (twelve) hours. 240 capsule 11    valGANciclovir (VALCYTE) 450 mg Tab Take 1 tablet (450 mg total) by mouth once daily. STOP 7/24/22 30 tablet 2    blood sugar diagnostic  "Strp 1 strip by Misc.(Non-Drug; Combo Route) route 4 (four) times daily before meals and nightly. 100 strip 5    blood-glucose meter Misc use as instructed 1 each 0    insulin aspart U-100 (NOVOLOG) 100 unit/mL (3 mL) InPn pen Inject 9 Units into the skin 3 (three) times daily with meals. May also inject 0-10 Units as needed (for hyperglycemia.). Plus SSI: 150 - 200 + 2 unit; 201 - 250 + 4 units; 251 - 300 + 6 units;  301 - 350 + 8 units;  > 350   + 10 units. Max TDD 60 units. 30 mL 0    lancets Misc 1 each by Misc.(Non-Drug; Combo Route) route 4 (four) times daily before meals and nightly. 100 each 0    pen needle, diabetic 32 gauge x 5/32" Ndle 1 each by Misc.(Non-Drug; Combo Route) route 3 (three) times daily with meals. 100 each 0    [DISCONTINUED] atorvastatin (LIPITOR) 20 MG tablet Take 20 mg by mouth once daily.      [DISCONTINUED] midodrine (PROAMATINE) 5 MG Tab Take 3 tablets (15 mg total) by mouth every 8 (eight) hours. 270 tablet 11    [DISCONTINUED] pantoprazole (PROTONIX) 40 MG tablet Take 40 mg by mouth once daily.      [DISCONTINUED] sodium bicarbonate 650 MG tablet Take 2 tablets (1,300 mg total) by mouth 2 (two) times daily. 120 tablet 11    [DISCONTINUED] spironolactone (ALDACTONE) 100 MG tablet Take 1 tablet (100 mg total) by mouth once daily. 30 tablet 5    [DISCONTINUED] triamcinolone acetonide 0.1% (KENALOG) 0.1 % cream Apply topically 2 (two) times daily as needed.         Review of patient's allergies indicates:   Allergen Reactions    Strawberry Anaphylaxis and Rash    Metformin Diarrhea    Pork/porcine containing products Diarrhea and Nausea And Vomiting       Review of Systems   Constitutional: Negative for chills, fever and weight loss.   HENT: Negative for ear pain, hearing loss and tinnitus.    Eyes: Negative for blurred vision, double vision and photophobia.   Respiratory: Negative for cough, hemoptysis and sputum production.    Cardiovascular: Negative for chest pain, " palpitations and orthopnea.   Gastrointestinal: Positive for abdominal pain, nausea and vomiting. Negative for blood in stool, constipation, diarrhea, heartburn and melena.   Genitourinary: Negative for dysuria, frequency and urgency.   Musculoskeletal: Negative for back pain, myalgias and neck pain.   Skin: Negative for itching and rash.   Neurological: Negative for dizziness, tingling and headaches.   Endo/Heme/Allergies: Negative for environmental allergies and polydipsia. Does not bruise/bleed easily.        Objective:     Vitals:    06/01/22 0450   BP: (!) 184/87   Pulse: 99   Resp: 18   Temp:      Constitutional:  not in acute distress and well developed  HENT: Head: Normal, normocephalic, atraumatic.  Eyes: conjunctiva clear and sclera nonicteric  Cardiovascular: regular rate and rhythm  Respiratory: normal chest expansion & respiratory effort   and no accessory muscle use  GI: soft and tenderness moderate in the RUQ  Musculoskeletal: no muscular tenderness noted  Skin: normal color  Neurological: alert, oriented x3  Psychiatric: mood and affect are within normal limits, pt is a good historian; no memory problems were noted      Significant Labs:  Recent Labs   Lab 05/29/22  1101 05/31/22  1146   HGB 10.2* 9.4*       Lab Results   Component Value Date    WBC 5.62 05/31/2022    HGB 9.4 (L) 05/31/2022    HCT 27.7 (L) 05/31/2022    MCV 88 05/31/2022     (L) 05/31/2022       Lab Results   Component Value Date     (L) 05/31/2022    K 5.0 05/31/2022     05/31/2022    CO2 25 05/31/2022    BUN 22 (H) 05/31/2022    CREATININE 1.2 05/31/2022    CALCIUM 9.1 05/31/2022    ANIONGAP 9 05/31/2022    ESTGFRAFRICA >60.0 05/31/2022    EGFRNONAA >60.0 05/31/2022       Lab Results   Component Value Date    ALT 11 05/31/2022    AST 11 05/31/2022    GGT 66 (H) 02/14/2022    ALKPHOS 64 05/31/2022    BILITOT 0.8 05/31/2022       Lab Results   Component Value Date    INR 1.1 04/26/2022    INR 1.2 04/25/2022    INR  1.3 (H) 04/25/2022       Significant Imaging:  Reviewed pertinent radiology findings.       Assessment/Plan:     58yo PMHx HTN, takes ASA, ID-T2DM, LOZANO/ EtOH cirrhosis c/p OLTx (04/24/22) immunosuppressed w/ tacro/ MMF presents w/ new onset RUQ pain.    BMs wnl w/o melena or hematochezia. No excessive NSAID use. Exam w/ moderate RUQ US. Liver enzymes unremarkable. Imaging unremarkable.     Low suspicion for ulcer at this time or biliary issue given normal imaging and LFTs    Problem List:  1. Abdominal pain    Recommendations:  - Will plan for EGD today    Thank you for involving us in the care of Pelon Vasquez. Please call with any additional questions, concerns or changes in the patient's clinical status. We will continue to follow.    Jose Roberto Knight MD  Gastroenterology Fellow PGY V  [facility]

## 2022-06-01 NOTE — ANESTHESIA PREPROCEDURE EVALUATION
06/01/2022  Pelon Vasquez is a 57 y.o., male a/p Liver tranplant for EGD for right upper abdominal pain.    Patient Active Problem List   Diagnosis    Type 2 diabetes mellitus without complication, without long-term current use of insulin    Anemia of chronic disease    Thrombocytopenia, unspecified    S/P liver transplant    At risk for opportunistic infections    Prophylactic immunotherapy    Adverse effect of corticosteroids    Class 2 severe obesity due to excess calories with serious comorbidity in adult    Long-term use of immunosuppressant medication    Essential hypertension    Generalized edema    Right upper quadrant abdominal pain           Pre-op Assessment    I have reviewed the Patient Summary Reports.     I have reviewed the Nursing Notes. I have reviewed the NPO Status.   I have reviewed the Medications.     Review of Systems  Social:  Non-Smoker, No Alcohol Use    Hematology/Oncology:  Hematology Normal   Oncology Normal     Cardiovascular:   Hypertension, well controlled NYHA Classification I    Pulmonary:   Sleep Apnea    Hepatic/GI:   Liver Disease,    Endocrine:   Diabetes, poorly controlled  Morbid Obesity / BMI > 40      Physical Exam  General: Well nourished    Airway:  Mallampati: I / I  Mouth Opening: Normal  TM Distance: Normal  Tongue: Normal  Neck ROM: Normal ROM    Dental:  Intact        Anesthesia Plan  Type of Anesthesia, risks & benefits discussed:    Anesthesia Type: Gen Natural Airway  Intra-op Monitoring Plan: Standard ASA Monitors  Post Op Pain Control Plan: multimodal analgesia  Induction:  IV  Informed Consent: Informed consent signed with the Patient and all parties understand the risks and agree with anesthesia plan.  All questions answered. Patient consented to blood products? Yes  ASA Score: 3  Day of Surgery Review of History & Physical: H&P Update  referred to the surgeon/provider.    Ready For Surgery From Anesthesia Perspective.     .    Lab Results   Component Value Date    WBC 4.79 06/01/2022    HGB 9.6 (L) 06/01/2022    HCT 28.1 (L) 06/01/2022    MCV 89 06/01/2022     (L) 06/01/2022       BMP  Lab Results   Component Value Date     (L) 06/01/2022    K 4.6 06/01/2022     06/01/2022    CO2 24 06/01/2022    BUN 15 06/01/2022    CREATININE 1.0 06/01/2022    CALCIUM 9.0 06/01/2022    ANIONGAP 10 06/01/2022    ESTGFRAFRICA >60.0 06/01/2022    EGFRNONAA >60.0 06/01/2022       Lab Results   Component Value Date    INR 1.1 04/26/2022    INR 1.2 04/25/2022    INR 1.3 (H) 04/25/2022       PET Stress:     The myocardial perfusion images are normal without evidence of ischemia or scar.    The whole heart absolute myocardial perfusion values averaged 1.04 cc/min/g at rest, which is elevated; 1.33 cc/min/g at stress, which is mildly reduced; and CFR is 1.28 , which is moderately reduced in part due to elevated resting flow.    CT attenuation images demonstrate moderate diffuse coronary calcifications in the LAD and LCX territory and no aortic calcifications.    The gated perfusion images showed an ejection fraction of 72% at rest and 76% during stress. A normal ejection fraction is greater than 53%.    The wall motion is normal at rest and during stress.    The LV cavity size is normal at rest and stress.    The EKG portion of this study is negative for ischemia.    There were no arrhythmias during stress.    The patient reported no chest pain during the stress test.

## 2022-06-01 NOTE — PLAN OF CARE
VVS  TELE NSR  OXY GIVEN FOR PAIN  GOOD UOP  WIFE AT BEDSIDE   BED LOW RAILS UP X2 CALL LIGHT WITHIN REACH  VERBAL UNDERSTANDING NOTED TO CALL PRN

## 2022-06-01 NOTE — PROGRESS NOTES
[facility] - Transplant Stepdown  Liver Transplant  Progress Note    Patient Name: Pelon Vasquez  MRN: [id number]  Admission Date: 5/31/2022  Hospital Length of Stay: 0 days  Code Status: Full Code  Primary Care Provider: Primary Doctor No  Post-Operative Day: 38    ORGAN:   LIVER  Disease Etiology: Cirrhosis: Other, Specify  Donor Type:   Donation after Brain Death  CDC High Risk:   No  Donor CMV Status:   Donor CMV Status: Positive  Donor HBcAB:   Negative  Donor HCV Status:   Negative  Donor HBV MATHEUS: Negative  Donor HCV MATHEUS: Negative  Whole or Partial: Whole Liver  Biliary Anastomosis: End to End  Arterial Anatomy: Completely Replaced Circulation  Subjective:     History of Present Illness:  Mr. Pelon Vasquez is s/p OLTx 4/24/22 2/2 LOZANO & ETOH (steroid induction, CMV +/+). Operation straight forward per op note. Post op SICU course notable for SVT requiring adenosine for conversation back to NSR. After stepping down to TSU, LFTs trended down and renal function stabilized. Patient reports a sudden onset of pain in abdominal RUQ on 5/28/22, prompting him to visit OSH where he received unremarkable labs and a CT AP without contrast that showed inflammation and fluid surrounding the liver. Post op USs have been stable with very small collections. He describes the pain as constant but with occasional stronger cramps that feel more stabbing. He reports NV, constipation, RUQ pain/swelling. Denies changes in urinary habits, fever, chills, trauma. LFTs WNL, CBC stable, will get a repeat CT with oral contrast and place patient in observation.       Hospital Course:  Interval history: No acute events overnight. PT admitted from the ED for worsening RUQ pain. GI consulted for new abdominal pain. Per GI, pt had Liver US to r/o portal vein thrombosis - US satisfactory. Brought pt for EGD which was unremarkable, biopsy taken and pending. Cont PPI. Pt also c/o constipation, miralax and suppository ordered. LFTs and tbili remain wnl.  Will continue to monitor.        Scheduled Meds:   bisacodyL  10 mg Rectal Daily    calcium-vitamin D3  1 tablet Oral Daily    docusate sodium  100 mg Oral BID    electrolyte-S (pH 7.4)  250 mL Intravenous Once    ketoconazole  100 mg Oral Daily    magnesium oxide  800 mg Oral BID    NIFEdipine  30 mg Oral Daily    pantoprazole  40 mg Oral BID AC    polyethylene glycol  17 g Oral Daily    sulfamethoxazole-trimethoprim 400-80mg  1 tablet Oral QAM    tacrolimus  5 mg Oral BID    valGANciclovir  450 mg Oral Daily     Continuous Infusions:  PRN Meds:acetaminophen, barium, cyclobenzaprine, dextrose 10%, dextrose 10%, glucagon (human recombinant), glucose, glucose, insulin aspart U-100, melatonin, ondansetron, oxyCODONE, sodium chloride 0.9%, sodium chloride 0.9%    Review of Systems   Constitutional:  Positive for appetite change. Negative for fatigue and fever.   HENT:  Negative for congestion and sore throat.    Respiratory:  Negative for cough, shortness of breath and wheezing.    Cardiovascular:  Negative for chest pain, palpitations and leg swelling.   Gastrointestinal:  Positive for abdominal pain and constipation. Negative for diarrhea, nausea and vomiting.   Genitourinary:  Negative for decreased urine volume, difficulty urinating, dysuria and frequency.   Musculoskeletal:  Negative for arthralgias and back pain.   Skin:  Negative for wound.   Allergic/Immunologic: Positive for immunocompromised state.   Neurological:  Negative for dizziness, syncope and weakness.   Psychiatric/Behavioral:  The patient is not nervous/anxious.    Objective:     Vital Signs (Most Recent):  Temp: 97.2 °F (36.2 °C) (06/01/22 1414)  Pulse: 97 (06/01/22 1430)  Resp: 11 (06/01/22 1430)  BP: (!) 157/79 (06/01/22 1430)  SpO2: 98 % (06/01/22 1430)   Vital Signs (24h Range):  Temp:  [97.2 °F (36.2 °C)-99.2 °F (37.3 °C)] 97.2 °F (36.2 °C)  Pulse:  [] 97  Resp:  [10-22] 11  SpO2:  [97 %-100 %] 98 %  BP: (138-184)/(8-88)  157/79     Weight: 102.8 kg (226 lb 10.1 oz)  Body mass index is 35.5 kg/m².    Intake/Output - Last 3 Shifts         05/30 0700 05/31 0659 05/31 0700 06/01 0659 06/01 0700 06/02 0659    IV Piggyback   300    Total Intake(mL/kg)   300 (2.9)    Urine (mL/kg/hr)  375     Total Output  375     Net  -375 +300                   Physical Exam  Vitals and nursing note reviewed.   Constitutional:       General: He is not in acute distress.  HENT:      Head: Normocephalic.      Mouth/Throat:      Mouth: Mucous membranes are moist.   Eyes:      General: No scleral icterus.  Cardiovascular:      Rate and Rhythm: Regular rhythm. Tachycardia present.      Pulses: Normal pulses.      Heart sounds: Normal heart sounds.   Pulmonary:      Effort: Pulmonary effort is normal.      Breath sounds: Normal breath sounds.   Abdominal:      General: Bowel sounds are normal. There is no distension.      Palpations: Abdomen is soft.      Tenderness: There is abdominal tenderness. There is no guarding.   Musculoskeletal:         General: Normal range of motion.      Right lower leg: No edema.      Left lower leg: No edema.   Skin:     General: Skin is warm and dry.   Neurological:      Mental Status: He is alert and oriented to person, place, and time.      Motor: No weakness.   Psychiatric:         Mood and Affect: Mood normal.         Behavior: Behavior normal.         Thought Content: Thought content normal.         Judgment: Judgment normal.       Laboratory:  Immunosuppressants           Stop Route Frequency     tacrolimus capsule 5 mg         -- Oral 2 times daily          CBC:   Recent Labs   Lab 06/01/22  0655   WBC 4.79   RBC 3.17*   HGB 9.6*   HCT 28.1*   *   MCV 89   MCH 30.3   MCHC 34.2     CMP:   Recent Labs   Lab 06/01/22  0655   *   CALCIUM 9.0   ALBUMIN 3.3*   PROT 6.6   *   K 4.6   CO2 24      BUN 15   CREATININE 1.0   ALKPHOS 65   ALT 11   AST 13   BILITOT 0.9     Labs within the past 24 hours  have been reviewed.    Diagnostic Results:  US Liver Transplant Post:  Results for orders placed during the hospital encounter of 05/31/22    US Doppler Liver Transplant Post (xpd)    Narrative  EXAMINATION:  US DOPPLER LIVER TRANSPLANT POST (XPD)    CLINICAL HISTORY:  Abdominal pain in new liver Tx;    TECHNIQUE:  Realtime sonographic imaging was performed with color Doppler interrogation.    COMPARISON:  Ultrasound Doppler liver transplant 05/17/2022    FINDINGS:  The patient is status post orthotopic liver transplant on 04/23/2022    The liver transplant demonstrates homogeneous echotexture.  No focal parenchymal abnormality.  Small mildly complex fluid collection in the right hepatic lobe measuring 1.1 x 1.1 x 0.9 cm.    No intra or extrahepatic bile duct dilatation. The common duct measures 4 mm.    The middle, right, and left hepatic veins are patent and unremarkable. The main, right, and left branches of the portal vein demonstrate hepatopedal flow and are unremarkable.    Hepatic arterial resistive indices are as follows: Main 0.58 (previously 0.62), Left 0.54 (previously 0.66), anterior Right 0.55 (previously 0.64), posterior Right 0.65 (previously 0.69).  There is no evidence of a tardus parvus waveform. The maximum velocity in the main hepatic artery is 96 cm/sec.    The IVC is unremarkable.  Partially evaluated right pleural effusion.    Impression  Mild interval decrease in intrahepatic resistive indices of the left and anterior right hepatic arteries, which remain within normal limits.  No tardus parvus waveforms.  Otherwise, satisfactory Doppler evaluation of the hepatic vasculature.    Stable tiny fluid collection within the right hepatic lobe.    Right pleural effusion.    Electronically signed by resident: Hansa Isbell  Date:    06/01/2022  Time:    10:07    Electronically signed by: Nitin Prince  Date:    06/01/2022  Time:    10:54        Assessment/Plan:     * Right upper quadrant abdominal  pain  - admitted from the ED for worsening RUQ pain  - CT a/p w/ contrast unremarkable  - GI consulted for new abdominal pain. Brought pt for EGD which was unremarkable, biopsy taken and pending.  - Cont PPI.   - Pt also c/o constipation, miralax and suppository ordered  - Monitor       Essential hypertension  - Cont home medication       Long-term use of immunosuppressant medication  - Continue prograf, cellcept, steroids.  - Continue to monitor prograf levels daily, monitor for toxic side effects, and adjust for therapeutic dose.       Prophylactic immunotherapy  - see long term use of immunosuppression       At risk for opportunistic infections  - resume appropriate OI prophylaxis per protocol.       S/P liver transplant  - s/p OLTx 4/24/22 2/2 LOZANO & ETOH (steroid induction, CMV +/+).   - Operation straight forward per op note.  - Tbili and LFTs wnl      Thrombocytopenia, unspecified  - Due to end stage liver disease, improving post liver transplant.       Anemia of chronic disease  - H/H stable. Will continue to monitor with daily cbc.       Type 2 diabetes mellitus without complication, without long-term current use of insulin  - Endocrine consulted. Appreciate their assistance.        VTE Risk Mitigation (From admission, onward)         Ordered     IP VTE HIGH RISK PATIENT  Once         05/31/22 1400     Place sequential compression device  Until discontinued         05/31/22 1400                The patients clinical status was discussed at multidisplinary rounds, involving transplant surgery, transplant medicine, pharmacy, nursing, nutrition, and social work    Discharge Planning: Not a candidate for d/c at this time.       TAE HermanC  Liver Transplant  [facility] - Transplant Stepdown

## 2022-06-01 NOTE — PLAN OF CARE
Patient aao independent today. Complains of 8/10 abd pain, oxy 10mg given q 4.  EGD negative, bx sent.  US of liver unremarkable. BG range 127-140. Was NPO for majority of the day. Patients wife at the bedside attentive to patient involved in care.

## 2022-06-01 NOTE — H&P (VIEW-ONLY)
[facility]  Gastroenterology  Consult Note    Patient Name: Pelon Vasquez  MRN: [id number]  Admission Date: 5/31/2022  Hospital Length of Stay: 0 days  Code Status: Full Code   Attending Provider: Brant Gonzalez Jr., MD   Consulting Provider: Jose Roberto Knight MD  Primary Care Physician: Primary Doctor No  Principal Problem:Right upper quadrant abdominal pain    Inpatient consult to Gastroenterology  Consult performed by: Jose Roberto Knight MD  Consult ordered by: Azalea Sy PA-C        Subjective:     HPI:   Mr Vasquez is a 58yo PMHx HTN, takes ASA, ID-T2DM, LOZANO/ EtOH cirrhosis c/p OLTx (04/24/22) immunosuppressed w/ tacro/ MMF presents w/ new onset RUQ pain.    Reports RUQ abd pain since 04/28 w/ associated episode of NBNB emesis. Reports normal BMs w/o melena, hematochezia. Only use ASA but no additional NSAIDs. Endorses some constipation, last BM 3d agos. Some difficulty w/ po tolerance.    Patient had EGD 02/25/2022  Notable for G1 EV, HH, PHG. Records demonstrate colonoscopy 10/2021 w/ poor prep. States he had a colonoscopy at [facility] earlier this year that was reportedly w/o polyps. Prior to this had a colonoscopy 2017 with 9 polyps.    VS since arrival notable for AF, , hypertensive 180s systolic. CBC w/o leukocytosis, Hgb stable at 9.6 (near baseline), mild thrombocytopneia 148. BMP w/ BUN/ Cr 15/ 1. LFTs unremarkable. CTAP w/p contrast notable for no biliary dilation, no obstruction, no divertculitis.    Past Medical History:   Diagnosis Date    Arthritis     Cirrhosis of liver     Encounter for blood transfusion     HTN (hypertension)     BRAYDON (obstructive sleep apnea)     Secondary esophageal varices without bleeding 03/18/2022    EGD (2/25/22) showed moderate portal hypertensive gastropathy, small hiatal hernia, grade 1 esophageal varices    Type 2 diabetes mellitus        Past Surgical History:   Procedure Laterality Date    ABDOMINAL SURGERY       COLONOSCOPY      FINGER AMPUTATION      LIVER TRANSPLANT N/A 04/10/2022    Procedure: TRANSPLANT, LIVER;  Surgeon: Félix Scott MD;  [facility];  Service: Transplant;  Laterality: N/A;    LIVER TRANSPLANT N/A 04/13/2022    Procedure: TRANSPLANT, LIVER;  Surgeon: Keyon Castillo MD;  Location: [facility] OR 2ND FLR;  Service: Transplant;  Laterality: N/A;    LIVER TRANSPLANT N/A 04/20/2022    Procedure: TRANSPLANT, LIVER;  Surgeon: Brant Gonzalez Jr., MD;  Location: [facility] OR 2ND FLR;  Service: Transplant;  Laterality: N/A;    LIVER TRANSPLANT N/A 04/23/2022    Procedure: TRANSPLANT, LIVER;  Surgeon: Tom Romero MD;  [facility];  Service: Transplant;  Laterality: N/A;    MEDIAL COLLATERAL LIGAMENT REPAIR, KNEE Bilateral     ROTATRO CUFF REPAIR      TONSILLECTOMY         History reviewed. No pertinent family history.    Social History     Socioeconomic History    Marital status: Single   Tobacco Use    Smoking status: Never Smoker    Smokeless tobacco: Never Used   Substance and Sexual Activity    Alcohol use: Not Currently    Drug use: Not Currently     Social Determinants of Health     Financial Resource Strain: High Risk    Difficulty of Paying Living Expenses: Very hard   Food Insecurity: Food Insecurity Present    Worried About Running Out of Food in the Last Year: Often true    Ran Out of Food in the Last Year: Sometimes true   Transportation Needs: No Transportation Needs    Lack of Transportation (Medical): No    Lack of Transportation (Non-Medical): No   Physical Activity: Unknown    Days of Exercise per Week: 4 days   Stress: Stress Concern Present    Feeling of Stress : To some extent   Social Connections: Unknown    Frequency of Communication with Friends and Family: Three times a week    Frequency of Social Gatherings with Friends and Family: Once a week    Active Member of Clubs or Organizations: Yes    Attends Club or Organization Meetings:  More than 4 times per year    Marital Status: Living with partner   Housing Stability: High Risk    Unable to Pay for Housing in the Last Year: Yes    Number of Places Lived in the Last Year: 1    Unstable Housing in the Last Year: No       No current facility-administered medications on file prior to encounter.     Current Outpatient Medications on File Prior to Encounter   Medication Sig Dispense Refill    aspirin (ECOTRIN) 81 MG EC tablet Take 1 tablet (81 mg total) by mouth once daily. 30 tablet 5    calcium carbonate-vitamin D3 600 mg-20 mcg (800 unit) Tab Take 1 tablet by mouth once daily. 30 tablet 5    docusate sodium (COLACE) 100 MG capsule Take 1 capsule (100 mg total) by mouth 3 (three) times daily as needed for Constipation.  0    famotidine (PEPCID) 20 MG tablet Take 1 tablet (20 mg total) by mouth nightly. 30 tablet 0    ketoconazole (NIZORAL) 200 mg Tab Take 0.5 tablets (100 mg total) by mouth once daily. 15 tablet 11    magnesium oxide (MAG-OX) 400 mg (241.3 mg magnesium) tablet Take 2 tablets (800 mg total) by mouth 2 (two) times daily. 120 tablet 5    mycophenolate (CELLCEPT) 250 mg Cap Take 4 capsules (1,000 mg total) by mouth 2 (two) times daily. 240 capsule 2    NIFEdipine (PROCARDIA-XL) 30 MG (OSM) 24 hr tablet Take 1 tablet (30 mg total) by mouth once daily. (Patient taking differently: Take 30 mg by mouth 2 (two) times a day.) 30 tablet 11    oxyCODONE (ROXICODONE) 5 MG immediate release tablet Take 1-2 tablets (5-10 mg total) by mouth every 6 (six) hours as needed for Pain. 50 tablet 0    sulfamethoxazole-trimethoprim 400-80mg (BACTRIM,SEPTRA) 400-80 mg per tablet Take 1 tablet by mouth every morning. STOP 10/24/22 30 tablet 5    tacrolimus (PROGRAF) 1 MG Cap Take 5 capsules (5 mg total) by mouth every 12 (twelve) hours. 240 capsule 11    valGANciclovir (VALCYTE) 450 mg Tab Take 1 tablet (450 mg total) by mouth once daily. STOP 7/24/22 30 tablet 2    blood sugar diagnostic  "Strp 1 strip by Misc.(Non-Drug; Combo Route) route 4 (four) times daily before meals and nightly. 100 strip 5    blood-glucose meter Misc use as instructed 1 each 0    insulin aspart U-100 (NOVOLOG) 100 unit/mL (3 mL) InPn pen Inject 9 Units into the skin 3 (three) times daily with meals. May also inject 0-10 Units as needed (for hyperglycemia.). Plus SSI: 150 - 200 + 2 unit; 201 - 250 + 4 units; 251 - 300 + 6 units;  301 - 350 + 8 units;  > 350   + 10 units. Max TDD 60 units. 30 mL 0    lancets Misc 1 each by Misc.(Non-Drug; Combo Route) route 4 (four) times daily before meals and nightly. 100 each 0    pen needle, diabetic 32 gauge x 5/32" Ndle 1 each by Misc.(Non-Drug; Combo Route) route 3 (three) times daily with meals. 100 each 0    [DISCONTINUED] atorvastatin (LIPITOR) 20 MG tablet Take 20 mg by mouth once daily.      [DISCONTINUED] midodrine (PROAMATINE) 5 MG Tab Take 3 tablets (15 mg total) by mouth every 8 (eight) hours. 270 tablet 11    [DISCONTINUED] pantoprazole (PROTONIX) 40 MG tablet Take 40 mg by mouth once daily.      [DISCONTINUED] sodium bicarbonate 650 MG tablet Take 2 tablets (1,300 mg total) by mouth 2 (two) times daily. 120 tablet 11    [DISCONTINUED] spironolactone (ALDACTONE) 100 MG tablet Take 1 tablet (100 mg total) by mouth once daily. 30 tablet 5    [DISCONTINUED] triamcinolone acetonide 0.1% (KENALOG) 0.1 % cream Apply topically 2 (two) times daily as needed.         Review of patient's allergies indicates:   Allergen Reactions    Strawberry Anaphylaxis and Rash    Metformin Diarrhea    Pork/porcine containing products Diarrhea and Nausea And Vomiting       Review of Systems   Constitutional: Negative for chills, fever and weight loss.   HENT: Negative for ear pain, hearing loss and tinnitus.    Eyes: Negative for blurred vision, double vision and photophobia.   Respiratory: Negative for cough, hemoptysis and sputum production.    Cardiovascular: Negative for chest pain, " palpitations and orthopnea.   Gastrointestinal: Positive for abdominal pain, nausea and vomiting. Negative for blood in stool, constipation, diarrhea, heartburn and melena.   Genitourinary: Negative for dysuria, frequency and urgency.   Musculoskeletal: Negative for back pain, myalgias and neck pain.   Skin: Negative for itching and rash.   Neurological: Negative for dizziness, tingling and headaches.   Endo/Heme/Allergies: Negative for environmental allergies and polydipsia. Does not bruise/bleed easily.        Objective:     Vitals:    06/01/22 0450   BP: (!) 184/87   Pulse: 99   Resp: 18   Temp:      Constitutional:  not in acute distress and well developed  HENT: Head: Normal, normocephalic, atraumatic.  Eyes: conjunctiva clear and sclera nonicteric  Cardiovascular: regular rate and rhythm  Respiratory: normal chest expansion & respiratory effort   and no accessory muscle use  GI: soft and tenderness moderate in the RUQ  Musculoskeletal: no muscular tenderness noted  Skin: normal color  Neurological: alert, oriented x3  Psychiatric: mood and affect are within normal limits, pt is a good historian; no memory problems were noted      Significant Labs:  Recent Labs   Lab 05/29/22  1101 05/31/22  1146   HGB 10.2* 9.4*       Lab Results   Component Value Date    WBC 5.62 05/31/2022    HGB 9.4 (L) 05/31/2022    HCT 27.7 (L) 05/31/2022    MCV 88 05/31/2022     (L) 05/31/2022       Lab Results   Component Value Date     (L) 05/31/2022    K 5.0 05/31/2022     05/31/2022    CO2 25 05/31/2022    BUN 22 (H) 05/31/2022    CREATININE 1.2 05/31/2022    CALCIUM 9.1 05/31/2022    ANIONGAP 9 05/31/2022    ESTGFRAFRICA >60.0 05/31/2022    EGFRNONAA >60.0 05/31/2022       Lab Results   Component Value Date    ALT 11 05/31/2022    AST 11 05/31/2022    GGT 66 (H) 02/14/2022    ALKPHOS 64 05/31/2022    BILITOT 0.8 05/31/2022       Lab Results   Component Value Date    INR 1.1 04/26/2022    INR 1.2 04/25/2022    INR  1.3 (H) 04/25/2022       Significant Imaging:  Reviewed pertinent radiology findings.       Assessment/Plan:     58yo PMHx HTN, takes ASA, ID-T2DM, LOZANO/ EtOH cirrhosis c/p OLTx (04/24/22) immunosuppressed w/ tacro/ MMF presents w/ new onset RUQ pain.    BMs wnl w/o melena or hematochezia. No excessive NSAID use. Exam w/ moderate RUQ US. Liver enzymes unremarkable. Imaging unremarkable.     Low suspicion for ulcer at this time or biliary issue given normal imaging and LFTs    Problem List:  1. Abdominal pain    Recommendations:  - Will plan for EGD today    Thank you for involving us in the care of Pelon Vasquez. Please call with any additional questions, concerns or changes in the patient's clinical status. We will continue to follow.    Jose Roberto Knight MD  Gastroenterology Fellow PGY V  [facility]

## 2022-06-01 NOTE — ASSESSMENT & PLAN NOTE
- admitted from the ED for worsening RUQ pain  - CT a/p w/ contrast unremarkable  - GI consulted for new abdominal pain. Brought pt for EGD which was unremarkable, biopsy taken and pending.  - Cont PPI.   - Pt also c/o constipation, miralax and suppository ordered  - Monitor

## 2022-06-01 NOTE — PROVATION PATIENT INSTRUCTIONS
Discharge Summary/Instructions after an Endoscopic Procedure  Patient Name: Pelon Vasquez  Patient MRN: [id number]  Patient YOB: 1965 Wednesday, June 1, 2022  Saad Nguyễn MD  Dear patient,  As a result of recent federal legislation (The Federal Cures Act), you may   receive lab or pathology results from your procedure in your [facility]   account before your physician is able to contact you. Your physician or   their representative will relay the results to you with their   recommendations at their soonest availability.  Thank you,  RESTRICTIONS:  During your procedure today, you received medications for sedation.  These   medications may affect your judgment, balance and coordination.  Therefore,   for 24 hours, you have the following restrictions:   - DO NOT drive a car, operate machinery, make legal/financial decisions,   sign important papers or drink alcohol.    ACTIVITY:  Today: no heavy lifting, straining or running due to procedural   sedation/anesthesia.  The following day: return to full activity including work.  DIET:  Eat and drink normally unless instructed otherwise.     TREATMENT FOR COMMON SIDE EFFECTS:  - Mild abdominal pain, nausea, belching, bloating or excessive gas:  rest,   eat lightly and use a heating pad.  - Sore Throat: treat with throat lozenges and/or gargle with warm salt   water.  - Because air was used during the procedure, expelling large amounts of air   from your rectum or belching is normal.  - If a bowel prep was taken, you may not have a bowel movement for 1-3 days.    This is normal.  SYMPTOMS TO WATCH FOR AND REPORT TO YOUR PHYSICIAN:  1. Abdominal pain or bloating, other than gas cramps.  2. Chest pain.  3. Back pain.  4. Signs of infection such as: chills or fever occurring within 24 hours   after the procedure.  5. Rectal bleeding, which would show as bright red, maroon, or black stools.   (A tablespoon of blood from the rectum is not serious, especially if    hemorrhoids are present.)  6. Vomiting.  7. Weakness or dizziness.  GO DIRECTLY TO THE NEAREST EMERGENCY ROOM IF YOU HAVE ANY OF THE FOLLOWING:      Difficulty breathing              Chills and/or fever over 101 F   Persistent vomiting and/or vomiting blood   Severe abdominal pain   Severe chest pain   Black, tarry stools   Bleeding- more than one tablespoon   Any other symptom or condition that you feel may need urgent attention  Your doctor recommends these additional instructions:  If any biopsies were taken, your doctors clinic will contact you in 1 to 2   weeks with any results.  - Return patient to [facility] for [facility] care.   - Advance diet as tolerated.   - Continue present medications.   - Await pathology results.  For questions, problems or results please call your physician - Saad Nguyễn MD at Work:  [phone].  [facility], EMERGENCY ROOM PHONE NUMBER: [phone]  IF A COMPLICATION OR EMERGENCY SITUATION ARISES AND YOU ARE UNABLE TO REACH   YOUR PHYSICIAN - GO DIRECTLY TO THE EMERGENCY ROOM.  Saad Nguyễn MD  6/1/2022 2:12:55 PM  This report has been verified and signed electronically.  Dear patient,  As a result of recent federal legislation (The Federal Cures Act), you may   receive lab or pathology results from your procedure in your [facility]   account before your physician is able to contact you. Your physician or   their representative will relay the results to you with their   recommendations at their soonest availability.  Thank you,  PROVATION

## 2022-06-01 NOTE — PROGRESS NOTES
Patient meets requirements for next phase of care. No acute complaints of nausea, pain, or dyspnea. Arouses easily, oriented x 4

## 2022-06-01 NOTE — HOSPITAL COURSE
PT admitted from the ED for worsening RUQ pain. CT AP 5/31 with oral contrast unremarkable, GI consulted. Liver US 6/1 reviewed by surgeon, satisfactory. EGD 6/1 with congestive gastropathy, no other findings, biopsy taken and pending. On PPI BID. On bowel regimen for constipation (LBM 5/29).    Interval History  No acute events overnight. Reports pain is still continuing despite pain meds. Constipation continues, had small BM with enema but no relief of pain. KUB 6/2 without ileus or obstruction. Will give GI cocktail and another suppository. Encouraging patient to walk around. Patient needs to have good BM or reduction in pain before discharge. LFTs and tbili remain wnl. Will continue to monitor.

## 2022-06-01 NOTE — SUBJECTIVE & OBJECTIVE
Scheduled Meds:   bisacodyL  10 mg Rectal Daily    calcium-vitamin D3  1 tablet Oral Daily    docusate sodium  100 mg Oral BID    electrolyte-S (pH 7.4)  250 mL Intravenous Once    ketoconazole  100 mg Oral Daily    magnesium oxide  800 mg Oral BID    NIFEdipine  30 mg Oral Daily    pantoprazole  40 mg Oral BID AC    polyethylene glycol  17 g Oral Daily    sulfamethoxazole-trimethoprim 400-80mg  1 tablet Oral QAM    tacrolimus  5 mg Oral BID    valGANciclovir  450 mg Oral Daily     Continuous Infusions:  PRN Meds:acetaminophen, barium, cyclobenzaprine, dextrose 10%, dextrose 10%, glucagon (human recombinant), glucose, glucose, insulin aspart U-100, melatonin, ondansetron, oxyCODONE, sodium chloride 0.9%, sodium chloride 0.9%    Review of Systems   Constitutional:  Positive for appetite change. Negative for fatigue and fever.   HENT:  Negative for congestion and sore throat.    Respiratory:  Negative for cough, shortness of breath and wheezing.    Cardiovascular:  Negative for chest pain, palpitations and leg swelling.   Gastrointestinal:  Positive for abdominal pain and constipation. Negative for diarrhea, nausea and vomiting.   Genitourinary:  Negative for decreased urine volume, difficulty urinating, dysuria and frequency.   Musculoskeletal:  Negative for arthralgias and back pain.   Skin:  Negative for wound.   Allergic/Immunologic: Positive for immunocompromised state.   Neurological:  Negative for dizziness, syncope and weakness.   Psychiatric/Behavioral:  The patient is not nervous/anxious.    Objective:     Vital Signs (Most Recent):  Temp: 97.2 °F (36.2 °C) (06/01/22 1414)  Pulse: 97 (06/01/22 1430)  Resp: 11 (06/01/22 1430)  BP: (!) 157/79 (06/01/22 1430)  SpO2: 98 % (06/01/22 1430)   Vital Signs (24h Range):  Temp:  [97.2 °F (36.2 °C)-99.2 °F (37.3 °C)] 97.2 °F (36.2 °C)  Pulse:  [] 97  Resp:  [10-22] 11  SpO2:  [97 %-100 %] 98 %  BP: (138-184)/(8-88) 157/79     Weight: 102.8 kg (226 lb 10.1  oz)  Body mass index is 35.5 kg/m².    Intake/Output - Last 3 Shifts         05/30 0700 05/31 0659 05/31 0700 06/01 0659 06/01 0700 06/02 0659    IV Piggyback   300    Total Intake(mL/kg)   300 (2.9)    Urine (mL/kg/hr)  375     Total Output  375     Net  -375 +300                   Physical Exam  Vitals and nursing note reviewed.   Constitutional:       General: He is not in acute distress.  HENT:      Head: Normocephalic.      Mouth/Throat:      Mouth: Mucous membranes are moist.   Eyes:      General: No scleral icterus.  Cardiovascular:      Rate and Rhythm: Regular rhythm. Tachycardia present.      Pulses: Normal pulses.      Heart sounds: Normal heart sounds.   Pulmonary:      Effort: Pulmonary effort is normal.      Breath sounds: Normal breath sounds.   Abdominal:      General: Bowel sounds are normal. There is no distension.      Palpations: Abdomen is soft.      Tenderness: There is abdominal tenderness. There is no guarding.   Musculoskeletal:         General: Normal range of motion.      Right lower leg: No edema.      Left lower leg: No edema.   Skin:     General: Skin is warm and dry.   Neurological:      Mental Status: He is alert and oriented to person, place, and time.      Motor: No weakness.   Psychiatric:         Mood and Affect: Mood normal.         Behavior: Behavior normal.         Thought Content: Thought content normal.         Judgment: Judgment normal.       Laboratory:  Immunosuppressants           Stop Route Frequency     tacrolimus capsule 5 mg         -- Oral 2 times daily          CBC:   Recent Labs   Lab 06/01/22  0655   WBC 4.79   RBC 3.17*   HGB 9.6*   HCT 28.1*   *   MCV 89   MCH 30.3   MCHC 34.2     CMP:   Recent Labs   Lab 06/01/22  0655   *   CALCIUM 9.0   ALBUMIN 3.3*   PROT 6.6   *   K 4.6   CO2 24      BUN 15   CREATININE 1.0   ALKPHOS 65   ALT 11   AST 13   BILITOT 0.9     Labs within the past 24 hours have been reviewed.    Diagnostic  Results:  US Liver Transplant Post:  Results for orders placed during the hospital encounter of 05/31/22    US Doppler Liver Transplant Post (xpd)    Narrative  EXAMINATION:  US DOPPLER LIVER TRANSPLANT POST (XPD)    CLINICAL HISTORY:  Abdominal pain in new liver Tx;    TECHNIQUE:  Realtime sonographic imaging was performed with color Doppler interrogation.    COMPARISON:  Ultrasound Doppler liver transplant 05/17/2022    FINDINGS:  The patient is status post orthotopic liver transplant on 04/23/2022    The liver transplant demonstrates homogeneous echotexture.  No focal parenchymal abnormality.  Small mildly complex fluid collection in the right hepatic lobe measuring 1.1 x 1.1 x 0.9 cm.    No intra or extrahepatic bile duct dilatation. The common duct measures 4 mm.    The middle, right, and left hepatic veins are patent and unremarkable. The main, right, and left branches of the portal vein demonstrate hepatopedal flow and are unremarkable.    Hepatic arterial resistive indices are as follows: Main 0.58 (previously 0.62), Left 0.54 (previously 0.66), anterior Right 0.55 (previously 0.64), posterior Right 0.65 (previously 0.69).  There is no evidence of a tardus parvus waveform. The maximum velocity in the main hepatic artery is 96 cm/sec.    The IVC is unremarkable.  Partially evaluated right pleural effusion.    Impression  Mild interval decrease in intrahepatic resistive indices of the left and anterior right hepatic arteries, which remain within normal limits.  No tardus parvus waveforms.  Otherwise, satisfactory Doppler evaluation of the hepatic vasculature.    Stable tiny fluid collection within the right hepatic lobe.    Right pleural effusion.    Electronically signed by resident: Hansa Isbell  Date:    06/01/2022  Time:    10:07    Electronically signed by: Nitin Prince  Date:    06/01/2022  Time:    10:54

## 2022-06-01 NOTE — NURSING TRANSFER
Nursing Transfer Note      6/1/2022     Reason patient is being transferred: meets criteria for discharge    Transfer To: [id number]    Transfer via stretcher    Transfer with none    Transported by PCT    Medicines sent: none    Any special needs or follow-up needed: none    Chart send with patient: Yes    Notified: n/a    Patient reassessed at: 6/01/22

## 2022-06-01 NOTE — TELEPHONE ENCOUNTER
----- Message from Tonio Smith MD sent at 6/1/2022  2:10 PM CDT -----  Results ok. No action needed

## 2022-06-01 NOTE — ANESTHESIA POSTPROCEDURE EVALUATION
Anesthesia Post Evaluation    Patient: Pelon Vasquez    Procedure(s) Performed: Procedure(s) (LRB):  EGD (ESOPHAGOGASTRODUODENOSCOPY) (N/A)    Final Anesthesia Type: general      Patient location during evaluation: PACU  Patient participation: Yes- Able to Participate  Level of consciousness: awake and alert and awake  Post-procedure vital signs: reviewed and stable  Pain management: adequate  Airway patency: patent    PONV status at discharge: No PONV  Anesthetic complications: no      Cardiovascular status: blood pressure returned to baseline  Respiratory status: unassisted and spontaneous ventilation  Hydration status: euvolemic  Follow-up not needed.          Vitals Value Taken Time   /86 06/01/22 1446   Temp 36.8 °C (98.2 °F) 06/01/22 1445   Pulse 99 06/01/22 1447   Resp 10 06/01/22 1447   SpO2 97 % 06/01/22 1447   Vitals shown include unvalidated device data.      Event Time   Out of Recovery 14:50:00         Pain/Daly Score: Pain Rating Prior to Med Admin: 7 (6/1/2022 11:52 AM)  Pain Rating Post Med Admin: 4 (6/1/2022  5:33 AM)  Daly Score: 10 (6/1/2022  2:45 PM)

## 2022-06-01 NOTE — TRANSFER OF CARE
"Anesthesia Transfer of Care Note    Patient: Pelon Vasquez    Procedure(s) Performed: Procedure(s) (LRB):  EGD (ESOPHAGOGASTRODUODENOSCOPY) (N/A)    Patient location: PACU    Anesthesia Type: general    Transport from OR: Transported from OR on 2-3 L/min O2 by NC with adequate spontaneous ventilation    Post pain: adequate analgesia    Post assessment: no apparent anesthetic complications    Post vital signs: stable    Level of consciousness: sedated    Nausea/Vomiting: no nausea/vomiting    Complications: none    Transfer of care protocol was followed      Last vitals:   Visit Vitals  BP (!) 176/88   Pulse 97   Temp 36.7 °C (98.1 °F)   Resp 18   Ht 5' 7" (1.702 m)   Wt 102.8 kg (226 lb 10.1 oz)   SpO2 97%   BMI 35.50 kg/m²     "

## 2022-06-01 NOTE — PROGRESS NOTES
Admit / Tentative Discharge Planning Note    Met with patient and significant other Leonarda to assess needs. Patient is a Thrombocytopenia, unspecified [D69.6]  Prophylactic immunotherapy [Z29.8]  At risk for opportunistic infections [Z91.89]  Long-term use of immunosuppressant medication [Z79.899]  Essential hypertension [I10]  Anemia of chronic disease [D63.8]  Right upper quadrant abdominal pain [R10.11]  S/P liver transplant [Z94.4]  Type 2 diabetes mellitus without complication, without long-term current use of insulin [E11.9]    Patient admitted on 5/31/2022 . At this time, patient presents as alert and oriented x 4, pleasant, good eye contact, well groomed, recall good, concentration/judgement good, average intelligence, calm, communicative, cooperative and asking and answering questions appropriately. At this time, patients caregiver presents as alert and oriented x 4, pleasant, good eye contact, well groomed, recall good, concentration/judgement good, average intelligence, calm, communicative, cooperative and asking and answering questions appropriately.    Household/Family Systems     Patient resides with patient's significant other, at [address] [place] [address] [place] [id number]. Support system includes his significant other, s/o's family, and friends.   Patient does not have dependents that are need of being cared for.     Patients primary caregiver is Leonarda Vicente, patients significant otherphone number [phone]. Confirmed patients contact information is [phone] (home);   Telephone Information:   [place] [phone]     During admission, patient's caregiver plans to stay in patient's room.  Confirmed patient and patients caregivers do have access to reliable transportation.    Cognitive Status/Learning     Patient reports reading ability as college and states patient does not have difficulty with N/A.  Patient reports patient learns best by reading and hands on.    Needed: No.  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 Pt states he plans on applying for disability.     Patient reported applying for disability through work in January 2022 and applying for social security disability in March 2022.    Adherence    Patient reports a high level of adherence to patients health care regimen. Adherence counseling and education provided. Patient verbalizes understanding.    Substance Use    Patient reports the following substance usage.    Tobacco: none, patient denies any use.  Alcohol: none, patient denies any use. Pt states he stopped drinking over 9 months ago. Pt states he stopped drinking upon learning of his liver disease. Pt states he would drink a couple mixed drinks per day. Pt states he drink at that capacity for 7-8 years. Pt states he has not had any cravings or urges to drink since. Pt states that he did not have any issue with discontinuing his use and pt states that he "does not have an addictive personality" and does not have any desire to drink again. Pt states he has engaged in AA.  Illicit Drugs/Non-prescribed Medications: none, patient denies any use.    Patient states clear understanding of the potential impact of substance use. Substance abstinence/cessation counseling, education and resources provided and reviewed.     Services Utilizing/ADLS    Infusion Service: Prior to admission, patient utilizing? no  Home Health: Prior to admission, patient utilizing? no  DME: Prior to admission, no  Pulmonary/Cardiac Rehab: Prior to admission, no  Dialysis:  Prior to admission, no  Transplant Specialty Pharmacy: Prior to admission, Yes; [facility].    Prior to admission, patient reports patient was independent with ADLS and was not driving. Patient reports patient is not able to " care for self at this time due to compromised medical condition (as documented in medical record) and physical weakness. Patient indicates a willingness to care for self once medically cleared to do so.    Insurance/Medications    Insured by   Payer/Plan Subscr  Sex Relation Sub. Ins. ID Effective Group Num   1. [employer]* BRANT GRANADO 1965 Male Self [id number]                                    [address] [address]   2. [employer]* BRANT GRANADO 1965 Male Self [id number]                                   [address] [address]      Primary Insurance (for UNOS reporting): Public Insurance - Medicaid  Secondary Insurance (for UNOS reporting): None    Patient reports patient is able to obtain and afford medications at this time and at time of discharge.    Living Will/Healthcare Power of     Patient states patient has a LW and/or HCPA.  provided education regarding LW and HCPA and the completion of forms.    Coping/Mental Health    Patient is coping adequately with the aid of  family members and friends. Pt denies any issues with anxiety or depression at this time. Patient denies mental health difficulties.     Discharge Planning    At time of discharge, patient plans to return to patient's home under the care of his significant other Leonarda. Patients significant other will transport patient. Per rounds today, expected discharge date later today or tentatively tomorrow pending bowel movement and pain control. Patient does not have discharge needs at this time. Patient and patients caregiver verbalize understanding and are involved in treatment planning and discharge process.    Additional Concerns    Patient's caretaker denies additional needs and/or concerns at this time. Patient is being followed for needs, education, resources, information, emotional support, supportive counseling, and for supportive and skilled discharge plan of care.  providing ongoing  psychosocial support, education, resources and d/c planning as needed.  SW remains available.  provided resource list, patient choice, psychosocial and supportive counseling, resources, education, assistance and discharge planning with patient and caregiver involvement, ongoing SW availability and services as appropriate. Patient's caregiver verbalizes understanding and agreement with information reviewed,  availability and how to access available resources as needed. Patient denies additional needs and/or concerns at this time. Patient verbalizes understanding and agreement with information reviewed, social work availability, and how to access available resources as needed.

## 2022-06-01 NOTE — CARE UPDATE
BG goal 140-180    -A1C   Lab Results   Component Value Date    HGBA1C 6.6 (H) 04/19/2022         -HOME REGIMEN: novolog 4 units plus low correction scale 150/50     -INPATIENT REGIMEN: correction scale     -GLUCOSE TREND FOR THE PAST 24HRS: 109-161    -NO HYPOGYCEMIAS NOTED     -TOLERATING % OF PO DIET     -Diet: npo for ultrasound     -Steroids - none       Remains in TSU, NAEON. Ultrasound this AM. BG reasonably well controlled without insulin.       Plan:  BG monitoring ac/hs and low dose correction scale.   Monitor for excursions once diet resumed.       Discharge planning:tbd     Endocrine to continue to follow    ** Please call Endocrine for any BG related issues **

## 2022-06-01 NOTE — PROGRESS NOTES
Notified ZARINA Damian of patient experiencing 9/10 R upper abdominal pain 1.5 hours after Oxy 10mg and also requesting something to have a BM.  New orders received.  Will continue to monitor patient.

## 2022-06-02 PROBLEM — K59.00 CONSTIPATION: Status: ACTIVE | Noted: 2022-06-02

## 2022-06-02 LAB
ALBUMIN SERPL BCP-MCNC: 3.3 G/DL (ref 3.5–5.2)
ALP SERPL-CCNC: 63 U/L (ref 55–135)
ALT SERPL W/O P-5'-P-CCNC: 10 U/L (ref 10–44)
ANION GAP SERPL CALC-SCNC: 7 MMOL/L (ref 8–16)
AST SERPL-CCNC: 13 U/L (ref 10–40)
BASOPHILS # BLD AUTO: 0.03 K/UL (ref 0–0.2)
BASOPHILS NFR BLD: 0.7 % (ref 0–1.9)
BILIRUB SERPL-MCNC: 0.8 MG/DL (ref 0.1–1)
BUN SERPL-MCNC: 13 MG/DL (ref 6–20)
CALCIUM SERPL-MCNC: 9.3 MG/DL (ref 8.7–10.5)
CHLORIDE SERPL-SCNC: 101 MMOL/L (ref 95–110)
CO2 SERPL-SCNC: 26 MMOL/L (ref 23–29)
CREAT SERPL-MCNC: 1.2 MG/DL (ref 0.5–1.4)
DIFFERENTIAL METHOD: ABNORMAL
EOSINOPHIL # BLD AUTO: 0.1 K/UL (ref 0–0.5)
EOSINOPHIL NFR BLD: 3 % (ref 0–8)
ERYTHROCYTE [DISTWIDTH] IN BLOOD BY AUTOMATED COUNT: 15.8 % (ref 11.5–14.5)
EST. GFR  (AFRICAN AMERICAN): >60 ML/MIN/1.73 M^2
EST. GFR  (NON AFRICAN AMERICAN): >60 ML/MIN/1.73 M^2
GLUCOSE SERPL-MCNC: 123 MG/DL (ref 70–110)
HCT VFR BLD AUTO: 28.1 % (ref 40–54)
HGB BLD-MCNC: 9.7 G/DL (ref 14–18)
IMM GRANULOCYTES # BLD AUTO: 0.01 K/UL (ref 0–0.04)
IMM GRANULOCYTES NFR BLD AUTO: 0.2 % (ref 0–0.5)
LYMPHOCYTES # BLD AUTO: 0.7 K/UL (ref 1–4.8)
LYMPHOCYTES NFR BLD: 16.3 % (ref 18–48)
MAGNESIUM SERPL-MCNC: 2 MG/DL (ref 1.6–2.6)
MCH RBC QN AUTO: 30 PG (ref 27–31)
MCHC RBC AUTO-ENTMCNC: 34.5 G/DL (ref 32–36)
MCV RBC AUTO: 87 FL (ref 82–98)
MONOCYTES # BLD AUTO: 0.5 K/UL (ref 0.3–1)
MONOCYTES NFR BLD: 11.3 % (ref 4–15)
NEUTROPHILS # BLD AUTO: 2.8 K/UL (ref 1.8–7.7)
NEUTROPHILS NFR BLD: 68.5 % (ref 38–73)
NRBC BLD-RTO: 0 /100 WBC
PHOSPHATE SERPL-MCNC: 4.7 MG/DL (ref 2.7–4.5)
PLATELET # BLD AUTO: 122 K/UL (ref 150–450)
PMV BLD AUTO: 7.9 FL (ref 9.2–12.9)
POCT GLUCOSE: 126 MG/DL (ref 70–110)
POCT GLUCOSE: 140 MG/DL (ref 70–110)
POCT GLUCOSE: 175 MG/DL (ref 70–110)
POCT GLUCOSE: 184 MG/DL (ref 70–110)
POTASSIUM SERPL-SCNC: 4.5 MMOL/L (ref 3.5–5.1)
PROT SERPL-MCNC: 6.5 G/DL (ref 6–8.4)
RBC # BLD AUTO: 3.23 M/UL (ref 4.6–6.2)
SODIUM SERPL-SCNC: 134 MMOL/L (ref 136–145)
TACROLIMUS BLD-MCNC: 9.1 NG/ML (ref 5–15)
WBC # BLD AUTO: 4.06 K/UL (ref 3.9–12.7)

## 2022-06-02 PROCEDURE — 25000003 PHARM REV CODE 250: Performed by: PHYSICIAN ASSISTANT

## 2022-06-02 PROCEDURE — 99900035 HC TECH TIME PER 15 MIN (STAT)

## 2022-06-02 PROCEDURE — 63600175 PHARM REV CODE 636 W HCPCS

## 2022-06-02 PROCEDURE — 80197 ASSAY OF TACROLIMUS: CPT | Performed by: PHYSICIAN ASSISTANT

## 2022-06-02 PROCEDURE — 96372 THER/PROPH/DIAG INJ SC/IM: CPT

## 2022-06-02 PROCEDURE — 99214 PR OFFICE/OUTPT VISIT, EST, LEVL IV, 30-39 MIN: ICD-10-PCS | Mod: ,,, | Performed by: NURSE PRACTITIONER

## 2022-06-02 PROCEDURE — 80053 COMPREHEN METABOLIC PANEL: CPT | Performed by: PHYSICIAN ASSISTANT

## 2022-06-02 PROCEDURE — 84100 ASSAY OF PHOSPHORUS: CPT | Performed by: PHYSICIAN ASSISTANT

## 2022-06-02 PROCEDURE — 99214 PR OFFICE/OUTPT VISIT, EST, LEVL IV, 30-39 MIN: ICD-10-PCS | Mod: ,,,

## 2022-06-02 PROCEDURE — 63600175 PHARM REV CODE 636 W HCPCS: Performed by: NURSE PRACTITIONER

## 2022-06-02 PROCEDURE — 99214 OFFICE O/P EST MOD 30 MIN: CPT | Mod: ,,, | Performed by: NURSE PRACTITIONER

## 2022-06-02 PROCEDURE — 94761 N-INVAS EAR/PLS OXIMETRY MLT: CPT

## 2022-06-02 PROCEDURE — 85025 COMPLETE CBC W/AUTO DIFF WBC: CPT | Performed by: PHYSICIAN ASSISTANT

## 2022-06-02 PROCEDURE — 63600175 PHARM REV CODE 636 W HCPCS: Performed by: PHYSICIAN ASSISTANT

## 2022-06-02 PROCEDURE — G0378 HOSPITAL OBSERVATION PER HR: HCPCS

## 2022-06-02 PROCEDURE — 25000003 PHARM REV CODE 250

## 2022-06-02 PROCEDURE — 83735 ASSAY OF MAGNESIUM: CPT | Performed by: PHYSICIAN ASSISTANT

## 2022-06-02 PROCEDURE — 36415 COLL VENOUS BLD VENIPUNCTURE: CPT | Performed by: PHYSICIAN ASSISTANT

## 2022-06-02 PROCEDURE — 96372 THER/PROPH/DIAG INJ SC/IM: CPT | Performed by: NURSE PRACTITIONER

## 2022-06-02 PROCEDURE — 99214 OFFICE O/P EST MOD 30 MIN: CPT | Mod: ,,,

## 2022-06-02 PROCEDURE — 25000003 PHARM REV CODE 250: Performed by: EMERGENCY MEDICINE

## 2022-06-02 RX ORDER — NIFEDIPINE 30 MG/1
60 TABLET, EXTENDED RELEASE ORAL DAILY
Status: DISCONTINUED | OUTPATIENT
Start: 2022-06-03 | End: 2022-06-03 | Stop reason: HOSPADM

## 2022-06-02 RX ORDER — LIDOCAINE HYDROCHLORIDE 20 MG/ML
10 SOLUTION OROPHARYNGEAL ONCE
Status: COMPLETED | OUTPATIENT
Start: 2022-06-02 | End: 2022-06-02

## 2022-06-02 RX ORDER — INSULIN ASPART 100 [IU]/ML
3 INJECTION, SOLUTION INTRAVENOUS; SUBCUTANEOUS
Status: DISCONTINUED | OUTPATIENT
Start: 2022-06-02 | End: 2022-06-03 | Stop reason: HOSPADM

## 2022-06-02 RX ORDER — MAG HYDROX/ALUMINUM HYD/SIMETH 200-200-20
30 SUSPENSION, ORAL (FINAL DOSE FORM) ORAL ONCE
Status: COMPLETED | OUTPATIENT
Start: 2022-06-02 | End: 2022-06-02

## 2022-06-02 RX ORDER — PSEUDOEPHEDRINE/ACETAMINOPHEN 30MG-500MG
100 TABLET ORAL
Status: DISPENSED | OUTPATIENT
Start: 2022-06-02 | End: 2022-06-02

## 2022-06-02 RX ORDER — HEPARIN SODIUM 5000 [USP'U]/ML
5000 INJECTION, SOLUTION INTRAVENOUS; SUBCUTANEOUS EVERY 8 HOURS
Status: DISCONTINUED | OUTPATIENT
Start: 2022-06-02 | End: 2022-06-03 | Stop reason: HOSPADM

## 2022-06-02 RX ORDER — SYRING-NEEDL,DISP,INSUL,0.3 ML 29 G X1/2"
296 SYRINGE, EMPTY DISPOSABLE MISCELLANEOUS
Status: DISPENSED | OUTPATIENT
Start: 2022-06-02 | End: 2022-06-02

## 2022-06-02 RX ORDER — NIFEDIPINE 30 MG/1
30 TABLET, EXTENDED RELEASE ORAL DAILY
Status: DISCONTINUED | OUTPATIENT
Start: 2022-06-02 | End: 2022-06-02

## 2022-06-02 RX ADMIN — PANTOPRAZOLE SODIUM 40 MG: 40 TABLET, DELAYED RELEASE ORAL at 04:06

## 2022-06-02 RX ADMIN — NIFEDIPINE 30 MG: 30 TABLET, FILM COATED, EXTENDED RELEASE ORAL at 11:06

## 2022-06-02 RX ADMIN — LIDOCAINE HYDROCHLORIDE 10 ML: 20 SOLUTION ORAL; TOPICAL at 11:06

## 2022-06-02 RX ADMIN — POLYETHYLENE GLYCOL 3350 17 G: 17 POWDER, FOR SOLUTION ORAL at 12:06

## 2022-06-02 RX ADMIN — Medication 800 MG: at 09:06

## 2022-06-02 RX ADMIN — BISACODYL 10 MG: 10 SUPPOSITORY RECTAL at 11:06

## 2022-06-02 RX ADMIN — Medication 1 TABLET: at 09:06

## 2022-06-02 RX ADMIN — SULFAMETHOXAZOLE AND TRIMETHOPRIM 1 TABLET: 400; 80 TABLET ORAL at 06:06

## 2022-06-02 RX ADMIN — PANTOPRAZOLE SODIUM 40 MG: 40 TABLET, DELAYED RELEASE ORAL at 06:06

## 2022-06-02 RX ADMIN — HEPARIN SODIUM 5000 UNITS: 5000 INJECTION INTRAVENOUS; SUBCUTANEOUS at 08:06

## 2022-06-02 RX ADMIN — DOCUSATE SODIUM 100 MG: 100 CAPSULE ORAL at 09:06

## 2022-06-02 RX ADMIN — OXYCODONE HYDROCHLORIDE 10 MG: 10 TABLET ORAL at 04:06

## 2022-06-02 RX ADMIN — NIFEDIPINE 30 MG: 30 TABLET, FILM COATED, EXTENDED RELEASE ORAL at 08:06

## 2022-06-02 RX ADMIN — DOCUSATE SODIUM 100 MG: 100 CAPSULE ORAL at 08:06

## 2022-06-02 RX ADMIN — OXYCODONE HYDROCHLORIDE 10 MG: 10 TABLET ORAL at 01:06

## 2022-06-02 RX ADMIN — ONDANSETRON 8 MG: 8 TABLET, ORALLY DISINTEGRATING ORAL at 08:06

## 2022-06-02 RX ADMIN — OXYCODONE HYDROCHLORIDE 10 MG: 10 TABLET ORAL at 08:06

## 2022-06-02 RX ADMIN — KETOCONAZOLE 100 MG: 200 TABLET ORAL at 09:06

## 2022-06-02 RX ADMIN — Medication 6 MG: at 08:06

## 2022-06-02 RX ADMIN — TACROLIMUS 5 MG: 1 CAPSULE ORAL at 09:06

## 2022-06-02 RX ADMIN — VALGANCICLOVIR 450 MG: 450 TABLET, FILM COATED ORAL at 09:06

## 2022-06-02 RX ADMIN — OXYCODONE HYDROCHLORIDE 10 MG: 10 TABLET ORAL at 12:06

## 2022-06-02 RX ADMIN — HEPARIN SODIUM 5000 UNITS: 5000 INJECTION INTRAVENOUS; SUBCUTANEOUS at 03:06

## 2022-06-02 RX ADMIN — ALUMINUM HYDROXIDE, MAGNESIUM HYDROXIDE, AND SIMETHICONE 30 ML: 200; 200; 20 SUSPENSION ORAL at 11:06

## 2022-06-02 RX ADMIN — OXYCODONE HYDROCHLORIDE 10 MG: 10 TABLET ORAL at 07:06

## 2022-06-02 RX ADMIN — INSULIN ASPART 3 UNITS: 100 INJECTION, SOLUTION INTRAVENOUS; SUBCUTANEOUS at 06:06

## 2022-06-02 RX ADMIN — TACROLIMUS 5 MG: 1 CAPSULE ORAL at 06:06

## 2022-06-02 NOTE — ASSESSMENT & PLAN NOTE
- admitted from the ED for worsening RUQ pain  - CT a/p w/ contrast unremarkable  - GI consulted for new abdominal pain. Brought pt for EGD 6/1 which was unremarkable, biopsy taken and pending.  - KUB 6/2 without ileus or obstruction   - Cont PPI  - Monitor

## 2022-06-02 NOTE — PROGRESS NOTES
"[facility] - Transplant Stepdown  Endocrinology  Progress Note    Admit Date: 5/31/2022     Reason for Consult: Management of T2DM, Hyperglycemia      Surgical Procedure and Date: Liver Transplant 4/24/22     Diabetes diagnosis year: 6 years ago     Home Diabetes Medications:  novolog 4 units plus low correction scale 150/50     How often checking glucose at home?  3 times a day  BG readings on regimen: 130-200s   Hypoglycemia on the regimen?  No  Missed doses on regimen? no     Diabetes Complications include:     CKD      Complicating diabetes co morbidities:   CKD, Cirrhosis, Obesity, BRAYDON, Glucocorticoid Use         HPI:   Patient is a 57 y.o. male with a diagnosis of type 2 DM, HTN, BRAYDON, DM, cirrhosis s/p liver transplant 4/23/22 who presents to the ED with chief complaint of abdominal pain. Endocrinology consulted for management of T2DM.       Interval HPI:   Overnight events: In TSU. NAEON. BG at or slightly above goal with prn correction scale.  Continues with abdominal pain.   Eating:  Diet diabetic [facility]; 2000 Calorie   50%  Nausea: No  Hypoglycemia and intervention: No  Fever: No  TPN and/or TF: No    BP (!) 162/82 (BP Location: Right arm, Patient Position: Lying)   Pulse 101   Temp 98.4 °F (36.9 °C) (Oral)   Resp 18   Ht 5' 7" (1.702 m)   Wt 98 kg (216 lb 0.8 oz)   SpO2 97%   BMI 33.84 kg/m²     Labs Reviewed and Include    Recent Labs   Lab 06/01/22  0655   *   CALCIUM 9.0   ALBUMIN 3.3*   PROT 6.6   *   K 4.6   CO2 24      BUN 15   CREATININE 1.0   ALKPHOS 65   ALT 11   AST 13   BILITOT 0.9     Lab Results   Component Value Date    WBC 4.79 06/01/2022    HGB 9.6 (L) 06/01/2022    HCT 28.1 (L) 06/01/2022    MCV 89 06/01/2022     (L) 06/01/2022     No results for input(s): TSH, FREET4 in the last 168 hours.  Lab Results   Component Value Date    HGBA1C 6.6 (H) 04/19/2022       Nutritional status:   Body mass index is 33.84 kg/m².  Lab Results   Component Value Date "    ALBUMIN 3.3 (L) 06/01/2022    ALBUMIN 3.4 (L) 05/31/2022    ALBUMIN 3.4 05/29/2022     No results found for: PREALBUMIN    Estimated Creatinine Clearance: 93.1 mL/min (based on SCr of 1 mg/dL).    Accu-Checks  Recent Labs     05/31/22  1440 05/31/22  1720 05/31/22  2249 06/01/22  0825 06/01/22  1151 06/01/22  1418 06/01/22 2012   POCTGLUCOSE 109 127* 161* 140* 137* 127* 205*       Current Medications and/or Treatments Impacting Glycemic Control  Immunotherapy:    Immunosuppressants           Stop Route Frequency     tacrolimus capsule 5 mg         -- Oral 2 times daily          Steroids:   Hormones (From admission, onward)                Start     Stop Route Frequency Ordered    05/31/22 1456  melatonin tablet 6 mg         -- Oral Nightly PRN 05/31/22 1400          Pressors:    Autonomic Drugs (From admission, onward)                None          Hyperglycemia/Diabetes Medications:   Antihyperglycemics (From admission, onward)                Start     Stop Route Frequency Ordered    06/02/22 0800  insulin aspart U-100 pen 3 Units         -- SubQ 3 times daily with meals 06/02/22 0651    05/31/22 1502  insulin aspart U-100 pen 0-5 Units         -- SubQ Before meals & nightly PRN 05/31/22 1402            ASSESSMENT and PLAN    * Right upper quadrant abdominal pain  Managed per primary.   May impact BG.       Type 2 diabetes mellitus without complication, without long-term current use of insulin  Bg goal 140-180       Ac/hs BG checks and low dose correction scale   start novolog 3 units with meals; hold if patient nauseated/ questionable about eating.       S/P liver transplant  Managed per primary.   avoid hypoglycemia        Prophylactic immunotherapy  May increase insulin resistance.             Sandrine Nix, NP  Endocrinology  [facility] - Transplant Stepdown

## 2022-06-02 NOTE — PLAN OF CARE
Plan of care reviewed on am rounds, afrebrile, vss wbc 4  H/h stable K 4.5 Cr 1.2 LFTS normal range Blood glucoses 100's range.. Patient remains with c/o ruq pain 8-10/10 described as stabbing sensation, mild relieef with po Oxy, KUB done, Miralax/ Dulculox given scheduled, OK to hold off on enema admin per PA, patient aware it is available if no results from above. No appetite today, some nausea this am relieved with prn Zofran.. Up ambulatory in [place] with steady gait, wife at bs, emotional support provided to both

## 2022-06-02 NOTE — SUBJECTIVE & OBJECTIVE
Scheduled Meds:   bisacodyL  10 mg Rectal Daily    calcium-vitamin D3  1 tablet Oral Daily    docusate sodium  100 mg Oral BID    glycerin 99.5%  100 mL Rectal ED 1 Time    And    magnesium citrate  296 mL Rectal ED 1 Time    And    sodium chloride 0.9%  500 mL Rectal ED 1 Time    insulin aspart U-100  3 Units Subcutaneous TIDWM    ketoconazole  100 mg Oral Daily    NIFEdipine  30 mg Oral Daily    [START ON 6/3/2022] NIFEdipine  60 mg Oral Daily    pantoprazole  40 mg Oral BID AC    polyethylene glycol  17 g Oral Daily    sulfamethoxazole-trimethoprim 400-80mg  1 tablet Oral QAM    tacrolimus  5 mg Oral BID    valGANciclovir  450 mg Oral Daily     Continuous Infusions:  PRN Meds:acetaminophen, barium, cyclobenzaprine, dextrose 10%, dextrose 10%, glucagon (human recombinant), glucose, glucose, insulin aspart U-100, melatonin, ondansetron, oxyCODONE, oxyCODONE, sodium chloride 0.9%, sodium chloride 0.9%    Review of Systems   Constitutional:  Positive for appetite change. Negative for fatigue and fever.   HENT:  Negative for congestion and sore throat.    Respiratory:  Negative for cough, shortness of breath and wheezing.    Cardiovascular:  Negative for chest pain, palpitations and leg swelling.   Gastrointestinal:  Positive for abdominal pain and constipation. Negative for diarrhea, nausea and vomiting.   Genitourinary:  Negative for decreased urine volume, difficulty urinating, dysuria and frequency.   Musculoskeletal:  Negative for arthralgias and back pain.   Skin:  Negative for wound.   Allergic/Immunologic: Positive for immunocompromised state.   Neurological:  Negative for dizziness, syncope and weakness.   Psychiatric/Behavioral:  The patient is not nervous/anxious.    Objective:     Vital Signs (Most Recent):  Temp: 98.6 °F (37 °C) (06/02/22 1231)  Pulse: 100 (06/02/22 1231)  Resp: 18 (06/02/22 1231)  BP: (!) 162/81 (06/02/22 1231)  SpO2: 95 % (06/02/22 1231)   Vital Signs (24h Range):  Temp:  [97.2 °F (36.2  °C)-98.8 °F (37.1 °C)] 98.6 °F (37 °C)  Pulse:  [] 100  Resp:  [10-26] 18  SpO2:  [95 %-100 %] 95 %  BP: (138-188)/(8-99) 162/81     Weight: 98 kg (216 lb 0.8 oz)  Body mass index is 33.84 kg/m².    Intake/Output - Last 3 Shifts         05/31 0700  06/01 0659 06/01 0700  06/02 0659 06/02 0700  06/03 0659    P.O.  680 270    IV Piggyback  300     Total Intake(mL/kg)  980 (10) 270 (2.8)    Urine (mL/kg/hr) 375 1950 (0.8) 100 (0.2)    Emesis/NG output  0     Stool  0     Total Output 375 1950 100    Net -375 -970 +170           Urine Occurrence  1 x     Stool Occurrence  2 x             Physical Exam  Vitals and nursing note reviewed.   Constitutional:       General: He is not in acute distress.  HENT:      Head: Normocephalic.      Mouth/Throat:      Mouth: Mucous membranes are moist.   Eyes:      General: No scleral icterus.  Cardiovascular:      Rate and Rhythm: Normal rate and regular rhythm.      Pulses: Normal pulses.      Heart sounds: Normal heart sounds.   Pulmonary:      Effort: Pulmonary effort is normal.      Breath sounds: Normal breath sounds.   Abdominal:      General: Bowel sounds are normal. There is no distension.      Palpations: Abdomen is soft. There is no mass.      Tenderness: There is abdominal tenderness (RUQ). There is no guarding.      Hernia: No hernia is present.   Musculoskeletal:         General: Normal range of motion.      Right lower leg: No edema.      Left lower leg: No edema.   Skin:     General: Skin is warm and dry.   Neurological:      Mental Status: He is alert and oriented to person, place, and time.      Motor: No weakness.   Psychiatric:         Mood and Affect: Mood normal.         Behavior: Behavior normal.         Thought Content: Thought content normal.         Judgment: Judgment normal.       Laboratory:  Immunosuppressants           Stop Route Frequency     tacrolimus capsule 5 mg         -- Oral 2 times daily          CBC:   Recent Labs   Lab 06/02/22  0723   WBC  4.06   RBC 3.23*   HGB 9.7*   HCT 28.1*   *   MCV 87   MCH 30.0   MCHC 34.5     CMP:   Recent Labs   Lab 06/02/22  0723   *   CALCIUM 9.3   ALBUMIN 3.3*   PROT 6.5   *   K 4.5   CO2 26      BUN 13   CREATININE 1.2   ALKPHOS 63   ALT 10   AST 13   BILITOT 0.8     Labs within the past 24 hours have been reviewed.    Diagnostic Results:  CT Abd/Pelvis: Results for orders placed during the hospital encounter of 05/31/22    CT Abdomen Pelvis  Without Contrast    Narrative  EXAMINATION:  CT ABDOMEN PELVIS WITHOUT CONTRAST    CLINICAL HISTORY:  RLQ abdominal pain (Age >= 14y);    TECHNIQUE:  Routine axial CT images of the abdomen and pelvis were obtained without the use of IV contrast.  Sagittal and coronal reformatted images were also acquired.    COMPARISON:  Ultrasound Doppler liver transplant post 05/17/2022, MRI abdomen with without contrast 03/17/2022    FINDINGS:  Heart: The heart is normal in size with no pericardial effusion or calcification.  Partially visualized coronary artery calcification.    Lungs: Small right pleural effusion with associated compressive atelectasis.  Lungs demonstrate no evidence for consolidation or pneumothorax.  No suspicious pulmonary nodules or pulmonary masses.    Liver: Postoperative change including orthotopic liver transplant.  Liver allograft demonstrates no focal parenchymal abnormality.    Gallbladder: Surgically absent.    Bile ducts: No evidence of dilated ducts.    Pancreas: No mass or peripancreatic fat stranding.    Spleen: The spleen is upper limits of normal in size with no focal splenic lesions.    Adrenals: Normal.    GI Tract/ Mesentery: Gastroesophageal junction is unremarkable.  The stomach is filled with high density previously ingested contrast material.  Visualized stomach, duodenum, small bowel, and colon demonstrate no evidence for obstructive or inflammatory process.  Multiple colonic diverticula without evidence for diverticulitis.   The appendix is visualized without evidence for appendicitis.  Scattered regions of mesenteric haziness and adenopathy, which may reflect reactive inflammatory change.  No ascites or free intra-abdominal air.  No pneumatosis intestinalis.    Kidneys/Ureters: Normal in size and location. No hydronephrosis.  Punctate left nonobstructing renal stone (axial series 2, image 80).  No ureteral dilatation.    Bladder: No evidence of wall thickening.    Reproductive organs: Normal.    Retroperitoneum: Few prominent retroperitoneal lymph nodes without evidence for pathologic adenopathy.    Abdominal wall: Small bilateral fat containing inguinal hernias.    Vasculature: The visualized aorta demonstrates mild calcific atherosclerosis.  No evidence for aneurysm.    Bones: No acute fracture. Age-appropriate degenerative changes.    Impression  No acute abdominal pathology.    Postoperative change including orthotopic liver transplant with no focal parenchymal abnormalities.    Small right pleural effusion with associated compressive atelectasis.    Mild splenomegaly.    Mild scattered mesenteric haziness and adenopathy without evidence for ascites or free intra-abdominal air.  No pneumatosis intestinalis.  Findings may relate to reactive etiology.    Punctate left nonobstructing renal stone.    Electronically signed by resident: Bal Reilly  Date:    05/31/2022  Time:    15:59    Electronically signed by: Win Manzo MD  Date:    05/31/2022  Time:    16:35    US Liver Transplant Post:  Results for orders placed during the hospital encounter of 05/31/22    US Doppler Liver Transplant Post (xpd)    Narrative  EXAMINATION:  US DOPPLER LIVER TRANSPLANT POST (XPD)    CLINICAL HISTORY:  Abdominal pain in new liver Tx;    TECHNIQUE:  Realtime sonographic imaging was performed with color Doppler interrogation.    COMPARISON:  Ultrasound Doppler liver transplant 05/17/2022    FINDINGS:  The patient is status post orthotopic liver  transplant on 04/23/2022    The liver transplant demonstrates homogeneous echotexture.  No focal parenchymal abnormality.  Small mildly complex fluid collection in the right hepatic lobe measuring 1.1 x 1.1 x 0.9 cm.    No intra or extrahepatic bile duct dilatation. The common duct measures 4 mm.    The middle, right, and left hepatic veins are patent and unremarkable. The main, right, and left branches of the portal vein demonstrate hepatopedal flow and are unremarkable.    Hepatic arterial resistive indices are as follows: Main 0.58 (previously 0.62), Left 0.54 (previously 0.66), anterior Right 0.55 (previously 0.64), posterior Right 0.65 (previously 0.69).  There is no evidence of a tardus parvus waveform. The maximum velocity in the main hepatic artery is 96 cm/sec.    The IVC is unremarkable.  Partially evaluated right pleural effusion.    Impression  Mild interval decrease in intrahepatic resistive indices of the left and anterior right hepatic arteries, which remain within normal limits.  No tardus parvus waveforms.  Otherwise, satisfactory Doppler evaluation of the hepatic vasculature.    Stable tiny fluid collection within the right hepatic lobe.    Right pleural effusion.    Electronically signed by resident: Hansa Isbell  Date:    06/01/2022  Time:    10:07    Electronically signed by: Nitin Prinec  Date:    06/01/2022  Time:    10:54    I have personally reviewed all pertinent imaging studies.    Debility/Functional status: No debility noted.

## 2022-06-02 NOTE — PLAN OF CARE
Pt AAOx4, VSS, afebrile.  at bedtime, SSI given. Pt c/o RUQ abdominal pain that radiates to the back - PRN oxycodone given q4h. GI bx pending. Fall risk precautions maintained. Pt is independent. Pt in lowest bed position setting, lighting adjusted, pt to wear nonskid socks when ambulating, side rails up x2.  Pt remain free from falls during shift. Spouse at bedside and active in care. Call light within reach. Will continue to monitor.

## 2022-06-02 NOTE — PROGRESS NOTES
[facility] Step[facility]  Liver Transplant  Progress Note    Patient Name: Pelon Vasquez  MRN: [id number]  Admission Date: 5/31/2022  Hospital Length of Stay: 0 days  Code Status: Full Code  Primary Care Provider: Primary Doctor No  Post-Operative Day: 39    ORGAN:   LIVER  Disease Etiology: Cirrhosis: Other, Specify  Donor Type:   Donation after Brain Death  CDC High Risk:   No  Donor CMV Status:   Donor CMV Status: Positive  Donor HBcAB:   Negative  Donor HCV Status:   Negative  Donor HBV MATHEUS: Negative  Donor HCV MATHEUS: Negative  Whole or Partial: Whole Liver  Biliary Anastomosis: End to End  Arterial Anatomy: Completely Replaced Circulation  Subjective:     History of Present Illness:  Mr. Pelon Vasquez is s/p OLTx 4/24/22 2/2 LOZANO & ETOH (steroid induction, CMV +/+). Operation straight forward per op note. Post op SICU course notable for SVT requiring adenosine for conversation back to NSR. After stepping down to TSU, LFTs trended down and renal function stabilized. Patient reports a sudden onset of pain in abdominal RUQ on 5/28/22, prompting him to visit OSH where he received unremarkable labs and a CT AP without contrast that showed inflammation and fluid surrounding the liver. Post op USs have been stable with very small collections. He describes the pain as constant but with occasional stronger cramps that feel more stabbing. He reports NV, constipation, RUQ pain/swelling. Denies changes in urinary habits, fever, chills, trauma. LFTs WNL, CBC stable, will get a repeat CT with oral contrast and place patient in observation.       Hospital Course:  PT admitted from the ED for worsening RUQ pain. CT AP 5/31 with oral contrast unremarkable, GI consulted. Liver US 6/1 reviewed by surgeon, ankit. EGD 6/1 with congestive gastropathy, no other findings, biopsy taken and pending. On PPI BID. On bowel regimen for constipation (LBM 5/29).    Interval History  No acute events overnight. Reports pain is still  continuing despite pain meds. Constipation continues, had small BM with enema but no relief of pain. KUB 6/2 without ileus or obstruction. Will give GI cocktail and another suppository. Encouraging patient to walk around. Patient needs to have good BM or reduction in pain before discharge. LFTs and tbili remain wnl. Will continue to monitor.        Scheduled Meds:   bisacodyL  10 mg Rectal Daily    calcium-vitamin D3  1 tablet Oral Daily    docusate sodium  100 mg Oral BID    glycerin 99.5%  100 mL Rectal ED 1 Time    And    magnesium citrate  296 mL Rectal ED 1 Time    And    sodium chloride 0.9%  500 mL Rectal ED 1 Time    insulin aspart U-100  3 Units Subcutaneous TIDWM    ketoconazole  100 mg Oral Daily    NIFEdipine  30 mg Oral Daily    [START ON 6/3/2022] NIFEdipine  60 mg Oral Daily    pantoprazole  40 mg Oral BID AC    polyethylene glycol  17 g Oral Daily    sulfamethoxazole-trimethoprim 400-80mg  1 tablet Oral QAM    tacrolimus  5 mg Oral BID    valGANciclovir  450 mg Oral Daily     Continuous Infusions:  PRN Meds:acetaminophen, barium, cyclobenzaprine, dextrose 10%, dextrose 10%, glucagon (human recombinant), glucose, glucose, insulin aspart U-100, melatonin, ondansetron, oxyCODONE, oxyCODONE, sodium chloride 0.9%, sodium chloride 0.9%    Review of Systems   Constitutional:  Positive for appetite change. Negative for fatigue and fever.   HENT:  Negative for congestion and sore throat.    Respiratory:  Negative for cough, shortness of breath and wheezing.    Cardiovascular:  Negative for chest pain, palpitations and leg swelling.   Gastrointestinal:  Positive for abdominal pain and constipation. Negative for diarrhea, nausea and vomiting.   Genitourinary:  Negative for decreased urine volume, difficulty urinating, dysuria and frequency.   Musculoskeletal:  Negative for arthralgias and back pain.   Skin:  Negative for wound.   Allergic/Immunologic: Positive for immunocompromised state.    Neurological:  Negative for dizziness, syncope and weakness.   Psychiatric/Behavioral:  The patient is not nervous/anxious.    Objective:     Vital Signs (Most Recent):  Temp: 98.6 °F (37 °C) (06/02/22 1231)  Pulse: 100 (06/02/22 1231)  Resp: 18 (06/02/22 1231)  BP: (!) 162/81 (06/02/22 1231)  SpO2: 95 % (06/02/22 1231)   Vital Signs (24h Range):  Temp:  [97.2 °F (36.2 °C)-98.8 °F (37.1 °C)] 98.6 °F (37 °C)  Pulse:  [] 100  Resp:  [10-26] 18  SpO2:  [95 %-100 %] 95 %  BP: (138-188)/(8-99) 162/81     Weight: 98 kg (216 lb 0.8 oz)  Body mass index is 33.84 kg/m².    Intake/Output - Last 3 Shifts         05/31 0700  06/01 0659 06/01 0700  06/02 0659 06/02 0700  06/03 0659    P.O.  680 270    IV Piggyback  300     Total Intake(mL/kg)  980 (10) 270 (2.8)    Urine (mL/kg/hr) 375 1950 (0.8) 100 (0.2)    Emesis/NG output  0     Stool  0     Total Output 375 1950 100    Net -375 -970 +170           Urine Occurrence  1 x     Stool Occurrence  2 x             Physical Exam  Vitals and nursing note reviewed.   Constitutional:       General: He is not in acute distress.  HENT:      Head: Normocephalic.      Mouth/Throat:      Mouth: Mucous membranes are moist.   Eyes:      General: No scleral icterus.  Cardiovascular:      Rate and Rhythm: Normal rate and regular rhythm.      Pulses: Normal pulses.      Heart sounds: Normal heart sounds.   Pulmonary:      Effort: Pulmonary effort is normal.      Breath sounds: Normal breath sounds.   Abdominal:      General: Bowel sounds are normal. There is no distension.      Palpations: Abdomen is soft. There is no mass.      Tenderness: There is abdominal tenderness (RUQ). There is no guarding.      Hernia: No hernia is present.   Musculoskeletal:         General: Normal range of motion.      Right lower leg: No edema.      Left lower leg: No edema.   Skin:     General: Skin is warm and dry.   Neurological:      Mental Status: He is alert and oriented to person, place, and time.       Motor: No weakness.   Psychiatric:         Mood and Affect: Mood normal.         Behavior: Behavior normal.         Thought Content: Thought content normal.         Judgment: Judgment normal.       Laboratory:  Immunosuppressants           Stop Route Frequency     tacrolimus capsule 5 mg         -- Oral 2 times daily          CBC:   Recent Labs   Lab 06/02/22  0723   WBC 4.06   RBC 3.23*   HGB 9.7*   HCT 28.1*   *   MCV 87   MCH 30.0   MCHC 34.5     CMP:   Recent Labs   Lab 06/02/22  0723   *   CALCIUM 9.3   ALBUMIN 3.3*   PROT 6.5   *   K 4.5   CO2 26      BUN 13   CREATININE 1.2   ALKPHOS 63   ALT 10   AST 13   BILITOT 0.8     Labs within the past 24 hours have been reviewed.    Diagnostic Results:  CT Abd/Pelvis: Results for orders placed during the hospital encounter of 05/31/22    CT Abdomen Pelvis  Without Contrast    Narrative  EXAMINATION:  CT ABDOMEN PELVIS WITHOUT CONTRAST    CLINICAL HISTORY:  RLQ abdominal pain (Age >= 14y);    TECHNIQUE:  Routine axial CT images of the abdomen and pelvis were obtained without the use of IV contrast.  Sagittal and coronal reformatted images were also acquired.    COMPARISON:  Ultrasound Doppler liver transplant post 05/17/2022, MRI abdomen with without contrast 03/17/2022    FINDINGS:  Heart: The heart is normal in size with no pericardial effusion or calcification.  Partially visualized coronary artery calcification.    Lungs: Small right pleural effusion with associated compressive atelectasis.  Lungs demonstrate no evidence for consolidation or pneumothorax.  No suspicious pulmonary nodules or pulmonary masses.    Liver: Postoperative change including orthotopic liver transplant.  Liver allograft demonstrates no focal parenchymal abnormality.    Gallbladder: Surgically absent.    Bile ducts: No evidence of dilated ducts.    Pancreas: No mass or peripancreatic fat stranding.    Spleen: The spleen is upper limits of normal in size with no focal  splenic lesions.    Adrenals: Normal.    GI Tract/ Mesentery: Gastroesophageal junction is unremarkable.  The stomach is filled with high density previously ingested contrast material.  Visualized stomach, duodenum, small bowel, and colon demonstrate no evidence for obstructive or inflammatory process.  Multiple colonic diverticula without evidence for diverticulitis.  The appendix is visualized without evidence for appendicitis.  Scattered regions of mesenteric haziness and adenopathy, which may reflect reactive inflammatory change.  No ascites or free intra-abdominal air.  No pneumatosis intestinalis.    Kidneys/Ureters: Normal in size and location. No hydronephrosis.  Punctate left nonobstructing renal stone (axial series 2, image 80).  No ureteral dilatation.    Bladder: No evidence of wall thickening.    Reproductive organs: Normal.    Retroperitoneum: Few prominent retroperitoneal lymph nodes without evidence for pathologic adenopathy.    Abdominal wall: Small bilateral fat containing inguinal hernias.    Vasculature: The visualized aorta demonstrates mild calcific atherosclerosis.  No evidence for aneurysm.    Bones: No acute fracture. Age-appropriate degenerative changes.    Impression  No acute abdominal pathology.    Postoperative change including orthotopic liver transplant with no focal parenchymal abnormalities.    Small right pleural effusion with associated compressive atelectasis.    Mild splenomegaly.    Mild scattered mesenteric haziness and adenopathy without evidence for ascites or free intra-abdominal air.  No pneumatosis intestinalis.  Findings may relate to reactive etiology.    Punctate left nonobstructing renal stone.    Electronically signed by resident: Bal Reilly  Date:    05/31/2022  Time:    15:59    Electronically signed by: Win Manzo MD  Date:    05/31/2022  Time:    16:35    US Liver Transplant Post:  Results for orders placed during the hospital encounter of 05/31/22    US  Doppler Liver Transplant Post (xpd)    Narrative  EXAMINATION:  US DOPPLER LIVER TRANSPLANT POST (XPD)    CLINICAL HISTORY:  Abdominal pain in new liver Tx;    TECHNIQUE:  Realtime sonographic imaging was performed with color Doppler interrogation.    COMPARISON:  Ultrasound Doppler liver transplant 05/17/2022    FINDINGS:  The patient is status post orthotopic liver transplant on 04/23/2022    The liver transplant demonstrates homogeneous echotexture.  No focal parenchymal abnormality.  Small mildly complex fluid collection in the right hepatic lobe measuring 1.1 x 1.1 x 0.9 cm.    No intra or extrahepatic bile duct dilatation. The common duct measures 4 mm.    The middle, right, and left hepatic veins are patent and unremarkable. The main, right, and left branches of the portal vein demonstrate hepatopedal flow and are unremarkable.    Hepatic arterial resistive indices are as follows: Main 0.58 (previously 0.62), Left 0.54 (previously 0.66), anterior Right 0.55 (previously 0.64), posterior Right 0.65 (previously 0.69).  There is no evidence of a tardus parvus waveform. The maximum velocity in the main hepatic artery is 96 cm/sec.    The IVC is unremarkable.  Partially evaluated right pleural effusion.    Impression  Mild interval decrease in intrahepatic resistive indices of the left and anterior right hepatic arteries, which remain within normal limits.  No tardus parvus waveforms.  Otherwise, satisfactory Doppler evaluation of the hepatic vasculature.    Stable tiny fluid collection within the right hepatic lobe.    Right pleural effusion.    Electronically signed by resident: Hansa Isbell  Date:    06/01/2022  Time:    10:07    Electronically signed by: Nitin Prince  Date:    06/01/2022  Time:    10:54    I have personally reviewed all pertinent imaging studies.    Debility/Functional status: No debility noted.    Assessment/Plan:     * Right upper quadrant abdominal pain  - admitted from the ED for  worsening RUQ pain  - CT a/p w/ contrast unremarkable  - GI consulted for new abdominal pain. Brought pt for EGD 6/1 which was unremarkable, biopsy taken and pending.  - KUB 6/2 without ileus or obstruction   - Cont PPI  - Monitor     Constipation  - on bowel regimen   - enema 6/1 without successful BM   - getting suppository 6/2      Essential hypertension  - Cont home medication       Long-term use of immunosuppressant medication  - Continue prograf, cellcept, steroids.  - Continue to monitor prograf levels daily, monitor for toxic side effects, and adjust for therapeutic dose.         Prophylactic immunotherapy  - see long term use of immunosuppression         At risk for opportunistic infections  - resume appropriate OI prophylaxis per protocol.         S/P liver transplant  - s/p OLTx 4/24/22 2/2 LOZANO & ETOH (steroid induction, CMV +/+).   - Operation straight forward per op note.  - Tbili and LFTs wnl      Thrombocytopenia, unspecified  - Due to end stage liver disease, improving post liver transplant.         Anemia of chronic disease  - H/H stable. Will continue to monitor with daily cbc.         Type 2 diabetes mellitus without complication, without long-term current use of insulin  - Endocrine consulted. Appreciate their assistance.            VTE Risk Mitigation (From admission, onward)         Ordered     heparin (porcine) injection 5,000 Units  Every 8 hours         06/02/22 1247     IP VTE HIGH RISK PATIENT  Once         05/31/22 1400     Place sequential compression device  Until discontinued         05/31/22 1400                The patients clinical status was discussed at multidisplinary rounds, involving transplant surgery, transplant medicine, pharmacy, nursing, nutrition, and social work    Discharge Planning:  PharmD Review: need lasix still (CO2 28); please check mag levels on mag Ox; consider COVID vaccine if not vaccinated (AST recommends vaccine/booster 1-3 months post-txp) (sfa  5/17/22)      Azalea Sy PA-C  Liver Transplant  [facility]

## 2022-06-02 NOTE — ASSESSMENT & PLAN NOTE
Bg goal 140-180       Ac/hs BG checks and low dose correction scale   start novolog 3 units with meals; hold if patient nauseated/ questionable about eating.

## 2022-06-02 NOTE — SUBJECTIVE & OBJECTIVE
"Interval HPI:   Overnight events: In TSU. NAEON. BG at or slightly above goal with prn correction scale.  Continues with abdominal pain.   Eating:  Diet diabetic [facility]; 2000 Calorie   50%  Nausea: No  Hypoglycemia and intervention: No  Fever: No  TPN and/or TF: No    BP (!) 162/82 (BP Location: Right arm, Patient Position: Lying)   Pulse 101   Temp 98.4 °F (36.9 °C) (Oral)   Resp 18   Ht 5' 7" (1.702 m)   Wt 98 kg (216 lb 0.8 oz)   SpO2 97%   BMI 33.84 kg/m²     Labs Reviewed and Include    Recent Labs   Lab 06/01/22  0655   *   CALCIUM 9.0   ALBUMIN 3.3*   PROT 6.6   *   K 4.6   CO2 24      BUN 15   CREATININE 1.0   ALKPHOS 65   ALT 11   AST 13   BILITOT 0.9     Lab Results   Component Value Date    WBC 4.79 06/01/2022    HGB 9.6 (L) 06/01/2022    HCT 28.1 (L) 06/01/2022    MCV 89 06/01/2022     (L) 06/01/2022     No results for input(s): TSH, FREET4 in the last 168 hours.  Lab Results   Component Value Date    HGBA1C 6.6 (H) 04/19/2022       Nutritional status:   Body mass index is 33.84 kg/m².  Lab Results   Component Value Date    ALBUMIN 3.3 (L) 06/01/2022    ALBUMIN 3.4 (L) 05/31/2022    ALBUMIN 3.4 05/29/2022     No results found for: PREALBUMIN    Estimated Creatinine Clearance: 93.1 mL/min (based on SCr of 1 mg/dL).    Accu-Checks  Recent Labs     05/31/22  1440 05/31/22  1720 05/31/22  2249 06/01/22  0825 06/01/22  1151 06/01/22  1418 06/01/22 2012   POCTGLUCOSE 109 127* 161* 140* 137* 127* 205*       Current Medications and/or Treatments Impacting Glycemic Control  Immunotherapy:    Immunosuppressants           Stop Route Frequency     tacrolimus capsule 5 mg         -- Oral 2 times daily          Steroids:   Hormones (From admission, onward)                Start     Stop Route Frequency Ordered    05/31/22 1456  melatonin tablet 6 mg         -- Oral Nightly PRN 05/31/22 1400          Pressors:    Autonomic Drugs (From admission, onward)                None "          Hyperglycemia/Diabetes Medications:   Antihyperglycemics (From admission, onward)                Start     Stop Route Frequency Ordered    06/02/22 0800  insulin aspart U-100 pen 3 Units         -- SubQ 3 times daily with meals 06/02/22 0651    05/31/22 1502  insulin aspart U-100 pen 0-5 Units         -- SubQ Before meals & nightly PRN 05/31/22 1402

## 2022-06-03 ENCOUNTER — PATIENT MESSAGE (OUTPATIENT)
Dept: TRANSPLANT | Facility: CLINIC | Age: 57
End: 2022-06-03
Payer: MEDICAID

## 2022-06-03 ENCOUNTER — PATIENT MESSAGE (OUTPATIENT)
Dept: ENDOCRINOLOGY | Facility: HOSPITAL | Age: 57
End: 2022-06-03
Payer: MEDICAID

## 2022-06-03 VITALS
HEIGHT: 67 IN | SYSTOLIC BLOOD PRESSURE: 154 MMHG | HEART RATE: 97 BPM | DIASTOLIC BLOOD PRESSURE: 77 MMHG | TEMPERATURE: 98 F | OXYGEN SATURATION: 98 % | RESPIRATION RATE: 16 BRPM | BODY MASS INDEX: 34.22 KG/M2 | WEIGHT: 218.06 LBS

## 2022-06-03 PROBLEM — R10.11 RIGHT UPPER QUADRANT ABDOMINAL PAIN: Status: RESOLVED | Noted: 2022-05-31 | Resolved: 2022-06-03

## 2022-06-03 PROBLEM — K59.00 CONSTIPATION: Status: RESOLVED | Noted: 2022-06-02 | Resolved: 2022-06-03

## 2022-06-03 LAB
ALBUMIN SERPL BCP-MCNC: 3.3 G/DL (ref 3.5–5.2)
ALP SERPL-CCNC: 61 U/L (ref 55–135)
ALT SERPL W/O P-5'-P-CCNC: 9 U/L (ref 10–44)
ANION GAP SERPL CALC-SCNC: 8 MMOL/L (ref 8–16)
AST SERPL-CCNC: 12 U/L (ref 10–40)
BASOPHILS # BLD AUTO: 0.03 K/UL (ref 0–0.2)
BASOPHILS NFR BLD: 0.7 % (ref 0–1.9)
BILIRUB SERPL-MCNC: 0.7 MG/DL (ref 0.1–1)
BUN SERPL-MCNC: 17 MG/DL (ref 6–20)
CALCIUM SERPL-MCNC: 9.2 MG/DL (ref 8.7–10.5)
CHLORIDE SERPL-SCNC: 97 MMOL/L (ref 95–110)
CO2 SERPL-SCNC: 27 MMOL/L (ref 23–29)
CREAT SERPL-MCNC: 1.6 MG/DL (ref 0.5–1.4)
DIFFERENTIAL METHOD: ABNORMAL
EOSINOPHIL # BLD AUTO: 0.2 K/UL (ref 0–0.5)
EOSINOPHIL NFR BLD: 4.6 % (ref 0–8)
ERYTHROCYTE [DISTWIDTH] IN BLOOD BY AUTOMATED COUNT: 16 % (ref 11.5–14.5)
EST. GFR  (AFRICAN AMERICAN): 55.2 ML/MIN/1.73 M^2
EST. GFR  (NON AFRICAN AMERICAN): 47.8 ML/MIN/1.73 M^2
GLUCOSE SERPL-MCNC: 139 MG/DL (ref 70–110)
HCT VFR BLD AUTO: 28.4 % (ref 40–54)
HGB BLD-MCNC: 9.7 G/DL (ref 14–18)
IMM GRANULOCYTES # BLD AUTO: 0.02 K/UL (ref 0–0.04)
IMM GRANULOCYTES NFR BLD AUTO: 0.5 % (ref 0–0.5)
LYMPHOCYTES # BLD AUTO: 0.7 K/UL (ref 1–4.8)
LYMPHOCYTES NFR BLD: 17.2 % (ref 18–48)
MAGNESIUM SERPL-MCNC: 2 MG/DL (ref 1.6–2.6)
MCH RBC QN AUTO: 29.9 PG (ref 27–31)
MCHC RBC AUTO-ENTMCNC: 34.2 G/DL (ref 32–36)
MCV RBC AUTO: 88 FL (ref 82–98)
MONOCYTES # BLD AUTO: 0.6 K/UL (ref 0.3–1)
MONOCYTES NFR BLD: 13.2 % (ref 4–15)
NEUTROPHILS # BLD AUTO: 2.8 K/UL (ref 1.8–7.7)
NEUTROPHILS NFR BLD: 63.8 % (ref 38–73)
NRBC BLD-RTO: 0 /100 WBC
PHOSPHATE SERPL-MCNC: 5.3 MG/DL (ref 2.7–4.5)
PLATELET # BLD AUTO: 136 K/UL (ref 150–450)
PMV BLD AUTO: 8.9 FL (ref 9.2–12.9)
POCT GLUCOSE: 136 MG/DL (ref 70–110)
POCT GLUCOSE: 145 MG/DL (ref 70–110)
POTASSIUM SERPL-SCNC: 4.3 MMOL/L (ref 3.5–5.1)
PROT SERPL-MCNC: 6.5 G/DL (ref 6–8.4)
RBC # BLD AUTO: 3.24 M/UL (ref 4.6–6.2)
SODIUM SERPL-SCNC: 132 MMOL/L (ref 136–145)
TACROLIMUS BLD-MCNC: 11.2 NG/ML (ref 5–15)
WBC # BLD AUTO: 4.31 K/UL (ref 3.9–12.7)

## 2022-06-03 PROCEDURE — 96361 HYDRATE IV INFUSION ADD-ON: CPT | Performed by: EMERGENCY MEDICINE

## 2022-06-03 PROCEDURE — 63600175 PHARM REV CODE 636 W HCPCS

## 2022-06-03 PROCEDURE — G0378 HOSPITAL OBSERVATION PER HR: HCPCS

## 2022-06-03 PROCEDURE — 96372 THER/PROPH/DIAG INJ SC/IM: CPT

## 2022-06-03 PROCEDURE — 25000003 PHARM REV CODE 250: Performed by: PHYSICIAN ASSISTANT

## 2022-06-03 PROCEDURE — 83735 ASSAY OF MAGNESIUM: CPT | Performed by: PHYSICIAN ASSISTANT

## 2022-06-03 PROCEDURE — 80197 ASSAY OF TACROLIMUS: CPT | Performed by: PHYSICIAN ASSISTANT

## 2022-06-03 PROCEDURE — 63600175 PHARM REV CODE 636 W HCPCS: Performed by: PHYSICIAN ASSISTANT

## 2022-06-03 PROCEDURE — 80053 COMPREHEN METABOLIC PANEL: CPT | Performed by: PHYSICIAN ASSISTANT

## 2022-06-03 PROCEDURE — 36415 COLL VENOUS BLD VENIPUNCTURE: CPT | Performed by: PHYSICIAN ASSISTANT

## 2022-06-03 PROCEDURE — 85025 COMPLETE CBC W/AUTO DIFF WBC: CPT | Performed by: PHYSICIAN ASSISTANT

## 2022-06-03 PROCEDURE — 99214 OFFICE O/P EST MOD 30 MIN: CPT | Mod: ,,,

## 2022-06-03 PROCEDURE — 25000003 PHARM REV CODE 250

## 2022-06-03 PROCEDURE — 84100 ASSAY OF PHOSPHORUS: CPT | Performed by: PHYSICIAN ASSISTANT

## 2022-06-03 PROCEDURE — 99214 PR OFFICE/OUTPT VISIT, EST, LEVL IV, 30-39 MIN: ICD-10-PCS | Mod: ,,,

## 2022-06-03 RX ORDER — INSULIN ASPART 100 [IU]/ML
INJECTION, SOLUTION INTRAVENOUS; SUBCUTANEOUS
Qty: 30 ML | Refills: 0 | Status: SHIPPED | OUTPATIENT
Start: 2022-06-03 | End: 2022-08-16 | Stop reason: SDUPTHER

## 2022-06-03 RX ORDER — POLYETHYLENE GLYCOL 3350 17 G/17G
17 POWDER, FOR SOLUTION ORAL DAILY PRN
Qty: 30 PACKET | Refills: 0 | Status: ON HOLD | COMMUNITY
Start: 2022-06-03 | End: 2022-07-11

## 2022-06-03 RX ORDER — PANTOPRAZOLE SODIUM 20 MG/1
20 TABLET, DELAYED RELEASE ORAL DAILY
Qty: 30 TABLET | Refills: 11 | Status: SHIPPED | OUTPATIENT
Start: 2022-06-03 | End: 2023-06-23 | Stop reason: SDUPTHER

## 2022-06-03 RX ORDER — OXYCODONE HYDROCHLORIDE 5 MG/1
5 TABLET ORAL EVERY 8 HOURS PRN
Qty: 15 TABLET | Refills: 0 | Status: SHIPPED | OUTPATIENT
Start: 2022-06-03 | End: 2022-06-06 | Stop reason: SDUPTHER

## 2022-06-03 RX ORDER — DOCUSATE SODIUM 100 MG/1
100 CAPSULE, LIQUID FILLED ORAL 3 TIMES DAILY PRN
Refills: 0 | COMMUNITY
Start: 2022-06-03

## 2022-06-03 RX ADMIN — INSULIN ASPART 3 UNITS: 100 INJECTION, SOLUTION INTRAVENOUS; SUBCUTANEOUS at 01:06

## 2022-06-03 RX ADMIN — NIFEDIPINE 60 MG: 30 TABLET, FILM COATED, EXTENDED RELEASE ORAL at 09:06

## 2022-06-03 RX ADMIN — HEPARIN SODIUM 5000 UNITS: 5000 INJECTION INTRAVENOUS; SUBCUTANEOUS at 06:06

## 2022-06-03 RX ADMIN — SODIUM CHLORIDE 500 ML: 0.9 INJECTION, SOLUTION INTRAVENOUS at 10:06

## 2022-06-03 RX ADMIN — PANTOPRAZOLE SODIUM 40 MG: 40 TABLET, DELAYED RELEASE ORAL at 06:06

## 2022-06-03 RX ADMIN — VALGANCICLOVIR 450 MG: 450 TABLET, FILM COATED ORAL at 09:06

## 2022-06-03 RX ADMIN — Medication 1 TABLET: at 09:06

## 2022-06-03 RX ADMIN — INSULIN ASPART 3 UNITS: 100 INJECTION, SOLUTION INTRAVENOUS; SUBCUTANEOUS at 09:06

## 2022-06-03 RX ADMIN — OXYCODONE HYDROCHLORIDE 10 MG: 10 TABLET ORAL at 01:06

## 2022-06-03 RX ADMIN — OXYCODONE 5 MG: 5 TABLET ORAL at 07:06

## 2022-06-03 RX ADMIN — TACROLIMUS 5 MG: 1 CAPSULE ORAL at 09:06

## 2022-06-03 RX ADMIN — KETOCONAZOLE 100 MG: 200 TABLET ORAL at 09:06

## 2022-06-03 RX ADMIN — DOCUSATE SODIUM 100 MG: 100 CAPSULE ORAL at 09:06

## 2022-06-03 RX ADMIN — SULFAMETHOXAZOLE AND TRIMETHOPRIM 1 TABLET: 400; 80 TABLET ORAL at 06:06

## 2022-06-03 NOTE — NURSING
Patient ready for dc home once bedside Rx delivered.Printed AVS reviewed along with followup appt. Blue med card has been updated/ reviewed by PharmD. Patient aware of how to contact tx coordinator for questions/ concerns. Patient and wife verbalize understanding of dc instructions. Wife to transport patient home upon discharge

## 2022-06-03 NOTE — PROGRESS NOTES
SW Discharge Note:    Pt was alert and oriented x4, calm and in good spirits. Pt presented with his wife, Leonarda (ph# [phone]). SW met with pt and his wife to assess discharge plan and any concerns or needs.    Pt reports agreeing with the discharge plan and has no psychosocial concerns. Pt will discharge today to his home with no needs. Pt's wife will transport patient.  Patient's caretakers verbalize understanding and are involved in treatment planning and discharge process. Pt is coping well with support from his family. Pt nor caregiver had concerns or questions. SW providing psychosocial and counseling support, education, resources, assistance, and discharge planning as indicated. SW remains available.

## 2022-06-03 NOTE — PLAN OF CARE
Pt AAOx4, VSS, afebrile.  at bedtime, no SSI required. Pt c/o RUQ abdominal pain that radiates to the back - PRN oxycodone given q4h. GI bx pending. KUB negative for ileus. Bowel regimen continued. Pt had large loose BM and reports improvement in abd pain. Fall risk precautions maintained. Pt is independent. Pt in lowest bed position setting, lighting adjusted, pt to wear nonskid socks when ambulating, side rails up x2.  Pt remain free from falls during shift. Spouse at bedside and active in care. Call light within reach. Will continue to monitor.

## 2022-06-03 NOTE — NURSING
Patient returned to unit with blood leaking from IV dressing despite coban pressure dressing in place.  Patient seated on nearest chair & pressure held to site for 10 minutes.  Clean dry pressure dressing applied.  Patient & spouse with good understanding to apply more pressure if bleeding resumes & call for staff assist.  Patient to wait at least 15 minutes with clean dry dressing before going home

## 2022-06-03 NOTE — RESPIRATORY THERAPY
RAPID RESPONSE RESPIRATORY CHART CHECK       Chart check completed, call [phone] for further concerns or assistance.

## 2022-06-03 NOTE — PROGRESS NOTES
Discharge Medication Note:    Hospital Course:  OLTx 4/24/22 2/2 LOZANO & ETOH (steroid induction, CMV +/+). Operation straight forward per op note.    Patient reports a sudden onset of pain in abdominal RUQ on 5/28/22, prompting him to visit OSH where he received unremarkable labs and a CT AP without contrast that showed inflammation and fluid surrounding the liver. KUB showed no obstruction and no ileus. Changed from pepcid to ppi. Bowel regimen scheduled. EGD unremarkable. Pt pain improved after BM. Encouraged bowel regimen at home with miralax, docusate, and senna.       Met with Pelon Vasquez at discharge to review discharge medications and to update the blue medication card.           Medication List      START taking these medications    polyethylene glycol 17 gram Pwpk  Commonly known as: GLYCOLAX  Take 17 g by mouth daily as needed (constipation).        CHANGE how you take these medications    insulin aspart U-100 100 unit/mL (3 mL) Inpn pen  Commonly known as: NovoLOG  Inject 4 Units into the skin 3 (three) times daily with meals. May also inject 0-10 Units as needed (for hyperglycemia.). Plus SSI: 150 - 200 + 2 unit; 201 - 250 + 4 units; 251 - 300 + 6 units;  301 - 350 + 8 units;  > 350   + 10 units. Max TDD 60 units.  What changed: See the new instructions.     NIFEdipine 30 MG (OSM) 24 hr tablet  Commonly known as: PROCARDIA-XL  Take 1 tablet (30 mg total) by mouth once daily.  What changed: when to take this     oxyCODONE 5 MG immediate release tablet  Commonly known as: ROXICODONE  Take 1 tablet (5 mg total) by mouth every 8 (eight) hours as needed for Pain.  What changed:   · how much to take  · when to take this     pantoprazole 20 MG tablet  Commonly known as: PROTONIX  Take 1 tablet (20 mg total) by mouth once daily.  What changed:   · medication strength  · how much to take        CONTINUE taking these medications    aspirin 81 MG EC tablet  Commonly known as: ECOTRIN  Take 1 tablet (81 mg total)  "by mouth once daily.     BD ULTRA-FINE JACKIE PEN NEEDLE 32 gauge x 5/32" Ndle  Generic drug: pen needle, diabetic  1 each by Misc.(Non-Drug; Combo Route) route 3 (three) times daily with meals.     calcium carbonate-vitamin D3 600 mg-20 mcg (800 unit) Tab  Take 1 tablet by mouth once daily.     CONTOUR NEXT METER Misc  Generic drug: blood-glucose meter  use as instructed     CONTOUR NEXT TEST STRIPS Strp  Generic drug: blood sugar diagnostic  1 strip by Misc.(Non-Drug; Combo Route) route 4 (four) times daily before meals and nightly.     docusate sodium 100 MG capsule  Commonly known as: COLACE  Take 1 capsule (100 mg total) by mouth 3 (three) times daily as needed for Constipation.     ketoconazole 200 mg Tab  Commonly known as: NIZORAL  Take 0.5 tablets (100 mg total) by mouth once daily.     MICROLET LANCET Misc  Generic drug: lancets  1 each by Misc.(Non-Drug; Combo Route) route 4 (four) times daily before meals and nightly.     mycophenolate 250 mg Cap  Commonly known as: CELLCEPT  Take 4 capsules (1,000 mg total) by mouth 2 (two) times daily.     sulfamethoxazole-trimethoprim 400-80mg 400-80 mg per tablet  Commonly known as: BACTRIM,SEPTRA  Take 1 tablet by mouth every morning. STOP 10/24/22     tacrolimus 1 MG Cap  Commonly known as: PROGRAF  Take 5 capsules (5 mg total) by mouth every 12 (twelve) hours.     valGANciclovir 450 mg Tab  Commonly known as: VALCYTE  Take 1 tablet (450 mg total) by mouth once daily. STOP 7/24/22        STOP taking these medications    atorvastatin 20 MG tablet  Commonly known as: LIPITOR     famotidine 20 MG tablet  Commonly known as: PEPCID     magnesium oxide 400 mg (241.3 mg magnesium) tablet  Commonly known as: MAG-OX     midodrine 5 MG Tab  Commonly known as: PROAMATINE     sodium bicarbonate 650 MG tablet     spironolactone 100 MG tablet  Commonly known as: ALDACTONE     triamcinolone acetonide 0.1% 0.1 % cream  Commonly known as: KENALOG           Where to Get Your " Medications      These medications were sent to [facility]  [address] [facility] [place] [address]    Hours: Mon-Fri 7a-7p, Sat-Sun 10a-4p Phone: [phone]   · insulin aspart U-100 100 unit/mL (3 mL) Inpn pen  · oxyCODONE 5 MG immediate release tablet  · pantoprazole 20 MG tablet     You can get these medications from any pharmacy    You don't need a prescription for these medications  · docusate sodium 100 MG capsule  · polyethylene glycol 17 gram Pwpk            The following medications have been placed on HOLD and should be restarted in the outpatient setting (when appropriate): trinity Vasquez verbalized understanding and had the opportunity to ask questions.

## 2022-06-06 ENCOUNTER — OFFICE VISIT (OUTPATIENT)
Dept: TRANSPLANT | Facility: CLINIC | Age: 57
End: 2022-06-06
Payer: MEDICAID

## 2022-06-06 VITALS
SYSTOLIC BLOOD PRESSURE: 165 MMHG | DIASTOLIC BLOOD PRESSURE: 77 MMHG | BODY MASS INDEX: 34.81 KG/M2 | WEIGHT: 221.81 LBS | HEART RATE: 94 BPM | HEIGHT: 67 IN | TEMPERATURE: 97 F | OXYGEN SATURATION: 97 % | RESPIRATION RATE: 18 BRPM

## 2022-06-06 DIAGNOSIS — Z29.89 PROPHYLACTIC IMMUNOTHERAPY: ICD-10-CM

## 2022-06-06 DIAGNOSIS — Z94.4 S/P LIVER TRANSPLANT: ICD-10-CM

## 2022-06-06 DIAGNOSIS — E11.9 TYPE 2 DIABETES MELLITUS WITHOUT COMPLICATION, WITHOUT LONG-TERM CURRENT USE OF INSULIN: ICD-10-CM

## 2022-06-06 DIAGNOSIS — R10.10 PAIN OF UPPER ABDOMEN: Primary | ICD-10-CM

## 2022-06-06 LAB
CMV DNA SPEC QL NAA+PROBE: NOT DETECTED
CYTOMEGALOVIRUS LOG (IU/ML): NOT DETECTED LOGIU/ML
CYTOMEGALOVIRUS PCR, QUANT: NOT DETECTED IU/ML
FINAL PATHOLOGIC DIAGNOSIS: NORMAL
GROSS: NORMAL
Lab: NORMAL

## 2022-06-06 PROCEDURE — 3008F PR BODY MASS INDEX (BMI) DOCUMENTED: ICD-10-PCS | Mod: CPTII,,, | Performed by: INTERNAL MEDICINE

## 2022-06-06 PROCEDURE — 3077F PR MOST RECENT SYSTOLIC BLOOD PRESSURE >= 140 MM HG: ICD-10-PCS | Mod: CPTII,,, | Performed by: INTERNAL MEDICINE

## 2022-06-06 PROCEDURE — 99999 PR PBB SHADOW E&M-EST. PATIENT-LVL V: CPT | Mod: PBBFAC,,, | Performed by: INTERNAL MEDICINE

## 2022-06-06 PROCEDURE — 1159F MED LIST DOCD IN RCRD: CPT | Mod: CPTII,,, | Performed by: INTERNAL MEDICINE

## 2022-06-06 PROCEDURE — 1159F PR MEDICATION LIST DOCUMENTED IN MEDICAL RECORD: ICD-10-PCS | Mod: CPTII,,, | Performed by: INTERNAL MEDICINE

## 2022-06-06 PROCEDURE — 99999 PR PBB SHADOW E&M-EST. PATIENT-LVL V: ICD-10-PCS | Mod: PBBFAC,,, | Performed by: INTERNAL MEDICINE

## 2022-06-06 PROCEDURE — 3078F PR MOST RECENT DIASTOLIC BLOOD PRESSURE < 80 MM HG: ICD-10-PCS | Mod: CPTII,,, | Performed by: INTERNAL MEDICINE

## 2022-06-06 PROCEDURE — 3077F SYST BP >= 140 MM HG: CPT | Mod: CPTII,,, | Performed by: INTERNAL MEDICINE

## 2022-06-06 PROCEDURE — 99215 OFFICE O/P EST HI 40 MIN: CPT | Mod: S$PBB,24,, | Performed by: INTERNAL MEDICINE

## 2022-06-06 PROCEDURE — 3008F BODY MASS INDEX DOCD: CPT | Mod: CPTII,,, | Performed by: INTERNAL MEDICINE

## 2022-06-06 PROCEDURE — 3044F PR MOST RECENT HEMOGLOBIN A1C LEVEL <7.0%: ICD-10-PCS | Mod: CPTII,,, | Performed by: INTERNAL MEDICINE

## 2022-06-06 PROCEDURE — 4010F ACE/ARB THERAPY RXD/TAKEN: CPT | Mod: CPTII,,, | Performed by: INTERNAL MEDICINE

## 2022-06-06 PROCEDURE — 99215 OFFICE O/P EST HI 40 MIN: CPT | Mod: PBBFAC | Performed by: INTERNAL MEDICINE

## 2022-06-06 PROCEDURE — 99215 PR OFFICE/OUTPT VISIT, EST, LEVL V, 40-54 MIN: ICD-10-PCS | Mod: S$PBB,24,, | Performed by: INTERNAL MEDICINE

## 2022-06-06 PROCEDURE — 4010F PR ACE/ARB THEARPY RXD/TAKEN: ICD-10-PCS | Mod: CPTII,,, | Performed by: INTERNAL MEDICINE

## 2022-06-06 PROCEDURE — 3044F HG A1C LEVEL LT 7.0%: CPT | Mod: CPTII,,, | Performed by: INTERNAL MEDICINE

## 2022-06-06 PROCEDURE — 3078F DIAST BP <80 MM HG: CPT | Mod: CPTII,,, | Performed by: INTERNAL MEDICINE

## 2022-06-06 RX ORDER — OXYCODONE HYDROCHLORIDE 5 MG/1
5 TABLET ORAL EVERY 8 HOURS PRN
Qty: 28 TABLET | Refills: 0 | Status: SHIPPED | OUTPATIENT
Start: 2022-06-06 | End: 2022-06-15

## 2022-06-06 NOTE — PROGRESS NOTES
Subjective:       Patient ID: Pelon Vasquez is a 57 y.o. male.    Chief Complaint: Liver Transplant Follow-up    HPI  I saw this 57 y.o. man who had a liver tx 43 days ago.    Original Referring Physician: Irvin Sandhu  Current Corresponding Physician: Irvin Sandhu     Chief Complaint: Pelon is here for follow up of his liver transplant performed 4/24/2022 for the primary diagnosis (UNOS) of Cirrhosis: Other, Specify     ORGAN: LIVER  Whole or Partial: whole liver  Donor Type: donation after brain death  PHS Increased Risk: no  Donor CMV Status: Positive  Donor HCV Status: Negative  Donor HBcAb: Negative  Donor HBV MATHEUS: Negative  Donor HCV MATHEUS: Negative     Biliary Anastomosis: end to end  Arterial Anatomy: completely replaced circulation  IVC reconstruction: end to end ivc  Portal vein status: patent    Post op recovery:  Issue immediately post op - SVT, given adenosine and converted to NSR     His transplant admission was 11 days long.    - creatinine 1.6  - LFTs normal- Tac 11.2  Tac 5/5  MMF 1 g BID    Abdo US: 6/1/22  Mild interval decrease in intrahepatic resistive indices of the left and anterior right hepatic arteries, which remain within normal limits.  No tardus parvus waveforms.  Otherwise, satisfactory Doppler evaluation of the hepatic vasculature.  Stable tiny fluid collection within the right hepatic lobe.  Right pleural effusion.    Review of Systems   Constitutional: Negative for activity change, appetite change, chills, fatigue, fever and unexpected weight change.   HENT: Negative for hearing loss.    Eyes: Negative for discharge and visual disturbance.   Respiratory: Negative for cough, chest tightness, shortness of breath and wheezing.    Cardiovascular: Negative for chest pain, palpitations and leg swelling.   Gastrointestinal: Negative for abdominal distention, abdominal pain, constipation, diarrhea and nausea.   Genitourinary: Negative for dysuria and frequency.   Musculoskeletal: Negative for  arthralgias and back pain.   Skin: Negative for pallor and rash.   Neurological: Negative for dizziness, tremors, speech difficulty and headaches.   Hematological: Negative for adenopathy.   Psychiatric/Behavioral: Negative for agitation and confusion.           Lab Results   Component Value Date    ALT 9 (L) 06/03/2022    AST 12 06/03/2022    GGT 66 (H) 02/14/2022    ALKPHOS 61 06/03/2022    BILITOT 0.7 06/03/2022     Past Medical History:   Diagnosis Date    Arthritis     Cirrhosis of liver     Encounter for blood transfusion     HTN (hypertension)     BRAYDON (obstructive sleep apnea)     Secondary esophageal varices without bleeding 03/18/2022    EGD (2/25/22) showed moderate portal hypertensive gastropathy, small hiatal hernia, grade 1 esophageal varices    Type 2 diabetes mellitus      Past Surgical History:   Procedure Laterality Date    ABDOMINAL SURGERY      COLONOSCOPY      ESOPHAGOGASTRODUODENOSCOPY N/A 6/1/2022    Procedure: EGD (ESOPHAGOGASTRODUODENOSCOPY);  Surgeon: Saad Nguyễn MD;  Location: [facility] (2ND FLR);  Service: Endoscopy;  Laterality: N/A;    FINGER AMPUTATION      LIVER TRANSPLANT N/A 04/10/2022    Procedure: TRANSPLANT, LIVER;  Surgeon: Félix Scott MD;  [facility];  Service: Transplant;  Laterality: N/A;    LIVER TRANSPLANT N/A 04/13/2022    Procedure: TRANSPLANT, LIVER;  Surgeon: Keyon Castillo MD;  [facility];  Service: Transplant;  Laterality: N/A;    LIVER TRANSPLANT N/A 04/20/2022    Procedure: TRANSPLANT, LIVER;  Surgeon: Brant Gonzalez Jr., MD;  Location: [facility] OR 2ND FLR;  Service: Transplant;  Laterality: N/A;    LIVER TRANSPLANT N/A 04/23/2022    Procedure: TRANSPLANT, LIVER;  Surgeon: Tom Romero MD;  Location: [facility] OR [facility] FLR;  Service: Transplant;  Laterality: N/A;    MEDIAL COLLATERAL LIGAMENT REPAIR, KNEE Bilateral     ROTATRO CUFF REPAIR      TONSILLECTOMY       Current Outpatient Medications   Medication Sig  "   aspirin (ECOTRIN) 81 MG EC tablet Take 1 tablet (81 mg total) by mouth once daily.    blood sugar diagnostic Strp 1 strip by Misc.(Non-Drug; Combo Route) route 4 (four) times daily before meals and nightly.    blood-glucose meter Misc use as instructed    calcium carbonate-vitamin D3 600 mg-20 mcg (800 unit) Tab Take 1 tablet by mouth once daily.    docusate sodium (COLACE) 100 MG capsule Take 1 capsule (100 mg total) by mouth 3 (three) times daily as needed for Constipation.    insulin aspart U-100 (NOVOLOG) 100 unit/mL (3 mL) InPn pen Inject 4 Units into the skin 3 (three) times daily with meals. May also inject 0-10 Units as needed (for hyperglycemia.). Plus SSI: 150 - 200 + 2 unit; 201 - 250 + 4 units; 251 - 300 + 6 units;  301 - 350 + 8 units;  > 350   + 10 units. Max TDD 60 units.    ketoconazole (NIZORAL) 200 mg Tab Take 0.5 tablets (100 mg total) by mouth once daily.    lancets Misc 1 each by Misc.(Non-Drug; Combo Route) route 4 (four) times daily before meals and nightly.    mycophenolate (CELLCEPT) 250 mg Cap Take 4 capsules (1,000 mg total) by mouth 2 (two) times daily.    NIFEdipine (PROCARDIA-XL) 30 MG (OSM) 24 hr tablet Take 1 tablet (30 mg total) by mouth once daily. (Patient taking differently: Take 30 mg by mouth 2 (two) times a day.)    pantoprazole (PROTONIX) 20 MG tablet Take 1 tablet (20 mg total) by mouth once daily.    pen needle, diabetic 32 gauge x 5/32" Ndle 1 each by Misc.(Non-Drug; Combo Route) route 3 (three) times daily with meals.    polyethylene glycol (GLYCOLAX) 17 gram PwPk Take 17 g by mouth daily as needed (constipation).    sulfamethoxazole-trimethoprim 400-80mg (BACTRIM,SEPTRA) 400-80 mg per tablet Take 1 tablet by mouth every morning. STOP 10/24/22    tacrolimus (PROGRAF) 1 MG Cap Take 5 capsules (5 mg total) by mouth every 12 (twelve) hours.    valGANciclovir (VALCYTE) 450 mg Tab Take 1 tablet (450 mg total) by mouth once daily. STOP 7/24/22    oxyCODONE " (ROXICODONE) 5 MG immediate release tablet Take 1 tablet (5 mg total) by mouth every 8 (eight) hours as needed for Pain. Every 4-6 hours prn     No current facility-administered medications for this visit.       Objective:      Physical Exam  HENT:      Head: Normocephalic.   Eyes:      Pupils: Pupils are equal, round, and reactive to light.   Neck:      Thyroid: No thyromegaly.   Cardiovascular:      Rate and Rhythm: Normal rate and regular rhythm.      Heart sounds: Normal heart sounds.   Pulmonary:      Effort: Pulmonary effort is normal.      Breath sounds: Normal breath sounds. No wheezing.   Abdominal:      General: There is no distension.      Palpations: Abdomen is soft. There is no mass.      Tenderness: There is no abdominal tenderness.   Lymphadenopathy:      Cervical: No cervical adenopathy.   Skin:     General: Skin is warm.      Findings: No erythema or rash.   Neurological:      Mental Status: He is alert and oriented to person, place, and time.   Psychiatric:         Behavior: Behavior normal.         Assessment:       1. Pain of upper abdomen    2. S/P liver transplant    3. Type 2 diabetes mellitus without complication, without long-term current use of insulin    4. Prophylactic immunotherapy        Plan:   Improving daily  Only issue is mild constipation    - I note his high creatinine a few days ago  - he will repeat labs tomorrow and at that point decide whether we will adjust his tac dose.    Clinic in 4 weeks.      UNOS Patient Status  Functional Status: 90% - Able to carry on normal activity: minor symptoms of disease  Physical Capacity: No Limitations    Patient on life support: No  Diabetes: No  Any previous malignancy: No  Neoadjuvant Therapy: no  Has patient ever had a dx of HCC: no  Previous Abdominal Surgery: no  Spontaneous Bacterial Peritonitis: no  History of Portal Vein Thrombosis: no  Transjugular Intrahepatic Portosystemic Shunt: no    New diabetes onset between last follow-up to  the current follow-up: No  Did patient have any acute rejection episodes during the follow-up period: No  Post transplant malignancy: No

## 2022-06-06 NOTE — LETTER
June 6, 2022        Irvin Sandhu  [address] [address]  [address]  [place] [address]  Phone: [phone]  Fax: [phone]             [facility]  [address]  [facility] [address]  Phone: [phone]   Patient: Pelon Vasquez   MR Number: [id number]   YOB: 1965   Date of Visit: 6/6/2022       Dear Dr. Irvin Sandhu    Thank you for referring Pelon Vasquez to me for evaluation. Attached you will find relevant portions of my assessment and plan of care.    If you have questions, please do not hesitate to call me. I look forward to following Pelon Vasquez along with you.    Sincerely,    Tito Mcclure MD    Enclosure    If you would like to receive this communication electronically, please contact [email or web address] or [phone] to request [employer] access.    [employer] is a tool which provides read-only access to select patient information with whom you have a relationship. Its easy to use and provides real time access to review your patients record including encounter summaries, notes, results, and demographic information.    If you feel you have received this communication in error or would no longer like to receive these types of communications, please e-mail [email or web address]

## 2022-06-08 ENCOUNTER — PATIENT MESSAGE (OUTPATIENT)
Dept: TRANSPLANT | Facility: CLINIC | Age: 57
End: 2022-06-08
Payer: MEDICAID

## 2022-06-08 ENCOUNTER — TELEPHONE (OUTPATIENT)
Dept: TRANSPLANT | Facility: CLINIC | Age: 57
End: 2022-06-08
Payer: MEDICAID

## 2022-06-08 DIAGNOSIS — Z94.9 HYPERTENSION ASSOCIATED WITH TRANSPLANTATION: ICD-10-CM

## 2022-06-08 DIAGNOSIS — Z94.4 S/P LIVER TRANSPLANT: Primary | ICD-10-CM

## 2022-06-08 DIAGNOSIS — I15.8 HYPERTENSION ASSOCIATED WITH TRANSPLANTATION: ICD-10-CM

## 2022-06-08 RX ORDER — CARVEDILOL 12.5 MG/1
12.5 TABLET ORAL 2 TIMES DAILY WITH MEALS
Qty: 60 TABLET | Refills: 11 | Status: SHIPPED | OUTPATIENT
Start: 2022-06-08 | End: 2022-08-01 | Stop reason: SDUPTHER

## 2022-06-08 RX ORDER — NIFEDIPINE 30 MG/1
30 TABLET, EXTENDED RELEASE ORAL 2 TIMES DAILY
Qty: 60 TABLET | Refills: 11 | Status: SHIPPED | OUTPATIENT
Start: 2022-06-08 | End: 2023-08-14

## 2022-06-08 NOTE — TELEPHONE ENCOUNTER
Pt sent multiple messages via [employer] after not answering calls. Requested he contact the office to discuss his health issues.    Reviewed with Dr. Mcclure that pt is having continued issues with constipation and pain. Per MD, pt needs to reduce his oxy intake and can start colace up to TID along with glycolax daily. Will discuss with pt when he calls the office.

## 2022-06-08 NOTE — TELEPHONE ENCOUNTER
Called both numbers. No answer on either.  Sent [employer] message.  ----- Message from Celina Ramirez sent at 6/8/2022  9:54 AM CDT -----  Regarding: Patient advice  Patient's caregiver, Leonarda, calling to speak to coordinator for advice. Patient was in hospital for constipation and is still experiencing this issue. Please advise.      Call: [phone] (Mobile or [phone] (Mobile

## 2022-06-08 NOTE — TELEPHONE ENCOUNTER
Spoke with Leonarda. Reviewed Dr. Mcclure' recommendations that pt needs to reduce oxy intake, take colace TID and continue laxative nightly. Leonarda stated they have been doing the colace and laxative. They will try mag citrate and she is aware that if patient cannot have a BM and continues in severe pain, he will have to come back to the hospital.  ----- Message from Akash Feng sent at 6/8/2022 11:12 AM CDT -----  Regarding: Returning Call  Contact: Leonarda  Consult/Advisory:           Name Of Caller: Leonarda Bird                             [phone] or  [phone]         Coordinator Name: Valorie GUZMAN           What is the nature of the call?:    Pt is calling coordinator per request in relation to the conversation had through [employer].     As always, thank you for all that you do for our patients!

## 2022-06-09 LAB
EXT ALBUMIN: 3.4
EXT ALKALINE PHOSPHATASE: 62
EXT ALT: 15
EXT AST: 15
EXT BASOPHIL%: 1.2
EXT BILIRUBIN TOTAL: 0.5
EXT BUN: 18
EXT CALCIUM: 8.8
EXT CHLORIDE: 103
EXT CO2: 28
EXT CREATININE: 1.2 MG/DL
EXT EOSINOPHIL%: 3.7
EXT GLUCOSE: 153
EXT HEMATOCRIT: 30.2
EXT HEMOGLOBIN: 10
EXT LYMPH%: 15.3
EXT MAGNESIUM: 1.8
EXT MONOCYTES%: 7.4
EXT PLATELETS: 166
EXT POTASSIUM: 4.5
EXT PROTEIN TOTAL: 62
EXT SEGS%: 71.7
EXT SODIUM: 135 MMOL/L
EXT TACROLIMUS LVL: 8.9
EXT WBC: 4.3

## 2022-06-10 ENCOUNTER — PATIENT MESSAGE (OUTPATIENT)
Dept: TRANSPLANT | Facility: CLINIC | Age: 57
End: 2022-06-10
Payer: MEDICAID

## 2022-06-10 ENCOUNTER — TELEPHONE (OUTPATIENT)
Dept: TRANSPLANT | Facility: CLINIC | Age: 57
End: 2022-06-10
Payer: MEDICAID

## 2022-06-10 NOTE — TELEPHONE ENCOUNTER
Pt notified via portal of stable labs and that no medication changes are needed. Repeat labs due 6/13/22 per protocol.   ----- Message from Tito Mcclure MD sent at 6/10/2022  1:36 PM CDT -----  Results reviewed

## 2022-06-12 ENCOUNTER — PATIENT MESSAGE (OUTPATIENT)
Dept: ENDOCRINOLOGY | Facility: HOSPITAL | Age: 57
End: 2022-06-12
Payer: MEDICAID

## 2022-06-13 ENCOUNTER — TELEPHONE (OUTPATIENT)
Dept: TRANSPLANT | Facility: CLINIC | Age: 57
End: 2022-06-13
Payer: MEDICAID

## 2022-06-13 NOTE — TELEPHONE ENCOUNTER
Spoke with Elizabeth. Requested she have physician contact transfer center to discuss case with transplant surgeon. Number provided.  ----- Message from Angelo Shanks sent at 6/13/2022  2:13 PM CDT -----  Regarding: patient update  [facility] is calling to update nurse that patient is currently at their facility.    Contact: [phone]

## 2022-06-14 ENCOUNTER — TELEPHONE (OUTPATIENT)
Dept: TRANSPLANT | Facility: CLINIC | Age: 57
End: 2022-06-14
Payer: MEDICAID

## 2022-06-14 ENCOUNTER — PATIENT MESSAGE (OUTPATIENT)
Dept: TRANSPLANT | Facility: CLINIC | Age: 57
End: 2022-06-14
Payer: MEDICAID

## 2022-06-14 DIAGNOSIS — E11.9 TYPE 2 DIABETES MELLITUS WITHOUT COMPLICATION, WITHOUT LONG-TERM CURRENT USE OF INSULIN: ICD-10-CM

## 2022-06-14 LAB
ERYTHROCYTE [SEDIMENTATION RATE] IN BLOOD: 40 MM/HR (ref 0–15)
EXT ALBUMIN: 3.3
EXT ALKALINE PHOSPHATASE: 57
EXT ALT: 11
EXT AST: 13
EXT BILIRUBIN DIRECT: 0.2 MG/DL
EXT BILIRUBIN TOTAL: 0.5
EXT BUN: 14
EXT CALCIUM: 8.8
EXT CHLORIDE: 105
EXT CHOLESTEROL: 195
EXT CO2: 26
EXT CREATININE: 1.11 MG/DL
EXT CRP: 0.37
EXT EOSINOPHIL%: 3
EXT GLUCOSE: 134
EXT HDL: 42
EXT HEMATOCRIT: 29
EXT HEMOGLOBIN: 9.9
EXT INR: 1.2
EXT LDL CHOLESTEROL: 121
EXT LYMPH%: 15
EXT MONOCYTES%: 9
EXT PLATELETS: 150
EXT POTASSIUM: 4.4
EXT PROTEIN TOTAL: 6.4
EXT PT: 13.5
EXT PTT: 37
EXT SEGS%: 70
EXT SODIUM: 137 MMOL/L
EXT TRIGLYCERIDES: 164
EXT TSH: 3.13
EXT WBC: 4.3

## 2022-06-14 RX ORDER — PEN NEEDLE, DIABETIC 30 GX3/16"
1 NEEDLE, DISPOSABLE MISCELLANEOUS
Qty: 100 EACH | Refills: 0 | Status: SHIPPED | OUTPATIENT
Start: 2022-06-14 | End: 2022-07-20 | Stop reason: SDUPTHER

## 2022-06-14 RX ORDER — BLOOD-GLUCOSE CONTROL, NORMAL
EACH MISCELLANEOUS
Qty: 100 EACH | Refills: 0 | Status: SHIPPED | OUTPATIENT
Start: 2022-06-14 | End: 2022-07-20 | Stop reason: SDUPTHER

## 2022-06-14 NOTE — TELEPHONE ENCOUNTER
"Confirmed with pt that he is taking colace TID, miralax daily and dulcolax at night and is having 2-3 BMs a day but feels like he is not "emptying" his bowels. Still report abdominal pain.     Labs and imaging from ED sent to Dr. Mcclure for review.     "

## 2022-06-16 ENCOUNTER — TELEPHONE (OUTPATIENT)
Dept: TRANSPLANT | Facility: CLINIC | Age: 57
End: 2022-06-16
Payer: MEDICAID

## 2022-06-16 NOTE — TELEPHONE ENCOUNTER
Received [employer] message from Dr. Mcclure requesting that pt see transplant surgeon next Friday due to abdominal pain and constipation.  Called pt to advise.  Pt verbalized understanding.

## 2022-06-20 ENCOUNTER — TELEPHONE (OUTPATIENT)
Dept: TRANSPLANT | Facility: CLINIC | Age: 57
End: 2022-06-20
Payer: MEDICAID

## 2022-06-20 DIAGNOSIS — R10.10 PAIN OF UPPER ABDOMEN: ICD-10-CM

## 2022-06-20 DIAGNOSIS — Z94.4 S/P LIVER TRANSPLANT: Primary | ICD-10-CM

## 2022-06-20 NOTE — TELEPHONE ENCOUNTER
Repeat labs pending from this morning.  ----- Message from Tito Mcclure MD sent at 6/16/2022  3:57 PM CDT -----  Results reviewed

## 2022-06-20 NOTE — TELEPHONE ENCOUNTER
Consult placed  ----- Message from Tito Mcclure MD sent at 6/17/2022 10:05 AM CDT -----  I like the idea of pain management    ----- Message -----  From: Valorie Mcgill RN  Sent: 6/14/2022   1:59 PM CDT  To: Tito Mcclure MD    Do you want me to send him to pain management? As of this afternoon, it's not a GI issue, he said it is a pain issue.    Valorie

## 2022-06-21 ENCOUNTER — PATIENT MESSAGE (OUTPATIENT)
Dept: TRANSPLANT | Facility: CLINIC | Age: 57
End: 2022-06-21
Payer: MEDICAID

## 2022-06-21 ENCOUNTER — TELEPHONE (OUTPATIENT)
Dept: TRANSPLANT | Facility: CLINIC | Age: 57
End: 2022-06-21
Payer: MEDICAID

## 2022-06-21 LAB
EXT ALBUMIN: 3.5
EXT ALKALINE PHOSPHATASE: 54
EXT ALT: 13
EXT AST: 22
EXT BASOPHIL%: 1
EXT BILIRUBIN TOTAL: 0.6
EXT BUN: 19
EXT CALCIUM: 8.8
EXT CHLORIDE: 108
EXT CO2: 25
EXT CREATININE: 1.25 MG/DL
EXT EOSINOPHIL%: 2
EXT GLUCOSE: 123
EXT HEMATOCRIT: 28.7
EXT HEMOGLOBIN: 9.8
EXT LYMPH%: 13
EXT MAGNESIUM: 1.8
EXT MONOCYTES%: 4
EXT PLATELETS: 143
EXT POTASSIUM: 4.7
EXT PROTEIN TOTAL: 7
EXT SEGS%: 76
EXT SODIUM: 139 MMOL/L
EXT TACROLIMUS LVL: 7.9
EXT WBC: 4.5

## 2022-06-21 NOTE — TELEPHONE ENCOUNTER
Mr. Buchanan is a Liver Txp patient following Dr. Mcclure. He needs to be seen in Pain Management for upper abdominal pain post Txp. Please assist the patient with getting an appointment scheduled.

## 2022-06-23 ENCOUNTER — TELEPHONE (OUTPATIENT)
Dept: ADMINISTRATIVE | Facility: HOSPITAL | Age: 57
End: 2022-06-23
Payer: MEDICAID

## 2022-06-24 ENCOUNTER — OFFICE VISIT (OUTPATIENT)
Dept: TRANSPLANT | Facility: CLINIC | Age: 57
End: 2022-06-24
Payer: MEDICAID

## 2022-06-24 VITALS
HEIGHT: 67 IN | SYSTOLIC BLOOD PRESSURE: 117 MMHG | WEIGHT: 210.31 LBS | OXYGEN SATURATION: 99 % | BODY MASS INDEX: 33.01 KG/M2 | RESPIRATION RATE: 18 BRPM | TEMPERATURE: 97 F | HEART RATE: 78 BPM | DIASTOLIC BLOOD PRESSURE: 69 MMHG

## 2022-06-24 DIAGNOSIS — Z29.89 PROPHYLACTIC IMMUNOTHERAPY: ICD-10-CM

## 2022-06-24 DIAGNOSIS — Z79.60 LONG-TERM USE OF IMMUNOSUPPRESSANT MEDICATION: ICD-10-CM

## 2022-06-24 DIAGNOSIS — Z94.4 S/P LIVER TRANSPLANT: ICD-10-CM

## 2022-06-24 DIAGNOSIS — Z91.89 AT RISK FOR OPPORTUNISTIC INFECTIONS: Primary | ICD-10-CM

## 2022-06-24 PROCEDURE — 3074F SYST BP LT 130 MM HG: CPT | Mod: CPTII,,, | Performed by: TRANSPLANT SURGERY

## 2022-06-24 PROCEDURE — 99215 PR OFFICE/OUTPT VISIT, EST, LEVL V, 40-54 MIN: ICD-10-PCS | Mod: 24,S$PBB,, | Performed by: TRANSPLANT SURGERY

## 2022-06-24 PROCEDURE — 3078F DIAST BP <80 MM HG: CPT | Mod: CPTII,,, | Performed by: TRANSPLANT SURGERY

## 2022-06-24 PROCEDURE — 3008F BODY MASS INDEX DOCD: CPT | Mod: CPTII,,, | Performed by: TRANSPLANT SURGERY

## 2022-06-24 PROCEDURE — 99999 PR PBB SHADOW E&M-EST. PATIENT-LVL V: CPT | Mod: PBBFAC,,,

## 2022-06-24 PROCEDURE — 99999 PR PBB SHADOW E&M-EST. PATIENT-LVL V: ICD-10-PCS | Mod: PBBFAC,,,

## 2022-06-24 PROCEDURE — 3078F PR MOST RECENT DIASTOLIC BLOOD PRESSURE < 80 MM HG: ICD-10-PCS | Mod: CPTII,,, | Performed by: TRANSPLANT SURGERY

## 2022-06-24 PROCEDURE — 99215 OFFICE O/P EST HI 40 MIN: CPT | Mod: PBBFAC

## 2022-06-24 PROCEDURE — 4010F ACE/ARB THERAPY RXD/TAKEN: CPT | Mod: CPTII,,, | Performed by: TRANSPLANT SURGERY

## 2022-06-24 PROCEDURE — 3044F HG A1C LEVEL LT 7.0%: CPT | Mod: CPTII,,, | Performed by: TRANSPLANT SURGERY

## 2022-06-24 PROCEDURE — 1159F MED LIST DOCD IN RCRD: CPT | Mod: CPTII,,, | Performed by: TRANSPLANT SURGERY

## 2022-06-24 PROCEDURE — 3044F PR MOST RECENT HEMOGLOBIN A1C LEVEL <7.0%: ICD-10-PCS | Mod: CPTII,,, | Performed by: TRANSPLANT SURGERY

## 2022-06-24 PROCEDURE — 1159F PR MEDICATION LIST DOCUMENTED IN MEDICAL RECORD: ICD-10-PCS | Mod: CPTII,,, | Performed by: TRANSPLANT SURGERY

## 2022-06-24 PROCEDURE — 3074F PR MOST RECENT SYSTOLIC BLOOD PRESSURE < 130 MM HG: ICD-10-PCS | Mod: CPTII,,, | Performed by: TRANSPLANT SURGERY

## 2022-06-24 PROCEDURE — 4010F PR ACE/ARB THEARPY RXD/TAKEN: ICD-10-PCS | Mod: CPTII,,, | Performed by: TRANSPLANT SURGERY

## 2022-06-24 PROCEDURE — 99215 OFFICE O/P EST HI 40 MIN: CPT | Mod: 24,S$PBB,, | Performed by: TRANSPLANT SURGERY

## 2022-06-24 PROCEDURE — 3008F PR BODY MASS INDEX (BMI) DOCUMENTED: ICD-10-PCS | Mod: CPTII,,, | Performed by: TRANSPLANT SURGERY

## 2022-06-24 RX ORDER — GABAPENTIN 100 MG/1
100 CAPSULE ORAL 3 TIMES DAILY
Qty: 90 CAPSULE | Refills: 11 | Status: ON HOLD | OUTPATIENT
Start: 2022-06-24 | End: 2022-07-12 | Stop reason: SDUPTHER

## 2022-06-24 NOTE — PROGRESS NOTES
Transplant Surgery  Liver Transplant Recipient Follow-up    Original Referring Physician: Irvin Sandhu  Current Corresponding Physician: Irvin Sandhu    Chief Complaint: Pelon is here for follow up of his liver transplant performed 4/24/2022 for the primary diagnosis (UNOS) of Cirrhosis: Other, Specify    ORGAN: LIVER  Whole or Partial: whole liver  Donor Type: donation after brain death  PHS Increased Risk: no  Donor CMV Status: Positive  Donor HCV Status: Negative  Donor HBcAb: Negative  Donor HBV MATHEUS: Negative  Donor HCV MATHEUS: Negative    Biliary Anastomosis: end to end  Arterial Anatomy: completely replaced circulation  IVC reconstruction: end to end ivc  Portal vein status: patent    Subjective:     History of Present Illness: He has had the following complications since transplant: none.  The noted complications are well controlled.    Interval History: Currently, he is doing adequately.  Current complaints include abdominal pain in the RUQ and sternum and back pain.  Pelon is here for management of his immunosuppression medication.    External provider notes reviewed: Yes    Review of Systems   Constitutional: Negative for activity change, appetite change, chills, diaphoresis, fatigue, fever and unexpected weight change.   HENT: Negative for congestion, dental problem, ear pain, facial swelling, mouth sores, nosebleeds, sore throat, tinnitus, trouble swallowing and voice change.    Eyes: Negative for photophobia, pain and visual disturbance.   Respiratory: Negative for apnea, cough, choking, chest tightness and shortness of breath.    Cardiovascular: Negative for chest pain, palpitations and leg swelling.   Gastrointestinal: Positive for abdominal pain. Negative for abdominal distention, blood in stool, constipation, diarrhea, nausea and vomiting.   Endocrine: Negative for cold intolerance and heat intolerance.   Genitourinary: Negative for difficulty urinating, dysuria, flank pain, hematuria and urgency.    Musculoskeletal: Negative for arthralgias and gait problem.   Skin: Negative for color change, pallor and rash.   Neurological: Negative for dizziness, tremors, seizures, syncope and light-headedness.   Hematological: Negative for adenopathy. Does not bruise/bleed easily.   Psychiatric/Behavioral: Negative for agitation and confusion.     Objective:     Physical Exam  Constitutional:       General: He is not in acute distress.     Appearance: He is well-developed.   HENT:      Head: Normocephalic and atraumatic.      Mouth/Throat:      Pharynx: No oropharyngeal exudate.   Eyes:      General: No scleral icterus.     Conjunctiva/sclera: Conjunctivae normal.      Pupils: Pupils are equal, round, and reactive to light.   Cardiovascular:      Rate and Rhythm: Normal rate and regular rhythm.      Heart sounds: Normal heart sounds.   Pulmonary:      Effort: Pulmonary effort is normal. No respiratory distress.      Breath sounds: Normal breath sounds.   Abdominal:      General: Bowel sounds are normal. There is no distension.      Palpations: Abdomen is soft.      Tenderness: There is no abdominal tenderness. There is no guarding or rebound.   Musculoskeletal:         General: Normal range of motion.      Cervical back: Normal range of motion and neck supple.   Lymphadenopathy:      Cervical: No cervical adenopathy.   Skin:     General: Skin is warm and dry.      Findings: No erythema or rash.   Neurological:      Mental Status: He is alert and oriented to person, place, and time.   Psychiatric:         Behavior: Behavior normal.       Lab Results   Component Value Date    BILITOT 0.7 06/03/2022    AST 22 06/20/2022    AST 12 06/03/2022    ALT 13 (L) 06/20/2022    ALT 9 (L) 06/03/2022    ALKPHOS 54 06/20/2022    ALKPHOS 61 06/03/2022    CREATININE 1.25 06/20/2022    CREATININE 1.6 (H) 06/03/2022    ALBUMIN 3.5 06/20/2022    ALBUMIN 3.3 (L) 06/03/2022     Lab Results   Component Value Date    WBC 4.5 06/20/2022    WBC 4.5  (L) 06/20/2022    WBC 4.31 06/03/2022    HGB 9.8 (L) 06/20/2022    HGB 9.7 (L) 06/03/2022    HCT 28.7 (L) 06/20/2022    HCT 28.4 (L) 06/03/2022    HCT 24 (L) 04/24/2022     (L) 06/20/2022     (L) 06/03/2022     Lab Results   Component Value Date    TACROLIMUS 7.9 06/20/2022    TACROLIMUS 11.2 06/03/2022       Diagnostics:  The following labs have been reviewed: CBC  CMP  PT  INR  The following radiology images have been independently reviewed and interpreted: CT Abd/Pelvis    Assessment/Plan:          · S/P liver transplant.  · Chronic immunosuppressive medications for rejection prophylaxis at target.  Plan: no adjustment needed.  · Continue monitoring symptoms, labs and drug levels for drug-related toxicity and side effects.  · Incision: staples out, wound well-healed, no evidence of hernia  · Femoral arterial line site: no complications evident   · abd/back pain - no pathologiy noted on CT or physical exam.  Will start neurontin and agree with pain management referal    Additional testing to be completed according to Written Order Guidelines for Post-Liver and Combined Liver/Kidney Transplant Follow-up (LI-09)    Interpretation of tests and discussion of patient management involves all members of the multidisciplinary transplant team  Patient advised that it is recommended that all transplanted patients, and their close contacts and household members receive Covid vaccination.  MD VICTOR M Cedillo Patient Status  Functional Status: 70% - Cares for self: unable to carry on normal activity or active work  Physical Capacity: No Limitations

## 2022-06-27 ENCOUNTER — TELEPHONE (OUTPATIENT)
Dept: TRANSPLANT | Facility: CLINIC | Age: 57
End: 2022-06-27
Payer: MEDICAID

## 2022-06-27 NOTE — TELEPHONE ENCOUNTER
Repeat labs pending from today.  ----- Message from Tito Mcclure MD sent at 6/27/2022 10:08 AM CDT -----  Results reviewed

## 2022-06-28 ENCOUNTER — TELEPHONE (OUTPATIENT)
Dept: ADMINISTRATIVE | Facility: HOSPITAL | Age: 57
End: 2022-06-28
Payer: MEDICAID

## 2022-06-29 LAB
EXT ALBUMIN: 3.7
EXT ALKALINE PHOSPHATASE: 53
EXT ALT: 11
EXT AST: 15
EXT BANDS%: 1
EXT BASOPHIL%: 1
EXT BILIRUBIN TOTAL: 0.6
EXT BUN: 22
EXT CALCIUM: 9
EXT CHLORIDE: 106
EXT CO2: 24
EXT CREATININE: 1.27 MG/DL
EXT EOSINOPHIL%: 8
EXT GLUCOSE: 136
EXT HEMATOCRIT: 30.9
EXT HEMOGLOBIN: 10.6
EXT LYMPH%: 32
EXT MAGNESIUM: 1.8
EXT MONOCYTES%: 7
EXT PLATELETS: 130
EXT POTASSIUM: 4.9
EXT PROTEIN TOTAL: 7.3
EXT SEGS%: 51
EXT SODIUM: 137 MMOL/L
EXT TACROLIMUS LVL: 9.3
EXT WBC: 2.8

## 2022-06-30 ENCOUNTER — PATIENT MESSAGE (OUTPATIENT)
Dept: TRANSPLANT | Facility: CLINIC | Age: 57
End: 2022-06-30
Payer: MEDICAID

## 2022-06-30 ENCOUNTER — TELEPHONE (OUTPATIENT)
Dept: TRANSPLANT | Facility: CLINIC | Age: 57
End: 2022-06-30
Payer: MEDICAID

## 2022-06-30 NOTE — TELEPHONE ENCOUNTER
Pt notified via portal of stable labs and that no medication changes are needed. Repeat labs due 7/5/22 per protocol.     ----- Message from Tito Mcclure MD sent at 6/30/2022  2:56 PM CDT -----  Results reviewed

## 2022-07-06 ENCOUNTER — TELEPHONE (OUTPATIENT)
Dept: TRANSPLANT | Facility: CLINIC | Age: 57
End: 2022-07-06
Payer: MEDICAID

## 2022-07-06 ENCOUNTER — PATIENT MESSAGE (OUTPATIENT)
Dept: TRANSPLANT | Facility: CLINIC | Age: 57
End: 2022-07-06
Payer: MEDICAID

## 2022-07-06 LAB
EXT ALBUMIN: 3.8
EXT ALKALINE PHOSPHATASE: 51
EXT ALT: 11
EXT AST: 18
EXT BANDS%: 1
EXT BILIRUBIN TOTAL: 0.6
EXT BUN: 21
EXT CALCIUM: 9.3
EXT CHLORIDE: 105
EXT CO2: 28
EXT CREATININE: 1.29 MG/DL
EXT EOSINOPHIL%: 5
EXT GLUCOSE: 135
EXT HEMATOCRIT: 31.4
EXT HEMOGLOBIN: 10.7
EXT LYMPH%: 29
EXT MAGNESIUM: 2.1
EXT MONOCYTES%: 14
EXT PLATELETS: 159
EXT POTASSIUM: 4.8
EXT PROTEIN TOTAL: 7.2
EXT SEGS%: 51
EXT SODIUM: 138 MMOL/L
EXT TACROLIMUS LVL: 10.6
EXT WBC: 3.2

## 2022-07-06 NOTE — TELEPHONE ENCOUNTER
Pt notified via portal of stable labs and that no medication changes are needed. Repeat labs due 7/11/22 per protocol.   ----- Message from Tito Mcclure MD sent at 7/6/2022  4:36 PM CDT -----  Results reviewed

## 2022-07-11 ENCOUNTER — HOSPITAL ENCOUNTER (OUTPATIENT)
Facility: HOSPITAL | Age: 57
Discharge: HOME | End: 2022-07-12
Attending: EMERGENCY MEDICINE | Admitting: TRANSPLANT SURGERY
Payer: MEDICAID

## 2022-07-11 DIAGNOSIS — R10.9 ABDOMINAL PAIN, UNSPECIFIED ABDOMINAL LOCATION: Primary | ICD-10-CM

## 2022-07-11 DIAGNOSIS — Z94.4 S/P LIVER TRANSPLANT: ICD-10-CM

## 2022-07-11 DIAGNOSIS — R11.2 NAUSEA & VOMITING: ICD-10-CM

## 2022-07-11 DIAGNOSIS — D63.8 ANEMIA OF CHRONIC DISEASE: ICD-10-CM

## 2022-07-11 DIAGNOSIS — Z29.89 PROPHYLACTIC IMMUNOTHERAPY: ICD-10-CM

## 2022-07-11 DIAGNOSIS — Z79.60 LONG-TERM USE OF IMMUNOSUPPRESSANT MEDICATION: ICD-10-CM

## 2022-07-11 DIAGNOSIS — E11.9 TYPE 2 DIABETES MELLITUS WITHOUT COMPLICATION, WITHOUT LONG-TERM CURRENT USE OF INSULIN: ICD-10-CM

## 2022-07-11 DIAGNOSIS — R11.2 NAUSEA AND VOMITING, INTRACTABILITY OF VOMITING NOT SPECIFIED, UNSPECIFIED VOMITING TYPE: ICD-10-CM

## 2022-07-11 LAB
ALBUMIN SERPL BCP-MCNC: 3.8 G/DL (ref 3.5–5.2)
ALP SERPL-CCNC: 46 U/L (ref 55–135)
ALT SERPL W/O P-5'-P-CCNC: 8 U/L (ref 10–44)
ANION GAP SERPL CALC-SCNC: 9 MMOL/L (ref 8–16)
ANISOCYTOSIS BLD QL SMEAR: SLIGHT
AST SERPL-CCNC: 17 U/L (ref 10–40)
BACTERIA #/AREA URNS AUTO: ABNORMAL /HPF
BASOPHILS NFR BLD: 3 % (ref 0–1.9)
BILIRUB SERPL-MCNC: 0.6 MG/DL (ref 0.1–1)
BILIRUB UR QL STRIP: NEGATIVE
BUN SERPL-MCNC: 22 MG/DL (ref 6–20)
BUN SERPL-MCNC: 22 MG/DL (ref 6–30)
CALCIUM SERPL-MCNC: 9.4 MG/DL (ref 8.7–10.5)
CHLORIDE SERPL-SCNC: 104 MMOL/L (ref 95–110)
CHLORIDE SERPL-SCNC: 104 MMOL/L (ref 95–110)
CLARITY UR REFRACT.AUTO: CLEAR
CO2 SERPL-SCNC: 23 MMOL/L (ref 23–29)
COLOR UR AUTO: YELLOW
CREAT SERPL-MCNC: 1.4 MG/DL (ref 0.5–1.4)
CREAT SERPL-MCNC: 1.5 MG/DL (ref 0.5–1.4)
DIFFERENTIAL METHOD: ABNORMAL
EOSINOPHIL NFR BLD: 7 % (ref 0–8)
ERYTHROCYTE [DISTWIDTH] IN BLOOD BY AUTOMATED COUNT: 13.1 % (ref 11.5–14.5)
EST. GFR  (AFRICAN AMERICAN): >60 ML/MIN/1.73 M^2
EST. GFR  (NON AFRICAN AMERICAN): 56.2 ML/MIN/1.73 M^2
GLUCOSE SERPL-MCNC: 122 MG/DL (ref 70–110)
GLUCOSE SERPL-MCNC: 125 MG/DL (ref 70–110)
GLUCOSE UR QL STRIP: NEGATIVE
HCT VFR BLD AUTO: 31.7 % (ref 40–54)
HCT VFR BLD CALC: 31 %PCV (ref 36–54)
HGB BLD-MCNC: 11.1 G/DL (ref 14–18)
HGB UR QL STRIP: NEGATIVE
HYALINE CASTS UR QL AUTO: 10 /LPF
IMM GRANULOCYTES # BLD AUTO: ABNORMAL K/UL (ref 0–0.04)
IMM GRANULOCYTES NFR BLD AUTO: ABNORMAL % (ref 0–0.5)
KETONES UR QL STRIP: NEGATIVE
LACTATE SERPL-SCNC: 1 MMOL/L (ref 0.5–2.2)
LEUKOCYTE ESTERASE UR QL STRIP: NEGATIVE
LIPASE SERPL-CCNC: 8 U/L (ref 4–60)
LYMPHOCYTES NFR BLD: 23 % (ref 18–48)
MCH RBC QN AUTO: 30.4 PG (ref 27–31)
MCHC RBC AUTO-ENTMCNC: 35 G/DL (ref 32–36)
MCV RBC AUTO: 87 FL (ref 82–98)
MICROSCOPIC COMMENT: ABNORMAL
MONOCYTES NFR BLD: 6 % (ref 4–15)
NEUTROPHILS NFR BLD: 59 % (ref 38–73)
NEUTS BAND NFR BLD MANUAL: 2 %
NITRITE UR QL STRIP: NEGATIVE
NRBC BLD-RTO: 0 /100 WBC
PH UR STRIP: 5 [PH] (ref 5–8)
PLATELET # BLD AUTO: 136 K/UL (ref 150–450)
PLATELET BLD QL SMEAR: ABNORMAL
PMV BLD AUTO: 8.7 FL (ref 9.2–12.9)
POC IONIZED CALCIUM: 1.18 MMOL/L (ref 1.06–1.42)
POC TCO2 (MEASURED): 24 MMOL/L (ref 23–29)
POCT GLUCOSE: 113 MG/DL (ref 70–110)
POTASSIUM BLD-SCNC: 4.7 MMOL/L (ref 3.5–5.1)
POTASSIUM SERPL-SCNC: 5 MMOL/L (ref 3.5–5.1)
PROT SERPL-MCNC: 7 G/DL (ref 6–8.4)
PROT UR QL STRIP: ABNORMAL
RBC # BLD AUTO: 3.65 M/UL (ref 4.6–6.2)
RBC #/AREA URNS AUTO: 2 /HPF (ref 0–4)
SAMPLE: ABNORMAL
SARS-COV-2 RNA RESP QL NAA+PROBE: NOT DETECTED
SODIUM BLD-SCNC: 138 MMOL/L (ref 136–145)
SODIUM SERPL-SCNC: 136 MMOL/L (ref 136–145)
SP GR UR STRIP: 1.02 (ref 1–1.03)
SQUAMOUS #/AREA URNS AUTO: 1 /HPF
TROPONIN I SERPL DL<=0.01 NG/ML-MCNC: 0.01 NG/ML (ref 0–0.03)
URN SPEC COLLECT METH UR: ABNORMAL
WBC # BLD AUTO: 4.03 K/UL (ref 3.9–12.7)
WBC #/AREA URNS AUTO: 2 /HPF (ref 0–5)

## 2022-07-11 PROCEDURE — 85027 COMPLETE CBC AUTOMATED: CPT | Performed by: EMERGENCY MEDICINE

## 2022-07-11 PROCEDURE — 96375 TX/PRO/DX INJ NEW DRUG ADDON: CPT

## 2022-07-11 PROCEDURE — G0378 HOSPITAL OBSERVATION PER HR: HCPCS

## 2022-07-11 PROCEDURE — 96361 HYDRATE IV INFUSION ADD-ON: CPT

## 2022-07-11 PROCEDURE — U0005 INFEC AGEN DETEC AMPLI PROBE: HCPCS | Performed by: PHYSICIAN ASSISTANT

## 2022-07-11 PROCEDURE — 83605 ASSAY OF LACTIC ACID: CPT | Performed by: EMERGENCY MEDICINE

## 2022-07-11 PROCEDURE — 85007 BL SMEAR W/DIFF WBC COUNT: CPT | Performed by: EMERGENCY MEDICINE

## 2022-07-11 PROCEDURE — U0003 INFECTIOUS AGENT DETECTION BY NUCLEIC ACID (DNA OR RNA); SEVERE ACUTE RESPIRATORY SYNDROME CORONAVIRUS 2 (SARS-COV-2) (CORONAVIRUS DISEASE [COVID-19]), AMPLIFIED PROBE TECHNIQUE, MAKING USE OF HIGH THROUGHPUT TECHNOLOGIES AS DESCRIBED BY CMS-2020-01-R: HCPCS | Performed by: PHYSICIAN ASSISTANT

## 2022-07-11 PROCEDURE — 63600175 PHARM REV CODE 636 W HCPCS: Performed by: PHYSICIAN ASSISTANT

## 2022-07-11 PROCEDURE — 99285 EMERGENCY DEPT VISIT HI MDM: CPT | Mod: 25

## 2022-07-11 PROCEDURE — 83690 ASSAY OF LIPASE: CPT | Performed by: EMERGENCY MEDICINE

## 2022-07-11 PROCEDURE — 80047 BASIC METABLC PNL IONIZED CA: CPT

## 2022-07-11 PROCEDURE — 99285 EMERGENCY DEPT VISIT HI MDM: CPT | Mod: CS,,, | Performed by: EMERGENCY MEDICINE

## 2022-07-11 PROCEDURE — 84484 ASSAY OF TROPONIN QUANT: CPT | Performed by: EMERGENCY MEDICINE

## 2022-07-11 PROCEDURE — 25500020 PHARM REV CODE 255: Performed by: EMERGENCY MEDICINE

## 2022-07-11 PROCEDURE — 25000003 PHARM REV CODE 250: Performed by: PHYSICIAN ASSISTANT

## 2022-07-11 PROCEDURE — 63600175 PHARM REV CODE 636 W HCPCS: Performed by: STUDENT IN AN ORGANIZED HEALTH CARE EDUCATION/TRAINING PROGRAM

## 2022-07-11 PROCEDURE — 99285 PR EMERGENCY DEPT VISIT,LEVEL V: ICD-10-PCS | Mod: CS,,, | Performed by: EMERGENCY MEDICINE

## 2022-07-11 PROCEDURE — 25000003 PHARM REV CODE 250: Performed by: STUDENT IN AN ORGANIZED HEALTH CARE EDUCATION/TRAINING PROGRAM

## 2022-07-11 PROCEDURE — 36415 COLL VENOUS BLD VENIPUNCTURE: CPT | Performed by: TRANSPLANT SURGERY

## 2022-07-11 PROCEDURE — 96361 HYDRATE IV INFUSION ADD-ON: CPT | Performed by: EMERGENCY MEDICINE

## 2022-07-11 PROCEDURE — 99213 OFFICE O/P EST LOW 20 MIN: CPT | Mod: 24,,, | Performed by: PHYSICIAN ASSISTANT

## 2022-07-11 PROCEDURE — 99213 PR OFFICE/OUTPT VISIT, EST, LEVL III, 20-29 MIN: ICD-10-PCS | Mod: 24,,, | Performed by: PHYSICIAN ASSISTANT

## 2022-07-11 PROCEDURE — 82330 ASSAY OF CALCIUM: CPT

## 2022-07-11 PROCEDURE — 81001 URINALYSIS AUTO W/SCOPE: CPT | Performed by: EMERGENCY MEDICINE

## 2022-07-11 PROCEDURE — 80053 COMPREHEN METABOLIC PANEL: CPT | Performed by: EMERGENCY MEDICINE

## 2022-07-11 RX ORDER — POLYETHYLENE GLYCOL 3350 17 G/17G
17 POWDER, FOR SOLUTION ORAL DAILY PRN
Status: DISCONTINUED | OUTPATIENT
Start: 2022-07-11 | End: 2022-07-12 | Stop reason: HOSPADM

## 2022-07-11 RX ORDER — MORPHINE SULFATE 4 MG/ML
4 INJECTION, SOLUTION INTRAMUSCULAR; INTRAVENOUS
Status: COMPLETED | OUTPATIENT
Start: 2022-07-11 | End: 2022-07-11

## 2022-07-11 RX ORDER — ASPIRIN 81 MG/1
81 TABLET ORAL DAILY
Status: DISCONTINUED | OUTPATIENT
Start: 2022-07-12 | End: 2022-07-12 | Stop reason: HOSPADM

## 2022-07-11 RX ORDER — ACETAMINOPHEN 325 MG/1
650 TABLET ORAL EVERY 8 HOURS PRN
Status: DISCONTINUED | OUTPATIENT
Start: 2022-07-11 | End: 2022-07-12

## 2022-07-11 RX ORDER — ONDANSETRON 8 MG/1
8 TABLET, ORALLY DISINTEGRATING ORAL EVERY 8 HOURS PRN
Status: DISCONTINUED | OUTPATIENT
Start: 2022-07-11 | End: 2022-07-12 | Stop reason: HOSPADM

## 2022-07-11 RX ORDER — GLUCAGON 1 MG
1 KIT INJECTION
Status: DISCONTINUED | OUTPATIENT
Start: 2022-07-11 | End: 2022-07-12 | Stop reason: HOSPADM

## 2022-07-11 RX ORDER — PANTOPRAZOLE SODIUM 20 MG/1
20 TABLET, DELAYED RELEASE ORAL DAILY
Status: DISCONTINUED | OUTPATIENT
Start: 2022-07-11 | End: 2022-07-12 | Stop reason: HOSPADM

## 2022-07-11 RX ORDER — IBUPROFEN 200 MG
16 TABLET ORAL
Status: DISCONTINUED | OUTPATIENT
Start: 2022-07-11 | End: 2022-07-12 | Stop reason: HOSPADM

## 2022-07-11 RX ORDER — INSULIN ASPART 100 [IU]/ML
0-5 INJECTION, SOLUTION INTRAVENOUS; SUBCUTANEOUS
Status: DISCONTINUED | OUTPATIENT
Start: 2022-07-11 | End: 2022-07-12 | Stop reason: HOSPADM

## 2022-07-11 RX ORDER — GABAPENTIN 100 MG/1
100 CAPSULE ORAL 3 TIMES DAILY
Status: DISCONTINUED | OUTPATIENT
Start: 2022-07-11 | End: 2022-07-12

## 2022-07-11 RX ORDER — IBUPROFEN 200 MG
24 TABLET ORAL
Status: DISCONTINUED | OUTPATIENT
Start: 2022-07-11 | End: 2022-07-12 | Stop reason: HOSPADM

## 2022-07-11 RX ORDER — ONDANSETRON 2 MG/ML
4 INJECTION INTRAMUSCULAR; INTRAVENOUS
Status: COMPLETED | OUTPATIENT
Start: 2022-07-11 | End: 2022-07-11

## 2022-07-11 RX ORDER — DOCUSATE SODIUM 100 MG/1
100 CAPSULE, LIQUID FILLED ORAL 3 TIMES DAILY PRN
Status: DISCONTINUED | OUTPATIENT
Start: 2022-07-11 | End: 2022-07-12 | Stop reason: HOSPADM

## 2022-07-11 RX ORDER — OXYCODONE HYDROCHLORIDE 5 MG/1
5 TABLET ORAL EVERY 6 HOURS PRN
Status: DISCONTINUED | OUTPATIENT
Start: 2022-07-11 | End: 2022-07-12 | Stop reason: HOSPADM

## 2022-07-11 RX ORDER — SODIUM CHLORIDE 0.9 % (FLUSH) 0.9 %
10 SYRINGE (ML) INJECTION
Status: DISCONTINUED | OUTPATIENT
Start: 2022-07-11 | End: 2022-07-12 | Stop reason: HOSPADM

## 2022-07-11 RX ORDER — VALGANCICLOVIR 450 MG/1
450 TABLET, FILM COATED ORAL DAILY
Status: DISCONTINUED | OUTPATIENT
Start: 2022-07-12 | End: 2022-07-12 | Stop reason: HOSPADM

## 2022-07-11 RX ORDER — HEPARIN SODIUM 5000 [USP'U]/ML
5000 INJECTION, SOLUTION INTRAVENOUS; SUBCUTANEOUS EVERY 8 HOURS
Status: DISCONTINUED | OUTPATIENT
Start: 2022-07-11 | End: 2022-07-12 | Stop reason: HOSPADM

## 2022-07-11 RX ORDER — SODIUM CHLORIDE 9 MG/ML
INJECTION, SOLUTION INTRAVENOUS CONTINUOUS
Status: DISCONTINUED | OUTPATIENT
Start: 2022-07-11 | End: 2022-07-12

## 2022-07-11 RX ORDER — TACROLIMUS 1 MG/1
5 CAPSULE ORAL 2 TIMES DAILY
Status: DISCONTINUED | OUTPATIENT
Start: 2022-07-11 | End: 2022-07-12 | Stop reason: HOSPADM

## 2022-07-11 RX ORDER — INSULIN ASPART 100 [IU]/ML
0-5 INJECTION, SOLUTION INTRAVENOUS; SUBCUTANEOUS EVERY 6 HOURS PRN
Status: DISCONTINUED | OUTPATIENT
Start: 2022-07-11 | End: 2022-07-11

## 2022-07-11 RX ORDER — SULFAMETHOXAZOLE AND TRIMETHOPRIM 400; 80 MG/1; MG/1
1 TABLET ORAL EVERY MORNING
Status: DISCONTINUED | OUTPATIENT
Start: 2022-07-12 | End: 2022-07-12 | Stop reason: HOSPADM

## 2022-07-11 RX ORDER — GLUCAGON 1 MG
1 KIT INJECTION
Status: DISCONTINUED | OUTPATIENT
Start: 2022-07-11 | End: 2022-07-11

## 2022-07-11 RX ORDER — CARVEDILOL 12.5 MG/1
12.5 TABLET ORAL 2 TIMES DAILY WITH MEALS
Status: DISCONTINUED | OUTPATIENT
Start: 2022-07-11 | End: 2022-07-12 | Stop reason: HOSPADM

## 2022-07-11 RX ORDER — NIFEDIPINE 30 MG/1
30 TABLET, EXTENDED RELEASE ORAL 2 TIMES DAILY
Status: DISCONTINUED | OUTPATIENT
Start: 2022-07-11 | End: 2022-07-12 | Stop reason: HOSPADM

## 2022-07-11 RX ADMIN — MORPHINE SULFATE 4 MG: 4 INJECTION INTRAVENOUS at 12:07

## 2022-07-11 RX ADMIN — IOHEXOL 100 ML: 350 INJECTION, SOLUTION INTRAVENOUS at 12:07

## 2022-07-11 RX ADMIN — CARVEDILOL 12.5 MG: 12.5 TABLET, FILM COATED ORAL at 06:07

## 2022-07-11 RX ADMIN — GABAPENTIN 100 MG: 100 CAPSULE ORAL at 09:07

## 2022-07-11 RX ADMIN — NIFEDIPINE 30 MG: 30 TABLET, FILM COATED, EXTENDED RELEASE ORAL at 09:07

## 2022-07-11 RX ADMIN — OXYCODONE 5 MG: 5 TABLET ORAL at 06:07

## 2022-07-11 RX ADMIN — ONDANSETRON 4 MG: 2 INJECTION INTRAMUSCULAR; INTRAVENOUS at 12:07

## 2022-07-11 RX ADMIN — GABAPENTIN 100 MG: 100 CAPSULE ORAL at 04:07

## 2022-07-11 RX ADMIN — SODIUM CHLORIDE 1000 ML: 0.9 INJECTION, SOLUTION INTRAVENOUS at 12:07

## 2022-07-11 RX ADMIN — SODIUM CHLORIDE: 0.9 INJECTION, SOLUTION INTRAVENOUS at 09:07

## 2022-07-11 RX ADMIN — TACROLIMUS 5 MG: 5 CAPSULE ORAL at 06:07

## 2022-07-11 NOTE — ASSESSMENT & PLAN NOTE
- CT A/P with contrast overall unremarkable  - will check CMV PCR  - Start IVF hydration  - PRN antiemetics

## 2022-07-11 NOTE — ASSESSMENT & PLAN NOTE
- Continue prograf. Monitor prograf level daily, monitor for toxic side effects, and adjust for therapeutic dose  - Hold cellcept given GI upset

## 2022-07-11 NOTE — HPI
Mr. Pelon Vasquez is a 56 yo s/p OLTx 4/24/22 2/2 LOZANO & ETOH (steroid induction, CMV +/+). Operation straight forward per op note. Post op SICU course notable for SVT requiring adenosine for conversation back to NSR. Post transplant he has had RUQ abdominal pain prompting admission to the hospital in June. He also has had ongoing lower back pain. He presents to the ED today with ongoing epigastric pain, lower back pain, and bilateral lower abdominal pain along with nausea/vomiting. The nausea/vomiting has been ongoing since Friday 7/8/22. He can tolerate liquids and medications but no food. He denies fever/chills, diarrhea. He reports his last BM was 2 days ago. His abdomen is not tender on exam. Lab work unremarkable in ED. ED obtained CT A/P with contrast which is also overall unremarkable for source of pain and nausea/vomiting. Plan for admission to observation. Will hydrate with IVF, give anti nausea medications, and check CMV PCR.

## 2022-07-11 NOTE — ED NOTES
"Patient identifiers verified and correct for Pelon Vasquez  C/C: Pt states "I am having back pain, abdominal pain, and vomiting all weekend"  APPEARANCE: awake and alert in no acute distress.  SKIN: warm, dry and intact. No breakdown or bruising.  MUSCULOSKELETAL: Patient moving all extremities spontaneously, no obvious swelling or deformities noted. Ambulates independently.  RESPIRATORY: Denies shortness of breath. Respirations unlabored.   CARDIAC: Denies CP, 2+ distal pulses; no peripheral edema  ABDOMEN: soft, ntender, and non-distended, confirms N/V  : voids spontaneously, denies difficulty  Neurologic: AAO x 4; follows commands equal strength in all extremities; denies numbness/tingling. Denies dizziness  "

## 2022-07-11 NOTE — ED PROVIDER NOTES
Encounter Date: 7/11/2022       History     Chief Complaint   Patient presents with    Post-op Problem     Liver xplant 11 weeks ago, back pain, abd pain vomiting     57-year-old male with PMH of liver transplant (04/24/2022), diabetes, and hypertension presents with abdominal pain.  The abdominal pain is located in the right and left lower quadrants and the right upper quadrant, new times 2-3 days, insidious onset, progressive, without relieving factors, and associated with nausea, nonbloody vomiting, and PO intolerance. Last BM two days ago. Denies fever, chills, cough, chest pain, sob, dysuria, hematuria, diarrhea, and syncopal episodes. Reports compliance with medications including senna and docusate. ROS otherwise negative.     The history is provided by the patient and medical records.     Review of patient's allergies indicates:   Allergen Reactions    Strawberry Anaphylaxis and Rash    Metformin Diarrhea    Pork/porcine containing products Diarrhea and Nausea And Vomiting     Past Medical History:   Diagnosis Date    Arthritis     Cirrhosis of liver     Encounter for blood transfusion     HTN (hypertension)     BRAYDON (obstructive sleep apnea)     Secondary esophageal varices without bleeding 03/18/2022    EGD (2/25/22) showed moderate portal hypertensive gastropathy, small hiatal hernia, grade 1 esophageal varices    Type 2 diabetes mellitus      Past Surgical History:   Procedure Laterality Date    ABDOMINAL SURGERY      COLONOSCOPY      ESOPHAGOGASTRODUODENOSCOPY N/A 6/1/2022    Procedure: EGD (ESOPHAGOGASTRODUODENOSCOPY);  Surgeon: Saad Nguyễn MD;  Location: [address]);  Service: Endoscopy;  Laterality: N/A;    FINGER AMPUTATION      LIVER TRANSPLANT N/A 04/10/2022    Procedure: TRANSPLANT, LIVER;  Surgeon: Félix Scott MD;  [address];  Service: Transplant;  Laterality: N/A;    LIVER TRANSPLANT N/A 04/13/2022    Procedure: TRANSPLANT, LIVER;  Surgeon:  Keyon Castillo MD;  [facility];  Service: Transplant;  Laterality: N/A;    LIVER TRANSPLANT N/A 04/20/2022    Procedure: TRANSPLANT, LIVER;  Surgeon: Brant Gonzalez Jr., MD;  Location: [facility] OR [facility] FLR;  Service: Transplant;  Laterality: N/A;    LIVER TRANSPLANT N/A 04/23/2022    Procedure: TRANSPLANT, LIVER;  Surgeon: Tom Romero MD;  [facility];  Service: Transplant;  Laterality: N/A;    MEDIAL COLLATERAL LIGAMENT REPAIR, KNEE Bilateral     ROTATRO CUFF REPAIR      TONSILLECTOMY       History reviewed. No pertinent family history.  Social History     Tobacco Use    Smoking status: Never Smoker    Smokeless tobacco: Never Used   Substance Use Topics    Alcohol use: Not Currently    Drug use: Not Currently     Review of Systems   Constitutional: Negative for chills and fever.   HENT: Negative for congestion and sore throat.    Eyes: Negative for pain and visual disturbance.   Respiratory: Negative for cough and shortness of breath.    Cardiovascular: Negative for chest pain and leg swelling.   Gastrointestinal: Positive for abdominal pain, constipation, nausea and vomiting. Negative for blood in stool and diarrhea.   Endocrine: Negative for polydipsia and polyuria.   Genitourinary: Negative for dysuria and hematuria.   Musculoskeletal: Negative for arthralgias and back pain.   Skin: Negative for color change and rash.   Neurological: Positive for weakness (generalized). Negative for syncope and headaches.       Physical Exam     Initial Vitals [07/11/22 1101]   BP Pulse Resp Temp SpO2   113/60 83 18 98.3 °F (36.8 °C) 96 %      MAP       --         Physical Exam    Nursing note and vitals reviewed.  Constitutional: He appears well-developed and well-nourished. He is not diaphoretic. No distress.   HENT:   Head: Normocephalic and atraumatic.   Eyes: Conjunctivae and EOM are normal. Pupils are equal, round, and reactive to light.   Neck: Neck supple. No JVD present.    Normal range of motion.  Cardiovascular: Normal rate, regular rhythm, normal heart sounds and intact distal pulses.   No murmur heard.  Pulmonary/Chest: Breath sounds normal. No respiratory distress. He has no wheezes. He has no rhonchi. He has no rales.   Abdominal:   Soft, nondistended abdomen with diffuse TTP that is worse in RLQ, LLQ, and RUQ There is no rebound and no guarding.   Musculoskeletal:         General: No tenderness or edema.      Cervical back: Normal range of motion and neck supple.     Neurological: He is alert and oriented to person, place, and time.   Skin: Skin is warm and dry. Capillary refill takes less than 2 seconds.         ED Course   Procedures  Labs Reviewed   CBC W/ AUTO DIFFERENTIAL - Abnormal; Notable for the following components:       Result Value    RBC 3.65 (*)     Hemoglobin 11.1 (*)     Hematocrit 31.7 (*)     Platelets 136 (*)     MPV 8.7 (*)     Basophil % 3.0 (*)     Platelet Estimate Decreased (*)     All other components within normal limits   COMPREHENSIVE METABOLIC PANEL - Abnormal; Notable for the following components:    Glucose 122 (*)     BUN 22 (*)     Alkaline Phosphatase 46 (*)     ALT 8 (*)     eGFR if non  56.2 (*)     All other components within normal limits   URINALYSIS, REFLEX TO URINE CULTURE - Abnormal; Notable for the following components:    Protein, UA 1+ (*)     All other components within normal limits    Narrative:     Specimen Source->Urine   URINALYSIS MICROSCOPIC - Abnormal; Notable for the following components:    Hyaline Casts, UA 10 (*)     All other components within normal limits    Narrative:     Specimen Source->Urine   ISTAT PROCEDURE - Abnormal; Notable for the following components:    POC Glucose 125 (*)     POC Creatinine 1.5 (*)     POC Hematocrit 31 (*)     All other components within normal limits   LIPASE   LACTIC ACID, PLASMA   TROPONIN I   ISTAT CHEM8          Imaging Results          CT Abdomen Pelvis With  Contrast (Final result)  Result time 07/11/22 13:09:43    Final result by Bahman Rubi IV, MD (07/11/22 13:09:43)                 Impression:      No acute intra-abdominal abnormality.    Postoperative changes status post liver transplant with prominent periportal edema and trace periportal free fluid, improved as compared to prior CT.    Colonic diverticulosis without evidence of acute diverticulitis.    Subcentimeter nonobstructing left-sided nephrolithiasis.    Additional findings as above.      Electronically signed by: Bahman Rubi  Date:    07/11/2022  Time:    13:09             Narrative:    EXAMINATION:  CT ABDOMEN PELVIS WITH CONTRAST    CLINICAL HISTORY:  Abdominal pain, post-op;Hx liver transplant;    TECHNIQUE:  Low dose axial images, sagittal and coronal reformations were obtained from the lung bases to the pubic symphysis following the IV administration of 100 mL of Omnipaque 350 .  Oral contrast was not given.    COMPARISON:  Abdominal radiograph from 06/02/2022. Liver transplant Doppler ultrasound from 06/01/2022. CT abdomen/pelvis from 05/31/2022.    FINDINGS:  Trace dependent right pleural fluid.  Otherwise, lung bases are clear.  Partially visualized heart is unremarkable.    Postoperative changes status post liver transplant.  Normal homogeneous enhancement of liver parenchyma.  No focal hepatic lesion.  Portal vein is patent.  Prominent periportal edema and trace periportal free fluid.    Gallbladder is surgically absent.  No abnormal biliary duct dilatation.    Mild splenomegaly.    Pancreas and bilateral adrenal glands are unremarkable.    Kidneys are normal in size and location.  Normal contrast enhancement.  5 mm nonobstructing calculus in the left kidney.  No solid enhancing mass.  No hydronephrosis.  Ureters demonstrate normal caliber and course.    Bladder is nondistended.    Prostate is unremarkable.    Stomach and duodenum are unremarkable.  Normal caliber small bowel.   Moderate volume colonic stool burden.  Sigmoid diverticulosis without evidence of acute diverticulitis.  No evidence of bowel inflammation or obstruction.  Appendix visualized and unremarkable.    No free intraperitoneal air.    Multiple prominent periportal/peripancreatic/celiac axis lymph nodes.  No pathologically enlarged lymph nodes by CT criteria.    Abdominal aorta demonstrates normal caliber and course with mild atherosclerotic plaque.    Postoperative change status post chevron incision within the anterior upper abdominal wall.  Small bilateral fat containing inguinal hernias.    Degenerative changes.  No acute fracture.  No aggressive osseous lesion.                                 Medications   sodium chloride 0.9% bolus 1,000 mL (0 mLs Intravenous Stopped 7/11/22 1334)   ondansetron injection 4 mg (4 mg Intravenous Given 7/11/22 1233)   morphine injection 4 mg (4 mg Intravenous Given 7/11/22 1233)   iohexoL (OMNIPAQUE 350) injection 100 mL (100 mLs Intravenous Given 7/11/22 1244)     Medical Decision Making:   History:   Old Medical Records: I decided to obtain old medical records.  Initial Assessment:   57M hx liver transplant (04/2022) presents with abdominal pain, n/v, and constipation, abdomen soft and nondistended  - zofran, morphine  - labs  - CT abdomen/pelvis  - transplant consult   Differential Diagnosis:   SBO/LBO, Constipation, transplant rejection, pancreatitis, appendicitis, UTI  Clinical Tests:   Lab Tests: Ordered and Reviewed  Radiological Study: Ordered and Reviewed  Medical Tests: Ordered and Reviewed  ED Management:  See ED course            Attending Attestation:   Physician Attestation Statement for Resident:  As the supervising MD   Physician Attestation Statement: I have personally seen and examined this patient.   I agree with the above history. -:   As the supervising MD I agree with the above PE.    As the supervising MD I agree with the above treatment, course, plan, and  disposition.                ED Course as of 07/11/22 1401   Mon Jul 11, 2022   1225 CBC W/ AUTO DIFFERENTIAL(!)  CBC unremarkable without leukocytosis, significant anemia. Mild thrombocytopenia [BD]   1226 Comp. Metabolic Panel(!)  CMP shows normal but uptrending Ct at 1.4 and BUN 22. Normal electrolytes and LFTs [BD]   1226 Troponin I: 0.006  ACS is unlikely [BD]   1226 Lipase: 8  Pancreatitis is unlikely [BD]   1226 Lactate, Adilson: 1.0 [BD]   1226 Urinalysis, Reflex to Urine Culture Urine, Clean Catch(!)  UA unremarkable without evidence of infection [BD]   1322 CT Abdomen Pelvis With Contrast  CT abd/pel shows no acute intra-abdominal abnormality. Stable post-op changes from liver transplant. No obstruction. Moderate stool burden.  [BD]   1328 Liver transplant consulted and will come see the patient [BD]   1400 Liver transplant will admit the patient to their service. Pt remains hemodynamically stable. He agrees with plan.  [BD]      ED Course User Index  [BD] Pelon Salazar MD             Clinical Impression:   Final diagnoses:  [R10.9] Abdominal pain, unspecified abdominal location (Primary)  [R11.2] Nausea and vomiting, intractability of vomiting not specified, unspecified vomiting type  [Z79.899] Long-term use of immunosuppressant medication  [Z94.4] S/P liver transplant          ED Disposition Condition    Admit               Pelon Salazar MD  Resident  07/11/22 1401       Jesus Dean MD  07/13/22 1200

## 2022-07-11 NOTE — H&P
[facility] Emergency Dept  Liver Transplant  History & Physical    Patient Name: Pelon Vasquez  MRN: [id number]  Admission Date: 7/11/2022  Code Status: Full Code  Primary Care Provider: Primary Doctor No  Post-Operative Day: 78     ORGAN:   LIVER  Disease Etiology: Cirrhosis: Fatty Liver (LOZANO)  Donor Type:   Donation after Brain Death  [facility] High Risk:   No  Donor CMV Status:   Donor CMV Status: Positive  Donor HBcAB:   Negative  Donor HCV Status:   Negative  Donor HBV MATHEUS: Negative  Donor HCV MATHEUS: Negative  Whole or Partial: Whole Liver  Biliary Anastomosis: End to End  Arterial Anatomy: Completely Replaced Circulation    Subjective:     History of Present Illness:  No notes on file    No new subjective & objective note has been filed under this hospital service since the last note was generated.    Assessment/Plan:     * Nausea & vomiting  - CT A/P with contrast overall unremarkable  - will check CMV PCR  - Start IVF hydration  - PRN antiemetics       Abdominal pain  - See nausea/vomiting      Long-term use of immunosuppressant medication  - Continue prograf. Monitor prograf level daily, monitor for toxic side effects, and adjust for therapeutic dose  - Hold cellcept given GI upset      Prophylactic immunotherapy  - See long term use of immunosuppressant medication      S/P liver transplant  - LFTs stable  - Monitor with daily labs      Anemia of chronic disease  - H/H stable  - Monitor with daily cbc      Type 2 diabetes mellitus without complication, without long-term current use of insulin  - Sliding scale ordered since po intake poor due to nausea/vomiting            Discharge Planning: Not a candidate for discharge at this time     TAE LedezmaC  Liver Transplant  [facility] - Emergency Dept

## 2022-07-11 NOTE — SUBJECTIVE & OBJECTIVE
Past Medical History:   Diagnosis Date    Arthritis     Cirrhosis of liver     Encounter for blood transfusion     HTN (hypertension)     BRAYDON (obstructive sleep apnea)     Secondary esophageal varices without bleeding 03/18/2022    EGD (2/25/22) showed moderate portal hypertensive gastropathy, small hiatal hernia, grade 1 esophageal varices    Type 2 diabetes mellitus        Past Surgical History:   Procedure Laterality Date    ABDOMINAL SURGERY      COLONOSCOPY      ESOPHAGOGASTRODUODENOSCOPY N/A 6/1/2022    Procedure: EGD (ESOPHAGOGASTRODUODENOSCOPY);  Surgeon: Saad Nguyễn MD;  Location: [facility] ENDO (2ND FLR);  Service: Endoscopy;  Laterality: N/A;    FINGER AMPUTATION      LIVER TRANSPLANT N/A 04/10/2022    Procedure: TRANSPLANT, LIVER;  Surgeon: Félix Scott MD;  Location: [facility] OR 2ND FLR;  Service: Transplant;  Laterality: N/A;    LIVER TRANSPLANT N/A 04/13/2022    Procedure: TRANSPLANT, LIVER;  Surgeon: Keyon Castillo MD;  Location: [facility] OR 2ND FLR;  Service: Transplant;  Laterality: N/A;    LIVER TRANSPLANT N/A 04/20/2022    Procedure: TRANSPLANT, LIVER;  Surgeon: Brant Gonzalez Jr., MD;  Location: [facility] OR 2ND FLR;  Service: Transplant;  Laterality: N/A;    LIVER TRANSPLANT N/A 04/23/2022    Procedure: TRANSPLANT, LIVER;  Surgeon: Tom Romero MD;  Location: [facility] OR 2ND FLR;  Service: Transplant;  Laterality: N/A;    MEDIAL COLLATERAL LIGAMENT REPAIR, KNEE Bilateral     ROTATRO CUFF REPAIR      TONSILLECTOMY         Review of patient's allergies indicates:   Allergen Reactions    Strawberry Anaphylaxis and Rash    Metformin Diarrhea    Pork/porcine containing products Diarrhea and Nausea And Vomiting       Family History    None       Tobacco Use    Smoking status: Never Smoker    Smokeless tobacco: Never Used   Substance and Sexual Activity    Alcohol use: Not Currently    Drug use: Not Currently    Sexual activity: Not on file       (Not in a hospital admission)      Review of Systems    Constitutional:  Positive for activity change and fatigue. Negative for chills and fever.   Respiratory:  Negative for shortness of breath.    Cardiovascular:  Negative for leg swelling.   Gastrointestinal:  Positive for abdominal pain, nausea and vomiting. Negative for diarrhea.   Genitourinary:  Negative for difficulty urinating, dysuria and urgency.   Allergic/Immunologic: Positive for immunocompromised state.   Psychiatric/Behavioral:  Negative for agitation, confusion and decreased concentration.    Objective:     Vital Signs (Most Recent):  Temp: 98.3 °F (36.8 °C) (07/11/22 1101)  Pulse: 85 (07/11/22 1400)  Resp: 19 (07/11/22 1400)  BP: 138/72 (07/11/22 1400)  SpO2: 100 % (07/11/22 1400) Vital Signs (24h Range):  Temp:  [98.3 °F (36.8 °C)] 98.3 °F (36.8 °C)  Pulse:  [80-85] 85  Resp:  [11-19] 19  SpO2:  [96 %-100 %] 100 %  BP: (113-138)/(60-72) 138/72     Weight: 95.3 kg (210 lb)  Body mass index is 32.89 kg/m².    No intake or output data in the 24 hours ending 07/11/22 1415    Physical Exam  Vitals and nursing note reviewed.   Cardiovascular:      Rate and Rhythm: Normal rate and regular rhythm.      Heart sounds: No murmur heard.    No gallop.   Pulmonary:      Effort: Pulmonary effort is normal.      Breath sounds: No wheezing or rales.   Abdominal:      General: There is no distension.      Tenderness: There is no abdominal tenderness. There is no guarding or rebound.   Neurological:      Mental Status: He is alert and oriented to person, place, and time.   Psychiatric:         Mood and Affect: Mood normal.         Behavior: Behavior normal.         Thought Content: Thought content normal.         Judgment: Judgment normal.       Laboratory:  CBC:   Recent Labs   Lab 07/11/22  1110 07/11/22  1127   WBC 4.03  --    RBC 3.65*  --    HGB 11.1*  --    HCT 31.7* 31*   *  --    MCV 87  --    MCH 30.4  --    MCHC 35.0  --      CMP:   Recent Labs   Lab 07/11/22  1110   *   CALCIUM 9.4   ALBUMIN  3.8   PROT 7.0      K 5.0   CO2 23      BUN 22*   CREATININE 1.4   ALKPHOS 46*   ALT 8*   AST 17   BILITOT 0.6     Labs within the past 24 hours have been reviewed.    Diagnostic Results:  CT Abd/Pelvis: Results for orders placed during the hospital encounter of 05/31/22    CT Abdomen Pelvis  Without Contrast    Narrative  EXAMINATION:  CT ABDOMEN PELVIS WITHOUT CONTRAST    CLINICAL HISTORY:  RLQ abdominal pain (Age >= 14y);    TECHNIQUE:  Routine axial CT images of the abdomen and pelvis were obtained without the use of IV contrast.  Sagittal and coronal reformatted images were also acquired.    COMPARISON:  Ultrasound Doppler liver transplant post 05/17/2022, MRI abdomen with without contrast 03/17/2022    FINDINGS:  Heart: The heart is normal in size with no pericardial effusion or calcification.  Partially visualized coronary artery calcification.    Lungs: Small right pleural effusion with associated compressive atelectasis.  Lungs demonstrate no evidence for consolidation or pneumothorax.  No suspicious pulmonary nodules or pulmonary masses.    Liver: Postoperative change including orthotopic liver transplant.  Liver allograft demonstrates no focal parenchymal abnormality.    Gallbladder: Surgically absent.    Bile ducts: No evidence of dilated ducts.    Pancreas: No mass or peripancreatic fat stranding.    Spleen: The spleen is upper limits of normal in size with no focal splenic lesions.    Adrenals: Normal.    GI Tract/ Mesentery: Gastroesophageal junction is unremarkable.  The stomach is filled with high density previously ingested contrast material.  Visualized stomach, duodenum, small bowel, and colon demonstrate no evidence for obstructive or inflammatory process.  Multiple colonic diverticula without evidence for diverticulitis.  The appendix is visualized without evidence for appendicitis.  Scattered regions of mesenteric haziness and adenopathy, which may reflect reactive inflammatory  change.  No ascites or free intra-abdominal air.  No pneumatosis intestinalis.    Kidneys/Ureters: Normal in size and location. No hydronephrosis.  Punctate left nonobstructing renal stone (axial series 2, image 80).  No ureteral dilatation.    Bladder: No evidence of wall thickening.    Reproductive organs: Normal.    Retroperitoneum: Few prominent retroperitoneal lymph nodes without evidence for pathologic adenopathy.    Abdominal wall: Small bilateral fat containing inguinal hernias.    Vasculature: The visualized aorta demonstrates mild calcific atherosclerosis.  No evidence for aneurysm.    Bones: No acute fracture. Age-appropriate degenerative changes.    Impression  No acute abdominal pathology.    Postoperative change including orthotopic liver transplant with no focal parenchymal abnormalities.    Small right pleural effusion with associated compressive atelectasis.    Mild splenomegaly.    Mild scattered mesenteric haziness and adenopathy without evidence for ascites or free intra-abdominal air.  No pneumatosis intestinalis.  Findings may relate to reactive etiology.    Punctate left nonobstructing renal stone.    Electronically signed by resident: Bal Reilly  Date:    05/31/2022  Time:    15:59    Electronically signed by: Win Manzo MD  Date:    05/31/2022  Time:    16:35

## 2022-07-11 NOTE — FIRST PROVIDER EVALUATION
"Medical screening exam completed.  I have conducted a focused provider triage encounter, findings are as follows:    Brief history of present illness:  57yM liver transplant 11 weeks ago here with back and abdominal pain, associated with vomiting and dizziness.     Vitals:    07/11/22 1101   BP: 113/60   Pulse: 83   Resp: 18   SpO2: 96%   Weight: 95.3 kg (210 lb)   Height: 5' 7" (1.702 m)       Pertinent physical exam:    Gen: AxOx4, NAD, appears stated age, well appearing  Eye: EOMI, no scleral icterus, no periorbital edema or ecchymosis  Head: Normocephalic, atraumatic, no lesions, scalp grossly normal  ENT: neck supple, no stridor, no masses, no abnormal phonation or drooling  CVS: warm and well perfused, cap refill <2 seconds, no extremity pallor  PULM: normal work of breathing, no stridor, equal chest rise, no peripheral cyanosis  ABD: scaphoid, nondistended  Ext: no rash, no deformities, moving all joints with normal ROM  Neuro: RUIZ, gait intact, face grossly symmetric  Psych: well groomed, mood and affect congruent, makes good eye contact, cooperative      Brief workup plan:  Labs, imaging    Preliminary workup initiated; this workup will be continued and followed by the physician or advanced practice provider that is assigned to the patient when roomed.  "

## 2022-07-12 VITALS
BODY MASS INDEX: 32.96 KG/M2 | RESPIRATION RATE: 18 BRPM | HEIGHT: 67 IN | DIASTOLIC BLOOD PRESSURE: 75 MMHG | WEIGHT: 210 LBS | OXYGEN SATURATION: 99 % | TEMPERATURE: 99 F | SYSTOLIC BLOOD PRESSURE: 124 MMHG | HEART RATE: 79 BPM

## 2022-07-12 PROBLEM — R11.2 NAUSEA & VOMITING: Status: RESOLVED | Noted: 2022-07-11 | Resolved: 2022-07-12

## 2022-07-12 LAB
ALBUMIN SERPL BCP-MCNC: 3.4 G/DL (ref 3.5–5.2)
ALP SERPL-CCNC: 42 U/L (ref 55–135)
ALT SERPL W/O P-5'-P-CCNC: 5 U/L (ref 10–44)
ANION GAP SERPL CALC-SCNC: 8 MMOL/L (ref 8–16)
ANISOCYTOSIS BLD QL SMEAR: SLIGHT
AST SERPL-CCNC: 15 U/L (ref 10–40)
BASOPHILS # BLD AUTO: ABNORMAL K/UL (ref 0–0.2)
BASOPHILS NFR BLD: 0 % (ref 0–1.9)
BILIRUB SERPL-MCNC: 0.6 MG/DL (ref 0.1–1)
BUN SERPL-MCNC: 19 MG/DL (ref 6–20)
CALCIUM SERPL-MCNC: 8.9 MG/DL (ref 8.7–10.5)
CHLORIDE SERPL-SCNC: 105 MMOL/L (ref 95–110)
CO2 SERPL-SCNC: 23 MMOL/L (ref 23–29)
CREAT SERPL-MCNC: 1.1 MG/DL (ref 0.5–1.4)
DIFFERENTIAL METHOD: ABNORMAL
EOSINOPHIL # BLD AUTO: ABNORMAL K/UL (ref 0–0.5)
EOSINOPHIL NFR BLD: 3 % (ref 0–8)
ERYTHROCYTE [DISTWIDTH] IN BLOOD BY AUTOMATED COUNT: 12.8 % (ref 11.5–14.5)
EST. GFR  (AFRICAN AMERICAN): >60 ML/MIN/1.73 M^2
EST. GFR  (NON AFRICAN AMERICAN): >60 ML/MIN/1.73 M^2
GLUCOSE SERPL-MCNC: 91 MG/DL (ref 70–110)
HCT VFR BLD AUTO: 30.1 % (ref 40–54)
HGB BLD-MCNC: 10.3 G/DL (ref 14–18)
HYPOCHROMIA BLD QL SMEAR: ABNORMAL
IMM GRANULOCYTES # BLD AUTO: ABNORMAL K/UL (ref 0–0.04)
IMM GRANULOCYTES NFR BLD AUTO: ABNORMAL % (ref 0–0.5)
LYMPHOCYTES # BLD AUTO: ABNORMAL K/UL (ref 1–4.8)
LYMPHOCYTES NFR BLD: 11 % (ref 18–48)
MAGNESIUM SERPL-MCNC: 1.2 MG/DL (ref 1.6–2.6)
MCH RBC QN AUTO: 30.3 PG (ref 27–31)
MCHC RBC AUTO-ENTMCNC: 34.2 G/DL (ref 32–36)
MCV RBC AUTO: 89 FL (ref 82–98)
MONOCYTES # BLD AUTO: ABNORMAL K/UL (ref 0.3–1)
MONOCYTES NFR BLD: 6 % (ref 4–15)
NEUTROPHILS NFR BLD: 78 % (ref 38–73)
NEUTS BAND NFR BLD MANUAL: 2 %
NRBC BLD-RTO: 0 /100 WBC
OVALOCYTES BLD QL SMEAR: ABNORMAL
PHOSPHATE SERPL-MCNC: 4.6 MG/DL (ref 2.7–4.5)
PLATELET # BLD AUTO: 112 K/UL (ref 150–450)
PMV BLD AUTO: 9 FL (ref 9.2–12.9)
POIKILOCYTOSIS BLD QL SMEAR: SLIGHT
POLYCHROMASIA BLD QL SMEAR: ABNORMAL
POTASSIUM SERPL-SCNC: 4.6 MMOL/L (ref 3.5–5.1)
PROT SERPL-MCNC: 6.2 G/DL (ref 6–8.4)
RBC # BLD AUTO: 3.4 M/UL (ref 4.6–6.2)
SODIUM SERPL-SCNC: 136 MMOL/L (ref 136–145)
TACROLIMUS BLD-MCNC: 14.8 NG/ML (ref 5–15)
WBC # BLD AUTO: 3.15 K/UL (ref 3.9–12.7)

## 2022-07-12 PROCEDURE — G0378 HOSPITAL OBSERVATION PER HR: HCPCS

## 2022-07-12 PROCEDURE — 63600175 PHARM REV CODE 636 W HCPCS: Performed by: PHYSICIAN ASSISTANT

## 2022-07-12 PROCEDURE — 63600175 PHARM REV CODE 636 W HCPCS: Performed by: NURSE PRACTITIONER

## 2022-07-12 PROCEDURE — 80053 COMPREHEN METABOLIC PANEL: CPT | Performed by: PHYSICIAN ASSISTANT

## 2022-07-12 PROCEDURE — 25000003 PHARM REV CODE 250: Performed by: PHYSICIAN ASSISTANT

## 2022-07-12 PROCEDURE — 96365 THER/PROPH/DIAG IV INF INIT: CPT | Performed by: EMERGENCY MEDICINE

## 2022-07-12 PROCEDURE — 80197 ASSAY OF TACROLIMUS: CPT | Performed by: PHYSICIAN ASSISTANT

## 2022-07-12 PROCEDURE — 99225 PR SUBSEQUENT OBSERVATION CARE,LEVEL II: CPT | Mod: ,,, | Performed by: NURSE PRACTITIONER

## 2022-07-12 PROCEDURE — 84100 ASSAY OF PHOSPHORUS: CPT | Performed by: PHYSICIAN ASSISTANT

## 2022-07-12 PROCEDURE — 25000003 PHARM REV CODE 250: Performed by: NURSE PRACTITIONER

## 2022-07-12 PROCEDURE — 85027 COMPLETE CBC AUTOMATED: CPT | Performed by: PHYSICIAN ASSISTANT

## 2022-07-12 PROCEDURE — 96361 HYDRATE IV INFUSION ADD-ON: CPT | Performed by: EMERGENCY MEDICINE

## 2022-07-12 PROCEDURE — 83735 ASSAY OF MAGNESIUM: CPT | Performed by: PHYSICIAN ASSISTANT

## 2022-07-12 PROCEDURE — 36415 COLL VENOUS BLD VENIPUNCTURE: CPT | Performed by: PHYSICIAN ASSISTANT

## 2022-07-12 PROCEDURE — 99225 PR SUBSEQUENT OBSERVATION CARE,LEVEL II: ICD-10-PCS | Mod: ,,, | Performed by: NURSE PRACTITIONER

## 2022-07-12 PROCEDURE — 85007 BL SMEAR W/DIFF WBC COUNT: CPT | Performed by: PHYSICIAN ASSISTANT

## 2022-07-12 PROCEDURE — 96366 THER/PROPH/DIAG IV INF ADDON: CPT | Performed by: EMERGENCY MEDICINE

## 2022-07-12 RX ORDER — ACETAMINOPHEN 325 MG/1
650 TABLET ORAL EVERY 8 HOURS PRN
Status: DISCONTINUED | OUTPATIENT
Start: 2022-07-12 | End: 2022-07-12 | Stop reason: HOSPADM

## 2022-07-12 RX ORDER — DOCUSATE SODIUM 100 MG/1
100 CAPSULE, LIQUID FILLED ORAL 2 TIMES DAILY
Status: DISCONTINUED | OUTPATIENT
Start: 2022-07-12 | End: 2022-07-12 | Stop reason: HOSPADM

## 2022-07-12 RX ORDER — LIDOCAINE 50 MG/G
1 PATCH TOPICAL
Status: DISCONTINUED | OUTPATIENT
Start: 2022-07-12 | End: 2022-07-12 | Stop reason: HOSPADM

## 2022-07-12 RX ORDER — BISACODYL 5 MG
10 TABLET, DELAYED RELEASE (ENTERIC COATED) ORAL NIGHTLY
Status: DISCONTINUED | OUTPATIENT
Start: 2022-07-12 | End: 2022-07-12 | Stop reason: HOSPADM

## 2022-07-12 RX ORDER — BISACODYL 5 MG
10 TABLET, DELAYED RELEASE (ENTERIC COATED) ORAL NIGHTLY
Refills: 0 | COMMUNITY
Start: 2022-07-12

## 2022-07-12 RX ORDER — GABAPENTIN 300 MG/1
300 CAPSULE ORAL 3 TIMES DAILY
Qty: 90 CAPSULE | Refills: 5 | Status: SHIPPED | OUTPATIENT
Start: 2022-07-12 | End: 2022-08-08 | Stop reason: SDUPTHER

## 2022-07-12 RX ORDER — SYRING-NEEDL,DISP,INSUL,0.3 ML 29 G X1/2"
296 SYRINGE, EMPTY DISPOSABLE MISCELLANEOUS ONCE
Status: DISCONTINUED | OUTPATIENT
Start: 2022-07-12 | End: 2022-07-12 | Stop reason: HOSPADM

## 2022-07-12 RX ORDER — BISACODYL 10 MG
10 SUPPOSITORY, RECTAL RECTAL DAILY PRN
Status: DISCONTINUED | OUTPATIENT
Start: 2022-07-12 | End: 2022-07-12 | Stop reason: HOSPADM

## 2022-07-12 RX ORDER — MAGNESIUM SULFATE HEPTAHYDRATE 40 MG/ML
2 INJECTION, SOLUTION INTRAVENOUS
Status: COMPLETED | OUTPATIENT
Start: 2022-07-12 | End: 2022-07-12

## 2022-07-12 RX ORDER — GABAPENTIN 300 MG/1
300 CAPSULE ORAL 3 TIMES DAILY
Status: DISCONTINUED | OUTPATIENT
Start: 2022-07-12 | End: 2022-07-12 | Stop reason: HOSPADM

## 2022-07-12 RX ADMIN — OXYCODONE 5 MG: 5 TABLET ORAL at 06:07

## 2022-07-12 RX ADMIN — SULFAMETHOXAZOLE AND TRIMETHOPRIM 1 TABLET: 400; 80 TABLET ORAL at 06:07

## 2022-07-12 RX ADMIN — MAGNESIUM SULFATE HEPTAHYDRATE 2 G: 40 INJECTION, SOLUTION INTRAVENOUS at 10:07

## 2022-07-12 RX ADMIN — GABAPENTIN 300 MG: 300 CAPSULE ORAL at 02:07

## 2022-07-12 RX ADMIN — TACROLIMUS 5 MG: 5 CAPSULE ORAL at 08:07

## 2022-07-12 RX ADMIN — OXYCODONE 5 MG: 5 TABLET ORAL at 02:07

## 2022-07-12 RX ADMIN — DOCUSATE SODIUM 100 MG: 100 CAPSULE ORAL at 08:07

## 2022-07-12 RX ADMIN — ASPIRIN 81 MG: 81 TABLET, COATED ORAL at 08:07

## 2022-07-12 RX ADMIN — CARVEDILOL 12.5 MG: 12.5 TABLET, FILM COATED ORAL at 08:07

## 2022-07-12 RX ADMIN — GABAPENTIN 100 MG: 100 CAPSULE ORAL at 08:07

## 2022-07-12 RX ADMIN — MAGNESIUM SULFATE HEPTAHYDRATE 2 G: 40 INJECTION, SOLUTION INTRAVENOUS at 08:07

## 2022-07-12 RX ADMIN — PANTOPRAZOLE SODIUM 20 MG: 20 TABLET, DELAYED RELEASE ORAL at 08:07

## 2022-07-12 RX ADMIN — LIDOCAINE 1 PATCH: 50 PATCH CUTANEOUS at 12:07

## 2022-07-12 RX ADMIN — VALGANCICLOVIR HYDROCHLORIDE 450 MG: 450 TABLET ORAL at 08:07

## 2022-07-12 RX ADMIN — NIFEDIPINE 30 MG: 30 TABLET, FILM COATED, EXTENDED RELEASE ORAL at 08:07

## 2022-07-12 RX ADMIN — BISACODYL 10 MG: 5 TABLET, COATED ORAL at 12:07

## 2022-07-12 RX ADMIN — OXYCODONE 5 MG: 5 TABLET ORAL at 12:07

## 2022-07-12 RX ADMIN — SODIUM CHLORIDE: 0.9 INJECTION, SOLUTION INTRAVENOUS at 08:07

## 2022-07-12 NOTE — PROGRESS NOTES
"Discharge Medication Note:    Hospital Course:  56 y/o M s/p liver transplant 4/24/22 secondary to LOZANO admitted for abdominal pain and constipation. Patient started on IVF for hydration.  Checking CMV PCR for nausea/vomitting.  Cellcept has been completed (3 months post transplant).  Patient started on linzess for chronic constipation.  Increasing gabapentin to 300 mg TID for pain and patient should have pain management follow up.  Patient's tacrolimus elevated at discharge - will hold ketoconazole and continue tacrolimus 5 mg BID to see if the level decreases.     Met with Pelon Vasquez at discharge to review discharge medications and to update the blue medication card.           Medication List      START taking these medications    bisacodyL 5 mg EC tablet  Commonly known as: DULCOLAX  Take 2 tablets (10 mg total) by mouth every evening.     linaCLOtide 145 mcg Cap capsule  Commonly known as: LINZESS  Take 1 capsule (145 mcg total) by mouth before breakfast.        CHANGE how you take these medications    gabapentin 300 MG capsule  Commonly known as: NEURONTIN  Take 1 capsule (300 mg total) by mouth 3 (three) times daily.  What changed:   · medication strength  · how much to take        CONTINUE taking these medications    aspirin 81 MG EC tablet  Commonly known as: ECOTRIN  Take 1 tablet (81 mg total) by mouth once daily.     BD ULTRA-FINE JACKIE PEN NEEDLE 32 gauge x 5/32" Ndle  Generic drug: pen needle, diabetic  1 each by Misc.(Non-Drug; Combo Route) route 3 (three) times daily with meals.     calcium carbonate-vitamin D3 600 mg-20 mcg (800 unit) Tab  Take 1 tablet by mouth once daily.     carvediloL 12.5 MG tablet  Commonly known as: COREG  Take 1 tablet (12.5 mg total) by mouth 2 (two) times daily with meals. HOLD for SBP <120 and HR <60     docusate sodium 100 MG capsule  Commonly known as: COLACE  Take 1 capsule (100 mg total) by mouth 3 (three) times daily as needed for Constipation.     NIFEdipine 30 MG " (OSM) 24 hr tablet  Commonly known as: PROCARDIA-XL  Take 1 tablet (30 mg total) by mouth 2 (two) times a day.     NovoLOG Flexpen U-100 Insulin 100 unit/mL (3 mL) Inpn pen  Generic drug: insulin aspart U-100  Inject 4 Units into the skin 3 (three) times daily with meals. May also inject 0-10 Units as needed (for hyperglycemia.). Plus SSI: 150 - 200 + 2 unit; 201 - 250 + 4 units; 251 - 300 + 6 units;  301 - 350 + 8 units;  > 350   + 10 units. Max TDD 60 units.     ONETOUCH DELICA PLUS LANCET 30 gauge Misc  Generic drug: lancets  by Misc.(Non-Drug; Combo Route) route 4 (four) times daily before meals and nightly.     ONETOUCH VERIO FLEX METER Misc  Generic drug: blood-glucose meter  TEST BLOOD GLUCOSE AS DIRECTED     ONETOUCH VERIO TEST STRIPS Strp  Generic drug: blood sugar diagnostic  1 strip by Misc.(Non-Drug; Combo Route) route 4 (four) times daily before meals and nightly.     pantoprazole 20 MG tablet  Commonly known as: PROTONIX  Take 1 tablet (20 mg total) by mouth once daily.     sulfamethoxazole-trimethoprim 400-80mg 400-80 mg per tablet  Commonly known as: BACTRIM,SEPTRA  Take 1 tablet by mouth every morning. STOP 10/24/22     tacrolimus 1 MG Cap  Commonly known as: PROGRAF  Take 5 capsules (5 mg total) by mouth every 12 (twelve) hours.     valGANciclovir 450 mg Tab  Commonly known as: VALCYTE  Take 1 tablet (450 mg total) by mouth once daily. STOP 7/24/22        STOP taking these medications    atorvastatin 20 MG tablet  Commonly known as: LIPITOR     famotidine 20 MG tablet  Commonly known as: PEPCID     ketoconazole 200 mg Tab  Commonly known as: NIZORAL     midodrine 5 MG Tab  Commonly known as: PROAMATINE     mycophenolate 250 mg Cap  Commonly known as: CELLCEPT     polyethylene glycol 17 gram Pwpk  Commonly known as: GLYCOLAX     sodium bicarbonate 650 MG tablet     spironolactone 100 MG tablet  Commonly known as: ALDACTONE     triamcinolone acetonide 0.1% 0.1 % cream  Commonly known as: KENALOG            Where to Get Your Medications      These medications were sent to [facility]  [address] [place] [address]    Hours: Mon-Fri 7a-7p, Sat-Sun 10a-4p Phone: [phone]   · gabapentin 300 MG capsule  · linaCLOtide 145 mcg Cap capsule     You can get these medications from any pharmacy    You don't need a prescription for these medications  · bisacodyL 5 mg EC tablet            The following medications have been placed on HOLD and should be restarted in the outpatient setting (when appropriate): n/a    Pelon Vasquez verbalized understanding and had the opportunity to ask questions.

## 2022-07-12 NOTE — PLAN OF CARE
Pt admitted to TSU room 69901 from ED for c/o N/V and abdominal pain  Pt AOX4, VSS, afebrile.   Pt c/o 7/10 back pain- relieved with lidocaine patch and PRN oxycodone  Accucheck ACHS. - no SSI given  Wife at bedside, attentive to pt.  Pt has not had BM since Friday- ducolax and colace administered per MAR  Holding cellcept while pt continues to be nauseous   Pt up to toilet independently    Abdominal CT done- unremarkable  Pt in lowest settings, call light w/in reach, nonskid socks when ambulating, remains free from falls. NAD. WCTM.

## 2022-07-12 NOTE — PROGRESS NOTES
Admit / Tentative Discharge Planning Note    Met with patient and significant other Leonarda to assess needs. Patient is admitted for Nausea & vomiting [R11.2]  Prophylactic immunotherapy [Z29.8]  Long-term use of immunosuppressant medication [Z79.899]  Anemia of chronic disease [D63.8]  S/P liver transplant [Z94.4]  Abdominal pain, unspecified abdominal location [R10.9]  Type 2 diabetes mellitus without complication, without long-term current use of insulin [E11.9]  Nausea and vomiting, intractability of vomiting not specified, unspecified vomiting type [R11.2]    Patient admitted on 7/11/2022.  At this time, patient presents as in the bathroom. At this time, patients caregiver, Leonarda presents as alert and oriented x 4, pleasant, good eye contact, well groomed, recall good, calm, communicative, cooperative and asking and answering questions appropriately.    Household/Family Systems     Patient resides with patient's significant other, at [address] [facility] [place] [address]. Support system includes his significant other, s/o's family, and friends.   Patient does not have dependents that are need of being cared for.     Patients primary caregiver is Leonarda Vicente, patients significant other phone number [phone]. Confirmed patients contact information is [phone] (home);   Telephone Information:   Mobile [phone]     During admission, patient's caregiver plans to stay in patient's room. Confirmed patient and patients caregivers do have access to reliable transportation.    Cognitive Status/Learning     Patient reports reading ability as college and states patient does not have difficulty with N/A. Patient reports patient learns best by reading and hands on.    Needed: No.   Highest education level: Attended College/Technical School    Vocation/Disability     Working for Income: not currently  If yes, working activity level: Patient was employed as  for an  "[employer]. Patient has gotten accepted for disability however will not start receiving it till November 2022. Patients significant other reports when they start receiving the disability check it will be $1699.    Adherence    Patient reports a high level of adherence to patients health care regimen. Adherence counseling and education provided. Patient verbalizes understanding.    Substance Use    Patient reports the following substance usage.    Tobacco: none, patient denies any use.  Alcohol: none, patient denies any use. Pt states he stopped drinking over 9 months ago. Pt states he stopped drinking upon learning of his liver disease. Pt states he would drink a couple mixed drinks per day. Pt states he drink at that capacity for 7-8 years. Pt states he has not had any cravings or urges to drink since. Pt states that he did not have any issue with discontinuing his use and pt states that he "does not have an addictive personality" and does not have any desire to drink again. Pt states he has engaged in AA.  Illicit Drugs/Non-prescribed Medications: none, patient denies any use.    Patient states clear understanding of the potential impact of substance use. Substance abstinence/cessation counseling, education and resources provided and reviewed.     Services Utilizing/ADLS    Infusion Service: Prior to admission, patient utilizing? no  Home Health: Prior to admission, patient utilizing? no  DME: Prior to admission, no  Pulmonary/Cardiac Rehab: Prior to admission, no  Dialysis:  Prior to admission, no  Transplant Specialty Pharmacy: Prior to admission, Yes; [facility].    Prior to admission, patient reports patient was independent with ADLS and was not driving. Patient reports patient is not able to care for self at this time due to compromised medical condition (as documented in medical record) and physical weakness. Patient indicates a willingness to care for self once medically cleared to " do so.    Insurance/Medications    Insured by   Payer/Plan Subscr  Sex Relation Sub. Ins. ID Effective Group Num   1. [employer]* BRANT GRANADO 1965 Male Self [id number]                                    [address] [address]   2. [employer]* BRANT GRANADO 1965 Male Self [id number]                                   [address] [address]      Primary Insurance (for UNOS reporting): Public Insurance - Medicaid  Secondary Insurance (for UNOS reporting): None    Patient reports patient is able to obtain and afford medications at this time and at time of discharge.    Living Will/Healthcare Power of     Patient states patient has a LW and/or HCPA.  provided education regarding LW and HCPA and the completion of forms.    Coping/Mental Health    Patient is coping adequately with the aid of  family members and friends. Pt denies any issues with anxiety or depression at this time. Patient denies mental health difficulties.     Discharge Planning    At time of discharge, patient plans to return to patient's home under the care of his significant other Leonarda. Patients significant other will transport patient. Per rounds today, expected discharge date later today. Patient and patients caregiver verbalize understanding and are involved in treatment planning and discharge process.    Additional Concerns    Patient's caretaker denies additional needs and/or concerns at this time. Patient is being followed for needs, education, resources, information, emotional support, supportive counseling, and for supportive and skilled discharge plan of care.  providing ongoing psychosocial support, education, resources and d/c planning as needed.  SW remains available.  provided resource list, patient choice, psychosocial and supportive counseling, resources, education, assistance and discharge planning with patient and caregiver involvement, ongoing SW availability and  services as appropriate. Patient's caregiver verbalizes understanding and agreement with information reviewed,  availability and how to access available resources as needed. Patient denies additional needs and/or concerns at this time. Patient verbalizes understanding and agreement with information reviewed, social work availability, and how to access available resources as needed.

## 2022-07-12 NOTE — DISCHARGE SUMMARY
[facility] Transplant Stepdown  Liver Transplant  Discharge Summary      Patient Name: Pelon Vasquez  MRN: [id number]  Admission Date: 7/11/2022  Hospital Length of Stay: 0 days  Discharge Date and Time:  07/12/2022 11:38 AM  Attending Physician: Sandrine Nelson MD   Discharging Provider: Amanda Reilly DNP  Primary Care Provider: Primary Doctor No  Post-Operative Day: 79     ORGAN:   LIVER  Disease Etiology: Cirrhosis: Fatty Liver (LOZANO)  Donor Type:   Donation after Brain Death  CDC High Risk:   No  Donor CMV Status:   Donor CMV Status: Positive  Donor HBcAB:   Negative  Donor HCV Status:   Negative  Whole or Partial: Whole Liver  Biliary Anastomosis: End to End  Arterial Anatomy: Completely Replaced Circulation    HPI:   Mr. Pelon Vasquez is a 58 yo s/p OLTx 4/24/22 2/2 LOZANO & ETOH (steroid induction, CMV +/+). Operation straight forward per op note. Post op SICU course notable for SVT requiring adenosine for conversation back to NSR. Post transplant he has had RUQ abdominal pain prompting admission to the hospital in June. He also has had ongoing lower back pain. He presents to the ED today with ongoing epigastric pain, lower back pain, and bilateral lower abdominal pain along with nausea/vomiting. The nausea/vomiting has been ongoing since Friday 7/8/22. He can tolerate liquids and medications but no food. He denies fever/chills, diarrhea. He reports his last BM was 2 days ago. His abdomen is not tender on exam. Lab work unremarkable in ED. ED obtained CT A/P with contrast which is also overall unremarkable for source of pain and nausea/vomiting. Plan for admission to observation. Will hydrate with IVF, give anti nausea medications, and check CMV PCR.     Hospital Course:    Patient admitted with N/V, abdominal pain, and back pain. Abdominal pain chronic likely related to constipation. Back pain ongoing since transplant. CT abd/pelvis 7/11 was unremarkable except for moderate stool burden. Patient started on  linzess. CT lumbar spine obtained 7/12 which showed no acute findings to account for LBP, mild degenerative changes reported. Patient reports minimal relief with gabapentin, dose increased. Encouraged patient to see PCP or internal medicine MD to manage chronic issues, such as constipation and back pain. Patient and wife verbalized understanding. Discontinued cellcept. CMV PCR pending. Patient is stable and ready for discharge at this time. Hepatology appointment scheduled 8/1/22.     Goals of Care Treatment Preferences:  Code Status: Full Code    Living Will: Yes              Final Active Diagnoses:    Diagnosis Date Noted POA    Abdominal pain [R10.9] 07/11/2022 Yes    Long-term use of immunosuppressant medication [Z79.899] 04/27/2022 Not Applicable    S/P liver transplant [Z94.4] 04/24/2022 Not Applicable    Prophylactic immunotherapy [Z29.8] 04/24/2022 Not Applicable    Anemia of chronic disease [D63.8] 03/18/2022 Yes    Type 2 diabetes mellitus without complication, without long-term current use of insulin [E11.9] 02/04/2022 Yes      Problems Resolved During this Admission:    Diagnosis Date Noted Date Resolved POA    PRINCIPAL PROBLEM:  Nausea & vomiting [R11.2] 07/11/2022 07/12/2022 Yes       Consults (From admission, onward)        Status Ordering Provider     Inpatient consult to Liver Transplant  Once        Provider:  (Not yet assigned)    Acknowledged BRANT VILLAGOMEZ          Pending Diagnostic Studies:     Procedure Component Value Units Date/Time    CMV DNA, quantitative, PCR [[id number]] Collected: 07/11/22 2139    Order Status: Sent Lab Status: In process Updated: 07/11/22 2207    Specimen: Blood         Significant Diagnostic Studies: Labs:   CMP   Recent Labs   Lab 07/11/22  0718 07/11/22  1110 07/12/22  0716    136 136   K 4.6 5.0 4.6    104 105   CO2 27 23 23   GLU  --  122* 91   BUN 22* 22* 19   CREATININE 1.33* 1.4 1.1   CALCIUM 9.1 9.4 8.9   PROT  --  7.0 6.2   ALBUMIN 3.7  3.8 3.4*   BILITOT  --  0.6 0.6   ALKPHOS 47 46* 42*   AST 14* 17 15   ALT 12* 8* 5*   ANIONGAP  --  9 8   ESTGFRAFRICA >60 >60.0 >60.0   EGFRNONAA 60* 56.2* >60.0   , CBC   Recent Labs   Lab   0000 07/11/22  0718 07/11/22  1110 07/11/22  1127 07/12/22  0716   WBC  --  3.4*  3.4 4.03  --  3.15*   HGB  --  11.2* 11.1*  --  10.3*   HCT   < > 32.3* 31.7*   < > 30.1*   PLT  --  128* 136*  --  112*    < > = values in this interval not displayed.   , INR   Lab Results   Component Value Date    INR 1.20 06/13/2022    INR 1.1 04/26/2022    INR 1.2 04/25/2022    and All labs within the past 24 hours have been reviewed    The patients clinical status was discussed at multidisplinary rounds, involving transplant surgery, transplant medicine, pharmacy, nursing, nutrition, and social work    Discharged Condition: good    Disposition: Home or Self Care    Follow Up: see hospital course    Patient Instructions:      Diet Adult Regular     No dressing needed     Notify your health care provider if you experience any of the following:  temperature >100.4     Notify your health care provider if you experience any of the following:  persistent nausea and vomiting or diarrhea     Notify your health care provider if you experience any of the following:  severe uncontrolled pain     Notify your health care provider if you experience any of the following:  redness, tenderness, or signs of infection (pain, swelling, redness, odor or green/yellow discharge around incision site)     Notify your health care provider if you experience any of the following:  difficulty breathing or increased cough     Notify your health care provider if you experience any of the following:  severe persistent headache     Notify your health care provider if you experience any of the following:  worsening rash     Notify your health care provider if you experience any of the following:  persistent dizziness, light-headedness, or visual disturbances     Notify  "your health care provider if you experience any of the following:  increased confusion or weakness     Notify your health care provider if you experience any of the following:   Order Comments: Any other concerning signs and symptoms. If you have not received your scheduled follow up within 24 hours of your discharge or for any questions or concerns, please call [phone] for further assistance.     Activity as tolerated     Medications:  Reconciled Home Medications:      Medication List      START taking these medications    bisacodyL 5 mg EC tablet  Commonly known as: DULCOLAX  Take 2 tablets (10 mg total) by mouth every evening.     linaCLOtide 145 mcg Cap capsule  Commonly known as: LINZESS  Take 1 capsule (145 mcg total) by mouth before breakfast.        CHANGE how you take these medications    gabapentin 300 MG capsule  Commonly known as: NEURONTIN  Take 1 capsule (300 mg total) by mouth 3 (three) times daily.  What changed:   · medication strength  · how much to take        CONTINUE taking these medications    aspirin 81 MG EC tablet  Commonly known as: ECOTRIN  Take 1 tablet (81 mg total) by mouth once daily.     BD ULTRA-FINE JACKIE PEN NEEDLE 32 gauge x 5/32" Ndle  Generic drug: pen needle, diabetic  1 each by Misc.(Non-Drug; Combo Route) route 3 (three) times daily with meals.     calcium carbonate-vitamin D3 600 mg-20 mcg (800 unit) Tab  Take 1 tablet by mouth once daily.     carvediloL 12.5 MG tablet  Commonly known as: COREG  Take 1 tablet (12.5 mg total) by mouth 2 (two) times daily with meals. HOLD for SBP <120 and HR <60     docusate sodium 100 MG capsule  Commonly known as: COLACE  Take 1 capsule (100 mg total) by mouth 3 (three) times daily as needed for Constipation.     NIFEdipine 30 MG (OSM) 24 hr tablet  Commonly known as: PROCARDIA-XL  Take 1 tablet (30 mg total) by mouth 2 (two) times a day.     NovoLOG Flexpen U-100 Insulin 100 unit/mL (3 mL) Inpn pen  Generic drug: insulin aspart " U-100  Inject 4 Units into the skin 3 (three) times daily with meals. May also inject 0-10 Units as needed (for hyperglycemia.). Plus SSI: 150 - 200 + 2 unit; 201 - 250 + 4 units; 251 - 300 + 6 units;  301 - 350 + 8 units;  > 350   + 10 units. Max TDD 60 units.     ONETOUCH DELICA PLUS LANCET 30 gauge Misc  Generic drug: lancets  by Misc.(Non-Drug; Combo Route) route 4 (four) times daily before meals and nightly.     ONETOUCH VERIO FLEX METER Misc  Generic drug: blood-glucose meter  TEST BLOOD GLUCOSE AS DIRECTED     ONETOUCH VERIO TEST STRIPS Strp  Generic drug: blood sugar diagnostic  1 strip by Misc.(Non-Drug; Combo Route) route 4 (four) times daily before meals and nightly.     pantoprazole 20 MG tablet  Commonly known as: PROTONIX  Take 1 tablet (20 mg total) by mouth once daily.     sulfamethoxazole-trimethoprim 400-80mg 400-80 mg per tablet  Commonly known as: BACTRIM,SEPTRA  Take 1 tablet by mouth every morning. STOP 10/24/22     tacrolimus 1 MG Cap  Commonly known as: PROGRAF  Take 5 capsules (5 mg total) by mouth every 12 (twelve) hours.     valGANciclovir 450 mg Tab  Commonly known as: VALCYTE  Take 1 tablet (450 mg total) by mouth once daily. STOP 7/24/22        STOP taking these medications    atorvastatin 20 MG tablet  Commonly known as: LIPITOR     famotidine 20 MG tablet  Commonly known as: PEPCID     ketoconazole 200 mg Tab  Commonly known as: NIZORAL     midodrine 5 MG Tab  Commonly known as: PROAMATINE     mycophenolate 250 mg Cap  Commonly known as: CELLCEPT     polyethylene glycol 17 gram Pwpk  Commonly known as: GLYCOLAX     sodium bicarbonate 650 MG tablet     spironolactone 100 MG tablet  Commonly known as: ALDACTONE     triamcinolone acetonide 0.1% 0.1 % cream  Commonly known as: KENALOG          Time spent caring for patient (Greater than 1/2 spent in direct face-to-face contact): > 30 minutes    Amanda Reilly DNP  Liver Transplant  [facility] - Transplant Stepdown

## 2022-07-13 ENCOUNTER — TELEPHONE (OUTPATIENT)
Dept: TRANSPLANT | Facility: CLINIC | Age: 57
End: 2022-07-13
Payer: MEDICAID

## 2022-07-13 ENCOUNTER — PATIENT MESSAGE (OUTPATIENT)
Dept: TRANSPLANT | Facility: CLINIC | Age: 57
End: 2022-07-13
Payer: MEDICAID

## 2022-07-13 LAB
CMV DNA SPEC QL NAA+PROBE: NOT DETECTED
CYTOMEGALOVIRUS LOG (IU/ML): NOT DETECTED LOGIU/ML
CYTOMEGALOVIRUS PCR, QUANT: NOT DETECTED IU/ML

## 2022-07-13 NOTE — TELEPHONE ENCOUNTER
Per dc note, pt to repeat labs tomorrow, 7/14/22.  Sent [employer] message reminding pt to repeat fasting labs in the morning.

## 2022-07-14 ENCOUNTER — PATIENT MESSAGE (OUTPATIENT)
Dept: TRANSPLANT | Facility: CLINIC | Age: 57
End: 2022-07-14
Payer: MEDICAID

## 2022-07-15 ENCOUNTER — PATIENT MESSAGE (OUTPATIENT)
Dept: TRANSPLANT | Facility: CLINIC | Age: 57
End: 2022-07-15
Payer: MEDICAID

## 2022-07-15 LAB
EXT ALBUMIN: 3.5
EXT ALKALINE PHOSPHATASE: 46
EXT ALT: 10
EXT AST: 15
EXT BANDS%: 4
EXT BILIRUBIN TOTAL: 0.4
EXT BUN: 26
EXT BUN: 31
EXT CALCIUM: 8.4
EXT CALCIUM: 8.4
EXT CHLORIDE: 108
EXT CHLORIDE: 108
EXT CO2: 26
EXT CO2: 26
EXT CREATININE: 1.28 MG/DL
EXT CREATININE: 2.01 MG/DL
EXT EOSINOPHIL%: 3
EXT GLUCOSE: 112
EXT GLUCOSE: 142
EXT HEMATOCRIT: 29.6
EXT HEMOGLOBIN: 10
EXT LYMPH%: 28
EXT MONOCYTES%: 11
EXT PLATELETS: 119
EXT POTASSIUM: 4.1
EXT POTASSIUM: 4.4
EXT PROTEIN TOTAL: 6.6
EXT SEGS%: 54
EXT SODIUM: 139 MMOL/L
EXT SODIUM: 141 MMOL/L
EXT TACROLIMUS LVL: 9.8
EXT WBC: 3.1

## 2022-07-19 ENCOUNTER — PATIENT MESSAGE (OUTPATIENT)
Dept: TRANSPLANT | Facility: CLINIC | Age: 57
End: 2022-07-19
Payer: MEDICAID

## 2022-07-19 ENCOUNTER — TELEPHONE (OUTPATIENT)
Dept: TRANSPLANT | Facility: CLINIC | Age: 57
End: 2022-07-19
Payer: MEDICAID

## 2022-07-19 NOTE — TELEPHONE ENCOUNTER
Portal message sent, 7/14/22 labs reviewed with no changes.  7/18/22 labs received, waiting on drug level. ----- Message from Tito Mcclure MD sent at 7/18/2022 12:31 AM CDT -----  Results reviewed

## 2022-07-20 DIAGNOSIS — E11.9 TYPE 2 DIABETES MELLITUS WITHOUT COMPLICATION, WITHOUT LONG-TERM CURRENT USE OF INSULIN: ICD-10-CM

## 2022-07-20 RX ORDER — BLOOD-GLUCOSE CONTROL, NORMAL
1 EACH MISCELLANEOUS
Qty: 100 EACH | Refills: 4 | Status: SHIPPED | OUTPATIENT
Start: 2022-07-20 | End: 2022-11-15 | Stop reason: SDUPTHER

## 2022-07-20 RX ORDER — PEN NEEDLE, DIABETIC 30 GX3/16"
1 NEEDLE, DISPOSABLE MISCELLANEOUS
Qty: 100 EACH | Refills: 0 | Status: SHIPPED | OUTPATIENT
Start: 2022-07-20 | End: 2022-08-16 | Stop reason: SDUPTHER

## 2022-07-22 LAB
EXT ALBUMIN: 3.5
EXT ALKALINE PHOSPHATASE: 48
EXT ALT: 12
EXT AST: 15
EXT BANDS%: 2
EXT BASOPHIL%: 1
EXT BILIRUBIN TOTAL: 0.4
EXT BUN: 18
EXT CALCIUM: 8.4
EXT CHLORIDE: 107
EXT CO2: 29
EXT CREATININE: 1.18 MG/DL
EXT EOSINOPHIL%: 6
EXT GFR MDRD NON AF AMER: >60
EXT GLUCOSE: 135
EXT HEMATOCRIT: 31.3
EXT HEMOGLOBIN: 10.8
EXT LYMPH%: 32
EXT MAGNESIUM: 1.2
EXT MONOCYTES%: 8
EXT PLATELETS: 124
EXT POTASSIUM: 4
EXT PROTEIN TOTAL: 6.6
EXT SEGS%: 51
EXT SODIUM: 142 MMOL/L
EXT TACROLIMUS LVL: 6.9
EXT WBC: 2.5

## 2022-07-25 ENCOUNTER — PATIENT MESSAGE (OUTPATIENT)
Dept: TRANSPLANT | Facility: CLINIC | Age: 57
End: 2022-07-25
Payer: MEDICAID

## 2022-07-28 LAB
EXT ALBUMIN: 3.5
EXT ALKALINE PHOSPHATASE: 43
EXT ALT: 13
EXT AST: 15
EXT BILIRUBIN TOTAL: 0.7
EXT BUN: 23
EXT CALCIUM: 8.6
EXT CHLORIDE: 107
EXT CO2: 28
EXT CREATININE: 1.23 MG/DL
EXT EOSINOPHIL%: 4
EXT GLUCOSE: 145
EXT HEMATOCRIT: 32.2
EXT HEMOGLOBIN: 11.3
EXT LYMPH%: 41
EXT MAGNESIUM: 1.5
EXT MONOCYTES%: 6
EXT PLATELETS: 140
EXT POTASSIUM: 4.1
EXT PROTEIN TOTAL: 6.8
EXT SEGS%: 49
EXT SODIUM: 140 MMOL/L
EXT TACROLIMUS LVL: 8.5
EXT WBC: 2.3

## 2022-08-01 ENCOUNTER — OFFICE VISIT (OUTPATIENT)
Dept: TRANSPLANT | Facility: CLINIC | Age: 57
End: 2022-08-01
Payer: MEDICAID

## 2022-08-01 VITALS
SYSTOLIC BLOOD PRESSURE: 157 MMHG | OXYGEN SATURATION: 98 % | HEIGHT: 67 IN | TEMPERATURE: 97 F | BODY MASS INDEX: 32.21 KG/M2 | RESPIRATION RATE: 16 BRPM | WEIGHT: 205.25 LBS | DIASTOLIC BLOOD PRESSURE: 76 MMHG | HEART RATE: 80 BPM

## 2022-08-01 DIAGNOSIS — Z94.4 S/P LIVER TRANSPLANT: ICD-10-CM

## 2022-08-01 DIAGNOSIS — I15.8 HYPERTENSION ASSOCIATED WITH TRANSPLANTATION: ICD-10-CM

## 2022-08-01 DIAGNOSIS — Z94.9 HYPERTENSION ASSOCIATED WITH TRANSPLANTATION: ICD-10-CM

## 2022-08-01 PROCEDURE — 3044F PR MOST RECENT HEMOGLOBIN A1C LEVEL <7.0%: ICD-10-PCS | Mod: CPTII,,, | Performed by: INTERNAL MEDICINE

## 2022-08-01 PROCEDURE — 3077F PR MOST RECENT SYSTOLIC BLOOD PRESSURE >= 140 MM HG: ICD-10-PCS | Mod: CPTII,,, | Performed by: INTERNAL MEDICINE

## 2022-08-01 PROCEDURE — 3078F DIAST BP <80 MM HG: CPT | Mod: CPTII,,, | Performed by: INTERNAL MEDICINE

## 2022-08-01 PROCEDURE — 3008F BODY MASS INDEX DOCD: CPT | Mod: CPTII,,, | Performed by: INTERNAL MEDICINE

## 2022-08-01 PROCEDURE — 1160F RVW MEDS BY RX/DR IN RCRD: CPT | Mod: CPTII,,, | Performed by: INTERNAL MEDICINE

## 2022-08-01 PROCEDURE — 99999 PR PBB SHADOW E&M-EST. PATIENT-LVL V: CPT | Mod: PBBFAC,,, | Performed by: INTERNAL MEDICINE

## 2022-08-01 PROCEDURE — 3077F SYST BP >= 140 MM HG: CPT | Mod: CPTII,,, | Performed by: INTERNAL MEDICINE

## 2022-08-01 PROCEDURE — 4010F PR ACE/ARB THEARPY RXD/TAKEN: ICD-10-PCS | Mod: CPTII,,, | Performed by: INTERNAL MEDICINE

## 2022-08-01 PROCEDURE — 3078F PR MOST RECENT DIASTOLIC BLOOD PRESSURE < 80 MM HG: ICD-10-PCS | Mod: CPTII,,, | Performed by: INTERNAL MEDICINE

## 2022-08-01 PROCEDURE — 99214 OFFICE O/P EST MOD 30 MIN: CPT | Mod: S$PBB,,, | Performed by: INTERNAL MEDICINE

## 2022-08-01 PROCEDURE — 1159F PR MEDICATION LIST DOCUMENTED IN MEDICAL RECORD: ICD-10-PCS | Mod: CPTII,,, | Performed by: INTERNAL MEDICINE

## 2022-08-01 PROCEDURE — 1160F PR REVIEW ALL MEDS BY PRESCRIBER/CLIN PHARMACIST DOCUMENTED: ICD-10-PCS | Mod: CPTII,,, | Performed by: INTERNAL MEDICINE

## 2022-08-01 PROCEDURE — 99999 PR PBB SHADOW E&M-EST. PATIENT-LVL V: ICD-10-PCS | Mod: PBBFAC,,, | Performed by: INTERNAL MEDICINE

## 2022-08-01 PROCEDURE — 99215 OFFICE O/P EST HI 40 MIN: CPT | Mod: PBBFAC | Performed by: INTERNAL MEDICINE

## 2022-08-01 PROCEDURE — 3008F PR BODY MASS INDEX (BMI) DOCUMENTED: ICD-10-PCS | Mod: CPTII,,, | Performed by: INTERNAL MEDICINE

## 2022-08-01 PROCEDURE — 3044F HG A1C LEVEL LT 7.0%: CPT | Mod: CPTII,,, | Performed by: INTERNAL MEDICINE

## 2022-08-01 PROCEDURE — 99214 PR OFFICE/OUTPT VISIT, EST, LEVL IV, 30-39 MIN: ICD-10-PCS | Mod: S$PBB,,, | Performed by: INTERNAL MEDICINE

## 2022-08-01 PROCEDURE — 1159F MED LIST DOCD IN RCRD: CPT | Mod: CPTII,,, | Performed by: INTERNAL MEDICINE

## 2022-08-01 PROCEDURE — 4010F ACE/ARB THERAPY RXD/TAKEN: CPT | Mod: CPTII,,, | Performed by: INTERNAL MEDICINE

## 2022-08-01 RX ORDER — CARVEDILOL 25 MG/1
25 TABLET ORAL 2 TIMES DAILY WITH MEALS
Qty: 60 TABLET | Refills: 11 | Status: SHIPPED | OUTPATIENT
Start: 2022-08-01 | End: 2023-08-01

## 2022-08-01 NOTE — LETTER
August 1, 2022        Irvin Sandhu  [address] [facility]  [address]  [place] [address]  Phone: [phone]  Fax: [phone]             [facility]  [address]  [facility] [address]  Phone: [phone]   Patient: Pelon Vasquez   MR Number: [id number]   YOB: 1965   Date of Visit: 8/1/2022       Dear Dr. Irvin Sandhu    Thank you for referring Pelon Vasquez to me for evaluation. Attached you will find relevant portions of my assessment and plan of care.    If you have questions, please do not hesitate to call me. I look forward to following Pelon Vasquez along with you.    Sincerely,    Tito Mcclure MD    Enclosure    If you would like to receive this communication electronically, please contact [email or web address] or [phone] to request [employer] Link access.    [employer] Link is a tool which provides read-only access to select patient information with whom you have a relationship. Its easy to use and provides real time access to review your patients record including encounter summaries, notes, results, and demographic information.    If you feel you have received this communication in error or would no longer like to receive these types of communications, please e-mail [email or web address]

## 2022-08-01 NOTE — Clinical Note
Can we send him a release to work letter- but with limited duties- max 20 hrs per week and not lifting anything more than 15 lb?  Also he needs new lab orders

## 2022-08-02 ENCOUNTER — TELEPHONE (OUTPATIENT)
Dept: TRANSPLANT | Facility: CLINIC | Age: 57
End: 2022-08-02
Payer: MEDICAID

## 2022-08-02 NOTE — LETTER
August 2, 2022    Pelon Vasquez  [address]  [place] [address]             [facility]  [address]  [place] [address]  Phone: [phone] Vini Rodriguez:    Mr. Pelon Vasquez received a liver transplant 4/24/2022 at [facility] and has been under my care since.    Mr. Vasquez is doing well post transplant and is cleared to work with limited duties. As of now, we would recommend he work no more than 20 hours a week and should not lift anything over 15 pounds.     Please contact our office with any questions or concerns.    Sincerely,       Tito Mcclure, MBChB, FRCP (Raman), MPH  Section Head  Hepatology and Transplant Hepatology

## 2022-08-02 NOTE — TELEPHONE ENCOUNTER
Spoke with pt. Will email him the letter today.  ----- Message from Tito Mcclure MD sent at 8/1/2022  3:33 PM CDT -----  Can we send him a release to work letter- but with limited duties- max 20 hrs per week and not lifting anything more than 15 lb?    Also he needs new lab orders

## 2022-08-04 ENCOUNTER — PATIENT MESSAGE (OUTPATIENT)
Dept: TRANSPLANT | Facility: CLINIC | Age: 57
End: 2022-08-04
Payer: MEDICAID

## 2022-08-04 DIAGNOSIS — Z94.4 S/P LIVER TRANSPLANT: Primary | ICD-10-CM

## 2022-08-04 DIAGNOSIS — M62.838 MUSCLE SPASM: ICD-10-CM

## 2022-08-04 DIAGNOSIS — R10.9 ABDOMINAL PAIN, UNSPECIFIED ABDOMINAL LOCATION: ICD-10-CM

## 2022-08-04 DIAGNOSIS — R10.10 PAIN OF UPPER ABDOMEN: ICD-10-CM

## 2022-08-08 DIAGNOSIS — R10.9 ABDOMINAL PAIN, UNSPECIFIED ABDOMINAL LOCATION: ICD-10-CM

## 2022-08-08 DIAGNOSIS — Z94.4 S/P LIVER TRANSPLANT: Primary | ICD-10-CM

## 2022-08-08 LAB
EXT ALBUMIN: 3.9
EXT ALKALINE PHOSPHATASE: 49
EXT ALT: 14
EXT AST: 18
EXT BILIRUBIN TOTAL: 0.8
EXT BUN: 21
EXT CALCIUM: 9.6
EXT CHLORIDE: 103
EXT CO2: 29
EXT CREATININE: 1.23 MG/DL
EXT EOSINOPHIL%: 4
EXT GLUCOSE: 137
EXT HEMATOCRIT: 35
EXT HEMOGLOBIN: 12.1
EXT LYMPH%: 36
EXT MONOCYTES%: 6
EXT PLATELETS: 142
EXT POTASSIUM: 4.5
EXT PROTEIN TOTAL: 7.4
EXT SEGS%: 54
EXT SODIUM: 138 MMOL/L
EXT TACROLIMUS LVL: 9.8
EXT WBC: 2.9

## 2022-08-08 NOTE — TELEPHONE ENCOUNTER
Spoke with Leonarda. Confirmed dose and pharmacy.  ----- Message from Celina Ramirez sent at 8/8/2022 12:15 PM CDT -----  Regarding: Rx refill transferred  Patient's caregiver, Leonarda, calling to get Rx refill transferred, gabapentin (NEURONTIN) 300 MG capsule. Requesting a call back.      Call: [phone]        [place] [id number] [address]  Phone: [phone] Fax: [phone]

## 2022-08-09 ENCOUNTER — PATIENT MESSAGE (OUTPATIENT)
Dept: TRANSPLANT | Facility: CLINIC | Age: 57
End: 2022-08-09
Payer: MEDICAID

## 2022-08-09 ENCOUNTER — TELEPHONE (OUTPATIENT)
Dept: TRANSPLANT | Facility: CLINIC | Age: 57
End: 2022-08-09
Payer: MEDICAID

## 2022-08-09 RX ORDER — GABAPENTIN 300 MG/1
300 CAPSULE ORAL 3 TIMES DAILY
Qty: 90 CAPSULE | Refills: 5 | Status: SHIPPED | OUTPATIENT
Start: 2022-08-09 | End: 2023-01-10

## 2022-08-09 NOTE — TELEPHONE ENCOUNTER
Pt and caregiver advised that script just signed.   ----- Message from Chico Jackson sent at 8/9/2022 12:05 PM CDT -----  Contact: Patient care giver  Patient care giver calling and regards to speaking with Valorie Mcgill about an Rx.       Patient care giver @ [phone]

## 2022-08-11 ENCOUNTER — PATIENT MESSAGE (OUTPATIENT)
Dept: TRANSPLANT | Facility: CLINIC | Age: 57
End: 2022-08-11
Payer: MEDICAID

## 2022-08-11 ENCOUNTER — TELEPHONE (OUTPATIENT)
Dept: TRANSPLANT | Facility: CLINIC | Age: 57
End: 2022-08-11
Payer: MEDICAID

## 2022-08-11 LAB
EXT ALBUMIN: 3.6
EXT ALKALINE PHOSPHATASE: 46
EXT ALT: 14
EXT AST: 17
EXT BANDS%: 7
EXT BILIRUBIN TOTAL: 0.5
EXT BUN: 29
EXT CALCIUM: 9.2
EXT CHLORIDE: 106
EXT CO2: 28
EXT CREATININE: 1.25 MG/DL
EXT EOSINOPHIL%: 4
EXT GLUCOSE: 144
EXT HEMATOCRIT: 33.8
EXT HEMOGLOBIN: 11.8
EXT LYMPH%: 31
EXT MAGNESIUM: 1.8
EXT MONOCYTES%: 8
EXT PLATELETS: 143
EXT POTASSIUM: 4.6
EXT PROTEIN TOTAL: 7.2
EXT SEGS%: 49
EXT SODIUM: 139 MMOL/L
EXT TACROLIMUS LVL: 10.2
EXT WBC: 2.7

## 2022-08-11 NOTE — TELEPHONE ENCOUNTER
Pt notified via portal of stable labs and that no medication changes are needed. Repeat labs due 8/22/22 per protocol.   ----- Message from Tito Mcclure MD sent at 8/11/2022  3:59 PM CDT -----  Results reviewed

## 2022-08-11 NOTE — TELEPHONE ENCOUNTER
Repeat labs pending from Monday.  ----- Message from Tito Mcclure MD sent at 8/10/2022  1:37 PM CDT -----  Results reviewed

## 2022-08-16 DIAGNOSIS — E11.9 TYPE 2 DIABETES MELLITUS WITHOUT COMPLICATION, WITHOUT LONG-TERM CURRENT USE OF INSULIN: ICD-10-CM

## 2022-08-17 RX ORDER — INSULIN ASPART 100 [IU]/ML
INJECTION, SOLUTION INTRAVENOUS; SUBCUTANEOUS
Qty: 30 ML | Refills: 0 | Status: SHIPPED | OUTPATIENT
Start: 2022-08-17 | End: 2022-09-27 | Stop reason: SDUPTHER

## 2022-08-17 RX ORDER — PEN NEEDLE, DIABETIC 30 GX3/16"
1 NEEDLE, DISPOSABLE MISCELLANEOUS
Qty: 100 EACH | Refills: 0 | Status: SHIPPED | OUTPATIENT
Start: 2022-08-17 | End: 2022-09-27 | Stop reason: SDUPTHER

## 2022-08-18 ENCOUNTER — PATIENT MESSAGE (OUTPATIENT)
Dept: TRANSPLANT | Facility: CLINIC | Age: 57
End: 2022-08-18
Payer: MEDICAID

## 2022-08-24 ENCOUNTER — TELEPHONE (OUTPATIENT)
Dept: TRANSPLANT | Facility: CLINIC | Age: 57
End: 2022-08-24
Payer: MEDICAID

## 2022-08-24 ENCOUNTER — PATIENT MESSAGE (OUTPATIENT)
Dept: TRANSPLANT | Facility: CLINIC | Age: 57
End: 2022-08-24
Payer: MEDICAID

## 2022-08-24 LAB
EXT ALBUMIN: 3.4
EXT ALKALINE PHOSPHATASE: 46
EXT ALT: 12
EXT AST: 13
EXT BILIRUBIN TOTAL: 0.5
EXT BUN: 22
EXT CALCIUM: 8.8
EXT CHLORIDE: 108
EXT CO2: 29
EXT CREATININE: 1.17 MG/DL
EXT EOSINOPHIL%: 4
EXT GLUCOSE: 142
EXT HEMATOCRIT: 34
EXT HEMOGLOBIN: 11.7
EXT LYMPH%: 24
EXT MAGNESIUM: 1.7
EXT MONOCYTES%: 14
EXT PLATELETS: 146
EXT POTASSIUM: 4.5
EXT PROTEIN TOTAL: 6.8
EXT SEGS%: 54
EXT SODIUM: 140 MMOL/L
EXT TACROLIMUS LVL: 10.9
EXT WBC: 3.8

## 2022-08-24 NOTE — TELEPHONE ENCOUNTER
Pt notified via portal of stable labs and that no medication changes are needed. Repeat labs due 9/12/22 per protocol.   ----- Message from Tito Mcclure MD sent at 8/24/2022  3:00 PM CDT -----  Results reviewed

## 2022-09-14 LAB
EXT ALBUMIN: 3.3
EXT ALKALINE PHOSPHATASE: 49
EXT ALT: 14
EXT AST: 12
EXT BASOPHIL%: 0.7
EXT BILIRUBIN TOTAL: 0.6
EXT BUN: 18
EXT CALCIUM: 9
EXT CHLORIDE: 107
EXT CO2: 28
EXT CREATININE: 1.09 MG/DL
EXT EOSINOPHIL%: 2.1
EXT GLUCOSE: 161
EXT HEMATOCRIT: 33.8
EXT HEMOGLOBIN: 11.9
EXT LYMPH%: 18
EXT MAGNESIUM: 1.7
EXT MONOCYTES%: 9.3
EXT PLATELETS: 153
EXT POTASSIUM: 4.3
EXT PROTEIN TOTAL: 7
EXT SEGS%: 69.6
EXT SODIUM: 139 MMOL/L
EXT TACROLIMUS LVL: 8.9
EXT WBC: 6.1

## 2022-09-19 ENCOUNTER — TELEPHONE (OUTPATIENT)
Dept: TRANSPLANT | Facility: CLINIC | Age: 57
End: 2022-09-19
Payer: MEDICAID

## 2022-09-19 ENCOUNTER — PATIENT MESSAGE (OUTPATIENT)
Dept: TRANSPLANT | Facility: CLINIC | Age: 57
End: 2022-09-19
Payer: MEDICAID

## 2022-09-19 DIAGNOSIS — Z94.4 S/P LIVER TRANSPLANT: Primary | ICD-10-CM

## 2022-09-19 NOTE — TELEPHONE ENCOUNTER
Spoke to patient appointments scheduled for 10/24/22 with Dr. Mcclure for 9:00am and ultrasound for 11:00am.

## 2022-09-27 DIAGNOSIS — E11.9 TYPE 2 DIABETES MELLITUS WITHOUT COMPLICATION, WITHOUT LONG-TERM CURRENT USE OF INSULIN: ICD-10-CM

## 2022-09-27 RX ORDER — INSULIN ASPART 100 [IU]/ML
INJECTION, SOLUTION INTRAVENOUS; SUBCUTANEOUS
Qty: 30 ML | Refills: 0 | Status: SHIPPED | OUTPATIENT
Start: 2022-09-27 | End: 2022-11-15 | Stop reason: SDUPTHER

## 2022-09-27 RX ORDER — PEN NEEDLE, DIABETIC 30 GX3/16"
1 NEEDLE, DISPOSABLE MISCELLANEOUS
Qty: 100 EACH | Refills: 0 | Status: SHIPPED | OUTPATIENT
Start: 2022-09-27 | End: 2022-11-15 | Stop reason: SDUPTHER

## 2022-10-05 ENCOUNTER — PATIENT MESSAGE (OUTPATIENT)
Dept: TRANSPLANT | Facility: CLINIC | Age: 57
End: 2022-10-05
Payer: MEDICAID

## 2022-10-05 LAB
EXT ALBUMIN: 3.5
EXT ALKALINE PHOSPHATASE: 47
EXT ALT: 10
EXT AST: 11
EXT BASOPHIL%: 0.3
EXT BILIRUBIN TOTAL: 0.6
EXT BUN: 25
EXT CALCIUM: 8.7
EXT CHLORIDE: 106
EXT CO2: 29
EXT CREATININE: 1.27 MG/DL
EXT EOSINOPHIL%: 1.5
EXT GLUCOSE: 137
EXT HEMATOCRIT: 31.5
EXT HEMOGLOBIN: 11.3
EXT LYMPH%: 19.8
EXT MAGNESIUM: 1.6
EXT MONOCYTES%: 8.9
EXT PLATELETS: 138
EXT POTASSIUM: 4.5
EXT PROTEIN TOTAL: 6.7
EXT SEGS%: 69.2
EXT SODIUM: 139 MMOL/L
EXT TACROLIMUS LVL: 12.5
EXT WBC: 6.1

## 2022-10-06 ENCOUNTER — PATIENT MESSAGE (OUTPATIENT)
Dept: TRANSPLANT | Facility: CLINIC | Age: 57
End: 2022-10-06
Payer: MEDICAID

## 2022-10-06 DIAGNOSIS — Z94.4 LIVER REPLACED BY TRANSPLANT: ICD-10-CM

## 2022-10-06 NOTE — TELEPHONE ENCOUNTER
Pt advised of dose change and need to repeat labs 10/17/22.  ----- Message from Tito Mcclure MD sent at 10/6/2022  2:41 PM CDT -----  Reduce tacro to 5/4  Results reviewed

## 2022-10-10 RX ORDER — TACROLIMUS 1 MG/1
CAPSULE ORAL
Qty: 270 CAPSULE | Refills: 11 | Status: SHIPPED | OUTPATIENT
Start: 2022-10-10 | End: 2022-10-24

## 2022-10-17 ENCOUNTER — TELEPHONE (OUTPATIENT)
Dept: TRANSPLANT | Facility: CLINIC | Age: 57
End: 2022-10-17
Payer: MEDICAID

## 2022-10-17 NOTE — TELEPHONE ENCOUNTER
----- Message from Cornelio Schwartz sent at 10/17/2022  8:51 AM CDT -----  Regarding: call back  Pt call in regards to needing new lab order sent  pt states the order has  requesting call back    Call

## 2022-10-24 ENCOUNTER — HOSPITAL ENCOUNTER (OUTPATIENT)
Dept: RADIOLOGY | Facility: HOSPITAL | Age: 57
Discharge: HOME | End: 2022-10-24
Attending: INTERNAL MEDICINE
Payer: MEDICAID

## 2022-10-24 ENCOUNTER — TELEPHONE (OUTPATIENT)
Dept: TRANSPLANT | Facility: CLINIC | Age: 57
End: 2022-10-24

## 2022-10-24 ENCOUNTER — OFFICE VISIT (OUTPATIENT)
Dept: TRANSPLANT | Facility: CLINIC | Age: 57
End: 2022-10-24
Payer: MEDICAID

## 2022-10-24 VITALS
TEMPERATURE: 98 F | HEART RATE: 75 BPM | BODY MASS INDEX: 30.72 KG/M2 | HEIGHT: 67 IN | RESPIRATION RATE: 17 BRPM | WEIGHT: 195.75 LBS | DIASTOLIC BLOOD PRESSURE: 77 MMHG | SYSTOLIC BLOOD PRESSURE: 138 MMHG | OXYGEN SATURATION: 99 %

## 2022-10-24 DIAGNOSIS — Z94.4 LIVER REPLACED BY TRANSPLANT: ICD-10-CM

## 2022-10-24 DIAGNOSIS — Z94.4 S/P LIVER TRANSPLANT: ICD-10-CM

## 2022-10-24 DIAGNOSIS — R07.82 INTERCOSTAL PAIN: ICD-10-CM

## 2022-10-24 DIAGNOSIS — E11.9 TYPE 2 DIABETES MELLITUS WITHOUT COMPLICATION, WITHOUT LONG-TERM CURRENT USE OF INSULIN: ICD-10-CM

## 2022-10-24 DIAGNOSIS — R07.82 INTERCOSTAL PAIN: Primary | ICD-10-CM

## 2022-10-24 LAB
EXT ALBUMIN: 3.4
EXT ALKALINE PHOSPHATASE: 47
EXT ALT: 10
EXT AST: 9
EXT BASOPHIL%: 0.4
EXT BILIRUBIN TOTAL: 0.5
EXT BUN: 25
EXT CALCIUM: 8.5
EXT CHLORIDE: 108
EXT CO2: 29
EXT CREATININE: 1.35 MG/DL
EXT EOSINOPHIL%: 2.1
EXT GLUCOSE: 117
EXT HEMATOCRIT: 31.2
EXT HEMOGLOBIN: 10.9
EXT LYMPH%: 22.8
EXT MAGNESIUM: 1.6
EXT MONOCYTES%: 8.4
EXT PLATELETS: 141
EXT POTASSIUM: 4.7
EXT PROTEIN TOTAL: 6.6
EXT SEGS%: 65.9
EXT SODIUM: 139 MMOL/L
EXT TACROLIMUS LVL: 13.3
EXT WBC: 4.8

## 2022-10-24 PROCEDURE — 3078F PR MOST RECENT DIASTOLIC BLOOD PRESSURE < 80 MM HG: ICD-10-PCS | Mod: CPTII,,, | Performed by: INTERNAL MEDICINE

## 2022-10-24 PROCEDURE — 3075F SYST BP GE 130 - 139MM HG: CPT | Mod: CPTII,,, | Performed by: INTERNAL MEDICINE

## 2022-10-24 PROCEDURE — 99215 OFFICE O/P EST HI 40 MIN: CPT | Mod: S$PBB,,, | Performed by: INTERNAL MEDICINE

## 2022-10-24 PROCEDURE — 71046 X-RAY EXAM CHEST 2 VIEWS: CPT | Mod: 26,,, | Performed by: RADIOLOGY

## 2022-10-24 PROCEDURE — 76705 ECHO EXAM OF ABDOMEN: CPT | Mod: 26,59,, | Performed by: RADIOLOGY

## 2022-10-24 PROCEDURE — 1159F PR MEDICATION LIST DOCUMENTED IN MEDICAL RECORD: ICD-10-PCS | Mod: CPTII,,, | Performed by: INTERNAL MEDICINE

## 2022-10-24 PROCEDURE — 93976 US DOPPLER LIVER TRANSPLANT POST (XPD): ICD-10-PCS | Mod: 26,,, | Performed by: RADIOLOGY

## 2022-10-24 PROCEDURE — 93976 VASCULAR STUDY: CPT | Mod: 26,,, | Performed by: RADIOLOGY

## 2022-10-24 PROCEDURE — 76705 US DOPPLER LIVER TRANSPLANT POST (XPD): ICD-10-PCS | Mod: 26,59,, | Performed by: RADIOLOGY

## 2022-10-24 PROCEDURE — 71046 XR CHEST PA AND LATERAL: ICD-10-PCS | Mod: 26,,, | Performed by: RADIOLOGY

## 2022-10-24 PROCEDURE — 4010F PR ACE/ARB THEARPY RXD/TAKEN: ICD-10-PCS | Mod: CPTII,,, | Performed by: INTERNAL MEDICINE

## 2022-10-24 PROCEDURE — 4010F ACE/ARB THERAPY RXD/TAKEN: CPT | Mod: CPTII,,, | Performed by: INTERNAL MEDICINE

## 2022-10-24 PROCEDURE — 99215 PR OFFICE/OUTPT VISIT, EST, LEVL V, 40-54 MIN: ICD-10-PCS | Mod: S$PBB,,, | Performed by: INTERNAL MEDICINE

## 2022-10-24 PROCEDURE — 99215 OFFICE O/P EST HI 40 MIN: CPT | Mod: PBBFAC,25 | Performed by: INTERNAL MEDICINE

## 2022-10-24 PROCEDURE — 1159F MED LIST DOCD IN RCRD: CPT | Mod: CPTII,,, | Performed by: INTERNAL MEDICINE

## 2022-10-24 PROCEDURE — 99999 PR PBB SHADOW E&M-EST. PATIENT-LVL V: CPT | Mod: PBBFAC,,, | Performed by: INTERNAL MEDICINE

## 2022-10-24 PROCEDURE — 3078F DIAST BP <80 MM HG: CPT | Mod: CPTII,,, | Performed by: INTERNAL MEDICINE

## 2022-10-24 PROCEDURE — 71046 X-RAY EXAM CHEST 2 VIEWS: CPT | Mod: TC

## 2022-10-24 PROCEDURE — 3044F PR MOST RECENT HEMOGLOBIN A1C LEVEL <7.0%: ICD-10-PCS | Mod: CPTII,,, | Performed by: INTERNAL MEDICINE

## 2022-10-24 PROCEDURE — 93976 VASCULAR STUDY: CPT | Mod: TC

## 2022-10-24 PROCEDURE — 99999 PR PBB SHADOW E&M-EST. PATIENT-LVL V: ICD-10-PCS | Mod: PBBFAC,,, | Performed by: INTERNAL MEDICINE

## 2022-10-24 PROCEDURE — 3044F HG A1C LEVEL LT 7.0%: CPT | Mod: CPTII,,, | Performed by: INTERNAL MEDICINE

## 2022-10-24 PROCEDURE — 76705 ECHO EXAM OF ABDOMEN: CPT | Mod: TC

## 2022-10-24 PROCEDURE — 3075F PR MOST RECENT SYSTOLIC BLOOD PRESS GE 130-139MM HG: ICD-10-PCS | Mod: CPTII,,, | Performed by: INTERNAL MEDICINE

## 2022-10-24 RX ORDER — TACROLIMUS 1 MG/1
CAPSULE ORAL
Qty: 240 CAPSULE | Refills: 11 | Status: SHIPPED | OUTPATIENT
Start: 2022-10-24 | End: 2022-10-26

## 2022-10-24 RX ORDER — TRAMADOL HYDROCHLORIDE 50 MG/1
50 TABLET ORAL 3 TIMES DAILY PRN
COMMUNITY
Start: 2022-10-21

## 2022-10-24 RX ORDER — LUBIPROSTONE 24 UG/1
24 CAPSULE, GELATIN COATED ORAL 2 TIMES DAILY
COMMUNITY
Start: 2022-10-03

## 2022-10-24 NOTE — PROGRESS NOTES
Subjective:       Patient ID: Pelon Vasquez is a 57 y.o. male.    Chief Complaint: Liver Transplant Follow-up    HPI  I saw this 57 y.o. man who had a liver tx 6 months ago.    Original Referring Physician: Irvin Sandhu  Current Corresponding Physician: Irvin Sandhu     Chief Complaint: Pelon is here for follow up of his liver transplant performed 4/24/2022 for the primary diagnosis (UNOS) of Cirrhosis: Other, Specify     ORGAN: LIVER  Whole or Partial: whole liver  Donor Type: donation after brain death  PHS Increased Risk: no  Donor CMV Status: Positive  Donor HCV Status: Negative  Donor HBcAb: Negative  Donor HBV MATHEUS: Negative  Donor HCV MATHEUS: Negative     Biliary Anastomosis: end to end  Arterial Anatomy: completely replaced circulation  IVC reconstruction: end to end ivc  Portal vein status: patent    Post op recovery:  Issue immediately post op - SVT, given adenosine and converted to NSR     His transplant admission was 11 days long.    Labs on 7/25/22  - creatinine 1.35  - LFTs normal- Tac 13.3    Has had issues with low back pain and abdo pain since Tx  - admitted on 7/12/22 with constipation  - CT lumbar spine- mild degenerative changes.    Tac 5/4    - developed a new anterior chest pain- constant      Abdo US: 6/1/22  Mild interval decrease in intrahepatic resistive indices of the left and anterior right hepatic arteries, which remain within normal limits.  No tardus parvus waveforms.  Otherwise, satisfactory Doppler evaluation of the hepatic vasculature.  Stable tiny fluid collection within the right hepatic lobe.  Right pleural effusion.    Review of Systems   Constitutional:  Negative for activity change, appetite change, chills, fatigue, fever and unexpected weight change.   HENT:  Negative for hearing loss.    Eyes:  Negative for discharge and visual disturbance.   Respiratory:  Negative for cough, chest tightness, shortness of breath and wheezing.    Cardiovascular:  Negative for chest pain,  palpitations and leg swelling.   Gastrointestinal:  Negative for abdominal distention, abdominal pain, constipation, diarrhea and nausea.   Genitourinary:  Negative for dysuria and frequency.   Musculoskeletal:  Negative for arthralgias and back pain.   Skin:  Negative for pallor and rash.   Neurological:  Negative for dizziness, tremors, speech difficulty and headaches.   Hematological:  Negative for adenopathy.   Psychiatric/Behavioral:  Negative for agitation and confusion.          Lab Results   Component Value Date    ALT 10 (L) 10/19/2022    AST 9 (L) 10/19/2022    GGT 66 (H) 02/14/2022    ALKPHOS 47 10/19/2022    BILITOT 0.6 07/12/2022     Past Medical History:   Diagnosis Date    Arthritis     Cirrhosis of liver     Encounter for blood transfusion     HTN (hypertension)     BRAYDON (obstructive sleep apnea)     Secondary esophageal varices without bleeding 03/18/2022    EGD (2/25/22) showed moderate portal hypertensive gastropathy, small hiatal hernia, grade 1 esophageal varices    Type 2 diabetes mellitus      Past Surgical History:   Procedure Laterality Date    ABDOMINAL SURGERY      COLONOSCOPY      ESOPHAGOGASTRODUODENOSCOPY N/A 6/1/2022    Procedure: EGD (ESOPHAGOGASTRODUODENOSCOPY);  Surgeon: Saad Nguyễn MD;  Location: [address]);  Service: Endoscopy;  Laterality: N/A;    FINGER AMPUTATION      LIVER TRANSPLANT N/A 04/10/2022    Procedure: TRANSPLANT, LIVER;  Surgeon: Félix Scott MD;  Location: [address];  Service: Transplant;  Laterality: N/A;    LIVER TRANSPLANT N/A 04/13/2022    Procedure: TRANSPLANT, LIVER;  Surgeon: Keyon Castillo MD;  [address];  Service: Transplant;  Laterality: N/A;    LIVER TRANSPLANT N/A 04/20/2022    Procedure: TRANSPLANT, LIVER;  Surgeon: Brant Gonzalez Jr., MD;  [address];  Service: Transplant;  Laterality: N/A;    LIVER TRANSPLANT N/A 04/23/2022    Procedure: TRANSPLANT, LIVER;  Surgeon: Tom Romero MD;   "Location: [facility] OR [address] [place] [address];  Service: Transplant;  Laterality: N/A;    MEDIAL COLLATERAL LIGAMENT REPAIR, KNEE Bilateral     ROTATRO CUFF REPAIR      TONSILLECTOMY       Current Outpatient Medications   Medication Sig    AMITIZA 24 mcg Cap Take 24 mcg by mouth 2 (two) times daily.    aspirin (ECOTRIN) 81 MG EC tablet Take 1 tablet (81 mg total) by mouth once daily.    blood sugar diagnostic Strp 1 strip by Misc.(Non-Drug; Combo Route) route 4 (four) times daily before meals and nightly.    blood-glucose meter Misc TEST BLOOD GLUCOSE AS DIRECTED    calcium carbonate-vitamin D3 600 mg-20 mcg (800 unit) Tab Take 1 tablet by mouth once daily.    carvediloL (COREG) 25 MG tablet Take 1 tablet (25 mg total) by mouth 2 (two) times daily with meals. HOLD for SBP <120 and HR <60    docusate sodium (COLACE) 100 MG capsule Take 1 capsule (100 mg total) by mouth 3 (three) times daily as needed for Constipation.    gabapentin (NEURONTIN) 300 MG capsule Take 1 capsule (300 mg total) by mouth 3 (three) times daily.    insulin aspart U-100 (NOVOLOG FLEXPEN U-100 INSULIN) 100 unit/mL (3 mL) InPn pen Inject 4 Units into the skin 3 (three) times daily with meals. May also inject 0-10 Units as needed (for hyperglycemia.). Plus SSI: 150-200+2 unit; 201-250+4 units; 251-300+6 units; 301-350+8 units; >350+10 units. Max TDD 60 units.    NIFEdipine (PROCARDIA-XL) 30 MG (OSM) 24 hr tablet Take 1 tablet (30 mg total) by mouth 2 (two) times a day.    pantoprazole (PROTONIX) 20 MG tablet Take 1 tablet (20 mg total) by mouth once daily.    pen needle, diabetic (BD ULTRA-FINE JACKIE PEN NEEDLE) 32 gauge x 5/32" Ndle Use 1 pen needle to inject 3 (three) times daily with meals.    sulfamethoxazole-trimethoprim 400-80mg (BACTRIM,SEPTRA) 400-80 mg per tablet Take 1 tablet by mouth every morning. STOP 10/24/22    tacrolimus (PROGRAF) 1 MG Cap Take 5 capsules (5 mg total) by mouth every morning AND 4 capsules (4 mg total) every evening.    traMADoL " (ULTRAM) 50 mg tablet Take 50 mg by mouth 3 (three) times daily as needed.    bisacodyL (DULCOLAX) 5 mg EC tablet Take 2 tablets (10 mg total) by mouth every evening.    lancets (ONETOUCH DELICA PLUS LANCET) 30 gauge Misc 1 lancet by Misc.(Non-Drug; Combo Route) route 4 (four) times daily before meals and nightly.    linaCLOtide (LINZESS) 145 mcg Cap capsule Take 1 capsule (145 mcg total) by mouth before breakfast. (Patient not taking: Reported on 10/24/2022)    valGANciclovir (VALCYTE) 450 mg Tab Take 1 tablet (450 mg total) by mouth once daily. STOP 7/24/22 (Patient not taking: No sig reported)     No current facility-administered medications for this visit.       Objective:      Physical Exam  HENT:      Head: Normocephalic.   Eyes:      Pupils: Pupils are equal, round, and reactive to light.   Neck:      Thyroid: No thyromegaly.   Cardiovascular:      Rate and Rhythm: Normal rate and regular rhythm.      Heart sounds: Normal heart sounds.   Pulmonary:      Effort: Pulmonary effort is normal.      Breath sounds: Normal breath sounds. No wheezing.   Abdominal:      General: There is no distension.      Palpations: Abdomen is soft. There is no mass.      Tenderness: There is no abdominal tenderness.   Lymphadenopathy:      Cervical: No cervical adenopathy.   Skin:     General: Skin is warm.      Findings: No erythema or rash.   Neurological:      Mental Status: He is alert and oriented to person, place, and time.   Psychiatric:         Behavior: Behavior normal.       Assessment:       1. Intercostal pain    2. S/P liver transplant    3. Type 2 diabetes mellitus without complication, without long-term current use of insulin        Plan:   Feels well but lacking stamina.  New anterior chest pain- constant - not related to exercise.  - reduce tac to 4 mg BID - may need further reduction next week after labs  - US and CXR today    Clinic in 3 months      UNOS Patient Status  Functional Status: 90% - Able to carry on  normal activity: minor symptoms of disease  Physical Capacity: No Limitations    Patient on life support: No  Diabetes: No  Any previous malignancy: No  Neoadjuvant Therapy: no  Has patient ever had a dx of HCC: no  Previous Abdominal Surgery: no  Spontaneous Bacterial Peritonitis: no  History of Portal Vein Thrombosis: no  Transjugular Intrahepatic Portosystemic Shunt: no    New diabetes onset between last follow-up to the current follow-up: No  Did patient have any acute rejection episodes during the follow-up period: No  Post transplant malignancy: No

## 2022-10-24 NOTE — LETTER
October 24, 2022        Irvin Sandhu  [address]  [address]  [place] [address]  Phone: [phone]  Fax: [phone]             [facility] [address]  [address]  [facility] [address]  Phone: [phone]   Patient: Pelon Vasquez   MR Number: [id number]   YOB: 1965   Date of Visit: 10/24/2022       Dear Dr. Irvin Sandhu    Thank you for referring Pelon Vasquez to me for evaluation. Attached you will find relevant portions of my assessment and plan of care.    If you have questions, please do not hesitate to call me. I look forward to following Pelon Vasquez along with you.    Sincerely,    Tito Mcclure MD    Enclosure    If you would like to receive this communication electronically, please contact [email or web address] or [phone] to request Gist Link access.    Gist Link is a tool which provides read-only access to select patient information with whom you have a relationship. Its easy to use and provides real time access to review your patients record including encounter summaries, notes, results, and demographic information.    If you feel you have received this communication in error or would no longer like to receive these types of communications, please e-mail [email or web address]

## 2022-10-24 NOTE — PROGRESS NOTES
Called to inform patient and his wife that CXR ordered by Dr. Mccluer has been scheduled for today at 10:00 am at [facility]. Verbal understanding received.

## 2022-10-26 ENCOUNTER — PATIENT MESSAGE (OUTPATIENT)
Dept: TRANSPLANT | Facility: CLINIC | Age: 57
End: 2022-10-26
Payer: MEDICAID

## 2022-10-26 DIAGNOSIS — Z94.4 LIVER REPLACED BY TRANSPLANT: ICD-10-CM

## 2022-10-26 RX ORDER — ASPIRIN 81 MG/1
81 TABLET ORAL DAILY
Qty: 30 TABLET | Refills: 5 | Status: SHIPPED | OUTPATIENT
Start: 2022-10-26 | End: 2023-05-25 | Stop reason: SDUPTHER

## 2022-10-26 RX ORDER — ACETAMINOPHEN 500 MG
1 TABLET ORAL DAILY
Qty: 30 TABLET | Refills: 5 | Status: SHIPPED | OUTPATIENT
Start: 2022-10-26 | End: 2023-05-25 | Stop reason: SDUPTHER

## 2022-10-26 NOTE — TELEPHONE ENCOUNTER
Pt advised  ----- Message from Tito Mcclure MD sent at 10/25/2022  3:15 PM CDT -----  Results reviewed     2021

## 2022-10-26 NOTE — TELEPHONE ENCOUNTER
Pt advised of dose change and need to repeat labs 11/7/22.   ----- Message from Tito Mcclure MD sent at 10/25/2022  3:17 PM CDT -----  Decrease tac to 4/3  Results reviewed

## 2022-10-26 NOTE — TELEPHONE ENCOUNTER
Pt advised  ----- Message from Tito Mcclure MD sent at 10/25/2022  3:16 PM CDT -----  Repeat US in 4 weeks  Results reviewed

## 2022-10-27 DIAGNOSIS — Z94.4 LIVER REPLACED BY TRANSPLANT: ICD-10-CM

## 2022-10-27 RX ORDER — TACROLIMUS 1 MG/1
CAPSULE ORAL
Qty: 210 CAPSULE | Refills: 11 | Status: SHIPPED | OUTPATIENT
Start: 2022-10-27 | End: 2022-10-27 | Stop reason: SDUPTHER

## 2022-10-27 RX ORDER — TACROLIMUS 1 MG/1
CAPSULE ORAL
Qty: 210 CAPSULE | Refills: 11 | Status: SHIPPED | OUTPATIENT
Start: 2022-10-27 | End: 2023-01-12 | Stop reason: DRUGHIGH

## 2022-11-09 LAB
EXT ALBUMIN: 3.4
EXT ALKALINE PHOSPHATASE: 46
EXT ALT: 15
EXT AST: 12
EXT BASOPHIL%: 0.4
EXT BILIRUBIN TOTAL: 0.4
EXT BUN: 22
EXT CALCIUM: 8.4
EXT CHLORIDE: 106
EXT CO2: 26
EXT CREATININE: 1.16 MG/DL
EXT EOSINOPHIL%: 2.2
EXT GFR MDRD NON AF AMER: >60
EXT GLUCOSE: 136
EXT HEMATOCRIT: 31.8
EXT HEMOGLOBIN: 11.3
EXT LYMPH%: 20.9
EXT MAGNESIUM: 1.8
EXT MONOCYTES%: 8.6
EXT PLATELETS: 176
EXT POTASSIUM: 4
EXT PROTEIN TOTAL: 6.9
EXT SEGS%: 67.6
EXT SODIUM: 139 MMOL/L
EXT TACROLIMUS LVL: 7.1
EXT WBC: 7.2

## 2022-11-11 ENCOUNTER — PATIENT MESSAGE (OUTPATIENT)
Dept: TRANSPLANT | Facility: CLINIC | Age: 57
End: 2022-11-11
Payer: MEDICAID

## 2022-11-11 ENCOUNTER — TELEPHONE (OUTPATIENT)
Dept: TRANSPLANT | Facility: CLINIC | Age: 57
End: 2022-11-11
Payer: MEDICAID

## 2022-11-11 NOTE — TELEPHONE ENCOUNTER
Pt notified via portal of stable labs and that no medication changes are needed. Repeat labs due 12/5/22 per protocol.   ----- Message from Tito Mcclure MD sent at 11/11/2022  9:50 AM CST -----  Results reviewed

## 2022-11-15 DIAGNOSIS — E11.9 TYPE 2 DIABETES MELLITUS WITHOUT COMPLICATION, WITHOUT LONG-TERM CURRENT USE OF INSULIN: ICD-10-CM

## 2022-11-15 RX ORDER — DEXTROSE 4 G
TABLET,CHEWABLE ORAL
Qty: 1 EACH | Refills: 0 | Status: SHIPPED | OUTPATIENT
Start: 2022-11-15 | End: 2023-05-09 | Stop reason: SDUPTHER

## 2022-11-18 ENCOUNTER — PATIENT MESSAGE (OUTPATIENT)
Dept: TRANSPLANT | Facility: CLINIC | Age: 57
End: 2022-11-18
Payer: MEDICAID

## 2022-11-18 RX ORDER — PEN NEEDLE, DIABETIC 30 GX3/16"
1 NEEDLE, DISPOSABLE MISCELLANEOUS
Qty: 100 EACH | Refills: 0 | Status: SHIPPED | OUTPATIENT
Start: 2022-11-18

## 2022-11-18 RX ORDER — BLOOD-GLUCOSE CONTROL, NORMAL
1 EACH MISCELLANEOUS
Qty: 100 EACH | Refills: 4 | Status: SHIPPED | OUTPATIENT
Start: 2022-11-18

## 2022-11-18 RX ORDER — INSULIN ASPART 100 [IU]/ML
INJECTION, SOLUTION INTRAVENOUS; SUBCUTANEOUS
Qty: 30 ML | Refills: 0 | Status: SHIPPED | OUTPATIENT
Start: 2022-11-18 | End: 2023-02-27 | Stop reason: SDUPTHER

## 2022-11-28 ENCOUNTER — HOSPITAL ENCOUNTER (OUTPATIENT)
Dept: RADIOLOGY | Facility: HOSPITAL | Age: 57
Discharge: HOME | End: 2022-11-28
Attending: INTERNAL MEDICINE
Payer: MEDICAID

## 2022-11-28 DIAGNOSIS — Z94.4 LIVER REPLACED BY TRANSPLANT: ICD-10-CM

## 2022-11-28 PROCEDURE — 93976 VASCULAR STUDY: CPT | Mod: TC

## 2022-11-28 PROCEDURE — 76705 US DOPPLER LIVER TRANSPLANT POST (XPD): ICD-10-PCS | Mod: 26,XS,, | Performed by: INTERNAL MEDICINE

## 2022-11-28 PROCEDURE — 76705 ECHO EXAM OF ABDOMEN: CPT | Mod: 59,TC

## 2022-11-28 PROCEDURE — 93976 US DOPPLER LIVER TRANSPLANT POST (XPD): ICD-10-PCS | Mod: 26,,, | Performed by: INTERNAL MEDICINE

## 2022-11-28 PROCEDURE — 76705 ECHO EXAM OF ABDOMEN: CPT | Mod: 26,XS,, | Performed by: INTERNAL MEDICINE

## 2022-11-28 PROCEDURE — 93976 VASCULAR STUDY: CPT | Mod: 26,,, | Performed by: INTERNAL MEDICINE

## 2022-12-01 ENCOUNTER — TELEPHONE (OUTPATIENT)
Dept: TRANSPLANT | Facility: CLINIC | Age: 57
End: 2022-12-01
Payer: MEDICAID

## 2022-12-01 ENCOUNTER — PATIENT MESSAGE (OUTPATIENT)
Dept: TRANSPLANT | Facility: CLINIC | Age: 57
End: 2022-12-01
Payer: MEDICAID

## 2022-12-01 DIAGNOSIS — Z94.4 S/P LIVER TRANSPLANT: Primary | ICD-10-CM

## 2022-12-01 NOTE — TELEPHONE ENCOUNTER
Pt advised that ultrasound was reviewed and is stable  ----- Message from Tito Mcclure MD sent at 12/1/2022  1:20 PM CST -----  Results reviewed

## 2022-12-09 LAB
EXT ALBUMIN: 3.3
EXT ALKALINE PHOSPHATASE: 49
EXT ALT: 21
EXT AST: 15
EXT BASOPHIL%: 0.5
EXT BILIRUBIN TOTAL: 0.4
EXT BUN: 15
EXT CALCIUM: 8.3
EXT CHLORIDE: 106
EXT CO2: 32
EXT CREATININE: 1.05 MG/DL
EXT EOSINOPHIL%: 4.2
EXT GLUCOSE: 130
EXT HEMATOCRIT: 34.3
EXT HEMOGLOBIN: 11.3
EXT LYMPH%: 21.6
EXT MAGNESIUM: 1.6
EXT MONOCYTES%: 11
EXT PLATELETS: 171
EXT POTASSIUM: 3.9
EXT PROTEIN TOTAL: 6.7
EXT SEGS%: 62.3
EXT SODIUM: 140 MMOL/L
EXT TACROLIMUS LVL: 7.6
EXT WBC: 5.5

## 2022-12-12 ENCOUNTER — TELEPHONE (OUTPATIENT)
Dept: TRANSPLANT | Facility: CLINIC | Age: 57
End: 2022-12-12
Payer: MEDICAID

## 2022-12-12 ENCOUNTER — PATIENT MESSAGE (OUTPATIENT)
Dept: TRANSPLANT | Facility: CLINIC | Age: 57
End: 2022-12-12
Payer: MEDICAID

## 2022-12-12 NOTE — TELEPHONE ENCOUNTER
Pt notified via portal of stable labs and that no medication changes are needed. Repeat labs due 1/3/23 per protocol.   ----- Message from Tito Mcclure MD sent at 12/12/2022  1:34 PM CST -----  Results reviewed

## 2023-01-09 LAB
EXT ALBUMIN: 3.5
EXT ALKALINE PHOSPHATASE: 56
EXT ALT: 35
EXT AST: 34
EXT BASOPHIL%: 1
EXT BILIRUBIN TOTAL: 0.5
EXT BUN: 16
EXT CALCIUM: 9
EXT CHLORIDE: 104
EXT CO2: 32
EXT CREATININE: 0.86 MG/DL
EXT EGFR NO RACE VARIABLE: >90
EXT EOSINOPHIL%: 6.1
EXT GLUCOSE: 128
EXT HEMATOCRIT: 36.4
EXT HEMOGLOBIN: 11.8
EXT LYMPH%: 17.3
EXT MONOCYTES%: 9.9
EXT PLATELETS: 160
EXT POTASSIUM: 4.4
EXT PROTEIN TOTAL: 7.1
EXT SEGS%: 65.1
EXT SODIUM: 138 MMOL/L
EXT TACROLIMUS LVL: 13.3
EXT WBC: 6.3

## 2023-01-11 ENCOUNTER — OFFICE VISIT (OUTPATIENT)
Dept: TRANSPLANT | Facility: CLINIC | Age: 58
End: 2023-01-11
Payer: MEDICAID

## 2023-01-11 VITALS
BODY MASS INDEX: 33.21 KG/M2 | WEIGHT: 211.63 LBS | OXYGEN SATURATION: 100 % | SYSTOLIC BLOOD PRESSURE: 187 MMHG | HEIGHT: 67 IN | DIASTOLIC BLOOD PRESSURE: 91 MMHG | TEMPERATURE: 97 F | HEART RATE: 77 BPM | RESPIRATION RATE: 18 BRPM

## 2023-01-11 DIAGNOSIS — Z29.89 PROPHYLACTIC IMMUNOTHERAPY: ICD-10-CM

## 2023-01-11 DIAGNOSIS — Z94.4 S/P LIVER TRANSPLANT: Primary | ICD-10-CM

## 2023-01-11 PROCEDURE — 3080F PR MOST RECENT DIASTOLIC BLOOD PRESSURE >= 90 MM HG: ICD-10-PCS | Mod: CPTII,,, | Performed by: INTERNAL MEDICINE

## 2023-01-11 PROCEDURE — 1159F MED LIST DOCD IN RCRD: CPT | Mod: CPTII,,, | Performed by: INTERNAL MEDICINE

## 2023-01-11 PROCEDURE — 1159F PR MEDICATION LIST DOCUMENTED IN MEDICAL RECORD: ICD-10-PCS | Mod: CPTII,,, | Performed by: INTERNAL MEDICINE

## 2023-01-11 PROCEDURE — 99999 PR PBB SHADOW E&M-EST. PATIENT-LVL IV: ICD-10-PCS | Mod: PBBFAC,,, | Performed by: INTERNAL MEDICINE

## 2023-01-11 PROCEDURE — 3080F DIAST BP >= 90 MM HG: CPT | Mod: CPTII,,, | Performed by: INTERNAL MEDICINE

## 2023-01-11 PROCEDURE — 99999 PR PBB SHADOW E&M-EST. PATIENT-LVL IV: CPT | Mod: PBBFAC,,, | Performed by: INTERNAL MEDICINE

## 2023-01-11 PROCEDURE — 3077F SYST BP >= 140 MM HG: CPT | Mod: CPTII,,, | Performed by: INTERNAL MEDICINE

## 2023-01-11 PROCEDURE — 3008F PR BODY MASS INDEX (BMI) DOCUMENTED: ICD-10-PCS | Mod: CPTII,,, | Performed by: INTERNAL MEDICINE

## 2023-01-11 PROCEDURE — 3077F PR MOST RECENT SYSTOLIC BLOOD PRESSURE >= 140 MM HG: ICD-10-PCS | Mod: CPTII,,, | Performed by: INTERNAL MEDICINE

## 2023-01-11 PROCEDURE — 3008F BODY MASS INDEX DOCD: CPT | Mod: CPTII,,, | Performed by: INTERNAL MEDICINE

## 2023-01-11 PROCEDURE — 99214 PR OFFICE/OUTPT VISIT, EST, LEVL IV, 30-39 MIN: ICD-10-PCS | Mod: S$PBB,,, | Performed by: INTERNAL MEDICINE

## 2023-01-11 PROCEDURE — 99214 OFFICE O/P EST MOD 30 MIN: CPT | Mod: S$PBB,,, | Performed by: INTERNAL MEDICINE

## 2023-01-11 PROCEDURE — 99214 OFFICE O/P EST MOD 30 MIN: CPT | Mod: PBBFAC | Performed by: INTERNAL MEDICINE

## 2023-01-11 NOTE — LETTER
January 11, 2023        Irvin Sandhu  [address]  [address]  [place] [address]  Phone: [phone]  Fax: [phone]             [facility]  [address]  [facility] [address]  Phone: [phone]   Patient: Pelon Vasquez   MR Number: [id number]   YOB: 1965   Date of Visit: 1/11/2023       Dear Dr. Irvin Sandhu    Thank you for referring Pelon Vasquez to me for evaluation. Attached you will find relevant portions of my assessment and plan of care.    If you have questions, please do not hesitate to call me. I look forward to following Pelon Vasquez along with you.    Sincerely,    Tito Mcclure MD    Enclosure    If you would like to receive this communication electronically, please contact [email or web address] or [phone] to request Informous Link access.    Informous Link is a tool which provides read-only access to select patient information with whom you have a relationship. Its easy to use and provides real time access to review your patients record including encounter summaries, notes, results, and demographic information.    If you feel you have received this communication in error or would no longer like to receive these types of communications, please e-mail [email or web address]

## 2023-01-11 NOTE — PROGRESS NOTES
Subjective:       Patient ID: Pelon Vasquez is a 57 y.o. male.    Chief Complaint: Liver Transplant Follow-up      HPI  I saw this 57 y.o. man who had a liver tx 9 months ago.  I last saw him in Oct 2022.    Original Referring Physician: Irvin Sandhu  Current Corresponding Physician: Irvin Sandhu     Chief Complaint: Pelon is here for follow up of his liver transplant performed 4/24/2022 for the primary diagnosis (UNOS) of Cirrhosis: Other, Specify     ORGAN: LIVER  Whole or Partial: whole liver  Donor Type: donation after brain death  PHS Increased Risk: no  Donor CMV Status: Positive  Donor HCV Status: Negative  Donor HBcAb: Negative  Donor HBV MATHEUS: Negative  Donor HCV MATHEUS: Negative     Biliary Anastomosis: end to end  Arterial Anatomy: completely replaced circulation  IVC reconstruction: end to end ivc  Portal vein status: patent    Post op recovery:  Issue immediately post op - SVT, given adenosine and converted to NSR     His transplant admission was 11 days long.    Labs on 1/3/22  - creatinine 0.86  - LFTs normal- Tac 13.3    Has had issues with low back pain and abdo pain since Tx  - admitted on 7/12/22 with constipation  - CT lumbar spine- mild degenerative changes.    Seeing pain management and is getting knee and back injections  Overall feeling much better although he still has fatigue.      Abdo US: 11/28/22  Satisfactory Doppler evaluation of the liver allograft.  Peritransplant collection not significantly changed    Review of Systems   Constitutional:  Negative for activity change, appetite change, chills, fatigue, fever and unexpected weight change.   HENT:  Negative for hearing loss.    Eyes:  Negative for discharge and visual disturbance.   Respiratory:  Negative for cough, chest tightness, shortness of breath and wheezing.    Cardiovascular:  Negative for chest pain, palpitations and leg swelling.   Gastrointestinal:  Negative for abdominal distention, abdominal pain, constipation, diarrhea and  nausea.   Genitourinary:  Negative for dysuria and frequency.   Musculoskeletal:  Negative for arthralgias and back pain.   Skin:  Negative for pallor and rash.   Neurological:  Negative for dizziness, tremors, speech difficulty and headaches.   Hematological:  Negative for adenopathy.   Psychiatric/Behavioral:  Negative for agitation and confusion.          Lab Results   Component Value Date    ALT 15 (L) 11/07/2022    AST 12 (L) 11/07/2022    GGT 66 (H) 02/14/2022    ALKPHOS 46 11/07/2022    BILITOT 0.6 07/12/2022     Past Medical History:   Diagnosis Date    Arthritis     Cirrhosis of liver     Encounter for blood transfusion     HTN (hypertension)     BRAYDON (obstructive sleep apnea)     Secondary esophageal varices without bleeding 03/18/2022    EGD (2/25/22) showed moderate portal hypertensive gastropathy, small hiatal hernia, grade 1 esophageal varices    Type 2 diabetes mellitus      Past Surgical History:   Procedure Laterality Date    ABDOMINAL SURGERY      COLONOSCOPY      ESOPHAGOGASTRODUODENOSCOPY N/A 6/1/2022    Procedure: EGD (ESOPHAGOGASTRODUODENOSCOPY);  Surgeon: Saad Nguyễn MD;  [facility]  Service: Endoscopy;  Laterality: N/A;    FINGER AMPUTATION      LIVER TRANSPLANT N/A 04/10/2022    Procedure: TRANSPLANT, LIVER;  Surgeon: Félix Scott MD;  [address];  Service: Transplant;  Laterality: N/A;    LIVER TRANSPLANT N/A 04/13/2022    Procedure: TRANSPLANT, LIVER;  Surgeon: Keyon Castillo MD;  [address];  Service: Transplant;  Laterality: N/A;    LIVER TRANSPLANT N/A 04/20/2022    Procedure: TRANSPLANT, LIVER;  Surgeon: Brant Gonzalez Jr., MD;  [facility];  Service: Transplant;  Laterality: N/A;    LIVER TRANSPLANT N/A 04/23/2022    Procedure: TRANSPLANT, LIVER;  Surgeon: Tom Romero MD;  [facility];  Service: Transplant;  Laterality: N/A;    MEDIAL COLLATERAL LIGAMENT REPAIR, KNEE Bilateral     ROTATRO  "CUFF REPAIR      TONSILLECTOMY       Current Outpatient Medications   Medication Sig    AMITIZA 24 mcg Cap Take 24 mcg by mouth 2 (two) times daily.    aspirin (ECOTRIN) 81 MG EC tablet Take 1 tablet (81 mg total) by mouth once daily.    blood sugar diagnostic (ONETOUCH VERIO TEST STRIPS) Strp 1 strip by Misc.(Non-Drug; Combo Route) route 4 (four) times daily before meals and nightly.    blood-glucose meter (ONETOUCH VERIO FLEX METER) [facility] TEST BLOOD GLUCOSE AS DIRECTED    calcium carbonate-vitamin D3 600 mg-20 mcg (800 unit) Tab Take 1 tablet by mouth once daily.    carvediloL (COREG) 25 MG tablet Take 1 tablet (25 mg total) by mouth 2 (two) times daily with meals. HOLD for SBP <120 and HR <60    docusate sodium (COLACE) 100 MG capsule Take 1 capsule (100 mg total) by mouth 3 (three) times daily as needed for Constipation.    gabapentin (NEURONTIN) 300 MG capsule TAKE 1 CAPSULE BY MOUTH THREE TIMES DAILY (Patient taking differently: Take 600 mg by mouth 3 (three) times daily.)    insulin aspart U-100 (NOVOLOG FLEXPEN U-100 INSULIN) 100 unit/mL (3 mL) InPn pen Inject 4 Units into the skin 3 (three) times daily with meals. May also inject 0-10 Units as needed (for hyperglycemia.). Plus SSI: 150-200+2 unit; 201-250+4 units; 251-300+6 units; 301-350+8 units; >350+10 units. Max TDD 60 units.    lancets (ONETOUCH DELICA PLUS LANCET) 30 gauge Misc 1 lancet by Misc.(Non-Drug; Combo Route) route 4 (four) times daily before meals and nightly.    NIFEdipine (PROCARDIA-XL) 30 MG (OSM) 24 hr tablet Take 1 tablet (30 mg total) by mouth 2 (two) times a day.    pantoprazole (PROTONIX) 20 MG tablet Take 1 tablet (20 mg total) by mouth once daily.    pen needle, diabetic (BD ULTRA-FINE JACKIE PEN NEEDLE) 32 gauge x 5/32" Ndle Use 1 pen needle to inject 3 (three) times daily with meals.    tacrolimus (PROGRAF) 1 MG Cap Take 4 capsules (4 mg total) by mouth every morning AND 3 capsules (3 mg total) every evening.    traMADoL (ULTRAM) 50 " mg tablet Take 50 mg by mouth 3 (three) times daily as needed.    bisacodyL (DULCOLAX) 5 mg EC tablet Take 2 tablets (10 mg total) by mouth every evening.    linaCLOtide (LINZESS) 145 mcg Cap capsule Take 1 capsule (145 mcg total) by mouth before breakfast. (Patient not taking: Reported on 1/11/2023)    valGANciclovir (VALCYTE) 450 mg Tab Take 1 tablet (450 mg total) by mouth once daily. STOP 7/24/22 (Patient not taking: Reported on 1/11/2023)     No current facility-administered medications for this visit.       Objective:      Physical Exam  HENT:      Head: Normocephalic.   Eyes:      Pupils: Pupils are equal, round, and reactive to light.   Neck:      Thyroid: No thyromegaly.   Cardiovascular:      Rate and Rhythm: Normal rate and regular rhythm.      Heart sounds: Normal heart sounds.   Pulmonary:      Effort: Pulmonary effort is normal.      Breath sounds: Normal breath sounds. No wheezing.   Abdominal:      General: There is no distension.      Palpations: Abdomen is soft. There is no mass.      Tenderness: There is no abdominal tenderness.   Lymphadenopathy:      Cervical: No cervical adenopathy.   Skin:     General: Skin is warm.      Findings: No erythema or rash.   Neurological:      Mental Status: He is alert and oriented to person, place, and time.   Psychiatric:         Behavior: Behavior normal.       Assessment:       1. S/P liver transplant    2. Prophylactic immunotherapy          Plan:   Feels well but lacking stamina.  - reduce tac to 3mg BID  - BP high but hasn't taken meds  - A1 c 6.1/glucose around 130    Overall doing well  Clinic in 6 months + monthly labs      UNOS Patient Status  Functional Status: 90% - Able to carry on normal activity: minor symptoms of disease  Physical Capacity: No Limitations    Patient on life support: No  Diabetes: No  Any previous malignancy: No  Neoadjuvant Therapy: no  Has patient ever had a dx of HCC: no  Previous Abdominal Surgery: no  Spontaneous Bacterial  Peritonitis: no  History of Portal Vein Thrombosis: no  Transjugular Intrahepatic Portosystemic Shunt: no    New diabetes onset between last follow-up to the current follow-up: No  Did patient have any acute rejection episodes during the follow-up period: No  Post transplant malignancy: No

## 2023-01-12 ENCOUNTER — PATIENT MESSAGE (OUTPATIENT)
Dept: TRANSPLANT | Facility: CLINIC | Age: 58
End: 2023-01-12
Payer: MEDICAID

## 2023-01-12 DIAGNOSIS — Z94.4 LIVER REPLACED BY TRANSPLANT: ICD-10-CM

## 2023-01-12 RX ORDER — TACROLIMUS 1 MG/1
CAPSULE ORAL
Qty: 180 CAPSULE | Refills: 11 | Status: SHIPPED | OUTPATIENT
Start: 2023-01-12 | End: 2023-10-18 | Stop reason: DRUGHIGH

## 2023-01-12 NOTE — TELEPHONE ENCOUNTER
Pt advised. Next labs week of 1/16/23.  ----- Message from Tito Mcclure MD sent at 1/12/2023 12:50 PM CST -----  Weekly labs please  Results reviewed

## 2023-01-23 ENCOUNTER — PATIENT MESSAGE (OUTPATIENT)
Dept: TRANSPLANT | Facility: CLINIC | Age: 58
End: 2023-01-23
Payer: MEDICAID

## 2023-01-23 ENCOUNTER — TELEPHONE (OUTPATIENT)
Dept: TRANSPLANT | Facility: CLINIC | Age: 58
End: 2023-01-23
Payer: MEDICAID

## 2023-01-23 LAB
EXT ALBUMIN: 3.6
EXT ALKALINE PHOSPHATASE: 55
EXT ALT: 23
EXT AST: 15
EXT BASOPHIL%: 0.5
EXT BILIRUBIN TOTAL: 0.4
EXT BUN: 17
EXT CALCIUM: 8.2
EXT CHLORIDE: 105
EXT CO2: 33
EXT CREATININE: 0.92 MG/DL
EXT EGFR NO RACE VARIABLE: >90
EXT EOSINOPHIL%: 3.8
EXT GLUCOSE: 129
EXT HEMATOCRIT: 36.7
EXT HEMOGLOBIN: 11.9
EXT LYMPH%: 21.2
EXT MONOCYTES%: 10
EXT PLATELETS: 163
EXT POTASSIUM: 4.1
EXT PROTEIN TOTAL: 7.5
EXT SEGS%: 64.1
EXT SODIUM: 140 MMOL/L
EXT TACROLIMUS LVL: 8.4
EXT WBC: 7.4

## 2023-01-23 NOTE — TELEPHONE ENCOUNTER
Pt notified via portal of stable labs and that no medication changes are needed. Repeat labs due 2/6/23 per protocol.     ----- Message from Tito Mcclure MD sent at 1/23/2023  3:59 PM CST -----  No, let's do it every 3 weeks now    ----- Message -----  From: Valorie Mcgill RN  Sent: 1/23/2023  12:32 PM CST  To: Tito Mcclure MD    Does he still need weekly labs?    ----- Message -----  From: Tito Mcclure MD  Sent: 1/23/2023  11:16 AM CST  To: [facility]    Results reviewed

## 2023-02-09 LAB
EXT ALBUMIN: 3.6
EXT ALKALINE PHOSPHATASE: 59
EXT ALT: 24
EXT AST: 23
EXT BASOPHIL%: 0.3
EXT BILIRUBIN TOTAL: 0.6
EXT BUN: 16
EXT CALCIUM: 8.6
EXT CHLORIDE: 104
EXT CO2: 31
EXT CREATININE: 0.78 MG/DL
EXT EGFR NO RACE VARIABLE: >90
EXT EOSINOPHIL%: 4.3
EXT GLUCOSE: 135
EXT HEMATOCRIT: 37.2
EXT HEMOGLOBIN: 12.4
EXT LYMPH%: 16.4
EXT MONOCYTES%: 9.3
EXT PLATELETS: 155
EXT POTASSIUM: 4.4
EXT PROTEIN TOTAL: 7.7
EXT SEGS%: 68.9
EXT SODIUM: 138 MMOL/L
EXT TACROLIMUS LVL: 6.7
EXT WBC: 6.5

## 2023-02-10 ENCOUNTER — TELEPHONE (OUTPATIENT)
Dept: TRANSPLANT | Facility: CLINIC | Age: 58
End: 2023-02-10
Payer: MEDICAID

## 2023-02-10 ENCOUNTER — PATIENT MESSAGE (OUTPATIENT)
Dept: TRANSPLANT | Facility: CLINIC | Age: 58
End: 2023-02-10
Payer: MEDICAID

## 2023-02-10 NOTE — TELEPHONE ENCOUNTER
Pt notified via portal of stable labs and that no medication changes are needed. Repeat labs due 2/27/23 per protocol.   ----- Message from Tito Mcclure MD sent at 2/10/2023  1:18 PM CST -----  Results reviewed

## 2023-02-27 DIAGNOSIS — E11.9 TYPE 2 DIABETES MELLITUS WITHOUT COMPLICATION, WITHOUT LONG-TERM CURRENT USE OF INSULIN: ICD-10-CM

## 2023-02-27 RX ORDER — INSULIN ASPART 100 [IU]/ML
INJECTION, SOLUTION INTRAVENOUS; SUBCUTANEOUS
Qty: 30 ML | Refills: 0 | Status: SHIPPED | OUTPATIENT
Start: 2023-02-27

## 2023-03-07 LAB
EXT ALBUMIN: 3.6
EXT ALKALINE PHOSPHATASE: 47
EXT ALT: 18
EXT AST: 13
EXT BASOPHIL%: 0.3
EXT BILIRUBIN TOTAL: 0.7
EXT BUN: 29
EXT CALCIUM: 8.7
EXT CHLORIDE: 105
EXT CO2: 29
EXT CREATININE: 1.1 MG/DL
EXT EGFR NO RACE VARIABLE: 79
EXT EOSINOPHIL%: 3.7
EXT GLUCOSE: 123
EXT HEMATOCRIT: 34.9
EXT HEMOGLOBIN: 11.4
EXT LYMPH%: 15.9
EXT MONOCYTES%: 7.5
EXT PLATELETS: 136
EXT POTASSIUM: 4.8
EXT PROTEIN TOTAL: 7.2
EXT SEGS%: 70.9
EXT SODIUM: 138 MMOL/L
EXT TACROLIMUS LVL: 9.3
EXT WBC: 6

## 2023-03-09 ENCOUNTER — TELEPHONE (OUTPATIENT)
Dept: TRANSPLANT | Facility: CLINIC | Age: 58
End: 2023-03-09
Payer: MEDICAID

## 2023-03-09 ENCOUNTER — PATIENT MESSAGE (OUTPATIENT)
Dept: TRANSPLANT | Facility: CLINIC | Age: 58
End: 2023-03-09
Payer: MEDICAID

## 2023-03-09 NOTE — TELEPHONE ENCOUNTER
Pt notified via portal of stable labs and that no medication changes are needed. Repeat labs due 3/27/23 per protocol.   ----- Message from Tito Mcclure MD sent at 3/9/2023 12:10 PM CST -----  Results reviewed

## 2023-03-27 DIAGNOSIS — E11.9 TYPE 2 DIABETES MELLITUS WITHOUT COMPLICATION, WITHOUT LONG-TERM CURRENT USE OF INSULIN: ICD-10-CM

## 2023-03-27 RX ORDER — INSULIN ASPART 100 [IU]/ML
INJECTION, SOLUTION INTRAVENOUS; SUBCUTANEOUS
Qty: 30 ML | Refills: 0 | Status: CANCELLED | OUTPATIENT
Start: 2023-03-27

## 2023-03-29 DIAGNOSIS — E11.9 TYPE 2 DIABETES MELLITUS WITHOUT COMPLICATION, WITHOUT LONG-TERM CURRENT USE OF INSULIN: ICD-10-CM

## 2023-03-29 RX ORDER — INSULIN ASPART 100 [IU]/ML
INJECTION, SOLUTION INTRAVENOUS; SUBCUTANEOUS
Qty: 30 ML | Refills: 0 | Status: CANCELLED | OUTPATIENT
Start: 2023-03-27

## 2023-04-04 ENCOUNTER — PATIENT MESSAGE (OUTPATIENT)
Dept: TRANSPLANT | Facility: CLINIC | Age: 58
End: 2023-04-04
Payer: MEDICAID

## 2023-04-04 ENCOUNTER — TELEPHONE (OUTPATIENT)
Dept: TRANSPLANT | Facility: CLINIC | Age: 58
End: 2023-04-04
Payer: MEDICAID

## 2023-04-04 LAB
EXT ALBUMIN: 3.4
EXT ALKALINE PHOSPHATASE: 60
EXT ALT: 24
EXT AST: 15
EXT BASOPHIL%: 0.4
EXT BILIRUBIN TOTAL: 0.6
EXT BUN: 21
EXT CALCIUM: 9.2
EXT CHLORIDE: 105
EXT CO2: 30
EXT CREATININE: 0.94 MG/DL
EXT EGFR NO RACE VARIABLE: >90
EXT EOSINOPHIL%: 2
EXT GLUCOSE: 118
EXT HEMATOCRIT: 36.9
EXT HEMOGLOBIN: 12.3
EXT LYMPH%: 15.9
EXT MONOCYTES%: 7.2
EXT PLATELETS: 135
EXT POTASSIUM: 4.2
EXT PROTEIN TOTAL: 7.5
EXT SEGS%: 73.9
EXT SODIUM: 138 MMOL/L
EXT TACROLIMUS LVL: 6.3
EXT WBC: 9.3

## 2023-04-04 NOTE — TELEPHONE ENCOUNTER
Pt notified via portal of stable labs and that no medication changes are needed. Repeat labs due 4/24/23 per protocol.   ----- Message from Tito Mcclure MD sent at 4/4/2023  4:38 PM CDT -----  Results reviewed

## 2023-04-19 ENCOUNTER — HOSPITAL ENCOUNTER (OUTPATIENT)
Dept: RADIOLOGY | Facility: HOSPITAL | Age: 58
Discharge: HOME | End: 2023-04-19
Attending: INTERNAL MEDICINE
Payer: MEDICAID

## 2023-04-19 DIAGNOSIS — Z94.4 S/P LIVER TRANSPLANT: ICD-10-CM

## 2023-04-19 PROCEDURE — 76705 ECHO EXAM OF ABDOMEN: CPT | Mod: 26,XS,, | Performed by: RADIOLOGY

## 2023-04-19 PROCEDURE — 93976 US DOPPLER LIVER TRANSPLANT POST (XPD): ICD-10-PCS | Mod: 26,,, | Performed by: RADIOLOGY

## 2023-04-19 PROCEDURE — 93976 VASCULAR STUDY: CPT | Mod: 26,,, | Performed by: RADIOLOGY

## 2023-04-19 PROCEDURE — 76705 US DOPPLER LIVER TRANSPLANT POST (XPD): ICD-10-PCS | Mod: 26,XS,, | Performed by: RADIOLOGY

## 2023-04-19 PROCEDURE — 93976 VASCULAR STUDY: CPT | Mod: TC

## 2023-04-25 ENCOUNTER — PATIENT MESSAGE (OUTPATIENT)
Dept: TRANSPLANT | Facility: CLINIC | Age: 58
End: 2023-04-25
Payer: MEDICAID

## 2023-04-25 ENCOUNTER — TELEPHONE (OUTPATIENT)
Dept: TRANSPLANT | Facility: CLINIC | Age: 58
End: 2023-04-25
Payer: MEDICAID

## 2023-04-25 NOTE — TELEPHONE ENCOUNTER
Pt advised via portal of stable imaging  ----- Message from Tito Mcclure MD sent at 4/24/2023  8:43 AM CDT -----  Results reviewed

## 2023-04-28 LAB
EXT ALBUMIN: 3.5
EXT ALKALINE PHOSPHATASE: 61
EXT ALT: 21
EXT AST: 19
EXT BASOPHIL%: 0.3
EXT BILIRUBIN TOTAL: 0.6
EXT BUN: 25
EXT CALCIUM: 8.8
EXT CHLORIDE: 105
EXT CO2: 31
EXT CREATININE: 1.18 MG/DL
EXT EGFR NO RACE VARIABLE: 72
EXT EOSINOPHIL%: 3.3
EXT GLUCOSE: 155
EXT HBV DNA QUANT PCR: NOT DETECTED
EXT HEMATOCRIT: 36.7
EXT HEMOGLOBIN: 12.2
EXT LYMPH%: 14.4
EXT MONOCYTES%: 10.5
EXT PLATELETS: 146
EXT POTASSIUM: 4.3
EXT PROTEIN TOTAL: 7.2
EXT SEGS%: 70.3
EXT SODIUM: 137 MMOL/L
EXT TACROLIMUS LVL: 8.8
EXT WBC: 6.5

## 2023-05-03 ENCOUNTER — TELEPHONE (OUTPATIENT)
Dept: TRANSPLANT | Facility: CLINIC | Age: 58
End: 2023-05-03
Payer: MEDICAID

## 2023-05-03 ENCOUNTER — PATIENT MESSAGE (OUTPATIENT)
Dept: TRANSPLANT | Facility: CLINIC | Age: 58
End: 2023-05-03
Payer: MEDICAID

## 2023-05-03 NOTE — TELEPHONE ENCOUNTER
Pt notified via portal of stable labs and that no medication changes are needed. Repeat labs due 6/5/23 per protocol.   ----- Message from Tito Mcclure MD sent at 5/2/2023  2:57 PM CDT -----  Results reviewed

## 2023-05-04 NOTE — SUBJECTIVE & OBJECTIVE
"Interval HPI:   Overnight events: Remains in TSU. POD 5. BG above goal ranges on current SQ insulin regimen. Receiving Solu Medrol 80 mg, standard steroid taper. Diet diabetic [facility];  Calorie; Isolation Tray - Regular [place]  Eatin%  Nausea: No  Hypoglycemia and intervention: No  Fever: No  TPN and/or TF: No  If yes, type of TF/TPN and rate: n/a    /80   Pulse 99   Temp 98 °F (36.7 °C)   Resp 18   Ht 5' 7" (1.702 m)   Wt 108.8 kg (239 lb 13.8 oz)   SpO2 97%   BMI 37.57 kg/m²     Labs Reviewed and Include    Recent Labs   Lab 22  0700   *   CALCIUM 7.8*   ALBUMIN 2.6*   PROT 4.3*   *   K 3.5   CO2 25      BUN 57*   CREATININE 1.3   ALKPHOS 83   *   *   BILITOT 2.6*     Lab Results   Component Value Date    WBC 5.73 2022    HGB 8.2 (L) 2022    HCT 24.7 (L) 2022    MCV 90 2022    PLT 46 (L) 2022     No results for input(s): TSH, FREET4 in the last 168 hours.  Lab Results   Component Value Date    HGBA1C 6.6 (H) 2022       Nutritional status:   Body mass index is 37.57 kg/m².  Lab Results   Component Value Date    ALBUMIN 2.6 (L) 2022    ALBUMIN 2.4 (L) 2022    ALBUMIN 2.4 (L) 2022     No results found for: PREALBUMIN    Estimated Creatinine Clearance: 75.6 mL/min (based on SCr of 1.3 mg/dL).    Accu-Checks  Recent Labs     22  1301 22  1707 22  2055 22  0155 22  0809 22  0843 22  1212 22  1704 22  2133 22  0741   POCTGLUCOSE 222* 331* 352* 311* 86 81 213* 309* 292* 189*       Current Medications and/or Treatments Impacting Glycemic Control  Immunotherapy:    Immunosuppressants           Stop Route Frequency     tacrolimus capsule 6 mg         -- Oral 2 times daily     mycophenolate capsule 1,000 mg         -- Oral 2 times daily          Steroids:   Hormones (From admission, onward)                Start     Stop Route Frequency Ordered " "   04/30/22 0900  predniSONE tablet 20 mg  (methylprednisolone taper panel)        "Followed by" Linked Group Details    -- Oral Daily 04/24/22 0831    04/29/22 0900  methylPREDNISolone sodium succinate injection 40 mg  (methylprednisolone taper panel)        "Followed by" Linked Group Details    04/30 0859 IV Daily 04/24/22 0831          Pressors:    Autonomic Drugs (From admission, onward)                None          Hyperglycemia/Diabetes Medications:   Antihyperglycemics (From admission, onward)                Start     Stop Route Frequency Ordered    04/28/22 1130  insulin aspart U-100 pen 10 Units         -- SubQ 3 times daily with meals 04/28/22 0956    04/25/22 0931  insulin aspart U-100 pen 0-10 Units         -- SubQ As needed (PRN) 04/25/22 0831          " Spontaneous, unlabored and symmetrical

## 2023-05-11 RX ORDER — DEXTROSE 4 G
TABLET,CHEWABLE ORAL
Qty: 1 EACH | Refills: 0 | Status: SHIPPED | OUTPATIENT
Start: 2023-05-11

## 2023-05-26 RX ORDER — ASPIRIN 81 MG/1
81 TABLET ORAL DAILY
Qty: 30 TABLET | Refills: 5 | Status: SHIPPED | OUTPATIENT
Start: 2023-05-26

## 2023-05-26 RX ORDER — ACETAMINOPHEN 500 MG
1 TABLET ORAL DAILY
Qty: 30 TABLET | Refills: 5 | Status: SHIPPED | OUTPATIENT
Start: 2023-05-26

## 2023-05-30 ENCOUNTER — TELEPHONE (OUTPATIENT)
Dept: TRANSPLANT | Facility: CLINIC | Age: 58
End: 2023-05-30
Payer: MEDICAID

## 2023-05-30 NOTE — TELEPHONE ENCOUNTER
----- Message from Marybel Soliman sent at 5/30/2023  9:16 AM CDT -----  Regarding: Consult Advisory  Contact: Pt wife  Pt wife is requesting a callback in regards to pt. She stated the pt is bloated and his legs are swollen. Please adv       Confirmed contact below:   Contact Name:Pt spouse   Phone Number: [phone]

## 2023-06-02 RX ORDER — PANTOPRAZOLE SODIUM 20 MG/1
20 TABLET, DELAYED RELEASE ORAL DAILY
Qty: 30 TABLET | Refills: 11 | Status: CANCELLED | OUTPATIENT
Start: 2023-06-02 | End: 2024-06-01

## 2023-06-05 DIAGNOSIS — Z94.4 LIVER REPLACED BY TRANSPLANT: Primary | ICD-10-CM

## 2023-06-08 LAB
EXT ALBUMIN: 3.4
EXT ALKALINE PHOSPHATASE: 48
EXT ALT: 28
EXT AST: 28
EXT BASOPHIL%: 0.3
EXT BILIRUBIN TOTAL: 0.6
EXT BUN: 24
EXT CALCIUM: 9
EXT CHLORIDE: 109
EXT CO2: 29
EXT CREATININE: 1.29 MG/DL
EXT EGFR NO RACE VARIABLE: 65
EXT EOSINOPHIL%: 3.4
EXT GLUCOSE: 129
EXT HBV DNA QUANT PCR: NOT DETECTED
EXT HEMATOCRIT: 34.5
EXT HEMOGLOBIN: 11.2
EXT LYMPH%: 15.6
EXT MONOCYTES%: 7.9
EXT PLATELETS: 129
EXT POTASSIUM: 5
EXT PROTEIN TOTAL: 7.1
EXT SEGS%: 72
EXT SODIUM: 142 MMOL/L
EXT TACROLIMUS LVL: 6.5
EXT WBC: 7.3

## 2023-06-13 ENCOUNTER — TELEPHONE (OUTPATIENT)
Dept: TRANSPLANT | Facility: CLINIC | Age: 58
End: 2023-06-13
Payer: MEDICAID

## 2023-06-13 ENCOUNTER — PATIENT MESSAGE (OUTPATIENT)
Dept: TRANSPLANT | Facility: CLINIC | Age: 58
End: 2023-06-13
Payer: MEDICAID

## 2023-06-13 DIAGNOSIS — Z94.4 LIVER REPLACED BY TRANSPLANT: Primary | ICD-10-CM

## 2023-06-13 DIAGNOSIS — R79.89 ELEVATED SERUM CREATININE: ICD-10-CM

## 2023-06-13 NOTE — TELEPHONE ENCOUNTER
Pt advised. Consult placed and MA to call pt once scheduled. Repeat labs requested 7/17/23 per protocol.    ----- Message from Tito Mcclure MD sent at 6/12/2023 11:48 PM CDT -----  Creatinine is slowly rising- oral hydration and nephrology please  Results reviewed

## 2023-06-25 RX ORDER — PANTOPRAZOLE SODIUM 20 MG/1
20 TABLET, DELAYED RELEASE ORAL DAILY
Qty: 30 TABLET | Refills: 11 | Status: SHIPPED | OUTPATIENT
Start: 2023-06-25 | End: 2024-06-24

## 2023-06-27 ENCOUNTER — HOSPITAL ENCOUNTER (OUTPATIENT)
Dept: RADIOLOGY | Facility: HOSPITAL | Age: 58
Discharge: HOME | End: 2023-06-27
Attending: INTERNAL MEDICINE
Payer: MEDICAID

## 2023-06-27 DIAGNOSIS — Z94.4 LIVER REPLACED BY TRANSPLANT: ICD-10-CM

## 2023-06-27 PROCEDURE — 76705 ECHO EXAM OF ABDOMEN: CPT | Mod: 26,XS,, | Performed by: INTERNAL MEDICINE

## 2023-06-27 PROCEDURE — 93976 US DOPPLER LIVER TRANSPLANT POST (XPD): ICD-10-PCS | Mod: 26,,, | Performed by: INTERNAL MEDICINE

## 2023-06-27 PROCEDURE — 93976 VASCULAR STUDY: CPT | Mod: 26,,, | Performed by: INTERNAL MEDICINE

## 2023-06-27 PROCEDURE — 76705 US DOPPLER LIVER TRANSPLANT POST (XPD): ICD-10-PCS | Mod: 26,XS,, | Performed by: INTERNAL MEDICINE

## 2023-06-27 PROCEDURE — 93976 VASCULAR STUDY: CPT | Mod: TC

## 2023-07-06 ENCOUNTER — TELEPHONE (OUTPATIENT)
Dept: TRANSPLANT | Facility: CLINIC | Age: 58
End: 2023-07-06
Payer: MEDICAID

## 2023-07-06 NOTE — TELEPHONE ENCOUNTER
Letter sent, ultrasound stable.  ----- Message from Tito Mcclure MD sent at 7/6/2023 12:35 PM CDT -----  Results reviewed

## 2023-07-06 NOTE — LETTER
July 6, 2023    Pelon Christina  [address]  [place] [address]             [facility]  [facility] [address]  Phone: [phone] Mr. Vasquez,    Dr. Mcclure reviewed your liver transplant ultrasound. He said it is stable. No repeat imaging needed at this time.     Please contact the office with any questions or concerns.        Sincerely,    Valorie Mcgill RN, BSN, [facility]  Sr Liver Transplant Coordinator

## 2023-07-21 ENCOUNTER — TELEPHONE (OUTPATIENT)
Dept: TRANSPLANT | Facility: CLINIC | Age: 58
End: 2023-07-21
Payer: MEDICAID

## 2023-07-21 ENCOUNTER — PATIENT MESSAGE (OUTPATIENT)
Dept: TRANSPLANT | Facility: CLINIC | Age: 58
End: 2023-07-21
Payer: MEDICAID

## 2023-07-21 LAB
EXT ALBUMIN: 3.5
EXT ALKALINE PHOSPHATASE: 57
EXT ALT: 22
EXT AST: 18
EXT BASOPHIL%: 0.5
EXT BILIRUBIN TOTAL: 0.6
EXT BUN: 28
EXT CALCIUM: 8.4
EXT CHLORIDE: 107
EXT CO2: 31
EXT CREATININE: 1.17 MG/DL
EXT EGFR NO RACE VARIABLE: 73
EXT EOSINOPHIL%: 2.7
EXT GLUCOSE: 152
EXT HEMATOCRIT: 36.5
EXT HEMOGLOBIN: 12.1
EXT LYMPH%: 14.8
EXT MONOCYTES%: 7.1
EXT PLATELETS: 133
EXT POTASSIUM: 4.2
EXT PROTEIN TOTAL: 7.2
EXT SEGS%: 74
EXT SODIUM: 140 MMOL/L
EXT TACROLIMUS LVL: 8.4
EXT WBC: 7.8

## 2023-08-01 ENCOUNTER — OFFICE VISIT (OUTPATIENT)
Dept: TRANSPLANT | Facility: CLINIC | Age: 58
End: 2023-08-01
Payer: MEDICAID

## 2023-08-01 VITALS
OXYGEN SATURATION: 94 % | TEMPERATURE: 97 F | HEART RATE: 70 BPM | DIASTOLIC BLOOD PRESSURE: 76 MMHG | BODY MASS INDEX: 39.1 KG/M2 | WEIGHT: 249.13 LBS | RESPIRATION RATE: 16 BRPM | SYSTOLIC BLOOD PRESSURE: 153 MMHG | HEIGHT: 67 IN

## 2023-08-01 DIAGNOSIS — Z94.4 S/P LIVER TRANSPLANT: Primary | ICD-10-CM

## 2023-08-01 DIAGNOSIS — Z29.89 PROPHYLACTIC IMMUNOTHERAPY: ICD-10-CM

## 2023-08-01 DIAGNOSIS — E11.9 TYPE 2 DIABETES MELLITUS WITHOUT COMPLICATION, WITHOUT LONG-TERM CURRENT USE OF INSULIN: ICD-10-CM

## 2023-08-01 PROCEDURE — 1159F MED LIST DOCD IN RCRD: CPT | Mod: CPTII,,, | Performed by: INTERNAL MEDICINE

## 2023-08-01 PROCEDURE — 3008F PR BODY MASS INDEX (BMI) DOCUMENTED: ICD-10-PCS | Mod: CPTII,,, | Performed by: INTERNAL MEDICINE

## 2023-08-01 PROCEDURE — 3078F PR MOST RECENT DIASTOLIC BLOOD PRESSURE < 80 MM HG: ICD-10-PCS | Mod: CPTII,,, | Performed by: INTERNAL MEDICINE

## 2023-08-01 PROCEDURE — 99215 PR OFFICE/OUTPT VISIT, EST, LEVL V, 40-54 MIN: ICD-10-PCS | Mod: S$PBB,,, | Performed by: INTERNAL MEDICINE

## 2023-08-01 PROCEDURE — 3077F SYST BP >= 140 MM HG: CPT | Mod: CPTII,,, | Performed by: INTERNAL MEDICINE

## 2023-08-01 PROCEDURE — 99215 OFFICE O/P EST HI 40 MIN: CPT | Mod: S$PBB,,, | Performed by: INTERNAL MEDICINE

## 2023-08-01 PROCEDURE — 3008F BODY MASS INDEX DOCD: CPT | Mod: CPTII,,, | Performed by: INTERNAL MEDICINE

## 2023-08-01 PROCEDURE — 3078F DIAST BP <80 MM HG: CPT | Mod: CPTII,,, | Performed by: INTERNAL MEDICINE

## 2023-08-01 PROCEDURE — 1160F RVW MEDS BY RX/DR IN RCRD: CPT | Mod: CPTII,,, | Performed by: INTERNAL MEDICINE

## 2023-08-01 PROCEDURE — 99999 PR PBB SHADOW E&M-EST. PATIENT-LVL V: CPT | Mod: PBBFAC,,, | Performed by: INTERNAL MEDICINE

## 2023-08-01 PROCEDURE — 99999 PR PBB SHADOW E&M-EST. PATIENT-LVL V: ICD-10-PCS | Mod: PBBFAC,,, | Performed by: INTERNAL MEDICINE

## 2023-08-01 PROCEDURE — 3077F PR MOST RECENT SYSTOLIC BLOOD PRESSURE >= 140 MM HG: ICD-10-PCS | Mod: CPTII,,, | Performed by: INTERNAL MEDICINE

## 2023-08-01 PROCEDURE — 1159F PR MEDICATION LIST DOCUMENTED IN MEDICAL RECORD: ICD-10-PCS | Mod: CPTII,,, | Performed by: INTERNAL MEDICINE

## 2023-08-01 PROCEDURE — 99215 OFFICE O/P EST HI 40 MIN: CPT | Mod: PBBFAC | Performed by: INTERNAL MEDICINE

## 2023-08-01 PROCEDURE — 1160F PR REVIEW ALL MEDS BY PRESCRIBER/CLIN PHARMACIST DOCUMENTED: ICD-10-PCS | Mod: CPTII,,, | Performed by: INTERNAL MEDICINE

## 2023-08-01 RX ORDER — UBIDECARENONE 75 MG
500 CAPSULE ORAL
COMMUNITY

## 2023-08-01 RX ORDER — ONDANSETRON 4 MG/1
TABLET, ORALLY DISINTEGRATING ORAL
COMMUNITY
Start: 2023-06-05

## 2023-08-01 RX ORDER — TORSEMIDE 20 MG/1
20 TABLET ORAL
COMMUNITY
Start: 2023-07-17

## 2023-08-01 RX ORDER — FOLIC ACID 1 MG/1
1 TABLET ORAL
COMMUNITY

## 2023-08-01 RX ORDER — POTASSIUM CHLORIDE 20 MEQ/1
20 TABLET, EXTENDED RELEASE ORAL
COMMUNITY
Start: 2023-07-17

## 2023-08-01 RX ORDER — METHOCARBAMOL 500 MG/1
500 TABLET, FILM COATED ORAL
COMMUNITY
Start: 2023-06-13 | End: 2023-08-12

## 2023-08-01 RX ORDER — SEMAGLUTIDE 0.25 MG/.5ML
0.25 INJECTION, SOLUTION SUBCUTANEOUS
COMMUNITY
Start: 2023-07-18 | End: 2023-08-15

## 2023-08-01 NOTE — PROGRESS NOTES
Subjective:       Patient ID: Pelon Vasquez is a 58 y.o. male.    Chief Complaint: Liver Transplant Follow-up      HPI  I saw this 58 y.o. man who had a liver tx 1 year and 3 months ago.  I last saw him in Jan 2023    Original Referring Physician: Irvin Sandhu  Current Corresponding Physician: Irvin Sandhu     Chief Complaint: Pelon is here for follow up of his liver transplant performed 4/24/2022 for the primary diagnosis (UNOS) of Cirrhosis: Other, Specify     ORGAN: LIVER  Whole or Partial: whole liver  Donor Type: donation after brain death  PHS Increased Risk: no  Donor CMV Status: Positive  Donor HCV Status: Negative  Donor HBcAb: Negative  Donor HBV MATHEUS: Negative  Donor HCV MATHEUS: Negative     Biliary Anastomosis: end to end  Arterial Anatomy: completely replaced circulation  IVC reconstruction: end to end ivc  Portal vein status: patent    Post op recovery:  Issue immediately post op - SVT, given adenosine and converted to NSR     His transplant admission was 11 days long.    Labs on 1/3/22  - creatinine 1.17  - LFTs normal- Tac 8.4    Has had issues with low back pain and abdo pain since Tx  - admitted on 7/12/22 with constipation  - CT lumbar spine- mild degenerative changes.    Seeing pain management and is getting knee and back injections  Overall feeling much better although he still has fatigue.      Abdo US: 6/5/23  Satisfactory doppler evaluation of the liver allograft.       Review of Systems   Constitutional:  Negative for activity change, appetite change, chills, fatigue, fever and unexpected weight change.   HENT:  Negative for hearing loss.    Eyes:  Negative for discharge and visual disturbance.   Respiratory:  Negative for cough, chest tightness, shortness of breath and wheezing.    Cardiovascular:  Negative for chest pain, palpitations and leg swelling.   Gastrointestinal:  Negative for abdominal distention, abdominal pain, constipation, diarrhea and nausea.   Genitourinary:  Negative for  dysuria and frequency.   Musculoskeletal:  Negative for arthralgias and back pain.   Skin:  Negative for pallor and rash.   Neurological:  Negative for dizziness, tremors, speech difficulty and headaches.   Hematological:  Negative for adenopathy.   Psychiatric/Behavioral:  Negative for agitation and confusion.            Lab Results   Component Value Date    ALT 15 (L) 11/07/2022    AST 12 (L) 11/07/2022    GGT 66 (H) 02/14/2022    ALKPHOS 46 11/07/2022    BILITOT 0.6 07/12/2022     Past Medical History:   Diagnosis Date    Arthritis     Cirrhosis of liver     Encounter for blood transfusion     HTN (hypertension)     BRAYDON (obstructive sleep apnea)     Secondary esophageal varices without bleeding 03/18/2022    EGD (2/25/22) showed moderate portal hypertensive gastropathy, small hiatal hernia, grade 1 esophageal varices    Type 2 diabetes mellitus      Past Surgical History:   Procedure Laterality Date    ABDOMINAL SURGERY      COLONOSCOPY      ESOPHAGOGASTRODUODENOSCOPY N/A 6/1/2022    Procedure: EGD (ESOPHAGOGASTRODUODENOSCOPY);  Surgeon: Saad Nguyễn MD;  Location: [facility] (2ND FLR);  Service: Endoscopy;  Laterality: N/A;    FINGER AMPUTATION      LIVER TRANSPLANT N/A 04/10/2022    Procedure: TRANSPLANT, LIVER;  Surgeon: Félix Scott MD;  [facility];  Service: Transplant;  Laterality: N/A;    LIVER TRANSPLANT N/A 04/13/2022    Procedure: TRANSPLANT, LIVER;  Surgeon: Keyon Castillo MD;  Location: [facility] OR [facility] FLR;  Service: Transplant;  Laterality: N/A;    LIVER TRANSPLANT N/A 04/20/2022    Procedure: TRANSPLANT, LIVER;  Surgeon: Brant Gonzalez Jr., MD;  Location: [facility] OR 2ND FLR;  Service: Transplant;  Laterality: N/A;    LIVER TRANSPLANT N/A 04/23/2022    Procedure: TRANSPLANT, LIVER;  Surgeon: Tom Romero MD;  Location: [facility] OR [facility] FLR;  Service: Transplant;  Laterality: N/A;    MEDIAL COLLATERAL LIGAMENT REPAIR, KNEE Bilateral     ROTATRO CUFF REPAIR      TONSILLECTOMY    "    Current Outpatient Medications   Medication Sig    AMITIZA 24 mcg Cap Take 24 mcg by mouth 2 (two) times daily.    aspirin (ECOTRIN) 81 MG EC tablet Take 1 tablet (81 mg total) by mouth once daily.    blood sugar diagnostic (ONETOUCH VERIO TEST STRIPS) Strp 1 strip by Misc.(Non-Drug; Combo Route) route 4 (four) times daily before meals and nightly.    blood-glucose meter (ONETOUCH VERIO FLEX METER) [facility] TEST BLOOD GLUCOSE AS DIRECTED    calcium carbonate-vitamin D3 600 mg-20 mcg (800 unit) Tab Take 1 tablet by mouth once daily.    carvediloL (COREG) 25 MG tablet Take 1 tablet (25 mg total) by mouth 2 (two) times daily with meals. HOLD for SBP <120 and HR <60    docusate sodium (COLACE) 100 MG capsule Take 1 capsule (100 mg total) by mouth 3 (three) times daily as needed for Constipation.    empagliflozin (JARDIANCE) 10 mg tablet Take 10 mg by mouth.    folic acid (FOLVITE) 1 MG tablet Take 1 mg by mouth.    gabapentin (NEURONTIN) 300 MG capsule Take 2 capsules (600 mg total) by mouth 3 (three) times daily.    insulin aspart U-100 (NOVOLOG FLEXPEN U-100 INSULIN) 100 unit/mL (3 mL) InPn pen Inject 4 Units into the skin 3 (three) times daily with meals. Plus SSI: 150-200+2 unit; 201-250+4 units; 251-300+6 units; 301-350+8 units; >350+10 units. (TDD 60 units.)    lancets (ONETOUCH DELICA PLUS LANCET) 30 gauge Misc 1 lancet by Misc.(Non-Drug; Combo Route) route 4 (four) times daily before meals and nightly.    methocarbamoL (ROBAXIN) 500 MG Tab Take 500 mg by mouth.    ondansetron (ZOFRAN-ODT) 4 MG TbDL DISSOLVE 1 TABLET IN MOUTH EVERY 8 HOURS AS NEEDED FOR NAUSEA    pantoprazole (PROTONIX) 20 MG tablet Take 1 tablet (20 mg total) by mouth once daily.    pen needle, diabetic (BD ULTRA-FINE JACKIE PEN NEEDLE) 32 gauge x 5/32" Ndle Use 1 pen needle to inject 3 (three) times daily with meals.    potassium chloride SA (K-DUR,KLOR-CON) 20 MEQ tablet Take 20 mEq by mouth.    semaglutide, weight loss, (WEGOVY) 0.25 mg/0.5 mL " PnIj Inject 0.25 mg into the skin.    tacrolimus (PROGRAF) 1 MG Cap Take 3 capsules (3 mg total) by mouth every morning AND 3 capsules (3 mg total) every evening.    torsemide (DEMADEX) 20 MG Tab Take 20 mg by mouth.    traMADoL (ULTRAM) 50 mg tablet Take 50 mg by mouth 3 (three) times daily as needed.    bisacodyL (DULCOLAX) 5 mg EC tablet Take 2 tablets (10 mg total) by mouth every evening.    cyanocobalamin 500 MCG tablet Take 500 mcg by mouth.    linaCLOtide (LINZESS) 145 mcg Cap capsule Take 1 capsule (145 mcg total) by mouth before breakfast. (Patient not taking: Reported on 1/11/2023)    NIFEdipine (PROCARDIA-XL) 30 MG (OSM) 24 hr tablet Take 1 tablet (30 mg total) by mouth 2 (two) times a day.    valGANciclovir (VALCYTE) 450 mg Tab Take 1 tablet (450 mg total) by mouth once daily. STOP 7/24/22 (Patient not taking: Reported on 1/11/2023)     No current facility-administered medications for this visit.       Objective:      Physical Exam  HENT:      Head: Normocephalic.   Eyes:      Pupils: Pupils are equal, round, and reactive to light.   Neck:      Thyroid: No thyromegaly.   Cardiovascular:      Rate and Rhythm: Normal rate and regular rhythm.      Heart sounds: Normal heart sounds.   Pulmonary:      Effort: Pulmonary effort is normal.      Breath sounds: Normal breath sounds. No wheezing.   Abdominal:      General: There is no distension.      Palpations: Abdomen is soft. There is no mass.      Tenderness: There is no abdominal tenderness.   Lymphadenopathy:      Cervical: No cervical adenopathy.   Skin:     General: Skin is warm.      Findings: No erythema or rash.   Neurological:      Mental Status: He is alert and oriented to person, place, and time.   Psychiatric:         Behavior: Behavior normal.         Assessment:       1. S/P liver transplant    2. Type 2 diabetes mellitus without complication, without long-term current use of insulin    3. Prophylactic immunotherapy      Plan:   Feels well but  lacking stamina.  - reduce tac to 3/2 mg daily  - BP high but hasn't taken meds  - main issue is weight gain and worsening glucose control    - Gained 40 lb in last 6 months  - sees weight loss doctor and dietician  - on 1800 Kcal diet- high protein  - also about to start semaglutide  - exercises x 3 per week    Overall doing well  Clinic in 6 months + monthly labs    1) Reduce tac to 3/2 mg  2) Labs every 6 weeks  3) Emphasized the importance of weight loss    Clinic in 6 months.      UNOS Patient Status  Functional Status: 90% - Able to carry on normal activity: minor symptoms of disease  Physical Capacity: No Limitations    Patient on life support: No  Diabetes: No  Any previous malignancy: No  Neoadjuvant Therapy: no  Has patient ever had a dx of HCC: no  Previous Abdominal Surgery: no  Spontaneous Bacterial Peritonitis: no  History of Portal Vein Thrombosis: no  Transjugular Intrahepatic Portosystemic Shunt: no    New diabetes onset between last follow-up to the current follow-up: No  Did patient have any acute rejection episodes during the follow-up period: No  Post transplant malignancy: No

## 2023-08-01 NOTE — LETTER
August 1, 2023        Irvin Sandhu  [address] [facility] [place] [address]  Phone: [phone]  Fax: [phone]             [facility]  [address]  [facility] [address]  Phone: [phone]   Patient: Pelon Vasquez   MR Number: [id number]   YOB: 1965   Date of Visit: 8/1/2023       Dear Dr. Irvin Sandhu    Thank you for referring Pelon Vasquez to me for evaluation. Attached you will find relevant portions of my assessment and plan of care.    If you have questions, please do not hesitate to call me. I look forward to following Pelon Vasquez along with you.    Sincerely,    Tito Mcclure MD    Enclosure    If you would like to receive this communication electronically, please contact [email or web address] or [phone] to request [employer] access.    [employer] is a tool which provides read-only access to select patient information with whom you have a relationship. Its easy to use and provides real time access to review your patients record including encounter summaries, notes, results, and demographic information.    If you feel you have received this communication in error or would no longer like to receive these types of communications, please e-mail [email or web address]

## 2023-08-14 DIAGNOSIS — Z94.4 S/P LIVER TRANSPLANT: ICD-10-CM

## 2023-08-14 DIAGNOSIS — Z94.9 HYPERTENSION ASSOCIATED WITH TRANSPLANTATION: ICD-10-CM

## 2023-08-14 DIAGNOSIS — I15.8 HYPERTENSION ASSOCIATED WITH TRANSPLANTATION: ICD-10-CM

## 2023-08-14 RX ORDER — NIFEDIPINE 30 MG/1
30 TABLET, EXTENDED RELEASE ORAL 2 TIMES DAILY
Qty: 60 TABLET | Refills: 0 | Status: SHIPPED | OUTPATIENT
Start: 2023-08-14

## 2023-08-14 NOTE — TELEPHONE ENCOUNTER
Refill request being sent in. Unfamiliar with patient as I am covering coordinator this week. Please sign script or let me know if it should be deferred to his PCP?

## 2023-08-24 DIAGNOSIS — R79.89 ELEVATED SERUM CREATININE: Primary | ICD-10-CM

## 2023-08-30 ENCOUNTER — TELEPHONE (OUTPATIENT)
Dept: NEPHROLOGY | Facility: CLINIC | Age: 58
End: 2023-08-30
Payer: MEDICAID

## 2023-08-30 NOTE — TELEPHONE ENCOUNTER
----- Message from Esthela Coombs sent at 8/30/2023  9:51 AM CDT -----  Regarding: appt r/s 8/31/2023  Contact: Leonarda (wife) @ [phone]  Pt is calling to speak with someone in the office to r/s appt that they are scheduled for on 8/31/2023; caller states that their car broke down. No available appts in Epic. Pt is asking for a return call back to r/s for a better date and time. Thanks.

## 2023-08-30 NOTE — TELEPHONE ENCOUNTER
----- Message from Nitin Romero sent at 8/30/2023  2:12 PM CDT -----  Regarding: appt  Contact: @ [phone]  Pt wife calling needing to resched appt that was can for 8/31 ... No aval dates .... Pls call and adv@ [phone]

## 2023-09-01 ENCOUNTER — TELEPHONE (OUTPATIENT)
Dept: TRANSPLANT | Facility: CLINIC | Age: 58
End: 2023-09-01
Payer: MEDICAID

## 2023-09-08 LAB
EXT ALBUMIN: 3.5
EXT ALKALINE PHOSPHATASE: 67
EXT ALT: 26
EXT AST: 19
EXT BASOPHIL%: 0.3
EXT BILIRUBIN TOTAL: 0.5
EXT BUN: 31
EXT CALCIUM: 9
EXT CHLORIDE: 105
EXT CO2: 33
EXT CREATININE: 1.39 MG/DL
EXT EGFR NO RACE VARIABLE: 59
EXT EOSINOPHIL%: 3.3
EXT GLUCOSE: 180
EXT HEMATOCRIT: 39.6
EXT HEMOGLOBIN: 13.1
EXT LYMPH%: 13.4
EXT MONOCYTES%: 7.8
EXT PLATELETS: 148
EXT POTASSIUM: 4.9
EXT PROTEIN TOTAL: 7.5
EXT SEGS%: 74.2
EXT SODIUM: 139 MMOL/L
EXT TACROLIMUS LVL: 7.8
EXT WBC: 7.3

## 2023-09-11 ENCOUNTER — TELEPHONE (OUTPATIENT)
Dept: TRANSPLANT | Facility: CLINIC | Age: 58
End: 2023-09-11
Payer: MEDICAID

## 2023-09-11 NOTE — TELEPHONE ENCOUNTER
Letter sent, labs stable and no medication changes are needed. Repeat labs due 10/9/23 per protocol.  ----- Message from Tito Mcclure MD sent at 9/9/2023  1:16 PM CDT -----  Results reviewed

## 2023-09-11 NOTE — LETTER
September 11, 2023    Pelon Vasquez  [address]  [place] [address]          Dear Pelon Vasquez:  MRN: [id number]    This is a follow up to your recent labs, your lab results were stable.  There are no medicine changes.  Please have your labs drawn again on 10/9/23.    I know there are some change going on with [facility]. If you decide to go to another lab, let us know so we can fax the order.      If you cannot have your labs drawn on the scheduled date, it is your responsibility to call the transplant department to reschedule.  Please call [phone] and ask to speak to [facility]  for all scheduling requests.     Sincerely,      Kadi [facility] [place]  [address] [place] [address]  [phone]

## 2023-09-24 ENCOUNTER — NURSE TRIAGE (OUTPATIENT)
Dept: ADMINISTRATIVE | Facility: CLINIC | Age: 58
End: 2023-09-24
Payer: MEDICAID

## 2023-09-25 NOTE — TELEPHONE ENCOUNTER
Reason for Disposition   SEVERE or constant chest pain or pressure  (Exception: Mild central chest pain, present only when coughing.)    Additional Information   Negative: SEVERE difficulty breathing (e.g., struggling for each breath, speaks in single words)   Negative: Difficult to awaken or acting confused (e.g., disoriented, slurred speech)   Negative: Bluish (or gray) lips or face now   Negative: Shock suspected (e.g., cold/pale/clammy skin, too weak to stand, low BP, rapid pulse)   Negative: Sounds like a life-threatening emergency to the triager    Protocols used: Coronavirus (COVID-19) Diagnosed or [id number]  Liver Transplant - 4/24/2022 (#1)     BPA n/a  CC: relative called re pt tested pos today for covid. Pt has splitting headache, chills, and body aches. sx started Friday. Pt admits to dull upper abd ache right above incision that started yest. Weak. Rating abd pain 5 constant since yest. Tylenol barely dulling HA. Rec ED now. Pt agrees.

## 2023-09-26 ENCOUNTER — TELEPHONE (OUTPATIENT)
Dept: TRANSPLANT | Facility: CLINIC | Age: 58
End: 2023-09-26
Payer: MEDICAID

## 2023-09-26 NOTE — TELEPHONE ENCOUNTER
Spoke with pt's spouse. States pt is feeling better. COVID symptoms resolving. Denies chest pain, abdominal pain and lethargy improved.

## 2023-09-27 ENCOUNTER — TELEPHONE (OUTPATIENT)
Dept: NEPHROLOGY | Facility: CLINIC | Age: 58
End: 2023-09-27
Payer: MEDICAID

## 2023-09-27 NOTE — TELEPHONE ENCOUNTER
----- Message from So Walker sent at 9/27/2023 11:51 AM CDT -----  Regarding: virtual appt  Contact: pt [phone]  PATIENTCALL     Pt krystyna Brower is calling to speak with someone in provider office regarding he tested positive for Covid and they want to know if possible can he be seen as a virtual appt tomorrow she  is asking for a return call please call pt at  [phone] if not can you reschedule the appt

## 2023-10-17 LAB
EXT ALBUMIN: 3.5
EXT ALKALINE PHOSPHATASE: 64
EXT ALT: 31
EXT AST: 31
EXT BASOPHIL%: 0.7
EXT BILIRUBIN TOTAL: 0.6
EXT BUN: 19
EXT CALCIUM: 8.8
EXT CHLORIDE: 105
EXT CO2: 30
EXT CREATININE: 1.37 MG/DL
EXT EGFR NO RACE VARIABLE: 60
EXT EOSINOPHIL%: 4
EXT GLUCOSE: 126
EXT HEMATOCRIT: 41.1
EXT HEMOGLOBIN: 13.6
EXT LYMPH%: 21.3
EXT MAGNESIUM: 2.2
EXT MONOCYTES%: 13
EXT PLATELETS: 144
EXT POTASSIUM: 4.3
EXT PROTEIN TOTAL: 7.5
EXT SEGS%: 60.3
EXT SODIUM: 141 MMOL/L
EXT TACROLIMUS LVL: 11.6
EXT WBC: 6

## 2023-10-18 DIAGNOSIS — Z94.4 LIVER REPLACED BY TRANSPLANT: ICD-10-CM

## 2023-10-18 NOTE — TELEPHONE ENCOUNTER
Spoke with pt. Reviewed lab results. Pt states he is currently on 3/2, so he will reduce to 2/2 and repeat labs 10/30/23.  ----- Message from Tito Mcclure MD sent at 10/18/2023  8:53 AM CDT -----  Reduce tac to 3/2  Results reviewed

## 2023-10-20 RX ORDER — TACROLIMUS 1 MG/1
2 CAPSULE ORAL EVERY 12 HOURS
Qty: 120 CAPSULE | Refills: 11 | Status: SHIPPED | OUTPATIENT
Start: 2023-10-20 | End: 2024-10-19

## 2023-11-06 ENCOUNTER — TELEPHONE (OUTPATIENT)
Dept: TRANSPLANT | Facility: CLINIC | Age: 58
End: 2023-11-06
Payer: MEDICAID

## 2023-11-06 LAB
EXT ALBUMIN: 3.3
EXT ALKALINE PHOSPHATASE: 59
EXT ALT: 28
EXT AST: 27
EXT BASOPHIL%: 0.5
EXT BILIRUBIN TOTAL: 0.5
EXT BUN: 15
EXT CALCIUM: 8.5
EXT CHLORIDE: 110
EXT CO2: 28
EXT CREATININE: 1.22 MG/DL
EXT EGFR NO RACE VARIABLE: 69
EXT EOSINOPHIL%: 4
EXT GLUCOSE: 111
EXT HEMATOCRIT: 39.1
EXT HEMOGLOBIN: 12.6
EXT LYMPH%: 19.5
EXT MAGNESIUM: 1.7
EXT MONOCYTES%: 11.4
EXT PLATELETS: 146
EXT POTASSIUM: 4.6
EXT PROTEIN TOTAL: 6.8
EXT SEGS%: 63
EXT SODIUM: 140 MMOL/L
EXT TACROLIMUS LVL: 8.1
EXT WBC: 5.5

## 2023-11-06 NOTE — LETTER
November 6, 2023    Pelon Christina  [address]  [place] [address]          Dear Pelon Vasquez:  MRN: [id number]    This is a follow up to your recent labs, your lab results were stable.  There are no medicine changes.  Please have your labs drawn again on 1/29/24.      If you cannot have your labs drawn on the scheduled date, it is your responsibility to call the transplant department to reschedule.  Please call [phone] and ask to speak to [facility]  for all scheduling requests.     Sincerely,    Valorie  Your Liver Transplant Coordinator    [facility]  [facility] [place] [address]  [phone]

## 2023-11-06 NOTE — LETTER
November 6, 2023    Pelon Christina  [address]  [place] [address]          Dear Pelon Vasquez:  MRN: [id number]    This is a follow up to your recent labs, your lab results were stable.  There are no medicine changes.  Please have your labs drawn again on 12/11/23.      If you cannot have your labs drawn on the scheduled date, it is your responsibility to call the transplant department to reschedule.  Please call [phone] and ask to speak to [facility]  for all scheduling requests.     Sincerely,      Valorie  Your Liver Transplant Coordinator    [facility]  [facility] [place] [address]  [phone]

## 2023-11-06 NOTE — TELEPHONE ENCOUNTER
Letter sent, labs stable and no medication changes are needed. Repeat labs due 12/11/23 per protocol.  ----- Message from Tito Mcclure MD sent at 11/6/2023  1:36 PM CST -----  Results reviewed

## 2023-11-16 ENCOUNTER — PATIENT MESSAGE (OUTPATIENT)
Dept: TRANSPLANT | Facility: CLINIC | Age: 58
End: 2023-11-16
Payer: MEDICAID

## 2023-11-16 ENCOUNTER — TELEPHONE (OUTPATIENT)
Dept: TRANSPLANT | Facility: CLINIC | Age: 58
End: 2023-11-16
Payer: MEDICAID

## 2023-11-16 NOTE — TELEPHONE ENCOUNTER
Spoke with pt and pt's spouse. Pt had right heart cath and was told he needs a triple bypass.  Pt is seeing a local cardiothoracic surgeon Monday. Declined consult to cardiology here for now. Pt and spouse will reach out to me for any needs and with surgery date.  ----- Message from Cornelio Schwartz sent at 11/16/2023 11:48 AM CST -----  Regarding: CALL BACK  Pt's wife call to speak with Valorie in regards to some question she has requesting call back    Call

## 2023-11-29 ENCOUNTER — PATIENT MESSAGE (OUTPATIENT)
Dept: TRANSPLANT | Facility: CLINIC | Age: 58
End: 2023-11-29
Payer: MEDICAID

## 2023-12-15 LAB
EXT ALBUMIN: 3
EXT ALKALINE PHOSPHATASE: 77
EXT ALT: 17
EXT AST: 20
EXT BASOPHIL%: 0.6
EXT BILIRUBIN TOTAL: 0.5
EXT BUN: 31
EXT CALCIUM: 9
EXT CHLORIDE: 104
EXT CO2: 29
EXT CREATININE: 1.59 MG/DL
EXT EGFR NO RACE VARIABLE: 50
EXT EOSINOPHIL%: 2.8
EXT GLUCOSE: 139
EXT HEMATOCRIT: 35.2
EXT HEMOGLOBIN: 10.5
EXT LYMPH%: 22.6
EXT MONOCYTES%: 10
EXT PLATELETS: 264
EXT POTASSIUM: 4.2
EXT PROTEIN TOTAL: 7.8
EXT SEGS%: 63.3
EXT SODIUM: 139 MMOL/L
EXT TACROLIMUS LVL: 2.9
EXT WBC: 6.7

## 2023-12-18 ENCOUNTER — PATIENT MESSAGE (OUTPATIENT)
Dept: TRANSPLANT | Facility: CLINIC | Age: 58
End: 2023-12-18
Payer: MEDICAID

## 2023-12-18 ENCOUNTER — TELEPHONE (OUTPATIENT)
Dept: TRANSPLANT | Facility: CLINIC | Age: 58
End: 2023-12-18
Payer: MEDICAID

## 2023-12-18 NOTE — TELEPHONE ENCOUNTER
Pt and spouse advised  ----- Message from Tito Mcclure MD sent at 12/16/2023  1:31 PM CST -----  Repeat in 2 weeks since his Tac is low  Results reviewed

## 2024-01-03 ENCOUNTER — PATIENT MESSAGE (OUTPATIENT)
Dept: TRANSPLANT | Facility: CLINIC | Age: 59
End: 2024-01-03
Payer: COMMERCIAL

## 2024-01-06 ENCOUNTER — PATIENT MESSAGE (OUTPATIENT)
Dept: GASTROENTEROLOGY | Facility: HOSPITAL | Age: 59
End: 2024-01-06
Payer: COMMERCIAL

## 2024-01-08 ENCOUNTER — TELEPHONE (OUTPATIENT)
Dept: TRANSPLANT | Facility: CLINIC | Age: 59
End: 2024-01-08
Payer: COMMERCIAL

## 2024-01-08 LAB
EXT ALBUMIN: 4.3
EXT ALKALINE PHOSPHATASE: 71
EXT ALT: 18
EXT AST: 22
EXT BASOPHIL%: 0.7
EXT BILIRUBIN TOTAL: 0.4
EXT BUN: 18
EXT CALCIUM: 9.1
EXT CHLORIDE: 107
EXT CO2: 29
EXT CREATININE: 1.03 MG/DL
EXT EGFR NO RACE VARIABLE: 85
EXT EOSINOPHIL%: 4.5
EXT GLUCOSE: 137
EXT HEMATOCRIT: 38.7
EXT HEMOGLOBIN: 11.3
EXT LYMPH%: 19.2
EXT MONOCYTES%: 10.7
EXT PLATELETS: 178
EXT POTASSIUM: 4.4
EXT PROTEIN TOTAL: 7.2
EXT SEGS%: 64
EXT SODIUM: 143 MMOL/L
EXT TACROLIMUS LVL: 3.5
EXT WBC: 5.8

## 2024-01-08 NOTE — TELEPHONE ENCOUNTER
Spoke with pt and spouse. Advised that labs received and sent to Dr. Mcclure for review. Reviewed labs results and that tacrolimus level is better than December's level. Advised that I will reach back out to them once Dr. Mcclure reviews and let them know his recommendations. They agreed with plan.   ----- Message from Destiny Pryor sent at 1/8/2024 11:28 AM CST -----  Regarding: low tac level  Contact: PT wife   The patient wife called requesting to speak to Nurse Valorie bellijng low tac levels. States they were advise to call. Please return call at earliest opportunity  No further information provided      Patient wife can be contacted @# [phone]

## 2024-01-09 ENCOUNTER — TELEPHONE (OUTPATIENT)
Dept: TRANSPLANT | Facility: CLINIC | Age: 59
End: 2024-01-09
Payer: COMMERCIAL

## 2024-01-09 ENCOUNTER — PATIENT MESSAGE (OUTPATIENT)
Dept: TRANSPLANT | Facility: CLINIC | Age: 59
End: 2024-01-09
Payer: COMMERCIAL

## 2024-01-09 NOTE — TELEPHONE ENCOUNTER
Pt notified via portal of stable labs and that no medication changes are needed. Repeat labs due 1/29/24 per protocol.   ----- Message from Tito Mcclure MD sent at 1/9/2024  3:43 PM CST -----  Results reviewed

## 2024-01-31 ENCOUNTER — PATIENT MESSAGE (OUTPATIENT)
Dept: TRANSPLANT | Facility: CLINIC | Age: 59
End: 2024-01-31
Payer: COMMERCIAL

## 2024-02-06 LAB
EXT ALBUMIN: 4.6
EXT ALKALINE PHOSPHATASE: 69
EXT ALT: 14
EXT AST: 17
EXT BASOPHIL%: 0.7
EXT BILIRUBIN TOTAL: 0.7
EXT BUN: 35
EXT CALCIUM: 9.4
EXT CHLORIDE: 102
EXT CO2: 27
EXT CREATININE: 1.4 MG/DL
EXT EGFR NO RACE VARIABLE: 59
EXT EOSINOPHIL%: 4.1
EXT GLUCOSE: 158
EXT HEMATOCRIT: 46.7
EXT HEMOGLOBIN: 14.7
EXT LYMPH%: 15.6
EXT MAGNESIUM: 1.9
EXT MONOCYTES%: 8.8
EXT PLATELETS: 195
EXT POTASSIUM: 4.4
EXT PROTEIN TOTAL: 7.9
EXT SEGS%: 70.3
EXT SODIUM: 137 MMOL/L
EXT TACROLIMUS LVL: 4.6
EXT WBC: 8.8

## 2024-02-08 ENCOUNTER — TELEPHONE (OUTPATIENT)
Dept: TRANSPLANT | Facility: CLINIC | Age: 59
End: 2024-02-08
Payer: COMMERCIAL

## 2024-02-08 ENCOUNTER — PATIENT MESSAGE (OUTPATIENT)
Dept: TRANSPLANT | Facility: CLINIC | Age: 59
End: 2024-02-08
Payer: COMMERCIAL

## 2024-02-08 NOTE — TELEPHONE ENCOUNTER
Pt notified via portal of stable labs and that no medication changes are needed. Repeat labs due 3/18/24 per protocol.   ----- Message from Tito Mcclure MD sent at 2/8/2024  9:39 AM CST -----  Results reviewed

## 2024-03-15 LAB
EXT ALBUMIN: 4.7
EXT ALKALINE PHOSPHATASE: 65
EXT ALT: 23
EXT AST: 22
EXT BASOPHIL%: 0.7
EXT BILIRUBIN TOTAL: 0.5
EXT BUN: 41
EXT CALCIUM: 9.5
EXT CHLORIDE: 102
EXT CO2: 29
EXT CREATININE: 1.66 MG/DL
EXT EGFR NO RACE VARIABLE: 48
EXT EOSINOPHIL%: 5.5
EXT GLUCOSE: 151
EXT HEMATOCRIT: 47
EXT HEMOGLOBIN: 14.4
EXT LYMPH%: 21.4
EXT MONOCYTES%: 7.1
EXT PLATELETS: 174
EXT POTASSIUM: 4.7
EXT PROTEIN TOTAL: 7.5
EXT SEGS%: 64.9
EXT SODIUM: 138 MMOL/L
EXT TACROLIMUS LVL: 6.3
EXT WBC: 8.3

## 2024-03-22 LAB
EXT ALBUMIN: 4.9
EXT ALKALINE PHOSPHATASE: 62
EXT ALT: 21
EXT AST: 20
EXT BASOPHIL%: 0.4
EXT BILIRUBIN TOTAL: 0.6
EXT BUN: 30
EXT CALCIUM: 9.8
EXT CHLORIDE: 101
EXT CO2: 30
EXT CREATININE: 2.2 MG/DL
EXT EGFR NO RACE VARIABLE: 34
EXT EOSINOPHIL%: 4.6
EXT GLUCOSE: 170
EXT HEMATOCRIT: 45.5
EXT HEMOGLOBIN: 14.5
EXT LYMPH%: 17.2
EXT MAGNESIUM: 2
EXT MONOCYTES%: 8.1
EXT PLATELETS: 170
EXT POTASSIUM: 4.6
EXT PROTEIN TOTAL: 8
EXT SEGS%: 69.3
EXT SODIUM: 137 MMOL/L
EXT TACROLIMUS LVL: 6.5
EXT WBC: 7.8

## 2024-03-25 ENCOUNTER — TELEPHONE (OUTPATIENT)
Dept: TRANSPLANT | Facility: CLINIC | Age: 59
End: 2024-03-25
Payer: COMMERCIAL

## 2024-03-25 ENCOUNTER — PATIENT MESSAGE (OUTPATIENT)
Dept: TRANSPLANT | Facility: CLINIC | Age: 59
End: 2024-03-25
Payer: COMMERCIAL

## 2024-03-25 NOTE — TELEPHONE ENCOUNTER
Pt notified via portal of stable liver labs and that no medication changes are needed. Repeat labs due 4/15/24 per protocol.   ----- Message from Tito Mcclure MD sent at 3/22/2024  5:41 PM CDT -----  Repeat in 1 month  Results reviewed

## 2024-04-18 LAB
EXT ALBUMIN: 4.5
EXT ALKALINE PHOSPHATASE: 61
EXT ALT: 19
EXT AST: 20
EXT BASOPHIL%: 0.5
EXT BILIRUBIN TOTAL: 0.8
EXT BUN: 36
EXT CALCIUM: 9.3
EXT CHLORIDE: 105
EXT CO2: 25
EXT CREATININE: 1.82 MG/DL
EXT EGFR NO RACE VARIABLE: 43
EXT EOSINOPHIL%: 5.3
EXT GLUCOSE: 141
EXT HEMATOCRIT: 42
EXT HEMOGLOBIN: 13.7
EXT LYMPH%: 16.7
EXT MONOCYTES%: 9.1
EXT PLATELETS: 162
EXT POTASSIUM: 4.4
EXT PROTEIN TOTAL: 6.8
EXT SEGS%: 68
EXT SODIUM: 136 MMOL/L
EXT TACROLIMUS LVL: 6.4
EXT WBC: 7.9

## 2024-04-22 ENCOUNTER — TELEPHONE (OUTPATIENT)
Dept: TRANSPLANT | Facility: CLINIC | Age: 59
End: 2024-04-22
Payer: COMMERCIAL

## 2024-04-22 ENCOUNTER — PATIENT MESSAGE (OUTPATIENT)
Dept: TRANSPLANT | Facility: CLINIC | Age: 59
End: 2024-04-22
Payer: COMMERCIAL

## 2024-04-22 NOTE — TELEPHONE ENCOUNTER
Pt notified via portal of stable labs and that no medication changes are needed. Repeat labs due 6/10/24 per protocol.   ----- Message from Tito Mcclure MD sent at 4/19/2024 10:44 AM CDT -----  Results reviewed

## 2024-06-13 ENCOUNTER — PATIENT MESSAGE (OUTPATIENT)
Dept: TRANSPLANT | Facility: CLINIC | Age: 59
End: 2024-06-13
Payer: COMMERCIAL

## 2024-06-13 DIAGNOSIS — Z94.4 LIVER REPLACED BY TRANSPLANT: ICD-10-CM

## 2024-06-13 RX ORDER — TACROLIMUS 1 MG/1
2 CAPSULE ORAL EVERY 12 HOURS
Qty: 120 CAPSULE | Refills: 11 | Status: SHIPPED | OUTPATIENT
Start: 2024-06-13 | End: 2025-06-13

## 2024-06-24 LAB
EXT ALBUMIN: 4.2
EXT ALKALINE PHOSPHATASE: 48
EXT ALT: 22
EXT AST: 22
EXT BASOPHIL%: 0.5
EXT BILIRUBIN TOTAL: 0.6
EXT BUN: 27
EXT CALCIUM: 9.3
EXT CHLORIDE: 107
EXT CO2: 30
EXT CREATININE: 1.27 MG/DL
EXT EGFR NO RACE VARIABLE: 66
EXT EOSINOPHIL%: 3
EXT GLUCOSE: 119
EXT HEMATOCRIT: 38.5
EXT HEMOGLOBIN: 12.4
EXT LYMPH%: 20.3
EXT MAGNESIUM: 1.6
EXT MONOCYTES%: 7.4
EXT PLATELETS: 149
EXT POTASSIUM: 4.7
EXT PROTEIN TOTAL: 6.7
EXT SEGS%: 68.3
EXT SODIUM: 140 MMOL/L
EXT TACROLIMUS LVL: 5.2
EXT WBC: 6

## 2024-06-26 ENCOUNTER — TELEPHONE (OUTPATIENT)
Dept: TRANSPLANT | Facility: CLINIC | Age: 59
End: 2024-06-26
Payer: COMMERCIAL

## 2024-06-26 NOTE — TELEPHONE ENCOUNTER
Letter sent, labs stable and no medication changes are needed. Repeat labs due 9/23/24 per protocol.  ----- Message from Tito Mcclure MD sent at 6/26/2024 10:40 AM CDT -----  Results reviewed

## 2024-06-26 NOTE — LETTER
June 26, 2024    Pelon Christina  [address]  [place] [address]          Dear Pelon Vasquez:  MRN: [id number]    This is a follow up to your recent labs, your lab results were stable.  There are no medicine changes.  Please have your labs drawn again on 9/23/24.      If you cannot have your labs drawn on the scheduled date, it is your responsibility to call the transplant department to reschedule.  Please call [phone] and ask to speak to Meenakshi Grayson, Medical Assistant for all scheduling requests.     Sincerely,    Valorie  Your Liver Transplant Coordinator    [facility]  [address] [place] [address]  [phone]

## 2024-07-17 DIAGNOSIS — Z94.4 LIVER REPLACED BY TRANSPLANT: Primary | ICD-10-CM

## 2024-07-17 RX ORDER — PANTOPRAZOLE SODIUM 20 MG/1
20 TABLET, DELAYED RELEASE ORAL
Qty: 30 TABLET | Refills: 11 | Status: SHIPPED | OUTPATIENT
Start: 2024-07-17

## 2024-09-19 LAB
EXT ALBUMIN: 4.3
EXT ALKALINE PHOSPHATASE: 59
EXT ALT: 26
EXT AST: 24
EXT BASOPHIL%: 0.8
EXT BILIRUBIN TOTAL: 0.7
EXT BUN: 25
EXT CALCIUM: 9.9
EXT CHLORIDE: 105
EXT CO2: 29
EXT CREATININE: 1.38 MG/DL
EXT EGFR NO RACE VARIABLE: 59
EXT EOSINOPHIL%: 8.8
EXT GLUCOSE: 127
EXT HEMATOCRIT: 38.3
EXT HEMOGLOBIN: 12.6
EXT LYMPH%: 24.2
EXT MONOCYTES%: 11.1
EXT PLATELETS: 146
EXT POTASSIUM: 4.4
EXT PROTEIN TOTAL: 6.9
EXT SEGS%: 54.7
EXT SODIUM: 138 MMOL/L
EXT TACROLIMUS LVL: 5.9
EXT WBC: 5.1

## 2024-09-23 ENCOUNTER — TELEPHONE (OUTPATIENT)
Dept: TRANSPLANT | Facility: CLINIC | Age: 59
End: 2024-09-23
Payer: COMMERCIAL

## 2024-09-23 NOTE — LETTER
September 23, 2024    Pelon Vasquez  [address]  [place] [address]          Dear Pelon Vasquez:  MRN: [id number]    This is a follow up to your recent labs, your lab results were stable.  There are no medicine changes.  Please have your labs drawn again on 12/16/24.      If you cannot have your labs drawn on the scheduled date, it is your responsibility to call the transplant department to reschedule.  Please call [phone] and ask to speak to Celia Doss Medical Assistant for all scheduling requests.     Sincerely,    Valorie  Your Liver Transplant Coordinator    [facility]  [address] [place] [address]  [phone]

## 2024-09-23 NOTE — TELEPHONE ENCOUNTER
Letter sent, labs stable and no medication changes are needed. Repeat labs due 12/16/24 per protocol.  ----- Message from Tito Mcclure MD sent at 9/23/2024 10:10 AM CDT -----  Results reviewed

## 2024-12-18 ENCOUNTER — PATIENT MESSAGE (OUTPATIENT)
Dept: TRANSPLANT | Facility: CLINIC | Age: 59
End: 2024-12-18
Payer: COMMERCIAL

## 2024-12-20 ENCOUNTER — TELEPHONE (OUTPATIENT)
Dept: TRANSPLANT | Facility: CLINIC | Age: 59
End: 2024-12-20
Payer: COMMERCIAL

## 2024-12-20 NOTE — LETTER
December 20, 2024    Pelon Christina  [address]  [place] [address]          Dear Pelon Vasquez:  MRN: [id number]    Your lab work was due to be drawn on 12/16/24.  We contacted your lab and were unable to get test results.  It is very important to get your labs drawn as scheduled.  We cannot monitor you for rejection, infections, or drug toxicity side effects without lab results.  Please call us at [phone] as soon as possible to let us know when you plan to have labs drawn.    Sincerely,      Valorie  Your Liver Transplant Coordinator    [facility]  [address] [place] [address]  [phone]

## 2024-12-20 NOTE — Clinical Note
December 20, 2024    Pelon Vasquez  [address]  [place] [address]          Dear Pelon Vasquez:  MRN: [id number]    This is a follow up to your recent labs, your lab results were stable.  There are no medicine changes.  Please have your labs drawn again on ***.      If you cannot have your labs drawn on the scheduled date, it is your responsibility to call the transplant department to reschedule.  Please call [phone] and ask to speak to {scheduling specialists:[id number]} for all scheduling requests.     Sincerely,        Your Liver Transplant Coordinator    [facility]  [address] [place] [address]  [phone]

## 2024-12-23 DIAGNOSIS — Z94.4 LIVER REPLACED BY TRANSPLANT: Primary | ICD-10-CM

## 2024-12-31 ENCOUNTER — TELEPHONE (OUTPATIENT)
Dept: TRANSPLANT | Facility: CLINIC | Age: 59
End: 2024-12-31
Payer: COMMERCIAL

## 2024-12-31 NOTE — TELEPHONE ENCOUNTER
"----- Message from Valorie Mcgill sent at 12/31/2024 11:34 AM CST -----  You can let them know that orders are already in for [facility]. We don't need to send anything. I also notified them of this last week via [facility]. He needs to set an appointment with [facility].  ----- Message -----  From: Celia Doss MA  Sent: 12/31/2024  11:17 AM CST  To: Valorie Mcgill RN      ----- Message -----  From: Bryson Melchor  Sent: 12/31/2024  10:54 AM CST  To: [facility] Post-Liver Transplant Non-Clinical    Consult/Advisory    Name Of Caller: Leonarda Vicente (Significant other)    Contact Preference?:     [phone]        [facility] Fax  [phone]    Provider Name: Artem    Does patient feel the need to be seen today? No    What is the nature of the call?: Calling to discuss pt having to go to a "stand alone" lab now due to change of insurance. Requesting for all current lab orders to be sent to [facility]    Additional Notes:  "Thank you for all that you do for our patients"  "

## 2024-12-31 NOTE — TELEPHONE ENCOUNTER
Called patients spouse, left a  with information stating his labs have been sent over to lab [facility] and now they have to make an appointment with [facility]

## 2025-01-14 ENCOUNTER — TELEPHONE (OUTPATIENT)
Dept: TRANSPLANT | Facility: CLINIC | Age: 60
End: 2025-01-14
Payer: COMMERCIAL

## 2025-01-14 NOTE — LETTER
January 14, 2025    Pelon Vasquez  [address]  [place] [address]          Dear Pelon Vasquez:  MRN: [id number]    This is a follow up to your recent labs, your lab results were stable.  There are no medicine changes.  Please have your labs drawn again on 4/7/25 .      We have not seen you in clinic since 2023. Dr. Mcclure is asking that you call to make an appointment to be seen so we can continue to take care you and your transplant.     If you cannot have your labs drawn on the scheduled date, it is your responsibility to call the transplant department to reschedule.  Please call [phone] and ask to speak to Celia Doss Medical Assistant for all scheduling requests.     Sincerely,    Valorie  Your Liver Transplant Coordinator    [facility]  [address] [place] [address]  [phone]

## 2025-01-14 NOTE — TELEPHONE ENCOUNTER
Letter sent, labs stable and no medication changes are needed. Repeat labs due 4/7/25 per protocol.  ----- Message from Tito Mcclure MD sent at 1/14/2025  9:14 AM CST -----  Results reviewed

## 2025-04-22 ENCOUNTER — RESULTS FOLLOW-UP (OUTPATIENT)
Dept: HEPATOLOGY | Facility: CLINIC | Age: 60
End: 2025-04-22
Payer: COMMERCIAL

## 2025-04-22 DIAGNOSIS — Z94.4 S/P LIVER TRANSPLANT: Primary | ICD-10-CM

## 2025-04-23 ENCOUNTER — TELEPHONE (OUTPATIENT)
Dept: TRANSPLANT | Facility: CLINIC | Age: 60
End: 2025-04-23
Payer: COMMERCIAL

## 2025-04-23 NOTE — TELEPHONE ENCOUNTER
Labs faxed  ----- Message from Destiny sent at 4/23/2025 11:53 AM CDT -----  Regarding: Nurse maryanne  Contact: Contact @# [phone]  Reached out to nephrologist to see if they can get the test results and they cannot. [facility]   [phone]

## 2025-04-23 NOTE — TELEPHONE ENCOUNTER
Spoke with pt and significant other, Leonarda. Reviewed lab results. Pt states he recently saw a local nephrologist at [facility]. He forgot MD name but he has contact info. He states he will contact the office today to get a follow-up asap. He agreed to repeat labs Monday 4/28/25.    ----- Message from Tito Mcclure MD sent at 4/22/2025  2:48 PM CDT -----  Can we recheck his creatiine soon please?  Results reviewed    ----- Message -----  From: Joni Beck  Sent: 4/21/2025   7:08 PM CDT  To: Tito Mcclure MD

## 2025-04-30 ENCOUNTER — TELEPHONE (OUTPATIENT)
Dept: TRANSPLANT | Facility: CLINIC | Age: 60
End: 2025-04-30
Payer: COMMERCIAL

## 2025-04-30 NOTE — TELEPHONE ENCOUNTER
Spoke with Lesli. She would like pt's lab results from 4/29/25. Discussed with Lesli that we haven't gotten them just yet but will fax them as soon as I receive them. She agreed with plan.  ----- Message from Win Yang sent at 4/30/2025  3:29 PM CDT -----  Regarding: FW: Consult/Advisory  Contact: [phone]    ----- Message -----  From: Wyatt Miller  Sent: 4/30/2025   3:22 PM CDT  To: [facility] Post-Liver Transplant Non-Clinical  Subject: Consult/Advisory                                 Consult/Advisory Name Of Caller: Dr. Echols office [facility]  Contact Preference: # [phone] Fax# [phone] Nature of call: Calling to see if they can get the results from the pt last labs.

## 2025-05-09 ENCOUNTER — RESULTS FOLLOW-UP (OUTPATIENT)
Dept: HEPATOLOGY | Facility: CLINIC | Age: 60
End: 2025-05-09

## 2025-05-12 ENCOUNTER — TELEPHONE (OUTPATIENT)
Dept: TRANSPLANT | Facility: CLINIC | Age: 60
End: 2025-05-12
Payer: COMMERCIAL

## 2025-05-12 NOTE — TELEPHONE ENCOUNTER
Faxed to [phone]  CBC, CMP< and Tac level      ----- Message from Destiny sent at 5/12/2025 11:44 AM CDT -----  Regarding: send lab results  Contact:  #  [phone]  (Lesli)  Lesli called [facility] needing to have PT lab results sent to them as soon as possible. They were drawn on 5/7 FAX #  [phone]  #  [phone]  (Lesli)

## 2025-05-30 ENCOUNTER — TELEPHONE (OUTPATIENT)
Dept: TRANSPLANT | Facility: CLINIC | Age: 60
End: 2025-05-30
Payer: MEDICARE

## 2025-05-30 NOTE — TELEPHONE ENCOUNTER
----- Message from Ellen sent at 5/30/2025 10:51 AM CDT -----  Regarding: Appointment reschedul e  Contact: [phone]  Reschedule Existing Appointment Current appt date:6/11 Type of appt :[facility] Physician:Kami Reason for rescheduling:Having a kidney biopsy that day  Caller:Leonarda  Contact Preference:[phone]

## 2025-05-30 NOTE — TELEPHONE ENCOUNTER
Labs pending review by Dr. Mcclure.  Called to advise.  Voice message left to notify.  ----- Message from Ellen sent at 5/30/2025 10:47 AM CDT -----  Regarding: Medication Advice  Contact: [phone]  Type:  Needs Medical AdviceWho Called: Leonarda Requesting a call back to discuss his lab results and if his medication needs to be changed Would the patient rather a call back or a response via [facility]? Callback Best Call Back Number: [phone] Information:

## 2025-06-23 ENCOUNTER — RESULTS FOLLOW-UP (OUTPATIENT)
Dept: TRANSPLANT | Facility: CLINIC | Age: 60
End: 2025-06-23
Payer: MEDICARE

## 2025-06-23 NOTE — LETTER
June 24, 2025    Pelon Vasquez  [address]  [place] [address]          Dear Pelon Vasquez:  MRN: [id number]    This is a follow up to your recent labs, your lab results were stable.  There are no medicine changes.  Please have your labs drawn again on 9/22/25.      If you cannot have your labs drawn on the scheduled date, it is your responsibility to call the transplant department to reschedule.  Please call [phone] and ask to speak to Celia Doss Medical Assistant for all scheduling requests.     Sincerely,    Valorie  Your Liver Transplant Coordinator    [facility]  [address] [place] [address]  [phone]

## 2025-06-24 NOTE — TELEPHONE ENCOUNTER
Letter sent, labs stable and no medication changes are needed. Repeat labs due 9/22/25 per protocol.  ----- Message from Tito Mcclure MD sent at 6/23/2025  8:05 AM CDT -----  Results reviewed    ----- Message -----  From: Joni Beck  Sent: 6/21/2025   3:07 AM CDT  To: Tito Mcclure MD

## 2025-07-10 DIAGNOSIS — Z94.4 LIVER REPLACED BY TRANSPLANT: ICD-10-CM

## 2025-07-11 RX ORDER — TACROLIMUS 1 MG/1
CAPSULE ORAL
Qty: 120 CAPSULE | Refills: 9 | Status: SHIPPED | OUTPATIENT
Start: 2025-07-11

## 2025-07-11 NOTE — TELEPHONE ENCOUNTER
Prescription sent to Dr. Mcclure for signature.   Pita notified.  Secure chat sent to Dr. Mcclure asking that he sign as soon as possible.    Copied from CRM #[id number]. Topic: Medications - Medication Status Check   >> Jul 11, 2025  8:06 AM Aida wrote:  CONSULT/ADVISORY    Name of Caller:  Otoniel    Contact Preference: [phone] (home) [phone] (M)      Nature of Call: Caller would like a status check on the medication listed.    Marked as a pending medication unsure why   Would like a call back in order to confirm the medication will be at the pharmacy soon     Tacrolimus (PROGRAF) 1 MG Cap [Pharmacy Med Name: TACROLIMUS 1 MG CAPSULE] 120 capsule 9 7/10/2025 -

## 2025-08-14 ENCOUNTER — TELEPHONE (OUTPATIENT)
Dept: TRANSPLANT | Facility: CLINIC | Age: 60
End: 2025-08-14
Payer: MEDICARE

## (undated) DEVICE — ELECTRODE PAD DEFIB STERILE

## (undated) DEVICE — STAPLER SKIN PROXIMATE WIDE

## (undated) DEVICE — SUT SILK 0 STRANDS 30IN BLK

## (undated) DEVICE — NDL MONOPTY BIOPSY 14GX10CM

## (undated) DEVICE — SUT SILK 3-0 SH 18IN BLACK

## (undated) DEVICE — CLIP SPRING 6MM

## (undated) DEVICE — PAD K-THERMIA 24IN X 60IN

## (undated) DEVICE — TOWEL OR XRAY WHITE 17X26IN

## (undated) DEVICE — CONNECTOR TUBING STR 5 IN 1

## (undated) DEVICE — EVACUATOR WOUND BULB 100CC

## (undated) DEVICE — TRAY FOLEY 16FR INFECTION CONT

## (undated) DEVICE — KIT SAHARA DRAPE DRAW/LIFT

## (undated) DEVICE — SUT SILK 3-0 STRANDS 30IN

## (undated) DEVICE — SUT 3-0 12-18IN SILK

## (undated) DEVICE — SEE MEDLINE ITEM 156901

## (undated) DEVICE — WIPE ESENTA BARR STNG FREE 3ML

## (undated) DEVICE — BOOT AIR FLUID HEEL ADLT STD

## (undated) DEVICE — DRESSING AQUACEL SACRAL 9 X 9

## (undated) DEVICE — DRAPE BAG ISOLATION 20 X 20

## (undated) DEVICE — SET IV ADMIN 15DROP 3 CARESITE

## (undated) DEVICE — SUT PROLENE 5-0 36IN C-1

## (undated) DEVICE — CATH URETHRAL RED RUBBER 18FR

## (undated) DEVICE — SUT ETHILON 3-0 PS2 18 BLK

## (undated) DEVICE — TIP YANKAUERS BULB NO VENT

## (undated) DEVICE — DRAPE CORETEMP FLD WRM 56X62IN

## (undated) DEVICE — SUT PDS BV 6-0

## (undated) DEVICE — SUT 4-0 12-18IN SILK BLACK

## (undated) DEVICE — SOL NS 1000CC

## (undated) DEVICE — DRESSING ADH ISLAND 3.6 X 14

## (undated) DEVICE — SYR ONLY LUER LOCK 20CC

## (undated) DEVICE — SUT PROLENE 6-0 BV-1 30IN

## (undated) DEVICE — ELECTRODE REM PLYHSV RETURN 9

## (undated) DEVICE — SEE MEDLINE ITEM 157128

## (undated) DEVICE — SUT PROLENE 4-0 SH BLU 36IN

## (undated) DEVICE — TUBING NEPTUNE 2 SMOKE 10IN

## (undated) DEVICE — SEALER LIGASURE MARYLAND 23CM

## (undated) DEVICE — HEMOSTAT SURGICEL NU-KNIT 6X9

## (undated) DEVICE — DRESSING AQUACEL FOAM 3 X 3

## (undated) DEVICE — SUT SILK 2-0 STRANDS 30IN

## (undated) DEVICE — SUT 4-0 12-30IN SILK

## (undated) DEVICE — SET DECANTER MEDICHOICE

## (undated) DEVICE — GOWN SURGICAL X-LARGE

## (undated) DEVICE — SUT 1 36IN PDS II VIO MONO

## (undated) DEVICE — SUT PROLENE 3-0 SH DA 36 BL

## (undated) DEVICE — DRAPE STERI INSTRUMENT 1018

## (undated) DEVICE — BLADE 4 INCH EDGE UN-INS

## (undated) DEVICE — SUT 2/0 30IN SILK BLK BRAI

## (undated) DEVICE — SET EXTENSION STERILE 30IN

## (undated) DEVICE — HANDSET ARGON PLUS

## (undated) DEVICE — DRAIN CHANNEL ROUND 19FR

## (undated) DEVICE — SUT 2-0 12-18IN SILK

## (undated) DEVICE — COVER LIGHT HANDLE 80/CA